# Patient Record
Sex: FEMALE | Race: WHITE | Employment: OTHER | URBAN - METROPOLITAN AREA
[De-identification: names, ages, dates, MRNs, and addresses within clinical notes are randomized per-mention and may not be internally consistent; named-entity substitution may affect disease eponyms.]

---

## 2017-01-03 ENCOUNTER — TELEPHONE (OUTPATIENT)
Dept: PULMONOLOGY | Age: 77
End: 2017-01-03

## 2017-01-03 DIAGNOSIS — G47.33 OSA (OBSTRUCTIVE SLEEP APNEA): Primary | ICD-10-CM

## 2017-01-03 ASSESSMENT — PAIN DESCRIPTION - PAIN TYPE: TYPE: CHRONIC PAIN

## 2017-01-03 ASSESSMENT — PAIN DESCRIPTION - LOCATION: LOCATION: GENERALIZED

## 2017-01-03 ASSESSMENT — PAIN - FUNCTIONAL ASSESSMENT: PAIN_FUNCTIONAL_ASSESSMENT: 0-10

## 2017-01-06 ENCOUNTER — HOSPITAL ENCOUNTER (OUTPATIENT)
Dept: SURGERY | Age: 77
Discharge: HOME | End: 2017-01-06
Attending: SURGERY | Admitting: SURGERY

## 2017-01-06 VITALS
WEIGHT: 189 LBS | SYSTOLIC BLOOD PRESSURE: 144 MMHG | TEMPERATURE: 97.6 F | DIASTOLIC BLOOD PRESSURE: 56 MMHG | BODY MASS INDEX: 37.11 KG/M2 | RESPIRATION RATE: 19 BRPM | OXYGEN SATURATION: 98 % | HEART RATE: 83 BPM | HEIGHT: 60 IN

## 2017-01-06 LAB
AMYLASE: 26 U/L (ref 25–115)
ANION GAP SERPL CALCULATED.3IONS-SCNC: 12 MMOL/L (ref 3–16)
BUN BLDV-MCNC: 13 MG/DL (ref 7–20)
CALCIUM SERPL-MCNC: 10.2 MG/DL (ref 8.3–10.6)
CHLORIDE BLD-SCNC: 98 MMOL/L (ref 99–110)
CO2: 31 MMOL/L (ref 21–32)
CREAT SERPL-MCNC: 0.8 MG/DL (ref 0.6–1.2)
GFR AFRICAN AMERICAN: >60
GFR NON-AFRICAN AMERICAN: >60
GLUCOSE BLD-MCNC: 109 MG/DL (ref 70–99)
POTASSIUM SERPL-SCNC: 3.2 MMOL/L (ref 3.5–5.1)
SODIUM BLD-SCNC: 141 MMOL/L (ref 136–145)

## 2017-01-06 PROCEDURE — 47562 LAPAROSCOPIC CHOLECYSTECTOMY: CPT | Performed by: SURGERY

## 2017-01-06 PROCEDURE — 93010 ELECTROCARDIOGRAM REPORT: CPT | Performed by: INTERNAL MEDICINE

## 2017-01-06 RX ORDER — OXYCODONE HYDROCHLORIDE AND ACETAMINOPHEN 5; 325 MG/1; MG/1
2 TABLET ORAL PRN
Status: COMPLETED | OUTPATIENT
Start: 2017-01-06 | End: 2017-01-06

## 2017-01-06 RX ORDER — IPRATROPIUM BROMIDE AND ALBUTEROL SULFATE 2.5; .5 MG/3ML; MG/3ML
1 SOLUTION RESPIRATORY (INHALATION) ONCE
Status: COMPLETED | OUTPATIENT
Start: 2017-01-06 | End: 2017-01-06

## 2017-01-06 RX ORDER — SODIUM CHLORIDE, SODIUM LACTATE, POTASSIUM CHLORIDE, CALCIUM CHLORIDE 600; 310; 30; 20 MG/100ML; MG/100ML; MG/100ML; MG/100ML
INJECTION, SOLUTION INTRAVENOUS CONTINUOUS
Status: DISCONTINUED | OUTPATIENT
Start: 2017-01-06 | End: 2017-01-07 | Stop reason: HOSPADM

## 2017-01-06 RX ORDER — HYDROMORPHONE HCL 110MG/55ML
0.5 PATIENT CONTROLLED ANALGESIA SYRINGE INTRAVENOUS EVERY 5 MIN PRN
Status: COMPLETED | OUTPATIENT
Start: 2017-01-06 | End: 2017-01-06

## 2017-01-06 RX ORDER — SODIUM CHLORIDE 0.9 % (FLUSH) 0.9 %
10 SYRINGE (ML) INJECTION EVERY 12 HOURS SCHEDULED
Status: DISCONTINUED | OUTPATIENT
Start: 2017-01-06 | End: 2017-01-07 | Stop reason: HOSPADM

## 2017-01-06 RX ORDER — HEPARIN SODIUM 5000 [USP'U]/ML
5000 INJECTION, SOLUTION INTRAVENOUS; SUBCUTANEOUS ONCE
Status: COMPLETED | OUTPATIENT
Start: 2017-01-06 | End: 2017-01-06

## 2017-01-06 RX ORDER — LIDOCAINE HYDROCHLORIDE 10 MG/ML
1 INJECTION, SOLUTION EPIDURAL; INFILTRATION; INTRACAUDAL; PERINEURAL
Status: DISPENSED | OUTPATIENT
Start: 2017-01-06 | End: 2017-01-06

## 2017-01-06 RX ORDER — HYDROMORPHONE HCL 110MG/55ML
0.25 PATIENT CONTROLLED ANALGESIA SYRINGE INTRAVENOUS EVERY 5 MIN PRN
Status: DISCONTINUED | OUTPATIENT
Start: 2017-01-06 | End: 2017-01-07 | Stop reason: HOSPADM

## 2017-01-06 RX ORDER — HYDROCODONE BITARTRATE AND ACETAMINOPHEN 10; 325 MG/1; MG/1
1 TABLET ORAL EVERY 6 HOURS PRN
Qty: 30 TABLET | Refills: 0 | Status: SHIPPED | OUTPATIENT
Start: 2017-01-06 | End: 2019-10-08

## 2017-01-06 RX ORDER — SODIUM CHLORIDE 0.9 % (FLUSH) 0.9 %
10 SYRINGE (ML) INJECTION PRN
Status: DISCONTINUED | OUTPATIENT
Start: 2017-01-06 | End: 2017-01-07 | Stop reason: HOSPADM

## 2017-01-06 RX ORDER — HYDRALAZINE HYDROCHLORIDE 20 MG/ML
5 INJECTION INTRAMUSCULAR; INTRAVENOUS
Status: DISCONTINUED | OUTPATIENT
Start: 2017-01-06 | End: 2017-01-07 | Stop reason: HOSPADM

## 2017-01-06 RX ORDER — IPRATROPIUM BROMIDE AND ALBUTEROL SULFATE 2.5; .5 MG/3ML; MG/3ML
SOLUTION RESPIRATORY (INHALATION)
Status: COMPLETED
Start: 2017-01-06 | End: 2017-01-06

## 2017-01-06 RX ORDER — ONDANSETRON 2 MG/ML
4 INJECTION INTRAMUSCULAR; INTRAVENOUS
Status: COMPLETED | OUTPATIENT
Start: 2017-01-06 | End: 2017-01-06

## 2017-01-06 RX ORDER — MEPERIDINE HYDROCHLORIDE 25 MG/ML
12.5 INJECTION INTRAMUSCULAR; INTRAVENOUS; SUBCUTANEOUS EVERY 5 MIN PRN
Status: DISCONTINUED | OUTPATIENT
Start: 2017-01-06 | End: 2017-01-07 | Stop reason: HOSPADM

## 2017-01-06 RX ORDER — LABETALOL HYDROCHLORIDE 5 MG/ML
5 INJECTION, SOLUTION INTRAVENOUS EVERY 10 MIN PRN
Status: DISCONTINUED | OUTPATIENT
Start: 2017-01-06 | End: 2017-01-07 | Stop reason: HOSPADM

## 2017-01-06 RX ORDER — OXYCODONE HYDROCHLORIDE AND ACETAMINOPHEN 5; 325 MG/1; MG/1
1 TABLET ORAL PRN
Status: COMPLETED | OUTPATIENT
Start: 2017-01-06 | End: 2017-01-06

## 2017-01-06 RX ADMIN — Medication 0.25 MG: at 12:10

## 2017-01-06 RX ADMIN — Medication 0.25 MG: at 12:00

## 2017-01-06 RX ADMIN — Medication 0.5 MG: at 10:52

## 2017-01-06 RX ADMIN — ONDANSETRON 4 MG: 2 INJECTION INTRAMUSCULAR; INTRAVENOUS at 10:52

## 2017-01-06 RX ADMIN — HEPARIN SODIUM 5000 UNITS: 5000 INJECTION, SOLUTION INTRAVENOUS; SUBCUTANEOUS at 09:03

## 2017-01-06 RX ADMIN — IPRATROPIUM BROMIDE AND ALBUTEROL SULFATE 1 AMPULE: 2.5; .5 SOLUTION RESPIRATORY (INHALATION) at 11:44

## 2017-01-06 RX ADMIN — MEPERIDINE HYDROCHLORIDE 12.5 MG: 25 INJECTION INTRAMUSCULAR; INTRAVENOUS; SUBCUTANEOUS at 11:07

## 2017-01-06 RX ADMIN — Medication 0.5 MG: at 11:10

## 2017-01-06 RX ADMIN — Medication 0.5 MG: at 11:07

## 2017-01-06 RX ADMIN — Medication 0.5 MG: at 11:23

## 2017-01-06 RX ADMIN — OXYCODONE HYDROCHLORIDE AND ACETAMINOPHEN 2 TABLET: 5; 325 TABLET ORAL at 11:40

## 2017-01-06 RX ADMIN — Medication 0.25 MG: at 12:15

## 2017-01-06 RX ADMIN — MEPERIDINE HYDROCHLORIDE 12.5 MG: 25 INJECTION INTRAMUSCULAR; INTRAVENOUS; SUBCUTANEOUS at 10:52

## 2017-01-06 RX ADMIN — SODIUM CHLORIDE, SODIUM LACTATE, POTASSIUM CHLORIDE, CALCIUM CHLORIDE: 600; 310; 30; 20 INJECTION, SOLUTION INTRAVENOUS at 09:05

## 2017-01-06 ASSESSMENT — PAIN DESCRIPTION - DESCRIPTORS: DESCRIPTORS: CONSTANT

## 2017-01-06 ASSESSMENT — PAIN SCALES - GENERAL
PAINLEVEL_OUTOF10: 7
PAINLEVEL_OUTOF10: 9
PAINLEVEL_OUTOF10: 5
PAINLEVEL_OUTOF10: 7
PAINLEVEL_OUTOF10: 8
PAINLEVEL_OUTOF10: 4
PAINLEVEL_OUTOF10: 8
PAINLEVEL_OUTOF10: 6
PAINLEVEL_OUTOF10: 8
PAINLEVEL_OUTOF10: 7
PAINLEVEL_OUTOF10: 8

## 2017-01-06 ASSESSMENT — PAIN - FUNCTIONAL ASSESSMENT: PAIN_FUNCTIONAL_ASSESSMENT: 0-10

## 2017-01-10 LAB
EKG ATRIAL RATE: 68 BPM
EKG DIAGNOSIS: NORMAL
EKG P AXIS: 24 DEGREES
EKG P-R INTERVAL: 162 MS
EKG Q-T INTERVAL: 444 MS
EKG QRS DURATION: 96 MS
EKG QTC CALCULATION (BAZETT): 472 MS
EKG R AXIS: -21 DEGREES
EKG T AXIS: -6 DEGREES
EKG VENTRICULAR RATE: 68 BPM

## 2017-01-16 RX ORDER — HYDROCHLOROTHIAZIDE 25 MG/1
TABLET ORAL
Qty: 30 TABLET | Refills: 2 | Status: SHIPPED | OUTPATIENT
Start: 2017-01-16 | End: 2017-04-19 | Stop reason: SDUPTHER

## 2017-01-19 ENCOUNTER — OFFICE VISIT (OUTPATIENT)
Dept: SURGERY | Age: 77
End: 2017-01-19

## 2017-01-19 VITALS
SYSTOLIC BLOOD PRESSURE: 126 MMHG | DIASTOLIC BLOOD PRESSURE: 70 MMHG | BODY MASS INDEX: 37.11 KG/M2 | HEIGHT: 60 IN | WEIGHT: 189 LBS

## 2017-01-19 DIAGNOSIS — Z98.890 POST-OPERATIVE STATE: Primary | ICD-10-CM

## 2017-01-19 PROCEDURE — 99024 POSTOP FOLLOW-UP VISIT: CPT | Performed by: SURGERY

## 2017-01-26 RX ORDER — TRIAMCINOLONE ACETONIDE 1 MG/G
CREAM TOPICAL
Qty: 454 G | Refills: 0 | Status: SHIPPED | OUTPATIENT
Start: 2017-01-26 | End: 2019-01-21

## 2017-02-08 ENCOUNTER — TELEPHONE (OUTPATIENT)
Dept: FAMILY MEDICINE CLINIC | Age: 77
End: 2017-02-08

## 2017-02-08 DIAGNOSIS — I10 ESSENTIAL HYPERTENSION: ICD-10-CM

## 2017-02-08 RX ORDER — CETIRIZINE HYDROCHLORIDE 10 MG/1
TABLET ORAL
Qty: 30 TABLET | Refills: 5 | Status: SHIPPED | OUTPATIENT
Start: 2017-02-08 | End: 2017-09-15 | Stop reason: SDUPTHER

## 2017-02-08 RX ORDER — TRAZODONE HYDROCHLORIDE 100 MG/1
TABLET ORAL
Qty: 60 TABLET | Refills: 5 | Status: SHIPPED | OUTPATIENT
Start: 2017-02-08 | End: 2017-08-21 | Stop reason: SDUPTHER

## 2017-02-08 RX ORDER — CETIRIZINE HYDROCHLORIDE 10 MG/1
TABLET ORAL
Qty: 30 TABLET | Refills: 3 | Status: SHIPPED | OUTPATIENT
Start: 2017-02-08 | End: 2017-02-08 | Stop reason: SDUPTHER

## 2017-02-08 RX ORDER — AMLODIPINE BESYLATE 10 MG/1
TABLET ORAL
Qty: 30 TABLET | Refills: 5 | Status: SHIPPED | OUTPATIENT
Start: 2017-02-08

## 2017-02-10 ENCOUNTER — OFFICE VISIT (OUTPATIENT)
Dept: PULMONOLOGY | Age: 77
End: 2017-02-10

## 2017-02-10 VITALS
SYSTOLIC BLOOD PRESSURE: 116 MMHG | RESPIRATION RATE: 18 BRPM | TEMPERATURE: 97.6 F | OXYGEN SATURATION: 95 % | DIASTOLIC BLOOD PRESSURE: 70 MMHG | WEIGHT: 190.2 LBS | HEART RATE: 70 BPM | HEIGHT: 60 IN | BODY MASS INDEX: 37.34 KG/M2

## 2017-02-10 DIAGNOSIS — G47.33 OSA (OBSTRUCTIVE SLEEP APNEA): Primary | ICD-10-CM

## 2017-02-10 DIAGNOSIS — E66.9 OBESITY (BMI 30-39.9): ICD-10-CM

## 2017-02-10 DIAGNOSIS — Z78.9 DIFFICULTY WITH BIPAP USE: ICD-10-CM

## 2017-02-10 DIAGNOSIS — R68.2 DRY MOUTH: ICD-10-CM

## 2017-02-10 PROCEDURE — G8427 DOCREV CUR MEDS BY ELIG CLIN: HCPCS | Performed by: NURSE PRACTITIONER

## 2017-02-10 PROCEDURE — G8399 PT W/DXA RESULTS DOCUMENT: HCPCS | Performed by: NURSE PRACTITIONER

## 2017-02-10 PROCEDURE — 1036F TOBACCO NON-USER: CPT | Performed by: NURSE PRACTITIONER

## 2017-02-10 PROCEDURE — G8484 FLU IMMUNIZE NO ADMIN: HCPCS | Performed by: NURSE PRACTITIONER

## 2017-02-10 PROCEDURE — 4040F PNEUMOC VAC/ADMIN/RCVD: CPT | Performed by: NURSE PRACTITIONER

## 2017-02-10 PROCEDURE — 1123F ACP DISCUSS/DSCN MKR DOCD: CPT | Performed by: NURSE PRACTITIONER

## 2017-02-10 PROCEDURE — 1090F PRES/ABSN URINE INCON ASSESS: CPT | Performed by: NURSE PRACTITIONER

## 2017-02-10 PROCEDURE — G8417 CALC BMI ABV UP PARAM F/U: HCPCS | Performed by: NURSE PRACTITIONER

## 2017-02-10 PROCEDURE — G8598 ASA/ANTIPLAT THER USED: HCPCS | Performed by: NURSE PRACTITIONER

## 2017-02-10 PROCEDURE — 99213 OFFICE O/P EST LOW 20 MIN: CPT | Performed by: NURSE PRACTITIONER

## 2017-02-10 ASSESSMENT — SLEEP AND FATIGUE QUESTIONNAIRES
HOW LIKELY ARE YOU TO NOD OFF OR FALL ASLEEP WHILE SITTING AND TALKING TO SOMEONE: 0
HOW LIKELY ARE YOU TO NOD OFF OR FALL ASLEEP IN A CAR, WHILE STOPPED FOR A FEW MINUTES IN TRAFFIC: 0
ESS TOTAL SCORE: 3
HOW LIKELY ARE YOU TO NOD OFF OR FALL ASLEEP WHEN YOU ARE A PASSENGER IN A CAR FOR AN HOUR WITHOUT A BREAK: 0
HOW LIKELY ARE YOU TO NOD OFF OR FALL ASLEEP WHILE WATCHING TV: 2
HOW LIKELY ARE YOU TO NOD OFF OR FALL ASLEEP WHILE SITTING QUIETLY AFTER LUNCH WITHOUT ALCOHOL: 0
HOW LIKELY ARE YOU TO NOD OFF OR FALL ASLEEP WHILE SITTING AND READING: 1
HOW LIKELY ARE YOU TO NOD OFF OR FALL ASLEEP WHILE LYING DOWN TO REST IN THE AFTERNOON WHEN CIRCUMSTANCES PERMIT: 0
HOW LIKELY ARE YOU TO NOD OFF OR FALL ASLEEP WHILE SITTING INACTIVE IN A PUBLIC PLACE: 0
NECK CIRCUMFERENCE (INCHES): 14

## 2017-02-14 ENCOUNTER — TELEPHONE (OUTPATIENT)
Dept: ORTHOPEDIC SURGERY | Age: 77
End: 2017-02-14

## 2017-02-14 RX ORDER — CITALOPRAM 20 MG/1
TABLET ORAL
Qty: 30 TABLET | Refills: 5 | Status: SHIPPED | OUTPATIENT
Start: 2017-02-14 | End: 2017-10-16 | Stop reason: SDUPTHER

## 2017-02-15 ENCOUNTER — OFFICE VISIT (OUTPATIENT)
Dept: FAMILY MEDICINE CLINIC | Age: 77
End: 2017-02-15

## 2017-02-15 VITALS
HEIGHT: 60 IN | WEIGHT: 191 LBS | HEART RATE: 65 BPM | DIASTOLIC BLOOD PRESSURE: 70 MMHG | BODY MASS INDEX: 37.5 KG/M2 | SYSTOLIC BLOOD PRESSURE: 120 MMHG | OXYGEN SATURATION: 97 %

## 2017-02-15 DIAGNOSIS — G47.33 OSA (OBSTRUCTIVE SLEEP APNEA): ICD-10-CM

## 2017-02-15 DIAGNOSIS — G44.221 CHRONIC TENSION-TYPE HEADACHE, INTRACTABLE: ICD-10-CM

## 2017-02-15 DIAGNOSIS — I10 ESSENTIAL HYPERTENSION: Primary | ICD-10-CM

## 2017-02-15 PROCEDURE — 1090F PRES/ABSN URINE INCON ASSESS: CPT | Performed by: FAMILY MEDICINE

## 2017-02-15 PROCEDURE — G8399 PT W/DXA RESULTS DOCUMENT: HCPCS | Performed by: FAMILY MEDICINE

## 2017-02-15 PROCEDURE — 4040F PNEUMOC VAC/ADMIN/RCVD: CPT | Performed by: FAMILY MEDICINE

## 2017-02-15 PROCEDURE — G8417 CALC BMI ABV UP PARAM F/U: HCPCS | Performed by: FAMILY MEDICINE

## 2017-02-15 PROCEDURE — G8484 FLU IMMUNIZE NO ADMIN: HCPCS | Performed by: FAMILY MEDICINE

## 2017-02-15 PROCEDURE — G8427 DOCREV CUR MEDS BY ELIG CLIN: HCPCS | Performed by: FAMILY MEDICINE

## 2017-02-15 PROCEDURE — G8598 ASA/ANTIPLAT THER USED: HCPCS | Performed by: FAMILY MEDICINE

## 2017-02-15 PROCEDURE — 1036F TOBACCO NON-USER: CPT | Performed by: FAMILY MEDICINE

## 2017-02-15 PROCEDURE — 99214 OFFICE O/P EST MOD 30 MIN: CPT | Performed by: FAMILY MEDICINE

## 2017-02-15 PROCEDURE — 1123F ACP DISCUSS/DSCN MKR DOCD: CPT | Performed by: FAMILY MEDICINE

## 2017-02-15 RX ORDER — AMITRIPTYLINE HYDROCHLORIDE 50 MG/1
50 TABLET, FILM COATED ORAL NIGHTLY
Qty: 30 TABLET | Refills: 5 | Status: SHIPPED | OUTPATIENT
Start: 2017-02-15 | End: 2017-09-05 | Stop reason: SDUPTHER

## 2017-02-15 ASSESSMENT — ENCOUNTER SYMPTOMS
BACK PAIN: 1
GASTROINTESTINAL NEGATIVE: 1
RESPIRATORY NEGATIVE: 1

## 2017-02-20 RX ORDER — AMITRIPTYLINE HYDROCHLORIDE 25 MG/1
TABLET, FILM COATED ORAL
Qty: 30 TABLET | Refills: 5 | OUTPATIENT
Start: 2017-02-20

## 2017-02-22 ENCOUNTER — TELEPHONE (OUTPATIENT)
Dept: ORTHOPEDIC SURGERY | Age: 77
End: 2017-02-22

## 2017-02-28 ENCOUNTER — OFFICE VISIT (OUTPATIENT)
Dept: SURGERY | Age: 77
End: 2017-02-28

## 2017-02-28 ENCOUNTER — HOSPITAL ENCOUNTER (OUTPATIENT)
Dept: OTHER | Age: 77
Discharge: HOME | End: 2017-02-28
Attending: SURGERY | Admitting: SURGERY

## 2017-02-28 VITALS
SYSTOLIC BLOOD PRESSURE: 138 MMHG | DIASTOLIC BLOOD PRESSURE: 74 MMHG | BODY MASS INDEX: 36.91 KG/M2 | HEIGHT: 60 IN | WEIGHT: 188 LBS

## 2017-02-28 DIAGNOSIS — R10.13 EPIGASTRIC PAIN: ICD-10-CM

## 2017-02-28 DIAGNOSIS — Z98.890 POST-OPERATIVE STATE: Primary | ICD-10-CM

## 2017-02-28 LAB
ALBUMIN SERPL-MCNC: 4.3 G/DL (ref 3.4–5)
ALP BLD-CCNC: 109 U/L (ref 40–129)
ALT SERPL-CCNC: 21 U/L (ref 10–40)
ANION GAP SERPL CALCULATED.3IONS-SCNC: 18 MMOL/L (ref 3–16)
AST SERPL-CCNC: 26 U/L (ref 15–37)
BASOPHILS ABSOLUTE: 0.1 K/UL (ref 0–0.2)
BASOPHILS RELATIVE PERCENT: 0.7 %
BILIRUB SERPL-MCNC: <0.2 MG/DL (ref 0–1)
BILIRUBIN DIRECT: <0.2 MG/DL (ref 0–0.3)
BILIRUBIN, INDIRECT: NORMAL MG/DL (ref 0–1)
BUN BLDV-MCNC: 20 MG/DL (ref 7–20)
CALCIUM SERPL-MCNC: 9.5 MG/DL (ref 8.3–10.6)
CHLORIDE BLD-SCNC: 99 MMOL/L (ref 99–110)
CO2: 26 MMOL/L (ref 21–32)
CREAT SERPL-MCNC: 0.9 MG/DL (ref 0.6–1.2)
EOSINOPHILS ABSOLUTE: 0.1 K/UL (ref 0–0.6)
EOSINOPHILS RELATIVE PERCENT: 0.9 %
GFR AFRICAN AMERICAN: >60
GFR NON-AFRICAN AMERICAN: >60
GLUCOSE BLD-MCNC: 93 MG/DL (ref 70–99)
HCT VFR BLD CALC: 43.9 % (ref 36–48)
HEMOGLOBIN: 13.9 G/DL (ref 12–16)
LYMPHOCYTES ABSOLUTE: 2.8 K/UL (ref 1–5.1)
LYMPHOCYTES RELATIVE PERCENT: 24.4 %
MCH RBC QN AUTO: 26.1 PG (ref 26–34)
MCHC RBC AUTO-ENTMCNC: 31.7 G/DL (ref 31–36)
MCV RBC AUTO: 82.5 FL (ref 80–100)
MONOCYTES ABSOLUTE: 0.8 K/UL (ref 0–1.3)
MONOCYTES RELATIVE PERCENT: 6.7 %
NEUTROPHILS ABSOLUTE: 7.7 K/UL (ref 1.7–7.7)
NEUTROPHILS RELATIVE PERCENT: 67.3 %
PDW BLD-RTO: 14.6 % (ref 12.4–15.4)
PLATELET # BLD: 217 K/UL (ref 135–450)
PMV BLD AUTO: 8.1 FL (ref 5–10.5)
POTASSIUM SERPL-SCNC: 4.3 MMOL/L (ref 3.5–5.1)
RBC # BLD: 5.33 M/UL (ref 4–5.2)
SODIUM BLD-SCNC: 143 MMOL/L (ref 136–145)
TOTAL PROTEIN: 7.4 G/DL (ref 6.4–8.2)
WBC # BLD: 11.4 K/UL (ref 4–11)

## 2017-02-28 PROCEDURE — 99024 POSTOP FOLLOW-UP VISIT: CPT | Performed by: SURGERY

## 2017-03-02 ENCOUNTER — TELEPHONE (OUTPATIENT)
Dept: SURGERY | Age: 77
End: 2017-03-02

## 2017-03-06 DIAGNOSIS — I10 ESSENTIAL HYPERTENSION: ICD-10-CM

## 2017-03-06 RX ORDER — AMLODIPINE BESYLATE 10 MG/1
TABLET ORAL
Qty: 30 TABLET | Refills: 5 | OUTPATIENT
Start: 2017-03-06

## 2017-03-17 ENCOUNTER — OFFICE VISIT (OUTPATIENT)
Dept: PULMONOLOGY | Age: 77
End: 2017-03-17

## 2017-03-17 VITALS
OXYGEN SATURATION: 97 % | BODY MASS INDEX: 38.3 KG/M2 | RESPIRATION RATE: 18 BRPM | DIASTOLIC BLOOD PRESSURE: 73 MMHG | SYSTOLIC BLOOD PRESSURE: 132 MMHG | WEIGHT: 190 LBS | HEART RATE: 67 BPM | HEIGHT: 59 IN | TEMPERATURE: 97.4 F

## 2017-03-17 DIAGNOSIS — G47.33 OSA (OBSTRUCTIVE SLEEP APNEA): Primary | ICD-10-CM

## 2017-03-17 DIAGNOSIS — E66.9 OBESITY (BMI 30-39.9): ICD-10-CM

## 2017-03-17 PROCEDURE — 1090F PRES/ABSN URINE INCON ASSESS: CPT | Performed by: NURSE PRACTITIONER

## 2017-03-17 PROCEDURE — G8484 FLU IMMUNIZE NO ADMIN: HCPCS | Performed by: NURSE PRACTITIONER

## 2017-03-17 PROCEDURE — 99213 OFFICE O/P EST LOW 20 MIN: CPT | Performed by: NURSE PRACTITIONER

## 2017-03-17 PROCEDURE — G8399 PT W/DXA RESULTS DOCUMENT: HCPCS | Performed by: NURSE PRACTITIONER

## 2017-03-17 PROCEDURE — 1123F ACP DISCUSS/DSCN MKR DOCD: CPT | Performed by: NURSE PRACTITIONER

## 2017-03-17 PROCEDURE — G8417 CALC BMI ABV UP PARAM F/U: HCPCS | Performed by: NURSE PRACTITIONER

## 2017-03-17 PROCEDURE — G8427 DOCREV CUR MEDS BY ELIG CLIN: HCPCS | Performed by: NURSE PRACTITIONER

## 2017-03-17 PROCEDURE — 1036F TOBACCO NON-USER: CPT | Performed by: NURSE PRACTITIONER

## 2017-03-17 PROCEDURE — 4040F PNEUMOC VAC/ADMIN/RCVD: CPT | Performed by: NURSE PRACTITIONER

## 2017-03-17 PROCEDURE — G8598 ASA/ANTIPLAT THER USED: HCPCS | Performed by: NURSE PRACTITIONER

## 2017-03-17 ASSESSMENT — SLEEP AND FATIGUE QUESTIONNAIRES
NECK CIRCUMFERENCE (INCHES): 14.5
ESS TOTAL SCORE: 1
HOW LIKELY ARE YOU TO NOD OFF OR FALL ASLEEP WHILE LYING DOWN TO REST IN THE AFTERNOON WHEN CIRCUMSTANCES PERMIT: 0
HOW LIKELY ARE YOU TO NOD OFF OR FALL ASLEEP WHILE WATCHING TV: 0
HOW LIKELY ARE YOU TO NOD OFF OR FALL ASLEEP WHILE SITTING QUIETLY AFTER LUNCH WITHOUT ALCOHOL: 0
HOW LIKELY ARE YOU TO NOD OFF OR FALL ASLEEP WHILE SITTING INACTIVE IN A PUBLIC PLACE: 0
HOW LIKELY ARE YOU TO NOD OFF OR FALL ASLEEP IN A CAR, WHILE STOPPED FOR A FEW MINUTES IN TRAFFIC: 0
HOW LIKELY ARE YOU TO NOD OFF OR FALL ASLEEP WHEN YOU ARE A PASSENGER IN A CAR FOR AN HOUR WITHOUT A BREAK: 0
HOW LIKELY ARE YOU TO NOD OFF OR FALL ASLEEP WHILE SITTING AND TALKING TO SOMEONE: 0
HOW LIKELY ARE YOU TO NOD OFF OR FALL ASLEEP WHILE SITTING AND READING: 1

## 2017-03-22 RX ORDER — BUTALBITAL, ACETAMINOPHEN AND CAFFEINE 50; 325; 40 MG/1; MG/1; MG/1
1 TABLET ORAL EVERY 6 HOURS PRN
Qty: 30 TABLET | Refills: 0 | Status: SHIPPED | OUTPATIENT
Start: 2017-03-22 | End: 2017-04-24 | Stop reason: SDUPTHER

## 2017-03-23 ENCOUNTER — HOSPITAL ENCOUNTER (OUTPATIENT)
Dept: SURGERY | Age: 77
Discharge: HOME | End: 2017-03-23
Attending: INTERNAL MEDICINE | Admitting: INTERNAL MEDICINE

## 2017-03-23 VITALS
BODY MASS INDEX: 38.3 KG/M2 | TEMPERATURE: 97.4 F | SYSTOLIC BLOOD PRESSURE: 138 MMHG | OXYGEN SATURATION: 95 % | DIASTOLIC BLOOD PRESSURE: 53 MMHG | HEIGHT: 59 IN | WEIGHT: 190 LBS | HEART RATE: 60 BPM | RESPIRATION RATE: 18 BRPM

## 2017-03-23 RX ORDER — CALCIUM CARBONATE 200(500)MG
1 TABLET,CHEWABLE ORAL DAILY
COMMUNITY
End: 2019-01-05

## 2017-03-23 RX ORDER — SODIUM CHLORIDE, SODIUM LACTATE, POTASSIUM CHLORIDE, CALCIUM CHLORIDE 600; 310; 30; 20 MG/100ML; MG/100ML; MG/100ML; MG/100ML
INJECTION, SOLUTION INTRAVENOUS ONCE
Status: COMPLETED | OUTPATIENT
Start: 2017-03-23 | End: 2017-03-23

## 2017-03-23 RX ORDER — LIDOCAINE HYDROCHLORIDE 10 MG/ML
0.1 INJECTION, SOLUTION EPIDURAL; INFILTRATION; INTRACAUDAL; PERINEURAL
Status: ACTIVE | OUTPATIENT
Start: 2017-03-23 | End: 2017-03-23

## 2017-03-23 RX ADMIN — SODIUM CHLORIDE, SODIUM LACTATE, POTASSIUM CHLORIDE, CALCIUM CHLORIDE: 600; 310; 30; 20 INJECTION, SOLUTION INTRAVENOUS at 10:16

## 2017-03-23 ASSESSMENT — PAIN DESCRIPTION - DESCRIPTORS: DESCRIPTORS: DISCOMFORT;DULL

## 2017-03-23 ASSESSMENT — PAIN SCALES - GENERAL
PAINLEVEL_OUTOF10: 0

## 2017-03-23 ASSESSMENT — PAIN - FUNCTIONAL ASSESSMENT: PAIN_FUNCTIONAL_ASSESSMENT: 0-10

## 2017-04-18 ENCOUNTER — TELEPHONE (OUTPATIENT)
Dept: FAMILY MEDICINE CLINIC | Age: 77
End: 2017-04-18

## 2017-04-18 ENCOUNTER — HOSPITAL ENCOUNTER (OUTPATIENT)
Dept: SLEEP MEDICINE | Age: 77
Discharge: HOME | End: 2017-04-20
Attending: NURSE PRACTITIONER | Admitting: NURSE PRACTITIONER

## 2017-04-18 DIAGNOSIS — G47.33 OSA (OBSTRUCTIVE SLEEP APNEA): ICD-10-CM

## 2017-04-19 RX ORDER — HYDROCHLOROTHIAZIDE 25 MG/1
TABLET ORAL
Qty: 60 TABLET | Refills: 1 | Status: SHIPPED | OUTPATIENT
Start: 2017-04-19 | End: 2017-09-08 | Stop reason: SDUPTHER

## 2017-04-20 DIAGNOSIS — G47.33 OSA (OBSTRUCTIVE SLEEP APNEA): Primary | ICD-10-CM

## 2017-04-27 ENCOUNTER — TELEPHONE (OUTPATIENT)
Dept: PULMONOLOGY | Age: 77
End: 2017-04-27

## 2017-04-27 DIAGNOSIS — G47.33 OSA (OBSTRUCTIVE SLEEP APNEA): ICD-10-CM

## 2017-04-27 DIAGNOSIS — G47.31 CSA (CENTRAL SLEEP APNEA): Primary | ICD-10-CM

## 2017-05-04 ENCOUNTER — TELEPHONE (OUTPATIENT)
Dept: PULMONOLOGY | Age: 77
End: 2017-05-04

## 2017-05-04 DIAGNOSIS — G47.33 OSA (OBSTRUCTIVE SLEEP APNEA): Primary | ICD-10-CM

## 2017-05-09 ENCOUNTER — TELEPHONE (OUTPATIENT)
Dept: CARDIOLOGY CLINIC | Age: 77
End: 2017-05-09

## 2017-05-18 ENCOUNTER — OFFICE VISIT (OUTPATIENT)
Dept: FAMILY MEDICINE CLINIC | Age: 77
End: 2017-05-18

## 2017-05-18 VITALS
OXYGEN SATURATION: 94 % | HEART RATE: 94 BPM | BODY MASS INDEX: 36.91 KG/M2 | WEIGHT: 188 LBS | DIASTOLIC BLOOD PRESSURE: 74 MMHG | HEIGHT: 60 IN | SYSTOLIC BLOOD PRESSURE: 130 MMHG

## 2017-05-18 DIAGNOSIS — G44.221 CHRONIC TENSION-TYPE HEADACHE, INTRACTABLE: ICD-10-CM

## 2017-05-18 DIAGNOSIS — I10 ESSENTIAL HYPERTENSION: Primary | ICD-10-CM

## 2017-05-18 DIAGNOSIS — F51.01 PRIMARY INSOMNIA: ICD-10-CM

## 2017-05-18 PROCEDURE — 1036F TOBACCO NON-USER: CPT | Performed by: FAMILY MEDICINE

## 2017-05-18 PROCEDURE — G8427 DOCREV CUR MEDS BY ELIG CLIN: HCPCS | Performed by: FAMILY MEDICINE

## 2017-05-18 PROCEDURE — G8399 PT W/DXA RESULTS DOCUMENT: HCPCS | Performed by: FAMILY MEDICINE

## 2017-05-18 PROCEDURE — G8598 ASA/ANTIPLAT THER USED: HCPCS | Performed by: FAMILY MEDICINE

## 2017-05-18 PROCEDURE — G8417 CALC BMI ABV UP PARAM F/U: HCPCS | Performed by: FAMILY MEDICINE

## 2017-05-18 PROCEDURE — 1123F ACP DISCUSS/DSCN MKR DOCD: CPT | Performed by: FAMILY MEDICINE

## 2017-05-18 PROCEDURE — 99214 OFFICE O/P EST MOD 30 MIN: CPT | Performed by: FAMILY MEDICINE

## 2017-05-18 PROCEDURE — 4040F PNEUMOC VAC/ADMIN/RCVD: CPT | Performed by: FAMILY MEDICINE

## 2017-05-18 PROCEDURE — 1090F PRES/ABSN URINE INCON ASSESS: CPT | Performed by: FAMILY MEDICINE

## 2017-05-18 ASSESSMENT — ENCOUNTER SYMPTOMS: GASTROINTESTINAL NEGATIVE: 1

## 2017-05-22 RX ORDER — BUTALBITAL, ACETAMINOPHEN AND CAFFEINE 50; 325; 40 MG/1; MG/1; MG/1
TABLET ORAL
Qty: 30 TABLET | Refills: 0 | Status: SHIPPED | OUTPATIENT
Start: 2017-05-22 | End: 2017-10-20 | Stop reason: SDUPTHER

## 2017-06-06 ENCOUNTER — TELEPHONE (OUTPATIENT)
Dept: PULMONOLOGY | Age: 77
End: 2017-06-06

## 2017-06-13 ENCOUNTER — OFFICE VISIT (OUTPATIENT)
Dept: PULMONOLOGY | Age: 77
End: 2017-06-13

## 2017-06-13 VITALS
RESPIRATION RATE: 16 BRPM | WEIGHT: 188 LBS | HEIGHT: 59 IN | DIASTOLIC BLOOD PRESSURE: 74 MMHG | BODY MASS INDEX: 37.9 KG/M2 | OXYGEN SATURATION: 97 % | HEART RATE: 84 BPM | TEMPERATURE: 97 F | SYSTOLIC BLOOD PRESSURE: 136 MMHG

## 2017-06-13 DIAGNOSIS — E66.9 OBESITY (BMI 30-39.9): ICD-10-CM

## 2017-06-13 DIAGNOSIS — Z78.9 DIFFICULTY WITH BIPAP USE: ICD-10-CM

## 2017-06-13 DIAGNOSIS — G47.09 OTHER INSOMNIA: ICD-10-CM

## 2017-06-13 DIAGNOSIS — G47.33 OSA (OBSTRUCTIVE SLEEP APNEA): Primary | ICD-10-CM

## 2017-06-13 PROCEDURE — 99213 OFFICE O/P EST LOW 20 MIN: CPT | Performed by: NURSE PRACTITIONER

## 2017-06-13 RX ORDER — CLOPIDOGREL BISULFATE 75 MG/1
75 TABLET ORAL
COMMUNITY
Start: 2017-05-20 | End: 2019-01-08

## 2017-06-13 RX ORDER — LIDOCAINE 50 MG/G
OINTMENT TOPICAL
COMMUNITY
Start: 2016-08-17 | End: 2019-01-05

## 2017-06-13 ASSESSMENT — SLEEP AND FATIGUE QUESTIONNAIRES
HOW LIKELY ARE YOU TO NOD OFF OR FALL ASLEEP IN A CAR, WHILE STOPPED FOR A FEW MINUTES IN TRAFFIC: 0
HOW LIKELY ARE YOU TO NOD OFF OR FALL ASLEEP WHILE SITTING AND TALKING TO SOMEONE: 0
HOW LIKELY ARE YOU TO NOD OFF OR FALL ASLEEP WHILE LYING DOWN TO REST IN THE AFTERNOON WHEN CIRCUMSTANCES PERMIT: 0
HOW LIKELY ARE YOU TO NOD OFF OR FALL ASLEEP WHILE SITTING QUIETLY AFTER LUNCH WITHOUT ALCOHOL: 0
HOW LIKELY ARE YOU TO NOD OFF OR FALL ASLEEP WHILE WATCHING TV: 2
HOW LIKELY ARE YOU TO NOD OFF OR FALL ASLEEP WHILE SITTING INACTIVE IN A PUBLIC PLACE: 0
HOW LIKELY ARE YOU TO NOD OFF OR FALL ASLEEP WHEN YOU ARE A PASSENGER IN A CAR FOR AN HOUR WITHOUT A BREAK: 0
HOW LIKELY ARE YOU TO NOD OFF OR FALL ASLEEP WHILE SITTING AND READING: 1
NECK CIRCUMFERENCE (INCHES): 15
ESS TOTAL SCORE: 3

## 2017-06-19 DIAGNOSIS — B37.9 CANDIDIASIS: ICD-10-CM

## 2017-06-20 RX ORDER — POTASSIUM CHLORIDE 750 MG/1
CAPSULE, EXTENDED RELEASE ORAL
Qty: 120 CAPSULE | Refills: 5 | Status: SHIPPED | OUTPATIENT
Start: 2017-06-20 | End: 2018-01-04 | Stop reason: SDUPTHER

## 2017-06-20 RX ORDER — NYSTATIN 100000 [USP'U]/G
POWDER TOPICAL
Qty: 1 BOTTLE | Refills: 0 | Status: SHIPPED | OUTPATIENT
Start: 2017-06-20 | End: 2017-08-30 | Stop reason: SDUPTHER

## 2017-08-10 ENCOUNTER — TELEPHONE (OUTPATIENT)
Dept: CARDIOLOGY CLINIC | Age: 77
End: 2017-08-10

## 2017-08-21 RX ORDER — TRAZODONE HYDROCHLORIDE 100 MG/1
TABLET ORAL
Qty: 60 TABLET | Refills: 1 | Status: SHIPPED | OUTPATIENT
Start: 2017-08-21 | End: 2017-10-27 | Stop reason: SDUPTHER

## 2017-08-30 DIAGNOSIS — B37.9 CANDIDIASIS: ICD-10-CM

## 2017-08-30 RX ORDER — NYSTATIN 100000 [USP'U]/G
POWDER TOPICAL
Qty: 15 G | Refills: 0 | Status: SHIPPED | OUTPATIENT
Start: 2017-08-30 | End: 2018-06-25 | Stop reason: SDUPTHER

## 2017-09-07 ENCOUNTER — TELEPHONE (OUTPATIENT)
Dept: FAMILY MEDICINE CLINIC | Age: 77
End: 2017-09-07

## 2017-09-08 RX ORDER — HYDROCHLOROTHIAZIDE 25 MG/1
TABLET ORAL
Qty: 60 TABLET | Refills: 0 | Status: SHIPPED | OUTPATIENT
Start: 2017-09-08 | End: 2017-12-07 | Stop reason: SDUPTHER

## 2017-09-11 ENCOUNTER — OFFICE VISIT (OUTPATIENT)
Dept: FAMILY MEDICINE CLINIC | Age: 77
End: 2017-09-11

## 2017-09-11 VITALS
DIASTOLIC BLOOD PRESSURE: 82 MMHG | HEIGHT: 60 IN | BODY MASS INDEX: 36.91 KG/M2 | HEART RATE: 65 BPM | SYSTOLIC BLOOD PRESSURE: 122 MMHG | OXYGEN SATURATION: 92 % | WEIGHT: 188 LBS

## 2017-09-11 DIAGNOSIS — Z01.818 PRE-OP TESTING: ICD-10-CM

## 2017-09-11 DIAGNOSIS — Z01.818 PRE-OP EXAM: Primary | ICD-10-CM

## 2017-09-11 DIAGNOSIS — Z12.31 ENCOUNTER FOR SCREENING MAMMOGRAM FOR HIGH-RISK PATIENT: ICD-10-CM

## 2017-09-11 LAB
A/G RATIO: 1.4 (ref 1.1–2.2)
ALBUMIN SERPL-MCNC: 4.1 G/DL (ref 3.4–5)
ALP BLD-CCNC: 127 U/L (ref 40–129)
ALT SERPL-CCNC: 13 U/L (ref 10–40)
ANION GAP SERPL CALCULATED.3IONS-SCNC: 15 MMOL/L (ref 3–16)
AST SERPL-CCNC: 16 U/L (ref 15–37)
BASOPHILS ABSOLUTE: 0 K/UL (ref 0–0.2)
BASOPHILS RELATIVE PERCENT: 0.5 %
BILIRUB SERPL-MCNC: <0.2 MG/DL (ref 0–1)
BUN BLDV-MCNC: 22 MG/DL (ref 7–20)
CALCIUM SERPL-MCNC: 9.5 MG/DL (ref 8.3–10.6)
CHLORIDE BLD-SCNC: 101 MMOL/L (ref 99–110)
CO2: 27 MMOL/L (ref 21–32)
CREAT SERPL-MCNC: 0.7 MG/DL (ref 0.6–1.2)
EOSINOPHILS ABSOLUTE: 0.3 K/UL (ref 0–0.6)
EOSINOPHILS RELATIVE PERCENT: 3.3 %
GFR AFRICAN AMERICAN: >60
GFR NON-AFRICAN AMERICAN: >60
GLOBULIN: 2.9 G/DL
GLUCOSE BLD-MCNC: 84 MG/DL (ref 70–99)
HCT VFR BLD CALC: 41.1 % (ref 36–48)
HEMOGLOBIN: 13.2 G/DL (ref 12–16)
LYMPHOCYTES ABSOLUTE: 2.2 K/UL (ref 1–5.1)
LYMPHOCYTES RELATIVE PERCENT: 23.3 %
MCH RBC QN AUTO: 26.4 PG (ref 26–34)
MCHC RBC AUTO-ENTMCNC: 32.2 G/DL (ref 31–36)
MCV RBC AUTO: 82.1 FL (ref 80–100)
MONOCYTES ABSOLUTE: 0.6 K/UL (ref 0–1.3)
MONOCYTES RELATIVE PERCENT: 6.3 %
NEUTROPHILS ABSOLUTE: 6.3 K/UL (ref 1.7–7.7)
NEUTROPHILS RELATIVE PERCENT: 66.6 %
PDW BLD-RTO: 15.4 % (ref 12.4–15.4)
PLATELET # BLD: 170 K/UL (ref 135–450)
PMV BLD AUTO: 8.2 FL (ref 5–10.5)
POTASSIUM SERPL-SCNC: 3.8 MMOL/L (ref 3.5–5.1)
RBC # BLD: 5 M/UL (ref 4–5.2)
SODIUM BLD-SCNC: 143 MMOL/L (ref 136–145)
TOTAL PROTEIN: 7 G/DL (ref 6.4–8.2)
WBC # BLD: 9.5 K/UL (ref 4–11)

## 2017-09-11 PROCEDURE — 99214 OFFICE O/P EST MOD 30 MIN: CPT | Performed by: FAMILY MEDICINE

## 2017-09-11 PROCEDURE — 93000 ELECTROCARDIOGRAM COMPLETE: CPT | Performed by: FAMILY MEDICINE

## 2017-09-11 ASSESSMENT — PATIENT HEALTH QUESTIONNAIRE - PHQ9
2. FEELING DOWN, DEPRESSED OR HOPELESS: 0
SUM OF ALL RESPONSES TO PHQ9 QUESTIONS 1 & 2: 0
SUM OF ALL RESPONSES TO PHQ QUESTIONS 1-9: 0
1. LITTLE INTEREST OR PLEASURE IN DOING THINGS: 0

## 2017-09-21 ENCOUNTER — TELEPHONE (OUTPATIENT)
Dept: PULMONOLOGY | Age: 77
End: 2017-09-21

## 2017-10-12 ENCOUNTER — HOSPITAL ENCOUNTER (OUTPATIENT)
Dept: MAMMOGRAPHY | Age: 77
Discharge: HOME | End: 2017-10-12
Attending: FAMILY MEDICINE | Admitting: FAMILY MEDICINE

## 2017-10-12 DIAGNOSIS — R92.8 ABNORMAL SCREENING MAMMOGRAM: Primary | ICD-10-CM

## 2017-10-12 DIAGNOSIS — Z12.31 SCREENING MAMMOGRAM, ENCOUNTER FOR: ICD-10-CM

## 2017-10-12 DIAGNOSIS — Z12.31 ENCOUNTER FOR SCREENING MAMMOGRAM FOR MALIGNANT NEOPLASM OF BREAST: ICD-10-CM

## 2017-10-16 RX ORDER — CITALOPRAM 20 MG/1
TABLET ORAL
Qty: 45 TABLET | Refills: 3 | Status: SHIPPED | OUTPATIENT
Start: 2017-10-16 | End: 2018-02-26 | Stop reason: SDUPTHER

## 2017-10-19 ENCOUNTER — HOSPITAL ENCOUNTER (OUTPATIENT)
Dept: MAMMOGRAPHY | Age: 77
Discharge: HOME | End: 2017-10-19
Admitting: FAMILY MEDICINE

## 2017-10-19 ENCOUNTER — TELEPHONE (OUTPATIENT)
Dept: FAMILY MEDICINE CLINIC | Age: 77
End: 2017-10-19

## 2017-10-19 DIAGNOSIS — N63.0 LUMP OR MASS IN BREAST: ICD-10-CM

## 2017-10-19 DIAGNOSIS — R92.8 ABNORMAL MAMMOGRAM: ICD-10-CM

## 2017-10-19 DIAGNOSIS — N63.10 LUMP OF RIGHT BREAST: ICD-10-CM

## 2017-10-20 RX ORDER — BUTALBITAL, ACETAMINOPHEN AND CAFFEINE 50; 325; 40 MG/1; MG/1; MG/1
TABLET ORAL
Qty: 30 TABLET | Refills: 0 | Status: SHIPPED | OUTPATIENT
Start: 2017-10-20 | End: 2017-11-24 | Stop reason: SDUPTHER

## 2017-10-27 RX ORDER — TRAZODONE HYDROCHLORIDE 100 MG/1
TABLET ORAL
Qty: 60 TABLET | Refills: 0 | Status: SHIPPED | OUTPATIENT
Start: 2017-10-27 | End: 2017-11-24 | Stop reason: SDUPTHER

## 2018-01-04 ENCOUNTER — HOSPITAL ENCOUNTER (OUTPATIENT)
Dept: OTHER | Age: 78
Discharge: HOME | End: 2018-01-31
Attending: INTERNAL MEDICINE | Admitting: INTERNAL MEDICINE

## 2018-01-15 ENCOUNTER — TELEPHONE (OUTPATIENT)
Dept: PULMONOLOGY | Age: 78
End: 2018-01-15

## 2018-01-15 NOTE — TELEPHONE ENCOUNTER
Patient cancelled appointment on 1/15/18 with Dr. Amber Alegria for MARIA C f/u. Reason: weather    Patient did not reschedule appointment. Pt states that sister is in the hospital and she does not have transportation right now. Pt asks for office to call her back in 1 week. Appointment rescheduled for . 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 · Discussed acclimation techniques. Patient will make priority to use daily while watching TV with BiPAP to get used to using machine  · Discussed severity of MARIA C and importance of BiPAP use  · Advised to use BiPAP 6-8 hrs at night and during naps- need to increase use. · Replacement of mask, tubing, head straps every 3-6 months or sooner if damaged. · Patient instructed to contact [facility] for any mask, tubing or machine trouble shooting if problems arise. · Sleep hygiene  · Cognitive behavioral therapy was discussed with patient including stimulus control and sleep restriction. · Continue to follow with psychiatry for medication management and stress management and coping skills  · Avoid sedatives, alcohol and caffeinated drinks at bed time. · No driving motorized vehicles or operating heavy machinery while fatigue, drowsy or sleepy. · Weight loss is also recommended as a long-term intervention.     · Complications of MARIA C if not treated were discussed with patient patient to include systemic hypertension, pulmonary hypertension, cardiovascular morbidities, car accidents and all cause mortality.        Follow up 2 months for compliance and AHI

## 2018-01-16 RX ORDER — BUTALBITAL, ACETAMINOPHEN AND CAFFEINE 50; 325; 40 MG/1; MG/1; MG/1
TABLET ORAL
Qty: 30 TABLET | Refills: 0 | Status: SHIPPED | OUTPATIENT
Start: 2018-01-16 | End: 2018-03-07 | Stop reason: SDUPTHER

## 2018-02-01 ENCOUNTER — HOSPITAL ENCOUNTER (OUTPATIENT)
Dept: OTHER | Age: 78
Discharge: HOME | End: 2018-02-28
Attending: INTERNAL MEDICINE | Admitting: INTERNAL MEDICINE

## 2018-03-01 ENCOUNTER — HOSPITAL ENCOUNTER (OUTPATIENT)
Dept: OTHER | Age: 78
Discharge: HOME | End: 2018-03-31
Attending: INTERNAL MEDICINE | Admitting: INTERNAL MEDICINE

## 2018-03-07 RX ORDER — BUTALBITAL, ACETAMINOPHEN AND CAFFEINE 50; 325; 40 MG/1; MG/1; MG/1
TABLET ORAL
Qty: 30 TABLET | Refills: 0 | Status: SHIPPED | OUTPATIENT
Start: 2018-03-07 | End: 2019-01-21 | Stop reason: SDUPTHER

## 2018-03-07 NOTE — TELEPHONE ENCOUNTER
.  Last office visit 9/11/2017     Last written 1-16-18     Next office visit scheduled Visit date not scheduled     Requested Prescriptions     Pending Prescriptions Disp Refills    butalbital-acetaminophen-caffeine (FIORICET, ESGIC) -40 MG per tablet [Pharmacy Med Name: [id number] -40] 30 tablet 0     Sig: TAKE 1 TABLET EVERY 6 HOURS AS NEEDED FOR HEADACHES LIMIT TO 3 DAYS PER WEEK

## 2018-03-21 ENCOUNTER — HOSPITAL ENCOUNTER (OUTPATIENT)
Dept: OTHER | Age: 78
Discharge: HOME | End: 2018-03-21
Attending: INTERNAL MEDICINE | Admitting: INTERNAL MEDICINE

## 2018-03-21 LAB
ALBUMIN SERPL-MCNC: 4.3 G/DL (ref 3.4–5)
ANION GAP SERPL CALCULATED.3IONS-SCNC: 18 MMOL/L (ref 3–16)
BUN BLDV-MCNC: 21 MG/DL (ref 7–20)
CALCIUM SERPL-MCNC: 9.1 MG/DL (ref 8.3–10.6)
CHLORIDE BLD-SCNC: 98 MMOL/L (ref 99–110)
CO2: 23 MMOL/L (ref 21–32)
CREAT SERPL-MCNC: 0.9 MG/DL (ref 0.6–1.2)
GFR AFRICAN AMERICAN: >60
GFR NON-AFRICAN AMERICAN: >60
GLUCOSE BLD-MCNC: 108 MG/DL (ref 70–99)
PHOSPHORUS: 3.8 MG/DL (ref 2.5–4.9)
POTASSIUM SERPL-SCNC: 4.3 MMOL/L (ref 3.5–5.1)
SODIUM BLD-SCNC: 139 MMOL/L (ref 136–145)

## 2018-04-01 ENCOUNTER — HOSPITAL ENCOUNTER (OUTPATIENT)
Dept: OTHER | Age: 78
Discharge: HOME | End: 2018-04-30
Attending: INTERNAL MEDICINE | Admitting: INTERNAL MEDICINE

## 2018-05-03 ENCOUNTER — TELEPHONE (OUTPATIENT)
Dept: FAMILY MEDICINE CLINIC | Age: 78
End: 2018-05-03

## 2018-05-03 ENCOUNTER — OFFICE VISIT (OUTPATIENT)
Dept: FAMILY MEDICINE CLINIC | Age: 78
End: 2018-05-03

## 2018-05-03 VITALS
DIASTOLIC BLOOD PRESSURE: 70 MMHG | OXYGEN SATURATION: 97 % | HEART RATE: 74 BPM | WEIGHT: 197 LBS | SYSTOLIC BLOOD PRESSURE: 140 MMHG | HEIGHT: 60 IN | BODY MASS INDEX: 38.68 KG/M2

## 2018-05-03 DIAGNOSIS — I10 ESSENTIAL HYPERTENSION: Primary | ICD-10-CM

## 2018-05-03 DIAGNOSIS — N30.00 ACUTE CYSTITIS WITHOUT HEMATURIA: ICD-10-CM

## 2018-05-03 DIAGNOSIS — I25.10 CORONARY ARTERY DISEASE INVOLVING NATIVE CORONARY ARTERY OF NATIVE HEART WITHOUT ANGINA PECTORIS: ICD-10-CM

## 2018-05-03 DIAGNOSIS — K21.00 GASTROESOPHAGEAL REFLUX DISEASE WITH ESOPHAGITIS: ICD-10-CM

## 2018-05-03 DIAGNOSIS — R10.9 FLANK PAIN: ICD-10-CM

## 2018-05-03 LAB
BILIRUBIN, POC: NORMAL
BLOOD URINE, POC: NORMAL
CLARITY, POC: NORMAL
COLOR, POC: YELLOW
GLUCOSE URINE, POC: NORMAL
KETONES, POC: NORMAL
LEUKOCYTE EST, POC: NORMAL
NITRITE, POC: NORMAL
PH, POC: 6
PROTEIN, POC: NORMAL
SPECIFIC GRAVITY, POC: 1.02
UROBILINOGEN, POC: 0.2

## 2018-05-03 PROCEDURE — 99214 OFFICE O/P EST MOD 30 MIN: CPT | Performed by: FAMILY MEDICINE

## 2018-05-03 PROCEDURE — 81002 URINALYSIS NONAUTO W/O SCOPE: CPT | Performed by: FAMILY MEDICINE

## 2018-05-03 RX ORDER — LOSARTAN POTASSIUM 50 MG/1
50 TABLET ORAL DAILY
COMMUNITY
End: 2019-05-22 | Stop reason: ALTCHOICE

## 2018-05-03 RX ORDER — FEXOFENADINE HYDROCHLORIDE 60 MG/1
60 TABLET, FILM COATED ORAL 2 TIMES DAILY
Qty: 60 TABLET | Refills: 3 | Status: SHIPPED | OUTPATIENT
Start: 2018-05-03 | End: 2019-01-21 | Stop reason: SDUPTHER

## 2018-05-03 RX ORDER — FUROSEMIDE 20 MG/1
20 TABLET ORAL DAILY
COMMUNITY
End: 2019-01-31 | Stop reason: SDUPTHER

## 2018-05-03 RX ORDER — CEPHALEXIN 500 MG/1
500 CAPSULE ORAL 3 TIMES DAILY
Qty: 30 CAPSULE | Refills: 0 | Status: SHIPPED | OUTPATIENT
Start: 2018-05-03 | End: 2018-09-20

## 2018-05-03 RX ORDER — ISOSORBIDE MONONITRATE 30 MG/1
30 TABLET, EXTENDED RELEASE ORAL DAILY
COMMUNITY

## 2018-05-03 RX ORDER — PANTOPRAZOLE SODIUM 40 MG/1
40 GRANULE, DELAYED RELEASE ORAL
COMMUNITY
End: 2019-02-12 | Stop reason: SDUPTHER

## 2018-05-03 RX ORDER — NITROFURANTOIN 25; 75 MG/1; MG/1
100 CAPSULE ORAL 2 TIMES DAILY
Qty: 20 CAPSULE | Refills: 0 | Status: SHIPPED | OUTPATIENT
Start: 2018-05-03 | End: 2018-05-03

## 2018-05-03 RX ORDER — BACLOFEN 10 MG/1
10 TABLET ORAL EVERY 8 HOURS PRN
COMMUNITY
End: 2019-11-29 | Stop reason: SDUPTHER

## 2018-05-03 ASSESSMENT — ENCOUNTER SYMPTOMS: GASTROINTESTINAL NEGATIVE: 1

## 2018-05-06 LAB
ORGANISM: ABNORMAL
URINE CULTURE, ROUTINE: ABNORMAL
URINE CULTURE, ROUTINE: ABNORMAL

## 2018-05-07 RX ORDER — NITROFURANTOIN 25; 75 MG/1; MG/1
100 CAPSULE ORAL 2 TIMES DAILY
Qty: 20 CAPSULE | Refills: 0 | Status: SHIPPED | OUTPATIENT
Start: 2018-05-07 | End: 2018-05-17

## 2018-06-25 DIAGNOSIS — B37.9 CANDIDIASIS: ICD-10-CM

## 2018-06-25 RX ORDER — NYSTATIN 100000 [USP'U]/G
POWDER TOPICAL
Qty: 15 G | Refills: 0 | Status: SHIPPED | OUTPATIENT
Start: 2018-06-25 | End: 2018-12-13 | Stop reason: SDUPTHER

## 2018-08-31 RX ORDER — TRAZODONE HYDROCHLORIDE 100 MG/1
TABLET ORAL
Qty: 60 TABLET | Refills: 3 | Status: SHIPPED | OUTPATIENT
Start: 2018-08-31 | End: 2018-11-14 | Stop reason: SDUPTHER

## 2018-09-20 ENCOUNTER — OFFICE VISIT (OUTPATIENT)
Dept: FAMILY MEDICINE CLINIC | Age: 78
End: 2018-09-20

## 2018-09-20 VITALS
SYSTOLIC BLOOD PRESSURE: 116 MMHG | WEIGHT: 203 LBS | OXYGEN SATURATION: 99 % | HEIGHT: 60 IN | BODY MASS INDEX: 39.85 KG/M2 | HEART RATE: 74 BPM | DIASTOLIC BLOOD PRESSURE: 60 MMHG

## 2018-09-20 DIAGNOSIS — E53.8 VITAMIN B12 DEFICIENCY: ICD-10-CM

## 2018-09-20 DIAGNOSIS — G44.221 CHRONIC TENSION-TYPE HEADACHE, INTRACTABLE: ICD-10-CM

## 2018-09-20 DIAGNOSIS — E66.9 OBESITY (BMI 30-39.9): ICD-10-CM

## 2018-09-20 DIAGNOSIS — I25.10 CORONARY ARTERY DISEASE INVOLVING NATIVE CORONARY ARTERY OF NATIVE HEART WITHOUT ANGINA PECTORIS: ICD-10-CM

## 2018-09-20 DIAGNOSIS — R53.82 CHRONIC FATIGUE: ICD-10-CM

## 2018-09-20 DIAGNOSIS — I10 ESSENTIAL HYPERTENSION: Primary | ICD-10-CM

## 2018-09-20 LAB
BILIRUBIN, POC: NORMAL
BLOOD URINE, POC: NORMAL
CLARITY, POC: CLEAR
COLOR, POC: YELLOW
GLUCOSE URINE, POC: NORMAL
KETONES, POC: NORMAL
LEUKOCYTE EST, POC: NORMAL
NITRITE, POC: NORMAL
PH, POC: 5.5
PROTEIN, POC: NORMAL
SPECIFIC GRAVITY, POC: 1.01
UROBILINOGEN, POC: 0.2

## 2018-09-20 PROCEDURE — 99214 OFFICE O/P EST MOD 30 MIN: CPT | Performed by: FAMILY MEDICINE

## 2018-09-20 PROCEDURE — 36415 COLL VENOUS BLD VENIPUNCTURE: CPT | Performed by: FAMILY MEDICINE

## 2018-09-20 PROCEDURE — 81002 URINALYSIS NONAUTO W/O SCOPE: CPT | Performed by: FAMILY MEDICINE

## 2018-09-20 ASSESSMENT — PATIENT HEALTH QUESTIONNAIRE - PHQ9
2. FEELING DOWN, DEPRESSED OR HOPELESS: 1
1. LITTLE INTEREST OR PLEASURE IN DOING THINGS: 1
SUM OF ALL RESPONSES TO PHQ9 QUESTIONS 1 & 2: 2
SUM OF ALL RESPONSES TO PHQ QUESTIONS 1-9: 2
SUM OF ALL RESPONSES TO PHQ QUESTIONS 1-9: 2

## 2018-09-20 ASSESSMENT — ENCOUNTER SYMPTOMS
COUGH: 1
ABDOMINAL PAIN: 0
BLOOD IN STOOL: 0

## 2018-09-21 ENCOUNTER — TELEPHONE (OUTPATIENT)
Dept: FAMILY MEDICINE CLINIC | Age: 78
End: 2018-09-21

## 2018-09-21 DIAGNOSIS — M54.9 SPINE PAIN: Primary | ICD-10-CM

## 2018-09-21 LAB
FOLATE: >20 NG/ML (ref 4.78–24.2)
T4 FREE: 1.4 NG/DL (ref 0.9–1.8)
TSH SERPL DL<=0.05 MIU/L-ACNC: 1.76 UIU/ML (ref 0.27–4.2)
VITAMIN B-12: 641 PG/ML (ref 211–911)

## 2018-09-21 NOTE — TELEPHONE ENCOUNTER
Dr. Rajendra Greenwood ( Chiropractor) received a referral from you to see Mesfin Em. Dr. Rajendra Greenwood is asking if you can order a cervical, thoracic and lumbar Xray for Mesfin Em so she can see what she is dealing with. Please call pt. When orders have been placed so she may go get them done.

## 2018-09-26 ENCOUNTER — HOSPITAL ENCOUNTER (OUTPATIENT)
Age: 78
Discharge: HOME | End: 2018-09-26
Payer: MEDICARE

## 2018-09-26 ENCOUNTER — HOSPITAL ENCOUNTER (OUTPATIENT)
Dept: GENERAL RADIOLOGY | Age: 78
Discharge: HOME | End: 2018-09-26
Payer: MEDICARE

## 2018-09-26 DIAGNOSIS — M54.9 SPINE PAIN: ICD-10-CM

## 2018-09-26 DIAGNOSIS — R05.9 COUGH: ICD-10-CM

## 2018-09-26 PROCEDURE — 72110 X-RAY EXAM L-2 SPINE 4/>VWS: CPT

## 2018-09-26 PROCEDURE — 72052 X-RAY EXAM NECK SPINE 6/>VWS: CPT

## 2018-09-26 PROCEDURE — 72070 X-RAY EXAM THORAC SPINE 2VWS: CPT

## 2018-10-03 DIAGNOSIS — I25.10 CORONARY ARTERY DISEASE INVOLVING NATIVE CORONARY ARTERY OF NATIVE HEART WITHOUT ANGINA PECTORIS: Primary | ICD-10-CM

## 2018-10-18 ENCOUNTER — OFFICE VISIT (OUTPATIENT)
Dept: ORTHOPEDIC SURGERY | Age: 78
End: 2018-10-18
Payer: MEDICARE

## 2018-10-18 VITALS
DIASTOLIC BLOOD PRESSURE: 70 MMHG | BODY MASS INDEX: 38.28 KG/M2 | HEIGHT: 60 IN | WEIGHT: 195 LBS | HEART RATE: 67 BPM | SYSTOLIC BLOOD PRESSURE: 159 MMHG

## 2018-10-18 DIAGNOSIS — R52 PAIN: ICD-10-CM

## 2018-10-18 DIAGNOSIS — M25.562 LEFT KNEE PAIN, UNSPECIFIED CHRONICITY: Primary | ICD-10-CM

## 2018-10-18 DIAGNOSIS — M25.561 RIGHT KNEE PAIN, UNSPECIFIED CHRONICITY: ICD-10-CM

## 2018-10-18 DIAGNOSIS — T84.84XA PAINFUL TOTAL KNEE REPLACEMENT, INITIAL ENCOUNTER (HCC): ICD-10-CM

## 2018-10-18 DIAGNOSIS — Z96.659 PAINFUL TOTAL KNEE REPLACEMENT, INITIAL ENCOUNTER (HCC): ICD-10-CM

## 2018-10-18 PROCEDURE — 99203 OFFICE O/P NEW LOW 30 MIN: CPT | Performed by: ORTHOPAEDIC SURGERY

## 2018-10-18 NOTE — PATIENT INSTRUCTIONS
HPI:    Patient ID: Nely Blount is a 31year old female.     HPI  Here for med ck   Doing better w the med but not lasting all day she feels  No SE from meds   Gets panic attacks at end of the day   Otherwise feels well controlled w it     Review of System Management discussed and patient voiced understanding. They were instructed to follow up with their PCP regarding any abnormal vitals reading.

## 2018-10-25 ENCOUNTER — HOSPITAL ENCOUNTER (OUTPATIENT)
Dept: NUCLEAR MEDICINE | Age: 78
Discharge: HOME | End: 2018-10-25
Payer: MEDICARE

## 2018-10-25 DIAGNOSIS — T84.84XA PAINFUL TOTAL KNEE REPLACEMENT, INITIAL ENCOUNTER (HCC): ICD-10-CM

## 2018-10-25 DIAGNOSIS — Z96.659 PAINFUL TOTAL KNEE REPLACEMENT, INITIAL ENCOUNTER (HCC): ICD-10-CM

## 2018-10-25 LAB
BASOPHILS ABSOLUTE: 0.1 K/UL (ref 0–0.2)
BASOPHILS RELATIVE PERCENT: 0.8 %
C-REACTIVE PROTEIN: 9 MG/L (ref 0–5.1)
EOSINOPHILS ABSOLUTE: 0.1 K/UL (ref 0–0.6)
EOSINOPHILS RELATIVE PERCENT: 2 %
HCT VFR BLD CALC: 38.5 % (ref 36–48)
HEMOGLOBIN: 12.5 G/DL (ref 12–16)
LYMPHOCYTES ABSOLUTE: 1.2 K/UL (ref 1–5.1)
LYMPHOCYTES RELATIVE PERCENT: 19 %
MCH RBC QN AUTO: 26.6 PG (ref 26–34)
MCHC RBC AUTO-ENTMCNC: 32.4 G/DL (ref 31–36)
MCV RBC AUTO: 82.3 FL (ref 80–100)
MONOCYTES ABSOLUTE: 0.4 K/UL (ref 0–1.3)
MONOCYTES RELATIVE PERCENT: 6.9 %
NEUTROPHILS ABSOLUTE: 4.6 K/UL (ref 1.7–7.7)
NEUTROPHILS RELATIVE PERCENT: 71.3 %
PDW BLD-RTO: 14.7 % (ref 12.4–15.4)
PLATELET # BLD: 152 K/UL (ref 135–450)
PMV BLD AUTO: 8.1 FL (ref 5–10.5)
RBC # BLD: 4.68 M/UL (ref 4–5.2)
SEDIMENTATION RATE, ERYTHROCYTE: 24 MM/HR (ref 0–30)
WBC # BLD: 6.4 K/UL (ref 4–11)

## 2018-10-25 PROCEDURE — 85025 COMPLETE CBC W/AUTO DIFF WBC: CPT

## 2018-10-25 PROCEDURE — A9503 TC99M MEDRONATE: HCPCS | Performed by: ORTHOPAEDIC SURGERY

## 2018-10-25 PROCEDURE — 85652 RBC SED RATE AUTOMATED: CPT

## 2018-10-25 PROCEDURE — 86140 C-REACTIVE PROTEIN: CPT

## 2018-10-25 PROCEDURE — 36415 COLL VENOUS BLD VENIPUNCTURE: CPT

## 2018-10-25 PROCEDURE — 78315 BONE IMAGING 3 PHASE: CPT

## 2018-10-25 PROCEDURE — 3430000000 HC RX DIAGNOSTIC RADIOPHARMACEUTICAL: Performed by: ORTHOPAEDIC SURGERY

## 2018-10-25 RX ORDER — TC 99M MEDRONATE 20 MG/10ML
26.7 INJECTION, POWDER, LYOPHILIZED, FOR SOLUTION INTRAVENOUS ONCE
Status: COMPLETED | OUTPATIENT
Start: 2018-10-25 | End: 2018-10-25

## 2018-10-25 RX ADMIN — Medication 26.7 MILLICURIE: at 10:55

## 2018-10-29 ENCOUNTER — OFFICE VISIT (OUTPATIENT)
Dept: ORTHOPEDIC SURGERY | Age: 78
End: 2018-10-29
Payer: MEDICARE

## 2018-10-29 DIAGNOSIS — T84.84XA PAINFUL TOTAL KNEE REPLACEMENT, INITIAL ENCOUNTER (HCC): Primary | ICD-10-CM

## 2018-10-29 DIAGNOSIS — Z96.659 PAINFUL TOTAL KNEE REPLACEMENT, INITIAL ENCOUNTER (HCC): Primary | ICD-10-CM

## 2018-10-29 PROCEDURE — 99213 OFFICE O/P EST LOW 20 MIN: CPT | Performed by: ORTHOPAEDIC SURGERY

## 2018-10-29 NOTE — PROGRESS NOTES
Chief Complaint    Results (BONE SCAN RESULTS and lab bilat knees)      History of Present Illness:  Magalys Jackson is a 77 y.o. female comes in for bilateral knee pain worse in the left side. She had both knees replaced 2010 years ago by Dr. Jason Cardenas. She reports doing very well for multiple years after surgery however over the past 4-5 years she's had insidious onset of bilateral knee pain worse in the left side. She also complains of pain throughout her entire body including her neck and lumbar spine, shoulders, hands, hips, etc.  She has currently seen pain management specialist.  She has pain daily she has trouble walking for distances she uses a cane and walker for ambulation. She also has a history of full spine surgeons. She also has 10 stents in her heart and recently had aortic valve replacement. She is on aspirin and Plavix there is no smoking or diabetic history. We ordered acute phase reactants and a bone scan and she is back in today for the results. The bone scan shows some mild activity around both the left and the right knee prosthesis but after reviewing the images no skin to graphic evidence of loosening. The white blood cell count and sed rate were also both within normal limits with only a mildly elevated CRP. Pain Assessment  Location of Pain: Knee  Location Modifiers: Right, Left  Severity of Pain: 8  Frequency of Pain: Intermittent  Aggravating Factors: Walking, Standing  Limiting Behavior: Some  Result of Injury: No  Work-Related Injury: No  Are there other pain locations you wish to document?: No    Medical History:  Patient's medications, allergies, past medical, surgical, social and family histories were reviewed and updated as appropriate. Review of Systems:  Pertinent items are noted in HPI  Review of systems reviewed from Patient History Form dated on 10/29/2018 and available in the patient's chart under the Media tab.        Vital Signs:  LMP  (LMP Unknown)

## 2018-11-05 ENCOUNTER — OFFICE VISIT (OUTPATIENT)
Dept: FAMILY MEDICINE CLINIC | Age: 78
End: 2018-11-05
Payer: MEDICARE

## 2018-11-05 VITALS
SYSTOLIC BLOOD PRESSURE: 150 MMHG | BODY MASS INDEX: 38.28 KG/M2 | HEART RATE: 58 BPM | WEIGHT: 195 LBS | OXYGEN SATURATION: 98 % | HEIGHT: 60 IN | DIASTOLIC BLOOD PRESSURE: 70 MMHG

## 2018-11-05 DIAGNOSIS — I25.10 CORONARY ARTERY DISEASE INVOLVING NATIVE CORONARY ARTERY OF NATIVE HEART WITHOUT ANGINA PECTORIS: ICD-10-CM

## 2018-11-05 DIAGNOSIS — I10 ESSENTIAL HYPERTENSION: Primary | ICD-10-CM

## 2018-11-05 DIAGNOSIS — F41.9 ANXIETY: ICD-10-CM

## 2018-11-05 DIAGNOSIS — G25.0 TREMOR, ESSENTIAL: ICD-10-CM

## 2018-11-05 PROCEDURE — 99214 OFFICE O/P EST MOD 30 MIN: CPT | Performed by: FAMILY MEDICINE

## 2018-11-05 RX ORDER — LORAZEPAM 0.5 MG/1
0.5 TABLET ORAL DAILY PRN
Qty: 30 TABLET | Refills: 0 | Status: SHIPPED | OUTPATIENT
Start: 2018-11-05 | End: 2018-11-14

## 2018-11-05 ASSESSMENT — ENCOUNTER SYMPTOMS
ABDOMINAL PAIN: 0
BLOOD IN STOOL: 0

## 2018-11-05 NOTE — PROGRESS NOTES
Subjective:      Patient ID: Beverlyn Rubinstein is a 77 y.o. female. HPI  In for check on HT(labile-checking bid per Card-she will call him in AM)-CAD(seeing Card)--Anxiety(not well with Duspar)    Prior to Visit Medications :  Medication citalopram (CELEXA) 20 MG tablet, Sig TAKE 1 AND 1/2 TABLET ONCE DAILY, Taking? Yes, Authorizing Provider Huber Smith, DO    Medication traZODone (DESYREL) 100 MG tablet, Sig TAKE 1 OR 2 TABLETS AT BEDTIME, Taking? Yes, Authorizing Provider Huber Smith, DO    Medication busPIRone (BUSPAR) 5 MG tablet, Sig TAKE 1 OR 2 TABLET(S) IN THE EVENING AS NEEDED FOR ANXIETY, Taking? Yes, Authorizing Provider Huber Smith, DO    Medication nystatin (MYCOSTATIN) 385298 UNIT/GM powder, Sig APPLY 3 TIMES DAILY, Taking? Yes, Authorizing Provider Huber Smith, DO    Medication cetirizine (ZYRTEC) 10 MG tablet, Sig TAKE (1) TABLET DAILY, Taking? Yes, Authorizing Provider Huber Smith, DO    Medication isosorbide mononitrate (IMDUR) 30 MG extended release tablet, Sig Take 30 mg by mouth daily, Taking? Yes, Authorizing Provider Historical Provider, MD    Medication losartan (COZAAR) 50 MG tablet, Sig Take 50 mg by mouth daily, Taking? Yes, Authorizing Provider Historical Provider, MD    Medication furosemide (LASIX) 20 MG tablet, Sig Take 20 mg by mouth daily , Taking? Yes, Authorizing Provider Historical Provider, MD    Medication pantoprazole sodium (PROTONIX) 40 MG PACK packet, Sig Take 40 mg by mouth every morning (before breakfast), Taking? Yes, Authorizing Provider Historical Provider, MD    Medication baclofen (LIORESAL) 10 MG tablet, Sig Take 10 mg by mouth every 8 hours as needed, Taking? Yes, Authorizing Provider Historical Provider, MD    Medication fexofenadine (ALLEGRA ALLERGY) 60 MG tablet, Sig Take 1 tablet by mouth 2 times daily, Taking?  Yes, Authorizing Provider Huber Smith, DO    Medication albuterol-ipratropium (COMBIVENT RESPIMAT)  MCG/ACT AERS inhaler, Sig Inhale 1

## 2018-11-05 NOTE — PATIENT INSTRUCTIONS
Essential hypertension  Continue meds-call Card with 1 week of BP readings  Coronary artery disease involving native coronary artery of native heart without angina pectoris  Continue meds & Card  Anxiety  Stop Buspar--Rx Ativan 0.5 daily if needed-call 1 week  Tremor, essential  Rx Mysoline 50--call me 1 week    See me 2 mos

## 2018-11-14 ENCOUNTER — TELEPHONE (OUTPATIENT)
Dept: FAMILY MEDICINE CLINIC | Age: 78
End: 2018-11-14

## 2018-11-14 RX ORDER — TRAZODONE HYDROCHLORIDE 100 MG/1
TABLET ORAL
Qty: 60 TABLET | Refills: 3 | Status: SHIPPED | OUTPATIENT
Start: 2018-11-14 | End: 2019-07-09 | Stop reason: SDUPTHER

## 2018-11-14 RX ORDER — BUSPIRONE HYDROCHLORIDE 5 MG/1
TABLET ORAL
Qty: 60 TABLET | Refills: 3 | Status: SHIPPED | OUTPATIENT
Start: 2018-11-14 | End: 2019-05-17 | Stop reason: SDUPTHER

## 2019-01-05 ENCOUNTER — APPOINTMENT (OUTPATIENT)
Dept: GENERAL RADIOLOGY | Age: 79
End: 2019-01-05
Payer: MEDICARE

## 2019-01-05 ENCOUNTER — HOSPITAL ENCOUNTER (EMERGENCY)
Age: 79
Discharge: LEFT BEFORE DISCHARGE | End: 2019-01-05
Attending: EMERGENCY MEDICINE
Payer: MEDICARE

## 2019-01-05 VITALS
SYSTOLIC BLOOD PRESSURE: 156 MMHG | HEART RATE: 61 BPM | BODY MASS INDEX: 35.93 KG/M2 | RESPIRATION RATE: 16 BRPM | WEIGHT: 183 LBS | DIASTOLIC BLOOD PRESSURE: 91 MMHG | OXYGEN SATURATION: 92 % | HEIGHT: 60 IN | TEMPERATURE: 99 F

## 2019-01-05 DIAGNOSIS — Z53.21 PATIENT LEFT WITHOUT BEING SEEN: Primary | ICD-10-CM

## 2019-01-05 PROCEDURE — 99284 EMERGENCY DEPT VISIT MOD MDM: CPT

## 2019-01-05 PROCEDURE — 71046 X-RAY EXAM CHEST 2 VIEWS: CPT

## 2019-01-05 ASSESSMENT — PAIN DESCRIPTION - LOCATION: LOCATION: HEAD;THROAT;CHEST

## 2019-01-05 ASSESSMENT — PAIN SCALES - GENERAL: PAINLEVEL_OUTOF10: 8

## 2019-01-05 ASSESSMENT — PAIN DESCRIPTION - DESCRIPTORS: DESCRIPTORS: ACHING;BURNING

## 2019-01-07 ENCOUNTER — OFFICE VISIT (OUTPATIENT)
Dept: FAMILY MEDICINE CLINIC | Age: 79
End: 2019-01-07
Payer: MEDICARE

## 2019-01-07 VITALS
DIASTOLIC BLOOD PRESSURE: 70 MMHG | OXYGEN SATURATION: 91 % | SYSTOLIC BLOOD PRESSURE: 108 MMHG | HEIGHT: 60 IN | HEART RATE: 64 BPM | BODY MASS INDEX: 35.18 KG/M2 | TEMPERATURE: 97.9 F | WEIGHT: 179.2 LBS

## 2019-01-07 DIAGNOSIS — I10 ESSENTIAL HYPERTENSION: Primary | ICD-10-CM

## 2019-01-07 DIAGNOSIS — F41.9 ANXIETY: ICD-10-CM

## 2019-01-07 DIAGNOSIS — J20.9 ACUTE BRONCHITIS WITH BRONCHOSPASM: ICD-10-CM

## 2019-01-07 PROCEDURE — 99214 OFFICE O/P EST MOD 30 MIN: CPT | Performed by: FAMILY MEDICINE

## 2019-01-07 RX ORDER — PREDNISONE 20 MG/1
20 TABLET ORAL 2 TIMES DAILY
Qty: 8 TABLET | Refills: 0 | Status: SHIPPED | OUTPATIENT
Start: 2019-01-07 | End: 2019-01-11

## 2019-01-07 RX ORDER — DOXYCYCLINE HYCLATE 100 MG
100 TABLET ORAL 2 TIMES DAILY
Qty: 20 TABLET | Refills: 0 | Status: SHIPPED | OUTPATIENT
Start: 2019-01-07 | End: 2019-01-17

## 2019-01-07 ASSESSMENT — ENCOUNTER SYMPTOMS
COUGH: 1
SINUS PRESSURE: 1
SHORTNESS OF BREATH: 0
DIARRHEA: 1

## 2019-01-08 ENCOUNTER — HOSPITAL ENCOUNTER (EMERGENCY)
Age: 79
Discharge: HOME | End: 2019-01-08
Attending: EMERGENCY MEDICINE
Payer: MEDICARE

## 2019-01-08 ENCOUNTER — TELEPHONE (OUTPATIENT)
Dept: FAMILY MEDICINE CLINIC | Age: 79
End: 2019-01-08

## 2019-01-08 ENCOUNTER — APPOINTMENT (OUTPATIENT)
Dept: GENERAL RADIOLOGY | Age: 79
End: 2019-01-08
Payer: MEDICARE

## 2019-01-08 VITALS
BODY MASS INDEX: 35.14 KG/M2 | HEART RATE: 62 BPM | HEIGHT: 60 IN | RESPIRATION RATE: 16 BRPM | DIASTOLIC BLOOD PRESSURE: 48 MMHG | SYSTOLIC BLOOD PRESSURE: 142 MMHG | OXYGEN SATURATION: 93 % | TEMPERATURE: 97.3 F | WEIGHT: 179 LBS

## 2019-01-08 DIAGNOSIS — J06.9 ACUTE UPPER RESPIRATORY INFECTION: Primary | ICD-10-CM

## 2019-01-08 DIAGNOSIS — J06.9 VIRAL UPPER RESPIRATORY TRACT INFECTION: ICD-10-CM

## 2019-01-08 LAB
A/G RATIO: 1.1 (ref 1.1–2.2)
ALBUMIN SERPL-MCNC: 3.9 G/DL (ref 3.4–5)
ALP BLD-CCNC: 98 U/L (ref 40–129)
ALT SERPL-CCNC: 12 U/L (ref 10–40)
ANION GAP SERPL CALCULATED.3IONS-SCNC: 13 MMOL/L (ref 3–16)
AST SERPL-CCNC: 18 U/L (ref 15–37)
BASOPHILS ABSOLUTE: 0 K/UL (ref 0–0.2)
BASOPHILS RELATIVE PERCENT: 0.4 %
BILIRUB SERPL-MCNC: <0.2 MG/DL (ref 0–1)
BUN BLDV-MCNC: 28 MG/DL (ref 7–20)
CALCIUM SERPL-MCNC: 9.7 MG/DL (ref 8.3–10.6)
CHLORIDE BLD-SCNC: 100 MMOL/L (ref 99–110)
CO2: 24 MMOL/L (ref 21–32)
CREAT SERPL-MCNC: 1.3 MG/DL (ref 0.6–1.2)
EOSINOPHILS ABSOLUTE: 0 K/UL (ref 0–0.6)
EOSINOPHILS RELATIVE PERCENT: 0 %
GFR AFRICAN AMERICAN: 48
GFR NON-AFRICAN AMERICAN: 39
GLOBULIN: 3.4 G/DL
GLUCOSE BLD-MCNC: 155 MG/DL (ref 70–99)
HCT VFR BLD CALC: 41.3 % (ref 36–48)
HEMOGLOBIN: 13.1 G/DL (ref 12–16)
LACTIC ACID, SEPSIS: 2.5 MMOL/L (ref 0.4–1.9)
LYMPHOCYTES ABSOLUTE: 0.9 K/UL (ref 1–5.1)
LYMPHOCYTES RELATIVE PERCENT: 9.8 %
MCH RBC QN AUTO: 25.7 PG (ref 26–34)
MCHC RBC AUTO-ENTMCNC: 31.8 G/DL (ref 31–36)
MCV RBC AUTO: 80.9 FL (ref 80–100)
MONOCYTES ABSOLUTE: 0.4 K/UL (ref 0–1.3)
MONOCYTES RELATIVE PERCENT: 4.5 %
NEUTROPHILS ABSOLUTE: 8.2 K/UL (ref 1.7–7.7)
NEUTROPHILS RELATIVE PERCENT: 85.3 %
PDW BLD-RTO: 16.2 % (ref 12.4–15.4)
PLATELET # BLD: 207 K/UL (ref 135–450)
PMV BLD AUTO: 8.3 FL (ref 5–10.5)
POTASSIUM SERPL-SCNC: 4.7 MMOL/L (ref 3.5–5.1)
RAPID INFLUENZA  B AGN: NEGATIVE
RAPID INFLUENZA A AGN: NEGATIVE
RBC # BLD: 5.1 M/UL (ref 4–5.2)
SODIUM BLD-SCNC: 137 MMOL/L (ref 136–145)
TOTAL PROTEIN: 7.3 G/DL (ref 6.4–8.2)
WBC # BLD: 9.6 K/UL (ref 4–11)

## 2019-01-08 PROCEDURE — 6370000000 HC RX 637 (ALT 250 FOR IP): Performed by: NURSE PRACTITIONER

## 2019-01-08 PROCEDURE — 80053 COMPREHEN METABOLIC PANEL: CPT

## 2019-01-08 PROCEDURE — 99284 EMERGENCY DEPT VISIT MOD MDM: CPT

## 2019-01-08 PROCEDURE — 85025 COMPLETE CBC W/AUTO DIFF WBC: CPT

## 2019-01-08 PROCEDURE — 6360000002 HC RX W HCPCS: Performed by: NURSE PRACTITIONER

## 2019-01-08 PROCEDURE — 71046 X-RAY EXAM CHEST 2 VIEWS: CPT

## 2019-01-08 PROCEDURE — 2580000003 HC RX 258: Performed by: NURSE PRACTITIONER

## 2019-01-08 PROCEDURE — 96360 HYDRATION IV INFUSION INIT: CPT

## 2019-01-08 PROCEDURE — 83605 ASSAY OF LACTIC ACID: CPT

## 2019-01-08 PROCEDURE — 96361 HYDRATE IV INFUSION ADD-ON: CPT

## 2019-01-08 PROCEDURE — 87804 INFLUENZA ASSAY W/OPTIC: CPT

## 2019-01-08 PROCEDURE — 87040 BLOOD CULTURE FOR BACTERIA: CPT

## 2019-01-08 RX ORDER — ALBUTEROL SULFATE 2.5 MG/3ML
5 SOLUTION RESPIRATORY (INHALATION) ONCE
Status: COMPLETED | OUTPATIENT
Start: 2019-01-08 | End: 2019-01-08

## 2019-01-08 RX ORDER — 0.9 % SODIUM CHLORIDE 0.9 %
1000 INTRAVENOUS SOLUTION INTRAVENOUS ONCE
Status: COMPLETED | OUTPATIENT
Start: 2019-01-08 | End: 2019-01-08

## 2019-01-08 RX ORDER — IPRATROPIUM BROMIDE AND ALBUTEROL SULFATE 2.5; .5 MG/3ML; MG/3ML
1 SOLUTION RESPIRATORY (INHALATION) ONCE
Status: COMPLETED | OUTPATIENT
Start: 2019-01-08 | End: 2019-01-08

## 2019-01-08 RX ORDER — ALBUTEROL SULFATE 90 UG/1
2 AEROSOL, METERED RESPIRATORY (INHALATION) 4 TIMES DAILY PRN
Qty: 1 INHALER | Refills: 0 | Status: SHIPPED | OUTPATIENT
Start: 2019-01-08 | End: 2019-01-21 | Stop reason: SDUPTHER

## 2019-01-08 RX ORDER — LORATADINE 10 MG/1
10 CAPSULE, LIQUID FILLED ORAL DAILY
COMMUNITY
End: 2019-01-21

## 2019-01-08 RX ADMIN — SODIUM CHLORIDE 1000 ML: 9 INJECTION, SOLUTION INTRAVENOUS at 16:22

## 2019-01-08 RX ADMIN — ALBUTEROL SULFATE 5 MG: 2.5 SOLUTION RESPIRATORY (INHALATION) at 16:06

## 2019-01-08 RX ADMIN — IPRATROPIUM BROMIDE AND ALBUTEROL SULFATE 1 AMPULE: .5; 3 SOLUTION RESPIRATORY (INHALATION) at 16:06

## 2019-01-08 ASSESSMENT — ENCOUNTER SYMPTOMS
DIARRHEA: 0
WHEEZING: 1
SHORTNESS OF BREATH: 1
CHEST TIGHTNESS: 1
VOMITING: 0
NAUSEA: 0
ABDOMINAL PAIN: 0
COUGH: 1

## 2019-01-08 ASSESSMENT — PAIN DESCRIPTION - DESCRIPTORS: DESCRIPTORS: ACHING

## 2019-01-08 ASSESSMENT — PAIN SCALES - GENERAL: PAINLEVEL_OUTOF10: 8

## 2019-01-13 LAB
BLOOD CULTURE, ROUTINE: NORMAL
CULTURE, BLOOD 2: NORMAL

## 2019-01-21 ENCOUNTER — OFFICE VISIT (OUTPATIENT)
Dept: FAMILY MEDICINE CLINIC | Age: 79
End: 2019-01-21
Payer: MEDICARE

## 2019-01-21 VITALS
WEIGHT: 190.13 LBS | BODY MASS INDEX: 37.13 KG/M2 | DIASTOLIC BLOOD PRESSURE: 70 MMHG | OXYGEN SATURATION: 94 % | HEART RATE: 55 BPM | TEMPERATURE: 97.7 F | SYSTOLIC BLOOD PRESSURE: 122 MMHG

## 2019-01-21 DIAGNOSIS — F02.80 ALZHEIMER'S DEMENTIA WITHOUT BEHAVIORAL DISTURBANCE, UNSPECIFIED TIMING OF DEMENTIA ONSET: ICD-10-CM

## 2019-01-21 DIAGNOSIS — F41.9 ANXIETY: ICD-10-CM

## 2019-01-21 DIAGNOSIS — G44.221 CHRONIC TENSION-TYPE HEADACHE, INTRACTABLE: Primary | ICD-10-CM

## 2019-01-21 DIAGNOSIS — E78.5 HYPERLIPIDEMIA, UNSPECIFIED HYPERLIPIDEMIA TYPE: ICD-10-CM

## 2019-01-21 DIAGNOSIS — I10 ESSENTIAL HYPERTENSION: ICD-10-CM

## 2019-01-21 DIAGNOSIS — R05.9 COUGH: ICD-10-CM

## 2019-01-21 DIAGNOSIS — G30.9 ALZHEIMER'S DEMENTIA WITHOUT BEHAVIORAL DISTURBANCE, UNSPECIFIED TIMING OF DEMENTIA ONSET: ICD-10-CM

## 2019-01-21 DIAGNOSIS — J30.89 NON-SEASONAL ALLERGIC RHINITIS, UNSPECIFIED TRIGGER: ICD-10-CM

## 2019-01-21 DIAGNOSIS — I25.10 CORONARY ARTERY DISEASE INVOLVING NATIVE CORONARY ARTERY OF NATIVE HEART WITHOUT ANGINA PECTORIS: ICD-10-CM

## 2019-01-21 DIAGNOSIS — R73.09 ELEVATED GLUCOSE: ICD-10-CM

## 2019-01-21 DIAGNOSIS — F51.01 PRIMARY INSOMNIA: ICD-10-CM

## 2019-01-21 PROBLEM — G45.9 TIA (TRANSIENT ISCHEMIC ATTACK): Status: ACTIVE | Noted: 2019-01-21

## 2019-01-21 PROCEDURE — 99214 OFFICE O/P EST MOD 30 MIN: CPT | Performed by: NURSE PRACTITIONER

## 2019-01-21 RX ORDER — FEXOFENADINE HCL 180 MG/1
180 TABLET ORAL DAILY
Qty: 30 TABLET | Refills: 2 | Status: SHIPPED | OUTPATIENT
Start: 2019-01-21 | End: 2019-02-12 | Stop reason: ALTCHOICE

## 2019-01-21 RX ORDER — ALBUTEROL SULFATE 90 UG/1
2 AEROSOL, METERED RESPIRATORY (INHALATION) 4 TIMES DAILY PRN
Qty: 1 INHALER | Refills: 0 | Status: SHIPPED | OUTPATIENT
Start: 2019-01-21 | End: 2019-03-02 | Stop reason: SDUPTHER

## 2019-01-21 RX ORDER — BUTALBITAL, ACETAMINOPHEN AND CAFFEINE 50; 325; 40 MG/1; MG/1; MG/1
TABLET ORAL
Qty: 30 TABLET | Refills: 0 | Status: SHIPPED | OUTPATIENT
Start: 2019-01-21 | End: 2019-05-22 | Stop reason: SDUPTHER

## 2019-01-21 ASSESSMENT — ENCOUNTER SYMPTOMS
NAUSEA: 0
VOMITING: 0
BACK PAIN: 1
CHEST TIGHTNESS: 0
RHINORRHEA: 1
SORE THROAT: 0
ABDOMINAL PAIN: 0
SHORTNESS OF BREATH: 0
BLOOD IN STOOL: 0
COUGH: 1

## 2019-01-22 LAB
A/G RATIO: 1.7 (ref 1.1–2.2)
ALBUMIN SERPL-MCNC: 4 G/DL (ref 3.4–5)
ALP BLD-CCNC: 92 U/L (ref 40–129)
ALT SERPL-CCNC: 12 U/L (ref 10–40)
ANION GAP SERPL CALCULATED.3IONS-SCNC: 12 MMOL/L (ref 3–16)
AST SERPL-CCNC: 16 U/L (ref 15–37)
BASOPHILS ABSOLUTE: 0 K/UL (ref 0–0.2)
BASOPHILS RELATIVE PERCENT: 0.1 %
BILIRUB SERPL-MCNC: <0.2 MG/DL (ref 0–1)
BUN BLDV-MCNC: 20 MG/DL (ref 7–20)
CALCIUM SERPL-MCNC: 9.4 MG/DL (ref 8.3–10.6)
CHLORIDE BLD-SCNC: 104 MMOL/L (ref 99–110)
CHOLESTEROL, TOTAL: 167 MG/DL (ref 0–199)
CO2: 27 MMOL/L (ref 21–32)
CREAT SERPL-MCNC: 0.9 MG/DL (ref 0.6–1.2)
EOSINOPHILS ABSOLUTE: 0.1 K/UL (ref 0–0.6)
EOSINOPHILS RELATIVE PERCENT: 1.7 %
GFR AFRICAN AMERICAN: >60
GFR NON-AFRICAN AMERICAN: >60
GLOBULIN: 2.3 G/DL
GLUCOSE BLD-MCNC: 94 MG/DL (ref 70–99)
HCT VFR BLD CALC: 37.6 % (ref 36–48)
HDLC SERPL-MCNC: 53 MG/DL (ref 40–60)
HEMOGLOBIN: 12 G/DL (ref 12–16)
LDL CHOLESTEROL CALCULATED: 88 MG/DL
LYMPHOCYTES ABSOLUTE: 1.9 K/UL (ref 1–5.1)
LYMPHOCYTES RELATIVE PERCENT: 23.2 %
MCH RBC QN AUTO: 26 PG (ref 26–34)
MCHC RBC AUTO-ENTMCNC: 32 G/DL (ref 31–36)
MCV RBC AUTO: 81.3 FL (ref 80–100)
MONOCYTES ABSOLUTE: 0.6 K/UL (ref 0–1.3)
MONOCYTES RELATIVE PERCENT: 7.4 %
NEUTROPHILS ABSOLUTE: 5.6 K/UL (ref 1.7–7.7)
NEUTROPHILS RELATIVE PERCENT: 67.6 %
PDW BLD-RTO: 16.3 % (ref 12.4–15.4)
PLATELET # BLD: 208 K/UL (ref 135–450)
PMV BLD AUTO: 8.8 FL (ref 5–10.5)
POTASSIUM SERPL-SCNC: 4.6 MMOL/L (ref 3.5–5.1)
RBC # BLD: 4.63 M/UL (ref 4–5.2)
SODIUM BLD-SCNC: 143 MMOL/L (ref 136–145)
TOTAL PROTEIN: 6.3 G/DL (ref 6.4–8.2)
TRIGL SERPL-MCNC: 129 MG/DL (ref 0–150)
VLDLC SERPL CALC-MCNC: 26 MG/DL
WBC # BLD: 8.3 K/UL (ref 4–11)

## 2019-01-23 LAB
ESTIMATED AVERAGE GLUCOSE: 131.2 MG/DL
HBA1C MFR BLD: 6.2 %

## 2019-01-25 ENCOUNTER — TELEPHONE (OUTPATIENT)
Dept: ORTHOPEDIC SURGERY | Age: 79
End: 2019-01-25

## 2019-01-31 RX ORDER — FUROSEMIDE 20 MG/1
TABLET ORAL
Qty: 90 TABLET | Refills: 0 | Status: SHIPPED | OUTPATIENT
Start: 2019-01-31 | End: 2019-06-22 | Stop reason: SDUPTHER

## 2019-02-12 ENCOUNTER — OFFICE VISIT (OUTPATIENT)
Dept: FAMILY MEDICINE CLINIC | Age: 79
End: 2019-02-12
Payer: MEDICARE

## 2019-02-12 VITALS
OXYGEN SATURATION: 94 % | BODY MASS INDEX: 37.35 KG/M2 | WEIGHT: 191.25 LBS | DIASTOLIC BLOOD PRESSURE: 74 MMHG | HEART RATE: 53 BPM | SYSTOLIC BLOOD PRESSURE: 128 MMHG | TEMPERATURE: 97.3 F

## 2019-02-12 DIAGNOSIS — L29.9 ITCH OF SKIN: Primary | ICD-10-CM

## 2019-02-12 PROCEDURE — 99214 OFFICE O/P EST MOD 30 MIN: CPT | Performed by: NURSE PRACTITIONER

## 2019-02-12 RX ORDER — METOPROLOL TARTRATE 50 MG/1
25 TABLET, FILM COATED ORAL 2 TIMES DAILY
COMMUNITY
Start: 2019-01-31 | End: 2020-09-18

## 2019-02-12 RX ORDER — PREDNISONE 10 MG/1
10 TABLET ORAL DAILY
Qty: 10 TABLET | Refills: 0 | Status: SHIPPED | OUTPATIENT
Start: 2019-02-12 | End: 2019-02-22

## 2019-02-12 RX ORDER — CETIRIZINE HYDROCHLORIDE 10 MG/1
TABLET ORAL
Qty: 30 TABLET | Refills: 2 | Status: SHIPPED | OUTPATIENT
Start: 2019-02-12 | End: 2019-05-28 | Stop reason: ALTCHOICE

## 2019-02-12 RX ORDER — PANTOPRAZOLE SODIUM 40 MG/1
40 TABLET, DELAYED RELEASE ORAL DAILY
COMMUNITY
Start: 2019-01-31 | End: 2020-08-05 | Stop reason: SDUPTHER

## 2019-03-02 DIAGNOSIS — R05.9 COUGH: ICD-10-CM

## 2019-03-19 ENCOUNTER — OFFICE VISIT (OUTPATIENT)
Dept: FAMILY MEDICINE CLINIC | Age: 79
End: 2019-03-19
Payer: MEDICARE

## 2019-03-19 VITALS
DIASTOLIC BLOOD PRESSURE: 75 MMHG | WEIGHT: 192 LBS | TEMPERATURE: 98.7 F | OXYGEN SATURATION: 92 % | BODY MASS INDEX: 37.5 KG/M2 | HEART RATE: 79 BPM | SYSTOLIC BLOOD PRESSURE: 170 MMHG

## 2019-03-19 DIAGNOSIS — N30.90 CYSTITIS: ICD-10-CM

## 2019-03-19 DIAGNOSIS — J20.9 ACUTE BRONCHITIS, UNSPECIFIED ORGANISM: Primary | ICD-10-CM

## 2019-03-19 DIAGNOSIS — I10 ESSENTIAL HYPERTENSION: ICD-10-CM

## 2019-03-19 LAB
BILIRUBIN, POC: ABNORMAL
BLOOD URINE, POC: ABNORMAL
CLARITY, POC: ABNORMAL
COLOR, POC: YELLOW
GLUCOSE URINE, POC: ABNORMAL
INFLUENZA A ANTIGEN, POC: NORMAL
INFLUENZA B ANTIGEN, POC: NORMAL
KETONES, POC: ABNORMAL
LEUKOCYTE EST, POC: ABNORMAL
NITRITE, POC: ABNORMAL
PH, POC: 5.5
PROTEIN, POC: ABNORMAL
SPECIFIC GRAVITY, POC: >=1.03
UROBILINOGEN, POC: 0.2

## 2019-03-19 PROCEDURE — 99214 OFFICE O/P EST MOD 30 MIN: CPT | Performed by: NURSE PRACTITIONER

## 2019-03-19 PROCEDURE — 87804 INFLUENZA ASSAY W/OPTIC: CPT | Performed by: NURSE PRACTITIONER

## 2019-03-19 PROCEDURE — 81003 URINALYSIS AUTO W/O SCOPE: CPT | Performed by: NURSE PRACTITIONER

## 2019-03-19 RX ORDER — CEPHALEXIN 500 MG/1
500 CAPSULE ORAL 4 TIMES DAILY
Qty: 28 CAPSULE | Refills: 0 | Status: SHIPPED | OUTPATIENT
Start: 2019-03-19 | End: 2019-03-21 | Stop reason: SINTOL

## 2019-03-19 RX ORDER — MONTELUKAST SODIUM 10 MG/1
10 TABLET ORAL NIGHTLY
Qty: 30 TABLET | Refills: 0 | Status: SHIPPED | OUTPATIENT
Start: 2019-03-19 | End: 2019-04-08 | Stop reason: SDUPTHER

## 2019-03-19 RX ORDER — PREDNISONE 20 MG/1
20 TABLET ORAL DAILY
Qty: 10 TABLET | Refills: 0 | Status: SHIPPED | OUTPATIENT
Start: 2019-03-19 | End: 2019-03-29

## 2019-03-21 ENCOUNTER — HOSPITAL ENCOUNTER (OUTPATIENT)
Dept: GENERAL RADIOLOGY | Age: 79
Discharge: HOME | End: 2019-03-21
Payer: MEDICARE

## 2019-03-21 ENCOUNTER — HOSPITAL ENCOUNTER (OUTPATIENT)
Age: 79
Discharge: HOME | End: 2019-03-21
Payer: MEDICARE

## 2019-03-21 DIAGNOSIS — J20.9 ACUTE BRONCHITIS, UNSPECIFIED ORGANISM: ICD-10-CM

## 2019-03-21 PROCEDURE — 71046 X-RAY EXAM CHEST 2 VIEWS: CPT

## 2019-03-21 RX ORDER — DOXYCYCLINE HYCLATE 100 MG
100 TABLET ORAL 2 TIMES DAILY
Qty: 20 TABLET | Refills: 0 | Status: SHIPPED | OUTPATIENT
Start: 2019-03-21 | End: 2019-03-31

## 2019-03-22 LAB
ORGANISM: ABNORMAL
ORGANISM: ABNORMAL
URINE CULTURE, ROUTINE: ABNORMAL

## 2019-03-22 RX ORDER — NITROFURANTOIN 25; 75 MG/1; MG/1
100 CAPSULE ORAL 2 TIMES DAILY
Qty: 14 CAPSULE | Refills: 0 | Status: SHIPPED | OUTPATIENT
Start: 2019-03-22 | End: 2019-03-29

## 2019-03-25 ENCOUNTER — TELEPHONE (OUTPATIENT)
Dept: FAMILY MEDICINE CLINIC | Age: 79
End: 2019-03-25

## 2019-04-08 DIAGNOSIS — J20.9 ACUTE BRONCHITIS, UNSPECIFIED ORGANISM: ICD-10-CM

## 2019-04-08 RX ORDER — MONTELUKAST SODIUM 10 MG/1
TABLET ORAL
Qty: 30 TABLET | Refills: 3 | Status: SHIPPED | OUTPATIENT
Start: 2019-04-08 | End: 2019-05-22 | Stop reason: ALTCHOICE

## 2019-04-22 ENCOUNTER — OFFICE VISIT (OUTPATIENT)
Dept: FAMILY MEDICINE CLINIC | Age: 79
End: 2019-04-22
Payer: MEDICARE

## 2019-04-22 VITALS
SYSTOLIC BLOOD PRESSURE: 144 MMHG | HEART RATE: 51 BPM | TEMPERATURE: 98.1 F | DIASTOLIC BLOOD PRESSURE: 72 MMHG | OXYGEN SATURATION: 95 % | BODY MASS INDEX: 37.6 KG/M2 | WEIGHT: 192.5 LBS

## 2019-04-22 DIAGNOSIS — Z09 FOLLOW UP: Primary | ICD-10-CM

## 2019-04-22 DIAGNOSIS — J30.89 NON-SEASONAL ALLERGIC RHINITIS, UNSPECIFIED TRIGGER: ICD-10-CM

## 2019-04-22 PROCEDURE — 99213 OFFICE O/P EST LOW 20 MIN: CPT | Performed by: NURSE PRACTITIONER

## 2019-04-22 RX ORDER — CANDESARTAN 4 MG/1
TABLET ORAL
COMMUNITY
Start: 2019-04-03 | End: 2020-04-07

## 2019-04-22 NOTE — PATIENT INSTRUCTIONS
Please read the healthy family handout that you were given and share it with your family. Please compare this printed medication list with your medications at home to be sure they are the same. If you have any medications that are different please contact us immediately at [phone]. Also review your allergies that we have listed, these may also include medications that you have not been able to tolerate, make sure everything listed is correct. If you have any allergies that are different please contact us immediately at [phone].

## 2019-04-22 NOTE — PROGRESS NOTES
Patient: Roge Ford is a 77 y.o. female who presents today with the following Chief Complaint(s):  Chief Complaint   Patient presents with    Pneumonia     Follow up         Assessment & Plan:  1. Follow up  Pneumonia resolved  Do not feel it is necessary for repeat chest x-ray since patient is asymptomatic  Follow-up as needed    2. Non-seasonal allergic rhinitis, unspecified trigger  On two oral agents without relief. Does not tolerate nasal spray. Requesting referral to allergist.  - Kayla Toney MD, Allergy & Immunology, [facility]  Live Huynh is in the office for follow-up following treatment for pneumonia. She was diagnosed with pneumonia after chest x-ray 5 weeks ago. She finished antibiotic. She states her symptoms are completely resolved. She still has a cough but associates this with postnasal drip related to allergies. She states she has had lifelong allergies and symptoms are getting worse. She takes Singulair and Zyrtec daily without control of symptoms. She does not tolerate nasal sprays. She would like referral to allergist for more targeted treatment. Denies fever, dyspnea, myalgias or fatigue.     Current Outpatient Medications   Medication Sig Dispense Refill    candesartan (ATACAND) 4 MG tablet       montelukast (SINGULAIR) 10 MG tablet TAKE 1 TABLET NIGHTLY 30 tablet 3    VENTOLIN  (90 Base) MCG/ACT inhaler INHALE 2 PUFFS FOUR TIMES DAILY AS NEEDED FOR WHEEZING 18 g 2    metoprolol tartrate (LOPRESSOR) 50 MG tablet 50 mg 2 times daily       pantoprazole (PROTONIX) 40 MG tablet       cetirizine (ZYRTEC) 10 MG tablet TAKE (1) TABLET DAILY 30 tablet 2    furosemide (LASIX) 20 MG tablet TAKE (1) TABLET DAILY 90 tablet 0    butalbital-acetaminophen-caffeine (FIORICET, ESGIC) -40 MG per tablet TAKE 1 TABLET EVERY 6 HOURS AS NEEDED FOR HEADACHES LIMIT TO 3 DAYS PER WEEK 30 tablet 0    citalopram (CELEXA) 20 MG tablet TAKE 1 AND 1/2 TABLET ONCE DAILY 90 tablet 1    busPIRone (BUSPAR) 5 MG tablet TAKE 1 OR 2 TABLET(S) IN THE EVENING AS NEEDED FOR ANXIETY 60 tablet 3    traZODone (DESYREL) 100 MG tablet TAKE 1 OR 2 TABLETS AT BEDTIME 60 tablet 3    isosorbide mononitrate (IMDUR) 30 MG extended release tablet Take 30 mg by mouth daily      losartan (COZAAR) 50 MG tablet Take 50 mg by mouth daily      baclofen (LIORESAL) 10 MG tablet Take 10 mg by mouth every 8 hours as needed      albuterol-ipratropium (COMBIVENT RESPIMAT)  MCG/ACT AERS inhaler Inhale 1 puff into the lungs every 6 hours 1 Inhaler 5    KLOR-CON SPRINKLE 10 MEQ extended release capsule TAKE 2 CAPSULES IN THE   MORNING AND 2 CAPSULES IN  THE EVENING 120 capsule 3    amLODIPine (NORVASC) 10 MG tablet TAKE (1) TABLET DAILY 30 tablet 5    HYDROcodone-acetaminophen (NORCO)  MG per tablet Take 1 tablet by mouth every 6 hours as needed for Pain . 30 tablet 0    simvastatin (ZOCOR) 40 MG tablet Take 1 tablet by mouth nightly 30 tablet 11    donepezil (ARICEPT) 10 MG tablet TAKE 1 TABLET IN THE EVENING 30 tablet 11    ASPIR-LOW 81 MG EC tablet TAKE (1) TABLET DAILY 30 tablet 3    NITROSTAT 0.4 MG SL tablet USE 1 TABLET UNDER TONGUE AS NEEDED FOR CHEST PAIN MAY REPEAT EVERY 5MIN. UP TO 3. THEN GO TO ER. 25 tablet 3    lidocaine (XYLOCAINE) 5 % ointment Apply topically as needed. 1 Tube 3     No current facility-administered medications for this visit. Patient's past medical history, surgical history, family history, medications,  and allergies  were all reviewed and updated as appropriate today. Review of Systems  See HPI    Physical Exam   Constitutional: She is oriented to person, place, and time. She appears well-developed. Non-toxic appearance. HENT:   Head: Normocephalic. Eyes: Pupils are equal, round, and reactive to light. EOM are normal.   Neck: Normal range of motion. Neck supple.    Cardiovascular: Regular rhythm, normal heart sounds and normal pulses. Bradycardia present. Pulmonary/Chest: Effort normal and breath sounds normal. No respiratory distress. Musculoskeletal: She exhibits no edema. Neurological: She is alert and oriented to person, place, and time. Skin: Skin is warm and dry. Psychiatric: She has a normal mood and affect. Her behavior is normal. Judgment and thought content normal.   Vitals reviewed. Vitals:    04/22/19 0939 04/22/19 0942   BP: (!) 148/76 (!) 144/72   Pulse: 51    Temp: 98.1 °F (36.7 °C)    TempSrc: Oral    SpO2: 95%    Weight: 192 lb 8 oz (87.3 kg)            KALI Camacho-CNP    The note was completedusing Dragon voice recognition transcription. Every effort was made to ensure accuracy; however, inadvertent  transcription errors may be present despite my best efforts to edit errors.

## 2019-05-03 ENCOUNTER — TELEPHONE (OUTPATIENT)
Dept: FAMILY MEDICINE CLINIC | Age: 79
End: 2019-05-03

## 2019-05-03 NOTE — TELEPHONE ENCOUNTER
Patient said the allergist you referred her does not take her insurance.   She wanted to see if you could refer to someone else

## 2019-05-03 NOTE — TELEPHONE ENCOUNTER
Dr Donn Mcnulty is the other allergist in the area. She can also call her insurance carrier to find out who is in her network.

## 2019-05-07 DIAGNOSIS — J30.89 NON-SEASONAL ALLERGIC RHINITIS, UNSPECIFIED TRIGGER: Primary | ICD-10-CM

## 2019-05-17 RX ORDER — BUSPIRONE HYDROCHLORIDE 5 MG/1
TABLET ORAL
Qty: 60 TABLET | Refills: 0 | Status: SHIPPED | OUTPATIENT
Start: 2019-05-17 | End: 2019-06-25 | Stop reason: SDUPTHER

## 2019-05-22 ENCOUNTER — OFFICE VISIT (OUTPATIENT)
Dept: FAMILY MEDICINE CLINIC | Age: 79
End: 2019-05-22
Payer: MEDICARE

## 2019-05-22 VITALS
BODY MASS INDEX: 37.5 KG/M2 | WEIGHT: 192 LBS | DIASTOLIC BLOOD PRESSURE: 68 MMHG | TEMPERATURE: 97.8 F | OXYGEN SATURATION: 97 % | HEART RATE: 60 BPM | SYSTOLIC BLOOD PRESSURE: 113 MMHG

## 2019-05-22 DIAGNOSIS — G44.221 CHRONIC TENSION-TYPE HEADACHE, INTRACTABLE: ICD-10-CM

## 2019-05-22 DIAGNOSIS — I10 ESSENTIAL HYPERTENSION: Primary | ICD-10-CM

## 2019-05-22 DIAGNOSIS — Z91.81 AT HIGH RISK FOR FALLS: ICD-10-CM

## 2019-05-22 DIAGNOSIS — R73.03 PREDIABETES: ICD-10-CM

## 2019-05-22 DIAGNOSIS — E78.5 HYPERLIPIDEMIA, UNSPECIFIED HYPERLIPIDEMIA TYPE: ICD-10-CM

## 2019-05-22 PROCEDURE — 99214 OFFICE O/P EST MOD 30 MIN: CPT | Performed by: NURSE PRACTITIONER

## 2019-05-22 PROCEDURE — 36415 COLL VENOUS BLD VENIPUNCTURE: CPT | Performed by: NURSE PRACTITIONER

## 2019-05-22 RX ORDER — NYSTATIN 100000 [USP'U]/G
POWDER TOPICAL 4 TIMES DAILY
COMMUNITY
End: 2019-06-17 | Stop reason: SDUPTHER

## 2019-05-22 RX ORDER — BUTALBITAL, ACETAMINOPHEN AND CAFFEINE 50; 325; 40 MG/1; MG/1; MG/1
TABLET ORAL
Qty: 30 TABLET | Refills: 0 | Status: SHIPPED | OUTPATIENT
Start: 2019-05-22 | End: 2019-08-06 | Stop reason: SDUPTHER

## 2019-05-22 RX ORDER — AMITRIPTYLINE HYDROCHLORIDE 50 MG/1
50 TABLET, FILM COATED ORAL NIGHTLY
COMMUNITY
End: 2019-12-10 | Stop reason: SDUPTHER

## 2019-05-22 RX ORDER — DIPHENHYDRAMINE HCL 25 MG
25 TABLET ORAL EVERY 8 HOURS PRN
COMMUNITY
End: 2019-05-28 | Stop reason: ALTCHOICE

## 2019-05-22 RX ORDER — TIZANIDINE 2 MG/1
2 TABLET ORAL EVERY 6 HOURS PRN
COMMUNITY
End: 2019-11-26

## 2019-05-22 ASSESSMENT — PATIENT HEALTH QUESTIONNAIRE - PHQ9
1. LITTLE INTEREST OR PLEASURE IN DOING THINGS: 0
2. FEELING DOWN, DEPRESSED OR HOPELESS: 0
SUM OF ALL RESPONSES TO PHQ QUESTIONS 1-9: 0
SUM OF ALL RESPONSES TO PHQ QUESTIONS 1-9: 0
SUM OF ALL RESPONSES TO PHQ9 QUESTIONS 1 & 2: 0

## 2019-05-22 NOTE — PATIENT INSTRUCTIONS
Please read the healthy family handout that you were given and share it with your family. Please compare this printed medication list with your medications at home to be sure they are the same. If you have any medications that are different please contact us immediately at [phone]. Also review your allergies that we have listed, these may also include medications that you have not been able to tolerate, make sure everything listed is correct. If you have any allergies that are different please contact us immediately at [phone]. Patient Education        Preventing Outdoor Falls: Care Instructions  Your Care Instructions    Worries about falls don't need to keep you indoors. Outdoor activities like walking have big benefits for your health. You will need to watch your step and learn a few safety measures. If you are worried about having a fall outdoors, ask your doctor about exercises, classes, or physical therapy that may help. You can learn ways to gain strength, flexibility, and balance. Ask if it might help to use a cane or walker. You can make your time outdoors safer with a few simple measures. Follow-up care is a key part of your treatment and safety. Be sure to make and go to all appointments, and call your doctor if you are having problems. It's also a good idea to know your test results and keep a list of the medicines you take. How can you prevent falls outdoors? · Wear shoes with firm soles and low heels. If you have to walk on an icy surface, use grippers that can be worn over your shoes in bad weather. · Be extra careful if weather is bad. Walk on the grass when the sidewalks are slick. If you live in a place that gets snow and ice in the winter, sprinkle salt on slippery stairs and sidewalks. · Be careful getting on or off buses and trains or getting in and out of cars. If handrails are available, use them. · Be careful when you cross the street.  Look for crosswalks or places where curb cuts or ramps are present. · Try not to hurry, especially if you are carrying something. · Be cautious in parking lots or garages. There may be curbs or changes in pavement, or the height of the pavement may vary. · Make sure to wear the correct eyeglasses, if you need them. Reading glasses or bifocals can make it harder to see hazards that might be in your way. · If you are walking outdoors for exercise, try to:  ? Walk in well-lighted, well-maintained areas. These include high school or college tracks, shopping malls, and public spaces. ? Walk with a partner. ? Watch out for cracked sidewalks, curbs, changes in the height of the pavement, exposed tree roots, and debris such as fallen leaves or branches. Where can you learn more? Go to https://Colondeepepiceweb.[employer]. org and sign in to your [employer] account. Enter I207 in the Ahandyhand box to learn more about \"Preventing Outdoor Falls: Care Instructions. \"     If you do not have an account, please click on the \"Sign Up Now\" link. Current as of: November 7, 2018  Content Version: 12.0  © 3223-6827 Healthwise, Incorporated. Care instructions adapted under license by [facility] [place] ([facility]). If you have questions about a medical condition or this instruction, always ask your healthcare professional. [place] [address] any warranty or liability for your use of this information. Patient Education        Preventing Falls: Care Instructions  Your Care Instructions    Getting around your home safely can be a challenge if you have injuries or health problems that make it easy for you to fall. Loose rugs and furniture in walkways are among the dangers for many older people who have problems walking or who have poor eyesight. People who have conditions such as arthritis, osteoporosis, or dementia also have to be careful not to fall. You can make your home safer with a few simple measures.   Follow-up care is a key part of your treatment and safety. Be sure to make and go to all appointments, and call your doctor if you are having problems. It's also a good idea to know your test results and keep a list of the medicines you take. How can you care for yourself at home? Taking care of yourself  · You may get dizzy if you do not drink enough water. To prevent dehydration, drink plenty of fluids, enough so that your urine is light yellow or clear like water. Choose water and other caffeine-free clear liquids. If you have kidney, heart, or liver disease and have to limit fluids, talk with your doctor before you increase the amount of fluids you drink. · Exercise regularly to improve your strength, muscle tone, and balance. Walk if you can. Swimming may be a good choice if you cannot walk easily. · Have your vision and hearing checked each year or any time you notice a change. If you have trouble seeing and hearing, you might not be able to avoid objects and could lose your balance. · Know the side effects of the medicines you take. Ask your doctor or pharmacist whether the medicines you take can affect your balance. Sleeping pills or sedatives can affect your balance. · Limit the amount of alcohol you drink. Alcohol can impair your balance and other senses. · Ask your doctor whether calluses or corns on your feet need to be removed. If you wear loose-fitting shoes because of calluses or corns, you can lose your balance and fall. · Talk to your doctor if you have numbness in your feet. Preventing falls at home  · Remove raised doorway thresholds, throw rugs, and clutter. Repair loose carpet or raised areas in the floor. · Move furniture and electrical cords to keep them out of walking paths. · Use nonskid floor wax, and wipe up spills right away, especially on ceramic tile floors. · If you use a walker or cane, put rubber tips on it.  If you use crutches, clean the bottoms of them regularly with an abrasive pad, such as steel wool.  · Keep your house well lit, especially [address], and outside walkways. Use night-lights in areas such as hallways and bathrooms. Add extra light switches or use remote switches (such as switches that go on or off when you clap your hands) to make it easier to turn lights on if you have to get up during the night. · Install sturdy handrails on stairways. · Move items in your cabinets so that the things you use a lot are on the lower shelves (about waist level). · Keep a cordless phone and a flashlight with new batteries by your bed. If possible, put a phone in each of the main rooms of your house, or carry a cell phone in case you fall and cannot reach a phone. Or, you can wear a device around your neck or wrist. You push a button that sends a signal for help. · Wear low-heeled shoes that fit well and give your feet good support. Use footwear with nonskid soles. Check the heels and soles of your shoes for wear. Repair or replace worn heels or soles. · Do not wear socks without shoes on wood floors. · Walk on the grass when the sidewalks are slippery. If you live in an area that gets snow and ice in the winter, sprinkle salt on slippery steps and sidewalks. Preventing falls in the bath  · Install grab bars and nonskid mats inside and outside your shower or tub and near the toilet and sinks. · Use shower chairs and bath benches. · Use a hand-held shower head that will allow you to sit while showering. · Get into a tub or shower by putting the weaker leg in first. Get out of a tub or shower with your strong side first.  · Repair loose toilet seats and consider installing a raised toilet seat to make getting on and off the toilet easier. · Keep your bathroom door unlocked while you are in the shower. Where can you learn more? Go to https://kyleigh.healthVatler. org and sign in to your [employer] account.  Enter G117 in the [facility] box to learn more about \"Preventing Falls: Care Instructions. \"     If you do not have an account, please click on the \"Sign Up Now\" link. Current as of: November 7, 2018  Content Version: 12.0  © 8436-1582 Healthwise, Incorporated. Care instructions adapted under license by [facility] ([facility]). If you have questions about a medical condition or this instruction, always ask your healthcare professional. [address] any warranty or liability for your use of this information.

## 2019-05-22 NOTE — PROGRESS NOTES
Patient: Cami Wheatley is a [de-identified] y.o. female who presents today with the following Chief Complaint(s):  Chief Complaint   Patient presents with   [address]:  1. Essential hypertension  Controlled  Continue current medication  Follow-up with cardiology as scheduled  Follow-up annually or sooner as needed  - Comprehensive Metabolic Panel    2. Hyperlipidemia, unspecified hyperlipidemia type  Controlled  Continue current medication  Follow-up with cardiology  Follow-up annually   - Lipid Panel    3. Prediabetes  Pending labs  - Hemoglobin A1C    4. Chronic tension-type headache, intractable  OARRS report obtained. No inappropriate prescription use noted  Controlled  Follow-up with neurologist in July to discuss injectable medication  Follow-up 4 months or sooner as needed  - butalbital-acetaminophen-caffeine (FIORICET, ESGIC) -40 MG per tablet; TAKE 1 TABLET EVERY 6 HOURS AS NEEDED FOR HEADACHES LIMIT TO 3 DAYS PER WEEK  Dispense: 30 tablet; Refill: 0    5. At high risk for falls    - Misc. Devices (ROLLER WALKER) MISC; 1 each by Does not apply route daily  Dispense: 1 each; Refill: 0    HPI  HTN  Current medication includes Norvasc and Lopressor. She is compliant with medication. Denies medication side effects. Blood pressure is managed by her cardiologist.  Is due to see her cardiologist in September. Denies dizziness, chest pain, palpitations, orthopnea or abnormal fatigue. HLD  Current medication includes Zocor. She is compliant with medication. Denies medication side effects, myalgias. Cholesterol is managed by cardiologist.    Prediabetes  Labs drawn 4 months ago indicated prediabetes. She has cut back on carbohydrates since then. Is not been physically active since she had recent spinal cord stimulator placed. She has had increased pain and complication due to the spinal cord stimulator. She has procedure scheduled for tomorrow to fix the problem.   Denies probably aphasia. Reports polydipsia. Denies delayed wound healing or peripheral neuralgia    Chronic headache  Treatment includes Fioricet . This is only medication that has helped with her headaches. She denies adverse reactions such as increased fatigue for rebound headache. She does see a neurologist Dr. Gillian Collado for dementia. She has an appointment in July. Plans to ask Dr. Gillian Collado about injectable migraine treatment. Current Outpatient Medications   Medication Sig Dispense Refill    amitriptyline (ELAVIL) 50 MG tablet Take 50 mg by mouth nightly      diphenhydrAMINE (BENADRYL) 25 MG tablet Take 25 mg by mouth every 8 hours as needed for Itching      nystatin (MYCOSTATIN) 117422 UNIT/GM powder Apply topically 4 times daily Apply topically 4 times daily.       tiZANidine (ZANAFLEX) 2 MG tablet Take 2 mg by mouth every 6 hours as needed      Lifitegrast (XIIDRA) 5 % SOLN Apply to eye      busPIRone (BUSPAR) 5 MG tablet TAKE 1 OR 2 TABLET(S) IN THE EVENING AS NEEDED FOR ANXIETY 60 tablet 0    candesartan (ATACAND) 4 MG tablet       VENTOLIN  (90 Base) MCG/ACT inhaler INHALE 2 PUFFS FOUR TIMES DAILY AS NEEDED FOR WHEEZING 18 g 2    metoprolol tartrate (LOPRESSOR) 50 MG tablet 50 mg 2 times daily       pantoprazole (PROTONIX) 40 MG tablet       cetirizine (ZYRTEC) 10 MG tablet TAKE (1) TABLET DAILY 30 tablet 2    furosemide (LASIX) 20 MG tablet TAKE (1) TABLET DAILY 90 tablet 0    butalbital-acetaminophen-caffeine (FIORICET, ESGIC) -40 MG per tablet TAKE 1 TABLET EVERY 6 HOURS AS NEEDED FOR HEADACHES LIMIT TO 3 DAYS PER WEEK 30 tablet 0    citalopram (CELEXA) 20 MG tablet TAKE 1 AND 1/2 TABLET ONCE DAILY 90 tablet 1    traZODone (DESYREL) 100 MG tablet TAKE 1 OR 2 TABLETS AT BEDTIME 60 tablet 3    isosorbide mononitrate (IMDUR) 30 MG extended release tablet Take 30 mg by mouth daily      baclofen (LIORESAL) 10 MG tablet Take 10 mg by mouth every 8 hours as needed      albuterol-ipratropium (COMBIVENT RESPIMAT)  MCG/ACT AERS inhaler Inhale 1 puff into the lungs every 6 hours 1 Inhaler 5    KLOR-CON SPRINKLE 10 MEQ extended release capsule TAKE 2 CAPSULES IN THE   MORNING AND 2 CAPSULES IN  THE EVENING 120 capsule 3    amLODIPine (NORVASC) 10 MG tablet TAKE (1) TABLET DAILY 30 tablet 5    HYDROcodone-acetaminophen (NORCO)  MG per tablet Take 1 tablet by mouth every 6 hours as needed for Pain . 30 tablet 0    simvastatin (ZOCOR) 40 MG tablet Take 1 tablet by mouth nightly 30 tablet 11    donepezil (ARICEPT) 10 MG tablet TAKE 1 TABLET IN THE EVENING 30 tablet 11    ASPIR-LOW 81 MG EC tablet TAKE (1) TABLET DAILY 30 tablet 3    NITROSTAT 0.4 MG SL tablet USE 1 TABLET UNDER TONGUE AS NEEDED FOR CHEST PAIN MAY REPEAT EVERY 5MIN. UP TO 3. THEN GO TO ER. 25 tablet 3    lidocaine (XYLOCAINE) 5 % ointment Apply topically as needed. 1 Tube 3     No current facility-administered medications for this visit. Patient's past medical history, surgical history, family history, medications,  and allergies  were all reviewed and updated as appropriate today. Review of Systems  See HPI    Physical Exam   Constitutional: She appears well-developed. Non-toxic appearance. She appears distressed (Appears to be in pain). HENT:   Head: Normocephalic and atraumatic. Eyes: EOM are normal.   Neck: Normal range of motion. Neck supple. Cardiovascular: Normal rate, regular rhythm, normal heart sounds and intact distal pulses. No murmur heard. Pulmonary/Chest: Effort normal and breath sounds normal. She has no wheezes. Musculoskeletal: She exhibits no edema. Neurological: She is alert. Skin: Skin is warm and dry. Capillary refill takes 2 to 3 seconds. Psychiatric: She has a normal mood and affect. Nursing note and vitals reviewed.       Vitals:    05/22/19 1330   BP: 113/68   Pulse: 60   Temp: 97.8 °F (36.6 °C)   TempSrc: Oral   SpO2: 97%   Weight: 192 lb (87.1 kg)           Levi Jiang, APRN-CNP    The note was completedusing Dragon voice recognition transcription. Every effort was made to ensure accuracy; however, inadvertent  transcription errors may be present despite my best efforts to edit errors. On the basis of positive falls risk screening, assessment and plan is as follows: home safety tips provided, patient declines any further evaluation/treatment for increased falls risk, order for walker. Camila Caldwell

## 2019-05-23 DIAGNOSIS — I10 ESSENTIAL HYPERTENSION: Primary | ICD-10-CM

## 2019-05-23 LAB
A/G RATIO: 1.6 (ref 1.1–2.2)
ALBUMIN SERPL-MCNC: 4.5 G/DL (ref 3.4–5)
ALP BLD-CCNC: 114 U/L (ref 40–129)
ALT SERPL-CCNC: 10 U/L (ref 10–40)
ANION GAP SERPL CALCULATED.3IONS-SCNC: 18 MMOL/L (ref 3–16)
AST SERPL-CCNC: 15 U/L (ref 15–37)
BILIRUB SERPL-MCNC: 0.3 MG/DL (ref 0–1)
BUN BLDV-MCNC: 19 MG/DL (ref 7–20)
CALCIUM SERPL-MCNC: 9.8 MG/DL (ref 8.3–10.6)
CHLORIDE BLD-SCNC: 105 MMOL/L (ref 99–110)
CHOLESTEROL, TOTAL: 123 MG/DL (ref 0–199)
CO2: 25 MMOL/L (ref 21–32)
CREAT SERPL-MCNC: 1.2 MG/DL (ref 0.6–1.2)
ESTIMATED AVERAGE GLUCOSE: 139.9 MG/DL
GFR AFRICAN AMERICAN: 52
GFR NON-AFRICAN AMERICAN: 43
GLOBULIN: 2.9 G/DL
GLUCOSE BLD-MCNC: 134 MG/DL (ref 70–99)
HBA1C MFR BLD: 6.5 %
HDLC SERPL-MCNC: 50 MG/DL (ref 40–60)
LDL CHOLESTEROL CALCULATED: 49 MG/DL
POTASSIUM SERPL-SCNC: 4.3 MMOL/L (ref 3.5–5.1)
SODIUM BLD-SCNC: 148 MMOL/L (ref 136–145)
TOTAL PROTEIN: 7.4 G/DL (ref 6.4–8.2)
TRIGL SERPL-MCNC: 121 MG/DL (ref 0–150)
VLDLC SERPL CALC-MCNC: 24 MG/DL

## 2019-05-28 DIAGNOSIS — R05.9 COUGH: ICD-10-CM

## 2019-05-28 RX ORDER — CETIRIZINE HYDROCHLORIDE 10 MG/1
TABLET ORAL
Qty: 30 TABLET | Refills: 2 | Status: SHIPPED | OUTPATIENT
Start: 2019-05-28 | End: 2019-09-13 | Stop reason: SDUPTHER

## 2019-05-30 ENCOUNTER — OFFICE VISIT (OUTPATIENT)
Dept: FAMILY MEDICINE CLINIC | Age: 79
End: 2019-05-30
Payer: MEDICARE

## 2019-05-30 VITALS
DIASTOLIC BLOOD PRESSURE: 70 MMHG | WEIGHT: 191.5 LBS | BODY MASS INDEX: 37.4 KG/M2 | SYSTOLIC BLOOD PRESSURE: 132 MMHG | TEMPERATURE: 97.5 F | HEART RATE: 52 BPM | OXYGEN SATURATION: 96 %

## 2019-05-30 DIAGNOSIS — N28.9 DECREASED RENAL FUNCTION: Primary | ICD-10-CM

## 2019-05-30 DIAGNOSIS — I50.9 CONGESTIVE HEART FAILURE, UNSPECIFIED HF CHRONICITY, UNSPECIFIED HEART FAILURE TYPE (HCC): ICD-10-CM

## 2019-05-30 DIAGNOSIS — T14.8XXA OPEN WOUND OF SKIN: ICD-10-CM

## 2019-05-30 PROCEDURE — 99214 OFFICE O/P EST MOD 30 MIN: CPT | Performed by: NURSE PRACTITIONER

## 2019-05-30 PROCEDURE — 36415 COLL VENOUS BLD VENIPUNCTURE: CPT | Performed by: NURSE PRACTITIONER

## 2019-05-30 RX ORDER — AMOXICILLIN 500 MG/1
500 CAPSULE ORAL 2 TIMES DAILY
Qty: 14 CAPSULE | Refills: 0 | Status: SHIPPED | OUTPATIENT
Start: 2019-05-30 | End: 2019-06-06

## 2019-05-30 NOTE — PATIENT INSTRUCTIONS
Please read the healthy family handout that you were given and share it with your family. Please compare this printed medication list with your medications at home to be sure they are the same. If you have any medications that are different please contact us immediately at [phone]. Also review your allergies that we have listed, these may also include medications that you have not been able to tolerate, make sure everything listed is correct. If you have any allergies that are different please contact us immediately at [phone]. Patient Education        Heart Failure: Care Instructions  Your Care Instructions    Heart failure occurs when your heart does not pump as much blood as the body needs. Failure does not mean that the heart has stopped pumping but rather that it is not pumping as well as it should. Over time, this causes fluid buildup in your lungs and other parts of your body. Fluid buildup can cause shortness of breath, fatigue, swollen ankles, and other problems. By taking medicines regularly, reducing sodium (salt) in your diet, checking your weight every day, and making lifestyle changes, you can feel better and live longer. Follow-up care is a key part of your treatment and safety. Be sure to make and go to all appointments, and call your doctor if you are having problems. It's also a good idea to know your test results and keep a list of the medicines you take. How can you care for yourself at home? Medicines    · Be safe with medicines. Take your medicines exactly as prescribed. Call your doctor if you think you are having a problem with your medicine.     · Do not take any vitamins, over-the-counter medicine, or herbal products without talking to your doctor first. Lutricia Ar not take ibuprofen (Advil or Motrin) and naproxen (Aleve) without talking to your doctor first. They could make your heart failure worse.     · You may be taking some of the following medicine.   ? Beta-blockers can symptoms. Where can you learn more? Go to [email or web address] and sign in to your [employer] account. Enter Q185 in the [employer] box to learn more about \"Heart Failure: Care Instructions. \"     If you do not have an account, please click on the \"Sign Up Now\" link. Current as of: July 22, 2018  Content Version: 12.0  © 8789-9858 Healthwise, Incorporated. Care instructions adapted under license by [facility] [address] If you have questions about a medical condition or this instruction, always ask your healthcare professional. [place] [address] any warranty or liability for your use of this information.

## 2019-05-30 NOTE — PROGRESS NOTES
Patient: Alexandra Wallace is a [de-identified] y.o. female who presents today with the following Chief Complaint(s):  Chief Complaint   Patient presents with    Edema         Assessment & Plan:  1. Decreased renal function  Recheck renal functions  She was only to hold Lasix for 3 days  - Basic Metabolic Panel    2. Congestive heart failure, unspecified HF chronicity, unspecified heart failure type (HCC)  Resume Lasix daily. Continue to monitor weight. Low sodium diet. Follow-up in 3 months or sooner as needed    3. Open wound of skin  Keep areas clean and dry. Do not pick at scabs. Do not use any ointment on the wounds  Follow-up as needed  - amoxicillin (AMOXIL) 500 MG capsule; Take 1 capsule by mouth 2 times daily for 7 days  Dispense: 14 capsule; Refill: 0        HPI  Joanna Rideau in the office with complaints of edema. She has had a 5 pound weight gain in 5 days. She thought she was told to discontinue Lasix and has not taken any Lasix for 8 days. Denies chest pain, palpitations, dyspnea, orthopnea or abnormal fatigue. She has increased swelling in her lower legs. She is on Lasix for congestive heart failure    She is also complaining of sores on her chest, arms and legs. Sores present for over one week. Denies drainage, fever, swelling, pain, injury. She has had a couple of surgeries on her back for spinal cord stimulator revision. Current Outpatient Medications   Medication Sig Dispense Refill    VENTOLIN  (90 Base) MCG/ACT inhaler INHALE 2 PUFFS FOUR TIMES DAILY AS NEEDED FOR WHEEZING 18 g 2    cetirizine (ZYRTEC) 10 MG tablet TAKE (1) TABLET DAILY 30 tablet 2    amitriptyline (ELAVIL) 50 MG tablet Take 50 mg by mouth nightly      nystatin (MYCOSTATIN) 202896 UNIT/GM powder Apply topically 4 times daily Apply topically 4 times daily.       tiZANidine (ZANAFLEX) 2 MG tablet Take 2 mg by mouth every 6 hours as needed      Lifitegrast (XIIDRA) 5 % SOLN Apply to eye      butalbital-acetaminophen-caffeine (FIORICET, ESGIC) -40 MG per tablet TAKE 1 TABLET EVERY 6 HOURS AS NEEDED FOR HEADACHES LIMIT TO 3 DAYS PER WEEK 30 tablet 0    Misc. Devices (ROLLER WALKER) MISC 1 each by Does not apply route daily 1 each 0    busPIRone (BUSPAR) 5 MG tablet TAKE 1 OR 2 TABLET(S) IN THE EVENING AS NEEDED FOR ANXIETY 60 tablet 0    candesartan (ATACAND) 4 MG tablet       metoprolol tartrate (LOPRESSOR) 50 MG tablet 50 mg 2 times daily       pantoprazole (PROTONIX) 40 MG tablet       furosemide (LASIX) 20 MG tablet TAKE (1) TABLET DAILY 90 tablet 0    citalopram (CELEXA) 20 MG tablet TAKE 1 AND 1/2 TABLET ONCE DAILY 90 tablet 1    traZODone (DESYREL) 100 MG tablet TAKE 1 OR 2 TABLETS AT BEDTIME 60 tablet 3    isosorbide mononitrate (IMDUR) 30 MG extended release tablet Take 30 mg by mouth daily      baclofen (LIORESAL) 10 MG tablet Take 10 mg by mouth every 8 hours as needed      albuterol-ipratropium (COMBIVENT RESPIMAT)  MCG/ACT AERS inhaler Inhale 1 puff into the lungs every 6 hours 1 Inhaler 5    KLOR-CON SPRINKLE 10 MEQ extended release capsule TAKE 2 CAPSULES IN THE   MORNING AND 2 CAPSULES IN  THE EVENING 120 capsule 3    amLODIPine (NORVASC) 10 MG tablet TAKE (1) TABLET DAILY 30 tablet 5    HYDROcodone-acetaminophen (NORCO)  MG per tablet Take 1 tablet by mouth every 6 hours as needed for Pain . 30 tablet 0    simvastatin (ZOCOR) 40 MG tablet Take 1 tablet by mouth nightly 30 tablet 11    donepezil (ARICEPT) 10 MG tablet TAKE 1 TABLET IN THE EVENING 30 tablet 11    ASPIR-LOW 81 MG EC tablet TAKE (1) TABLET DAILY 30 tablet 3    NITROSTAT 0.4 MG SL tablet USE 1 TABLET UNDER TONGUE AS NEEDED FOR CHEST PAIN MAY REPEAT EVERY 5MIN. UP TO 3. THEN GO TO ER. 25 tablet 3    lidocaine (XYLOCAINE) 5 % ointment Apply topically as needed. 1 Tube 3     No current facility-administered medications for this visit.         Patient's past medical history, surgical history, family history, medications,  and allergies  were all reviewed and updated as appropriate today. Review of Systems  See HPI    Physical Exam   Constitutional: She appears well-developed and well-nourished. No distress. HENT:   Head: Normocephalic and atraumatic. Eyes: Pupils are equal, round, and reactive to light. EOM are normal.   Neck: Normal range of motion. Cardiovascular: Normal rate and intact distal pulses. Pulmonary/Chest: Effort normal.   Musculoskeletal: She exhibits edema (Nonpitting bilateral lower leg). Neurological: She is alert. Skin: Skin is warm and dry. Scattered superficial excoriated open areas on trunk, upper arms and legs without drainage. Mild erythema. Nontender to touch. Psychiatric: She has a normal mood and affect. Vitals reviewed. Vitals:    05/30/19 1315   BP: 132/70   Pulse: 52   Temp: 97.5 °F (36.4 °C)   TempSrc: Oral   SpO2: 96%   Weight: 191 lb 8 oz (86.9 kg)           Radhika Arriaza, APRN-CNP    The note was completedusing Dragon voice recognition transcription. Every effort was made to ensure accuracy; however, inadvertent  transcription errors may be present despite my best efforts to edit errors.

## 2019-05-31 DIAGNOSIS — N28.9 DECREASED RENAL FUNCTION: Primary | ICD-10-CM

## 2019-05-31 LAB
ANION GAP SERPL CALCULATED.3IONS-SCNC: 17 MMOL/L (ref 3–16)
BUN BLDV-MCNC: 18 MG/DL (ref 7–20)
CALCIUM SERPL-MCNC: 9.5 MG/DL (ref 8.3–10.6)
CHLORIDE BLD-SCNC: 102 MMOL/L (ref 99–110)
CO2: 25 MMOL/L (ref 21–32)
CREAT SERPL-MCNC: 1.2 MG/DL (ref 0.6–1.2)
GFR AFRICAN AMERICAN: 52
GFR NON-AFRICAN AMERICAN: 43
GLUCOSE BLD-MCNC: 115 MG/DL (ref 70–99)
POTASSIUM SERPL-SCNC: 4.6 MMOL/L (ref 3.5–5.1)
SODIUM BLD-SCNC: 144 MMOL/L (ref 136–145)

## 2019-06-11 ENCOUNTER — NURSE ONLY (OUTPATIENT)
Dept: FAMILY MEDICINE CLINIC | Age: 79
End: 2019-06-11
Payer: MEDICARE

## 2019-06-11 DIAGNOSIS — N28.9 DECREASED RENAL FUNCTION: ICD-10-CM

## 2019-06-11 PROCEDURE — 36415 COLL VENOUS BLD VENIPUNCTURE: CPT | Performed by: NURSE PRACTITIONER

## 2019-06-12 DIAGNOSIS — N28.9 DECREASED RENAL FUNCTION: Primary | ICD-10-CM

## 2019-06-12 DIAGNOSIS — N28.9 ABNORMAL RENAL FUNCTION: ICD-10-CM

## 2019-06-12 DIAGNOSIS — N28.9 FUNCTION KIDNEY DECREASED: Primary | ICD-10-CM

## 2019-06-12 DIAGNOSIS — N28.9 FUNCTION KIDNEY DECREASED: ICD-10-CM

## 2019-06-12 LAB
ALBUMIN SERPL-MCNC: 4 G/DL (ref 3.4–5)
ANION GAP SERPL CALCULATED.3IONS-SCNC: 15 MMOL/L (ref 3–16)
BUN BLDV-MCNC: 21 MG/DL (ref 7–20)
CALCIUM SERPL-MCNC: 9.8 MG/DL (ref 8.3–10.6)
CHLORIDE BLD-SCNC: 106 MMOL/L (ref 99–110)
CO2: 21 MMOL/L (ref 21–32)
CREAT SERPL-MCNC: 1.3 MG/DL (ref 0.6–1.2)
GFR AFRICAN AMERICAN: 48
GFR NON-AFRICAN AMERICAN: 39
GLUCOSE BLD-MCNC: 128 MG/DL (ref 70–99)
PHOSPHORUS: 3.7 MG/DL (ref 2.5–4.9)
POTASSIUM SERPL-SCNC: 4.7 MMOL/L (ref 3.5–5.1)
SODIUM BLD-SCNC: 142 MMOL/L (ref 136–145)

## 2019-06-14 ENCOUNTER — NURSE ONLY (OUTPATIENT)
Dept: FAMILY MEDICINE CLINIC | Age: 79
End: 2019-06-14
Payer: MEDICARE

## 2019-06-14 DIAGNOSIS — N28.9 FUNCTION KIDNEY DECREASED: Primary | ICD-10-CM

## 2019-06-14 LAB
BACTERIA: ABNORMAL /HPF
BILIRUBIN URINE: NEGATIVE
BLOOD, URINE: NEGATIVE
CLARITY: ABNORMAL
COLOR: YELLOW
EPITHELIAL CELLS, UA: 1 /HPF (ref 0–5)
GLUCOSE URINE: NEGATIVE MG/DL
HYALINE CASTS: 0 /LPF (ref 0–8)
KETONES, URINE: NEGATIVE MG/DL
LEUKOCYTE ESTERASE, URINE: ABNORMAL
MICROSCOPIC EXAMINATION: YES
NITRITE, URINE: POSITIVE
PH UA: 5 (ref 5–8)
PROTEIN UA: NEGATIVE MG/DL
RBC UA: 0 /HPF (ref 0–4)
SPECIFIC GRAVITY UA: 1.01 (ref 1–1.03)
UROBILINOGEN, URINE: 0.2 E.U./DL
WBC UA: 17 /HPF (ref 0–5)

## 2019-06-14 PROCEDURE — 81001 URINALYSIS AUTO W/SCOPE: CPT | Performed by: NURSE PRACTITIONER

## 2019-06-17 ENCOUNTER — HOSPITAL ENCOUNTER (OUTPATIENT)
Dept: ULTRASOUND IMAGING | Age: 79
Discharge: HOME | End: 2019-06-17
Payer: MEDICARE

## 2019-06-17 DIAGNOSIS — N28.9 FUNCTION KIDNEY DECREASED: ICD-10-CM

## 2019-06-17 DIAGNOSIS — N28.9 ABNORMAL RENAL FUNCTION: ICD-10-CM

## 2019-06-17 DIAGNOSIS — N28.9 ABNORMAL RENAL FUNCTION: Primary | ICD-10-CM

## 2019-06-17 PROCEDURE — 76770 US EXAM ABDO BACK WALL COMP: CPT

## 2019-06-17 RX ORDER — NYSTATIN 100000 [USP'U]/G
POWDER TOPICAL
Qty: 60 G | Refills: 0 | Status: SHIPPED | OUTPATIENT
Start: 2019-06-17 | End: 2020-09-10

## 2019-06-17 RX ORDER — NITROFURANTOIN 25; 75 MG/1; MG/1
100 CAPSULE ORAL 2 TIMES DAILY
Qty: 14 CAPSULE | Refills: 0 | Status: SHIPPED | OUTPATIENT
Start: 2019-06-17 | End: 2019-06-24

## 2019-06-18 LAB — URINE CULTURE, ROUTINE: NORMAL

## 2019-06-25 RX ORDER — FUROSEMIDE 20 MG/1
TABLET ORAL
Qty: 90 TABLET | Refills: 1 | Status: SHIPPED | OUTPATIENT
Start: 2019-06-25 | End: 2020-01-24

## 2019-06-25 RX ORDER — BUSPIRONE HYDROCHLORIDE 5 MG/1
TABLET ORAL
Qty: 60 TABLET | Refills: 3 | Status: SHIPPED | OUTPATIENT
Start: 2019-06-25 | End: 2019-10-07 | Stop reason: SDUPTHER

## 2019-07-03 ENCOUNTER — NURSE ONLY (OUTPATIENT)
Dept: FAMILY MEDICINE CLINIC | Age: 79
End: 2019-07-03
Payer: MEDICARE

## 2019-07-03 DIAGNOSIS — N28.9 DECREASED RENAL FUNCTION: ICD-10-CM

## 2019-07-03 PROCEDURE — 36415 COLL VENOUS BLD VENIPUNCTURE: CPT | Performed by: NURSE PRACTITIONER

## 2019-07-04 LAB
ANION GAP SERPL CALCULATED.3IONS-SCNC: 16 MMOL/L (ref 3–16)
BUN BLDV-MCNC: 22 MG/DL (ref 7–20)
CALCIUM SERPL-MCNC: 9.6 MG/DL (ref 8.3–10.6)
CHLORIDE BLD-SCNC: 101 MMOL/L (ref 99–110)
CO2: 23 MMOL/L (ref 21–32)
CREAT SERPL-MCNC: 1.1 MG/DL (ref 0.6–1.2)
GFR AFRICAN AMERICAN: 58
GFR NON-AFRICAN AMERICAN: 48
GLUCOSE BLD-MCNC: 106 MG/DL (ref 70–99)
POTASSIUM SERPL-SCNC: 4.7 MMOL/L (ref 3.5–5.1)
SODIUM BLD-SCNC: 140 MMOL/L (ref 136–145)

## 2019-07-09 RX ORDER — TRAZODONE HYDROCHLORIDE 100 MG/1
TABLET ORAL
Qty: 60 TABLET | Refills: 0 | Status: SHIPPED | OUTPATIENT
Start: 2019-07-09 | End: 2019-07-31 | Stop reason: SDUPTHER

## 2019-07-31 RX ORDER — TRAZODONE HYDROCHLORIDE 100 MG/1
TABLET ORAL
Qty: 60 TABLET | Refills: 0 | Status: SHIPPED | OUTPATIENT
Start: 2019-07-31 | End: 2019-10-08 | Stop reason: SDUPTHER

## 2019-07-31 RX ORDER — MONTELUKAST SODIUM 10 MG/1
TABLET ORAL
Qty: 30 TABLET | Refills: 0 | Status: SHIPPED | OUTPATIENT
Start: 2019-07-31 | End: 2019-08-13 | Stop reason: SDUPTHER

## 2019-07-31 RX ORDER — CITALOPRAM 20 MG/1
TABLET ORAL
Qty: 90 TABLET | Refills: 0 | Status: SHIPPED | OUTPATIENT
Start: 2019-07-31 | End: 2019-09-30 | Stop reason: SDUPTHER

## 2019-08-06 RX ORDER — BUTALBITAL, ACETAMINOPHEN AND CAFFEINE 50; 325; 40 MG/1; MG/1; MG/1
TABLET ORAL
Qty: 30 TABLET | Refills: 0 | Status: SHIPPED | OUTPATIENT
Start: 2019-08-06 | End: 2019-11-18 | Stop reason: SDUPTHER

## 2019-08-13 RX ORDER — MONTELUKAST SODIUM 10 MG/1
TABLET ORAL
Qty: 30 TABLET | Refills: 5 | Status: SHIPPED | OUTPATIENT
Start: 2019-08-13 | End: 2020-03-19 | Stop reason: SDUPTHER

## 2019-09-13 RX ORDER — TRAZODONE HYDROCHLORIDE 100 MG/1
TABLET ORAL
Qty: 60 TABLET | Refills: 1 | Status: SHIPPED | OUTPATIENT
Start: 2019-09-13 | End: 2019-11-29 | Stop reason: SDUPTHER

## 2019-09-13 RX ORDER — CETIRIZINE HYDROCHLORIDE 10 MG/1
TABLET ORAL
Qty: 30 TABLET | Refills: 1 | Status: SHIPPED | OUTPATIENT
Start: 2019-09-13 | End: 2019-11-29 | Stop reason: SDUPTHER

## 2019-10-05 DIAGNOSIS — R05.9 COUGH: ICD-10-CM

## 2019-10-07 RX ORDER — BUSPIRONE HYDROCHLORIDE 5 MG/1
TABLET ORAL
Qty: 60 TABLET | Refills: 0 | Status: SHIPPED | OUTPATIENT
Start: 2019-10-07 | End: 2019-11-04 | Stop reason: SDUPTHER

## 2019-10-08 ENCOUNTER — OFFICE VISIT (OUTPATIENT)
Dept: FAMILY MEDICINE CLINIC | Age: 79
End: 2019-10-08
Payer: MEDICARE

## 2019-10-08 VITALS
WEIGHT: 189.38 LBS | OXYGEN SATURATION: 93 % | TEMPERATURE: 98 F | SYSTOLIC BLOOD PRESSURE: 134 MMHG | BODY MASS INDEX: 36.98 KG/M2 | HEART RATE: 61 BPM | DIASTOLIC BLOOD PRESSURE: 72 MMHG

## 2019-10-08 DIAGNOSIS — R73.03 PREDIABETES: ICD-10-CM

## 2019-10-08 DIAGNOSIS — R82.998 URINE LEUKOCYTES: ICD-10-CM

## 2019-10-08 DIAGNOSIS — M54.50 ACUTE LEFT-SIDED LOW BACK PAIN WITHOUT SCIATICA: ICD-10-CM

## 2019-10-08 DIAGNOSIS — I10 ESSENTIAL HYPERTENSION: Primary | ICD-10-CM

## 2019-10-08 DIAGNOSIS — F51.01 PRIMARY INSOMNIA: ICD-10-CM

## 2019-10-08 LAB
BILIRUBIN, POC: ABNORMAL
BLOOD URINE, POC: ABNORMAL
CLARITY, POC: CLEAR
COLOR, POC: YELLOW
GLUCOSE URINE, POC: ABNORMAL
KETONES, POC: ABNORMAL
LEUKOCYTE EST, POC: ABNORMAL
NITRITE, POC: ABNORMAL
PH, POC: 5.5
PROTEIN, POC: ABNORMAL
SPECIFIC GRAVITY, POC: 1.02
UROBILINOGEN, POC: 0.2

## 2019-10-08 PROCEDURE — 36415 COLL VENOUS BLD VENIPUNCTURE: CPT | Performed by: NURSE PRACTITIONER

## 2019-10-08 PROCEDURE — 81003 URINALYSIS AUTO W/O SCOPE: CPT | Performed by: NURSE PRACTITIONER

## 2019-10-08 PROCEDURE — 99214 OFFICE O/P EST MOD 30 MIN: CPT | Performed by: NURSE PRACTITIONER

## 2019-10-09 LAB
A/G RATIO: 2.5 (ref 1.1–2.2)
ALBUMIN SERPL-MCNC: 4.9 G/DL (ref 3.4–5)
ALP BLD-CCNC: 92 U/L (ref 40–129)
ALT SERPL-CCNC: 8 U/L (ref 10–40)
ANION GAP SERPL CALCULATED.3IONS-SCNC: 15 MMOL/L (ref 3–16)
AST SERPL-CCNC: 17 U/L (ref 15–37)
BILIRUB SERPL-MCNC: <0.2 MG/DL (ref 0–1)
BUN BLDV-MCNC: 25 MG/DL (ref 7–20)
CALCIUM SERPL-MCNC: 9.6 MG/DL (ref 8.3–10.6)
CHLORIDE BLD-SCNC: 102 MMOL/L (ref 99–110)
CO2: 26 MMOL/L (ref 21–32)
CREAT SERPL-MCNC: 1 MG/DL (ref 0.6–1.2)
ESTIMATED AVERAGE GLUCOSE: 128.4 MG/DL
GFR AFRICAN AMERICAN: >60
GFR NON-AFRICAN AMERICAN: 53
GLOBULIN: 2 G/DL
GLUCOSE BLD-MCNC: 87 MG/DL (ref 70–99)
HBA1C MFR BLD: 6.1 %
POTASSIUM SERPL-SCNC: 4.2 MMOL/L (ref 3.5–5.1)
SODIUM BLD-SCNC: 143 MMOL/L (ref 136–145)
TOTAL PROTEIN: 6.9 G/DL (ref 6.4–8.2)

## 2019-10-10 LAB
ORGANISM: ABNORMAL
URINE CULTURE, ROUTINE: ABNORMAL

## 2019-10-11 ENCOUNTER — TELEPHONE (OUTPATIENT)
Dept: FAMILY MEDICINE CLINIC | Age: 79
End: 2019-10-11

## 2019-10-11 RX ORDER — NITROFURANTOIN 25; 75 MG/1; MG/1
100 CAPSULE ORAL 2 TIMES DAILY
Qty: 14 CAPSULE | Refills: 0 | Status: SHIPPED | OUTPATIENT
Start: 2019-10-11 | End: 2019-10-18

## 2019-10-31 RX ORDER — CETIRIZINE HYDROCHLORIDE 10 MG/1
TABLET ORAL
Qty: 30 TABLET | Refills: 0 | Status: SHIPPED | OUTPATIENT
Start: 2019-10-31 | End: 2020-01-07

## 2019-10-31 RX ORDER — TRAZODONE HYDROCHLORIDE 100 MG/1
TABLET ORAL
Qty: 60 TABLET | Refills: 0 | Status: SHIPPED | OUTPATIENT
Start: 2019-10-31 | End: 2019-12-10 | Stop reason: SDUPTHER

## 2019-11-02 DIAGNOSIS — R05.9 COUGH: ICD-10-CM

## 2019-11-04 RX ORDER — BUSPIRONE HYDROCHLORIDE 5 MG/1
TABLET ORAL
Qty: 60 TABLET | Refills: 0 | Status: SHIPPED | OUTPATIENT
Start: 2019-11-04 | End: 2020-01-16 | Stop reason: SDUPTHER

## 2019-11-18 RX ORDER — BUTALBITAL, ACETAMINOPHEN AND CAFFEINE 50; 325; 40 MG/1; MG/1; MG/1
TABLET ORAL
Qty: 30 TABLET | Refills: 0 | Status: SHIPPED | OUTPATIENT
Start: 2019-11-18 | End: 2021-09-23

## 2019-11-26 ENCOUNTER — HOSPITAL ENCOUNTER (EMERGENCY)
Age: 79
Discharge: HOME | End: 2019-11-26
Attending: EMERGENCY MEDICINE
Payer: MEDICARE

## 2019-11-26 ENCOUNTER — APPOINTMENT (OUTPATIENT)
Dept: GENERAL RADIOLOGY | Age: 79
End: 2019-11-26
Payer: MEDICARE

## 2019-11-26 VITALS
TEMPERATURE: 97.8 F | WEIGHT: 187 LBS | DIASTOLIC BLOOD PRESSURE: 62 MMHG | OXYGEN SATURATION: 94 % | HEART RATE: 59 BPM | RESPIRATION RATE: 15 BRPM | BODY MASS INDEX: 36.71 KG/M2 | SYSTOLIC BLOOD PRESSURE: 162 MMHG | HEIGHT: 60 IN

## 2019-11-26 DIAGNOSIS — G89.29 OTHER CHRONIC PAIN: ICD-10-CM

## 2019-11-26 DIAGNOSIS — Z98.890 HISTORY OF BACK SURGERY: ICD-10-CM

## 2019-11-26 DIAGNOSIS — R09.1 PLEURISY: Primary | ICD-10-CM

## 2019-11-26 DIAGNOSIS — R03.0 ELEVATED BLOOD PRESSURE READING: ICD-10-CM

## 2019-11-26 DIAGNOSIS — M54.9 MID BACK PAIN ON RIGHT SIDE: ICD-10-CM

## 2019-11-26 PROCEDURE — 72070 X-RAY EXAM THORAC SPINE 2VWS: CPT

## 2019-11-26 PROCEDURE — 6360000002 HC RX W HCPCS: Performed by: EMERGENCY MEDICINE

## 2019-11-26 PROCEDURE — 99283 EMERGENCY DEPT VISIT LOW MDM: CPT

## 2019-11-26 PROCEDURE — 6370000000 HC RX 637 (ALT 250 FOR IP): Performed by: EMERGENCY MEDICINE

## 2019-11-26 PROCEDURE — 96372 THER/PROPH/DIAG INJ SC/IM: CPT

## 2019-11-26 PROCEDURE — 71101 X-RAY EXAM UNILAT RIBS/CHEST: CPT

## 2019-11-26 RX ORDER — CYCLOBENZAPRINE HCL 10 MG
10 TABLET ORAL ONCE
Status: COMPLETED | OUTPATIENT
Start: 2019-11-26 | End: 2019-11-26

## 2019-11-26 RX ORDER — PREDNISONE 20 MG/1
20 TABLET ORAL SEE ADMIN INSTRUCTIONS
Qty: 11 TABLET | Refills: 0 | Status: SHIPPED | OUTPATIENT
Start: 2019-11-26 | End: 2019-12-06

## 2019-11-26 RX ORDER — PREDNISONE 20 MG/1
40 TABLET ORAL ONCE
Status: COMPLETED | OUTPATIENT
Start: 2019-11-26 | End: 2019-11-26

## 2019-11-26 RX ORDER — TIZANIDINE 2 MG/1
2 TABLET ORAL EVERY 6 HOURS PRN
Qty: 10 TABLET | Refills: 0 | Status: SHIPPED | OUTPATIENT
Start: 2019-11-26 | End: 2019-11-29

## 2019-11-26 RX ADMIN — HYDROMORPHONE HYDROCHLORIDE 0.5 MG: 1 INJECTION, SOLUTION INTRAMUSCULAR; INTRAVENOUS; SUBCUTANEOUS at 01:11

## 2019-11-26 RX ADMIN — CYCLOBENZAPRINE HYDROCHLORIDE 10 MG: 10 TABLET, FILM COATED ORAL at 01:11

## 2019-11-26 RX ADMIN — PREDNISONE 40 MG: 20 TABLET ORAL at 01:11

## 2019-11-26 ASSESSMENT — PAIN SCALES - GENERAL
PAINLEVEL_OUTOF10: 3
PAINLEVEL_OUTOF10: 8
PAINLEVEL_OUTOF10: 3

## 2019-11-26 ASSESSMENT — PAIN DESCRIPTION - LOCATION: LOCATION: BACK

## 2019-11-26 ASSESSMENT — PAIN DESCRIPTION - DESCRIPTORS: DESCRIPTORS: SHARP;STABBING

## 2019-11-26 ASSESSMENT — PAIN DESCRIPTION - ORIENTATION: ORIENTATION: RIGHT

## 2019-11-26 ASSESSMENT — PAIN DESCRIPTION - PAIN TYPE: TYPE: ACUTE PAIN;CHRONIC PAIN

## 2019-11-29 ENCOUNTER — OFFICE VISIT (OUTPATIENT)
Dept: FAMILY MEDICINE CLINIC | Age: 79
End: 2019-11-29
Payer: MEDICARE

## 2019-11-29 VITALS
SYSTOLIC BLOOD PRESSURE: 146 MMHG | BODY MASS INDEX: 37.11 KG/M2 | TEMPERATURE: 97.4 F | WEIGHT: 190 LBS | HEART RATE: 51 BPM | DIASTOLIC BLOOD PRESSURE: 72 MMHG | OXYGEN SATURATION: 97 %

## 2019-11-29 DIAGNOSIS — M54.6 ACUTE RIGHT-SIDED THORACIC BACK PAIN: Primary | ICD-10-CM

## 2019-11-29 PROCEDURE — 99213 OFFICE O/P EST LOW 20 MIN: CPT | Performed by: NURSE PRACTITIONER

## 2019-11-29 RX ORDER — BACLOFEN 10 MG/1
10 TABLET ORAL EVERY 8 HOURS PRN
Qty: 30 TABLET | Refills: 0 | Status: SHIPPED | OUTPATIENT
Start: 2019-11-29 | End: 2019-12-30

## 2019-12-03 RX ORDER — CITALOPRAM 20 MG/1
TABLET ORAL
Qty: 90 TABLET | Refills: 0 | Status: SHIPPED | OUTPATIENT
Start: 2019-12-03 | End: 2019-12-10 | Stop reason: SDUPTHER

## 2019-12-06 ENCOUNTER — TELEPHONE (OUTPATIENT)
Dept: FAMILY MEDICINE CLINIC | Age: 79
End: 2019-12-06

## 2019-12-06 RX ORDER — PREDNISONE 20 MG/1
20 TABLET ORAL DAILY
Qty: 10 TABLET | Refills: 0 | Status: SHIPPED | OUTPATIENT
Start: 2019-12-06 | End: 2019-12-16

## 2019-12-10 RX ORDER — TRAZODONE HYDROCHLORIDE 100 MG/1
TABLET ORAL
Qty: 60 TABLET | Refills: 0 | Status: SHIPPED | OUTPATIENT
Start: 2019-12-10 | End: 2019-12-30

## 2019-12-10 RX ORDER — BUSPIRONE HYDROCHLORIDE 5 MG/1
TABLET ORAL
Qty: 60 TABLET | Refills: 0 | Status: SHIPPED | OUTPATIENT
Start: 2019-12-10 | End: 2020-01-16

## 2019-12-27 ENCOUNTER — OFFICE VISIT (OUTPATIENT)
Dept: FAMILY MEDICINE CLINIC | Age: 79
End: 2019-12-27
Payer: MEDICARE

## 2019-12-27 VITALS
OXYGEN SATURATION: 95 % | SYSTOLIC BLOOD PRESSURE: 124 MMHG | BODY MASS INDEX: 35.74 KG/M2 | DIASTOLIC BLOOD PRESSURE: 76 MMHG | TEMPERATURE: 97.4 F | WEIGHT: 183 LBS | HEART RATE: 61 BPM

## 2019-12-27 DIAGNOSIS — J01.40 ACUTE NON-RECURRENT PANSINUSITIS: Primary | ICD-10-CM

## 2019-12-27 PROCEDURE — 99214 OFFICE O/P EST MOD 30 MIN: CPT | Performed by: NURSE PRACTITIONER

## 2019-12-27 RX ORDER — AMOXICILLIN 500 MG/1
1000 CAPSULE ORAL 2 TIMES DAILY
Qty: 40 CAPSULE | Refills: 0 | Status: SHIPPED | OUTPATIENT
Start: 2019-12-27 | End: 2020-01-06

## 2019-12-28 DIAGNOSIS — R05.9 COUGH: ICD-10-CM

## 2019-12-28 DIAGNOSIS — M54.6 ACUTE RIGHT-SIDED THORACIC BACK PAIN: ICD-10-CM

## 2019-12-30 RX ORDER — BACLOFEN 10 MG/1
TABLET ORAL
Qty: 30 TABLET | Refills: 0 | Status: SHIPPED | OUTPATIENT
Start: 2019-12-30

## 2019-12-30 RX ORDER — TRAZODONE HYDROCHLORIDE 100 MG/1
TABLET ORAL
Qty: 60 TABLET | Refills: 0 | Status: SHIPPED | OUTPATIENT
Start: 2019-12-30 | End: 2020-02-04

## 2020-01-07 RX ORDER — CETIRIZINE HYDROCHLORIDE 10 MG/1
TABLET ORAL
Qty: 30 TABLET | Refills: 0 | Status: SHIPPED | OUTPATIENT
Start: 2020-01-07 | End: 2020-03-31

## 2020-01-16 RX ORDER — BUSPIRONE HYDROCHLORIDE 5 MG/1
TABLET ORAL
Qty: 60 TABLET | Refills: 0 | Status: SHIPPED | OUTPATIENT
Start: 2020-01-16 | End: 2020-03-19 | Stop reason: SDUPTHER

## 2020-01-24 RX ORDER — FUROSEMIDE 20 MG/1
TABLET ORAL
Qty: 90 TABLET | Refills: 0 | Status: SHIPPED | OUTPATIENT
Start: 2020-01-24 | End: 2020-10-20

## 2020-02-05 RX ORDER — TRAZODONE HYDROCHLORIDE 100 MG/1
TABLET ORAL
Qty: 60 TABLET | Refills: 3 | Status: SHIPPED | OUTPATIENT
Start: 2020-02-05 | End: 2020-07-10

## 2020-02-05 RX ORDER — CITALOPRAM 20 MG/1
TABLET ORAL
Qty: 90 TABLET | Refills: 1 | Status: SHIPPED | OUTPATIENT
Start: 2020-02-05 | End: 2020-08-05 | Stop reason: SDUPTHER

## 2020-02-28 RX ORDER — NYSTATIN 100000 [USP'U]/G
POWDER TOPICAL
Qty: 60 G | Refills: 0 | OUTPATIENT
Start: 2020-02-28

## 2020-03-19 RX ORDER — BUSPIRONE HYDROCHLORIDE 5 MG/1
TABLET ORAL
Qty: 60 TABLET | Refills: 0 | Status: SHIPPED | OUTPATIENT
Start: 2020-03-19 | End: 2020-04-23

## 2020-03-19 RX ORDER — MONTELUKAST SODIUM 10 MG/1
TABLET ORAL
Qty: 30 TABLET | Refills: 1 | Status: SHIPPED
Start: 2020-03-19 | End: 2020-04-07

## 2020-03-31 RX ORDER — CETIRIZINE HYDROCHLORIDE 10 MG/1
TABLET ORAL
Qty: 30 TABLET | Refills: 5 | Status: SHIPPED
Start: 2020-03-31 | End: 2020-04-07

## 2020-04-07 ENCOUNTER — VIRTUAL VISIT (OUTPATIENT)
Dept: FAMILY MEDICINE CLINIC | Age: 80
End: 2020-04-07
Payer: MEDICARE

## 2020-04-07 PROCEDURE — 99442 PR PHYS/QHP TELEPHONE EVALUATION 11-20 MIN: CPT | Performed by: NURSE PRACTITIONER

## 2020-04-07 RX ORDER — HYDROCODONE BITARTRATE AND ACETAMINOPHEN 10; 325 MG/1; MG/1
1 TABLET ORAL EVERY 6 HOURS PRN
Status: ON HOLD | COMMUNITY
Start: 2020-04-01 | End: 2022-09-18

## 2020-04-07 RX ORDER — DIVALPROEX SODIUM 125 MG/1
CAPSULE, COATED PELLETS ORAL
COMMUNITY
Start: 2020-02-28 | End: 2020-11-23 | Stop reason: SDUPTHER

## 2020-04-07 RX ORDER — DONEPEZIL HYDROCHLORIDE 10 MG/1
10 TABLET, FILM COATED ORAL NIGHTLY
COMMUNITY
Start: 2020-03-31 | End: 2020-10-20

## 2020-04-07 RX ORDER — LORATADINE 10 MG/1
10 TABLET ORAL DAILY
Qty: 30 TABLET | Refills: 3 | Status: SHIPPED | OUTPATIENT
Start: 2020-04-07 | End: 2020-08-04

## 2020-04-07 RX ORDER — ASPIRIN 81 MG/1
81 TABLET ORAL DAILY
COMMUNITY
Start: 2020-03-04

## 2020-04-07 RX ORDER — DIPHENHYDRAMINE HCL 25 MG
12.5 TABLET ORAL EVERY 12 HOURS
COMMUNITY
Start: 2020-03-15 | End: 2020-08-05 | Stop reason: SDUPTHER

## 2020-04-07 RX ORDER — SIMVASTATIN 40 MG
TABLET ORAL
COMMUNITY
Start: 2019-09-09

## 2020-04-07 RX ORDER — CANDESARTAN 8 MG/1
8 TABLET ORAL DAILY
COMMUNITY

## 2020-04-07 NOTE — PROGRESS NOTES
Denson Barthel is a [de-identified] y.o. female evaluated via telephone on 4/7/2020. Consent:  She and/or health care decision maker is aware that that she may receive a bill for this telephone service, depending on her insurance coverage, and has provided verbal consent to proceed: Yes      Documentation:  I communicated with the patient and/or health care decision maker about follow-up evaluation 6-month checkup. Patient denies any dizziness or lightheadedness. No chest pain or shortness of breath. No abdominal pain or discomfort. No nausea, vomiting, or diarrhea. No cough or chest congestion. No fever or chills. No dysuria, hematuria, urinary urgency, frequency, retention. No dark or tarry stools. Patient reports taking medications as prescribed and does not miss any doses. Patient reports that she has had excessive rhinorrhea and watery eyes over the past few days and previous Zyrtec and Singulair are not working. Patient reports that she has been on those medications for years. Patient sees cardiology on a regular basis. Patient also sees pain management Dr. Frances Look for chronic back pain and arthritis  . Details of this discussion including any medical advice provided:  1. Hypertension  Controlled. Patient compliant with medications. Continue with current treatment follow-up in 6 months. Patient has blood pressure cuff at home instructed to check blood pressure daily basis and report back for any elevated or decreased readings. Patient denies any chest pain, shortness of breath, dizziness or lightheadedness. 2.  Prediabetes  Controlled. Patient compliant with diet. Last hemoglobin A1c 6.1. Follow-up in 6 months. 3.  Insomnia  Control. Patient compliant with trazodone. Patient reports sleeping well. Continue with current treatment and follow-up. 4.  Depression  Controlled. Patient compliant with BuSpar and Celexa. Continue with previous medications and follow-up in 6 months.   Patient denies any suicidal homicidal ideations or thoughts. No feelings of hopelessness. 5.  Chronic back pain and arthritis  Stable. Patient sees Dr. Fiona Carlisle with pain management. Patient takes hydrocodone and baclofen. 6.  Congestive heart failure  Controlled. Patient denies any recent weight gain. No excessive swelling. Patient reports that when she sits she will lift her legs up in the recliner. Patient aware of sodium restriction diet. Patient takes medications as prescribed and follows up with cardiology on a regular basis. Follow-up in 6 months. Patient will follow-up in 6 months and we will perform laboratory studies at that time. Patient verbalizes and acknowledges to call for any new or worsening symptoms. I affirm this is a Patient Initiated Episode with an Established Patient who has not had a related appointment within my department in the past 7 days or scheduled within the next 24 hours. Total Time: minutes: 11-20 minutes    Note: not billable if this call serves to triage the patient into an appointment for the relevant concern    Pursuant to the emergency declaration under the [place] act and the national emergencies act, [facility] waiver authority in the coronavirus prepredness and response supplemental appropriation's act, this visit was conducted via telephone with patient's consent, to reduce the patient's risk of exposure to Covid 19 and provide continuity of care for this established patient. Services were provided through a telephone call to substitute for [address] [address] clinic visit.   Ryan Weldon

## 2020-04-23 RX ORDER — BUSPIRONE HYDROCHLORIDE 5 MG/1
TABLET ORAL
Qty: 60 TABLET | Refills: 0 | OUTPATIENT
Start: 2020-04-23

## 2020-07-10 RX ORDER — TRAZODONE HYDROCHLORIDE 100 MG/1
TABLET ORAL
Qty: 60 TABLET | Refills: 4 | Status: SHIPPED | OUTPATIENT
Start: 2020-07-10 | End: 2020-12-14

## 2020-07-10 NOTE — TELEPHONE ENCOUNTER
Future appt scheduled 0 scheduled -Return in about 6 months (around 10/7/2020         Last appt 04/07/2020      Last Written  02/05/2020    traZODone (DESYREL) 100 MG tablet  #60  3 RF

## 2020-08-04 RX ORDER — LORATADINE 10 MG/1
TABLET ORAL
Qty: 30 TABLET | Refills: 3 | Status: SHIPPED | OUTPATIENT
Start: 2020-08-04 | End: 2020-11-23

## 2020-08-05 RX ORDER — CITALOPRAM 20 MG/1
TABLET ORAL
Qty: 135 TABLET | Refills: 1 | Status: SHIPPED | OUTPATIENT
Start: 2020-08-05 | End: 2020-11-19

## 2020-08-05 RX ORDER — PANTOPRAZOLE SODIUM 40 MG/1
40 TABLET, DELAYED RELEASE ORAL DAILY
Qty: 90 TABLET | Refills: 0 | Status: SHIPPED | OUTPATIENT
Start: 2020-08-05

## 2020-08-05 RX ORDER — DIPHENHYDRAMINE HCL 25 MG
12.5 TABLET ORAL EVERY 12 HOURS
Qty: 45 TABLET | Refills: 0 | Status: ON HOLD | OUTPATIENT
Start: 2020-08-05 | End: 2021-11-29 | Stop reason: HOSPADM

## 2020-08-10 ENCOUNTER — OFFICE VISIT (OUTPATIENT)
Dept: ORTHOPEDIC SURGERY | Age: 80
End: 2020-08-10
Payer: MEDICARE

## 2020-08-10 VITALS — HEIGHT: 60 IN | WEIGHT: 191 LBS | BODY MASS INDEX: 37.5 KG/M2

## 2020-08-10 PROCEDURE — 99213 OFFICE O/P EST LOW 20 MIN: CPT | Performed by: ORTHOPAEDIC SURGERY

## 2020-08-10 NOTE — PROGRESS NOTES
groomed with no evidence of malnutrition  DTRs: Deep tendon reflexes are intact  Mental Status: The patient is oriented to time, place and person. The patient's mood and affect are appropriate. Lymphatic: The lymphatic examination bilaterally reveals all areas to be without enlargement or induration. Vascular: Examination reveals no swelling or calf tenderness. Peripheral pulses are palpable and 2+. Neurological: The patient has good coordination. There is no weakness or sensory deficit. Right shoulder Examination:    Inspection:  No rashes, scars, or lesions. No deformity or atrophy. Palpation: Patient has severe pain upon palpation over the inferior margin of her scapula and she also has severe pain upon palpation over her acromioclavicular joint. Range of Motion: 90 degrees of forward flexion, 80 degrees of abduction, 40 degrees of external rotation and she internally rotates to L2. Strength: Right shoulder. Biceps and triceps strength is 3/5. Special Tests:  Positive Castillo and Neer impingement exam.  Negative speed sign. Negative crossover examination. Skin: There are no rashes, ulcerations or lesions. Distal neurovascular is grossly intact and she has normal sensation into the right hand  Additional Examinations:         Contralateral Exam: Examination of the left shoulder reveals no atrophy or deformity. Skin is warm and dry. Range of motion is within normal limits. There is no focal tenderness with palpation. No AC joint tenderness. Negative Neer and Castillo-Alfonso exams. Strength is graded 5/5 throughout. Neck: Examination of the neck reveals very limited range of motion with approximately 20 degrees of motion in any plane. Range of motion of the cervical spine to the right does reproduce pain over the right shoulder and arm.     Radiology:     X-rays obtained and reviewed in office:  Views 3 views including AP, Y, axillary  Location right shoulder  Impression there is a well-maintained glenohumeral joint with moderate arthritic changes. No fractures or dislocations. Impression:  Encounter Diagnoses   Name Primary?  Right shoulder pain, unspecified chronicity Yes    Impingement syndrome of right shoulder     Cervical radiculopathy        Office Procedures:  Orders Placed This Encounter   Procedures    XR SHOULDER RIGHT (MIN 2 VIEWS)     Standing Status:   Future     Number of Occurrences:   1     Standing Expiration Date:   8/5/2021     Scheduling Instructions:      3V    MRI SHOULDER RIGHT WO CONTRAST     Standing Status:   Future     Standing Expiration Date:   8/10/2021     Scheduling Instructions:      [facility]      [phone]            SCAN DATE/TIME:     Order Specific Question:   Reason for exam:     Answer:   SHOULDER IMPINGEMENT; R/O RTC TEAR    MRI CERVICAL SPINE WO CONTRAST     Standing Status:   Future     Standing Expiration Date:   8/10/2021     Scheduling Instructions:      [facility]      [phone]            SCAN DATE/TIME:     Order Specific Question:   Reason for exam:     Answer:   R/O HNP       Treatment Plan: Right shoulder pain due to possible overlying cervical radiculopathy versus rotator cuff pathology/subacromial bursitis. We are going to obtain an MRI of the cervical spine and right shoulder to rule out cervical radiculopathy and rotator cuff pathology. She will follow-up after the MRI to review the results and to discuss treatment options.

## 2020-08-12 ENCOUNTER — TELEPHONE (OUTPATIENT)
Dept: ORTHOPEDIC SURGERY | Age: 80
End: 2020-08-12

## 2020-08-12 NOTE — TELEPHONE ENCOUNTER
Spoke with patient regarding MRI's ordered for C-Spine and RIGHT Shoulder. Shoulder scan has gone peer to peer and was given to PA to complete. Patient is scheduled on Monday for both MRI's. She may have to cancel the Shoulder scan pending authorization.   I will call patient back if we get approval.

## 2020-08-17 ENCOUNTER — HOSPITAL ENCOUNTER (OUTPATIENT)
Dept: MRI IMAGING | Age: 80
Discharge: HOME | End: 2020-08-17
Payer: MEDICARE

## 2020-08-24 ENCOUNTER — OFFICE VISIT (OUTPATIENT)
Dept: ORTHOPEDIC SURGERY | Age: 80
End: 2020-08-24
Payer: MEDICARE

## 2020-08-24 ENCOUNTER — TELEPHONE (OUTPATIENT)
Dept: ORTHOPEDIC SURGERY | Age: 80
End: 2020-08-24

## 2020-08-24 VITALS — WEIGHT: 190 LBS | HEIGHT: 60 IN | BODY MASS INDEX: 37.3 KG/M2

## 2020-08-24 PROCEDURE — 99213 OFFICE O/P EST LOW 20 MIN: CPT | Performed by: ORTHOPAEDIC SURGERY

## 2020-08-24 RX ORDER — BETAMETHASONE SODIUM PHOSPHATE AND BETAMETHASONE ACETATE 3; 3 MG/ML; MG/ML
12 INJECTION, SUSPENSION INTRA-ARTICULAR; INTRALESIONAL; INTRAMUSCULAR; SOFT TISSUE ONCE
Status: COMPLETED | OUTPATIENT
Start: 2020-08-24 | End: 2020-08-24

## 2020-08-24 RX ORDER — BUPIVACAINE HYDROCHLORIDE 5 MG/ML
30 INJECTION, SOLUTION PERINEURAL ONCE
Status: COMPLETED | OUTPATIENT
Start: 2020-08-24 | End: 2020-08-24

## 2020-08-24 RX ADMIN — BETAMETHASONE SODIUM PHOSPHATE AND BETAMETHASONE ACETATE 12 MG: 3; 3 INJECTION, SUSPENSION INTRA-ARTICULAR; INTRALESIONAL; INTRAMUSCULAR; SOFT TISSUE at 15:25

## 2020-08-24 RX ADMIN — BUPIVACAINE HYDROCHLORIDE 150 MG: 5 INJECTION, SOLUTION PERINEURAL at 15:25

## 2020-08-24 NOTE — TELEPHONE ENCOUNTER
Patient was supposed to have Cervical & Shoulder MRI. Cha Lynne at [facility] ([address]) [phone] and said because of the implantable pain stimulators patient is unable to have MRI. They are not compatible.

## 2020-08-24 NOTE — PROGRESS NOTES
Chief Complaint    Shoulder Pain (rt shoulder continued pain, not able to have MRI due to pain stimulators, wants to proceed with something)      History of Present Illness:  Corey Portillo is a 79 y.o. female who presents today for evaluation of right shoulder and neck pain. We attempted to order an MRI of her cervical spine and right shoulder but unfortunately this was canceled due to several wire leads from a old pain stimulator. Patient states that she has been experiencing shoulder pain for several years which she states is gradually gotten to the point where she is now having moderate to severe pain over the mid scapular margin on her right shoulder which is painful even to lean up against the back of a chair. She also states that she has moderate to severe pain over the [facility] joint where her bra strap contacts. She states that she has had previous cervical fusion and has limited range of motion of her neck but states that range of motion of her neck causes pain that radiates down her arm into her hand. She has had  strength weakness recently and has been dropping objects. She states that any forward range of motion or abduction of the right shoulder causes severe pain over the anterior superior aspect of the right shoulder. Is presently only taking over-the-counter medication for her right shoulder. Pain Assessment  Location of Pain: Shoulder  Location Modifiers: Right  Severity of Pain: 9  Frequency of Pain: Constant  Limiting Behavior: Yes  Work-Related Injury: No  Are there other pain locations you wish to document?: No    Medical History:  Patient's medications, allergies, past medical, surgical, social and family histories were reviewed and updated as appropriate.     Review of Systems:  Relevant review of systems reviewed and available in the patient's chart    Vital Signs:  Ht 5' (1.524 m)   Wt 190 lb (86.2 kg)   LMP  (LMP Unknown)   BMI 37.11 kg/m²     General Exam: Constitutional: Patient is adequately groomed with no evidence of malnutrition  DTRs: Deep tendon reflexes are intact  Mental Status: The patient is oriented to time, place and person. The patient's mood and affect are appropriate. Lymphatic: The lymphatic examination bilaterally reveals all areas to be without enlargement or induration. Vascular: Examination reveals no swelling or calf tenderness. Peripheral pulses are palpable and 2+. Neurological: The patient has good coordination. There is no weakness or sensory deficit. Right shoulder Examination:    Inspection:  No rashes, scars, or lesions. No deformity or atrophy. Palpation: Patient has severe pain upon palpation over the inferior margin of her scapula and she also has severe pain upon palpation over her acromioclavicular joint. Range of Motion: 90 degrees of forward flexion, 80 degrees of abduction, 40 degrees of external rotation and she internally rotates to L2. Strength: Right shoulder. Biceps and triceps strength is 3/5. Special Tests:  Positive Castillo and Neer impingement exam.  Negative speed sign. Negative crossover examination. Skin: There are no rashes, ulcerations or lesions. Distal neurovascular is grossly intact and she has normal sensation into the right hand      Neck: Examination of the neck reveals very limited range of motion with approximately 20 degrees of motion in any plane. Range of motion of the cervical spine to the right does reproduce pain over the right shoulder and arm. Impression:  Encounter Diagnoses   Name Primary?     Impingement syndrome of right shoulder Yes    Cervical radiculopathy        Office Procedures:  Orders Placed This Encounter   Procedures    US ARTHR/ASP/INJ MAJOR JNT/BURSA RIGHT     Standing Status:   Future     Number of Occurrences:   1     Standing Expiration Date:   8/24/2021   Prasad Hernández PT - [facility]) Physical Therapy     Referral Priority:   Routine     Referral Type:   Eval and Treat     Referral Reason:   Specialty Services Required     Requested Specialty:   Physical Therapy     Number of Visits Requested:   1     Procedure: Right shoulder subacromial injection  I discussed in detail the risk, benefits, and complications of an injection which included but are not limited to infection, skin reactions, hot swollen joints, and anaphylaxis with the patient. The patient verbalized good understanding and gave informed consent for the right shoulder injection. The skin was prepped using sterile alcohol solution. A sterile 22-gauge needle was inserted into the subacromial space and a mixture of 2ml Beta-Beta, 3 mL of 0.5% bupivacaine, was injected under sterile technique. The needle was withdrawn and the puncture site sealed with a Band-Aid. Technique: Under sterile conditions a [employer] ultrasound unit with a variable frequency (6.0-15.0 MHz) linear transducer was used to localize the placement of a 22-gauge needle into the subacromial space. Findings: Successful needle placement for  subacromial space injection. Final images were taken and saved for permanent record. The patient tolerated the injection well. The patient was instructed to call the office immediately if there is any pain, redness, warmth, fever, or chills. Treatment Plan: At this point we would recommend proceeding with a left shoulder subacromial injection along with outpatient physical therapy for both her shoulder and her neck. This will hopefully help us be able to differentiate between her neck pain and her shoulder pain. We will also have her follow-up with 1 of our spine physicians to further evaluate her neck. If she does get substantial improvement from the subacromial injection, but it short-term, she may require a CT arthrogram of the right shoulder.

## 2020-09-10 RX ORDER — BUSPIRONE HYDROCHLORIDE 5 MG/1
TABLET ORAL
Qty: 60 TABLET | Refills: 4 | Status: SHIPPED | OUTPATIENT
Start: 2020-09-10 | End: 2021-02-23

## 2020-09-10 RX ORDER — NYSTATIN 100000 [USP'U]/G
POWDER TOPICAL
Qty: 60 G | Refills: 1 | Status: SHIPPED | OUTPATIENT
Start: 2020-09-10 | End: 2021-02-23

## 2020-09-10 NOTE — TELEPHONE ENCOUNTER
Future appt scheduled 0 scheduled- Return in about 6 months (around 10/7/2020          Last appt 04/07/2020        Last Written 04/23/2020    busPIRone (BUSPAR) 5 MG tablet  #60  3 RF

## 2020-09-11 ENCOUNTER — HOSPITAL ENCOUNTER (EMERGENCY)
Age: 80
Discharge: HOME | End: 2020-09-11
Attending: EMERGENCY MEDICINE
Payer: MEDICARE

## 2020-09-11 ENCOUNTER — APPOINTMENT (OUTPATIENT)
Dept: CT IMAGING | Age: 80
End: 2020-09-11
Payer: MEDICARE

## 2020-09-11 ENCOUNTER — NURSE TRIAGE (OUTPATIENT)
Dept: OTHER | Facility: CLINIC | Age: 80
End: 2020-09-11

## 2020-09-11 VITALS
RESPIRATION RATE: 16 BRPM | OXYGEN SATURATION: 96 % | HEART RATE: 62 BPM | DIASTOLIC BLOOD PRESSURE: 62 MMHG | SYSTOLIC BLOOD PRESSURE: 110 MMHG | TEMPERATURE: 98.7 F

## 2020-09-11 PROCEDURE — 96372 THER/PROPH/DIAG INJ SC/IM: CPT

## 2020-09-11 PROCEDURE — 6360000002 HC RX W HCPCS: Performed by: EMERGENCY MEDICINE

## 2020-09-11 PROCEDURE — 99283 EMERGENCY DEPT VISIT LOW MDM: CPT

## 2020-09-11 PROCEDURE — 2500000003 HC RX 250 WO HCPCS: Performed by: EMERGENCY MEDICINE

## 2020-09-11 PROCEDURE — 72125 CT NECK SPINE W/O DYE: CPT

## 2020-09-11 RX ORDER — ORPHENADRINE CITRATE 100 MG/1
100 TABLET, EXTENDED RELEASE ORAL 2 TIMES DAILY PRN
Qty: 20 TABLET | Refills: 0 | Status: SHIPPED | OUTPATIENT
Start: 2020-09-11 | End: 2020-09-21

## 2020-09-11 RX ORDER — ORPHENADRINE CITRATE 30 MG/ML
60 INJECTION INTRAMUSCULAR; INTRAVENOUS ONCE
Status: COMPLETED | OUTPATIENT
Start: 2020-09-11 | End: 2020-09-11

## 2020-09-11 RX ADMIN — ORPHENADRINE CITRATE 60 MG: 60 INJECTION INTRAMUSCULAR; INTRAVENOUS at 13:31

## 2020-09-11 RX ADMIN — HYDROMORPHONE HYDROCHLORIDE 0.5 MG: 1 INJECTION, SOLUTION INTRAMUSCULAR; INTRAVENOUS; SUBCUTANEOUS at 13:31

## 2020-09-11 ASSESSMENT — PAIN SCALES - GENERAL
PAINLEVEL_OUTOF10: 2
PAINLEVEL_OUTOF10: 10

## 2020-09-11 ASSESSMENT — ENCOUNTER SYMPTOMS
ABDOMINAL PAIN: 0
BACK PAIN: 0
NAUSEA: 0
SORE THROAT: 0
CONSTIPATION: 0
DIARRHEA: 0
SHORTNESS OF BREATH: 0
COUGH: 0
VOMITING: 0

## 2020-09-11 ASSESSMENT — PAIN DESCRIPTION - LOCATION: LOCATION: NECK

## 2020-09-11 ASSESSMENT — PAIN DESCRIPTION - ONSET: ONSET: ON-GOING

## 2020-09-11 ASSESSMENT — PAIN DESCRIPTION - ORIENTATION: ORIENTATION: RIGHT

## 2020-09-11 ASSESSMENT — PAIN DESCRIPTION - DESCRIPTORS: DESCRIPTORS: SORE

## 2020-09-11 ASSESSMENT — PAIN DESCRIPTION - PAIN TYPE: TYPE: ACUTE PAIN

## 2020-09-11 NOTE — ED NOTES
Granddaughter to the nurses station. Updated on POC. Verbalized understanding.      Jermaine Kennedy RN  09/11/20 2128

## 2020-09-11 NOTE — ED PROVIDER NOTES
[facility] Name: Mariza Ross  MRN: [id number]  [place] 1940  Date of evaluation: 9/11/2020  Provider: Yvonne Adan MD    [address] [place] [address]       Chief Complaint   Patient presents with    Neck Pain         HISTORY OF PRESENT ILLNESS   (Location/Symptom, Timing/Onset, Context/Setting, Quality, Duration, Modifying Factors, Severity)  Note limiting factors. Mariza Ross is a 79 y.o. female who presents to the emergency department     Patient is an 18-year-old female with a past medical history of hypertension, hyperlipidemia, CHF, coronary artery disease, COPD, chronic right neck and right shoulder pain that she is seeing orthopedics for who presents with worsening right-sided neck and shoulder pain. Patient reports that symptoms have been getting worse over the last couple of days. She reports that when she woke up this morning her neck was turned more than it normally is due to her prior injury. She reports that she felt like she could not straighten it out and that the pain was excruciating. Patient has hydrocodone available to her at home which she took but it did not improve the symptoms. She also tried both heat and ice without any relief so came in for further work-up and evaluation. Patient denies any fever, chills, chest pain, shortness of breath, nausea, vomiting, abdominal pain. Patient does report some weakness in her right arm and numbness that she states has been going on for quite some time. The history is provided by the patient. Nursing Notes were reviewed. REVIEW OF SYSTEMS    (2-9 systems for level 4, 10 or more for level 5)     Review of Systems   Constitutional: Negative for chills and fever. HENT: Negative for congestion and sore throat. Eyes: Negative for visual disturbance. Respiratory: Negative for cough and shortness of breath. Cardiovascular: Negative for chest pain.    Gastrointestinal: Negative for abdominal pain, constipation, diarrhea, nausea and vomiting. Genitourinary: Negative for dysuria and hematuria. Musculoskeletal: Positive for arthralgias and neck pain. Negative for back pain. Skin: Negative for pallor and rash. Neurological: Negative for light-headedness and headaches. All other systems reviewed and are negative. Except as noted above the remainder of the review of systems was reviewed and negative.        PAST MEDICAL HISTORY     Past Medical History:   Diagnosis Date    Arthritis     BCC (basal cell carcinoma of skin)     RT clavicle    Bladder infection     frequently    CAD (coronary artery disease)     stents    Cancer ([facility]     skin    CHF (congestive heart failure) (HCC)     Chronic back pain     COPD (chronic obstructive pulmonary disease) ([facility]     Depression     Depression     Hypercholesteremia     Hypertension     Pain in shoulder [id number]    pain in left shoulder, taking steroid injections for steroid    Reflux     Rheumatic fever     as a child    Sleep apnea     uses CPAP    TIA (transient ischemic attack)     Urinary incontinence     Wears glasses          SURGICAL HISTORY       Past Surgical History:   Procedure Laterality Date    AORTIC VALVE REPLACEMENT      APPENDECTOMY      BLADDER REPAIR      3 TIMES    CARDIAC SURGERY      stents    CARPAL TUNNEL RELEASE      RIGHT    CHOLECYSTECTOMY, LAPAROSCOPIC  01/06/2017    with dr Mills Clarity      has it it done twice    DIAGNOSTIC CARDIAC CATH LAB PROCEDURE      HYSTERECTOMY      JOINT REPLACEMENT      KATHLEEN TKR    NECK SURGERY      OTHER SURGICAL HISTORY  9/28/12    Full Interstim Implant    SHOULDER SURGERY      SKIN CANCER EXCISION      SPINAL FIXATION SURGERY WITH IMPLANT  2001    SPINAL FIXATION SURGERY WITH IMPLANT  2004    SPINAL FIXATION SURGERY WITH IMPLANT  2007    SPINE SURGERY  1999    TONSILLECTOMY      UPPER GASTROINTESTINAL ENDOSCOPY  4/22/11    UPPER GASTROINTESTINAL ENDOSCOPY      UPPER GASTROINTESTINAL ENDOSCOPY  03/23/2017    dilitation         CURRENT MEDICATIONS       Discharge Medication List as of 9/11/2020  3:10 PM      CONTINUE these medications which have NOT CHANGED    Details   busPIRone (BUSPAR) 5 MG tablet TAKE 1 OR 2 TABLET(S) IN THE EVENING AS NEEDED FOR ANXIETY, Disp-60 tablet,R-4Normal      NYAMYC 646913 UNIT/GM powder APPLY 3 TIMES DAILY, Disp-60 g,R-1Normal      citalopram (CELEXA) 20 MG tablet TAKE 1 AND 1/2 TABLET ONCE DAILY, Disp-135 tablet,R-1Normal      diphenhydrAMINE (BANOPHEN) 25 MG tablet Take 0.5 tablets by mouth every 12 hours, Disp-45 tablet,R-0Normal      pantoprazole (PROTONIX) 40 MG tablet Take 1 tablet by mouth daily, Disp-90 tablet,R-0Normal      loratadine (CLARITIN) 10 MG tablet TAKE (1) TABLET DAILY, Disp-30 tablet,R-3Normal      traZODone (DESYREL) 100 MG tablet TAKE 1 OR 2 TABLETS AT BEDTIME, Disp-60 tablet,R-4Normal      simvastatin (ZOCOR) 40 MG tablet TAKE ONE TABLET NIGHTLY. Historical Med      divalproex (DEPAKOTE SPRINKLE) 125 MG capsule Historical Med      aspirin (ASPIRIN 81) 81 MG EC tablet Take 81 mg by mouth dailyHistorical Med      candesartan (ATACAND) 8 MG tablet Take 8 mg by mouth dailyHistorical Med      donepezil (ARICEPT) 10 MG tablet Take 10 mg by mouth nightly Historical Med      HYDROcodone-acetaminophen (NORCO)  MG per tablet Take 1 tablet by mouth every 6 hours as needed.  Historical Med      furosemide (LASIX) 20 MG tablet TAKE (1) TABLET DAILY, Disp-90 tablet, R-0Normal      baclofen (LIORESAL) 10 MG tablet TAKE 1 TABLET EVERY 8 HOURS AS NEEDED, Disp-30 tablet, R-0Normal      VENTOLIN  (90 Base) MCG/ACT inhaler INHALE 2 PUFFS FOUR TIMES DAILY AS NEEDED FOR WHEEZING, Disp-18 g, R-0Normal      butalbital-acetaminophen-caffeine (FIORICET, ESGIC) -40 MG per tablet TAKE 1 TABLET EVERY 6 HOURS AS NEEDED FOR HEADACHES LIMIT TO 3 DAYS PER WEEK, Disp-30 tablet, R-0$Normal      Misc. Devices (ROLLER WALKER) MISC DAILY Starting Wed 5/22/2019, Disp-1 each, R-0, Print      metoprolol tartrate (LOPRESSOR) 50 MG tablet Take 25 mg by mouth 2 times daily Historical Med      isosorbide mononitrate (IMDUR) 30 MG extended release tablet Take 30 mg by mouth dailyHistorical Med      albuterol-ipratropium (COMBIVENT RESPIMAT)  MCG/ACT AERS inhaler Inhale 1 puff into the lungs every 6 hours, Disp-1 Inhaler, R-5Normal      KLOR-CON SPRINKLE 10 MEQ extended release capsule TAKE 2 CAPSULES IN THE   MORNING AND 2 CAPSULES IN  THE EVENING, Disp-120 capsule, R-3Normal      amLODIPine (NORVASC) 10 MG tablet TAKE (1) TABLET DAILY, Disp-30 tablet, R-5      NITROSTAT 0.4 MG SL tablet USE 1 TABLET UNDER TONGUE AS NEEDED FOR CHEST PAIN MAY REPEAT EVERY 5MIN. UP TO 3. THEN GO TO ER., Disp-25 tablet, R-3      lidocaine (XYLOCAINE) 5 % ointment Apply topically as needed. , Disp-1 Tube, R-3, Normal             ALLERGIES     Azithromycin; Codeine; Keflex [cephalexin];  Levofloxacin; Morphine; Pentazocine lactate; Sulfa antibiotics; and Tape Shilo Hane tape]    FAMILY HISTORY       Family History   Problem Relation Age of Onset    Arthritis Mother     Cancer Mother     Depression Mother     Heart Disease Mother     High Blood Pressure Mother     High Cholesterol Mother     Miscarriages / [place] People Mother     Stroke Mother     Early Death Mother     Hearing Loss Mother     Mental Illness Mother     Vision Loss Mother     Arthritis Brother     Depression Brother     Diabetes Brother     Hearing Loss Brother     Heart Disease Brother     High Blood Pressure Brother     High Cholesterol Brother     Vision Loss Brother           SOCIAL HISTORY       Social History     Socioeconomic History    Marital status:      Spouse name: None    Number of children: 8    Years of education: 10    Highest education level: None   Occupational History    Occupation: retired   Social Needs    Financial resource strain: None    Food insecurity     Worry: None     Inability: None    Transportation needs     Medical: None     Non-medical: None   Tobacco Use    Smoking status: Never Smoker    Smokeless tobacco: Never Used   Substance and Sexual Activity    Alcohol use: No    Drug use: No    Sexual activity: Not Currently   Lifestyle    Physical activity     Days per week: None     Minutes per session: None    Stress: None   Relationships    Social connections     Talks on phone: None     Gets together: None     Attends Orthodox service: None     Active member of club or organization: None     Attends meetings of clubs or organizations: None     Relationship status: None    Intimate partner violence     Fear of current or ex partner: None     Emotionally abused: None     Physically abused: None     Forced sexual activity: None   Other Topics Concern    None   Social History Narrative    None       SCREENINGS    Abbi Coma Scale  Eye Opening: Spontaneous  Best Verbal Response: Oriented  Best Motor Response: Obeys commands  Abbi Coma Scale Score: 15          PHYSICAL EXAM    (up to 7 for level 4, 8 or more for level 5)     ED Triage Vitals [09/11/20 1223]   BP Temp Temp Source Pulse Resp SpO2 Height Weight   (!) 155/49 98.7 °F (37.1 °C) Oral 62 22 97 % -- --       Physical Exam  Vitals signs and nursing note reviewed. Constitutional:       General: She is not in acute distress. Appearance: Normal appearance. HENT:      Head: Normocephalic and atraumatic. Nose: Nose normal. No congestion. Mouth/Throat:      Mouth: Mucous membranes are moist.   Eyes:      Conjunctiva/sclera: Conjunctivae normal.   Neck:      Musculoskeletal: Decreased range of motion. Spinous process tenderness and muscular tenderness present. Cardiovascular:      Rate and Rhythm: Normal rate and regular rhythm. Pulses: Normal pulses.       Heart sounds: Normal heart sounds. No murmur. Pulmonary:      Effort: Pulmonary effort is normal. No respiratory distress. Musculoskeletal:      Comments: Decreased range of motion of the right upper extremity due to pain that seems to stem from the neck. Patient has decreased  strength on the right but this is not unusual for her. Sensation is intact. 2+ radial pulse present. Skin:     General: Skin is warm and dry. Neurological:      General: No focal deficit present. Mental Status: She is alert and oriented to person, place, and time. DIAGNOSTIC RESULTS     EKG: All EKG's are interpreted by the Emergency Department Physician who either signs or Co-signs this chart in the absence of a cardiologist.        RADIOLOGY:     Interpretation per the Radiologist below, if available at the time of this note:    CT Cervical Spine WO Contrast   Final Result   1. No acute abnormality   2. Intact cervical fusion   3. Multilevel degenerative disease               LABS:  Labs Reviewed - No data to display    All other labs were within normal range or not returned as of this dictation. EMERGENCY DEPARTMENT COURSE and DIFFERENTIAL DIAGNOSIS/MDM:   Vitals:    Vitals:    09/11/20 1223 09/11/20 1325 09/11/20 1422 09/11/20 1519   BP: (!) 155/49 (!) 132/50 (!) 120/52 110/62   Pulse: 62 57 52 62   Resp: 22 18 12 16   Temp: 98.7 °F (37.1 °C)      TempSrc: Oral      SpO2: 97% 95% 92% 96%       Patient evaluated and previous record reviewed. Patient presents with worsening of her chronic right-sided neck pain and shoulder pain. Vital signs notable for initial hypertension that improves with pain control. Physical exam as documented above. CT scan as above with multilevel degenerative disease with no acute abnormality. Patient treated symptomatically with pain medication and muscle relaxer. On reevaluation patient states that her pain is much improved and rates as a 2 out of 10.   Patient is amenable with discharge home to follow-up with her orthopedic surgeon as an outpatient. Patient is already prescribed hydrocodone at home so advised her to continue taking that but will prescribe a short course of muscle relaxer. Patient voiced understanding was amenable with this plan. Advised of return precautions. Patient discharged home. CONSULTS:  None    PROCEDURES:  Unless otherwise noted below, none     Procedures      FINAL IMPRESSION      1. Neck pain    2. Chronic right shoulder pain          DISPOSITION/PLAN   DISPOSITION        PATIENT REFERRED TO:  KALI Osullivan - CNP  [address] [id number]  [phone]    Call   As needed    Jayson Romero MD  [facility] [address]  [phone]    Schedule an appointment as soon as possible for a visit         DISCHARGE MEDICATIONS:  Discharge Medication List as of 9/11/2020  3:10 PM      START taking these medications    Details   orphenadrine (NORFLEX) 100 MG extended release tablet Take 1 tablet by mouth 2 times daily as needed for Muscle spasms, Disp-20 tablet,R-0Print           Controlled Substances Monitoring:     RX Monitoring 5/22/2019   Attestation The Prescription Monitoring Report for this patient was reviewed today.    Periodic Controlled Substance Monitoring No signs of potential drug abuse or diversion identified: otherwise, see note documentation       (Please note that portions of this note were completed with a voice recognition program.  Efforts were made to edit the dictations but occasionally words are mis-transcribed.)    Yogesh Cuevas MD (electronically signed)  Attending Emergency Physician            Fernando Kaur MD  09/11/20 3705

## 2020-09-11 NOTE — ED NOTES
AVS provided and reviewed with the patient. The patient verbalized understanding of care at home, follow up care, and emergent symptoms to return for. The patient verbalized understanding of medication side effects and restrictions. No questions or concerns verbalized at this time. The patient is alert, oriented, stable, and assisted via wheelchair out of the department at the time of discharge. Discharged with prescription x1.      Chiquita Goode RN  09/11/20 2639

## 2020-09-12 ENCOUNTER — APPOINTMENT (OUTPATIENT)
Dept: CT IMAGING | Age: 80
End: 2020-09-12
Payer: MEDICARE

## 2020-09-12 ENCOUNTER — HOSPITAL ENCOUNTER (EMERGENCY)
Age: 80
Discharge: HOME | End: 2020-09-12
Attending: STUDENT IN AN ORGANIZED HEALTH CARE EDUCATION/TRAINING PROGRAM
Payer: MEDICARE

## 2020-09-12 VITALS
RESPIRATION RATE: 16 BRPM | DIASTOLIC BLOOD PRESSURE: 63 MMHG | TEMPERATURE: 97.9 F | BODY MASS INDEX: 37.89 KG/M2 | WEIGHT: 193 LBS | HEIGHT: 60 IN | HEART RATE: 63 BPM | OXYGEN SATURATION: 94 % | SYSTOLIC BLOOD PRESSURE: 148 MMHG

## 2020-09-12 LAB
ANION GAP SERPL CALCULATED.3IONS-SCNC: 13 MMOL/L (ref 3–16)
BUN BLDV-MCNC: 21 MG/DL (ref 7–20)
CALCIUM SERPL-MCNC: 9.4 MG/DL (ref 8.3–10.6)
CHLORIDE BLD-SCNC: 98 MMOL/L (ref 99–110)
CO2: 29 MMOL/L (ref 21–32)
CREAT SERPL-MCNC: 1.2 MG/DL (ref 0.6–1.2)
GFR AFRICAN AMERICAN: 52
GFR NON-AFRICAN AMERICAN: 43
GLUCOSE BLD-MCNC: 117 MG/DL (ref 70–99)
POTASSIUM REFLEX MAGNESIUM: 4.3 MMOL/L (ref 3.5–5.1)
SODIUM BLD-SCNC: 140 MMOL/L (ref 136–145)

## 2020-09-12 PROCEDURE — 6360000002 HC RX W HCPCS: Performed by: STUDENT IN AN ORGANIZED HEALTH CARE EDUCATION/TRAINING PROGRAM

## 2020-09-12 PROCEDURE — 70450 CT HEAD/BRAIN W/O DYE: CPT

## 2020-09-12 PROCEDURE — 70498 CT ANGIOGRAPHY NECK: CPT

## 2020-09-12 PROCEDURE — 6360000004 HC RX CONTRAST MEDICATION: Performed by: STUDENT IN AN ORGANIZED HEALTH CARE EDUCATION/TRAINING PROGRAM

## 2020-09-12 PROCEDURE — 6370000000 HC RX 637 (ALT 250 FOR IP): Performed by: STUDENT IN AN ORGANIZED HEALTH CARE EDUCATION/TRAINING PROGRAM

## 2020-09-12 PROCEDURE — 99284 EMERGENCY DEPT VISIT MOD MDM: CPT

## 2020-09-12 PROCEDURE — 80048 BASIC METABOLIC PNL TOTAL CA: CPT

## 2020-09-12 PROCEDURE — 96372 THER/PROPH/DIAG INJ SC/IM: CPT

## 2020-09-12 RX ORDER — PREDNISONE 20 MG/1
40 TABLET ORAL ONCE
Status: COMPLETED | OUTPATIENT
Start: 2020-09-12 | End: 2020-09-12

## 2020-09-12 RX ORDER — HYDROCODONE BITARTRATE AND ACETAMINOPHEN 5; 325 MG/1; MG/1
1 TABLET ORAL ONCE
Status: COMPLETED | OUTPATIENT
Start: 2020-09-12 | End: 2020-09-12

## 2020-09-12 RX ORDER — PREDNISONE 10 MG/1
TABLET ORAL
Qty: 20 TABLET | Refills: 0 | Status: SHIPPED | OUTPATIENT
Start: 2020-09-12 | End: 2020-09-22

## 2020-09-12 RX ORDER — ORPHENADRINE CITRATE 30 MG/ML
60 INJECTION INTRAMUSCULAR; INTRAVENOUS ONCE
Status: COMPLETED | OUTPATIENT
Start: 2020-09-12 | End: 2020-09-12

## 2020-09-12 RX ADMIN — HYDROCODONE BITARTRATE AND ACETAMINOPHEN 1 TABLET: 5; 325 TABLET ORAL at 14:54

## 2020-09-12 RX ADMIN — PREDNISONE 40 MG: 20 TABLET ORAL at 16:45

## 2020-09-12 RX ADMIN — ORPHENADRINE CITRATE 60 MG: 30 INJECTION INTRAMUSCULAR; INTRAVENOUS at 14:54

## 2020-09-12 RX ADMIN — IOPAMIDOL 85 ML: 755 INJECTION, SOLUTION INTRAVENOUS at 15:44

## 2020-09-12 ASSESSMENT — ENCOUNTER SYMPTOMS
VOMITING: 0
SHORTNESS OF BREATH: 0
ABDOMINAL PAIN: 0
PHOTOPHOBIA: 0
NAUSEA: 0
SORE THROAT: 0
RHINORRHEA: 0
STRIDOR: 0
COUGH: 0
BACK PAIN: 0

## 2020-09-12 ASSESSMENT — PAIN DESCRIPTION - LOCATION: LOCATION: HEAD

## 2020-09-12 ASSESSMENT — PAIN DESCRIPTION - PAIN TYPE: TYPE: ACUTE PAIN

## 2020-09-12 ASSESSMENT — PAIN SCALES - GENERAL
PAINLEVEL_OUTOF10: 9
PAINLEVEL_OUTOF10: 9

## 2020-09-12 ASSESSMENT — PAIN DESCRIPTION - PROGRESSION: CLINICAL_PROGRESSION: GRADUALLY WORSENING

## 2020-09-12 NOTE — ED PROVIDER NOTES
[address] ED  EMERGENCY DEPARTMENT ENCOUNTER      Pt Name: Kristi Shirley  MRN: [id number]  [place] 1940  Date of evaluation: 9/12/2020  Provider: Carloz Poole, [address] [place] [address]       Chief Complaint   Patient presents with    Headache         HISTORY OF PRESENT ILLNESS   (Location/Symptom, Timing/Onset, Context/Setting, Quality, Duration, Modifying Factors, Severity)  Note limiting factors. Kristi Shirley is a 79 y.o. female who presents to the emergency department complaining of right-sided neck pain/shoulder pain, headache. Patient has longstanding history of neck pain status post fusion several years ago with neurosurgery. Was seen and evaluated yesterday in our hospital had CT cervical spine obtained which showed stable fusion with degenerative changes of the spine. On arrival today patient is complaining mainly of right-sided lateral neck pain up to the base of the skull which does wrap around to the front of her head. Pain seems to worsen with movement antibiotic palpation. Patient denies vision change. Patient does report occasionally she has paresthesias and shooting nerve type pain into her arms and legs but however this is not occurring at time of presentation today. Patient is on chronic opioid medications hydrocodone for pain control at baseline and has not had her scheduled dose yet during our evaluation. Otherwise patient has no other complaints denies chest pain, shortness of breath, abdominal pain, nausea, vomiting, vision change. Nursing Notes were reviewed.     PAST MEDICAL HISTORY     Past Medical History:   Diagnosis Date    Arthritis     BCC (basal cell carcinoma of skin)     RT clavicle    Bladder infection     frequently    CAD (coronary artery disease)     stents    Cancer ([facility] [address] [facility]     skin    CHF (congestive heart failure) (HCC)     Chronic back pain     COPD (chronic obstructive pulmonary disease) ([facility] [address] [facility]     Depression     Depression     Hypercholesteremia     Hypertension     Pain in shoulder [id number]    pain in left shoulder, taking steroid injections for steroid    Reflux     Rheumatic fever     as a child    Sleep apnea     uses CPAP    TIA (transient ischemic attack)     Urinary incontinence     Wears glasses          SURGICAL HISTORY       Past Surgical History:   Procedure Laterality Date    AORTIC VALVE REPLACEMENT      APPENDECTOMY      BLADDER REPAIR      3 TIMES    CARDIAC SURGERY      stents    CARPAL TUNNEL RELEASE      RIGHT    CHOLECYSTECTOMY, LAPAROSCOPIC  01/06/2017    with dr Brooke Casanova      has it it done twice    DIAGNOSTIC CARDIAC CATH LAB PROCEDURE      HYSTERECTOMY      JOINT REPLACEMENT      KATHLEEN TKR    NECK SURGERY      OTHER SURGICAL HISTORY  9/28/12    Full Interstim Implant    SHOULDER SURGERY      SKIN CANCER EXCISION      SPINAL FIXATION SURGERY WITH IMPLANT  2001    SPINAL FIXATION SURGERY WITH IMPLANT  2004    SPINAL FIXATION SURGERY WITH IMPLANT  2007    SPINE SURGERY  1999    TONSILLECTOMY      UPPER GASTROINTESTINAL ENDOSCOPY  4/22/11    UPPER GASTROINTESTINAL ENDOSCOPY      UPPER GASTROINTESTINAL ENDOSCOPY  03/23/2017    dilitation         CURRENT MEDICATIONS       Previous Medications    ALBUTEROL-IPRATROPIUM (COMBIVENT RESPIMAT)  MCG/ACT AERS INHALER    Inhale 1 puff into the lungs every 6 hours    AMLODIPINE (NORVASC) 10 MG TABLET    TAKE (1) TABLET DAILY    ASPIRIN (ASPIRIN 81) 81 MG EC TABLET    Take 81 mg by mouth daily    BACLOFEN (LIORESAL) 10 MG TABLET    TAKE 1 TABLET EVERY 8 HOURS AS NEEDED    BUSPIRONE (BUSPAR) 5 MG TABLET    TAKE 1 OR 2 TABLET(S) IN THE EVENING AS NEEDED FOR ANXIETY    BUTALBITAL-ACETAMINOPHEN-CAFFEINE (FIORICET, ESGIC) -40 MG PER TABLET    TAKE 1 TABLET EVERY 6 HOURS AS NEEDED FOR HEADACHES LIMIT TO 3 DAYS PER WEEK    CANDESARTAN (ATACAND) 8 MG TABLET    Take 8 mg by mouth daily CITALOPRAM (CELEXA) 20 MG TABLET    TAKE 1 AND 1/2 TABLET ONCE DAILY    DIPHENHYDRAMINE (BANOPHEN) 25 MG TABLET    Take 0.5 tablets by mouth every 12 hours    DIVALPROEX (DEPAKOTE SPRINKLE) 125 MG CAPSULE        DONEPEZIL (ARICEPT) 10 MG TABLET    Take 10 mg by mouth nightly     FUROSEMIDE (LASIX) 20 MG TABLET    TAKE (1) TABLET DAILY    HYDROCODONE-ACETAMINOPHEN (NORCO)  MG PER TABLET    Take 1 tablet by mouth every 6 hours as needed. ISOSORBIDE MONONITRATE (IMDUR) 30 MG EXTENDED RELEASE TABLET    Take 30 mg by mouth daily    KLOR-CON SPRINKLE 10 MEQ EXTENDED RELEASE CAPSULE    TAKE 2 CAPSULES IN THE   MORNING AND 2 CAPSULES IN  THE EVENING    LIDOCAINE (XYLOCAINE) 5 % OINTMENT    Apply topically as needed. LORATADINE (CLARITIN) 10 MG TABLET    TAKE (1) TABLET DAILY    METOPROLOL TARTRATE (LOPRESSOR) 50 MG TABLET    Take 25 mg by mouth 2 times daily     MISC. DEVICES (ROLLER WALKER) MISC    1 each by Does not apply route daily    NITROSTAT 0.4 MG SL TABLET    USE 1 TABLET UNDER TONGUE AS NEEDED FOR CHEST PAIN MAY REPEAT EVERY 5MIN. UP TO 3. THEN GO TO ER.    Megan Can [id number] UNIT/GM POWDER    APPLY 3 TIMES DAILY    ORPHENADRINE (NORFLEX) 100 MG EXTENDED RELEASE TABLET    Take 1 tablet by mouth 2 times daily as needed for Muscle spasms    PANTOPRAZOLE (PROTONIX) 40 MG TABLET    Take 1 tablet by mouth daily    SIMVASTATIN (ZOCOR) 40 MG TABLET    TAKE ONE TABLET NIGHTLY. TRAZODONE (DESYREL) 100 MG TABLET    TAKE 1 OR 2 TABLETS AT BEDTIME    VENTOLIN  (90 BASE) MCG/ACT INHALER    INHALE 2 PUFFS FOUR TIMES DAILY AS NEEDED FOR WHEEZING       ALLERGIES     Azithromycin; Codeine; Keflex [cephalexin];  Levofloxacin; Morphine; Pentazocine lactate; Sulfa antibiotics; and Tape [employer] tape]    FAMILY HISTORY       Family History   Problem Relation Age of Onset    Arthritis Mother     Cancer Mother     Depression Mother     Heart Disease Mother     High Blood Pressure Mother     High Cholesterol Mother     Miscarriages / Stillbirths Mother     Stroke Mother     Early Death Mother     Hearing Loss Mother     Mental Illness Mother     Vision Loss Mother     Arthritis Brother     Depression Brother     Diabetes Brother     Hearing Loss Brother     Heart Disease Brother     High Blood Pressure Brother     High Cholesterol Brother     Vision Loss Brother           SOCIAL HISTORY       Social History     Socioeconomic History    Marital status:      Spouse name: None    Number of children: 6    Years of education: 8    Highest education level: None   Occupational History    Occupation: retired   Social Needs    Financial resource strain: None    Food insecurity     Worry: None     Inability: None    Transportation needs     Medical: None     Non-medical: None   Tobacco Use    Smoking status: Never Smoker    Smokeless tobacco: Never Used   Substance and Sexual Activity    Alcohol use: No    Drug use: No    Sexual activity: Not Currently   Lifestyle    Physical activity     Days per week: None     Minutes per session: None    Stress: None   Relationships    Social connections     Talks on phone: None     Gets together: None     Attends [employer] service: None     Active member of club or organization: None     Attends meetings of clubs or organizations: None     Relationship status: None    Intimate partner violence     Fear of current or ex partner: None     Emotionally abused: None     Physically abused: None     Forced sexual activity: None   Other Topics Concern    None   Social History Narrative    None       SCREENINGS        [place] Coma Scale  Eye Opening: Spontaneous  Best Verbal Response: Oriented  Best Motor Response: Obeys commands  Abbi Coma Scale Score: 15                   REVIEW OF SYSTEMS    (2-9 systems for level 4, 10 or more for level 5)   Review of Systems   Constitutional: Negative for chills and fever.    HENT: Negative for congestion, rhinorrhea and sore throat. Eyes: Negative for photophobia and visual disturbance. Respiratory: Negative for cough, shortness of breath and stridor. Cardiovascular: Negative for chest pain, palpitations and leg swelling. Gastrointestinal: Negative for abdominal pain, nausea and vomiting. Genitourinary: Negative for decreased urine volume. Musculoskeletal: Positive for arthralgias and neck stiffness. Negative for back pain and neck pain. Right-sided shoulder pain/trap pain. Skin: Negative for rash. Allergic/Immunologic: Negative for environmental allergies. Neurological: Positive for headaches. Negative for weakness and numbness. Hematological: Negative for adenopathy. Psychiatric/Behavioral: Negative for confusion. PHYSICAL EXAM    (up to 7 for level 4, 8 or more for level 5)     ED Triage Vitals [09/12/20 1414]   BP Temp Temp Source Pulse Resp SpO2 Height Weight   (!) 137/51 97.9 °F (36.6 °C) Oral 68 20 95 % 5' (1.524 m) 193 lb (87.5 kg)       Physical Exam  Constitutional:       General: She is not in acute distress. Appearance: She is not diaphoretic. HENT:      Head: Normocephalic and atraumatic. Eyes:      Pupils: Pupils are equal, round, and reactive to light. Neck:      Musculoskeletal: Decreased range of motion. No crepitus. Trachea: No tracheal deviation. Comments: Patient with old vertical midline incision from prior fusion. Patient has significant tenderness to the right sided paraspinal region and trap. Neck pain and headache symptoms worsen with range of motion of the neck and range of motion of the right shoulder. Cardiovascular:      Rate and Rhythm: Normal rate and regular rhythm. Pulmonary:      Effort: Pulmonary effort is normal. No respiratory distress. Abdominal:      General: There is no distension. Palpations: Abdomen is soft. Skin:     General: Skin is warm. Neurological:      General: No focal deficit present.       Mental Status: She is oriented to person, place, and time. Comments: Normal strength normal sensation upper and lower extremities         DIAGNOSTIC RESULTS     EKG: All EKG's are interpreted by the Emergency Department Physician who either signs or Co-signs this chart in the absence of a cardiologi        RADIOLOGY:   Non-plain film images such as CT, Ultrasound and MRI are read by the radiologist. Plain radiographic images are visualized and preliminarily interpreted by the emergency physician. Interpretation per the Radiologist below, if available at the time of this note:     [facility]   Final Result   1. No acute arterial abnormality in the head or neck. 2. Bilateral proximal internal carotid artery stenosis measuring 50% on the   left and 20% on the right. Otherwise, no arterial stenosis in the head or   neck. CT HEAD WO CONTRAST   Final Result   No acute intracranial abnormality. Diffuse atrophic changes with findings suggesting chronic microvascular   ischemia               LABS:  Labs Reviewed   BASIC METABOLIC PANEL W/ REFLEX TO MG FOR LOW K - Abnormal; Notable for the following components:       Result Value    Chloride 98 (*)     Glucose 117 (*)     BUN 21 (*)     GFR Non- 43 (*)     GFR  52 (*)     All other components within normal limits    Narrative:     Performed at:  [facility]) [facility],  [place], [facility]   Phone [phone]       All other labs were within normal range or not returned as of this dictation. EMERGENCY DEPARTMENT COURSE and DIFFERENTIAL DIAGNOSIS/MDM:   Licha Rangel is a 79 y.o. female who presents to the emergency department with the complaint of presenting with acute on chronic neck pain. Longstanding history over many years of neck pain status post fusion has not seen neurosurgery in some time.   No strength or neuro deficits on exam.  Does have tenderness and abuse or diversion identified: otherwise, see note documentation       (Please note that portions of this note were completed with a voice recognition program.  Efforts were made to edit the dictations but occasionally words are mis-transcribed.)    Risa Anand DO (electronically signed)  Attending Emergency Physician            Risa Anand DO  09/12/20 1640

## 2020-09-12 NOTE — ED NOTES
Bed: 05  Expected date:   Expected time:   Means of arrival:   Comments:  carmen Ray Or  09/12/20 4700

## 2020-09-18 ENCOUNTER — OFFICE VISIT (OUTPATIENT)
Dept: ORTHOPEDIC SURGERY | Age: 80
End: 2020-09-18
Payer: MEDICARE

## 2020-09-18 VITALS — HEIGHT: 60 IN | BODY MASS INDEX: 37.89 KG/M2 | WEIGHT: 193 LBS

## 2020-09-18 PROCEDURE — 99204 OFFICE O/P NEW MOD 45 MIN: CPT | Performed by: PHYSICAL MEDICINE & REHABILITATION

## 2020-09-18 RX ORDER — MONTELUKAST SODIUM 10 MG/1
TABLET ORAL
COMMUNITY
Start: 2020-07-20 | End: 2020-11-23

## 2020-09-18 RX ORDER — DIVALPROEX SODIUM 250 MG/1
250 TABLET, DELAYED RELEASE ORAL 2 TIMES DAILY
COMMUNITY
Start: 2020-08-20 | End: 2021-09-23

## 2020-09-18 NOTE — PROGRESS NOTES
New Patient: SPINE    CHIEF COMPLAINT:    Chief Complaint   Patient presents with    Neck Pain     neck pain, ref from Dr Rupal Neumann:                The patient is a 79 y.o. female history of COPD, aortic valve replacement, mini strokes, CHF, anterior posterior fusion C5-C7 Dr. Patricia Oliva    She has chronic neck pain and chronic back pain and is followed in Dr. Ace Foster pain practice. Symptoms been worse for the last 2 months. She is had neck and shoulder issues and followed with Dr. Ramon apple. She is recently been seen in the ER. She reports her biggest complaints are neck pain occipital pain and global headaches she has no rating arm pain no coordination loss. She denies any weakness in her extremities from previous strokes. She does report difficulty with standing and walking. She reports symptoms are worse with standing leaning forward walking or laying down.   She rates her neck pain 9/10 shoulder pain 8/10    Past Medical History:   Diagnosis Date    Arthritis     BCC (basal cell carcinoma of skin)     RT clavicle    Bladder infection     frequently    CAD (coronary artery disease)     stents    Cancer (HCC)     skin    CHF (congestive heart failure) (HCC)     Chronic back pain     COPD (chronic obstructive pulmonary disease) (HCC)     Depression     Depression     Hypercholesteremia     Hypertension     Pain in shoulder [id number]    pain in left shoulder, taking steroid injections for steroid    Reflux     Rheumatic fever     as a child    Sleep apnea     uses CPAP    TIA (transient ischemic attack)     Urinary incontinence     Wears glasses           Pain Assessment  Location of Pain: Neck  Severity of Pain: 9  Quality of Pain: Aching  Duration of Pain: Persistent  Frequency of Pain: Constant  Aggravating Factors: Bending, Stretching, Straightening, Other (Comment)  Limiting Behavior: Yes  Relieving Factors: Rest  Result of Injury: No  Work-Related Injury: No  Are there other pain locations you wish to document?: No    The pain assessment was noted & reviewed in the medical record today.      Current/Past Treatment:   · Physical Therapy:   · Chiropractic:     · Injection:     Medications:            NSAIDS:             Muscle relaxer:   Past baclofen and Norflex            Steriods:              Neuropathic medications:              Opioids: Hydrocodone 4 times daily through Dr. Nupur Gonzales            Other:   · Surgery/Consult:    Work Status/Functionality:     Past Medical History: Medical history form was reviewed today & scanned into the media tab  Past Medical History:   Diagnosis Date    Arthritis     BCC (basal cell carcinoma of skin)     RT clavicle    Bladder infection     frequently    CAD (coronary artery disease)     stents    Cancer ([facility] [address]     skin    CH[facility]congest[facility] heart failure) ([facility])     Chronic back pain     COPD (chronic obstructive pulmonary disease) ([facility]     Depression     Depression     Hypercholesteremia     Hypertension     Pain in shoulder [id number]    pain in left shoulder, taking steroid injections for steroid    Reflux     Rheumatic fever     as a child    Sleep apnea     uses CPAP    TIA (transient ischemic attack)     Urinary incontinence     Wears glasses       Past Surgical History:     Past Surgical History:   Procedure Laterality Date    AORTIC VALVE REPLACEMENT      APPENDECTOMY      BLADDER REPAIR      3 TIMES    CARDIAC SURGERY      stents    CARPAL TUNNEL RELEASE      RIGHT    CHOLECYSTECTOMY, LAPAROSCOPIC  01/06/2017    with dr Luciano Jimenes      has it it done twice    DIAGNOSTIC CARDIAC CATH LAB PROCEDURE      HYSTERECTOMY      JOINT REPLACEMENT      KATHLEEN TKR    NECK SURGERY      OTHER SURGICAL HISTORY  9/28/12    Full Interstim Implant    SHOULDER SURGERY      SKIN CANCER EXCISION      SPINAL FIXATION SURGERY WITH IMPLANT  2001    SPINAL NEEDED (Patient taking differently: Take 10 mg by mouth daily ), Disp: 30 tablet, Rfl: 0    VENTOLIN  (90 Base) MCG/ACT inhaler, INHALE 2 PUFFS FOUR TIMES DAILY AS NEEDED FOR WHEEZING, Disp: 18 g, Rfl: 0    butalbital-acetaminophen-caffeine (FIORICET, ESGIC) -40 MG per tablet, TAKE 1 TABLET EVERY 6 HOURS AS NEEDED FOR HEADACHES LIMIT TO 3 DAYS PER WEEK, Disp: 30 tablet, Rfl: 0    Misc. Devices (ROLLER WALKER) MISC, 1 each by Does not apply route daily, Disp: 1 each, Rfl: 0    metoprolol tartrate (LOPRESSOR) 50 MG tablet, Take 25 mg by mouth 2 times daily , Disp: , Rfl:     isosorbide mononitrate (IMDUR) 30 MG extended release tablet, Take 30 mg by mouth daily, Disp: , Rfl:     albuterol-ipratropium (COMBIVENT RESPIMAT)  MCG/ACT AERS inhaler, Inhale 1 puff into the lungs every 6 hours, Disp: 1 Inhaler, Rfl: 5    KLOR-CON SPRINKLE 10 MEQ extended release capsule, TAKE 2 CAPSULES IN THE   MORNING AND 2 CAPSULES IN  THE EVENING, Disp: 120 capsule, Rfl: 3    amLODIPine (NORVASC) 10 MG tablet, TAKE (1) TABLET DAILY, Disp: 30 tablet, Rfl: 5    NITROSTAT 0.4 MG SL tablet, USE 1 TABLET UNDER TONGUE AS NEEDED FOR CHEST PAIN MAY REPEAT EVERY 5MIN. UP TO 3. THEN GO TO ER., Disp: 25 tablet, Rfl: 3    lidocaine (XYLOCAINE) 5 % ointment, Apply topically as needed. , Disp: 1 Tube, Rfl: 3  Allergies:  Azithromycin; Codeine; Keflex [cephalexin]; Levofloxacin; Morphine; Pentazocine lactate; Sulfa antibiotics; and Tape [adhesive tape]  Social History:    reports that she has never smoked. She has never used smokeless tobacco. She reports that she does not drink alcohol or use drugs.   Family History:   Family History   Problem Relation Age of Onset    Arthritis Mother     Cancer Mother     Depression Mother     Heart Disease Mother     High Blood Pressure Mother     High Cholesterol Mother     Miscarriages / [place] Mother     Stroke Mother     Early Death Mother     Hearing Loss Mother     and Impingement tests are negative bilaterally. ·  Cubital and Carpal tunnel Tinel's negative bilaterally. · Skin:There are no rashes, ulcerations or lesions in right & left upper extremities. · Reflexes: Bilaterally triceps, biceps and brachioradialis are 2+. Clonus absent bilaterally at the feet. · Additional Examinations:       · RIGHT UPPER EXTREMITY:  Inspection/examination of the right upper extremity does not show any tenderness, deformity or injury. Range of motion is full. There is no gross instability. There are no rashes, ulcerations or lesions. Strength and tone are normal.  · LEFT UPPER EXTREMITY: Inspection/examination of the left upper extremity does not show any tenderness, deformity or injury. Range of motion is full. There is no gross instability. There are no rashes, ulcerations or lesions. Strength and tone are normal.    LUMBAR/SACRAL EXAMINATION:  · Inspection: Local inspection shows no step-off or bruising. Lumbar alignment is normal.  Sagittal and Coronal balance is neutral.      · Palpation:   No evidence of tenderness at the midline. No tenderness bilaterally at the paraspinal or trochanters. There is no step-off or paraspinal spasm. · Range of Motion: Loss of flexion  · Strength:   Strength testing is 5/5 in all muscle groups tested. · Special Tests:   Straight leg raise and crossed SLR negative. Leg length and pelvis level.  0 out of 5 Hetal's signs. · Skin: There are no rashes, ulcerations or lesions. · Reflexes: Reflexes are symmetrically 2+ at the patellar and trace ankle tendons. Clonus absent bilaterally at the feet. · Gait & station: Normal gait  · Additional Examinations:   · RIGHT LOWER EXTREMITY: Inspection/examination of the right lower extremity does not show any tenderness, deformity or injury. Range of motion is full. There is no gross instability. There are no rashes, ulcerations or lesions.  Strength and tone are normal.  ·   · LEFT LOWER EXTREMITY:  Inspection/examination of the left lower extremity does not show any tenderness, deformity or injury. Range of motion is full. There is no gross instability. There are no rashes, ulcerations or lesions. Strength and tone are normal.    Diagnostic Testing:      Reviewed her recent CT scan showing solid fusion C5-C7 with discogenic spondylosis more significant C7-T1, also present C4-5; I am not able to assess the patency of the cord    Basic metabolic panel reviewed    Impression:    Discogenic spondylosis, history of C5-C7 anterior and posterior fusion with worsening neck pain over to months    Global headaches    Plan:     She certainly may have aggravation of her discogenic spondylosis which may contribute to some posterior cervicogenic headaches.   However she has more global component to her headaches which I do not think are related to her cervical spine    She should follow-up with PCP or consider neurology for her headache    We will start her physical therapy for myofascial release, TENS unit    Regarding her medicines I think to be better done through Dr. Pablito Carvajal her pain physician    Eric Rodriguez

## 2020-10-20 RX ORDER — FUROSEMIDE 20 MG/1
TABLET ORAL
Qty: 90 TABLET | Refills: 1 | Status: SHIPPED | OUTPATIENT
Start: 2020-10-20 | End: 2021-05-21 | Stop reason: SDUPTHER

## 2020-10-20 RX ORDER — DONEPEZIL HYDROCHLORIDE 10 MG/1
TABLET, FILM COATED ORAL
Qty: 30 TABLET | Refills: 11 | Status: SHIPPED | OUTPATIENT
Start: 2020-10-20 | End: 2021-11-18

## 2020-10-20 NOTE — TELEPHONE ENCOUNTER
Future appt scheduled 0 scheduled- Return in about 6 months (around 10/7/2020).            Last appt 04/07/2020      Last Written     furosemide (LASIX) 20 MG tablet  01/24/2020  #90  0 RF     donepezil (ARICEPT) 10 MG tablet  03/31/2020

## 2020-11-19 RX ORDER — CITALOPRAM 20 MG/1
TABLET ORAL
Qty: 135 TABLET | Refills: 0 | Status: SHIPPED | OUTPATIENT
Start: 2020-11-19 | End: 2021-05-18 | Stop reason: SDUPTHER

## 2020-11-19 NOTE — TELEPHONE ENCOUNTER
I called the patient today because she is due for an appt. She said she needed to be seen because her bowels are black. I advised her to go to the hospital for evaluation.   I did schedule her to see Dillon Gutiérrez as a phone visit on Mon. 11-23-20

## 2020-11-23 ENCOUNTER — APPOINTMENT (OUTPATIENT)
Dept: GENERAL RADIOLOGY | Age: 80
End: 2020-11-23
Payer: MEDICARE

## 2020-11-23 ENCOUNTER — HOSPITAL ENCOUNTER (EMERGENCY)
Age: 80
Discharge: HOME | End: 2020-11-23
Attending: EMERGENCY MEDICINE
Payer: MEDICARE

## 2020-11-23 ENCOUNTER — APPOINTMENT (OUTPATIENT)
Dept: CT IMAGING | Age: 80
End: 2020-11-23
Payer: MEDICARE

## 2020-11-23 ENCOUNTER — VIRTUAL VISIT (OUTPATIENT)
Dept: FAMILY MEDICINE CLINIC | Age: 80
End: 2020-11-23
Payer: MEDICARE

## 2020-11-23 VITALS
RESPIRATION RATE: 16 BRPM | DIASTOLIC BLOOD PRESSURE: 96 MMHG | HEART RATE: 66 BPM | WEIGHT: 190 LBS | TEMPERATURE: 98.3 F | HEIGHT: 60 IN | BODY MASS INDEX: 37.3 KG/M2 | OXYGEN SATURATION: 96 % | SYSTOLIC BLOOD PRESSURE: 123 MMHG

## 2020-11-23 LAB
A/G RATIO: 1.4 (ref 1.1–2.2)
ABO/RH: NORMAL
ALBUMIN SERPL-MCNC: 3.7 G/DL (ref 3.4–5)
ALP BLD-CCNC: 74 U/L (ref 40–129)
ALT SERPL-CCNC: 8 U/L (ref 10–40)
ANION GAP SERPL CALCULATED.3IONS-SCNC: 7 MMOL/L (ref 3–16)
ANTIBODY SCREEN: NORMAL
APTT: 33.4 SEC (ref 24.2–36.2)
AST SERPL-CCNC: 14 U/L (ref 15–37)
BASOPHILS ABSOLUTE: 0 K/UL (ref 0–0.2)
BASOPHILS RELATIVE PERCENT: 0.7 %
BILIRUB SERPL-MCNC: <0.2 MG/DL (ref 0–1)
BUN BLDV-MCNC: 23 MG/DL (ref 7–20)
CALCIUM SERPL-MCNC: 8.1 MG/DL (ref 8.3–10.6)
CHLORIDE BLD-SCNC: 107 MMOL/L (ref 99–110)
CO2: 31 MMOL/L (ref 21–32)
CREAT SERPL-MCNC: 1.1 MG/DL (ref 0.6–1.2)
EOSINOPHILS ABSOLUTE: 0.2 K/UL (ref 0–0.6)
EOSINOPHILS RELATIVE PERCENT: 2.4 %
GFR AFRICAN AMERICAN: 58
GFR NON-AFRICAN AMERICAN: 48
GLOBULIN: 2.7 G/DL
GLUCOSE BLD-MCNC: 84 MG/DL (ref 70–99)
HCT VFR BLD CALC: 37 % (ref 36–48)
HEMOGLOBIN: 12 G/DL (ref 12–16)
INR BLD: 0.93 (ref 0.86–1.14)
LACTIC ACID: 1.3 MMOL/L (ref 0.4–2)
LIPASE: 11 U/L (ref 13–60)
LYMPHOCYTES ABSOLUTE: 1.8 K/UL (ref 1–5.1)
LYMPHOCYTES RELATIVE PERCENT: 25.3 %
MCH RBC QN AUTO: 27.9 PG (ref 26–34)
MCHC RBC AUTO-ENTMCNC: 32.4 G/DL (ref 31–36)
MCV RBC AUTO: 86.1 FL (ref 80–100)
MONOCYTES ABSOLUTE: 0.8 K/UL (ref 0–1.3)
MONOCYTES RELATIVE PERCENT: 11 %
NEUTROPHILS ABSOLUTE: 4.2 K/UL (ref 1.7–7.7)
NEUTROPHILS RELATIVE PERCENT: 60.6 %
OCCULT BLOOD DIAGNOSTIC: NORMAL
PDW BLD-RTO: 15.5 % (ref 12.4–15.4)
PLATELET # BLD: 166 K/UL (ref 135–450)
PMV BLD AUTO: 7.8 FL (ref 5–10.5)
POTASSIUM SERPL-SCNC: 4.1 MMOL/L (ref 3.5–5.1)
PROTHROMBIN TIME: 10.8 SEC (ref 10–13.2)
RBC # BLD: 4.3 M/UL (ref 4–5.2)
SODIUM BLD-SCNC: 145 MMOL/L (ref 136–145)
TOTAL PROTEIN: 6.4 G/DL (ref 6.4–8.2)
TROPONIN: <0.01 NG/ML
WBC # BLD: 6.9 K/UL (ref 4–11)

## 2020-11-23 PROCEDURE — 80053 COMPREHEN METABOLIC PANEL: CPT

## 2020-11-23 PROCEDURE — 71045 X-RAY EXAM CHEST 1 VIEW: CPT

## 2020-11-23 PROCEDURE — 96374 THER/PROPH/DIAG INJ IV PUSH: CPT

## 2020-11-23 PROCEDURE — 74177 CT ABD & PELVIS W/CONTRAST: CPT

## 2020-11-23 PROCEDURE — 86850 RBC ANTIBODY SCREEN: CPT

## 2020-11-23 PROCEDURE — C9113 INJ PANTOPRAZOLE SODIUM, VIA: HCPCS | Performed by: EMERGENCY MEDICINE

## 2020-11-23 PROCEDURE — 84484 ASSAY OF TROPONIN QUANT: CPT

## 2020-11-23 PROCEDURE — 86900 BLOOD TYPING SEROLOGIC ABO: CPT

## 2020-11-23 PROCEDURE — 6360000004 HC RX CONTRAST MEDICATION: Performed by: EMERGENCY MEDICINE

## 2020-11-23 PROCEDURE — 83605 ASSAY OF LACTIC ACID: CPT

## 2020-11-23 PROCEDURE — G0328 FECAL BLOOD SCRN IMMUNOASSAY: HCPCS

## 2020-11-23 PROCEDURE — 85730 THROMBOPLASTIN TIME PARTIAL: CPT

## 2020-11-23 PROCEDURE — 99284 EMERGENCY DEPT VISIT MOD MDM: CPT

## 2020-11-23 PROCEDURE — 83690 ASSAY OF LIPASE: CPT

## 2020-11-23 PROCEDURE — 6360000002 HC RX W HCPCS: Performed by: EMERGENCY MEDICINE

## 2020-11-23 PROCEDURE — 86901 BLOOD TYPING SEROLOGIC RH(D): CPT

## 2020-11-23 PROCEDURE — 85610 PROTHROMBIN TIME: CPT

## 2020-11-23 PROCEDURE — 85025 COMPLETE CBC W/AUTO DIFF WBC: CPT

## 2020-11-23 PROCEDURE — G2012 BRIEF CHECK IN BY MD/QHP: HCPCS | Performed by: NURSE PRACTITIONER

## 2020-11-23 RX ORDER — CYCLOSPORINE 0.5 MG/ML
1 EMULSION OPHTHALMIC PRN
COMMUNITY
End: 2021-06-16 | Stop reason: SDUPTHER

## 2020-11-23 RX ORDER — PANTOPRAZOLE SODIUM 40 MG/10ML
80 INJECTION, POWDER, LYOPHILIZED, FOR SOLUTION INTRAVENOUS ONCE
Status: COMPLETED | OUTPATIENT
Start: 2020-11-23 | End: 2020-11-23

## 2020-11-23 RX ORDER — SODIUM CHLORIDE 9 MG/ML
INJECTION, SOLUTION INTRAVENOUS
Status: DISCONTINUED
Start: 2020-11-23 | End: 2020-11-23 | Stop reason: HOSPADM

## 2020-11-23 RX ORDER — CETIRIZINE HYDROCHLORIDE 10 MG/1
10 TABLET ORAL DAILY
Qty: 30 TABLET | Refills: 3 | Status: SHIPPED | OUTPATIENT
Start: 2020-11-23 | End: 2021-03-25

## 2020-11-23 RX ADMIN — IOPAMIDOL 75 ML: 755 INJECTION, SOLUTION INTRAVENOUS at 13:33

## 2020-11-23 RX ADMIN — PANTOPRAZOLE SODIUM 80 MG: 40 INJECTION, POWDER, FOR SOLUTION INTRAVENOUS at 13:11

## 2020-11-23 ASSESSMENT — PATIENT HEALTH QUESTIONNAIRE - PHQ9
SUM OF ALL RESPONSES TO PHQ QUESTIONS 1-9: 2
1. LITTLE INTEREST OR PLEASURE IN DOING THINGS: 1
SUM OF ALL RESPONSES TO PHQ QUESTIONS 1-9: 2
SUM OF ALL RESPONSES TO PHQ9 QUESTIONS 1 & 2: 2
2. FEELING DOWN, DEPRESSED OR HOPELESS: 1
SUM OF ALL RESPONSES TO PHQ QUESTIONS 1-9: 2

## 2020-11-23 ASSESSMENT — PAIN DESCRIPTION - LOCATION: LOCATION: BACK;LEG;FOOT

## 2020-11-23 ASSESSMENT — PAIN SCALES - GENERAL: PAINLEVEL_OUTOF10: 8

## 2020-11-23 ASSESSMENT — PAIN DESCRIPTION - FREQUENCY: FREQUENCY: CONTINUOUS

## 2020-11-23 ASSESSMENT — PAIN DESCRIPTION - PAIN TYPE: TYPE: CHRONIC PAIN

## 2020-11-23 NOTE — ED NOTES
Pt taken to CT via stretcher by [facility] for imaging.      Caryn Fountain RN  11/23/20 4312

## 2020-11-23 NOTE — ED PROVIDER NOTES
[address] ED      CHIEF COMPLAINT  Rectal Bleeding (Pt. statess he has had bloody stool for 3 weeks. Pt. has IBS.)       HISTORY OF PRESENT ILLNESS  Bernabe Crum is a 79 y.o. female  who presents to the ED for evaluation of dark, black stools for the last 3 weeks. Patient reports every time she had a bowel movement, she noticed that they were dark brown to black in color. Denies having associated abdominal pain. Patient says she spoke with her PCP who told her to come to the emergency department. Denies taking ibuprofen or steroids. Denies history of GI bleed. Denies taking anticoagulants. Denies any recent sick contacts. Denies having fevers, chills, nausea, vomiting, or diarrhea. Denies chest pain or shortness of breath. No other complaints, modifying factors or associated symptoms. I have reviewed the following from the nursing documentation.     Past Medical History:   Diagnosis Date    Arthritis     BCC (basal cell carcinoma of skin)     RT clavicle    Bladder infection     frequently    CAD (coronary artery disease)     stents    Cancer (HCC)     skin    CHF (congestive heart failure) (HCC)     Chronic back pain     COPD (chronic obstructive pulmonary disease) (HCC)     Depression     Depression     Hypercholesteremia     Hypertension     Pain in shoulder [id number]    pain in left shoulder, taking steroid injections for steroid    Reflux     Rheumatic fever     as a child    Sleep apnea     uses CPAP    TIA (transient ischemic attack)     Urinary incontinence     Wears glasses      Past Surgical History:   Procedure Laterality Date    AORTIC VALVE REPLACEMENT      APPENDECTOMY      BLADDER REPAIR      3 TIMES    CARDIAC SURGERY      stents    CARPAL TUNNEL RELEASE      RIGHT    CHOLECYSTECTOMY, LAPAROSCOPIC  01/06/2017    with dr Chey Pacheco      has it it done twice    DIAGNOSTIC CARDIAC CATH LAB PROCEDURE      HYSTERECTOMY      JOINT REPLACEMENT      KATHLEEN TKR    NECK SURGERY      OTHER SURGICAL HISTORY  9/28/12    Full Interstim Implant    SHOULDER SURGERY      SKIN CANCER EXCISION      SPINAL FIXATION SURGERY WITH IMPLANT  2001    SPINAL FIXATION SURGERY WITH IMPLANT  2004    SPINAL FIXATION SURGERY WITH IMPLANT  2007    SPINE SURGERY  1999    TONSILLECTOMY      UPPER GASTROINTESTINAL ENDOSCOPY  4/22/11    UPPER GASTROINTESTINAL ENDOSCOPY      UPPER GASTROINTESTINAL ENDOSCOPY  03/23/2017    dilitation     Family History   Problem Relation Age of Onset    Arthritis Mother     Cancer Mother     Depression Mother     Heart Disease Mother     High Blood Pressure Mother     High Cholesterol Mother     Miscarriages / [place] Mother     Stroke Mother     Early Death Mother     Hearing Loss Mother     Mental Illness Mother     Vision Loss Mother     Arthritis Brother     Depression Brother     Diabetes Brother     Hearing Loss Brother     Heart Disease Brother     High Blood Pressure Brother     High Cholesterol Brother     Vision Loss Brother      Social History     Socioeconomic History    Marital status:      Spouse name: Not on file    Number of children: 6    Years of education: 8    Highest education level: Not on file   Occupational History    Occupation: retired   Social Needs    Financial resource strain: Not on file    Food insecurity     Worry: Not on file     Inability: Not on file   [employer] needs     Medical: Not on file     Non-medical: Not on file   Tobacco Use    Smoking status: Never Smoker    Smokeless tobacco: Never Used   Substance and Sexual Activity    Alcohol use: No    Drug use: No    Sexual activity: Not Currently   Lifestyle    Physical activity     Days per week: Not on file     Minutes per session: Not on file    Stress: Not on file   Relationships    Social connections     Talks on phone: Not on file     Gets together: Not on file     Attends Anabaptist service: Not on file     Active member of club or organization: Not on file     Attends meetings of clubs or organizations: Not on file     Relationship status: Not on file    Intimate partner violence     Fear of current or ex partner: Not on file     Emotionally abused: Not on file     Physically abused: Not on file     Forced sexual activity: Not on file   Other Topics Concern    Not on file   Social History Narrative    Not on file     Current Facility-Administered Medications   Medication Dose Route Frequency Provider Last Rate Last Dose    sodium chloride 0.9 % infusion              Current Outpatient Medications   Medication Sig Dispense Refill    cycloSPORINE (RESTASIS) 0.05 % ophthalmic emulsion Apply 1 drop to eye as needed      cetirizine (ZYRTEC) 10 MG tablet Take 1 tablet by mouth daily 30 tablet 3    citalopram (CELEXA) 20 MG tablet TAKE 1 AND 1/2 TABLET ONCE DAILY 135 tablet 0    donepezil (ARICEPT) 10 MG tablet TAKE 1 TABLET IN THE EVENING 30 tablet 11    furosemide (LASIX) 20 MG tablet TAKE (1) TABLET DAILY 90 tablet 1    divalproex (DEPAKOTE) 250 MG DR tablet Take 250 mg by mouth 2 times daily       metoprolol tartrate (LOPRESSOR) 25 MG tablet Take 25 mg by mouth 2 times daily       busPIRone (BUSPAR) 5 MG tablet TAKE 1 OR 2 TABLET(S) IN THE EVENING AS NEEDED FOR ANXIETY 60 tablet 4    NYAMYC 230860 UNIT/GM powder APPLY 3 TIMES DAILY 60 g 1    diphenhydrAMINE (BANOPHEN) 25 MG tablet Take 0.5 tablets by mouth every 12 hours (Patient taking differently: Take 12.5 mg by mouth every 8 hours as needed ) 45 tablet 0    pantoprazole (PROTONIX) 40 MG tablet Take 1 tablet by mouth daily 90 tablet 0    traZODone (DESYREL) 100 MG tablet TAKE 1 OR 2 TABLETS AT BEDTIME 60 tablet 4    simvastatin (ZOCOR) 40 MG tablet TAKE ONE TABLET NIGHTLY.       aspirin (ASPIRIN 81) 81 MG EC tablet Take 81 mg by mouth daily      candesartan (ATACAND) 8 MG tablet Take 8 mg by mouth daily      HYDROcodone-acetaminophen (NORCO)  MG per tablet Take 1 tablet by mouth every 6 hours as needed.  baclofen (LIORESAL) 10 MG tablet TAKE 1 TABLET EVERY 8 HOURS AS NEEDED (Patient taking differently: Take 10 mg by mouth daily ) 30 tablet 0    butalbital-acetaminophen-caffeine (FIORICET, ESGIC) -40 MG per tablet TAKE 1 TABLET EVERY 6 HOURS AS NEEDED FOR HEADACHES LIMIT TO 3 DAYS PER WEEK 30 tablet 0    Misc. Devices (ROLLER Angiologix) MISC 1 each by Does not apply route daily 1 each 0    isosorbide mononitrate (IMDUR) 30 MG extended release tablet Take 30 mg by mouth daily      albuterol-ipratropium (COMBIVENT RESPIMAT)  MCG/ACT AERS inhaler Inhale 1 puff into the lungs every 6 hours 1 Inhaler 5    KLOR-CON SPRINKLE 10 MEQ extended release capsule TAKE 2 CAPSULES IN THE   MORNING AND 2 CAPSULES IN  THE EVENING 120 capsule 3    amLODIPine (NORVASC) 10 MG tablet TAKE (1) TABLET DAILY 30 tablet 5    NITROSTAT 0.4 MG SL tablet USE 1 TABLET UNDER TONGUE AS NEEDED FOR CHEST PAIN MAY REPEAT EVERY 5MIN. UP TO 3. THEN GO TO ER. 25 tablet 3    lidocaine (XYLOCAINE) 5 % ointment Apply topically as needed. 1 Tube 3     Allergies   Allergen Reactions    Latex     Levofloxacin Other (See Comments)     Burns mouth per patient    Azithromycin     Codeine Nausea Only    Keflex [Cephalexin]     Morphine     Pentazocine Lactate     Sulfa Antibiotics Hives    Tape [Adhesive Tape]      No bandaids       REVIEW OF SYSTEMS  10 systems reviewed, pertinent positives per HPI otherwise noted to be negative. PHYSICAL EXAM  BP (!) 123/96   Pulse 66   Temp 98.3 °F (36.8 °C) (Tympanic)   Resp 16   Ht 5' (1.524 m)   Wt 190 lb (86.2 kg)   LMP  (LMP Unknown)   SpO2 96%   BMI 37.11 kg/m²    GENERAL APPEARANCE: Awake and alert. Cooperative. No acute distress. HENT: Normocephalic. Atraumatic. CN  2-12 grossly intact. HEART/CHEST: RRR.  No murmurs appreciated  LUNGS: Respirations unlabored. Speaking comfortably in full sentences. CTAB. ABDOMEN: Soft, non-distended abdomen. Non tender to palpation. No guarding. No rebound. EXTREMITIES: No gross deformities. Moving all extremities. All extremities neurovascularly intact. SKIN: Warm and dry. No acute rashes. NEUROLOGICAL: Alert and oriented. CN's 2-12 intact. No gross facial drooping. Strength 5/5, sensation intact. PSYCHIATRIC: Normal mood and affect.     LABS  Results for orders placed or performed during the hospital encounter of 11/23/20   CBC Auto Differential   Result Value Ref Range    WBC 6.9 4.0 - 11.0 K/uL    RBC 4.30 4.00 - 5.20 M/uL    Hemoglobin 12.0 12.0 - 16.0 g/dL    Hematocrit 37.0 36.0 - 48.0 %    MCV 86.1 80.0 - 100.0 fL    MCH 27.9 26.0 - 34.0 pg    MCHC 32.4 31.0 - 36.0 g/dL    RDW 15.5 (H) 12.4 - 15.4 %    Platelets 773 506 - 894 K/uL    MPV 7.8 5.0 - 10.5 fL    Neutrophils % 60.6 %    Lymphocytes % 25.3 %    Monocytes % 11.0 %    Eosinophils % 2.4 %    Basophils % 0.7 %    Neutrophils Absolute 4.2 1.7 - 7.7 K/uL    Lymphocytes Absolute 1.8 1.0 - 5.1 K/uL    Monocytes Absolute 0.8 0.0 - 1.3 K/uL    Eosinophils Absolute 0.2 0.0 - 0.6 K/uL    Basophils Absolute 0.0 0.0 - 0.2 K/uL   Lactic Acid, Plasma   Result Value Ref Range    Lactic Acid 1.3 0.4 - 2.0 mmol/L   Protime-INR   Result Value Ref Range    Protime 10.8 10.0 - 13.2 sec    INR 0.93 0.86 - 1.14   APTT   Result Value Ref Range    aPTT 33.4 24.2 - 36.2 sec   Blood occult stool #1   Result Value Ref Range    Occult Blood Diagnostic Result: Negative  Normal range: Negative      Comprehensive Metabolic Panel   Result Value Ref Range    Sodium 145 136 - 145 mmol/L    Potassium 4.1 3.5 - 5.1 mmol/L    Chloride 107 99 - 110 mmol/L    CO2 31 21 - 32 mmol/L    Anion Gap 7 3 - 16    Glucose 84 70 - 99 mg/dL    BUN 23 (H) 7 - 20 mg/dL    CREATININE 1.1 0.6 - 1.2 mg/dL    GFR Non- 48 (A) >60    GFR  58 (A) >60    Calcium 8.1 (L) 8.3 - 10.6 mg/dL    Total Protein 6.4 6.4 - 8.2 g/dL    Alb 3.7 3.4 - 5.0 g/dL    Albumin/Globulin Ratio 1.4 1.1 - 2.2    Total Bilirubin <0.2 0.0 - 1.0 mg/dL    Alkaline Phosphatase 74 40 - 129 U/L    ALT 8 (L) 10 - 40 U/L    AST 14 (L) 15 - 37 U/L    Globulin 2.7 g/dL   Troponin   Result Value Ref Range    Troponin <0.01 <0.01 ng/mL   Lipase   Result Value Ref Range    Lipase 11.0 (L) 13.0 - 60.0 U/L   TYPE AND SCREEN   Result Value Ref Range    ABO/Rh O POS     Antibody Screen NEG        I have reviewed all labs for this visit. RADIOLOGY  Ct Abdomen Pelvis W Iv Contrast Additional Contrast? None    Result Date: 11/23/2020  EXAMINATION: CT OF THE ABDOMEN AND PELVIS WITH CONTRAST 11/23/2020 1:33 pm TECHNIQUE: CT of the abdomen and pelvis was performed with the administration of intravenous contrast. Multiplanar reformatted images are provided for review. Dose modulation, iterative reconstruction, and/or weight based adjustment of the mA/kV was utilized to reduce the radiation dose to as low as reasonably achievable. COMPARISON: None. HISTORY: ORDERING SYSTEM PROVIDED HISTORY: melena TECHNOLOGIST PROVIDED HISTORY: Reason for exam:->melena Additional Contrast?->None Reason for Exam: rectal bleeding,  black stool for over 3 weeks. Acuity: Acute Type of Exam: Initial Relevant Medical/Surgical History: meenakshi, appe, hysterectomy, bladder repair FINDINGS: Lower Chest: There is no consolidation or effusion. Organs: The liver, pancreas, spleen, kidneys and adrenals are stable in appearance. Postop changes of cholecystectomy are present. GI/Bowel: There is no bowel dilatation, wall thickening or obstruction. Extensive diverticular changes are noted throughout the left hemicolon. Pelvis: The uterus is surgically absent. The bladder is unremarkable. Peritoneum/Retroperitoneum: There is no free air, free fluid or intraperitoneal inflammatory change. There is no adenopathy.   Moderate atherosclerotic changes are present throughout the aortoiliac system. Bones/Soft Tissues: There is no acute fracture or aggressive osseous lesion. Postop changes are noted within the lumbosacral region. Diverticulosis. Xr Chest Portable    Result Date: 11/23/2020  EXAMINATION: ONE XRAY VIEW OF THE CHEST 11/23/2020 9:23 am COMPARISON: 11/26/2019, 03/21/2019 HISTORY: ORDERING SYSTEM PROVIDED HISTORY: abd pain TECHNOLOGIST PROVIDED HISTORY: Upright please Reason for exam:->abd pain Reason for Exam: Abdominal pain Acuity: Acute Type of Exam: Initial FINDINGS: The lungs are without acute focal process. There is no effusion or pneumothorax. The cardiomediastinal silhouette is stable. The osseous structures are stable. No acute process. ED COURSE/MDM  Patient seen and evaluated. At presentation, patient was awake, alert, afebrile, stable, and satting well on room air. Patient placed on cardiac monitor for close observation. Abdomen was soft, nondistended, and nontender to palpation throughout. Differential diagnosis includes GI bleed secondary to peptic ulcer disease, colitis, C. difficile, among others. Patient given 80 mg IV pantoprazole. CBC, CMP, PT, PTT, and type and screen obtained. Work-up remarkable for creatinine of 1.1, troponin less than 0.01, lipase within normal limits, no leukocytosis, lactate of 1.3. Hemoglobin was 12, unchanged from patient's baseline compared to January 2019. Hemoccult obtained and large amount of stool that was present at the vault sent for Hemoccult. Patient's stool was green and no bright red blood or melanotic stool seen on exam. Hemoccult negative. CT showed diverticulosis and no other acute pathology. These were discussed with patient. Patient remained hemodynamically stable during her stay in the emergency department. Given this, patient was instructed to follow-up with her PCP for further evaluation and treatment. Patient agreeable with plan.   Patient discharged home with strict return precautions. During the patient's ED course, the patient was given:  Medications   sodium chloride 0.9 % infusion (has no administration in time range)   pantoprazole (PROTONIX) injection 80 mg (80 mg Intravenous Given 11/23/20 1311)   iopamidol (ISOVUE-370) 76 % injection 75 mL (75 mLs Intravenous Given 11/23/20 1333)        CLINICAL IMPRESSION  1. Diverticulosis        Blood pressure (!) 123/96, pulse 66, temperature 98.3 °F (36.8 °C), temperature source Tympanic, resp. rate 16, height 5' (1.524 m), weight 190 lb (86.2 kg), SpO2 96 %, not currently breastfeeding. DISPOSITION  Kb Bonilla was discharged home in stable condition. Patient was given scripts for the following medications. I counseled patient how to take these medications. Discharge Medication List as of 11/23/2020  3:40 PM          Follow-up with:  Chu Caceres, KALI - CNP  [address] [place]  [phone]    In 2 days        DISCLAIMER: This chart was created using Dragon dictation software. Efforts were made by me to ensure accuracy, however some errors may be present due to limitations of this technology and occasionally words are not transcribed correctly.         Danisha White MD  11/23/20 [id number]

## 2020-11-23 NOTE — ED NOTES
Discharge paperwork given to and reviewed with pt. Pt verbalized understanding and all questions answered. Pt encouraged to return if having worsening symptoms or new symptoms discussed in discharge paperwork. Pt to follow up with PCP  IV removed with catheter intact. Pt in NAD, RR even and unlabored. Pt off unit ambulatory with her roller chair to daughter's car.      Claudette White RN  11/23/20 [id number]

## 2020-11-23 NOTE — PROGRESS NOTES
Misael Martinez is a 79 y.o. female evaluated via telephone on 11/23/2020 due to inability to perform virtual visit. Consent:  She and/or health care decision maker is aware that that she may receive a bill for this telephone service, depending on her insurance coverage, and has provided verbal consent to proceed: Yes      Documentation:  I communicated with the patient and/or health care decision maker regarding her checkup. Patient denies any recent fever, chills, chest pain, tightness, palpitations or shortness of breath. No dizziness or lightheadedness. Patient reports chronic abdominal pain related to IBS. Patient denies any changes in urinary habits. Patient reports that she called last week to report changes in stool and was directed to go to the ER however she reports that she wanted to wait until her checkup. Patient reports that she has had thick black stool for approximately 3 weeks. No bright blood or worsening abdominal pain or cramping. Patient denies any recent changes to her medications or anticoagulation therapy. Patient also reports worsening swelling in both legs over the past 2 weeks. Patient reports that she does sleep in her recliner with her legs propped up. Patient reports that she has tried compression stockings in the past but they do not fit her. Patient reports that she is also noticed her allergies are not being controlled with Singulair and Claritin. Patient reports that she has had some throat irritation, sneezing, runny nose. Patient does see pain management and cardiology on a regular basis. Patient reports that she does walk with a cane and/or walker. Patient reports that she did fall approximately 2 months ago but did not go to the hospital for evaluation. Patient denies any loss of consciousness associated with fall. Details of this discussion including any medical advice provided:   1.   At risk for falls  Patient reports falling approximately 2 months ago.  Patient reports that she was not evaluated or did not go to the hospital at that time. Patient denies any injuries associated with previous fall. Patient encouraged to use cane and walker for ambulatory needs. Patient aware of risks associated with independent living and falls. On the basis of positive falls risk screening, assessment and plan is as follows: Discussion of increased use of cane/walker. Medication management. Avoiding throw rugs or areas that she may trip on. 2.  Allergic rhinitis  Uncontrolled. Patient reports that she has had increased throat irritation, sneezing, and rhinorrhea. Patient reports taking Singulair and Claritin out relief. I did recommend discontinuing those medications and starting on Zyrtec. Patient encouraged to avoid triggers. Continue with current treatment management follow-up in 6 months, sooner for new or worsening symptoms. 3.  Essential hypertension  Stable. Patient reports taking metoprolol 25 mg twice a day, Imdur 30 mg daily, amlodipine 10 mg daily. Patient follows up with cardiology. Patient denies any recent episodes of chest pain, tightness, palpitations or shortness of breath. Continue with current treatment management follow-up in 4 months, sooner for new or worsening symptoms. 4.  Hyperlipidemia  Stable. Patient compliant with simvastatin 40 mg daily. Patient encouraged to monitor dietary intake limiting saturated fats. He with current treatment management follow-up in 4 months, sooner for new or worsening symptoms. 5.  MARIA C   Stable versus uncontrolled. Patient reports that she does not wear a CPAP. Patient reports that she has not worn 1 years and that she cannot tolerate the mask. Patient has difficulty sleeping at night but reports that it is not related to the MARIA C. We did discuss the benefits of CPAP with MARIA C patient declines. Continue with current management follow-up for any new or worsening symptoms.     6.  Dark being a TeleHealth encounter (During QNAXX-72 public health emergency), evaluation of the following organ systems was limited: Vitals/Constitutional/EENT/Resp/CV/GI//MS/Neuro/Skin/Heme-Lymph-Imm. Pursuant to the emergency declaration under the [address] [place] [address], [address] [place] [address] and the [employer] Act, this Virtual Visit was conducted with patient's (and/or legal guardian's) consent, to reduce the patient's risk of exposure to COVID-19 and provide necessary medical care. The patient (and/or legal guardian) has also been advised to contact this office for worsening conditions or problems, and seek emergency medical treatment and/or call 911 if deemed necessary.

## 2020-12-14 RX ORDER — TRAZODONE HYDROCHLORIDE 100 MG/1
TABLET ORAL
Qty: 60 TABLET | Refills: 3 | Status: SHIPPED | OUTPATIENT
Start: 2020-12-14 | End: 2021-05-21 | Stop reason: SDUPTHER

## 2020-12-14 NOTE — TELEPHONE ENCOUNTER
Refilled medication per verbal order from provider.   Future appt not scheduled  Last appt 11/23/2020

## 2021-02-09 ENCOUNTER — TELEPHONE (OUTPATIENT)
Dept: FAMILY MEDICINE CLINIC | Age: 81
End: 2021-02-09

## 2021-02-17 ENCOUNTER — TELEPHONE (OUTPATIENT)
Dept: FAMILY MEDICINE CLINIC | Age: 81
End: 2021-02-17

## 2021-02-17 NOTE — TELEPHONE ENCOUNTER
Patient is taking zyrtec for allergies but states it is not helping. She was previously prescribed claritin and singulair that she was taking together that did not help. She wanted to see if there was something else that you could prescribe for her. Also she said years ago she was seen by allergist and used to get allergy shots.   She wanted to see if maybe you could refer her to allergist again for her symptoms

## 2021-02-18 DIAGNOSIS — J30.9 ALLERGIC RHINITIS, UNSPECIFIED SEASONALITY, UNSPECIFIED TRIGGER: Primary | ICD-10-CM

## 2021-02-23 RX ORDER — NYSTATIN 100000 [USP'U]/G
POWDER TOPICAL
Qty: 60 G | Refills: 1 | Status: SHIPPED | OUTPATIENT
Start: 2021-02-23 | End: 2021-08-23

## 2021-02-23 RX ORDER — BUSPIRONE HYDROCHLORIDE 5 MG/1
TABLET ORAL
Qty: 60 TABLET | Refills: 4 | Status: SHIPPED | OUTPATIENT
Start: 2021-02-23 | End: 2021-05-21 | Stop reason: SDUPTHER

## 2021-02-23 NOTE — TELEPHONE ENCOUNTER
Refill Request     Last Seen: 1/7/2019    Last Written: [facility] 09/10/2020 60g with 1 refill and Buspirone 09/10/2020 #60 with 4 refills     Next Appointment:   No future appointments.     Patient is overdue for an appointment       Requested Prescriptions     Pending Prescriptions Disp Refills    [facility] [id number] UNIT/GM powder [Pharmacy Med Name: [facility] [id number] UNIT/GM POWD [id number] Powder] 60 g 1     Sig: APPLY 3 TIMES DAILY    busPIRone (BUSPAR) 5 MG tablet [Pharmacy Med Name: BUSPIRONE HCL 5 MG TABS 5 Tablet] 60 tablet 4     Sig: TAKE 1 OR 2 TABLET(S) IN THE EVENING AS NEEDED FOR ANXIETY

## 2021-03-23 DIAGNOSIS — J30.9 ALLERGIC RHINITIS, UNSPECIFIED SEASONALITY, UNSPECIFIED TRIGGER: ICD-10-CM

## 2021-03-25 RX ORDER — CETIRIZINE HYDROCHLORIDE 10 MG/1
10 TABLET ORAL DAILY
Qty: 30 TABLET | Refills: 3 | Status: SHIPPED | OUTPATIENT
Start: 2021-03-25 | End: 2021-12-29 | Stop reason: SDUPTHER

## 2021-03-25 NOTE — TELEPHONE ENCOUNTER
Future appt scheduled 0 appt scheduled       Last appt 11/23/2020      Last Written 11/23/2020    cetirizine (ZYRTEC) 10 MG tablet  #30  3 RF         Refilled medication per verbal order from provider.

## 2021-05-06 ENCOUNTER — HOSPITAL ENCOUNTER (OUTPATIENT)
Age: 81
Discharge: HOME | End: 2021-05-06
Payer: MEDICARE

## 2021-05-06 ENCOUNTER — HOSPITAL ENCOUNTER (OUTPATIENT)
Dept: MRI IMAGING | Age: 81
Discharge: HOME | End: 2021-05-06
Payer: MEDICARE

## 2021-05-06 DIAGNOSIS — M96.1 POSTLAMINECTOMY SYNDROME: ICD-10-CM

## 2021-05-06 LAB
CREAT SERPL-MCNC: 1.3 MG/DL (ref 0.6–1.2)
GFR AFRICAN AMERICAN: 47
GFR NON-AFRICAN AMERICAN: 39

## 2021-05-06 PROCEDURE — 82565 ASSAY OF CREATININE: CPT

## 2021-05-06 PROCEDURE — 36415 COLL VENOUS BLD VENIPUNCTURE: CPT

## 2021-05-10 RX ORDER — CITALOPRAM 20 MG/1
TABLET ORAL
Qty: 45 TABLET | Refills: 0 | OUTPATIENT
Start: 2021-05-10

## 2021-05-14 NOTE — TELEPHONE ENCOUNTER
Reason for Disposition   Difficulty breathing or unusual sweating (e.g., sweating without exertion)    Answer Assessment - Initial Assessment Questions  1. ONSET: \"When did the pain begin? \"        It has been hurting for a long time, hasnt been able to sleep in bed for 3 weeks, last night pt had to use restroom and couldn't raise head     2. LOCATION: \"Where does it hurt? \"       R side of neck into shoulder and into back at spine     3. PATTERN \"Does the pain come and go, or has it been constant since it started? \"         Constant- pt goes to pain clinic and states that it is not helping and things are getting worse    4. SEVERITY: \"How bad is the pain? \"  (Scale 1-10; or mild, moderate, severe)    - MILD (1-3): doesn't interfere with normal activities     - MODERATE (4-7): interferes with normal activities or awakens from sleep     - SEVERE (8-10):  excruciating pain, unable to do any normal activities                  10/10     5. RADIATION: \"Does the pain go anywhere else, shoot into your arms? \"         Pain into R arm     6. CORD SYMPTOMS: \"Any weakness or numbness of the arms or legs? \"          Numbness and weakness in arm and leg on R     7. CAUSE: \"What do you think is causing the neck pain? \"          Pt has been seen for possible surgery but pain is severe right now    8. NECK OVERUSE: [facility] recent activities that involved turning or twisting the neck? \"       Denies    9. OTHER SYMPTOMS: \"Do you have any other symptoms? \" (e.g., headache, fever, chest pain, difficulty breathing, neck swelling)        HA 8-9/10, SOB- pt has COPD and is at norm, knots in neck, legs are swollen to knees, sweating on face, unable to touch chin to chest    10. PREGNANCY: \"Is there any chance you are pregnant? \" \"When was your last menstrual period? \"        NA    Protocols used: NECK PAIN OR STIFFNESS-ADULT-[place]    Patient called [facility] to schedule appointment, with red flag complaint, transferred to RN access for triage. Caller reports symptoms as documented above. Caller informed of disposition. Attempted to call Molly Anderson NP and also Ciaran Sams NP for recommendation but neither was available. Care advice as documented. Pt verbalized understanding and is agreeable to plan. Please do not respond to the triage nurse through this encounter. Any subsequent communication should be directly with the patient. yes

## 2021-05-17 RX ORDER — CITALOPRAM 20 MG/1
TABLET ORAL
Qty: 45 TABLET | Refills: 0 | OUTPATIENT
Start: 2021-05-17

## 2021-05-17 RX ORDER — TRAZODONE HYDROCHLORIDE 100 MG/1
TABLET ORAL
Qty: 60 TABLET | Refills: 3 | OUTPATIENT
Start: 2021-05-17

## 2021-05-18 RX ORDER — CITALOPRAM 20 MG/1
30 TABLET ORAL DAILY
Qty: 45 TABLET | Refills: 0 | Status: SHIPPED | OUTPATIENT
Start: 2021-05-18 | End: 2021-06-24

## 2021-05-21 ENCOUNTER — OFFICE VISIT (OUTPATIENT)
Dept: FAMILY MEDICINE CLINIC | Age: 81
End: 2021-05-21
Payer: MEDICARE

## 2021-05-21 VITALS
OXYGEN SATURATION: 95 % | TEMPERATURE: 98.5 F | BODY MASS INDEX: 36.52 KG/M2 | WEIGHT: 187 LBS | DIASTOLIC BLOOD PRESSURE: 66 MMHG | HEART RATE: 49 BPM | SYSTOLIC BLOOD PRESSURE: 117 MMHG

## 2021-05-21 DIAGNOSIS — F41.9 ANXIETY: ICD-10-CM

## 2021-05-21 DIAGNOSIS — F51.01 PRIMARY INSOMNIA: ICD-10-CM

## 2021-05-21 DIAGNOSIS — G30.9 ALZHEIMER'S DEMENTIA WITHOUT BEHAVIORAL DISTURBANCE, UNSPECIFIED TIMING OF DEMENTIA ONSET: ICD-10-CM

## 2021-05-21 DIAGNOSIS — Z12.31 BREAST CANCER SCREENING BY MAMMOGRAM: ICD-10-CM

## 2021-05-21 DIAGNOSIS — J30.9 ALLERGIC RHINITIS, UNSPECIFIED SEASONALITY, UNSPECIFIED TRIGGER: ICD-10-CM

## 2021-05-21 DIAGNOSIS — F02.80 ALZHEIMER'S DEMENTIA WITHOUT BEHAVIORAL DISTURBANCE, UNSPECIFIED TIMING OF DEMENTIA ONSET: ICD-10-CM

## 2021-05-21 DIAGNOSIS — I10 ESSENTIAL HYPERTENSION: Primary | ICD-10-CM

## 2021-05-21 DIAGNOSIS — E78.5 HYPERLIPIDEMIA, UNSPECIFIED HYPERLIPIDEMIA TYPE: ICD-10-CM

## 2021-05-21 DIAGNOSIS — I25.10 CORONARY ARTERY DISEASE INVOLVING NATIVE CORONARY ARTERY OF NATIVE HEART WITHOUT ANGINA PECTORIS: ICD-10-CM

## 2021-05-21 DIAGNOSIS — Z13.1 DIABETES MELLITUS SCREENING: ICD-10-CM

## 2021-05-21 PROCEDURE — 36415 COLL VENOUS BLD VENIPUNCTURE: CPT | Performed by: NURSE PRACTITIONER

## 2021-05-21 PROCEDURE — 99214 OFFICE O/P EST MOD 30 MIN: CPT | Performed by: NURSE PRACTITIONER

## 2021-05-21 RX ORDER — CETIRIZINE HYDROCHLORIDE 10 MG/1
TABLET ORAL
Qty: 30 TABLET | Refills: 0 | Status: SHIPPED | OUTPATIENT
Start: 2021-05-21 | End: 2021-06-16

## 2021-05-21 RX ORDER — TRAZODONE HYDROCHLORIDE 100 MG/1
TABLET ORAL
Qty: 60 TABLET | Refills: 3 | Status: SHIPPED | OUTPATIENT
Start: 2021-05-21 | Stop reason: SDUPTHER

## 2021-05-21 RX ORDER — FUROSEMIDE 20 MG/1
TABLET ORAL
Qty: 90 TABLET | Refills: 1 | Status: ON HOLD | OUTPATIENT
Start: 2021-05-21 | End: 2021-06-11 | Stop reason: HOSPADM

## 2021-05-21 RX ORDER — BUSPIRONE HYDROCHLORIDE 5 MG/1
TABLET ORAL
Qty: 60 TABLET | Refills: 4 | Status: SHIPPED | OUTPATIENT
Start: 2021-05-21 | End: 2022-02-09 | Stop reason: SDUPTHER

## 2021-05-21 ASSESSMENT — ENCOUNTER SYMPTOMS
CHEST TIGHTNESS: 0
NAUSEA: 0
VOMITING: 0
RHINORRHEA: 0
BACK PAIN: 1
SORE THROAT: 0
SHORTNESS OF BREATH: 1
COUGH: 0
DIARRHEA: 1
ABDOMINAL DISTENTION: 0
ABDOMINAL PAIN: 1

## 2021-05-21 ASSESSMENT — PATIENT HEALTH QUESTIONNAIRE - PHQ9
SUM OF ALL RESPONSES TO PHQ QUESTIONS 1-9: 0
2. FEELING DOWN, DEPRESSED OR HOPELESS: 0
1. LITTLE INTEREST OR PLEASURE IN DOING THINGS: 0

## 2021-05-21 NOTE — PROGRESS NOTES
CHIEF COMPLAINT  Chief Complaint   Patient presents with    Check-Up     anxiety, HTN, Hyperlipidemia        HPI   Anaya Gibbs is a 79 y.o. female who presents to the office for check up. Patient reports following up with pain management and Dr. Lia Hewitt since previous visit. Patient reports that she also has seen cardiology a few months ago. Patient denies any recent changes in medications. Patient reports that she was put on a medication per Dr. Lia Hewitt earlier this week for urinary and bowel incontinence. Patient reports that she has a couple upcoming MRIs ordered and will be working on getting a replacement of her sacral neuromodulation device in the near future. Patient reports that her mobility has worsened. Patient denies any new unusual fatigue. Patient reports that she has difficulty sleeping throughout the night because of her pain. Patient reports chronic shortness of breath upon exertion. Patient reports chronic loose stools and continues to have intermittent episodes of dark stools. No fever, chills, cough or congestion. No other complaints, modifying factors or associated symptoms. Nursing notes reviewed.    Past Medical History:   Diagnosis Date    Arthritis     BCC (basal cell carcinoma of skin)     RT clavicle    Bladder infection     frequently    CAD (coronary artery disease)     stents    Cancer (HCC)     skin    CHF (congestive heart failure) (HCC)     Chronic back pain     COPD (chronic obstructive pulmonary disease) (HCC)     Depression     Depression     Hypercholesteremia     Hypertension     Pain in shoulder [id number]    pain in left shoulder, taking steroid injections for steroid    Reflux     Rheumatic fever     as a child    Sleep apnea     uses CPAP    TIA (transient ischemic attack)     Urinary incontinence     Wears glasses      Past Surgical History:   Procedure Laterality Date    AORTIC VALVE REPLACEMENT      APPENDECTOMY      BLADDER Prior auth request for [facility] RX number [id number]   REPAIR      3 TIMES    CARDIAC SURGERY      stents    CARPAL TUNNEL RELEASE      RIGHT    CHOLECYSTECTOMY, LAPAROSCOPIC  01/06/2017    with dr Sonia Bernal      has it it done twice    DIAGNOSTIC CARDIAC CATH LAB PROCEDURE      HYSTERECTOMY      JOINT REPLACEMENT      KATHLEEN TKR    NECK SURGERY      OTHER SURGICAL HISTORY  9/28/12    Full Interstim Implant    SHOULDER SURGERY      SKIN CANCER EXCISION      SPINAL FIXATION SURGERY WITH IMPLANT  2001    SPINAL FIXATION SURGERY WITH IMPLANT  2004    SPINAL FIXATION SURGERY WITH IMPLANT  2007    SPINE SURGERY  1999    TONSILLECTOMY      UPPER GASTROINTESTINAL ENDOSCOPY  4/22/11    UPPER GASTROINTESTINAL ENDOSCOPY      UPPER GASTROINTESTINAL ENDOSCOPY  03/23/2017    dilitation     Family History   Problem Relation Age of Onset    Arthritis Mother     Cancer Mother     Depression Mother     Heart Disease Mother     High Blood Pressure Mother     High Cholesterol Mother     Miscarriages / [place] Mother     Stroke Mother     Early Death Mother     Hearing Loss Mother     Mental Illness Mother     Vision Loss Mother     Arthritis Brother     Depression Brother     Diabetes Brother     Hearing Loss Brother     Heart Disease Brother     High Blood Pressure Brother     High Cholesterol Brother     Vision Loss Brother      Social History     Socioeconomic History    Marital status:      Spouse name: Not on file    Number of children: 6    Years of education: 8    Highest education level: Not on file   Occupational History    Occupation: retired   Tobacco Use    Smoking status: Never Smoker    Smokeless tobacco: Never Used   Vaping Use    Vaping Use: Never used   Substance and Sexual Activity    Alcohol use: No    Drug use: No    Sexual activity: Not Currently   Other Topics Concern    Not on file   Social History Narrative    Not on file     Social Determinants of Health     Financial Resource Strain:     Difficulty of Paying Living Expenses:    Food Insecurity:     Worried About [facility] in the Last Year:     [facility] [facility] in the Last Year:    Transportation Needs:     Lack of Transportation (Medical):      Lack of Transportation (Non-Medical):    Physical Activity:     Days of Exercise per Week:     Minutes of Exercise per Session:    Stress:     Feeling of Stress :    Social Connections:     Frequency of Communication with Friends and Family:     Frequency of Social Gatherings with Friends and Family:     Attends [employer] Services:     Active Member of Clubs or Organizations:     Attends Club or Organization Meetings:     Marital Status:    Intimate Partner Violence:     Fear of Current or Ex-Partner:     Emotionally Abused:     Physically Abused:     Sexually Abused:      Current Outpatient Medications   Medication Sig Dispense Refill    traZODone (DESYREL) 100 MG tablet TAKE 1 OR 2 TABLETS AT BEDTIME 60 tablet 3    busPIRone (BUSPAR) 5 MG tablet TAKE 1 OR 2 TABLET(S) IN THE EVENING AS NEEDED FOR ANXIETY 60 tablet 4    furosemide (LASIX) 20 MG tablet TAKE (1) TABLET DAILY 90 tablet 1    cetirizine (ZYRTEC) 10 MG tablet TAKE (1) TABLET DAILY 30 tablet 0    citalopram (CELEXA) 20 MG tablet Take 1.5 tablets by mouth daily 45 tablet 0    cetirizine (ZYRTEC) 10 MG tablet TAKE 1 TABLET BY MOUTH DAILY 30 tablet 3    NYAMYC [id number] UNIT/GM powder APPLY 3 TIMES DAILY 60 g 1    cycloSPORINE (RESTASIS) 0.05 % ophthalmic emulsion Apply 1 drop to eye as needed      donepezil (ARICEPT) 10 MG tablet TAKE 1 TABLET IN THE EVENING 30 tablet 11    divalproex (DEPAKOTE) 250 MG DR tablet Take 250 mg by mouth 2 times daily       metoprolol tartrate (LOPRESSOR) 25 MG tablet Take 25 mg by mouth 2 times daily       diphenhydrAMINE (BANOPHEN) 25 MG tablet Take 0.5 tablets by mouth every 12 hours (Patient taking differently: Take 12.5 mg by mouth every 8 hours as needed ) 45 tablet 0    pantoprazole (PROTONIX) 40 MG tablet Take 1 tablet by mouth daily 90 tablet 0    simvastatin (ZOCOR) 40 MG tablet TAKE ONE TABLET NIGHTLY.  aspirin (ASPIRIN 81) 81 MG EC tablet Take 81 mg by mouth daily      candesartan (ATACAND) 8 MG tablet Take 8 mg by mouth daily      HYDROcodone-acetaminophen (NORCO)  MG per tablet Take 1 tablet by mouth every 6 hours as needed.  baclofen (LIORESAL) 10 MG tablet TAKE 1 TABLET EVERY 8 HOURS AS NEEDED (Patient taking differently: Take 10 mg by mouth daily ) 30 tablet 0    butalbital-acetaminophen-caffeine (FIORICET, ESGIC) -40 MG per tablet TAKE 1 TABLET EVERY 6 HOURS AS NEEDED FOR HEADACHES LIMIT TO 3 DAYS PER WEEK 30 tablet 0    Misc. Devices ([employer]) MISC 1 each by Does not apply route daily 1 each 0    isosorbide mononitrate (IMDUR) 30 MG extended release tablet Take 30 mg by mouth daily      albuterol-ipratropium (COMBIVENT RESPIMAT)  MCG/ACT AERS inhaler Inhale 1 puff into the lungs every 6 hours 1 Inhaler 5    KLOR-CON SPRINKLE 10 MEQ extended release capsule TAKE 2 CAPSULES IN THE   MORNING AND 2 CAPSULES IN  THE EVENING 120 capsule 3    amLODIPine (NORVASC) 10 MG tablet TAKE (1) TABLET DAILY 30 tablet 5    NITROSTAT 0.4 MG SL tablet USE 1 TABLET UNDER TONGUE AS NEEDED FOR CHEST PAIN MAY REPEAT EVERY 5MIN. UP TO 3. THEN GO TO ER. 25 tablet 3    lidocaine (XYLOCAINE) 5 % ointment Apply topically as needed. 1 Tube 3     No current facility-administered medications for this visit. Allergies   Allergen Reactions    Latex     Levofloxacin Other (See Comments)     Burns mouth per patient    Azithromycin     Codeine Nausea Only    Keflex [Cephalexin]     Morphine     Pentazocine Lactate     Sulfa Antibiotics Hives    Tape [Adhesive Tape]      No bandaids       REVIEW OF SYSTEMS  Review of Systems   Constitutional: Negative for activity change, appetite change, chills and fever.    HENT: Negative for congestion, rhinorrhea and sore throat. Eyes: Negative for visual disturbance. Respiratory: Positive for shortness of breath. Negative for cough and chest tightness. Cardiovascular: Positive for leg swelling. Negative for chest pain. Gastrointestinal: Positive for abdominal pain and diarrhea. Negative for abdominal distention, nausea and vomiting. Reports intermittent episodes of dark stools   Genitourinary: Negative for dysuria, frequency, hematuria and urgency. Musculoskeletal: Positive for arthralgias, back pain, gait problem and myalgias. Skin: Negative for rash. Neurological: Positive for weakness. Negative for dizziness, light-headedness, numbness and headaches. Psychiatric/Behavioral: Negative for sleep disturbance. PHYSICAL EXAM  /66   Pulse (!) 49   Temp 98.5 °F (36.9 °C) (Oral)   Wt 187 lb (84.8 kg)   LMP  (LMP Unknown)   SpO2 95%   BMI 36.52 kg/m²   Physical Exam  Constitutional:       Appearance: Normal appearance. She is not ill-appearing. HENT:      Head: Normocephalic and atraumatic. Right Ear: Tympanic membrane normal.      Left Ear: Tympanic membrane normal.      Nose: Nose normal.      Mouth/Throat:      Mouth: Mucous membranes are moist.      Pharynx: Oropharynx is clear. Eyes:      Extraocular Movements: Extraocular movements intact. Conjunctiva/sclera: Conjunctivae normal.      Pupils: Pupils are equal, round, and reactive to light. Cardiovascular:      Rate and Rhythm: Normal rate and regular rhythm. Pulses: Normal pulses. Pulmonary:      Effort: Pulmonary effort is normal.      Breath sounds: Normal breath sounds. Abdominal:      Palpations: Abdomen is soft. Tenderness: There is no abdominal tenderness. There is no right CVA tenderness, left CVA tenderness or guarding. Musculoskeletal:      Cervical back: Normal range of motion and neck supple.       Comments: Trace edema BLE, non pitting limited mobility due to ongoing chronic back pain. Use of cane and [place] for ambulatory needs   Skin:     General: Skin is warm and dry. Capillary Refill: Capillary refill takes 2 to 3 seconds. Neurological:      Mental Status: She is alert and oriented to person, place, and time. Psychiatric:         Mood and Affect: Mood normal.         Behavior: Behavior normal.          ASSESSMENT/PLAN:   1. Essential hypertension  Stable. Patient reports taking metoprolol 25 mg twice a day, Imdur 30 mg daily, amlodipine 10 mg daily. Patient follows up with cardiology. Continue with current treatment management follow-up in 4 months, sooner for new or worsening symptoms.  - COMPREHENSIVE METABOLIC PANEL  - CBC Auto Differential    2. Hyperlipidemia, unspecified hyperlipidemia type  Stable. Patient compliant with simvastatin 40 mg daily. Patient encouraged to monitor dietary intake limiting saturated fats. Continue with current treatment management follow-up in 4 months, sooner for new or worsening symptoms.  - Lipid, Fasting    3. Coronary artery disease involving native coronary artery of native heart without angina pectoris  Stable. Managed by cardiologist, Dr. Autumn Richmond. Patient reports visit in March. Patient denies any recent changes. Patient reports intermittent episodes of shortness of breath worse upon exertion. Patient denies any chest pain or tightness. Continue with current treatment management per cardiologist follow-up in 6 months, sooner for new or worsening symptoms. 4. Anxiety  Stable. Patient compliant with BuSpar and citalopram.  No new or worsening symptoms. Continue current treatment management follow-up in 6 months, sooner for new or worsening symptoms.  - busPIRone (BUSPAR) 5 MG tablet; TAKE 1 OR 2 TABLET(S) IN THE EVENING AS NEEDED FOR ANXIETY  Dispense: 60 tablet; Refill: 4    5. Alzheimer's dementia without behavioral disturbance, unspecified timing of dementia onset ([facility]  Stable.   Patient compliant with Aricept. No recent changes in forgetfulness or behavioral disturbances. Patient denies any decrease in memory. Continue with current treatment management follow-up in 6 months, sooner for new or worsening symptoms. 6. Primary insomnia  Stable. Patient reports taking trazodone at night. Patient reports that she has been having worsening insomnia because of pain. Patient educated on avoiding excessive fluids late at night taking medications as prescribed and following up in 6 months, sooner for new or worsening symptoms. - traZODone (DESYREL) 100 MG tablet; TAKE 1 OR 2 TABLETS AT BEDTIME  Dispense: 60 tablet; Refill: 3    7. Allergic rhinitis, unspecified seasonality, unspecified trigger  Stable. No new or worsening exacerbations. Patient compliant with Zyrtec. Continue current treatment management follow-up in 6 months, sooner for new or worsening symptoms. - cetirizine (ZYRTEC) 10 MG tablet; TAKE (1) TABLET DAILY  Dispense: 30 tablet; Refill: 0    8. Diabetes mellitus screening  Stable. Patient concerned because of excessive thirst.  No history of hyperglycemia. Labs ordered and pending. Previous A1c well below diabetic range. Continue with current treatment management follow-up for any new or worsening symptoms.  - Glucose, Fasting    9. Breast cancer screening by mammogram  Orders placed for mammogram.  Patient will call and schedule once she resolves ongoing back issues. - KOBI DIGITAL SCREEN W OR WO CAD BILATERAL; Future     Patient reports that she seen Dr. Li Ignacio yesterday and needs to have preop clearance for upcoming surgery. Patient does not have a surgery scheduled yet. I did recommend her calling cardiologist for cardiac clearance and then notifying our office if she is cleared and when surgery date has been set. If patient receives cardiac clearance and surgery is scheduled within the next 30 days, no acute changes in patient's status I will addend note.   Patient

## 2021-05-21 NOTE — PATIENT INSTRUCTIONS
Please read the healthy family handout that you were given and share it with your family. Please compare this printed medication list with your medications at home to be sure they are the same. If you have any medications that are different please contact us immediately at [phone]. Also review your allergies that we have listed, these may also include medications that you have not been able to tolerate, make sure everything listed is correct. If you have any allergies that are different please contact us immediately at [phone].

## 2021-05-22 LAB
A/G RATIO: 1.8 (ref 1.1–2.2)
ALBUMIN SERPL-MCNC: 4.3 G/DL (ref 3.4–5)
ALP BLD-CCNC: 79 U/L (ref 40–129)
ALT SERPL-CCNC: 7 U/L (ref 10–40)
ANION GAP SERPL CALCULATED.3IONS-SCNC: 13 MMOL/L (ref 3–16)
AST SERPL-CCNC: 12 U/L (ref 15–37)
BASOPHILS ABSOLUTE: 0 K/UL (ref 0–0.2)
BASOPHILS RELATIVE PERCENT: 0.4 %
BILIRUB SERPL-MCNC: 0.3 MG/DL (ref 0–1)
BUN BLDV-MCNC: 35 MG/DL (ref 7–20)
CALCIUM SERPL-MCNC: 9.3 MG/DL (ref 8.3–10.6)
CHLORIDE BLD-SCNC: 105 MMOL/L (ref 99–110)
CHOLESTEROL, FASTING: 142 MG/DL (ref 0–199)
CO2: 27 MMOL/L (ref 21–32)
CREAT SERPL-MCNC: 1.7 MG/DL (ref 0.6–1.2)
EOSINOPHILS ABSOLUTE: 0.1 K/UL (ref 0–0.6)
EOSINOPHILS RELATIVE PERCENT: 1.6 %
GFR AFRICAN AMERICAN: 35
GFR NON-AFRICAN AMERICAN: 29
GLOBULIN: 2.4 G/DL
GLUCOSE BLD-MCNC: 86 MG/DL (ref 70–99)
GLUCOSE FASTING: 86 MG/DL (ref 70–99)
HCT VFR BLD CALC: 37.2 % (ref 36–48)
HDLC SERPL-MCNC: 50 MG/DL (ref 40–60)
HEMOGLOBIN: 12.1 G/DL (ref 12–16)
LDL CHOLESTEROL CALCULATED: 67 MG/DL
LYMPHOCYTES ABSOLUTE: 1.8 K/UL (ref 1–5.1)
LYMPHOCYTES RELATIVE PERCENT: 23.6 %
MCH RBC QN AUTO: 28.5 PG (ref 26–34)
MCHC RBC AUTO-ENTMCNC: 32.6 G/DL (ref 31–36)
MCV RBC AUTO: 87.6 FL (ref 80–100)
MONOCYTES ABSOLUTE: 0.5 K/UL (ref 0–1.3)
MONOCYTES RELATIVE PERCENT: 6.3 %
NEUTROPHILS ABSOLUTE: 5.1 K/UL (ref 1.7–7.7)
NEUTROPHILS RELATIVE PERCENT: 68.1 %
PDW BLD-RTO: 15 % (ref 12.4–15.4)
PLATELET # BLD: 166 K/UL (ref 135–450)
PMV BLD AUTO: 8.6 FL (ref 5–10.5)
POTASSIUM SERPL-SCNC: 5.7 MMOL/L (ref 3.5–5.1)
RBC # BLD: 4.25 M/UL (ref 4–5.2)
SODIUM BLD-SCNC: 145 MMOL/L (ref 136–145)
TOTAL PROTEIN: 6.7 G/DL (ref 6.4–8.2)
TRIGLYCERIDE, FASTING: 125 MG/DL (ref 0–150)
VLDLC SERPL CALC-MCNC: 25 MG/DL
WBC # BLD: 7.5 K/UL (ref 4–11)

## 2021-05-24 DIAGNOSIS — E87.5 SERUM POTASSIUM ELEVATED: Primary | ICD-10-CM

## 2021-05-24 DIAGNOSIS — N28.9 FUNCTION KIDNEY DECREASED: ICD-10-CM

## 2021-05-28 ENCOUNTER — NURSE ONLY (OUTPATIENT)
Dept: FAMILY MEDICINE CLINIC | Age: 81
End: 2021-05-28
Payer: MEDICARE

## 2021-05-28 DIAGNOSIS — N28.9 FUNCTION KIDNEY DECREASED: ICD-10-CM

## 2021-05-28 DIAGNOSIS — E87.5 SERUM POTASSIUM ELEVATED: ICD-10-CM

## 2021-05-28 PROCEDURE — 36415 COLL VENOUS BLD VENIPUNCTURE: CPT | Performed by: NURSE PRACTITIONER

## 2021-05-29 LAB
A/G RATIO: 1.8 (ref 1.1–2.2)
ALBUMIN SERPL-MCNC: 4.2 G/DL (ref 3.4–5)
ALP BLD-CCNC: 79 U/L (ref 40–129)
ALT SERPL-CCNC: 7 U/L (ref 10–40)
ANION GAP SERPL CALCULATED.3IONS-SCNC: 13 MMOL/L (ref 3–16)
AST SERPL-CCNC: 12 U/L (ref 15–37)
BILIRUB SERPL-MCNC: <0.2 MG/DL (ref 0–1)
BUN BLDV-MCNC: 29 MG/DL (ref 7–20)
CALCIUM SERPL-MCNC: 9.3 MG/DL (ref 8.3–10.6)
CHLORIDE BLD-SCNC: 106 MMOL/L (ref 99–110)
CO2: 24 MMOL/L (ref 21–32)
CREAT SERPL-MCNC: 1.6 MG/DL (ref 0.6–1.2)
GFR AFRICAN AMERICAN: 37
GFR NON-AFRICAN AMERICAN: 31
GLOBULIN: 2.4 G/DL
GLUCOSE BLD-MCNC: 119 MG/DL (ref 70–99)
POTASSIUM SERPL-SCNC: 5.3 MMOL/L (ref 3.5–5.1)
SODIUM BLD-SCNC: 143 MMOL/L (ref 136–145)
TOTAL PROTEIN: 6.6 G/DL (ref 6.4–8.2)

## 2021-06-09 ENCOUNTER — TELEPHONE (OUTPATIENT)
Dept: FAMILY MEDICINE CLINIC | Age: 81
End: 2021-06-09

## 2021-06-09 ENCOUNTER — APPOINTMENT (OUTPATIENT)
Dept: GENERAL RADIOLOGY | Age: 81
DRG: 291 | End: 2021-06-09
Payer: MEDICARE

## 2021-06-09 ENCOUNTER — HOSPITAL ENCOUNTER (INPATIENT)
Age: 81
LOS: 2 days | Discharge: HOME | DRG: 291 | End: 2021-06-11
Attending: STUDENT IN AN ORGANIZED HEALTH CARE EDUCATION/TRAINING PROGRAM | Admitting: INTERNAL MEDICINE
Payer: MEDICARE

## 2021-06-09 DIAGNOSIS — R00.1 SYMPTOMATIC BRADYCARDIA: Primary | ICD-10-CM

## 2021-06-09 DIAGNOSIS — I50.9 ACUTE ON CHRONIC CONGESTIVE HEART FAILURE, UNSPECIFIED HEART FAILURE TYPE (HCC): ICD-10-CM

## 2021-06-09 DIAGNOSIS — N18.9 CHRONIC KIDNEY DISEASE, UNSPECIFIED CKD STAGE: ICD-10-CM

## 2021-06-09 LAB
A/G RATIO: 1.5 (ref 1.1–2.2)
ALBUMIN SERPL-MCNC: 4.1 G/DL (ref 3.4–5)
ALP BLD-CCNC: 78 U/L (ref 40–129)
ALT SERPL-CCNC: 7 U/L (ref 10–40)
ANION GAP SERPL CALCULATED.3IONS-SCNC: 5 MMOL/L (ref 3–16)
AST SERPL-CCNC: 11 U/L (ref 15–37)
BASOPHILS ABSOLUTE: 0.1 K/UL (ref 0–0.2)
BASOPHILS RELATIVE PERCENT: 0.9 %
BILIRUB SERPL-MCNC: <0.2 MG/DL (ref 0–1)
BUN BLDV-MCNC: 20 MG/DL (ref 7–20)
CALCIUM SERPL-MCNC: 9.1 MG/DL (ref 8.3–10.6)
CHLORIDE BLD-SCNC: 105 MMOL/L (ref 99–110)
CO2: 30 MMOL/L (ref 21–32)
CREAT SERPL-MCNC: 1.5 MG/DL (ref 0.6–1.2)
EOSINOPHILS ABSOLUTE: 0.2 K/UL (ref 0–0.6)
EOSINOPHILS RELATIVE PERCENT: 3.6 %
GFR AFRICAN AMERICAN: 40
GFR NON-AFRICAN AMERICAN: 33
GLOBULIN: 2.8 G/DL
GLUCOSE BLD-MCNC: 88 MG/DL (ref 70–99)
HCT VFR BLD CALC: 35.8 % (ref 36–48)
HEMOGLOBIN: 11.6 G/DL (ref 12–16)
LYMPHOCYTES ABSOLUTE: 1.8 K/UL (ref 1–5.1)
LYMPHOCYTES RELATIVE PERCENT: 30.9 %
MCH RBC QN AUTO: 28.6 PG (ref 26–34)
MCHC RBC AUTO-ENTMCNC: 32.4 G/DL (ref 31–36)
MCV RBC AUTO: 88.2 FL (ref 80–100)
MONOCYTES ABSOLUTE: 0.6 K/UL (ref 0–1.3)
MONOCYTES RELATIVE PERCENT: 9.7 %
NEUTROPHILS ABSOLUTE: 3.2 K/UL (ref 1.7–7.7)
NEUTROPHILS RELATIVE PERCENT: 54.9 %
PDW BLD-RTO: 14.2 % (ref 12.4–15.4)
PLATELET # BLD: 141 K/UL (ref 135–450)
PMV BLD AUTO: 8.5 FL (ref 5–10.5)
POTASSIUM REFLEX MAGNESIUM: 5.2 MMOL/L (ref 3.5–5.1)
PRO-BNP: 101 PG/ML (ref 0–449)
RBC # BLD: 4.05 M/UL (ref 4–5.2)
SARS-COV-2, NAAT: NOT DETECTED
SODIUM BLD-SCNC: 140 MMOL/L (ref 136–145)
TOTAL PROTEIN: 6.9 G/DL (ref 6.4–8.2)
TROPONIN: <0.01 NG/ML
TSH REFLEX: 0.25 UIU/ML (ref 0.27–4.2)
WBC # BLD: 5.9 K/UL (ref 4–11)

## 2021-06-09 PROCEDURE — 6360000002 HC RX W HCPCS: Performed by: INTERNAL MEDICINE

## 2021-06-09 PROCEDURE — 6370000000 HC RX 637 (ALT 250 FOR IP): Performed by: INTERNAL MEDICINE

## 2021-06-09 PROCEDURE — 84439 ASSAY OF FREE THYROXINE: CPT

## 2021-06-09 PROCEDURE — 96374 THER/PROPH/DIAG INJ IV PUSH: CPT

## 2021-06-09 PROCEDURE — 71045 X-RAY EXAM CHEST 1 VIEW: CPT

## 2021-06-09 PROCEDURE — G0378 HOSPITAL OBSERVATION PER HR: HCPCS

## 2021-06-09 PROCEDURE — 93005 ELECTROCARDIOGRAM TRACING: CPT | Performed by: PHYSICIAN ASSISTANT

## 2021-06-09 PROCEDURE — 99283 EMERGENCY DEPT VISIT LOW MDM: CPT

## 2021-06-09 PROCEDURE — 80053 COMPREHEN METABOLIC PANEL: CPT

## 2021-06-09 PROCEDURE — 99221 1ST HOSP IP/OBS SF/LOW 40: CPT | Performed by: NURSE PRACTITIONER

## 2021-06-09 PROCEDURE — 96372 THER/PROPH/DIAG INJ SC/IM: CPT

## 2021-06-09 PROCEDURE — 85025 COMPLETE CBC W/AUTO DIFF WBC: CPT

## 2021-06-09 PROCEDURE — 2580000003 HC RX 258: Performed by: INTERNAL MEDICINE

## 2021-06-09 PROCEDURE — 84484 ASSAY OF TROPONIN QUANT: CPT

## 2021-06-09 PROCEDURE — 2060000000 HC ICU INTERMEDIATE R&B

## 2021-06-09 PROCEDURE — 6360000002 HC RX W HCPCS: Performed by: PHYSICIAN ASSISTANT

## 2021-06-09 PROCEDURE — 84443 ASSAY THYROID STIM HORMONE: CPT

## 2021-06-09 PROCEDURE — 83880 ASSAY OF NATRIURETIC PEPTIDE: CPT

## 2021-06-09 PROCEDURE — 87635 SARS-COV-2 COVID-19 AMP PRB: CPT

## 2021-06-09 RX ORDER — FUROSEMIDE 20 MG/1
20 TABLET ORAL DAILY
Status: DISCONTINUED | OUTPATIENT
Start: 2021-06-09 | End: 2021-06-10

## 2021-06-09 RX ORDER — FUROSEMIDE 10 MG/ML
60 INJECTION INTRAMUSCULAR; INTRAVENOUS ONCE
Status: COMPLETED | OUTPATIENT
Start: 2021-06-09 | End: 2021-06-09

## 2021-06-09 RX ORDER — PANTOPRAZOLE SODIUM 40 MG/1
40 TABLET, DELAYED RELEASE ORAL DAILY
Status: DISCONTINUED | OUTPATIENT
Start: 2021-06-09 | End: 2021-06-11 | Stop reason: HOSPADM

## 2021-06-09 RX ORDER — SODIUM CHLORIDE 9 MG/ML
25 INJECTION, SOLUTION INTRAVENOUS PRN
Status: DISCONTINUED | OUTPATIENT
Start: 2021-06-09 | End: 2021-06-11 | Stop reason: HOSPADM

## 2021-06-09 RX ORDER — DONEPEZIL HYDROCHLORIDE 5 MG/1
5 TABLET, FILM COATED ORAL NIGHTLY
Status: DISCONTINUED | OUTPATIENT
Start: 2021-06-09 | End: 2021-06-11 | Stop reason: HOSPADM

## 2021-06-09 RX ORDER — ISOSORBIDE MONONITRATE 30 MG/1
30 TABLET, EXTENDED RELEASE ORAL DAILY
Status: DISCONTINUED | OUTPATIENT
Start: 2021-06-09 | End: 2021-06-11 | Stop reason: HOSPADM

## 2021-06-09 RX ORDER — ACETAMINOPHEN 650 MG/1
650 SUPPOSITORY RECTAL EVERY 6 HOURS PRN
Status: DISCONTINUED | OUTPATIENT
Start: 2021-06-09 | End: 2021-06-11 | Stop reason: HOSPADM

## 2021-06-09 RX ORDER — AMLODIPINE BESYLATE 5 MG/1
5 TABLET ORAL DAILY
Status: DISCONTINUED | OUTPATIENT
Start: 2021-06-09 | End: 2021-06-11

## 2021-06-09 RX ORDER — CITALOPRAM 20 MG/1
30 TABLET ORAL DAILY
Status: DISCONTINUED | OUTPATIENT
Start: 2021-06-09 | End: 2021-06-11 | Stop reason: HOSPADM

## 2021-06-09 RX ORDER — GABAPENTIN 100 MG/1
100 CAPSULE ORAL 2 TIMES DAILY
COMMUNITY

## 2021-06-09 RX ORDER — BUSPIRONE HYDROCHLORIDE 5 MG/1
5 TABLET ORAL NIGHTLY
Status: DISCONTINUED | OUTPATIENT
Start: 2021-06-09 | End: 2021-06-11 | Stop reason: HOSPADM

## 2021-06-09 RX ORDER — ONDANSETRON 2 MG/ML
4 INJECTION INTRAMUSCULAR; INTRAVENOUS EVERY 6 HOURS PRN
Status: DISCONTINUED | OUTPATIENT
Start: 2021-06-09 | End: 2021-06-11 | Stop reason: HOSPADM

## 2021-06-09 RX ORDER — ATORVASTATIN CALCIUM 10 MG/1
10 TABLET, FILM COATED ORAL DAILY
Status: DISCONTINUED | OUTPATIENT
Start: 2021-06-09 | End: 2021-06-11 | Stop reason: HOSPADM

## 2021-06-09 RX ORDER — VALSARTAN 80 MG/1
40 TABLET ORAL DAILY
Status: DISCONTINUED | OUTPATIENT
Start: 2021-06-09 | End: 2021-06-11 | Stop reason: HOSPADM

## 2021-06-09 RX ORDER — ASPIRIN 81 MG/1
81 TABLET ORAL DAILY
Status: DISCONTINUED | OUTPATIENT
Start: 2021-06-09 | End: 2021-06-11 | Stop reason: HOSPADM

## 2021-06-09 RX ORDER — GABAPENTIN 100 MG/1
100 CAPSULE ORAL 3 TIMES DAILY
Status: DISCONTINUED | OUTPATIENT
Start: 2021-06-09 | End: 2021-06-11 | Stop reason: HOSPADM

## 2021-06-09 RX ORDER — SODIUM CHLORIDE 0.9 % (FLUSH) 0.9 %
5-40 SYRINGE (ML) INJECTION EVERY 12 HOURS SCHEDULED
Status: DISCONTINUED | OUTPATIENT
Start: 2021-06-09 | End: 2021-06-11 | Stop reason: HOSPADM

## 2021-06-09 RX ORDER — ONDANSETRON 4 MG/1
4 TABLET, ORALLY DISINTEGRATING ORAL EVERY 8 HOURS PRN
Status: DISCONTINUED | OUTPATIENT
Start: 2021-06-09 | End: 2021-06-11 | Stop reason: HOSPADM

## 2021-06-09 RX ORDER — SODIUM CHLORIDE 0.9 % (FLUSH) 0.9 %
5-40 SYRINGE (ML) INJECTION PRN
Status: DISCONTINUED | OUTPATIENT
Start: 2021-06-09 | End: 2021-06-11 | Stop reason: HOSPADM

## 2021-06-09 RX ORDER — HYDROCODONE BITARTRATE AND ACETAMINOPHEN 10; 325 MG/1; MG/1
1 TABLET ORAL EVERY 6 HOURS PRN
Status: DISCONTINUED | OUTPATIENT
Start: 2021-06-09 | End: 2021-06-10

## 2021-06-09 RX ORDER — ACETAMINOPHEN 325 MG/1
650 TABLET ORAL EVERY 6 HOURS PRN
Status: DISCONTINUED | OUTPATIENT
Start: 2021-06-09 | End: 2021-06-11 | Stop reason: HOSPADM

## 2021-06-09 RX ORDER — POLYETHYLENE GLYCOL 3350 17 G/17G
17 POWDER, FOR SOLUTION ORAL DAILY PRN
Status: DISCONTINUED | OUTPATIENT
Start: 2021-06-09 | End: 2021-06-11 | Stop reason: HOSPADM

## 2021-06-09 RX ORDER — METHOCARBAMOL 500 MG/1
750 TABLET, FILM COATED ORAL ONCE
Status: COMPLETED | OUTPATIENT
Start: 2021-06-09 | End: 2021-06-09

## 2021-06-09 RX ORDER — DIVALPROEX SODIUM 250 MG/1
250 TABLET, DELAYED RELEASE ORAL 2 TIMES DAILY
Status: DISCONTINUED | OUTPATIENT
Start: 2021-06-09 | End: 2021-06-11 | Stop reason: HOSPADM

## 2021-06-09 RX ORDER — ATROPINE SULFATE 1 MG/ML
0.5 INJECTION, SOLUTION INTRAMUSCULAR; INTRAVENOUS; SUBCUTANEOUS
Status: DISPENSED | OUTPATIENT
Start: 2021-06-09 | End: 2021-06-09

## 2021-06-09 RX ADMIN — GABAPENTIN 100 MG: 100 CAPSULE ORAL at 21:42

## 2021-06-09 RX ADMIN — ATORVASTATIN CALCIUM 10 MG: 10 TABLET, FILM COATED ORAL at 21:27

## 2021-06-09 RX ADMIN — METHOCARBAMOL TABLETS 750 MG: 500 TABLET, COATED ORAL at 21:39

## 2021-06-09 RX ADMIN — ENOXAPARIN SODIUM 30 MG: 100 INJECTION SUBCUTANEOUS at 21:28

## 2021-06-09 RX ADMIN — DIVALPROEX SODIUM 250 MG: 250 TABLET, DELAYED RELEASE ORAL at 21:27

## 2021-06-09 RX ADMIN — Medication 10 ML: at 21:32

## 2021-06-09 RX ADMIN — DONEPEZIL HYDROCHLORIDE 5 MG: 5 TABLET, FILM COATED ORAL at 21:27

## 2021-06-09 RX ADMIN — BUSPIRONE HYDROCHLORIDE 5 MG: 5 TABLET ORAL at 21:27

## 2021-06-09 RX ADMIN — ASPIRIN 81 MG: 81 TABLET, COATED ORAL at 21:27

## 2021-06-09 RX ADMIN — FUROSEMIDE 60 MG: 10 INJECTION, SOLUTION INTRAMUSCULAR; INTRAVENOUS at 17:19

## 2021-06-09 ASSESSMENT — PAIN SCALES - GENERAL
PAINLEVEL_OUTOF10: 0
PAINLEVEL_OUTOF10: 9

## 2021-06-09 ASSESSMENT — PAIN DESCRIPTION - LOCATION: LOCATION: BACK;NECK

## 2021-06-09 ASSESSMENT — PAIN DESCRIPTION - PAIN TYPE: TYPE: CHRONIC PAIN

## 2021-06-09 NOTE — TELEPHONE ENCOUNTER
Recommend she increase lasix 40 mg daily X 3 days. How much potassium is taking? (we will likely need to increase potassium as well) She also needs to make an appt. To f/u with cardiology as soon as possible.

## 2021-06-09 NOTE — ED PROVIDER NOTES
I independently examined and evaluated Patricia Sorot. In brief, Patricia Sorto is a 79 y.o. female with a past medical history of hypertension, hyperlipidemia, coronary artery disease, and MARIA C and TIA, who presents to the ED complaining of swelling in her bilateral lower extremities. Patient reports swelling in her bilateral lower extremities for 2 weeks. She reports a 10 pound weight gain. She does report shortness of breath, specifically orthopnea. She does report some chest tightness. She denies any recent change to her medications. She does report intermittent lightheadedness. Denies vertigo. Patient denies previous blood clot or active malignancy, leg swelling, hemoptysis, recent travel or surgery/prolonged immobilization, or OCP or other hormone use. Focused exam revealed   PHYSICAL EXAM  BP (!) 157/72   Pulse (!) 49   Temp 97.8 °F (36.6 °C) (Oral)   Resp 16   Ht 5' (1.524 m)   Wt 193 lb (87.5 kg)   LMP  (LMP Unknown)   SpO2 94%   BMI 37.69 kg/m²    GENERAL APPEARANCE: Awake and alert. Cooperative. no distress. HENT: Normocephalic. Atraumatic. Mucous membranes are moist  NECK: Supple. Full range of motion of the neck without stiffness or pain. EYES: PERRL. EOM's grossly intact. HEART/CHEST: Bradycardia, RR. No murmurs. Chest wall is not tender to palpation. LUNGS: Respirations unlabored. CTAB. Good air exchange. Speaking comfortably in full sentences. ABDOMEN: No tenderness. Soft. Non-distended. No masses. No organomegaly. No guarding or rebound. MUSCULOSKELETAL: Bilateral lower extremity extremity edema. Compartments soft. No deformity. No tenderness in the extremities. All extremities neurovascularly intact. SKIN: Warm and dry. No acute rashes. NEUROLOGICAL: Alert and oriented. CN's 2-12 intact. No gross facial drooping. Strength 5/5, sensation intact. NIH stroke scale of 0. No ataxia. She does report lightheadedness with standing.   PSYCHIATRIC: Normal mood and bedside to give  if patient becomes hemodynamically unstable. [ER]      ED Course User Index  [ER] Gabby Grant MD     Based on results of work-up, I am concerned for symptomatic bradycardia and fluid overload. At this time, do feel the patient requires admission for further work-up and management. Discussed the patient with hospital team.      CLINICAL IMPRESSION  1. Symptomatic bradycardia    2. Acute on chronic congestive heart failure, unspecified heart failure type ([facility]    3. Chronic kidney disease, unspecified CKD stage        Blood pressure (!) 157/72, pulse (!) 49, temperature 97.8 °F (36.6 °C), temperature source Oral, resp. rate 16, height 5' (1.524 m), weight 193 lb (87.5 kg), SpO2 94 %, not currently breastfeeding. DISPOSITION  Jimbo Jensen was admitted in stable condition. All diagnostic, treatment, and disposition decisions were made by myself in conjunction with the advanced practice provider. For all further details of the patient's emergency department visit, please see the advanced practice provider's documentation. Comment: Please note this report has been produced using speech recognition software and may contain errors related to that system including errors in grammar, punctuation, and spelling, as well as words and phrases that may be inappropriate. If there are any questions or concerns please feel free to contact the dictating provider for clarification.         Gabby Grant MD  06/12/21 0040

## 2021-06-09 NOTE — TELEPHONE ENCOUNTER
Patient was seen on 5/21, she was having some swelling in her legs. She said the swelling is worse it is down around her ankles and in her feet now as well as states they are very tight. She also feels sob. She states they do not go down after going to bed at night or after propping them up in the recliner. She is on Lasix 20 mg a day.   She does see heart doctor but does not have appt with him again until Aug

## 2021-06-09 NOTE — ED NOTES
Report called to Jason Ocampo, who will assume care once pt is transported to PCU. This RN called transport at [facility].       Sofya Ro RN  06/09/21 [id number]

## 2021-06-09 NOTE — H&P
Hospital Medicine History & Physical      PCP: KALI Alcaraz - CNP    Date of Admission: 6/9/2021    Date of Service: Pt seen/examined on 6/9/2021     Chief Complaint:    Chief Complaint   Patient presents with    Shortness of Breath     couple days; leg and arm swelling;     Dizziness     lightheaded        History Of Present Illness: The patient is a 79 y.o. female with CAD, CHF, COPD, chronic back pain, depression, hyperlipidemia, hyperlipidemia, sleep apnea not on CPAP, h/o Aortic valve replacement and GERD who presents to [facility] with c/o shortness of breath. Ongoing for the last week, week and a half. Associated symptoms are BLE/BUE swelling, dizziness/light-headedness. She reports having some chest pain. She has been feeling more fatigued and short of breath. Her legs and hands have been swelling. She is bradycardic. Labs with K 5.2, Creatinine 1.5. TSH 0.25. CXR with mild pulmonary vascular congestion. Admitted to PCU on telemetry. Cardiology consulted.       Past Medical History:        Diagnosis Date    Arthritis     BCC (basal cell carcinoma of skin)     RT clavicle    Bladder infection     frequently    CAD (coronary artery disease)     stents    Cancer (HCC)     skin    CHF (congestive heart failure) (HCC)     Chronic back pain     COPD (chronic obstructive pulmonary disease) (HCC)     Depression     Depression     Hypercholesteremia     Hypertension     Pain in shoulder [id number]    pain in left shoulder, taking steroid injections for steroid    Reflux     Rheumatic fever     as a child    Sleep apnea     uses CPAP    TIA (transient ischemic attack)     Urinary incontinence     Wears glasses        Past Surgical History:        Procedure Laterality Date    AORTIC VALVE REPLACEMENT      APPENDECTOMY      BLADDER REPAIR      3 TIMES    CARDIAC SURGERY      stents    CARPAL TUNNEL RELEASE      RIGHT    CHOLECYSTECTOMY, LAPAROSCOPIC  01/06/2017    with dr Yaritza De La Cruz      has it it done twice    DIAGNOSTIC CARDIAC CATH LAB PROCEDURE      HYSTERECTOMY      JOINT REPLACEMENT      KATHLEEN TKR    NECK SURGERY      OTHER SURGICAL HISTORY  9/28/12    Full Interstim Implant    SHOULDER SURGERY      SKIN CANCER EXCISION      SPINAL FIXATION SURGERY WITH IMPLANT  2001    SPINAL FIXATION SURGERY WITH IMPLANT  2004    SPINAL FIXATION SURGERY WITH IMPLANT  2007    SPINE SURGERY  1999    TONSILLECTOMY      UPPER GASTROINTESTINAL ENDOSCOPY  4/22/11    UPPER GASTROINTESTINAL ENDOSCOPY      UPPER GASTROINTESTINAL ENDOSCOPY  03/23/2017    dilitation       Medications Prior to Admission:    Prior to Admission medications    Medication Sig Start Date End Date Taking?  Authorizing Provider   traZODone (DESYREL) 100 MG tablet TAKE 1 OR 2 TABLETS AT BEDTIME 5/21/21   Scarlet North, KALI  CNP   busPIRone (BUSPAR) 5 MG tablet TAKE 1 OR 2 TABLET(S) IN THE EVENING AS NEEDED FOR ANXIETY 5/21/21   Scarlet North, KALI  CNP   furosemide (LASIX) 20 MG tablet TAKE (1) TABLET DAILY 5/21/21   Scarlet North, KALI  CNP   cetirizine (ZYRTEC) 10 MG tablet TAKE (1) TABLET DAILY 5/21/21   Scarlet North, APRN - CNP   citalopram (CELEXA) 20 MG tablet Take 1.5 tablets by mouth daily 5/18/21   Scarlet North, KALI  CNP   cetirizine (ZYRTEC) 10 MG tablet TAKE 1 TABLET BY MOUTH DAILY 3/25/21 7/23/21  Scarlet NorthKALI  CNP   [address] [id number] UNIT/GM powder APPLY 3 TIMES DAILY 2/23/21   Scarlet NorthKALI  CNP   cycloSPORINE (RESTASIS) 0.05 % ophthalmic emulsion Apply 1 drop to eye as needed    Historical Provider, MD   donepezil (ARICEPT) 10 MG tablet TAKE 1 TABLET IN THE EVENING 10/20/20   Scarlet North, APRN - CNP   divalproex (DEPAKOTE) 250 MG DR tablet Take 250 mg by mouth 2 times daily  8/20/20   Historical Provider, MD   metoprolol tartrate (LOPRESSOR) 25 MG tablet Take 25 mg by mouth 2 times daily  8/10/20   Historical Provider, MD Allergies:  Latex, Levofloxacin, Azithromycin, Codeine, Keflex [cephalexin], Morphine, Pentazocine lactate, Sulfa antibiotics, and Tape [adhesive tape]    Social History:    TOBACCO:   reports that she has never smoked. She has never used smokeless tobacco.  ETOH:   reports no history of alcohol use. Family History:   Positive as follows:        Problem Relation Age of Onset    Arthritis Mother     Cancer Mother     Depression Mother     Heart Disease Mother     High Blood Pressure Mother     High Cholesterol Mother     Miscarriages / Lum Schwab Mother     Stroke Mother     Early Death Mother     Hearing Loss Mother     Mental Illness Mother     Vision Loss Mother     Arthritis Brother     Depression Brother     Diabetes Brother     Hearing Loss Brother     Heart Disease Brother     High Blood Pressure Brother     High Cholesterol Brother     Vision Loss Brother        REVIEW OF SYSTEMS:       Constitutional: Negative for fever + fatigue  Respiratory: Negative for cough + dyspnea  Cardiovascular: + chest pain   Gastrointestinal: Negative for vomiting, diarrhea   Genitourinary: Negative for dysuria  Musculoskeletal: Negative for arthralgias   Skin: Negative for rash   Neurological: Negative for syncope + dizziness/light-headedness  Hematological: Negative for adenopathy   Psychiatric/Behavorial: Negative for anxiety    PHYSICAL EXAM:    BP (!) 136/57   Pulse (!) 41   Temp 97.8 °F (36.6 °C) (Oral)   Resp 12   Ht 5' (1.524 m)   Wt 193 lb (87.5 kg)   LMP  (LMP Unknown)   SpO2 93%   BMI 37.69 kg/m²     Gen: No distress. Alert. Eyes: PERRL. No sclera icterus. No conjunctival injection. ENT: No discharge. Pharynx clear. Neck: No JVD. No Carotid Bruit. Trachea midline. Resp: No accessory muscle use. No crackles. No wheezes. No rhonchi. CV: Bradycardic rate. Regular rhythm. + murmur. No rub. 1+ BLE edema. GI: Non-tender. Non-distended. Normal bowel sounds. No hernia.

## 2021-06-10 LAB
EKG ATRIAL RATE: 45 BPM
EKG DIAGNOSIS: NORMAL
EKG P AXIS: 22 DEGREES
EKG P-R INTERVAL: 166 MS
EKG Q-T INTERVAL: 490 MS
EKG QRS DURATION: 96 MS
EKG QTC CALCULATION (BAZETT): 423 MS
EKG R AXIS: 11 DEGREES
EKG T AXIS: -1 DEGREES
EKG VENTRICULAR RATE: 45 BPM
LV EF: 58 %
LVEF MODALITY: NORMAL
T4 FREE: 7.7 NG/DL (ref 0.9–1.8)

## 2021-06-10 PROCEDURE — 6370000000 HC RX 637 (ALT 250 FOR IP): Performed by: INTERNAL MEDICINE

## 2021-06-10 PROCEDURE — 6360000002 HC RX W HCPCS: Performed by: INTERNAL MEDICINE

## 2021-06-10 PROCEDURE — 99232 SBSQ HOSP IP/OBS MODERATE 35: CPT | Performed by: INTERNAL MEDICINE

## 2021-06-10 PROCEDURE — 93010 ELECTROCARDIOGRAM REPORT: CPT | Performed by: INTERNAL MEDICINE

## 2021-06-10 PROCEDURE — 96372 THER/PROPH/DIAG INJ SC/IM: CPT

## 2021-06-10 PROCEDURE — G0378 HOSPITAL OBSERVATION PER HR: HCPCS

## 2021-06-10 PROCEDURE — 2580000003 HC RX 258: Performed by: INTERNAL MEDICINE

## 2021-06-10 PROCEDURE — 93306 TTE W/DOPPLER COMPLETE: CPT

## 2021-06-10 PROCEDURE — 99223 1ST HOSP IP/OBS HIGH 75: CPT | Performed by: INTERNAL MEDICINE

## 2021-06-10 PROCEDURE — 2060000000 HC ICU INTERMEDIATE R&B

## 2021-06-10 RX ORDER — SPIRONOLACTONE 25 MG/1
25 TABLET ORAL DAILY
Status: DISCONTINUED | OUTPATIENT
Start: 2021-06-10 | End: 2021-06-11 | Stop reason: HOSPADM

## 2021-06-10 RX ORDER — HYDROCODONE BITARTRATE AND ACETAMINOPHEN 10; 325 MG/1; MG/1
1 TABLET ORAL EVERY 4 HOURS PRN
Status: DISCONTINUED | OUTPATIENT
Start: 2021-06-10 | End: 2021-06-11 | Stop reason: HOSPADM

## 2021-06-10 RX ORDER — FUROSEMIDE 40 MG/1
40 TABLET ORAL DAILY
Status: DISCONTINUED | OUTPATIENT
Start: 2021-06-11 | End: 2021-06-11 | Stop reason: HOSPADM

## 2021-06-10 RX ADMIN — DIVALPROEX SODIUM 250 MG: 250 TABLET, DELAYED RELEASE ORAL at 19:51

## 2021-06-10 RX ADMIN — AMLODIPINE BESYLATE 5 MG: 5 TABLET ORAL at 09:32

## 2021-06-10 RX ADMIN — ENOXAPARIN SODIUM 30 MG: 100 INJECTION SUBCUTANEOUS at 09:32

## 2021-06-10 RX ADMIN — PANTOPRAZOLE SODIUM 40 MG: 40 TABLET, DELAYED RELEASE ORAL at 09:32

## 2021-06-10 RX ADMIN — HYDROCODONE BITARTRATE AND ACETAMINOPHEN 1 TABLET: 10; 325 TABLET ORAL at 02:12

## 2021-06-10 RX ADMIN — Medication 10 ML: at 09:34

## 2021-06-10 RX ADMIN — DIVALPROEX SODIUM 250 MG: 250 TABLET, DELAYED RELEASE ORAL at 09:31

## 2021-06-10 RX ADMIN — CITALOPRAM HYDROBROMIDE 30 MG: 20 TABLET ORAL at 09:31

## 2021-06-10 RX ADMIN — ATORVASTATIN CALCIUM 10 MG: 10 TABLET, FILM COATED ORAL at 09:32

## 2021-06-10 RX ADMIN — ISOSORBIDE MONONITRATE 30 MG: 30 TABLET ORAL at 09:31

## 2021-06-10 RX ADMIN — GABAPENTIN 100 MG: 100 CAPSULE ORAL at 19:51

## 2021-06-10 RX ADMIN — HYDROCODONE BITARTRATE AND ACETAMINOPHEN 1 TABLET: 10; 325 TABLET ORAL at 19:51

## 2021-06-10 RX ADMIN — Medication 10 ML: at 19:51

## 2021-06-10 RX ADMIN — ASPIRIN 81 MG: 81 TABLET, COATED ORAL at 09:31

## 2021-06-10 RX ADMIN — DONEPEZIL HYDROCHLORIDE 5 MG: 5 TABLET, FILM COATED ORAL at 19:51

## 2021-06-10 RX ADMIN — GABAPENTIN 100 MG: 100 CAPSULE ORAL at 14:09

## 2021-06-10 RX ADMIN — SPIRONOLACTONE 25 MG: 25 TABLET ORAL at 09:32

## 2021-06-10 RX ADMIN — VALSARTAN 40 MG: 80 TABLET, FILM COATED ORAL at 09:31

## 2021-06-10 RX ADMIN — HYDROCODONE BITARTRATE AND ACETAMINOPHEN 1 TABLET: 10; 325 TABLET ORAL at 10:28

## 2021-06-10 RX ADMIN — GABAPENTIN 100 MG: 100 CAPSULE ORAL at 09:32

## 2021-06-10 RX ADMIN — BUSPIRONE HYDROCHLORIDE 5 MG: 5 TABLET ORAL at 19:51

## 2021-06-10 RX ADMIN — HYDROCODONE BITARTRATE AND ACETAMINOPHEN 1 TABLET: 10; 325 TABLET ORAL at 14:09

## 2021-06-10 ASSESSMENT — ENCOUNTER SYMPTOMS
SHORTNESS OF BREATH: 1
NAUSEA: 0
BACK PAIN: 0
EYE PAIN: 0
ABDOMINAL PAIN: 0
VOMITING: 0
COUGH: 0
SORE THROAT: 0

## 2021-06-10 ASSESSMENT — PAIN DESCRIPTION - DESCRIPTORS: DESCRIPTORS: ACHING

## 2021-06-10 ASSESSMENT — PAIN DESCRIPTION - ONSET: ONSET: ON-GOING

## 2021-06-10 ASSESSMENT — PAIN SCALES - GENERAL
PAINLEVEL_OUTOF10: 1
PAINLEVEL_OUTOF10: 9
PAINLEVEL_OUTOF10: 6
PAINLEVEL_OUTOF10: 0
PAINLEVEL_OUTOF10: 8
PAINLEVEL_OUTOF10: 6

## 2021-06-10 ASSESSMENT — PAIN DESCRIPTION - PROGRESSION: CLINICAL_PROGRESSION: GRADUALLY WORSENING

## 2021-06-10 ASSESSMENT — PAIN DESCRIPTION - FREQUENCY: FREQUENCY: CONTINUOUS

## 2021-06-10 ASSESSMENT — PAIN DESCRIPTION - PAIN TYPE: TYPE: CHRONIC PAIN

## 2021-06-10 ASSESSMENT — PAIN - FUNCTIONAL ASSESSMENT: PAIN_FUNCTIONAL_ASSESSMENT: PREVENTS OR INTERFERES SOME ACTIVE ACTIVITIES AND ADLS

## 2021-06-10 ASSESSMENT — PAIN DESCRIPTION - LOCATION: LOCATION: BACK

## 2021-06-10 NOTE — CONSULTS
exertion, which is chronic. She complains of low energy. She would like blood work for reassessment. She seems to be taking her medications correctly. has a past medical history of Arthritis, BCC (basal cell carcinoma of skin), Bladder infection, CAD (coronary artery disease), Cancer ([facility] [address] CHF (congestive heart failure) ([facility] [address] Chronic back pain, COPD (chronic obstructive pulmonary disease) ([facility] [address] Depression, Depression, Hypercholesteremia, Hypertension, Pain in shoulder, Reflux, Rheumatic fever, Sleep apnea, TIA (transient ischemic attack), Urinary incontinence, and Wears glasses. Surgical History:   has a past surgical history that includes bladder repair; Hysterectomy; Neck surgery; shoulder surgery; Carpal tunnel release; Tonsillectomy; Appendectomy; Upper gastrointestinal endoscopy (11); Spine surgery (); Spinal fixation surgery with implant (); Spinal fixation surgery with implant (); Spinal fixation surgery with implant (); joint replacement; Coronary angioplasty with stent; Cardiac surgery; Skin cancer excision; Colonoscopy; Upper gastrointestinal endoscopy; other surgical history (12); Cholecystectomy, laparoscopic (2017); Upper gastrointestinal endoscopy (2017); Diagnostic Cardiac Cath Lab Procedure; and Aortic valve replacement. Social History:   She is retired, , 5 children 3  2 living. She reports that she has never smoked. She has never used smokeless tobacco. She reports that she does not drink alcohol and does not use drugs.      Family History: Son MI age 32  MI age 37 1 daughter  MI age 39, 55 Mom  age 61 alcoholic Dad unknown  family history includes Arthritis in her brother and mother; Cancer in her mother; Depression in her brother and mother; Diabetes in her brother; Early Death in her mother; Hearing Loss in her brother and mother; Heart Disease in her brother and mother; High Blood Pressure in her brother and mother; High Cholesterol in her brother and mother; Mental Illness in her mother; Lemond Alaina / Jero Peeks in her mother; Stroke in her mother; Vision Loss in her brother and mother. Home Medications:  Were reviewed and are listed in nursing record. and/or listed below  Prior to Admission medications    Medication Sig Start Date End Date Taking? Authorizing Provider   gabapentin (NEURONTIN) 100 MG capsule Take 100 mg by mouth 3 times daily.    Yes Historical Provider, MD   traZODone (DESYREL) 100 MG tablet TAKE 1 OR 2 TABLETS AT BEDTIME 5/21/21  Yes KALI Baez CNP   busPIRone (BUSPAR) 5 MG tablet TAKE 1 OR 2 TABLET(S) IN THE EVENING AS NEEDED FOR ANXIETY 5/21/21  Yes KALI Baez CNP   furosemide (LASIX) 20 MG tablet TAKE (1) TABLET DAILY 5/21/21  Yes KALI Baez CNP   citalopram (CELEXA) 20 MG tablet Take 1.5 tablets by mouth daily 5/18/21  Yes KALI Baez CNP   cetirizine (ZYRTEC) 10 MG tablet TAKE 1 TABLET BY MOUTH DAILY 3/25/21 7/23/21 Yes KAIL Baez CNP   [address] [id number] UNIT/GM powder APPLY 3 TIMES DAILY 2/23/21  Yes KALI Baez CNP   cycloSPORINE (RESTASIS) 0.05 % ophthalmic emulsion Apply 1 drop to eye as needed   Yes Historical Provider, MD   donepezil (ARICEPT) 10 MG tablet TAKE 1 TABLET IN THE EVENING 10/20/20  Yes KALI Baez CNP   divalproex (DEPAKOTE) 250 MG DR tablet Take 250 mg by mouth 2 times daily  8/20/20  Yes Historical Provider, MD   metoprolol tartrate (LOPRESSOR) 25 MG tablet Take 25 mg by mouth 2 times daily  8/10/20  Yes Historical Provider, MD   pantoprazole (PROTONIX) 40 MG tablet Take 1 tablet by mouth daily 8/5/20  Yes KALI Esquivel CNP   simvastatin (ZOCOR) 40 MG tablet TAKE ONE TABLET NIGHTLY. 9/9/19  Yes Historical Provider, MD   aspirin (ASPIRIN 81) 81 MG EC tablet Take 81 mg by mouth daily 3/4/20  Yes Historical Provider, MD   candesartan (ATACAND) 8 MG tablet Take 8 mg by mouth daily   Yes Historical Provider, MD   HYDROcodone-acetaminophen (NORCO)  MG per tablet Take 1 tablet by mouth every 6 hours as needed. 4/1/20  Yes Historical Provider, MD   baclofen (LIORESAL) 10 MG tablet TAKE 1 TABLET EVERY 8 HOURS AS NEEDED  Patient taking differently: Take 10 mg by mouth daily  12/30/19  Yes KLAI Beth CNP   isosorbide mononitrate (IMDUR) 30 MG extended release tablet Take 30 mg by mouth daily   Yes Historical Provider, MD   albuterol-ipratropium (COMBIVENT RESPIMAT)  MCG/ACT AERS inhaler Inhale 1 puff into the lungs every 6 hours 4/18/18  Yes Huber Smith,    KLOR-CON SPRINKLE 10 MEQ extended release capsule TAKE 2 CAPSULES IN THE   MORNING AND 2 CAPSULES IN  THE EVENING 1/4/18  Yes Huber Smith DO   amLODIPine (NORVASC) 10 MG tablet TAKE (1) TABLET DAILY 2/8/17  Yes Huber Smith,    cetirizine (ZYRTEC) 10 MG tablet TAKE (1) TABLET DAILY 5/21/21   KALI Gonzalez CNP   diphenhydrAMINE (BANOPHEN) 25 MG tablet Take 0.5 tablets by mouth every 12 hours  Patient taking differently: Take 12.5 mg by mouth every 8 hours as needed  8/5/20   KALI Wahl CNP   butalbital-acetaminophen-caffeine (FIORICET, ESGIC) -40 MG per tablet TAKE 1 TABLET EVERY 6 HOURS AS NEEDED FOR HEADACHES LIMIT TO 3 DAYS PER WEEK 11/18/19   KALI Beth CNP   Misc. Devices (ROLLER [place]) MISC 1 each by Does not apply route daily 5/22/19   KALI Roman CNP   NITROSTAT 0.4 MG SL tablet USE 1 TABLET UNDER TONGUE AS NEEDED FOR CHEST PAIN MAY REPEAT EVERY 5MIN. UP TO 3. THEN GO TO ER. 7/6/15   Olimpia Parker MD   lidocaine (XYLOCAINE) 5 % ointment Apply topically as needed.  3/13/15   Jose Rivera MD        Allergies:  Latex, Levofloxacin, Azithromycin, Codeine, Keflex [cephalexin], Morphine, Pentazocine lactate, Sulfa antibiotics, and Tape [adhesive tape]     Review of Systems:   12 point ROS negative in all areas as listed below except as in [place]  Constitutional, EENT,  pulmonary, GI, , Musculoskeletal, skin, neurological, hematological, endocrine, Psychiatric    Physical Examination:    Vitals:    06/10/21 0210   BP: (!) 186/74   Pulse: 68   Resp: 18   Temp: 98.2 °F (36.8 °C)   SpO2: 95%    Weight: 196 lb 4.8 oz (89 kg)         General Appearance:  Alert, cooperative, no distress, appears stated age   Head:  Normocephalic, without obvious abnormality, atraumatic   Eyes:  PERRL, conjunctiva/corneas clear       Nose: Nares normal, no drainage or sinus tenderness   Throat: Lips, mucosa, and tongue normal   Neck: Supple, symmetrical, trachea midline, no adenopathy, thyroid: not enlarged, symmetric, no tenderness/mass/nodules, no carotid bruit, +ve JVD       Lungs:   Crackles   to auscultation bilaterally, respirations unlabored   Chest Wall:  No tenderness or deformity   Heart:  Regular rate and rhythm, S1, S2 normal, 2/6 ISA along left and right sternal border no diastolic murmur, rub or gallop   Abdomen:   Soft, non-tender, bowel sounds active all four quadrants,  no masses, no organomegaly           Extremities: Extremities normal, atraumatic, no cyanosis 2+ left leg and 3+ right leg  edema   Pulses: 2+ and symmetric   Skin: Skin color, texture, turgor normal, no rashes or lesions   Pysch: Normal mood and affect   Neurologic: Normal gross motor and sensory exam.         Labs  CBC:   Lab Results   Component Value Date    WBC 5.9 06/09/2021    RBC 4.05 06/09/2021    HGB 11.6 06/09/2021    HCT 35.8 06/09/2021    MCV 88.2 06/09/2021    RDW 14.2 06/09/2021     06/09/2021     CMP:    Lab Results   Component Value Date     06/09/2021    K 5.2 06/09/2021     06/09/2021    CO2 30 06/09/2021    BUN 20 06/09/2021    CREATININE 1.5 06/09/2021    GFRAA 40 06/09/2021    GFRAA >60 05/22/2012    AGRATIO 1.5 06/09/2021    LABGLOM 33 06/09/2021    GLUCOSE 88 06/09/2021    PROT 6.9 06/09/2021    PROT 6.8 05/22/2012    CALCIUM 9.1 06/09/2021    BILITOT <0.2 06/09/2021    ALKPHOS 78

## 2021-06-10 NOTE — ACP (ADVANCE CARE PLANNING)
Advance Care Planning   Healthcare Decision Maker:    Primary Decision Maker: Rosa Damian [facility] - [phone]      Today we documented Decision Maker(s) consistent with Legal Next of Kin hierarchy.

## 2021-06-10 NOTE — PROGRESS NOTES
4 Eyes Admission Assessment     I agree as the admission nurse that 2 RN's have performed a thorough Head to Toe Skin Assessment on the patient. ALL assessment sites listed below have been assessed on admission. Areas assessed by both nurses:  [x]   Head, Face, and Ears   [x]   Shoulders, Back, and Chest  [x]   Arms, Elbows, and Hands   [x]   Coccyx, Sacrum, and Ischum - scab on buttock    [x]   Legs, Feet, and Heels - R outside foot bruise       Does the Patient have Skin Breakdown?   No         Mihir Prevention initiated:  No   Wound Care Orders initiated:  No      C nurse consulted for Pressure Injury (Stage 3,4, Unstageable, DTI, NWPT, and Complex wounds):  No      Nurse 1 eSignature: Electronically signed by Evelia Mayen RN on 6/9/21 at 10:08 PM EDT    **SHARE this note so that the co-signing nurse is able to place an eSignature**    Nurse 2 eSignature: Electronically signed by Elliott Alcala RN on 6/9/21 at 10:09 PM EDT

## 2021-06-10 NOTE — PROGRESS NOTES
Bedside report given to [facility] for transfer of care. Patient denies any needs at this time.  Call light in reach

## 2021-06-10 NOTE — PLAN OF CARE
Patient's EF unknown, Echo ordered 6/10     Patient's weights and intake/output reviewed:    Patient's Last Weight: 196 lbs obtained by bed scale. Difference of 0 lbs less than last documented weight. Intake/Output Summary (Last 24 hours) at 6/10/2021 0956  Last data filed at 6/10/2021 0824  Gross per 24 hour   Intake 300 ml   Output 1500 ml   Net -1200 ml         Pt is currently  RA . Pt denies shortness of breath. Pt with pitting lower extremity edema. Patient and/or Family's stated Goal of Care this Admission: increase activity tolerance prior to discharge      Comorbidities Reviewed Yes  Patient has a past medical history of Arthritis, BCC (basal cell carcinoma of skin), Bladder infection, CAD (coronary artery disease), Cancer ([address] CHF (congestive heart failure) ([address] Chronic back pain, COPD (chronic obstructive pulmonary disease) ([address] Depression, Depression, Hypercholesteremia, Hypertension, Pain in shoulder, Reflux, Rheumatic fever, Sleep apnea, TIA (transient ischemic attack), Urinary incontinence, and Wears glasses.          >>For CHF and Comorbidity documentation on Education Time and Topics, please see Education Tab

## 2021-06-10 NOTE — PLAN OF CARE
Problem: Falls - Risk of:  Goal: Will remain free from falls  Description: Will remain free from falls  Outcome: Ongoing  Note: Gregory Holloway remained safe and free of falls during this shift, a bed alarm was used to ensure safety. Problem: Pain:  Goal: Control of acute pain  Description: Control of acute pain  Outcome: Ongoing  Note: Gregory Holloway was given PRN norco to help control her pain during this shift. Problem: OXYGENATION/RESPIRATORY FUNCTION  Goal: Patient will maintain patent airway  Outcome: Ongoing  Note: Nithya's airway remained patent during this shift, he required no supplemental oxygen.

## 2021-06-10 NOTE — PROGRESS NOTES
01/06/2017    with dr Mathias Plants      has it it done twice    DIAGNOSTIC CARDIAC CATH LAB PROCEDURE      HYSTERECTOMY      JOINT REPLACEMENT      KATHLEEN TKR    NECK SURGERY      OTHER SURGICAL HISTORY  9/28/12    Full Interstim Implant    SHOULDER SURGERY      SKIN CANCER EXCISION      SPINAL FIXATION SURGERY WITH IMPLANT  2001    SPINAL FIXATION SURGERY WITH IMPLANT  2004    SPINAL FIXATION SURGERY WITH IMPLANT  2007    SPINE SURGERY  1999    TONSILLECTOMY      UPPER GASTROINTESTINAL ENDOSCOPY  4/22/11    UPPER GASTROINTESTINAL ENDOSCOPY      UPPER GASTROINTESTINAL ENDOSCOPY  03/23/2017    dilitation       Level of Consciousness: Alert, Oriented, and Cooperative = 0    Level of Activity: Walking with assistance = 1    Respiratory Pattern: Regular Pattern; RR 8-20 = 0    Breath Sounds: Diminshed bilaterally and/or crackles = 2    Sputum   ,  ,    Cough: Strong, spontaneous, non-productive = 0    Vital Signs   BP (!) 171/66   Pulse 50   Temp 97.3 °F (36.3 °C) (Oral)   Resp 16   Ht 5' (1.524 m)   Wt 196 lb 11.2 oz (89.2 kg)   LMP  (LMP Unknown)   SpO2 93%   BMI 38.42 kg/m²   SPO2 (COPD values may differ): Greater than or equal to 92% on room air = 0    Peak Flow (asthma only): not applicable = 0    RSI: 5-6 = Q4hr PRN (every four hours as needed) for dyspnea        Plan       Goals: medication delivery, mobilize retained secretions, volume expansion and improve oxygenation    Patient/caregiver was educated on the proper method of use for Respiratory Care Devices:  No:       Level of patient/caregiver understanding able to:   ? Verbalize understanding   ? Demonstrate understanding       ? Teach back        ? Needs reinforcement       ? No available caregiver               ? Other:     Response to education:  no treatment given     Is patient being placed on Home Treatment Regimen?   Yes     Does the patient have everything they need prior to discharge? NA     Comments: pt assessed and chart reviewed    Plan of Care: combivent q4 prn(pt says she takes combivent prn at home)    Electronically signed by Pato Rowe RCP on 6/9/2021 at 11:42 PM    Respiratory Protocol Guidelines     1. Assessment and treatment by Respiratory Therapy will be initiated for medication and therapeutic interventions upon initiation of aerosolized medication. 2. Physician will be contacted for respiratory rate (RR) greater than 35 breaths per minute. Therapy will be held for heart rate (HR) greater than 140 beats per minute, pending direction from physician. 3. Bronchodilators will be administered via Metered Dose Inhaler (MDI) with spacer when the following criteria are met:  a. Alert and cooperative     b. HR < 140 bpm  c. RR < 30 bpm                d. Can demonstrate a 2-3 second inspiratory hold  4. Bronchodilators will be administered via Hand Held Nebulizer [facility]) to patients when ANY of the following criteria are met  a. Incognizant or uncooperative          b. Patients treated with HHN at Home        c. Unable to demonstrate proper use of MDI with spacer     d. RR > 30 bpm   5. Bronchodilators will be delivered via Metered Dose Inhaler (MDI), HHN, Aerogen to intubated patients on mechanical ventilation. 6. Inhalation medication orders will be delivered and/or substituted as outlined below. Aerosolized Medications Ordering and Administration Guidelines:    1. All Medications will be ordered by a physician, and their frequency and/or modality will be adjusted as defined by the patients Respiratory Severity Index (RSI) score. 2. If the patient does not have documented COPD, consider discontinuing anticholinergics when RSI is less than 9.  3. If the bronchospasm worsens (increased RSI), then the bronchodilator frequency can be increased to a maximum of every 4 hours. If greater than every 4 hours is required, the physician will be contacted.   4. If the bronchospasm improves, the frequency of the bronchodilator can be decreased, based on the patient's RSI, but not less than home treatment regimen frequency. 5. Bronchodilator(s) will be discontinued if patient has a RSI less than 9 and has received no scheduled or as needed treatment for 72  Hrs. Patients Ordered on a Mucolytic Agent:    1. Must always be administered with a bronchodilator. 2. Discontinue if patient experiences worsened bronchospasm, or secretions have lessened to the point that the patient is able to clear them with a cough. Anti-inflammatory and Combination Medications:    1. If the patient lacks prior history of lung disease, is not using inhaled anti-inflammatory medication at home, and lacks wheezing by examination or by history for at least 24 hours, contact physician for possible discontinuation.

## 2021-06-10 NOTE — PROGRESS NOTES
Patient admitted to room 321 from ER. Transferred to bed as a max assist. Oriented to room, call light, phone, TV, thermostat, bed controls, bathroom, emergency cord, white board, therapy times, unit routine, visiting hours,  and meal times. Patient verbalized understanding. Call light and personal items in reach, alarms active and in place.

## 2021-06-10 NOTE — CARE COORDINATION
phone and explained the role of the CM. Plans to return home. ADLS w/some assistance. Has HHA/Homemaker 3 days a week through [facility]. Unsure of [facility]. needs. CM follow andwill reassess.

## 2021-06-10 NOTE — PROGRESS NOTES
Patient resting in bed, AM assessment completed. Lungs decreased. BS+. INT intact. VSS. Patient states she is sore in her back and legs, not feeling well. No acute distress noted. Call light in reach. Will continue to monitor.

## 2021-06-10 NOTE — PROGRESS NOTES
Progress Note    Admit Date:  6/9/2021      Admitted with bradycardia and acute on chronic diastolic CHF. Heart rate was in the 40s on presentation. Beta-blocker held. Seen by cardiology    Subjective:  Ms. Paula Foley is better today. Heart rate is in the 70s. Feels weak, mildly short of breath. Has leg edema. . Oxygen saturations are stable on room air. Objective:   BP (!) 149/60   Pulse 72   Temp 97.9 °F (36.6 °C) (Oral)   Resp 18   Ht 5' (1.524 m)   Wt 196 lb 4.8 oz (89 kg)   LMP  (LMP Unknown)   SpO2 91%   BMI 38.34 kg/m²       Intake/Output Summary (Last 24 hours) at 6/10/2021 1053  Last data filed at 6/10/2021 0824  Gross per 24 hour   Intake 300 ml   Output 1500 ml   Net -1200 ml         Physical Exam:  General:  Awake, alert, NAD  Skin:  Warm and dry  Neck:  JVD absent. Neck supple  Chest:  Clear to auscultation, respiration easy. No wheezes, rales or rhonchi. Cardiovascular:  RRR ,S1S2 normal  Abdomen:  Soft, non tender, non distended, BS +  Extremities:   BLE 1 +  edema. Intact peripheral pulses.  Brisk cap refill, < 2 secs  Neuro: non focal      Medications:   Scheduled Meds:   [START ON 6/11/2021] furosemide  40 mg Oral Daily    spironolactone  25 mg Oral Daily    amLODIPine  5 mg Oral Daily    isosorbide mononitrate  30 mg Oral Daily    atorvastatin  10 mg Oral Daily    aspirin  81 mg Oral Daily    valsartan  40 mg Oral Daily    pantoprazole  40 mg Oral Daily    divalproex  250 mg Oral BID    donepezil  5 mg Oral Nightly    citalopram  30 mg Oral Daily    busPIRone  5 mg Oral Nightly    sodium chloride flush  5-40 mL Intravenous 2 times per day    enoxaparin  30 mg Subcutaneous Daily    gabapentin  100 mg Oral TID       Continuous Infusions:   sodium chloride         Data:  CBC:   Recent Labs     06/09/21  1539   WBC 5.9   RBC 4.05   HGB 11.6*   HCT 35.8*   MCV 88.2   RDW 14.2        BMP:   Recent Labs     06/09/21  1539      K 5.2*      CO2 30 BUN 20   CREATININE 1.5*     BNP: No results for input(s): BNP in the last 72 hours. PT/INR: No results for input(s): PROTIME, INR in the last 72 hours. APTT: No results for input(s): APTT in the last 72 hours. CARDIAC ENZYMES:   Recent Labs     06/09/21  1539   TROPONINI <0.01     FASTING LIPID PANEL:  Lab Results   Component Value Date    CHOL 123 05/22/2019    HDL 50 05/22/2021    TRIG 121 05/22/2019     LIVER PROFILE:   Recent Labs     06/09/21  1539   AST 11*   ALT 7*   BILITOT <0.2   ALKPHOS 78        CULTURES  COVID: Not detected     EKG:  I have reviewed the EKG with the following interpretation:   Sinus bradycardiaInferior infarct , age undetermined, Possible Anterior infarct , age undeterminedAbnormal      RADIOLOGY  XR CHEST PORTABLE   Final Result   Mild pulmonary vascular congestion. No overt edema or acute process. Echo 9/9/19  - Left ventricle: The cavity size was normal. Wall thickness was     increased in a pattern of mild LVH. Systolic function was normal.     The estimated ejection fraction was in the range of 55% to 60%.   Wall motion was normal; there were no regional wall motion     abnormalities. Doppler parameters are consistent with abnormal     left ventricular relaxation (grade 1 diastolic dysfunction). - Aortic valve: A bioprosthesis was present and functioning     normally. Trivial regurgitation (appears paravalvular). - Right ventricle: Systolic function was normal by objective     interpretation. TAPSE: 2.3cm.   - Pulmonary arteries: Systolic pressure could not be accurately     estimated. Rpt ECHO today       Assessment:  Active Problems:    Symptomatic bradycardia    History of transcatheter aortic valve replacement (TAVR)  Resolved Problems:    * No resolved hospital problems. *      Plan:    Symptomatic bradycardia  - admitted to PCU on telemetry  - cardiology consulted. - holding Lopressor. Continue to monitor in telemetry.   Heart rate was in the

## 2021-06-10 NOTE — PROGRESS NOTES
Physician Progress Note      Rah Calles  CSN #:                  [id number]  :                       1940  ADMIT DATE:       2021 3:16 PM  [address] DATE:  RESPONDING  PROVIDER #:        Stacy Fajardo MD          QUERY TEXT:    Pt admitted with acute on chronic diastolic CHF. Pt noted to also have htn,   cad, and h/o of aortic valve replacement. If possible, please document in   progress notes and discharge summary the etiology of CHF, if able to be   determined. The medical record reflects the following:  Risk Factors: advanced age, cad, htn, ckd, aortic valve replacement  Clinical Indicators: Admitted with bradycardia and acute on chronic diastolic   CHF, edema, [id number], chest: Mild pulmonary vascular congestion  Treatment: cardiology consult, echo-pending, IV lasix, low sodium diet    Thank you for your assistance,  Gill Gallo RN,BSN,CCDS,CRCR  Options provided:  -- CHF due to Hypertensive Heart Disease  -- CHF due to Hypertensive Heart Disease and CAD  -- CHF not due to Hypertension but due to CAD  -- CHF due to Hypertensive Heart Disease and Valvular Heart Disease  -- CHF not due to Hypertension but due to valvular heart disease  -- CHF due to HTN, CAD, and valvular disease  -- Other - I will add my own diagnosis  -- Disagree - Not applicable / Not valid  -- Disagree - Clinically unable to determine / Unknown  -- Refer to Clinical Documentation Reviewer    PROVIDER RESPONSE TEXT:    This patient has CHF due to hypertensive heart disease and CAD.     Query created by: Cynthia Arroyo on 6/10/2021 12:15 PM      Electronically signed by:  Stacy Fajardo MD 6/10/2021 1:23 PM

## 2021-06-11 VITALS
HEIGHT: 60 IN | TEMPERATURE: 97.1 F | SYSTOLIC BLOOD PRESSURE: 133 MMHG | BODY MASS INDEX: 38.01 KG/M2 | RESPIRATION RATE: 16 BRPM | WEIGHT: 193.6 LBS | DIASTOLIC BLOOD PRESSURE: 60 MMHG | HEART RATE: 63 BPM | OXYGEN SATURATION: 96 %

## 2021-06-11 LAB
ANION GAP SERPL CALCULATED.3IONS-SCNC: 10 MMOL/L (ref 3–16)
BUN BLDV-MCNC: 26 MG/DL (ref 7–20)
CALCIUM SERPL-MCNC: 8.6 MG/DL (ref 8.3–10.6)
CHLORIDE BLD-SCNC: 101 MMOL/L (ref 99–110)
CO2: 27 MMOL/L (ref 21–32)
CREAT SERPL-MCNC: 1.5 MG/DL (ref 0.6–1.2)
GFR AFRICAN AMERICAN: 40
GFR NON-AFRICAN AMERICAN: 33
GLUCOSE BLD-MCNC: 96 MG/DL (ref 70–99)
POTASSIUM REFLEX MAGNESIUM: 4.2 MMOL/L (ref 3.5–5.1)
SODIUM BLD-SCNC: 138 MMOL/L (ref 136–145)
TSH REFLEX FT4: 2.3 UIU/ML (ref 0.27–4.2)

## 2021-06-11 PROCEDURE — 6360000002 HC RX W HCPCS: Performed by: INTERNAL MEDICINE

## 2021-06-11 PROCEDURE — 6370000000 HC RX 637 (ALT 250 FOR IP): Performed by: INTERNAL MEDICINE

## 2021-06-11 PROCEDURE — 80048 BASIC METABOLIC PNL TOTAL CA: CPT

## 2021-06-11 PROCEDURE — 36415 COLL VENOUS BLD VENIPUNCTURE: CPT

## 2021-06-11 PROCEDURE — 99233 SBSQ HOSP IP/OBS HIGH 50: CPT | Performed by: INTERNAL MEDICINE

## 2021-06-11 PROCEDURE — 96376 TX/PRO/DX INJ SAME DRUG ADON: CPT

## 2021-06-11 PROCEDURE — G0378 HOSPITAL OBSERVATION PER HR: HCPCS

## 2021-06-11 PROCEDURE — 99239 HOSP IP/OBS DSCHRG MGMT >30: CPT | Performed by: INTERNAL MEDICINE

## 2021-06-11 PROCEDURE — 84443 ASSAY THYROID STIM HORMONE: CPT

## 2021-06-11 PROCEDURE — 96372 THER/PROPH/DIAG INJ SC/IM: CPT

## 2021-06-11 RX ORDER — AMLODIPINE BESYLATE 5 MG/1
10 TABLET ORAL DAILY
Status: DISCONTINUED | OUTPATIENT
Start: 2021-06-11 | End: 2021-06-11 | Stop reason: HOSPADM

## 2021-06-11 RX ORDER — FUROSEMIDE 10 MG/ML
40 INJECTION INTRAMUSCULAR; INTRAVENOUS ONCE
Status: COMPLETED | OUTPATIENT
Start: 2021-06-11 | End: 2021-06-11

## 2021-06-11 RX ORDER — SPIRONOLACTONE 25 MG/1
25 TABLET ORAL DAILY
Qty: 30 TABLET | Refills: 3 | Status: ON HOLD | OUTPATIENT
Start: 2021-06-12 | End: 2021-11-26

## 2021-06-11 RX ORDER — FUROSEMIDE 40 MG/1
40 TABLET ORAL DAILY
Qty: 60 TABLET | Refills: 3 | Status: SHIPPED | OUTPATIENT
Start: 2021-06-12 | End: 2022-08-26 | Stop reason: DRUGHIGH

## 2021-06-11 RX ORDER — AMLODIPINE BESYLATE 5 MG/1
10 TABLET ORAL DAILY
Status: DISCONTINUED | OUTPATIENT
Start: 2021-06-12 | End: 2021-06-11

## 2021-06-11 RX ADMIN — ENOXAPARIN SODIUM 30 MG: 100 INJECTION SUBCUTANEOUS at 09:05

## 2021-06-11 RX ADMIN — HYDROCODONE BITARTRATE AND ACETAMINOPHEN 1 TABLET: 10; 325 TABLET ORAL at 13:58

## 2021-06-11 RX ADMIN — FUROSEMIDE 40 MG: 40 TABLET ORAL at 09:02

## 2021-06-11 RX ADMIN — SPIRONOLACTONE 25 MG: 25 TABLET ORAL at 09:01

## 2021-06-11 RX ADMIN — HYDROCODONE BITARTRATE AND ACETAMINOPHEN 1 TABLET: 10; 325 TABLET ORAL at 09:15

## 2021-06-11 RX ADMIN — GABAPENTIN 100 MG: 100 CAPSULE ORAL at 09:01

## 2021-06-11 RX ADMIN — DIVALPROEX SODIUM 250 MG: 250 TABLET, DELAYED RELEASE ORAL at 09:02

## 2021-06-11 RX ADMIN — FUROSEMIDE 40 MG: 10 INJECTION, SOLUTION INTRAVENOUS at 09:14

## 2021-06-11 RX ADMIN — GABAPENTIN 100 MG: 100 CAPSULE ORAL at 13:58

## 2021-06-11 RX ADMIN — AMLODIPINE BESYLATE 10 MG: 5 TABLET ORAL at 09:14

## 2021-06-11 RX ADMIN — ISOSORBIDE MONONITRATE 30 MG: 30 TABLET ORAL at 09:01

## 2021-06-11 RX ADMIN — PANTOPRAZOLE SODIUM 40 MG: 40 TABLET, DELAYED RELEASE ORAL at 09:01

## 2021-06-11 RX ADMIN — CITALOPRAM HYDROBROMIDE 30 MG: 20 TABLET ORAL at 09:02

## 2021-06-11 RX ADMIN — ATORVASTATIN CALCIUM 10 MG: 10 TABLET, FILM COATED ORAL at 09:01

## 2021-06-11 RX ADMIN — VALSARTAN 40 MG: 80 TABLET, FILM COATED ORAL at 09:01

## 2021-06-11 RX ADMIN — ASPIRIN 81 MG: 81 TABLET, COATED ORAL at 09:02

## 2021-06-11 ASSESSMENT — PAIN SCALES - GENERAL
PAINLEVEL_OUTOF10: 8
PAINLEVEL_OUTOF10: 8

## 2021-06-11 NOTE — PROGRESS NOTES
[address] Daily Progress Note      Admit Date:  6/9/2021    Subjective:  Ms. Prem Atkinson is seen for cardiology follow up  She is feeling better today. Reason for Consultation/Chief Complaint: \"I have been having worsening sob, swelling in my feet and legs. \"     Consulted for bradycardia     History of Present Illness:  Osmany Schwab is a 79 y.o. patient who presented to the hospital with complaints of  Progressive swelling both legs over last two weeks. She has gained 11 lbs during last two weeks. She has shortness of breath more than usual.  She has no chest pain cough fever. she takes lasix but also has been told to drink a lots of water which she does. No change in diet or meds lately. She is frustrated and wants to have a medical device removed from her back which is for bladder. Multiple back surgeries and has chronic back pain, needing an MRI which she cannot have until medical device is removed from her back.     In the ED his EKG revealed Sinus bradycardiaInferior infarct , age undeterminedPossible Anterior infarct , age undetermined CXR Mild pulmonary vascular congestion.  No overt edema or acute process.   troponin neg  ROS:  12 point ROS negative in all areas as listed below except as in [place]  Constitutional, EENT,  pulmonary, GI, , Musculoskeletal, skin, neurological, hematological, endocrine, Psychiatric    Past Medical History:   Diagnosis Date    Arthritis     BCC (basal cell carcinoma of skin)     RT clavicle    Bladder infection     frequently    CAD (coronary artery disease)     stents    Cancer (HCC)     skin    CHF (congestive heart failure) (HCC)     Chronic back pain     COPD (chronic obstructive pulmonary disease) (HCC)     Depression     Depression     Hypercholesteremia     Hypertension     Pain in shoulder [id number]    pain in left shoulder, taking steroid injections for steroid    Reflux     Rheumatic fever     as a child    Sleep apnea     uses CPAP    or thrill. Abdomen:  Soft normal liver and spleen  Extremities:   1+ bilat leg edema,  No clubbing, cyanosis,  Pulses: Femoral and pedal pulses are normal.  Neuro: intact    Medications:    furosemide  40 mg Oral Daily    spironolactone  25 mg Oral Daily    amLODIPine  5 mg Oral Daily    isosorbide mononitrate  30 mg Oral Daily    atorvastatin  10 mg Oral Daily    aspirin  81 mg Oral Daily    valsartan  40 mg Oral Daily    pantoprazole  40 mg Oral Daily    divalproex  250 mg Oral BID    donepezil  5 mg Oral Nightly    citalopram  30 mg Oral Daily    busPIRone  5 mg Oral Nightly    sodium chloride flush  5-40 mL Intravenous 2 times per day    enoxaparin  30 mg Subcutaneous Daily    gabapentin  100 mg Oral TID      sodium chloride       HYDROcodone-acetaminophen, sodium chloride flush, sodium chloride, ondansetron **OR** ondansetron, polyethylene glycol, acetaminophen **OR** acetaminophen, albuterol-ipratropium    Lab Data:  CBC:   Recent Labs     06/09/21  1539   WBC 5.9   HGB 11.6*   HCT 35.8*   MCV 88.2        BMP:   Recent Labs     06/09/21  1539 06/11/21  0437    138   K 5.2* 4.2    101   CO2 30 27   BUN 20 26*   CREATININE 1.5* 1.5*     LIVER PROFILE:   Recent Labs     06/09/21  1539   AST 11*   ALT 7*   BILITOT <0.2   ALKPHOS 78     PT/INR: No results for input(s): PROTIME, INR in the last 72 hours. APTT: No results for input(s): APTT in the last 72 hours. BNP:  No results for input(s): BNP in the last 72 hours. IMAGING:      ECHO 6/10/21   Summary   Left ventricular systolic function is normal with ejection fraction   estimated at 55-60 %. No regional wall motion abnormalities are noted. There is moderate concentric left ventricular hypertrophy. Grade I diastolic dysfunction with normal filling pressure. Mild aortic regurgitation is present. It appears to be paravalvular, at 5 O   clock position in parasternal short axis view. .   The right atrium is mildly dilated. Mild tricuspid regurgitation. Normal systolic pulmonary artery pressure (SPAP) estimated at 39 mmHg (RA   pressure 15 mmHg). Mild pulmonic regurgitation present.  Echo 2019 55-60%, mild LVH, DD1, trivial AI probable paravalvular       EK21  I have reviewed EKG with the following interpretation:  Impression: Sinus bradycardiaInferior infarct , age undeterminedPossible Anterior infarct , age undeterminedAbnormal ECGWhen compared with ECG of 1.6.17 heart rate is slower. Confirmed by Jane Bravo MD, [address] () on 6/10/2021 7:51:52 AM     CXR 21  FINDINGS: Mild pulmonary vascular congestion.  No overt edema.  The lungs are without acute focal process.  There is no effusion or pneumothorax.  Postsurgical changes are seen in the region of the aortic valve.  The cardiomediastinal silhouette is without acute process. The osseous structures are without acute process. Postsurgical changes are again seen in the lower cervical spine. No significant change compared to prior. Mild pulmonary vascular congestion.  No overt edema or acute process.      ECHO 19  Study Conclusions     - Left ventricle: The cavity size was normal. Wall thickness was     increased in a pattern of mild LVH. Systolic function was normal.     The estimated ejection fraction was in the range of 55% to 60%.   Wall motion was normal; there were no regional wall motion     abnormalities. Doppler parameters are consistent with abnormal     left ventricular relaxation (grade 1 diastolic dysfunction). - Aortic valve: A bioprosthesis was present and functioning     normally. Trivial regurgitation (appears paravalvular). - Right ventricle: Systolic function was normal by objective     interpretation.  TAPSE: 2.3cm.   - Pulmonary arteries: Systolic pressure could not be accurately     estimated.      ECHO 4/24/15  Summary   Normal left ventricular systolic function, with estimated ejection fraction   of 55%.   Mild concentric

## 2021-06-11 NOTE — FLOWSHEET NOTE
06/11/21 1627   Encounter Summary   Services provided to: Patient and family together   Referral/Consult From: [facility] Children;Family members   Continue Visiting   (6/11 support and prayer)   Complexity of Encounter Low   Length of Encounter 15 minutes   Spiritual Assessment Completed Yes   Spiritual/Temple   Type Spiritual support   Assessment Calm; Hopeful   Intervention Prayer;Explored feelings, thoughts, concerns; Discussed illness/injury and it's impact; Discussed relationship with God   Outcome Expressed gratitude;Engaged in conversation; Shared life review; Hopeful

## 2021-06-11 NOTE — PROGRESS NOTES
Attempted to schedule follow up appt. With Dr. Garry Edwards for next week, advised that no appts are available within the next several weeks. Advised by office staff that a stat message would be sent to Dr. Garry Edwards to request an earlier appt. Patient has appointment scheduled for August 11th at 1:45 pm with Dr. Garry Edwards.

## 2021-06-11 NOTE — PROGRESS NOTES
Call out to cardiology to update on urine output and VS at this time following administration of IV Lasix. Dr. Michelle Hutchins has cleared patient to be discharged today, however, a follow up appointment needs to be made with Dr. Marisabel Clarke prior to discharge today. Message sent to Dr. Ascencion Hogan to update.

## 2021-06-11 NOTE — PROGRESS NOTES
Discharge instructions reviewed with patient and patient's daughter at bedside. Peripheral IV removed by patient, and patient removed self from telemetry monitor. Assisted patient to remove purewick and assisted with putting on personal clothing. Transport request made and patient belongings packed. Patient discharged in stable condition home with daughter to transport at this time.

## 2021-06-11 NOTE — PROGRESS NOTES
Spoke with Dr. Lilliam Tran regarding discharge plans, verbal order received to notify Dr. Tommy Perez before 1500 to review effectiveness of 1 x Lasix IV 40 mg.

## 2021-06-11 NOTE — CARE COORDINATION
INTERDISCIPLINARY PLAN OF CARE CONFERENCE    Date/Time: 2021 2:45 PM  Completed by: Nisha Medley RN, Case Management      Patient Name:  Victorina Mayberry  YOB: 1940  Admitting Diagnosis: Bradycardia [R00.1]     Admit Date/Time:  2021  3:16 PM    Chart reviewed. Interdisciplinary team contacted or reviewed plan related to patient progress and discharge plans. Disciplines included Case Management, Nursing, and Dietitian. Current Status:ongoing  PT/OT recommendation for discharge plan of care: n/a    Expected D/C Disposition:  Home  Confirmed plan with patient and/or family Yes confirmed with: (name) pt  Met with:pt  Discharge Plan Comments: Reviewed chart. Role of discharge planner explained and patient verbalized understanding. Pt is from 1st floor apt alone and plans to return. Pt has a [employer] [facility] 3 days a week. Pt is not sure if she wants [address] at this time. Will follow. Home O2 in place on admit: No  Pt informed of need to bring portable home O2 tank on day of discharge for nursing to connect prior to leaving:  Not Indicated  Verbalized agreement/Understanding:  Not Indicated    DISCHARGE ORDER  Date/Time 2021 3:18 PM  Completed by: Nisha Medley RN, Case Management    Patient Name: Victorina Mayberry      : 1940  Admitting Diagnosis: Bradycardia [R00.1]      Admit order Date and Status:2021 nipt  (verify MD's last order for status of admission)      Noted discharge order. If applicable PT/OT recommendation at Discharge: n/a  DME recommendation by PT/OT:n/a  Confirmed discharge plan  (pt): Yes  with whom________pt_______  If pt confirmed DC plan does family need to be contacted by CM Yes if yes who__daughter, Sonya____  Discharge Plan: Reviewed chart. Role of discharge planner explained and patient and daughter, Tom Hilliard, verbalized understanding. Discharge order is noted. Pt is being d/c'd to home today, as Sonya is at her bedside. Pt's O2 sats are 96% on RA.  Pt is active with [facility] with a HHA 3 days a week. Pt declines HHC outside of [employer]. CM called [employer] ([phone]) and LM via  for Leda Cardoza to let her know pt is discharging to home today and needs ALEXANDER with HHA. Faxed KINA/AVS to [phone], ATTN: Leda Cardoza. No further discharge needs needed or noted. Reviewed chart. Role of discharge planner explained and patient verbalized understanding. Discharge order is noted. Has Home O2 in place on admit:  No  Informed of need to bring portable home O2 tank on day of discharge for nursing to connect prior to leaving:   Not Indicated  Verbalized agreement/Understanding:   Not Indicated    Discharge timeout done with Qi Larkin RN. All discharge needs and concerns addressed.

## 2021-06-14 ENCOUNTER — CARE COORDINATION (OUTPATIENT)
Dept: CASE MANAGEMENT | Age: 81
End: 2021-06-14

## 2021-06-14 RX ORDER — POLYETHYLENE GLYCOL 3350 17 G/17G
17 POWDER, FOR SOLUTION ORAL DAILY
COMMUNITY
End: 2021-12-08

## 2021-06-14 RX ORDER — WHEAT DEXTRIN 3 G/3.8 G
4 POWDER (GRAM) ORAL DAILY
COMMUNITY
End: 2022-04-12 | Stop reason: ALTCHOICE

## 2021-06-14 NOTE — CARE COORDINATION
[facility] Transitions Initial Follow Up Call    Call within 2 business days of discharge: Yes    Patient: Luiz Soulier Patient : 1940   MRN: [id number]  Reason for Admission: symptomatic bradycardia, CAD, cp, acaute on chronic dCHF, h/o aortic valve replacement, COPD (no AE), HTN, HLD, sleep apnea not on CPAP, GERD, CKD3, dementia, chronic back pain   Discharge Date: 21 RARS: Readmission Risk Score: 22      Last Discharge [address] [address]       Complaint Diagnosis Description Type Department Provider    21 Shortness of Breath; Dizziness Symptomatic bradycardia . .. ED to Hosp-Admission (Discharged) (ADMITTED) [address]  PCU Caitlin Paris MD; Lakshmi Bravo,... Spoke with: Luiz Soulier (patient)    Facility: [facility]    Non-face-to-face services provided:  Scheduled appointment with PCP-see below  Obtained and reviewed discharge summary and/or continuity of care documents  Education of patient/family/caregiver/guardian to support self-management-CHF education  Assessment and support for treatment adherence and medication management-1111F completed    Was this an external facility discharge? No Discharge Facility: NA    Challenges to be reviewed by the provider   Additional needs identified to be addressed with provider Yes  appt on Wed       Method of communication with provider : chart routing    Advance Care Planning:   Does patient have an Advance Directive:  decision maker updated. Was this a readmission? No  Patient stated reason for admission: SOB, dizziness, lightheaded  Patients top risk factors for readmission: functional physical ability and medical condition-     Care Transition Nurse (CTN) contacted the patient by telephone to perform post hospital discharge assessment. Verified name and  with patient as identifiers. Provided introduction to self, and explanation of the CTN role.      CTN reviewed discharge instructions, medical action plan and red flags with patient who verbalized understanding. Patient given an opportunity to ask questions and does not have any further questions or concerns at this time. Were discharge instructions available to patient? Yes. Reviewed appropriate site of care based on symptoms and resources available to patient including: PCP, Specialist and CTN. The patient agrees to contact the PCP office for questions related to their healthcare. Medication reconciliation was performed with patient, who verbalizes understanding of administration of home medications. COVID Risk Education    HLXXE-19 Not Detected on 6/9/2021. Patient completed Pfizer 2-step COVID vaccination on 2/13/2021. Patient was given an opportunity to verbalize any questions and concerns and agrees to contact CTN or health care provider for questions related to their healthcare. Was patient discharged with a pulse oximeter? No     States she has \"been down all weekend\" with weakness and sleeping. She has been monitoring her VS daily which were reviewed at this time:     6/12 97/75 P62 (scale needed batteries)  6/13 129/84 P80 189.8#  6/14 123/72 P82 182# (states all she does is urinate and not eating well - she does have plenty of food in the home)    Full med review. She manages her own medications but could not find her gabapentin during med review. She will look for it. Her cardiologist is Dr [facility]abel [facility] at [facility]. She has appt on 8/11/2021. States it was supposed to be in July but he did not have availability and she does not want to see anyone else in the practice because he was the one who did her aortic valve replacement. CHF education:  -weigh daily in the morning at the same time and in the same clothing  -report weight gain of >3#/day or >5#/week  -report increased SOB, edema, abd fullness, difficulty lying flat  -report palpitations, cough, difficulty urinating  -eat a low sodium diet and limit fluid intake to 64 ounces daily    States appetite is poor.  She does not like nutrition shakes like Ensure or Boost. States she has plenty of food in the home, but no appetite to eat it. She states fluid restrictions without asking. She takes Miralax with 8 ounces of water and benefiber with 8 ounces of an orange drink. She also tries to drink two 16 ounces of diet flavored water. Her HHA was already there today and will return Wed and Fri. Her ankle edema is improved but she was still unable to wear her regular shoes. Encouraged elevation LE at rest. She has been sleeping in her recliner. She still has some SOB with exertion and her legs hurt at that time too. She does not wear oxygen nor does she have a pulse oximeter at home. [facility]. States she cannot stand up very long because of her chronic back pain. Encouraged her to offer herself snacks every couple hours today as her body needs protein and calories. She will try to do this. An appointment was made with PCP for Wednesday. States she will be able to get there. Instructed her if she does not feel well tomorrow or if she feels worse to call PCP office or CTN at 0900 for same day appt. She took CTN contact info and repeated the information with understanding. CTN will follow up with her after she sees PCP but she was encouraged to call prior for assistance or concerns.      Care Transitions 24 Hour Call    Schedule Follow Up Appointment with PCP: Completed  Do you have any ongoing symptoms?: Yes  Patient-reported symptoms: Weakness  Interventions for patient-reported symptoms: Notified PCP/Physician  Do you have a copy of your discharge instructions?: Yes  Do you have all of your prescriptions and are they filled?: Yes  Have you been contacted by a [facility] Pharmacist?: No  Have you scheduled your follow up appointment?: Yes  How are you going to get to your appointment?: Car - drive self  Were you discharged with any Home Care or Post Acute Services: Yes  Post Acute Services:  Outpatient/Community Services (Comment: [facility] 3x week MWF)  Do you feel like you have everything you need to keep you well at home?: Yes  Care Transitions Interventions     Other Therapy Services: Declined      Other Services: Declined          Follow Up  Future Appointments   Date Time Provider Karla Sims   6/16/2021 12:00 PM KALI Anne - CNP [address] [id number]       Darcy Carbajal RN

## 2021-06-14 NOTE — TELEPHONE ENCOUNTER
[id number] Antonina Sheikh, will follow up with patient at upcoming appointment.   I would recommend patient be placed on a cancellation list with her cardiologist in case an appointment would become available prior to August.

## 2021-06-15 ENCOUNTER — CARE COORDINATION (OUTPATIENT)
Dept: CASE MANAGEMENT | Age: 81
End: 2021-06-15

## 2021-06-15 NOTE — CARE COORDINATION
CTN update:     Outreach to Dr Ana Tellez office to see if patient could be added to a cancellation list as she prefers to see this provider only. CTN notified by their office that patient has appt with [facility] Cardiology NP (associate of Dr Ana Tellez her primary cardiologist) on 6/16/2021 at 0830.      Trudy Sanders, RN  Care Transition Nurse  [phone] mobile    Future Appointments   Date Time Provider Carla Sims   6/16/2021 12:00 PM KALI Larry - CNP [facility]

## 2021-06-16 ENCOUNTER — OFFICE VISIT (OUTPATIENT)
Dept: FAMILY MEDICINE CLINIC | Age: 81
End: 2021-06-16
Payer: MEDICARE

## 2021-06-16 VITALS
WEIGHT: 189.25 LBS | OXYGEN SATURATION: 96 % | DIASTOLIC BLOOD PRESSURE: 71 MMHG | TEMPERATURE: 97.7 F | SYSTOLIC BLOOD PRESSURE: 135 MMHG | BODY MASS INDEX: 36.96 KG/M2 | HEART RATE: 76 BPM

## 2021-06-16 DIAGNOSIS — R00.1 SYMPTOMATIC BRADYCARDIA: ICD-10-CM

## 2021-06-16 DIAGNOSIS — N18.9 CHRONIC KIDNEY DISEASE, UNSPECIFIED CKD STAGE: Primary | ICD-10-CM

## 2021-06-16 DIAGNOSIS — I50.9 ACUTE ON CHRONIC CONGESTIVE HEART FAILURE, UNSPECIFIED HEART FAILURE TYPE (HCC): ICD-10-CM

## 2021-06-16 DIAGNOSIS — J30.9 ALLERGIC RHINITIS, UNSPECIFIED SEASONALITY, UNSPECIFIED TRIGGER: ICD-10-CM

## 2021-06-16 PROCEDURE — 99495 TRANSJ CARE MGMT MOD F2F 14D: CPT | Performed by: NURSE PRACTITIONER

## 2021-06-16 PROCEDURE — 1111F DSCHRG MED/CURRENT MED MERGE: CPT | Performed by: NURSE PRACTITIONER

## 2021-06-16 PROCEDURE — 36415 COLL VENOUS BLD VENIPUNCTURE: CPT | Performed by: NURSE PRACTITIONER

## 2021-06-16 RX ORDER — CYCLOSPORINE 0.5 MG/ML
1 EMULSION OPHTHALMIC PRN
Qty: 1 VIAL | Refills: 3 | Status: ON HOLD | OUTPATIENT
Start: 2021-06-16 | End: 2022-09-18

## 2021-06-16 RX ORDER — AZELASTINE 1 MG/ML
1 SPRAY, METERED NASAL 2 TIMES DAILY
Qty: 2 BOTTLE | Refills: 1 | Status: ON HOLD | OUTPATIENT
Start: 2021-06-16 | End: 2022-09-18

## 2021-06-16 ASSESSMENT — ENCOUNTER SYMPTOMS
VOMITING: 0
CHEST TIGHTNESS: 0
RHINORRHEA: 0
DIARRHEA: 0
BACK PAIN: 1
NAUSEA: 0
SORE THROAT: 0
COUGH: 0
ABDOMINAL PAIN: 0
SHORTNESS OF BREATH: 1

## 2021-06-16 NOTE — PATIENT INSTRUCTIONS
Please read the healthy family handout that you were given and share it with your family. Please compare this printed medication list with your medications at home to be sure they are the same. If you have any medications that are different please contact us immediately at [phone]. Also review your allergies that we have listed, these may also include medications that you have not been able to tolerate, make sure everything listed is correct. If you have any allergies that are different please contact us immediately at [phone].

## 2021-06-16 NOTE — PROGRESS NOTES
Post-Discharge Transitional Care Management Services or Hospital Follow Up      Joselin Uribe   YOB: 1940    Date of Office Visit:  6/16/2021  Date of Hospital Admission: 6/9/21  Date of Hospital Discharge: 6/11/21  Readmission Risk Score(high >=14%. Medium >=10%):Readmission Risk Score: 22      Care management risk score Rising risk (score 2-5) and Complex Care (Scores >=6): 9     Non face to face  following discharge, date last encounter closed (first attempt may have been earlier): 6/14/2021 11:17 AM 6/14/2021 11:17 AM    Call initiated 2 business days of discharge: Yes     Patient Active Problem List   Diagnosis    HTN (hypertension)    Hyperlipidemia    CAD (coronary artery disease)    Hypokalemia    Obesity (BMI 30-39. 9)    Anxiety    Primary insomnia    Epigastric pain    Chronic tension-type headache, intractable    MARIA C (obstructive sleep apnea)    Painful total [facility] replacement, initial encounter ([facility])    Tremor, essential    TIA (transient ischemic attack)    Alzheimer's dementia without behavioral disturbance ([facility])    Symptomatic bradycardia    Acute on chronic congestive heart failure ([facility])    Chronic kidney disease    History of transcatheter aortic valve replacement (TAVR)       Allergies   Allergen Reactions    Latex     Levofloxacin Other (See Comments)     Burns mouth per patient    Azithromycin     Codeine Nausea Only    Keflex [Cephalexin]     Morphine     Pentazocine Lactate     Sulfa Antibiotics Hives    Tape [Adhesive Tape]      No bandaids       Medications listed as ordered at the time of discharge from hospital   Dashawn COOLEY   Home Medication Instructions TIM:    Printed on:06/16/21 1221   Medication Information                      albuterol-ipratropium (COMBIVENT RESPIMAT)  MCG/ACT AERS inhaler  Inhale 1 puff into the lungs every 6 hours             amLODIPine (NORVASC) 10 MG tablet  TAKE (1) TABLET DAILY             aspirin (ASPIRIN 81) 81 MG EC tablet  Take 81 mg by mouth daily             azelastine (ASTELIN) 0.1 % nasal spray  1 spray by Nasal route 2 times daily Use in each nostril as directed             baclofen (LIORESAL) 10 MG tablet  TAKE 1 TABLET EVERY 8 HOURS AS NEEDED             busPIRone (BUSPAR) 5 MG tablet  TAKE 1 OR 2 TABLET(S) IN THE EVENING AS NEEDED FOR ANXIETY             butalbital-acetaminophen-caffeine (FIORICET, ESGIC) -40 MG per tablet  TAKE 1 TABLET EVERY 6 HOURS AS NEEDED FOR HEADACHES LIMIT TO 3 DAYS PER WEEK             candesartan (ATACAND) 8 MG tablet  Take 8 mg by mouth daily             cetirizine (ZYRTEC) 10 MG tablet  TAKE 1 TABLET BY MOUTH DAILY             citalopram (CELEXA) 20 MG tablet  Take 1.5 tablets by mouth daily             cycloSPORINE (RESTASIS) 0.05 % ophthalmic emulsion  Place 1 drop into both eyes as needed (dry eyes)             diphenhydrAMINE (BANOPHEN) 25 MG tablet  Take 0.5 tablets by mouth every 12 hours             divalproex (DEPAKOTE) 250 MG DR tablet  Take 250 mg by mouth 2 times daily              donepezil (ARICEPT) 10 MG tablet  TAKE 1 TABLET IN THE EVENING             furosemide (LASIX) 40 MG tablet  Take 1 tablet by mouth daily             gabapentin (NEURONTIN) 100 MG capsule  Take 100 mg by mouth 3 times daily. HYDROcodone-acetaminophen (NORCO)  MG per tablet  Take 1 tablet by mouth every 6 hours as needed. isosorbide mononitrate (IMDUR) 30 MG extended release tablet  Take 30 mg by mouth daily             KLOR-CON SPRINKLE 10 MEQ extended release capsule  TAKE 2 CAPSULES IN THE   MORNING AND 2 CAPSULES IN  THE EVENING             lidocaine (XYLOCAINE) 5 % ointment  Apply topically as needed. Misc. Devices (ROLLER WALKER) MISC  1 each by Does not apply route daily             NITROSTAT 0.4 MG SL tablet  USE 1 TABLET UNDER TONGUE AS NEEDED FOR CHEST PAIN MAY REPEAT EVERY 5MIN. UP TO 3.  THEN GO TO ERAlexandro Maloney 198626 UNIT/GM powder  APPLY 3 TIMES DAILY             pantoprazole (PROTONIX) 40 MG tablet  Take 1 tablet by mouth daily             polyethylene glycol (GLYCOLAX) 17 GM/SCOOP powder  Take 17 g by mouth daily             simvastatin (ZOCOR) 40 MG tablet  TAKE ONE TABLET NIGHTLY. spironolactone (ALDACTONE) 25 MG tablet  Take 1 tablet by mouth daily             traZODone (DESYREL) 100 MG tablet  TAKE 1 OR 2 TABLETS AT BEDTIME             Wheat Dextrin (BENEFIBER) POWD  Take 4 g by mouth daily                   Medications marked \"taking\" at this time  Outpatient Medications Marked as Taking for the 6/16/21 encounter (Office Visit) with KALI Christiansen CNP   Medication Sig Dispense Refill    azelastine (ASTELIN) 0.1 % nasal spray 1 spray by Nasal route 2 times daily Use in each nostril as directed 2 Bottle 1    cycloSPORINE (RESTASIS) 0.05 % ophthalmic emulsion Place 1 drop into both eyes as needed (dry eyes) 1 vial 3    polyethylene glycol (GLYCOLAX) 17 GM/SCOOP powder Take 17 g by mouth daily      Wheat Dextrin (BENEFIBER) POWD Take 4 g by mouth daily      spironolactone (ALDACTONE) 25 MG tablet Take 1 tablet by mouth daily 30 tablet 3    furosemide (LASIX) 40 MG tablet Take 1 tablet by mouth daily 60 tablet 3    gabapentin (NEURONTIN) 100 MG capsule Take 100 mg by mouth 3 times daily.       traZODone (DESYREL) 100 MG tablet TAKE 1 OR 2 TABLETS AT BEDTIME 60 tablet 3    busPIRone (BUSPAR) 5 MG tablet TAKE 1 OR 2 TABLET(S) IN THE EVENING AS NEEDED FOR ANXIETY 60 tablet 4    citalopram (CELEXA) 20 MG tablet Take 1.5 tablets by mouth daily 45 tablet 0    cetirizine (ZYRTEC) 10 MG tablet TAKE 1 TABLET BY MOUTH DAILY 30 tablet 3    NYAMYC 443585 UNIT/GM powder APPLY 3 TIMES DAILY 60 g 1    donepezil (ARICEPT) 10 MG tablet TAKE 1 TABLET IN THE EVENING 30 tablet 11    divalproex (DEPAKOTE) 250 MG DR tablet Take 250 mg by mouth 2 times daily       diphenhydrAMINE (BANOPHEN) 25 MG tablet Take 0.5 tablets by mouth every 12 hours (Patient taking differently: Take 12.5 mg by mouth every 8 hours as needed ) 45 tablet 0    pantoprazole (PROTONIX) 40 MG tablet Take 1 tablet by mouth daily 90 tablet 0    simvastatin (ZOCOR) 40 MG tablet TAKE ONE TABLET NIGHTLY.  aspirin (ASPIRIN 81) 81 MG EC tablet Take 81 mg by mouth daily      candesartan (ATACAND) 8 MG tablet Take 8 mg by mouth daily      HYDROcodone-acetaminophen (NORCO)  MG per tablet Take 1 tablet by mouth every 6 hours as needed.  baclofen (LIORESAL) 10 MG tablet TAKE 1 TABLET EVERY 8 HOURS AS NEEDED (Patient taking differently: Take 10 mg by mouth daily ) 30 tablet 0    butalbital-acetaminophen-caffeine (FIORICET, ESGIC) -40 MG per tablet TAKE 1 TABLET EVERY 6 HOURS AS NEEDED FOR HEADACHES LIMIT TO 3 DAYS PER WEEK 30 tablet 0    Misc. Devices (ROLLER [place]) MISC 1 each by Does not apply route daily 1 each 0    isosorbide mononitrate (IMDUR) 30 MG extended release tablet Take 30 mg by mouth daily      albuterol-ipratropium (COMBIVENT RESPIMAT)  MCG/ACT AERS inhaler Inhale 1 puff into the lungs every 6 hours 1 Inhaler 5    KLOR-CON SPRINKLE 10 MEQ extended release capsule TAKE 2 CAPSULES IN THE   MORNING AND 2 CAPSULES IN  THE EVENING 120 capsule 3    amLODIPine (NORVASC) 10 MG tablet TAKE (1) TABLET DAILY 30 tablet 5    NITROSTAT 0.4 MG SL tablet USE 1 TABLET UNDER TONGUE AS NEEDED FOR CHEST PAIN MAY REPEAT EVERY 5MIN. UP TO 3. THEN GO TO ER. 25 tablet 3    lidocaine (XYLOCAINE) 5 % ointment Apply topically as needed. 1 Tube 3        Medications patient taking as of now reconciled against medications ordered at time of hospital discharge: Yes    Chief Complaint   Patient presents with   [address] from Hospital       HPI    Inpatient course: Discharge summary reviewed- see chart. Interval history/Current status: Stable. Patient presents to the office today for hospital follow-up.   Patient was recently 06/16/21 135/71   06/11/21 133/60   05/21/21 117/66       Physical Exam  Constitutional:       Appearance: Normal appearance. She is not ill-appearing. HENT:      Head: Normocephalic and atraumatic. Right Ear: Tympanic membrane normal.      Left Ear: Tympanic membrane normal.      Nose: Nose normal.      Mouth/Throat:      Mouth: Mucous membranes are moist.      Pharynx: Oropharynx is clear. Eyes:      Extraocular Movements: Extraocular movements intact. Conjunctiva/sclera: Conjunctivae normal.      Pupils: Pupils are equal, round, and reactive to light. Cardiovascular:      Rate and Rhythm: Normal rate. Pulses: Normal pulses. Pulmonary:      Effort: Pulmonary effort is normal.      Breath sounds: Normal breath sounds. Abdominal:      Palpations: Abdomen is soft. Tenderness: There is no right CVA tenderness, left CVA tenderness or guarding. Musculoskeletal:      Cervical back: Normal range of motion and neck supple. Right lower leg: No edema. Left lower leg: No edema. Comments:  limited mobility due to ongoing chronic back pain. Use of cane and [place] for ambulatory needs   Skin:     General: Skin is warm and dry. Capillary Refill: Capillary refill takes 2 to 3 seconds. Neurological:      Mental Status: She is alert and oriented to person, place, and time. Psychiatric:         Mood and Affect: Mood normal.         Behavior: Behavior normal.       Assessment/Plan:  1. Chronic kidney disease, unspecified CKD stage  Stable. Creatinine 1.5, BUN 26, GFR 33 prior to hospital discharge. Labs ordered today and pending. Continue to monitor and follow-up for any new or worsening symptoms.  - BASIC METABOLIC PANEL  - MA DISCHARGE MEDS RECONCILED W/ CURRENT OUTPATIENT MED LIST    2. Acute on chronic congestive heart failure, unspecified heart failure type ([facility] [address]  Stable.   Patient denies any new or worsening dyspnea upon exertion, chest pain, dizziness or lightheadedness. Patient reports taking Lasix and candesartan as described. Patient reports weighing herself every morning. No increase in weight since hospital discharge. Patient reports monitoring her fluid intake as well as salt in her diet. Continue with current treatment management follow-up with cardiology as recommended. - [place] DISCHARGE MEDS RECONCILED W/ CURRENT OUTPATIENT MED LIST    3. Symptomatic bradycardia  Stable. Asymptomatic at this time. Patient was previously on Lopressor twice a day. Since hospitalization that has been discontinued. Patient does see cardiology on a regular basis. Continue with current holding of Lopressor and follow-up with cardiology as recommended. Patient verbalized and acknowledged with plan of care at this time. - [place] DISCHARGE MEDS RECONCILED W/ CURRENT OUTPATIENT MED LIST    4. Allergic rhinitis, unspecified seasonality, unspecified trigger  Stable. Patient reports that her allergies have been uncontrolled recently. Patient reports sneezing, watery eyes, cough and rhinorrhea. Patient currently on Zyrtec. Patient reports trying other allergy medications with no relief. We did discuss avoiding triggers, continue with Zyrtec, use of local honey, and nasal spray. Patient verbalized and acknowledges. Patient will follow-up for any new or worsening symptoms. - azelastine (ASTELIN) 0.1 % nasal spray; 1 spray by Nasal route 2 times daily Use in each nostril as directed  Dispense: 2 Bottle; Refill: 1        Medical Decision Making: moderate complexity    Cardiology appointment was made while patient was in the office today. Patient will be seeing 's nurse practitioner on July 7 @ 11am.  Patient made aware.

## 2021-06-17 LAB
ANION GAP SERPL CALCULATED.3IONS-SCNC: 15 MMOL/L (ref 3–16)
BUN BLDV-MCNC: 23 MG/DL (ref 7–20)
CALCIUM SERPL-MCNC: 9.6 MG/DL (ref 8.3–10.6)
CHLORIDE BLD-SCNC: 103 MMOL/L (ref 99–110)
CO2: 25 MMOL/L (ref 21–32)
CREAT SERPL-MCNC: 1.5 MG/DL (ref 0.6–1.2)
GFR AFRICAN AMERICAN: 40
GFR NON-AFRICAN AMERICAN: 33
GLUCOSE BLD-MCNC: 114 MG/DL (ref 70–99)
POTASSIUM SERPL-SCNC: 5.1 MMOL/L (ref 3.5–5.1)
SODIUM BLD-SCNC: 143 MMOL/L (ref 136–145)

## 2021-06-18 ENCOUNTER — CARE COORDINATION (OUTPATIENT)
Dept: CASE MANAGEMENT | Age: 81
End: 2021-06-18

## 2021-06-18 NOTE — CARE COORDINATION
[facility] Transitions Follow Up Call    2021    Patient: Slade Mccann  Patient : 1940   MRN: [id number]  Reason for Admission: symptomatic bradycardia, CAD, cp, acaute on chronic dCHF, h/o aortic valve replacement, COPD (no AE), HTN, HLD, sleep apnea not on CPAP, GERD, CKD3, dementia, chronic back pain   Discharge Date: 21 RARS: Readmission Risk Score: 22       Spoke with: Slade Mccann (patient)    Reports \"not feeling the best\". She is having back, neck and knee pain and her feet are \"burning like fire and stinging\". BLE edema improved. Denies redness, weeping, red lines, open areas. She feels it is from having the increased swelling during hospitalization. Encouraged elevation above level of heart at rest. She does not wear compression stockings because the pair she has does not fit. States she drove to her PCP appt on Wednesday as it is only 1/4 mile from her home. Today's weight 186#   Tuesday 188.6#  Thursday 188.2#    She thinks the reported 182# on Monday was a mistake. She denies SOB, cough, fever, chills. States she isn't eating much but did start taking vitamins again. She has appt with cardiology on 2021 per TCM note. States her HHA through [employer] helps with whatever she needs and she declines therapy and nursing despite having c/o weakness. States her daughter is over 1-2 times daily and as needed. She denies needs going into the weekend. Care Transitions Subsequent and Final Call    Subsequent and Final Calls  Do you have any ongoing symptoms?: Yes  Onset of Patient-reported symptoms: Other  Patient-reported symptoms: Other  Interventions for patient-reported symptoms: Other  Are you currently active with any services?: Outpatient/Community Services  Care Transitions Interventions     Other Therapy Services: Declined      Other Services: Declined   Other Interventions:            Follow Up  Future Appointments   Date Time Provider Carla Sims 9/23/2021 10:40 AM KALI Jain - CNP [facility] Abbie Holland RN

## 2021-06-23 ENCOUNTER — CARE COORDINATION (OUTPATIENT)
Dept: CASE MANAGEMENT | Age: 81
End: 2021-06-23

## 2021-06-23 NOTE — CARE COORDINATION
[facility] Transitions Follow Up Call    2021    Patient: Ashanti Rascon  Patient : 1940   MRN: [id number]  Reason for Admission: symptomatic bradycardia, CAD, cp, acaute on chronic dCHF, h/o aortic valve replacement, COPD (no AE), HTN, HLD, sleep apnea not on CPAP, GERD, CKD3, dementia, chronic back pain   Discharge Date: 21 RARS: Readmission Risk Score: 22         Spoke with: Ashanti Rascon (patient)    Reports feeling okay this week. Today's weight 183#    Denies SOB, fever, n/v/d. States she has trembling, shaking. Appetite is fair. LE \"look pretty good\". Still has HHA support in the mornings. Declines skilled HC. States she has appt to get unit out of her back on  at [facility]. Her DIL will take her. She has cardiologist appt on . She denies needs today. She has [facility]N contact info and was encouraged to call CTN or PCP for concerns/change in condition. Care Transitions Subsequent and Final Call    Schedule Follow Up Appointment with PCP: Completed  Subsequent and Final Calls  Do you have any ongoing symptoms?: No  Have your medications changed?: No  Do you have any questions related to your medications?: No  Do you currently have any active services?: Yes  Are you currently active with any services?: Outpatient/Community Services  Do you have any needs or concerns that I can assist you with?: No  Identified Barriers: Impairment  Care Transitions Interventions     Other Therapy Services: Declined      Other Services: Declined   Other Interventions:            Follow Up  Future Appointments   Date Time Provider Carla Sims   2021 10:40 AM KALI Meraz - HENRIETTA Walker RN

## 2021-06-24 RX ORDER — CITALOPRAM 20 MG/1
30 TABLET ORAL DAILY
Qty: 45 TABLET | Refills: 5 | Status: SHIPPED | OUTPATIENT
Start: 2021-06-24 | End: 2021-12-28

## 2021-06-24 NOTE — TELEPHONE ENCOUNTER
Refilled medication per verbal order from provider.   Future appt scheduled 09/23/2021  Last appt 06/16/2021

## 2021-06-29 NOTE — DISCHARGE SUMMARY
Hospital Medicine Discharge Summary    Patient ID: Benedicto Richter      Patient's PCP: Armen Barrientos, APRN - CNP    Admit Date: 6/9/2021     Discharge Date: 6/11/2021      Admitting Physician: Dianna Sandhu MD     Discharge Physician: Yara Núñez MD     Discharge Diagnoses: Active Hospital Problems    Diagnosis     History of transcatheter aortic valve replacement (TAVR) [Z95.2]     Symptomatic bradycardia [R00.1]        The patient was seen and examined on day of discharge and this discharge summary is in conjunction with any daily progress note from day of discharge. Hospital Course: The patient is a 79 y.o. female with CAD, CHF, COPD, chronic back pain, depression, hyperlipidemia, hyperlipidemia, sleep apnea not on CPAP, h/o Aortic valve replacement and GERD who presents to [facility] with c/o shortness of breath. Ongoing for the last week, week and a half. Associated symptoms are BLE/BUE swelling, dizziness/light-headedness. She reports having some chest pain. She has been feeling more fatigued and short of breath. Her legs and hands have been swelling. She is bradycardic. Labs with K 5.2, Creatinine 1.5. TSH 0.25. CXR with mild pulmonary vascular congestion. Admitted to PCU on telemetry. Cardiology consulted. Symptomatic bradycardia  - admitted to PCU on telemetry  - cardiology consulted. - holding Lopressor.   - Continue to monitor in telemetry. Heart rate was in the 40s on presentation, currently stable in the 70s.    CAD  Chest pain   - on aspirin, Imdur, statin.  Holding BB  - cardiology consulted     Acute on chronic diastolic CHF  H/o Aortic valve replacement. - continue home dose of lasix.-1 dose of IV Lasix. Aldactone added. - holding Lopressor.   - Also on Candesartan.   Continue  Repeat echocardiogram reviewed     COPD  - stable. No AE  - continue Combivent.      Hypertension  - BP stable.  Continue Losartan, Amlodipine, Imdur.   Holding BB     Hyperlipidemia  - on Statin.      Sleep apnea  - not on CPAP     GERD  - on PPI.      CKD stage 3  - stable. Monitor labs.       Dementia  - supportive care measures  - continue Aricept.      Chronic back pain  -Continue [place] as at home       Physical Exam Performed:     /60   Pulse 63   Temp 97.1 °F (36.2 °C) (Oral)   Resp 16   Ht 5' (1.524 m)   Wt 193 lb 9.6 oz (87.8 kg)   LMP  (LMP Unknown)   SpO2 96%   BMI 37.81 kg/m²     General:  Awake, alert, NAD  Skin:  Warm and dry  Neck:  JVD absent. Neck supple  Chest:  Clear to auscultation, respiration easy. No wheezes, rales or rhonchi. Cardiovascular:  RRR ,S1S2 normal  Abdomen:  Soft, non tender, non distended, BS +  Extremities:   BLE 1 +  edema. Intact peripheral pulses. Brisk cap refill, < 2 secs  Neuro: non focal    Labs: For convenience and continuity at follow-up the following most recent labs are provided:      CBC:    Lab Results   Component Value Date    WBC 5.9 06/09/2021    HGB 11.6 06/09/2021    HCT 35.8 06/09/2021     06/09/2021       Renal:    Lab Results   Component Value Date     06/16/2021    K 5.1 06/16/2021    K 4.2 06/11/2021     06/16/2021    CO2 25 06/16/2021    BUN 23 06/16/2021    CREATININE 1.5 06/16/2021    CALCIUM 9.6 06/16/2021    PHOS 3.7 06/11/2019         Significant Diagnostic Studies    Radiology:   XR CHEST PORTABLE   Final Result   Mild pulmonary vascular congestion. No overt edema or acute process.                 Consults:     IP CONSULT TO CARDIOLOGY  IP CONSULT TO HOSPITALIST  IP CONSULT TO CARDIOLOGY    Disposition:  home     Condition at Discharge: Stable    Discharge Instructions/Follow-up:  PCP in 1 week    Code Status:  Prior     Activity: activity as tolerated    Diet: cardiac diet      Discharge Medications:     Discharge Medication List as of 6/11/2021  4:43 PM           Details   spironolactone (ALDACTONE) 25 MG tablet Take 1 tablet by mouth daily, Disp-30 tablet, R-3Normal Details   furosemide (LASIX) 40 MG tablet Take 1 tablet by mouth daily, Disp-60 tablet, R-3Normal              Details   gabapentin (NEURONTIN) 100 MG capsule Take 100 mg by mouth 3 times daily. Historical Med      traZODone (DESYREL) 100 MG tablet TAKE 1 OR 2 TABLETS AT BEDTIME, Disp-60 tablet, R-3Normal      busPIRone (BUSPAR) 5 MG tablet TAKE 1 OR 2 TABLET(S) IN THE EVENING AS NEEDED FOR ANXIETY, Disp-60 tablet, R-4Normal      !! cetirizine (ZYRTEC) 10 MG tablet TAKE (1) TABLET DAILY, Disp-30 tablet, R-0$Normal      citalopram (CELEXA) 20 MG tablet Take 1.5 tablets by mouth daily, Disp-45 tablet, R-0Normal      !! cetirizine (ZYRTEC) 10 MG tablet TAKE 1 TABLET BY MOUTH DAILY, Disp-30 tablet, R-3Normal      NYAMYC [id number] UNIT/GM powder APPLY 3 TIMES DAILY, Disp-60 g, R-1Normal      cycloSPORINE (RESTASIS) 0.05 % ophthalmic emulsion Apply 1 drop to eye as neededHistorical Med      donepezil (ARICEPT) 10 MG tablet TAKE 1 TABLET IN THE EVENING, Disp-30 tablet,R-11Normal      divalproex (DEPAKOTE) 250 MG DR tablet Take 250 mg by mouth 2 times daily Historical Med      diphenhydrAMINE (BANOPHEN) 25 MG tablet Take 0.5 tablets by mouth every 12 hours, Disp-45 tablet,R-0Normal      pantoprazole (PROTONIX) 40 MG tablet Take 1 tablet by mouth daily, Disp-90 tablet,R-0Normal      simvastatin (ZOCOR) 40 MG tablet TAKE ONE TABLET NIGHTLY. Historical Med      aspirin (ASPIRIN 81) 81 MG EC tablet Take 81 mg by mouth dailyHistorical Med      candesartan (ATACAND) 8 MG tablet Take 8 mg by mouth dailyHistorical Med      HYDROcodone-acetaminophen (NORCO)  MG per tablet Take 1 tablet by mouth every 6 hours as needed.  Historical Med      baclofen (LIORESAL) 10 MG tablet TAKE 1 TABLET EVERY 8 HOURS AS NEEDED, Disp-30 tablet, R-0Normal      butalbital-acetaminophen-caffeine (FIORICET, ESGIC) -40 MG per tablet TAKE 1 TABLET EVERY 6 HOURS AS NEEDED FOR HEADACHES LIMIT TO 3 DAYS PER WEEK, Disp-30 tablet, R-0$Normal Misc. Devices (ROLLER WALKER) MISC DAILY Starting Wed 5/22/2019, Disp-1 each, R-0, Print      isosorbide mononitrate (IMDUR) 30 MG extended release tablet Take 30 mg by mouth dailyHistorical Med      albuterol-ipratropium (COMBIVENT RESPIMAT)  MCG/ACT AERS inhaler Inhale 1 puff into the lungs every 6 hours, Disp-1 Inhaler, R-5Normal      KLOR-CON SPRINKLE 10 MEQ extended release capsule TAKE 2 CAPSULES IN THE   MORNING AND 2 CAPSULES IN  THE EVENING, Disp-120 capsule, R-3Normal      amLODIPine (NORVASC) 10 MG tablet TAKE (1) TABLET DAILY, Disp-30 tablet, R-5      NITROSTAT 0.4 MG SL tablet USE 1 TABLET UNDER TONGUE AS NEEDED FOR CHEST PAIN MAY REPEAT EVERY 5MIN. UP TO 3. THEN GO TO ER., Disp-25 tablet, R-3      lidocaine (XYLOCAINE) 5 % ointment Apply topically as needed. , Disp-1 Tube, R-3, Normal       !! - Potential duplicate medications found. Please discuss with provider. Time Spent on discharge is more than 30 minutes in the examination, evaluation, counseling and review of medications and discharge plan. Signed:    Hailee Scott MD   6/28/2021      Thank you KALI Meraz CNP for the opportunity to be involved in this patient's care. If you have any questions or concerns please feel free to contact me at [phone].

## 2021-06-30 ENCOUNTER — CARE COORDINATION (OUTPATIENT)
Dept: CASE MANAGEMENT | Age: 81
End: 2021-06-30

## 2021-06-30 NOTE — CARE COORDINATION
Rick 45 Transitions Follow Up Call    2021    Patient: Elise Brock  Patient : 1940   MRN: [id number]  Reason for Admission: symptomatic bradycardia, CAD, cp, acaute on chronic dCHF, h/o aortic valve replacement, COPD (no AE), HTN, HLD, sleep apnea not on CPAP, GERD, CKD3, dementia, chronic back pain   Discharge Date: 21 RARS: Readmission Risk Score: 22         Spoke with: Elise Brock (patient)    Today's weight 186#  Monitor VS daily and \"looking good\"    States she isn't feeling well. She limits her fluid and her salt. She keeps her LE elevated and denies more edema than baseline. She has dyspnea with exertion only. She states she is taking all of her medications as instructed. Takes Lasix 40mg and spironolactone 25mg daily. Her HHA still comes MWF to help around her home and such. CHF education:  -weigh daily in the morning at the same time and in the same clothing  -report weight gain of >3#/day or >5#/week  -report increased SOB, edema, abd fullness, difficulty lying flat  -report palpitations, cough, difficulty urinating  -eat a low sodium diet and limit fluid intake to 64 ounces daily    States she has swallow eval on , outpatient surgery at [facility] on  and cardiology appt on . She is agreeable to CTN contact after these appointments. Instructed her in s/s and when to call provider. She voices understanding.      Care Transitions Subsequent and Final Call    Schedule Follow Up Appointment with PCP: Completed  Subsequent and Final Calls  Do you have any ongoing symptoms?: No  Have your medications changed?: No  Do you have any questions related to your medications?: No  Do you currently have any active services?: Yes  Are you currently active with any services?: Outpatient/Community Services  Do you have any needs or concerns that I can assist you with?: No  Identified Barriers: Impairment  Care Transitions Interventions     Other Therapy Services: Declined Other Services: Declined   Other Interventions:            Follow Up  Future Appointments   Date Time Provider Carla Sims   9/23/2021 10:40 AM KALI Hanks - CNP [facility] Abbie See RN

## 2021-07-08 ENCOUNTER — CARE COORDINATION (OUTPATIENT)
Dept: CASE MANAGEMENT | Age: 81
End: 2021-07-08

## 2021-07-08 NOTE — CARE COORDINATION
[facility] 45 Transitions FINAL Call    2021    Patient: Lee Ann Costa  Patient : 1940   MRN: [id number]  Reason for Admission: symptomatic bradycardia, CAD, cp, acaute on chronic dCHF, h/o aortic valve replacement, COPD (no AE), HTN, HLD, sleep apnea not on CPAP, GERD, CKD3, dementia, chronic back pain   Discharge Date: 21 RARS: Readmission Risk Score: 22         Spoke with: Lee Ann Costa (patient)    She had surgery on  to remove implant and wires at [facility]. States no issues with this procedure and she is not even sore. She completed cardiology visit yesterday. She is to take additional furosemide 20mg if weight >186#. Change added to [facility] in notes. Weight today 185.2#    States she urinates all the time already so she is unsure about taking additional pill. She limits salt and fluids. She has chronic back pain and has limited mobility because of that. Has plan for MRI of back soon to plan for additional treatment now that stimulator removed. She denies further needs/concerns at this time. She has CTN contact info for future needs. Reviewed with her the upcoming appt as below and that if she has concerns/symptoms to contact her PCP or specialist the same day for same or next day appt. She voices understanding. No further CTN outreach scheduled. Episode resolved at this time.      Care Transitions Subsequent and Final Call    Schedule Follow Up Appointment with PCP: Completed  Subsequent and Final Calls  Do you have any ongoing symptoms?: No  Have your medications changed?: Yes  Do you have any questions related to your medications?: No  Do you currently have any active services?: Yes  Are you currently active with any services?: Outpatient/Community Services  Do you have any needs or concerns that I can assist you with?: No  Identified Barriers: Impairment  Care Transitions Interventions     Other Therapy Services: Declined      Other Services: Declined   Other Interventions:            Follow Up  Future Appointments   Date Time Provider Carla Sims   9/23/2021 10:40 AM KALI Keller - CNP [facility] [place] - JEFF Murray, RN

## 2021-08-23 DIAGNOSIS — J30.9 ALLERGIC RHINITIS, UNSPECIFIED SEASONALITY, UNSPECIFIED TRIGGER: ICD-10-CM

## 2021-08-23 RX ORDER — NYSTATIN 100000 [USP'U]/G
POWDER TOPICAL
Qty: 60 G | Refills: 1 | Status: SHIPPED | OUTPATIENT
Start: 2021-08-23 | End: 2022-01-06

## 2021-08-23 RX ORDER — CETIRIZINE HYDROCHLORIDE 10 MG/1
10 TABLET ORAL DAILY
Qty: 30 TABLET | Refills: 3 | Status: SHIPPED | OUTPATIENT
Start: 2021-08-23 | End: 2021-12-21

## 2021-08-23 NOTE — TELEPHONE ENCOUNTER
Future appt scheduled 9/23/21  Last appt 5/21/21      Do you want pt to take this? In your note from 5/21/21 you took her off of it and put her on it so I'm not sure want needs to be done. When I order it it states to high of a dose.     Thank you

## 2021-08-25 ENCOUNTER — HOSPITAL ENCOUNTER (OUTPATIENT)
Dept: MRI IMAGING | Age: 81
Discharge: HOME | End: 2021-08-25
Payer: MEDICARE

## 2021-08-25 DIAGNOSIS — M51.36 DEGENERATION OF LUMBAR INTERVERTEBRAL DISC: ICD-10-CM

## 2021-08-25 PROCEDURE — 72141 MRI NECK SPINE W/O DYE: CPT

## 2021-08-25 PROCEDURE — 72148 MRI LUMBAR SPINE W/O DYE: CPT

## 2021-09-09 ENCOUNTER — TELEPHONE (OUTPATIENT)
Dept: FAMILY MEDICINE CLINIC | Age: 81
End: 2021-09-09

## 2021-09-23 ENCOUNTER — OFFICE VISIT (OUTPATIENT)
Dept: FAMILY MEDICINE CLINIC | Age: 81
End: 2021-09-23
Payer: MEDICARE

## 2021-09-23 VITALS
BODY MASS INDEX: 37.2 KG/M2 | SYSTOLIC BLOOD PRESSURE: 126 MMHG | OXYGEN SATURATION: 94 % | DIASTOLIC BLOOD PRESSURE: 61 MMHG | TEMPERATURE: 97.9 F | WEIGHT: 190.5 LBS | HEART RATE: 57 BPM

## 2021-09-23 DIAGNOSIS — G47.33 OSA (OBSTRUCTIVE SLEEP APNEA): ICD-10-CM

## 2021-09-23 DIAGNOSIS — G30.9 ALZHEIMER'S DEMENTIA WITHOUT BEHAVIORAL DISTURBANCE, UNSPECIFIED TIMING OF DEMENTIA ONSET: Primary | ICD-10-CM

## 2021-09-23 DIAGNOSIS — F02.80 ALZHEIMER'S DEMENTIA WITHOUT BEHAVIORAL DISTURBANCE, UNSPECIFIED TIMING OF DEMENTIA ONSET: Primary | ICD-10-CM

## 2021-09-23 DIAGNOSIS — E78.5 HYPERLIPIDEMIA, UNSPECIFIED HYPERLIPIDEMIA TYPE: ICD-10-CM

## 2021-09-23 DIAGNOSIS — I25.10 CORONARY ARTERY DISEASE INVOLVING NATIVE CORONARY ARTERY OF NATIVE HEART WITHOUT ANGINA PECTORIS: ICD-10-CM

## 2021-09-23 DIAGNOSIS — F41.9 ANXIETY: ICD-10-CM

## 2021-09-23 DIAGNOSIS — N18.9 CHRONIC KIDNEY DISEASE, UNSPECIFIED CKD STAGE: ICD-10-CM

## 2021-09-23 DIAGNOSIS — I10 ESSENTIAL HYPERTENSION: ICD-10-CM

## 2021-09-23 PROCEDURE — 99214 OFFICE O/P EST MOD 30 MIN: CPT | Performed by: NURSE PRACTITIONER

## 2021-09-23 RX ORDER — DICLOFENAC SODIUM 75 MG/1
75 TABLET, DELAYED RELEASE ORAL 2 TIMES DAILY
Status: ON HOLD | COMMUNITY
Start: 2021-08-23 | End: 2021-11-26

## 2021-09-23 RX ORDER — ACETAMINOPHEN 325 MG/1
TABLET ORAL
COMMUNITY
Start: 2021-07-26 | End: 2022-04-12 | Stop reason: ALTCHOICE

## 2021-09-23 RX ORDER — ASPIRIN 81 MG/1
81 TABLET ORAL DAILY
COMMUNITY
End: 2021-09-23 | Stop reason: SDUPTHER

## 2021-09-23 RX ORDER — MIRABEGRON 50 MG/1
50 TABLET, FILM COATED, EXTENDED RELEASE ORAL DAILY
COMMUNITY
Start: 2021-08-30 | End: 2022-04-12 | Stop reason: ALTCHOICE

## 2021-09-23 RX ORDER — DIVALPROEX SODIUM 250 MG/1
250 TABLET, DELAYED RELEASE ORAL 2 TIMES DAILY
COMMUNITY
End: 2022-08-26

## 2021-09-23 RX ORDER — CANDESARTAN 8 MG/1
8 TABLET ORAL DAILY
Status: ON HOLD | COMMUNITY
End: 2021-11-26

## 2021-09-23 RX ORDER — METOPROLOL SUCCINATE 25 MG/1
25 TABLET, EXTENDED RELEASE ORAL DAILY
COMMUNITY
Start: 2021-08-11 | End: 2022-04-12 | Stop reason: ALTCHOICE

## 2021-09-23 ASSESSMENT — ENCOUNTER SYMPTOMS
SORE THROAT: 0
COUGH: 0
VOMITING: 0
RHINORRHEA: 0
NAUSEA: 0
DIARRHEA: 0
ABDOMINAL PAIN: 0
SHORTNESS OF BREATH: 1
CHEST TIGHTNESS: 0
BACK PAIN: 1

## 2021-09-23 NOTE — PROGRESS NOTES
CHIEF COMPLAINT  Chief Complaint   Patient presents with    Check-Up     HTN        HPI   Juana Justice is a 79 y.o. female who presents to the office check up. She denies any dizziness or lightheadedness. No chest pain or shortness of breath. No abdominal pain or discomfort. No nausea, vomiting or diarrhea. Patient reports following up with cardiologist, pain management and GI since last visit. Patient reports chronic fatigue and weakness. Patient reports that she does take her medications as prescribed. Patient reports checking her blood pressure daily and weighing herself as well. No other complaints, modifying factors or associated symptoms. Nursing notes reviewed.    Past Medical History:   Diagnosis Date    Arthritis     BCC (basal cell carcinoma of skin)     RT clavicle    Bladder infection     frequently    CAD (coronary artery disease)     stents    Cancer (HCC)     skin    CHF (congestive heart failure) (HCC)     Chronic back pain     COPD (chronic obstructive pulmonary disease) (HCC)     Depression     Depression     Hypercholesteremia     Hypertension     Pain in shoulder [id number]    pain in left shoulder, taking steroid injections for steroid    Reflux     Rheumatic fever     as a child    Sleep apnea     uses CPAP    TIA (transient ischemic attack)     Urinary incontinence     Wears glasses      Past Surgical History:   Procedure Laterality Date    AORTIC VALVE REPLACEMENT      APPENDECTOMY      BLADDER REPAIR      3 TIMES    CARDIAC SURGERY      stents    CARPAL TUNNEL RELEASE      RIGHT    CHOLECYSTECTOMY, LAPAROSCOPIC  01/06/2017    with dr Snow [place]      has it it done twice    DIAGNOSTIC CARDIAC CATH LAB PROCEDURE      HYSTERECTOMY      JOINT REPLACEMENT      KATHLEEN TKR    NECK SURGERY      OTHER SURGICAL HISTORY  9/28/12    Full Interstim Implant    SHOULDER SURGERY      SKIN CANCER EXCISION      SPINAL FIXATION SURGERY WITH IMPLANT  2001    SPINAL FIXATION SURGERY WITH IMPLANT  2004    SPINAL FIXATION SURGERY WITH IMPLANT  2007    SPINE SURGERY  1999    TONSILLECTOMY      UPPER GASTROINTESTINAL ENDOSCOPY  4/22/11    UPPER GASTROINTESTINAL ENDOSCOPY      UPPER GASTROINTESTINAL ENDOSCOPY  03/23/2017    dilitation     Family History   Problem Relation Age of Onset    Arthritis Mother     Cancer Mother     Depression Mother     Heart Disease Mother     High Blood Pressure Mother     High Cholesterol Mother     Miscarriages / [place] Mother     Stroke Mother     Early Death Mother     Hearing Loss Mother     Mental Illness Mother     Vision Loss Mother     Arthritis Brother     Depression Brother     Diabetes Brother     Hearing Loss Brother     Heart Disease Brother     High Blood Pressure Brother     High Cholesterol Brother     Vision Loss Brother      Social History     Socioeconomic History    Marital status:      Spouse name: Not on file    Number of children: 6    Years of education: 8    Highest education level: Not on file   Occupational History    Occupation: retired   Tobacco Use    Smoking status: Never Smoker    Smokeless tobacco: Never Used   Vaping Use    Vaping Use: Never used   Substance and Sexual Activity    Alcohol use: No    Drug use: No    Sexual activity: Not Currently   Other Topics Concern    Not on file   Social History Narrative    Not on file     Social Determinants of Health     Financial Resource Strain:     Difficulty of Paying Living Expenses:    Food Insecurity:     Worried About [address] [employer] in the Last Year:     [facility] in the Last Year:    Transportation Needs:     Lack of Transportation (Medical):      Lack of Transportation (Non-Medical):    Physical Activity:     Days of Exercise per Week:     Minutes of Exercise per Session:    Stress:     Feeling of Stress :    Social Connections:  Frequency of Communication with Friends and Family:     Frequency of Social Gatherings with Friends and Family:     Attends [employer] Services:     Active Member of Clubs or Organizations:     Attends Club or Organization Meetings:     Marital Status:    Intimate Partner Violence:     Fear of Current or Ex-Partner:     Emotionally Abused:     Physically Abused:     Sexually Abused:      Current Outpatient Medications   Medication Sig Dispense Refill    cetirizine (ZYRTEC) 10 MG tablet TAKE 1 TABLET BY MOUTH DAILY 30 tablet 3    NYAMYC 556626 UNIT/GM powder APPLY 3 TIMES DAILY 60 g 1    citalopram (CELEXA) 20 MG tablet TAKE 1.5 TABLETS BY MOUTH DAILY 45 tablet 5    azelastine (ASTELIN) 0.1 % nasal spray 1 spray by Nasal route 2 times daily Use in each nostril as directed 2 Bottle 1    cycloSPORINE (RESTASIS) 0.05 % ophthalmic emulsion Place 1 drop into both eyes as needed (dry eyes) 1 vial 3    polyethylene glycol (GLYCOLAX) 17 GM/SCOOP powder Take 17 g by mouth daily      Wheat Dextrin (BENEFIBER) POWD Take 4 g by mouth daily      spironolactone (ALDACTONE) 25 MG tablet Take 1 tablet by mouth daily 30 tablet 3    furosemide (LASIX) 40 MG tablet Take 1 tablet by mouth daily (Patient taking differently: Take 40 mg by mouth daily 7/6/2021-f/up at cardiology office - take additional 20mg daily if weight >186#) 60 tablet 3    gabapentin (NEURONTIN) 100 MG capsule Take 100 mg by mouth 3 times daily.       traZODone (DESYREL) 100 MG tablet TAKE 1 OR 2 TABLETS AT BEDTIME 60 tablet 3    busPIRone (BUSPAR) 5 MG tablet TAKE 1 OR 2 TABLET(S) IN THE EVENING AS NEEDED FOR ANXIETY 60 tablet 4    donepezil (ARICEPT) 10 MG tablet TAKE 1 TABLET IN THE EVENING 30 tablet 11    divalproex (DEPAKOTE) 250 MG DR tablet Take 250 mg by mouth 2 times daily       diphenhydrAMINE (BANOPHEN) 25 MG tablet Take 0.5 tablets by mouth every 12 hours (Patient taking differently: Take 12.5 mg by mouth every 8 hours as needed ) 45 tablet 0    pantoprazole (PROTONIX) 40 MG tablet Take 1 tablet by mouth daily 90 tablet 0    simvastatin (ZOCOR) 40 MG tablet TAKE ONE TABLET NIGHTLY.  aspirin (ASPIRIN 81) 81 MG EC tablet Take 81 mg by mouth daily      candesartan (ATACAND) 8 MG tablet Take 8 mg by mouth daily      HYDROcodone-acetaminophen (NORCO)  MG per tablet Take 1 tablet by mouth every 6 hours as needed.  baclofen (LIORESAL) 10 MG tablet TAKE 1 TABLET EVERY 8 HOURS AS NEEDED (Patient taking differently: Take 10 mg by mouth daily ) 30 tablet 0    butalbital-acetaminophen-caffeine (FIORICET, ESGIC) -40 MG per tablet TAKE 1 TABLET EVERY 6 HOURS AS NEEDED FOR HEADACHES LIMIT TO 3 DAYS PER WEEK 30 tablet 0    Misc. Devices (ROLLER [place]) MISC 1 each by Does not apply route daily 1 each 0    isosorbide mononitrate (IMDUR) 30 MG extended release tablet Take 30 mg by mouth daily      albuterol-ipratropium (COMBIVENT RESPIMAT)  MCG/ACT AERS inhaler Inhale 1 puff into the lungs every 6 hours 1 Inhaler 5    KLOR-CON SPRINKLE 10 MEQ extended release capsule TAKE 2 CAPSULES IN THE   MORNING AND 2 CAPSULES IN  THE EVENING 120 capsule 3    amLODIPine (NORVASC) 10 MG tablet TAKE (1) TABLET DAILY 30 tablet 5    NITROSTAT 0.4 MG SL tablet USE 1 TABLET UNDER TONGUE AS NEEDED FOR CHEST PAIN MAY REPEAT EVERY 5MIN. UP TO 3. THEN GO TO ER. 25 tablet 3    lidocaine (XYLOCAINE) 5 % ointment Apply topically as needed. 1 Tube 3     No current facility-administered medications for this visit. Allergies   Allergen Reactions    Latex     Levofloxacin Other (See Comments)     Burns mouth per patient    Azithromycin     Codeine Nausea Only    Keflex [Cephalexin]     Morphine     Pentazocine Lactate     Sulfa Antibiotics Hives    Tape [Adhesive Tape]      No bandaids       REVIEW OF SYSTEMS  Review of Systems   Constitutional: Negative for activity change, appetite change, chills and fever.    HENT: Negative for congestion, rhinorrhea and sore throat. Eyes: Negative for visual disturbance. Respiratory: Positive for shortness of breath. Negative for cough and chest tightness. Cardiovascular: Negative for chest pain and leg swelling. Gastrointestinal: Negative for abdominal pain, diarrhea, nausea and vomiting. Genitourinary: Negative for dysuria, frequency, hematuria and urgency. Musculoskeletal: Positive for arthralgias, back pain, gait problem and myalgias. Skin: Negative for rash. Neurological: Positive for tremors and weakness. Negative for dizziness, light-headedness, numbness and headaches. Psychiatric/Behavioral: Negative for sleep disturbance. PHYSICAL EXAM  /61   Pulse 57   Temp 97.9 °F (36.6 °C) (Oral)   Wt 190 lb 8 oz (86.4 kg)   LMP  (LMP Unknown)   SpO2 94%   BMI 37.20 kg/m²   Physical Exam  Constitutional:       Appearance: Normal appearance. She is not ill-appearing. HENT:      Head: Normocephalic and atraumatic. Right Ear: Tympanic membrane normal.      Left Ear: Tympanic membrane normal.      Nose: Nose normal.      Mouth/Throat:      Mouth: Mucous membranes are moist.      Pharynx: Oropharynx is clear. Eyes:      Extraocular Movements: Extraocular movements intact. Conjunctiva/sclera: Conjunctivae normal.      Pupils: Pupils are equal, round, and reactive to light. Cardiovascular:      Rate and Rhythm: Normal rate and regular rhythm. Pulses: Normal pulses. Pulmonary:      Effort: Pulmonary effort is normal.      Breath sounds: Normal breath sounds. Abdominal:      Palpations: Abdomen is soft. Tenderness: There is no abdominal tenderness. There is no right CVA tenderness, left CVA tenderness or guarding. Musculoskeletal:      Cervical back: Normal range of motion and neck supple. Comments: Trace edema BLE, non pitting limited mobility due to ongoing chronic back pain.   Use of cane and Hoveround for ambulatory needs   Skin: General: Skin is warm and dry. Capillary Refill: Capillary refill takes 2 to 3 seconds. Neurological:      Mental Status: She is alert and oriented to person, place, and time. Motor: Weakness present. Psychiatric:         Mood and Affect: Mood normal.         Behavior: Behavior normal.        ASSESSMENT/PLAN:   1. Alzheimer's dementia without behavioral disturbance, unspecified timing of dementia onset ([facility] [address]  Stable. Patient compliant with Aricept. No recent changes in forgetfulness or behavioral disturbances. Patient denies any decrease in memory. Continue with current treatment management follow-up in 6 months, sooner for new or worsening symptoms. 2. Chronic kidney disease, unspecified CKD stage  Stable. Labs performed at previous visit. No new or worsening symptoms. Patient reports that she monitors her diet and weighs herself daily. Continue with current treatment management follow-up in 6 months, sooner for new or worsening symptoms. 3. MARIA C (obstructive sleep apnea)  Stable. No new or worsening symptoms. Continue with current treatment management follow-up in 6 to 12 months, sooner for new or worsening symptoms. 4. Essential hypertension  Stable. Patient compliant with metoprolol 25 mg daily, Norvasc 10 mg daily and Imdur 30 mg daily. 5. Hyperlipidemia, unspecified hyperlipidemia type  Stable. Previous lipids reviewed. Patient compliant with simvastatin 40 mg daily. Continue with current treatment management follow-up in 6 months, sooner for new or worsening symptoms. 6. Coronary artery disease involving native coronary artery of native heart without angina pectoris  Stable. Managed by cardiologist, Dr. Desiree Gaston. Patient reports seeing him in August.  Patient reports that she was placed back onto metoprolol 25 mg daily. Patient continues to be on Lasix 60 mg daily, aspirin 81 mg daily, amlodipine 10 mg daily, Imdur 30 mg daily and simvastatin 40 mg daily.   Patient reports that she does check her blood pressure and weighs herself daily. Patient reports generalized fatigue/weakness and shortness of breath upon exertion. No new or worsening symptoms. Continue with current treatment management. Patient will follow up with cardiologist in December. Follow-up in 6 months, sooner for new or worsening symptoms. 7. Anxiety  Stable. Patient compliant with citalopram 1-1/2 tablets daily and BuSpar 2 tablets at nighttime. Patient reports doing well. No new or worsening anxiety. Continue with current treatment management follow-up in 6 months, sooner for new or worsening symptoms. Due to multiple providers providing medication Rx's I did call patient's pharmacy to get updated list.  We will update med rec once received. The note was completed using Dragon voice recognition transcription. Every effort was made to ensure accuracy; however, inadvertent  transcription errors may be present despite my best efforts to edit errors.     Lila Ying, KALI - CNP

## 2021-09-23 NOTE — PATIENT INSTRUCTIONS
Please read the healthy family handout that you were given and share it with your family. Please compare this printed medication list with your medications at home to be sure they are the same. If you have any medications that are different please contact us immediately at [phone]. Also review your allergies that we have listed, these may also include medications that you have not been able to tolerate, make sure everything listed is correct. If you have any allergies that are different please contact us immediately at [phone]. Patient Education        Alzheimer's Disease: Care Instructions  Overview     Alzheimer's disease is a type of dementia. It causes memory loss and affects judgment, language, and behavior. You may have trouble making decisions or may get lost in places that you used to know well. Alzheimer's disease is different than mild memory loss that occurs with aging. It's not clear what causes Alzheimer's disease. It's the most common form of dementia in older adults. Although there is no cure at this time, medicine in some cases may slow memory loss for a while. Other medicines may help with sleep, depression, or behavior changes. Alzheimer's disease is different for everyone. Some people can function well for a long time. In the early stage of the disease, you can do things at home to make life easier and safer. You also can keep doing your hobbies and other activities. Many people find comfort in planning now for their future needs. Follow-up care is a key part of your treatment and safety. Be sure to make and go to all appointments, and call your doctor if you are having problems. It's also a good idea to know your test results and keep a list of the medicines you take. How can you care for yourself at home? Taking care of yourself  · If your doctor gives you medicines, take them exactly as prescribed. Call your doctor if you think you are having a problem with your medicine. You will get more details on the medicines your doctor prescribes. · Eat a balanced diet. Get plenty of whole grains, fruits, and vegetables every day. If you are not hungry at mealtimes, eat snacks at midmorning and in the afternoon. Try drinks such as Boost, Ensure, or Sustacal if you are having trouble keeping your weight up. · Stay active. Exercise such as walking may slow the decline of your mental abilities. Try to stay active mentally too. Read and work crossword puzzles if you enjoy these activities. · If you have trouble sleeping, do not nap during the day. Get regular exercise (but not within several hours of bedtime). Drink a glass of warm milk or caffeine-free herbal tea before going to bed. · Ask your doctor about support groups and other resources in your area. They can help people who have Alzheimer's disease and their families. · Be patient. You may find that a task takes you longer than it used to. · If you have not already done so, make a list of advance directives. Advance directives are instructions to your doctor and family members about what kind of care you want if you become unable to speak or express yourself. Talk to a  about making a will, if you do not already have one. Keeping schedules  · Develop a routine. You will feel less frustrated or confused if you have a clear, simple plan of what to do every day. ? Make lists of your medicines and when to take them. ? Write down appointments and other tasks in a calendar. ? Put sticky notes around the house to help you remember events and other things you have to do. ? Schedule activities and tasks for times of the day when you are best able to handle them. Staying safe  · Tell someone when you are going out and where you are going. Let the person know when you will be back. Before you go out alone, write down where you are going, how to get there, and how to get back home.  Do this even if you have gone there many times before. Take someone along with you when possible. · Make your home safe. Tack down rugs, put no-slip tape in the tub, use handrails, and put safety switches on stoves and appliances. · Have a family member or other caregiver tell you whether you are driving badly. Deciding to stop driving is very hard for many people. Driving helps you feel independent. Your state 's license bureau can do a driving test if there is any question. Plan for other means of getting around when you are no longer able to drive. · Use strong lighting, especially at night. Put night-lights in bedrooms, hallways, and bathrooms. · Lower the hot water temperature setting to 120°F or lower to avoid burns. When should you call for help? Call 911 anytime you think you may need emergency care. For example, call if:    · You are lost and do not know whom to call.     · You are injured and do not know whom to call. Call your doctor now or seek immediate medical care if:    · Your symptoms suddenly get much worse. Watch closely for changes in your health, and be sure to contact your doctor if:    · You want more information about how you can take care of yourself. Where can you learn more? Go to https://KoviopeFranchise Fundeb.Just Eat. org and sign in to your CinaMaker account. Enter Y179 in the [facility] box to learn more about \"Alzheimer's Disease: Care Instructions. \"     If you do not have an account, please click on the \"Sign Up Now\" link. Current as of: June 16, 2021               Content Version: 13.0  © 2006-2021 Healthwise, Incorporated. Care instructions adapted under license by [facility]– [place]note 2] [address] If you have questions about a medical condition or this instruction, always ask your healthcare professional. [place] [address] any warranty or liability for your use of this information.          Patient Education        Anxiety Disorder: Care Instructions  Your Care Instructions     Anxiety is a normal are.  · Get at least 30 minutes of exercise on most days of the week to relieve stress. Walking is a good choice. You also may want to do other activities, such as running, swimming, cycling, or playing tennis or team sports. Relaxation techniques  Do relaxation exercises 10 to 20 minutes a day. You can play soothing, relaxing music while you do them, if you wish. · Tell others in your house that you are going to do your relaxation exercises. Ask them not to disturb you. · Find a comfortable place, away from all distractions and noise. · Lie down on your back, or sit with your back straight. · Focus on your breathing. Make it slow and steady. · Breathe in through your nose. Breathe out through either your nose or mouth. · Breathe deeply, filling up the area between your navel and your rib cage. Breathe so that your belly goes up and down. · Do not hold your breath. · Breathe like this for 5 to 10 minutes. Notice the feeling of calmness throughout your whole body. As you continue to breathe slowly and deeply, relax by doing the following for another 5 to 10 minutes:  · Tighten and relax each muscle group in your body. You can begin at your toes and work your way up to your head. · Imagine your muscle groups relaxing and becoming heavy. · Empty your mind of all thoughts. · Let yourself relax more and more deeply. · Become aware of the state of calmness that surrounds you. · When your relaxation time is over, you can bring yourself back to alertness by moving your fingers and toes and then your hands and feet and then stretching and moving your entire body. Sometimes people fall asleep during relaxation, but they usually wake up shortly afterward. · Always give yourself time to return to full alertness before you drive a car or do anything that might cause an accident if you are not fully alert. Never play a relaxation tape while you drive a car. When should you call for help?    Call 911 anytime you think you may need emergency care. For example, call if:    · You feel you cannot stop from hurting yourself or someone else. Keep the numbers for these national suicide hotlines: 3-190-438-TALK (8-112.270.5147) and 0-446-WPBESBW (5-860.336.6404). If you or someone you know talks about suicide or feeling hopeless, get help right away. Watch closely for changes in your health, and be sure to contact your doctor if:    · You have anxiety or fear that affects your life.     · You have symptoms of anxiety that are new or different from those you had before. Where can you learn more? Go to https://Telematik.eBIZ.mobility. org and sign in to your Decide.com account. Enter P754 in the "[employer]" box to learn more about \"Anxiety Disorder: Care Instructions. \"     If you do not have an account, please click on the \"Sign Up Now\" link. Current as of: September 23, 2020               Content Version: 12.9  © 2006-2021 [employer]. Care instructions adapted under license by [facility] ([facility]). If you have questions about a medical condition or this instruction, always ask your healthcare professional. [place] [address] any warranty or liability for your use of this information. Patient Education        Learning About Coronary Artery Disease (CAD)  What is coronary artery disease? Coronary artery disease is a condition that occurs when plaque builds up in the arteries that bring oxygen-rich blood to your heart. Plaque is a fatty substance made of cholesterol, calcium, and other substances in the blood. This process is called hardening of the arteries, or atherosclerosis. What happens when you have coronary artery disease? · Plaque may narrow the coronary arteries. Narrowed arteries cause poor blood flow. This can lead to angina symptoms such as chest pain or discomfort. If blood flow is completely blocked, you could have a heart attack.   · You can slow and reduce the risk of future problems by making changes in your lifestyle. These include quitting smoking and eating heart-healthy foods. · Treatment, along with changes in your lifestyle, can help you live a longer and healthier life. How can you prevent coronary artery disease? · Do not smoke. It may be the best thing you can do to prevent coronary artery disease. If you need help quitting, talk to your doctor about stop-smoking programs and medicines. These can increase your chances of quitting for good. · Be active. Try to do moderate activity at least 2½ hours a week. Or try to do vigorous activity at least 1¼ hours a week. You may want to walk or try other activities, such as running, swimming, cycling, or playing tennis or team sports. · Eat heart-healthy foods. Eat more fruits and vegetables and less food that contains saturated and trans fats. Limit alcohol, sodium, and sweets. · Stay at a healthy weight. Lose weight if you need to. · Manage other health problems such as diabetes, high blood pressure, and high cholesterol. How is coronary artery disease treated? · Your doctor will suggest that you make lifestyle changes. For example, your doctor may ask you to eat healthy foods, quit smoking, lose extra weight, and be more active. · You will take medicines that help prevent a heart attack. · Your doctor may suggest a procedure to open narrowed or blocked arteries. This is called angioplasty. Or your doctor may suggest using healthy blood vessels to create detours around narrowed or blocked arteries. This is called bypass surgery. Follow-up care is a key part of your treatment and safety. Be sure to make and go to all appointments, and call your doctor if you are having problems. It's also a good idea to know your test results and keep a list of the medicines you take. Where can you learn more? Go to https://kyleigh.Nordic Consumer Portals. org and sign in to your 7Road account.  Enter [phone] in the [address] chopped or canned fruit, 1/4 cup dried fruit, or 4 ounces (½ cup) of fruit juice. Choose fruit more often than fruit juice. · Eat 4 to 5 servings of vegetables each day. A serving is 1 cup of lettuce or raw leafy vegetables, ½ cup of chopped or cooked vegetables, or 4 ounces (½ cup) of vegetable juice. Choose vegetables more often than vegetable juice. · Get 2 to 3 servings of low-fat and fat-free dairy each day. A serving is 8 ounces of milk, 1 cup of yogurt, or 1 ½ ounces of cheese. · Eat 6 to 8 servings of grains each day. A serving is 1 slice of bread, 1 ounce of dry cereal, or ½ cup of cooked rice, pasta, or cooked cereal. Try to choose whole-grain products as much as possible. · Limit lean meat, poultry, and fish to 2 servings each day. A serving is 3 ounces, about the size of a deck of cards. · Eat 4 to 5 servings of nuts, seeds, and legumes (cooked dried beans, lentils, and split peas) each week. A serving is 1/3 cup of nuts, 2 tablespoons of seeds, or ½ cup of cooked beans or peas. · Limit fats and oils to 2 to 3 servings each day. A serving is 1 teaspoon of vegetable oil or 2 tablespoons of salad dressing. · Limit sweets and added sugars to 5 servings or less a week. A serving is 1 tablespoon jelly or jam, ½ cup sorbet, or 1 cup of lemonade. · Eat less than 2,300 milligrams (mg) of sodium a day. If you limit your sodium to 1,500 mg a day, you can lower your blood pressure even more. · Be aware that all of these are the suggested number of servings for people who eat 1,800 to 2,000 calories a day. Your recommended number of servings may be different if you need more or fewer calories. Tips for success  · Start small. Do not try to make dramatic changes to your diet all at once. You might feel that you are missing out on your favorite foods and then be more likely to not follow the plan. Make small changes, and stick with them. Once those changes become habit, add a few more changes.   · Try some of the following:  ? Make it a goal to eat a fruit or vegetable at every meal and at snacks. This will make it easy to get the recommended amount of fruits and vegetables each day. ? Try yogurt topped with fruit and nuts for a snack or healthy dessert. ? Add lettuce, tomato, cucumber, and onion to sandwiches. ? Combine a ready-made pizza crust with low-fat mozzarella cheese and lots of vegetable toppings. Try using tomatoes, squash, spinach, broccoli, carrots, cauliflower, and onions. ? Have a variety of cut-up vegetables with a low-fat dip as an appetizer instead of chips and dip. ? Sprinkle sunflower seeds or chopped almonds over salads. Or try adding chopped walnuts or almonds to cooked vegetables. ? Try some vegetarian meals using beans and peas. Add garbanzo or kidney beans to salads. Make burritos and tacos with mashed saleem beans or black beans. Where can you learn more? Go to https://Mosso.Brainz Games. org and sign in to your [employer] account. Enter T432 in the [facility] box to learn more about \"DASH Diet: Care Instructions. \"     If you do not have an account, please click on the \"Sign Up Now\" link. Current as of: April 29, 2021               Content Version: 13.0  © 3377-5361 [employer]. Care instructions adapted under license by [facility] ([facility]). If you have questions about a medical condition or this instruction, always ask your healthcare professional. [place] [address] any warranty or liability for your use of this information. Patient Education        High Cholesterol: Care Instructions  Overview     Cholesterol is a type of fat in your blood. It is needed for many body functions, such as making new cells. Cholesterol is made by your body. It also comes from food you eat. High cholesterol means that you have too much of the fat in your blood. This raises your risk of a heart attack and stroke.   LDL and HDL are part of your total health problems. These include diabetes and high blood pressure. If you think you may have a problem with alcohol or drug use, talk to your doctor. · If you take medicine, take it exactly as prescribed. Call your doctor if you think you are having a problem with your medicine. · Check with your doctor or pharmacist before you use any other medicines, including over-the-counter medicines. Make sure your doctor knows all of the medicines, vitamins, herbal products, and supplements you take. Taking some medicines together can cause problems. When should you call for help? Watch closely for changes in your health, and be sure to contact your doctor if:    · You need help making lifestyle changes.     · You have questions about your medicine. Where can you learn more? Go to https://AbiquopeClearCount Medical Solutionseweb.500px. org and sign in to your Five Delta account. Enter U255 in the Wis.dm box to learn more about \"High Cholesterol: Care Instructions. \"     If you do not have an account, please click on the \"Sign Up Now\" link. Current as of: April 29, 2021               Content Version: 13.0  © 0585-1268 [employer]ke[employer]. Care instructions adapted under license by [facility] [place] ([facility]). If you have questions about a medical condition or this instruction, always ask your healthcare professional. [address] any warranty or liability for your use of this information. Patient Education        High Blood Pressure: Care Instructions  Overview     It's normal for blood pressure to go up and down throughout the day. But if it stays up, you have high blood pressure. Another name for high blood pressure is hypertension. Despite what a lot of people think, high blood pressure usually doesn't cause headaches or make you feel dizzy or lightheaded. It usually has no symptoms. But it does increase your risk of stroke, heart attack, and other problems.  You and your doctor will talk about your risks of these problems based on your blood pressure. Your doctor will give you a goal for your blood pressure. Your goal will be based on your health and your age. Lifestyle changes, such as eating healthy and being active, are always important to help lower blood pressure. You might also take medicine to reach your blood pressure goal.  Follow-up care is a key part of your treatment and safety. Be sure to make and go to all appointments, and call your doctor if you are having problems. It's also a good idea to know your test results and keep a list of the medicines you take. How can you care for yourself at home? Medical treatment  · If you stop taking your medicine, your blood pressure will go back up. You may take one or more types of medicine to lower your blood pressure. Be safe with medicines. Take your medicine exactly as prescribed. Call your doctor if you think you are having a problem with your medicine. · Talk to your doctor before you start taking aspirin every day. Aspirin can help certain people lower their risk of a heart attack or stroke. But taking aspirin isn't right for everyone, because it can cause serious bleeding. · See your doctor regularly. You may need to see the doctor more often at first or until your blood pressure comes down. · If you are taking blood pressure medicine, talk to your doctor before you take decongestants or anti-inflammatory medicine, such as ibuprofen. Some of these medicines can raise blood pressure. · Learn how to check your blood pressure at home. Lifestyle changes  · Stay at a healthy weight. This is especially important if you put on weight around the waist. Losing even 10 pounds can help you lower your blood pressure. · If your doctor recommends it, get more exercise. Walking is a good choice. Bit by bit, increase the amount you walk every day. Try for at least 30 minutes on most days of the week.  You also may want to swim, bike, or do other activities. · Avoid or limit alcohol. Talk to your doctor about whether you can drink any alcohol. · Try to limit how much sodium you eat to less than 2,300 milligrams (mg) a day. Your doctor may ask you to try to eat less than 1,500 mg a day. · Eat plenty of fruits (such as bananas and oranges), vegetables, legumes, whole grains, and low-fat dairy products. · Lower the amount of saturated fat in your diet. Saturated fat is found in animal products such as milk, cheese, and meat. Limiting these foods may help you lose weight and also lower your risk for heart disease. · Do not smoke. Smoking increases your risk for heart attack and stroke. If you need help quitting, talk to your doctor about stop-smoking programs and medicines. These can increase your chances of quitting for good. When should you call for help? Call 911  anytime you think you may need emergency care. This may mean having symptoms that suggest that your blood pressure is causing a serious heart or blood vessel problem. Your blood pressure may be over 180/120. For example, call 911 if:    · You have symptoms of a heart attack. These may include:  ? Chest pain or pressure, or a strange feeling in the chest.  ? Sweating. ? Shortness of breath. ? Nausea or vomiting. ? Pain, pressure, or a strange feeling in the back, neck, jaw, or upper belly or in one or both shoulders or arms. ? Lightheadedness or sudden weakness. ? A fast or irregular heartbeat.     · You have symptoms of a stroke. These may include:  ? Sudden numbness, tingling, weakness, or loss of movement in your face, arm, or leg, especially on only one side of your body. ? Sudden vision changes. ? Sudden trouble speaking. ? Sudden confusion or trouble understanding simple statements. ? Sudden problems with walking or balance. ? A sudden, severe headache that is different from past headaches.     · You have severe back or belly pain.    Do not wait until your blood pressure comes down on its own. Get help right away. Call your doctor now or seek immediate care if:    · Your blood pressure is much higher than normal (such as 180/120 or higher), but you don't have symptoms.     · You think high blood pressure is causing symptoms, such as:  ? Severe headache.  ? Blurry vision. Watch closely for changes in your health, and be sure to contact your doctor if:    · Your blood pressure measures higher than your doctor recommends at least 2 times. That means the top number is higher or the bottom number is higher, or both.     · You think you may be having side effects from your blood pressure medicine. Where can you learn more? Go to https://ShareGrovepeClose."[employer] Inc". org and sign in to your Advice Wallet account. Enter T339 in the [facility]elti box to learn more about \"High Blood Pressure: Care Instructions. \"     If you do not have an account, please click on the \"Sign Up Now\" link. Current as of: April 29, 2021               Content Version: 13.0  © 2006-2021 Healthwise, Incorporated. Care instructions adapted under license by [facility] ([facility]). If you have questions about a medical condition or this instruction, always ask your healthcare professional. [place] [address] any warranty or liability for your use of this information.

## 2021-09-24 DIAGNOSIS — Z20.822 SUSPECTED COVID-19 VIRUS INFECTION: ICD-10-CM

## 2021-09-24 DIAGNOSIS — Z20.822 SUSPECTED COVID-19 VIRUS INFECTION: Primary | ICD-10-CM

## 2021-09-24 LAB — SARS-COV-2: POSITIVE

## 2021-10-12 DIAGNOSIS — F51.01 PRIMARY INSOMNIA: ICD-10-CM

## 2021-10-12 RX ORDER — SPIRONOLACTONE 25 MG/1
TABLET ORAL
Qty: 30 TABLET | Refills: 3 | OUTPATIENT
Start: 2021-10-12

## 2021-10-12 RX ORDER — TRAZODONE HYDROCHLORIDE 100 MG/1
TABLET ORAL
Qty: 60 TABLET | Refills: 3 | Status: SHIPPED | OUTPATIENT
Start: 2021-10-12 | End: 2022-03-03

## 2021-11-11 ENCOUNTER — OFFICE VISIT (OUTPATIENT)
Dept: FAMILY MEDICINE CLINIC | Age: 81
End: 2021-11-11
Payer: MEDICARE

## 2021-11-11 VITALS
TEMPERATURE: 97.4 F | SYSTOLIC BLOOD PRESSURE: 121 MMHG | DIASTOLIC BLOOD PRESSURE: 66 MMHG | OXYGEN SATURATION: 97 % | BODY MASS INDEX: 37.55 KG/M2 | HEART RATE: 61 BPM | WEIGHT: 192.25 LBS

## 2021-11-11 DIAGNOSIS — M79.89 LEG SWELLING: ICD-10-CM

## 2021-11-11 DIAGNOSIS — R31.9 URINARY TRACT INFECTION WITH HEMATURIA, SITE UNSPECIFIED: Primary | ICD-10-CM

## 2021-11-11 DIAGNOSIS — N39.0 URINARY TRACT INFECTION WITH HEMATURIA, SITE UNSPECIFIED: Primary | ICD-10-CM

## 2021-11-11 LAB
BILIRUBIN, POC: ABNORMAL
BLOOD URINE, POC: ABNORMAL
CLARITY, POC: ABNORMAL
COLOR, POC: YELLOW
GLUCOSE URINE, POC: ABNORMAL
KETONES, POC: ABNORMAL
LEUKOCYTE EST, POC: ABNORMAL
NITRITE, POC: ABNORMAL
PH, POC: 5
PROTEIN, POC: 30
SPECIFIC GRAVITY, POC: >=1.03
UROBILINOGEN, POC: 0.2

## 2021-11-11 PROCEDURE — 81002 URINALYSIS NONAUTO W/O SCOPE: CPT | Performed by: NURSE PRACTITIONER

## 2021-11-11 PROCEDURE — 99213 OFFICE O/P EST LOW 20 MIN: CPT | Performed by: NURSE PRACTITIONER

## 2021-11-11 RX ORDER — NITROFURANTOIN 25; 75 MG/1; MG/1
100 CAPSULE ORAL 2 TIMES DAILY
Qty: 20 CAPSULE | Refills: 0 | Status: SHIPPED | OUTPATIENT
Start: 2021-11-11 | End: 2021-11-21

## 2021-11-11 ASSESSMENT — ENCOUNTER SYMPTOMS
ABDOMINAL PAIN: 0
NAUSEA: 0
SHORTNESS OF BREATH: 0
DIARRHEA: 0
RHINORRHEA: 0
VOMITING: 0
COUGH: 1
SORE THROAT: 0
BACK PAIN: 1
CHEST TIGHTNESS: 0

## 2021-11-11 NOTE — PROGRESS NOTES
CHIEF COMPLAINT  Chief Complaint   Patient presents with    Dysuria    Urinary Frequency        HPI   Olivia Posada is a 79 y.o. female who presents to the office complaining of pain with urination since Friday evening during her flight home from [facility]. Reports that she noticed some burning during her flight home and symptoms worsened since then. Patient reports that she has been busy going to her specialty physicians that she was unable to come into our office until today. Patient reports urinary urgency, frequency. No known dysuria. Patient denies abdominal pain, nausea, vomiting or diarrhea. Patient reports that she has noticed some increase in leg swelling. Patient reports taking her Lasix but with her urinary symptoms she has only taken half the recommended dose. No Chest pain or chest tightness. No worsening shortness of breath. No other complaints, modifying factors or associated symptoms. Nursing notes reviewed.    Past Medical History:   Diagnosis Date    Arthritis     BCC (basal cell carcinoma of skin)     RT clavicle    Bladder infection     frequently    CAD (coronary artery disease)     stents    Cancer (HCC)     skin    CHF (congestive heart failure) (HCC)     Chronic back pain     COPD (chronic obstructive pulmonary disease) (HCC)     Depression     Depression     Hypercholesteremia     Hypertension     Pain in shoulder [id number]    pain in left shoulder, taking steroid injections for steroid    Reflux     Rheumatic fever     as a child    Sleep apnea     uses CPAP    TIA (transient ischemic attack)     Urinary incontinence     Wears glasses      Past Surgical History:   Procedure Laterality Date    AORTIC VALVE REPLACEMENT      APPENDECTOMY      BLADDER REPAIR      3 TIMES    CARDIAC SURGERY      stents    CARPAL TUNNEL RELEASE      RIGHT    CHOLECYSTECTOMY, LAPAROSCOPIC  01/06/2017    with dr Marcos Kitchen STENT PLACEMENT      has it it done twice    DIAGNOSTIC CARDIAC CATH LAB PROCEDURE      HYSTERECTOMY      JOINT REPLACEMENT      KATHLEEN TKR    NECK SURGERY      OTHER SURGICAL HISTORY  9/28/12    Full Interstim Implant    SHOULDER SURGERY      SKIN CANCER EXCISION      SPINAL FIXATION SURGERY WITH IMPLANT  2001    SPINAL FIXATION SURGERY WITH IMPLANT  2004    SPINAL FIXATION SURGERY WITH IMPLANT  2007    SPINE SURGERY  1999    TONSILLECTOMY      UPPER GASTROINTESTINAL ENDOSCOPY  4/22/11    UPPER GASTROINTESTINAL ENDOSCOPY      UPPER GASTROINTESTINAL ENDOSCOPY  03/23/2017    dilitation     Family History   Problem Relation Age of Onset    Arthritis Mother     Cancer Mother     Depression Mother     Heart Disease Mother     High Blood Pressure Mother     High Cholesterol Mother     Miscarriages / [place] Mother     Stroke Mother     Early Death Mother     Hearing Loss Mother     Mental Illness Mother     Vision Loss Mother     Arthritis Brother     Depression Brother     Diabetes Brother     Hearing Loss Brother     Heart Disease Brother     High Blood Pressure Brother     High Cholesterol Brother     Vision Loss Brother      Social History     Socioeconomic History    Marital status:      Spouse name: Not on file    Number of children: 6    Years of education: 8    Highest education level: Not on file   Occupational History    Occupation: retired   Tobacco Use    Smoking status: Never Smoker    Smokeless tobacco: Never Used   Vaping Use    Vaping Use: Never used   Substance and Sexual Activity    Alcohol use: No    Drug use: No    Sexual activity: Not Currently   Other Topics Concern    Not on file   Social History Narrative    Not on file     Social Determinants of Health     Financial Resource Strain:     Difficulty of Paying Living Expenses: Not on file   Food Insecurity:     Worried About [address] in the Last Year: Not on file    [employer] in the Last Year: Not on file   Transportation Needs:     Lack of Transportation (Medical): Not on file    Lack of Transportation (Non-Medical):  Not on file   Physical Activity:     Days of Exercise per Week: Not on file    Minutes of Exercise per Session: Not on file   Stress:     Feeling of Stress : Not on file   Social Connections:     Frequency of Communication with Friends and Family: Not on file    Frequency of Social Gatherings with Friends and Family: Not on file    Attends Worship Services: Not on file    Active Member of [address] [place] [address] [employer] or Organizations: Not on file    Attends Club or Organization Meetings: Not on file    Marital Status: Not on file   Intimate Partner Violence:     Fear of Current or Ex-Partner: Not on file    Emotionally Abused: Not on file    Physically Abused: Not on file    Sexually Abused: Not on file   Housing Stability:     Unable to Pay for Housing in the Last Year: Not on file    Number of [place] in the Last Year: Not on file    Unstable Housing in the Last Year: Not on file     Current Outpatient Medications   Medication Sig Dispense Refill    nitrofurantoin, macrocrystal-monohydrate, (MACROBID) 100 MG capsule Take 1 capsule by mouth 2 times daily for 10 days 20 capsule 0    traZODone (DESYREL) 100 MG tablet TAKE 1 OR 2 TABLETS AT BEDTIME 60 tablet 3    candesartan (ATACAND) 8 MG tablet Take 8 mg by mouth daily      divalproex (DEPAKOTE) 250 MG DR tablet Take 250 mg by mouth 2 times daily      diclofenac (VOLTAREN) 75 MG EC tablet Take 75 mg by mouth 2 times daily       MYRBETRIQ 50 MG TB24 50 mg daily       metoprolol succinate (TOPROL XL) 25 MG extended release tablet Take 25 mg by mouth daily       acetaminophen (TYLENOL) 325 MG tablet       cetirizine (ZYRTEC) 10 MG tablet TAKE 1 TABLET BY MOUTH DAILY 30 tablet 3    NYAMYC 797019 UNIT/GM powder APPLY 3 TIMES DAILY 60 g 1    citalopram (CELEXA) 20 MG tablet TAKE 1.5 TABLETS BY MOUTH DAILY 45 tablet 5    azelastine (ASTELIN) 0.1 % nasal spray 1 spray by Nasal route 2 times daily Use in each nostril as directed 2 Bottle 1    cycloSPORINE (RESTASIS) 0.05 % ophthalmic emulsion Place 1 drop into both eyes as needed (dry eyes) 1 vial 3    polyethylene glycol (GLYCOLAX) 17 GM/SCOOP powder Take 17 g by mouth daily      Wheat Dextrin (BENEFIBER) POWD Take 4 g by mouth daily      spironolactone (ALDACTONE) 25 MG tablet Take 1 tablet by mouth daily 30 tablet 3    furosemide (LASIX) 40 MG tablet Take 1 tablet by mouth daily (Patient taking differently: Take 40 mg by mouth daily 7/6/2021-f/up at cardiology office - take additional 20mg daily if weight >186#) 60 tablet 3    gabapentin (NEURONTIN) 100 MG capsule Take 100 mg by mouth 3 times daily.  busPIRone (BUSPAR) 5 MG tablet TAKE 1 OR 2 TABLET(S) IN THE EVENING AS NEEDED FOR ANXIETY 60 tablet 4    donepezil (ARICEPT) 10 MG tablet TAKE 1 TABLET IN THE EVENING 30 tablet 11    diphenhydrAMINE (BANOPHEN) 25 MG tablet Take 0.5 tablets by mouth every 12 hours (Patient taking differently: Take 12.5 mg by mouth every 8 hours as needed ) 45 tablet 0    pantoprazole (PROTONIX) 40 MG tablet Take 1 tablet by mouth daily 90 tablet 0    simvastatin (ZOCOR) 40 MG tablet TAKE ONE TABLET NIGHTLY.  aspirin (ASPIRIN 81) 81 MG EC tablet Take 81 mg by mouth daily      candesartan (ATACAND) 8 MG tablet Take 8 mg by mouth daily      HYDROcodone-acetaminophen (NORCO)  MG per tablet Take 1 tablet by mouth every 6 hours as needed.  baclofen (LIORESAL) 10 MG tablet TAKE 1 TABLET EVERY 8 HOURS AS NEEDED (Patient taking differently: Take 10 mg by mouth daily ) 30 tablet 0    Misc.  Devices (ROLLER WALKER) MISC 1 each by Does not apply route daily 1 each 0    isosorbide mononitrate (IMDUR) 30 MG extended release tablet Take 30 mg by mouth daily      albuterol-ipratropium (COMBIVENT RESPIMAT)  MCG/ACT AERS inhaler Inhale 1 puff into the lungs every 6 Left Ear: Tympanic membrane normal.      Nose: Nose normal.      Mouth/Throat:      Mouth: Mucous membranes are moist.      Pharynx: Oropharynx is clear. Eyes:      Extraocular Movements: Extraocular movements intact. Conjunctiva/sclera: Conjunctivae normal.      Pupils: Pupils are equal, round, and reactive to light. Cardiovascular:      Rate and Rhythm: Normal rate. Pulses: Normal pulses. Pulmonary:      Effort: Pulmonary effort is normal.      Breath sounds: Normal breath sounds. Abdominal:      General: Bowel sounds are normal.      Palpations: Abdomen is soft. Tenderness: There is no right CVA tenderness, left CVA tenderness or guarding. Musculoskeletal:      Cervical back: Normal range of motion. Right lower leg: Edema present. Left lower leg: Edema present. Skin:     General: Skin is warm and dry. Capillary Refill: Capillary refill takes 2 to 3 seconds. Findings: No rash. Neurological:      Mental Status: She is alert and oriented to person, place, and time. ASSESSMENT/PLAN:   1. Urinary tract infection with hematuria, site unspecified  Patient presents today with complaints of urinary urgency, frequency, and dysuria. Patient reports that she noticed symptoms during her flight home on Friday evening. Patient denies any abdominal pain, nausea, vomiting or diarrhea. Patient reports chronic back pain but no new or worsening symptoms. Urinalysis obtained which did reveal moderate leuks and blood. Urine culture pending. Patiently placed on Macrobid at this time. Patient courage to drink plenty of fluids and follow-up if symptoms do not improve or worsen. - POCT Urinalysis no Micro  - Culture, Urine    2. Leg swelling  Stable. Patient reports worsening leg swelling since her recent trip. Patient reports that she was sitting with her legs dependent for long period of time. No calf pain, tenderness, redness.   Patient reports that she has been taking Lasix 40 mg daily however today she only took 20 mg because of needing to run errands plus urinary tract infection. Swelling is bilaterally. Patient reports that she has gained a few pounds according to her scale at home. Patient denies any worsening shortness of breath, chest tightness, or pain. Patient encouraged to wear compression stockings, elevate extremities, and take recommended Lasix 40 mg daily. If symptoms do not improve or worsen she would need to be reevaluated. Patient verbalized and acknowledges with plan of care at this time. The note was completed using Dragon voice recognition transcription. Every effort was made to ensure accuracy; however, inadvertent  transcription errors may be present despite my best efforts to edit errors.     Jenell Cranker, APRN - CNP

## 2021-11-13 LAB
ORGANISM: ABNORMAL
URINE CULTURE, ROUTINE: ABNORMAL

## 2021-11-18 RX ORDER — DONEPEZIL HYDROCHLORIDE 10 MG/1
TABLET, FILM COATED ORAL
Qty: 30 TABLET | Refills: 11 | Status: SHIPPED | OUTPATIENT
Start: 2021-11-18 | End: 2022-08-26

## 2021-11-18 NOTE — TELEPHONE ENCOUNTER
Future appt scheduled 03/09/2022                 Last appt 11/11/2021      Last Written 10/20/2020    donepezil (ARICEPT) 10 MG tablet  #30  11 RF

## 2021-11-24 ENCOUNTER — TELEPHONE (OUTPATIENT)
Dept: FAMILY MEDICINE CLINIC | Age: 81
End: 2021-11-24

## 2021-11-24 ENCOUNTER — HOSPITAL ENCOUNTER (INPATIENT)
Age: 81
LOS: 5 days | Discharge: HOME HEALTH | DRG: 683 | End: 2021-11-29
Attending: EMERGENCY MEDICINE | Admitting: INTERNAL MEDICINE
Payer: MEDICARE

## 2021-11-24 ENCOUNTER — APPOINTMENT (OUTPATIENT)
Dept: CT IMAGING | Age: 81
DRG: 683 | End: 2021-11-24
Payer: MEDICARE

## 2021-11-24 ENCOUNTER — APPOINTMENT (OUTPATIENT)
Dept: GENERAL RADIOLOGY | Age: 81
DRG: 683 | End: 2021-11-24
Payer: MEDICARE

## 2021-11-24 DIAGNOSIS — E87.5 HYPERKALEMIA: ICD-10-CM

## 2021-11-24 DIAGNOSIS — S09.90XA CLOSED HEAD INJURY, INITIAL ENCOUNTER: Primary | ICD-10-CM

## 2021-11-24 DIAGNOSIS — S01.01XA LACERATION OF SCALP, INITIAL ENCOUNTER: ICD-10-CM

## 2021-11-24 DIAGNOSIS — N17.9 AKI (ACUTE KIDNEY INJURY) (HCC): ICD-10-CM

## 2021-11-24 LAB
A/G RATIO: 1.6 (ref 1.1–2.2)
ALBUMIN SERPL-MCNC: 3.9 G/DL (ref 3.4–5)
ALP BLD-CCNC: 81 U/L (ref 40–129)
ALT SERPL-CCNC: <5 U/L (ref 10–40)
ANION GAP SERPL CALCULATED.3IONS-SCNC: 6 MMOL/L (ref 3–16)
ANION GAP SERPL CALCULATED.3IONS-SCNC: 9 MMOL/L (ref 3–16)
ANION GAP SERPL CALCULATED.3IONS-SCNC: 9 MMOL/L (ref 3–16)
APTT: 34.2 SEC (ref 26.2–38.6)
AST SERPL-CCNC: 9 U/L (ref 15–37)
BASE EXCESS VENOUS: -5.8 MMOL/L (ref -3–3)
BASOPHILS ABSOLUTE: 0.1 K/UL (ref 0–0.2)
BASOPHILS RELATIVE PERCENT: 1.2 %
BILIRUB SERPL-MCNC: <0.2 MG/DL (ref 0–1)
BILIRUBIN URINE: NEGATIVE
BLOOD, URINE: NEGATIVE
BUN BLDV-MCNC: 30 MG/DL (ref 7–20)
BUN BLDV-MCNC: 30 MG/DL (ref 7–20)
BUN BLDV-MCNC: 32 MG/DL (ref 7–20)
CALCIUM SERPL-MCNC: 8.9 MG/DL (ref 8.3–10.6)
CALCIUM SERPL-MCNC: 9.1 MG/DL (ref 8.3–10.6)
CALCIUM SERPL-MCNC: 9.2 MG/DL (ref 8.3–10.6)
CARBOXYHEMOGLOBIN: 1.5 % (ref 0–1.5)
CHLORIDE BLD-SCNC: 104 MMOL/L (ref 99–110)
CHLORIDE BLD-SCNC: 104 MMOL/L (ref 99–110)
CHLORIDE BLD-SCNC: 106 MMOL/L (ref 99–110)
CLARITY: CLEAR
CO2: 21 MMOL/L (ref 21–32)
CO2: 22 MMOL/L (ref 21–32)
CO2: 24 MMOL/L (ref 21–32)
COLOR: YELLOW
CREAT SERPL-MCNC: 3 MG/DL (ref 0.6–1.2)
CREAT SERPL-MCNC: 3.1 MG/DL (ref 0.6–1.2)
CREAT SERPL-MCNC: 3.3 MG/DL (ref 0.6–1.2)
EKG ATRIAL RATE: 44 BPM
EKG DIAGNOSIS: NORMAL
EKG P AXIS: 56 DEGREES
EKG P-R INTERVAL: 190 MS
EKG Q-T INTERVAL: 484 MS
EKG QRS DURATION: 124 MS
EKG QTC CALCULATION (BAZETT): 413 MS
EKG R AXIS: -21 DEGREES
EKG T AXIS: 14 DEGREES
EKG VENTRICULAR RATE: 44 BPM
EOSINOPHILS ABSOLUTE: 0.1 K/UL (ref 0–0.6)
EOSINOPHILS RELATIVE PERCENT: 1.2 %
GFR AFRICAN AMERICAN: 16
GFR AFRICAN AMERICAN: 17
GFR AFRICAN AMERICAN: 18
GFR NON-AFRICAN AMERICAN: 13
GFR NON-AFRICAN AMERICAN: 14
GFR NON-AFRICAN AMERICAN: 15
GLUCOSE BLD-MCNC: 100 MG/DL (ref 70–99)
GLUCOSE BLD-MCNC: 71 MG/DL (ref 70–99)
GLUCOSE BLD-MCNC: 91 MG/DL (ref 70–99)
GLUCOSE URINE: NEGATIVE MG/DL
HCO3 VENOUS: 21.4 MMOL/L (ref 23–29)
HCT VFR BLD CALC: 35 % (ref 36–48)
HEMOGLOBIN: 11.3 G/DL (ref 12–16)
INR BLD: 1.03 (ref 0.88–1.12)
KETONES, URINE: ABNORMAL MG/DL
LACTIC ACID: 0.6 MMOL/L (ref 0.4–2)
LEUKOCYTE ESTERASE, URINE: NEGATIVE
LYMPHOCYTES ABSOLUTE: 1.9 K/UL (ref 1–5.1)
LYMPHOCYTES RELATIVE PERCENT: 17.5 %
MCH RBC QN AUTO: 29.1 PG (ref 26–34)
MCHC RBC AUTO-ENTMCNC: 32.3 G/DL (ref 31–36)
MCV RBC AUTO: 90.1 FL (ref 80–100)
METHEMOGLOBIN VENOUS: 0.5 %
MICROSCOPIC EXAMINATION: ABNORMAL
MONOCYTES ABSOLUTE: 1 K/UL (ref 0–1.3)
MONOCYTES RELATIVE PERCENT: 8.9 %
NEUTROPHILS ABSOLUTE: 7.6 K/UL (ref 1.7–7.7)
NEUTROPHILS RELATIVE PERCENT: 71.2 %
NITRITE, URINE: NEGATIVE
O2 SAT, VEN: 95 %
O2 THERAPY: ABNORMAL
PCO2, VEN: 49.6 MMHG (ref 40–50)
PDW BLD-RTO: 14.5 % (ref 12.4–15.4)
PH UA: 5.5 (ref 5–8)
PH VENOUS: 7.25 (ref 7.35–7.45)
PHOSPHORUS: 4.6 MG/DL (ref 2.5–4.9)
PLATELET # BLD: 175 K/UL (ref 135–450)
PLATELET SLIDE REVIEW: ADEQUATE
PMV BLD AUTO: 8.2 FL (ref 5–10.5)
PO2, VEN: 77.8 MMHG (ref 25–40)
POTASSIUM REFLEX MAGNESIUM: 8.1 MMOL/L (ref 3.5–5.1)
POTASSIUM SERPL-SCNC: 5.4 MMOL/L (ref 3.5–5.1)
POTASSIUM SERPL-SCNC: 5.9 MMOL/L (ref 3.5–5.1)
PRO-BNP: 497 PG/ML (ref 0–449)
PROTEIN UA: NEGATIVE MG/DL
PROTHROMBIN TIME: 11.6 SEC (ref 9.9–12.7)
RBC # BLD: 3.88 M/UL (ref 4–5.2)
REASON FOR REJECTION: NORMAL
REJECTED TEST: NORMAL
SLIDE REVIEW: ABNORMAL
SODIUM BLD-SCNC: 134 MMOL/L (ref 136–145)
SODIUM BLD-SCNC: 134 MMOL/L (ref 136–145)
SODIUM BLD-SCNC: 137 MMOL/L (ref 136–145)
SPECIFIC GRAVITY UA: 1.02 (ref 1–1.03)
TCO2 CALC VENOUS: 23 MMOL/L
TOTAL PROTEIN: 6.3 G/DL (ref 6.4–8.2)
TROPONIN: <0.01 NG/ML
URINE REFLEX TO CULTURE: ABNORMAL
URINE TYPE: ABNORMAL
UROBILINOGEN, URINE: 0.2 E.U./DL
VALPROIC ACID LEVEL: 7 UG/ML (ref 50–100)
WBC # BLD: 10.7 K/UL (ref 4–11)

## 2021-11-24 PROCEDURE — 93010 ELECTROCARDIOGRAM REPORT: CPT | Performed by: INTERNAL MEDICINE

## 2021-11-24 PROCEDURE — 90471 IMMUNIZATION ADMIN: CPT | Performed by: EMERGENCY MEDICINE

## 2021-11-24 PROCEDURE — 71045 X-RAY EXAM CHEST 1 VIEW: CPT

## 2021-11-24 PROCEDURE — P9612 CATHETERIZE FOR URINE SPEC: HCPCS

## 2021-11-24 PROCEDURE — 99285 EMERGENCY DEPT VISIT HI MDM: CPT

## 2021-11-24 PROCEDURE — 80164 ASSAY DIPROPYLACETIC ACD TOT: CPT

## 2021-11-24 PROCEDURE — 71250 CT THORAX DX C-: CPT

## 2021-11-24 PROCEDURE — 84484 ASSAY OF TROPONIN QUANT: CPT

## 2021-11-24 PROCEDURE — 72170 X-RAY EXAM OF PELVIS: CPT

## 2021-11-24 PROCEDURE — 6370000000 HC RX 637 (ALT 250 FOR IP): Performed by: INTERNAL MEDICINE

## 2021-11-24 PROCEDURE — 51702 INSERT TEMP BLADDER CATH: CPT

## 2021-11-24 PROCEDURE — 85610 PROTHROMBIN TIME: CPT

## 2021-11-24 PROCEDURE — 84100 ASSAY OF PHOSPHORUS: CPT

## 2021-11-24 PROCEDURE — 72125 CT NECK SPINE W/O DYE: CPT

## 2021-11-24 PROCEDURE — 81003 URINALYSIS AUTO W/O SCOPE: CPT

## 2021-11-24 PROCEDURE — 94640 AIRWAY INHALATION TREATMENT: CPT

## 2021-11-24 PROCEDURE — 6370000000 HC RX 637 (ALT 250 FOR IP): Performed by: EMERGENCY MEDICINE

## 2021-11-24 PROCEDURE — 6360000002 HC RX W HCPCS: Performed by: INTERNAL MEDICINE

## 2021-11-24 PROCEDURE — 2580000003 HC RX 258: Performed by: EMERGENCY MEDICINE

## 2021-11-24 PROCEDURE — 96375 TX/PRO/DX INJ NEW DRUG ADDON: CPT

## 2021-11-24 PROCEDURE — 80053 COMPREHEN METABOLIC PANEL: CPT

## 2021-11-24 PROCEDURE — 2060000000 HC ICU INTERMEDIATE R&B

## 2021-11-24 PROCEDURE — 72100 X-RAY EXAM L-S SPINE 2/3 VWS: CPT

## 2021-11-24 PROCEDURE — 85730 THROMBOPLASTIN TIME PARTIAL: CPT

## 2021-11-24 PROCEDURE — 2580000003 HC RX 258: Performed by: INTERNAL MEDICINE

## 2021-11-24 PROCEDURE — 6360000002 HC RX W HCPCS

## 2021-11-24 PROCEDURE — 85025 COMPLETE CBC W/AUTO DIFF WBC: CPT

## 2021-11-24 PROCEDURE — 2500000003 HC RX 250 WO HCPCS: Performed by: INTERNAL MEDICINE

## 2021-11-24 PROCEDURE — 90715 TDAP VACCINE 7 YRS/> IM: CPT | Performed by: EMERGENCY MEDICINE

## 2021-11-24 PROCEDURE — 96374 THER/PROPH/DIAG INJ IV PUSH: CPT

## 2021-11-24 PROCEDURE — 82803 BLOOD GASES ANY COMBINATION: CPT

## 2021-11-24 PROCEDURE — 93005 ELECTROCARDIOGRAM TRACING: CPT | Performed by: INTERNAL MEDICINE

## 2021-11-24 PROCEDURE — 6360000002 HC RX W HCPCS: Performed by: EMERGENCY MEDICINE

## 2021-11-24 PROCEDURE — 83605 ASSAY OF LACTIC ACID: CPT

## 2021-11-24 PROCEDURE — 2500000003 HC RX 250 WO HCPCS: Performed by: EMERGENCY MEDICINE

## 2021-11-24 PROCEDURE — 93005 ELECTROCARDIOGRAM TRACING: CPT | Performed by: EMERGENCY MEDICINE

## 2021-11-24 PROCEDURE — 70450 CT HEAD/BRAIN W/O DYE: CPT

## 2021-11-24 PROCEDURE — 83880 ASSAY OF NATRIURETIC PEPTIDE: CPT

## 2021-11-24 PROCEDURE — 36415 COLL VENOUS BLD VENIPUNCTURE: CPT

## 2021-11-24 RX ORDER — AZELASTINE 1 MG/ML
1 SPRAY, METERED NASAL 2 TIMES DAILY
Status: DISCONTINUED | OUTPATIENT
Start: 2021-11-24 | End: 2021-11-29 | Stop reason: HOSPADM

## 2021-11-24 RX ORDER — DEXTROSE MONOHYDRATE 100 MG/ML
INJECTION, SOLUTION INTRAVENOUS CONTINUOUS
Status: DISCONTINUED | OUTPATIENT
Start: 2021-11-24 | End: 2021-11-24

## 2021-11-24 RX ORDER — CIPROFLOXACIN 250 MG/1
250 TABLET, FILM COATED ORAL 2 TIMES DAILY
Qty: 20 TABLET | Refills: 0 | Status: ON HOLD | OUTPATIENT
Start: 2021-11-24 | End: 2021-12-03 | Stop reason: HOSPADM

## 2021-11-24 RX ORDER — FUROSEMIDE 10 MG/ML
INJECTION INTRAMUSCULAR; INTRAVENOUS
Status: COMPLETED
Start: 2021-11-24 | End: 2021-11-24

## 2021-11-24 RX ORDER — NICOTINE POLACRILEX 4 MG
15 LOZENGE BUCCAL PRN
Status: DISCONTINUED | OUTPATIENT
Start: 2021-11-24 | End: 2021-11-29 | Stop reason: HOSPADM

## 2021-11-24 RX ORDER — SODIUM CHLORIDE 0.9 % (FLUSH) 0.9 %
10 SYRINGE (ML) INJECTION EVERY 12 HOURS SCHEDULED
Status: DISCONTINUED | OUTPATIENT
Start: 2021-11-24 | End: 2021-11-29 | Stop reason: HOSPADM

## 2021-11-24 RX ORDER — ACETAMINOPHEN 650 MG/1
650 SUPPOSITORY RECTAL EVERY 6 HOURS PRN
Status: DISCONTINUED | OUTPATIENT
Start: 2021-11-24 | End: 2021-11-29 | Stop reason: HOSPADM

## 2021-11-24 RX ORDER — ONDANSETRON 2 MG/ML
4 INJECTION INTRAMUSCULAR; INTRAVENOUS EVERY 6 HOURS PRN
Status: DISCONTINUED | OUTPATIENT
Start: 2021-11-24 | End: 2021-11-29 | Stop reason: HOSPADM

## 2021-11-24 RX ORDER — SODIUM CHLORIDE 9 MG/ML
1000 INJECTION, SOLUTION INTRAVENOUS CONTINUOUS
Status: DISCONTINUED | OUTPATIENT
Start: 2021-11-24 | End: 2021-11-25

## 2021-11-24 RX ORDER — DIVALPROEX SODIUM 250 MG/1
250 TABLET, DELAYED RELEASE ORAL 2 TIMES DAILY
Status: DISCONTINUED | OUTPATIENT
Start: 2021-11-24 | End: 2021-11-29 | Stop reason: HOSPADM

## 2021-11-24 RX ORDER — POTASSIUM CHLORIDE 7.45 MG/ML
10 INJECTION INTRAVENOUS PRN
Status: DISCONTINUED | OUTPATIENT
Start: 2021-11-24 | End: 2021-11-25

## 2021-11-24 RX ORDER — ACETAMINOPHEN 500 MG
1000 TABLET ORAL ONCE
Status: COMPLETED | OUTPATIENT
Start: 2021-11-24 | End: 2021-11-24

## 2021-11-24 RX ORDER — ALBUTEROL SULFATE 2.5 MG/3ML
2.5 SOLUTION RESPIRATORY (INHALATION) ONCE
Status: COMPLETED | OUTPATIENT
Start: 2021-11-24 | End: 2021-11-24

## 2021-11-24 RX ORDER — HEPARIN SODIUM 5000 [USP'U]/ML
5000 INJECTION, SOLUTION INTRAVENOUS; SUBCUTANEOUS EVERY 8 HOURS SCHEDULED
Status: DISCONTINUED | OUTPATIENT
Start: 2021-11-24 | End: 2021-11-29 | Stop reason: HOSPADM

## 2021-11-24 RX ORDER — MAGNESIUM SULFATE IN WATER 40 MG/ML
2000 INJECTION, SOLUTION INTRAVENOUS PRN
Status: DISCONTINUED | OUTPATIENT
Start: 2021-11-24 | End: 2021-11-25

## 2021-11-24 RX ORDER — ACETAMINOPHEN 325 MG/1
650 TABLET ORAL EVERY 6 HOURS PRN
Status: DISCONTINUED | OUTPATIENT
Start: 2021-11-24 | End: 2021-11-29 | Stop reason: HOSPADM

## 2021-11-24 RX ORDER — PANTOPRAZOLE SODIUM 40 MG/1
40 TABLET, DELAYED RELEASE ORAL DAILY
Status: DISCONTINUED | OUTPATIENT
Start: 2021-11-25 | End: 2021-11-29 | Stop reason: HOSPADM

## 2021-11-24 RX ORDER — SODIUM CHLORIDE 0.9 % (FLUSH) 0.9 %
10 SYRINGE (ML) INJECTION PRN
Status: DISCONTINUED | OUTPATIENT
Start: 2021-11-24 | End: 2021-11-29 | Stop reason: HOSPADM

## 2021-11-24 RX ORDER — ATORVASTATIN CALCIUM 10 MG/1
20 TABLET, FILM COATED ORAL DAILY
Status: DISCONTINUED | OUTPATIENT
Start: 2021-11-25 | End: 2021-11-29 | Stop reason: HOSPADM

## 2021-11-24 RX ORDER — DEXTROSE MONOHYDRATE 50 MG/ML
100 INJECTION, SOLUTION INTRAVENOUS PRN
Status: DISCONTINUED | OUTPATIENT
Start: 2021-11-24 | End: 2021-11-29 | Stop reason: HOSPADM

## 2021-11-24 RX ORDER — SODIUM CHLORIDE 9 MG/ML
25 INJECTION, SOLUTION INTRAVENOUS PRN
Status: DISCONTINUED | OUTPATIENT
Start: 2021-11-24 | End: 2021-11-29 | Stop reason: HOSPADM

## 2021-11-24 RX ORDER — CALCIUM GLUCONATE 94 MG/ML
1000 INJECTION, SOLUTION INTRAVENOUS ONCE
Status: COMPLETED | OUTPATIENT
Start: 2021-11-24 | End: 2021-11-24

## 2021-11-24 RX ORDER — CALCIUM GLUCONATE 20 MG/ML
1000 INJECTION, SOLUTION INTRAVENOUS ONCE
Status: COMPLETED | OUTPATIENT
Start: 2021-11-24 | End: 2021-11-24

## 2021-11-24 RX ORDER — DEXTROSE MONOHYDRATE 25 G/50ML
25 INJECTION, SOLUTION INTRAVENOUS ONCE
Status: COMPLETED | OUTPATIENT
Start: 2021-11-24 | End: 2021-11-24

## 2021-11-24 RX ORDER — TROSPIUM CHLORIDE 20 MG/1
20 TABLET, FILM COATED ORAL DAILY
Status: DISCONTINUED | OUTPATIENT
Start: 2021-11-25 | End: 2021-11-29 | Stop reason: HOSPADM

## 2021-11-24 RX ORDER — PROMETHAZINE HYDROCHLORIDE 25 MG/1
12.5 TABLET ORAL EVERY 6 HOURS PRN
Status: DISCONTINUED | OUTPATIENT
Start: 2021-11-24 | End: 2021-11-29 | Stop reason: HOSPADM

## 2021-11-24 RX ORDER — FUROSEMIDE 10 MG/ML
20 INJECTION INTRAMUSCULAR; INTRAVENOUS ONCE
Status: COMPLETED | OUTPATIENT
Start: 2021-11-24 | End: 2021-11-24

## 2021-11-24 RX ORDER — DONEPEZIL HYDROCHLORIDE 5 MG/1
10 TABLET, FILM COATED ORAL NIGHTLY
Status: DISCONTINUED | OUTPATIENT
Start: 2021-11-24 | End: 2021-11-29 | Stop reason: HOSPADM

## 2021-11-24 RX ORDER — ASPIRIN 81 MG/1
81 TABLET ORAL DAILY
Status: DISCONTINUED | OUTPATIENT
Start: 2021-11-25 | End: 2021-11-29 | Stop reason: HOSPADM

## 2021-11-24 RX ORDER — FUROSEMIDE 10 MG/ML
80 INJECTION INTRAMUSCULAR; INTRAVENOUS ONCE
Status: COMPLETED | OUTPATIENT
Start: 2021-11-24 | End: 2021-11-24

## 2021-11-24 RX ORDER — DEXTROSE MONOHYDRATE 25 G/50ML
12.5 INJECTION, SOLUTION INTRAVENOUS PRN
Status: DISCONTINUED | OUTPATIENT
Start: 2021-11-24 | End: 2021-11-29 | Stop reason: HOSPADM

## 2021-11-24 RX ORDER — GABAPENTIN 100 MG/1
100 CAPSULE ORAL 3 TIMES DAILY
Status: DISCONTINUED | OUTPATIENT
Start: 2021-11-24 | End: 2021-11-29 | Stop reason: HOSPADM

## 2021-11-24 RX ADMIN — CALCIUM GLUCONATE 1000 MG: 98 INJECTION, SOLUTION INTRAVENOUS at 17:57

## 2021-11-24 RX ADMIN — INSULIN HUMAN 10 UNITS: 100 INJECTION, SOLUTION PARENTERAL at 20:05

## 2021-11-24 RX ADMIN — ACETAMINOPHEN 1000 MG: 500 TABLET ORAL at 17:57

## 2021-11-24 RX ADMIN — TETANUS TOXOID, REDUCED DIPHTHERIA TOXOID AND ACELLULAR PERTUSSIS VACCINE, ADSORBED 0.5 ML: 5; 2.5; 8; 8; 2.5 SUSPENSION INTRAMUSCULAR at 17:38

## 2021-11-24 RX ADMIN — FUROSEMIDE 20 MG: 10 INJECTION, SOLUTION INTRAMUSCULAR; INTRAVENOUS at 18:35

## 2021-11-24 RX ADMIN — Medication 3 ML: at 15:28

## 2021-11-24 RX ADMIN — HEPARIN SODIUM 5000 UNITS: 5000 INJECTION INTRAVENOUS; SUBCUTANEOUS at 23:44

## 2021-11-24 RX ADMIN — INSULIN HUMAN 10 UNITS: 100 INJECTION, SOLUTION PARENTERAL at 17:35

## 2021-11-24 RX ADMIN — ALBUTEROL SULFATE 2.5 MG: 2.5 SOLUTION RESPIRATORY (INHALATION) at 18:39

## 2021-11-24 RX ADMIN — SODIUM BICARBONATE 50 MEQ: 84 INJECTION, SOLUTION INTRAVENOUS at 20:04

## 2021-11-24 RX ADMIN — SODIUM CHLORIDE 1000 ML: 9 INJECTION, SOLUTION INTRAVENOUS at 15:46

## 2021-11-24 RX ADMIN — CALCIUM GLUCONATE 1000 MG: 20 INJECTION, SOLUTION INTRAVENOUS at 20:10

## 2021-11-24 RX ADMIN — DEXTROSE MONOHYDRATE 25 G: 25 INJECTION, SOLUTION INTRAVENOUS at 17:34

## 2021-11-24 RX ADMIN — FUROSEMIDE 80 MG: 10 INJECTION INTRAMUSCULAR; INTRAVENOUS at 20:04

## 2021-11-24 RX ADMIN — SODIUM ZIRCONIUM CYCLOSILICATE 10 G: 5 POWDER, FOR SUSPENSION ORAL at 17:42

## 2021-11-24 RX ADMIN — FUROSEMIDE 80 MG: 10 INJECTION, SOLUTION INTRAMUSCULAR; INTRAVENOUS at 20:04

## 2021-11-24 RX ADMIN — SODIUM CHLORIDE, PRESERVATIVE FREE 10 ML: 5 INJECTION INTRAVENOUS at 23:45

## 2021-11-24 RX ADMIN — DEXTROSE MONOHYDRATE 25 G: 25 INJECTION, SOLUTION INTRAVENOUS at 20:05

## 2021-11-24 ASSESSMENT — PAIN SCALES - GENERAL
PAINLEVEL_OUTOF10: 8
PAINLEVEL_OUTOF10: 8

## 2021-11-24 ASSESSMENT — PAIN DESCRIPTION - LOCATION: LOCATION: HEAD

## 2021-11-24 ASSESSMENT — PAIN DESCRIPTION - DESCRIPTORS: DESCRIPTORS: HEADACHE

## 2021-11-24 ASSESSMENT — PAIN DESCRIPTION - PAIN TYPE: TYPE: ACUTE PAIN

## 2021-11-24 NOTE — TELEPHONE ENCOUNTER
Patient seen on the 11th and was prescribed Macrobid, urine culture was positive. Patient said she finished abx yesterday but is still having the frequent and burning when she urinates.   Requesting to have more abx prescribed

## 2021-11-24 NOTE — ED NOTES
Writer placed patient on purewick external catheter at this time. Patient instructed to pee when possible patient verbalized understanding.      Lisa Vega RN  11/24/21 9714

## 2021-11-24 NOTE — ED NOTES
Called [place]. [address] [facility] @1677  Per Jama Quesada MARIO k8   returned page @2006       [facility]  11/24/21 1828

## 2021-11-24 NOTE — Clinical Note
Patient Class: Inpatient [101]   REQUIRED: Diagnosis: MARIO (acute kidney injury) ([facility] [[id number]]   Estimated Length of Stay: Estimated stay of more than 2 midnights   Admitting Provider: Maged Marie [[id number]]   Telemetry/Cardiac Monitoring Required?: Yes

## 2021-11-24 NOTE — ED NOTES
Phillipr performed wound care on patient's laceration using sterile technique to left sided head laceration. Writer irrigated laceration with 200 cc of sodium chloride irrigation solution, then scrubbed wound with chlorhexidine solution and soldium chloride soaked gauze, wound then irrigated with 100 cc of sodium chloride irrigation solution. Patient tolerated well. Dr. Moreira Poster aware. Writer will continue to monitor.        Annabelle Aguilar RN  11/24/21 0353

## 2021-11-24 NOTE — ED NOTES
RN to attempt IV start x1, was able to obtain bloodwork at this time. No IV access started. Dr. Ladi White aware.      Joselito Duncan, MARINO  11/24/21 5007

## 2021-11-25 ENCOUNTER — APPOINTMENT (OUTPATIENT)
Dept: GENERAL RADIOLOGY | Age: 81
DRG: 683 | End: 2021-11-25
Payer: MEDICARE

## 2021-11-25 LAB
ANION GAP SERPL CALCULATED.3IONS-SCNC: 9 MMOL/L (ref 3–16)
ANION GAP SERPL CALCULATED.3IONS-SCNC: 9 MMOL/L (ref 3–16)
BASOPHILS ABSOLUTE: 0.1 K/UL (ref 0–0.2)
BASOPHILS RELATIVE PERCENT: 1.4 %
BUN BLDV-MCNC: 29 MG/DL (ref 7–20)
BUN BLDV-MCNC: 30 MG/DL (ref 7–20)
CALCIUM SERPL-MCNC: 9.1 MG/DL (ref 8.3–10.6)
CALCIUM SERPL-MCNC: 9.5 MG/DL (ref 8.3–10.6)
CHLORIDE BLD-SCNC: 102 MMOL/L (ref 99–110)
CHLORIDE BLD-SCNC: 102 MMOL/L (ref 99–110)
CO2: 25 MMOL/L (ref 21–32)
CO2: 28 MMOL/L (ref 21–32)
CREAT SERPL-MCNC: 2.3 MG/DL (ref 0.6–1.2)
CREAT SERPL-MCNC: 2.8 MG/DL (ref 0.6–1.2)
EKG ATRIAL RATE: 46 BPM
EKG DIAGNOSIS: NORMAL
EKG P AXIS: 1 DEGREES
EKG P-R INTERVAL: 198 MS
EKG Q-T INTERVAL: 480 MS
EKG QRS DURATION: 102 MS
EKG QTC CALCULATION (BAZETT): 420 MS
EKG R AXIS: -18 DEGREES
EKG T AXIS: -2 DEGREES
EKG VENTRICULAR RATE: 46 BPM
EOSINOPHILS ABSOLUTE: 0.2 K/UL (ref 0–0.6)
EOSINOPHILS RELATIVE PERCENT: 2.8 %
GFR AFRICAN AMERICAN: 20
GFR AFRICAN AMERICAN: 25
GFR NON-AFRICAN AMERICAN: 16
GFR NON-AFRICAN AMERICAN: 20
GLUCOSE BLD-MCNC: 121 MG/DL (ref 70–99)
GLUCOSE BLD-MCNC: 123 MG/DL (ref 70–99)
GLUCOSE BLD-MCNC: 161 MG/DL (ref 70–99)
GLUCOSE BLD-MCNC: 80 MG/DL (ref 70–99)
GLUCOSE BLD-MCNC: 86 MG/DL (ref 70–99)
HCT VFR BLD CALC: 33.7 % (ref 36–48)
HEMOGLOBIN: 11.1 G/DL (ref 12–16)
LYMPHOCYTES ABSOLUTE: 1.5 K/UL (ref 1–5.1)
LYMPHOCYTES RELATIVE PERCENT: 20.7 %
MCH RBC QN AUTO: 28.6 PG (ref 26–34)
MCHC RBC AUTO-ENTMCNC: 33 G/DL (ref 31–36)
MCV RBC AUTO: 86.6 FL (ref 80–100)
MONOCYTES ABSOLUTE: 0.6 K/UL (ref 0–1.3)
MONOCYTES RELATIVE PERCENT: 8.1 %
NEUTROPHILS ABSOLUTE: 4.8 K/UL (ref 1.7–7.7)
NEUTROPHILS RELATIVE PERCENT: 67 %
PDW BLD-RTO: 14.3 % (ref 12.4–15.4)
PERFORMED ON: ABNORMAL
PERFORMED ON: ABNORMAL
PERFORMED ON: NORMAL
PLATELET # BLD: 164 K/UL (ref 135–450)
PMV BLD AUTO: 7.4 FL (ref 5–10.5)
POTASSIUM REFLEX MAGNESIUM: 6.2 MMOL/L (ref 3.5–5.1)
POTASSIUM SERPL-SCNC: 5.8 MMOL/L (ref 3.5–5.1)
RBC # BLD: 3.89 M/UL (ref 4–5.2)
SODIUM BLD-SCNC: 136 MMOL/L (ref 136–145)
SODIUM BLD-SCNC: 139 MMOL/L (ref 136–145)
WBC # BLD: 7.2 K/UL (ref 4–11)

## 2021-11-25 PROCEDURE — 93010 ELECTROCARDIOGRAM REPORT: CPT | Performed by: INTERNAL MEDICINE

## 2021-11-25 PROCEDURE — 73560 X-RAY EXAM OF KNEE 1 OR 2: CPT

## 2021-11-25 PROCEDURE — 80048 BASIC METABOLIC PNL TOTAL CA: CPT

## 2021-11-25 PROCEDURE — 6370000000 HC RX 637 (ALT 250 FOR IP): Performed by: HOSPITALIST

## 2021-11-25 PROCEDURE — 36415 COLL VENOUS BLD VENIPUNCTURE: CPT

## 2021-11-25 PROCEDURE — 2580000003 HC RX 258: Performed by: INTERNAL MEDICINE

## 2021-11-25 PROCEDURE — 6360000002 HC RX W HCPCS: Performed by: INTERNAL MEDICINE

## 2021-11-25 PROCEDURE — 85025 COMPLETE CBC W/AUTO DIFF WBC: CPT

## 2021-11-25 PROCEDURE — 6370000000 HC RX 637 (ALT 250 FOR IP): Performed by: INTERNAL MEDICINE

## 2021-11-25 PROCEDURE — 2060000000 HC ICU INTERMEDIATE R&B

## 2021-11-25 RX ORDER — IPRATROPIUM BROMIDE AND ALBUTEROL SULFATE 2.5; .5 MG/3ML; MG/3ML
1 SOLUTION RESPIRATORY (INHALATION) EVERY 6 HOURS PRN
Status: DISCONTINUED | OUTPATIENT
Start: 2021-11-25 | End: 2021-11-29 | Stop reason: HOSPADM

## 2021-11-25 RX ORDER — SODIUM POLYSTYRENE SULFONATE 15 G/60ML
30 SUSPENSION ORAL; RECTAL ONCE
Status: COMPLETED | OUTPATIENT
Start: 2021-11-25 | End: 2021-11-25

## 2021-11-25 RX ORDER — HYDROCODONE BITARTRATE AND ACETAMINOPHEN 5; 325 MG/1; MG/1
1 TABLET ORAL EVERY 6 HOURS PRN
Status: DISCONTINUED | OUTPATIENT
Start: 2021-11-25 | End: 2021-11-29 | Stop reason: HOSPADM

## 2021-11-25 RX ADMIN — HEPARIN SODIUM 5000 UNITS: 5000 INJECTION INTRAVENOUS; SUBCUTANEOUS at 22:05

## 2021-11-25 RX ADMIN — DONEPEZIL HYDROCHLORIDE 10 MG: 5 TABLET, FILM COATED ORAL at 22:01

## 2021-11-25 RX ADMIN — GABAPENTIN 100 MG: 100 CAPSULE ORAL at 07:56

## 2021-11-25 RX ADMIN — SODIUM POLYSTYRENE SULFONATE 30 G: 15 SUSPENSION ORAL; RECTAL at 11:22

## 2021-11-25 RX ADMIN — TROSPIUM CHLORIDE 20 MG: 20 TABLET, FILM COATED ORAL at 07:56

## 2021-11-25 RX ADMIN — SODIUM CHLORIDE, PRESERVATIVE FREE 10 ML: 5 INJECTION INTRAVENOUS at 07:57

## 2021-11-25 RX ADMIN — GABAPENTIN 100 MG: 100 CAPSULE ORAL at 13:14

## 2021-11-25 RX ADMIN — SODIUM CHLORIDE, PRESERVATIVE FREE 10 ML: 5 INJECTION INTRAVENOUS at 22:04

## 2021-11-25 RX ADMIN — SODIUM ZIRCONIUM CYCLOSILICATE 10 G: 10 POWDER, FOR SUSPENSION ORAL at 22:02

## 2021-11-25 RX ADMIN — PANTOPRAZOLE SODIUM 40 MG: 40 TABLET, DELAYED RELEASE ORAL at 07:56

## 2021-11-25 RX ADMIN — DIVALPROEX SODIUM 250 MG: 250 TABLET, DELAYED RELEASE ORAL at 07:56

## 2021-11-25 RX ADMIN — GABAPENTIN 100 MG: 100 CAPSULE ORAL at 22:01

## 2021-11-25 RX ADMIN — HEPARIN SODIUM 5000 UNITS: 5000 INJECTION INTRAVENOUS; SUBCUTANEOUS at 05:13

## 2021-11-25 RX ADMIN — ATORVASTATIN CALCIUM 20 MG: 10 TABLET, FILM COATED ORAL at 07:56

## 2021-11-25 RX ADMIN — ASPIRIN 81 MG: 81 TABLET, COATED ORAL at 07:57

## 2021-11-25 RX ADMIN — HEPARIN SODIUM 5000 UNITS: 5000 INJECTION INTRAVENOUS; SUBCUTANEOUS at 13:14

## 2021-11-25 RX ADMIN — ACETAMINOPHEN 650 MG: 325 TABLET ORAL at 04:47

## 2021-11-25 RX ADMIN — DIVALPROEX SODIUM 250 MG: 250 TABLET, DELAYED RELEASE ORAL at 22:01

## 2021-11-25 RX ADMIN — HYDROCODONE BITARTRATE AND ACETAMINOPHEN 1 TABLET: 5; 325 TABLET ORAL at 13:14

## 2021-11-25 ASSESSMENT — PAIN DESCRIPTION - LOCATION
LOCATION: HEAD
LOCATION: HEAD;KNEE
LOCATION: HEAD;LEG

## 2021-11-25 ASSESSMENT — PAIN SCALES - GENERAL
PAINLEVEL_OUTOF10: 8
PAINLEVEL_OUTOF10: 10
PAINLEVEL_OUTOF10: 0
PAINLEVEL_OUTOF10: 8
PAINLEVEL_OUTOF10: 0
PAINLEVEL_OUTOF10: 8

## 2021-11-25 ASSESSMENT — PAIN DESCRIPTION - ORIENTATION
ORIENTATION: LEFT
ORIENTATION: LEFT

## 2021-11-25 NOTE — ED NOTES
Per Dr. Pauline Hanks hold Kemper Mail for potassium about 5.4 after BMP redraw results at 2045. Writer will continue to monitor.      Madhu Shepherd RN  11/24/21 2042

## 2021-11-25 NOTE — CONSULTS
Call from [facility] at 608 p.m. regarding this patient with a potassium of 8.1  Creatinine 3.3. Blood pressure in the range of 100  Spoke with  ER physician  Patient most likely will need urgent dialysis. Requested to place a femoral dialysis catheter. He is also going to contact ICU and hospitalist.  Meantime I asked to place a Prince catheter in case she has urinary retention. She got the cocktail including Lokelma and D50 calcium. She also got some Lasix 20 mg although probably will not work with this level of creatinine her blood pressure is soft. As per Dr. Kimberley Silverman patient has edema. Patient has bradycardia. Start D10 with calcium gluconate and calcium and insulin meantime.   Urgent dialysis being arranged  I'm heading to [facility] to see the patient in the ER

## 2021-11-25 NOTE — CONSULTS
[address] [address]                                  CONSULTATION    PATIENT NAME: Maurice Jim                     :        1940  MED REC NO:   [id number]                          ROOM:       2376  ACCOUNT NO:   [de-identified]                           ADMIT DATE: 2021  PROVIDER:     Zachary Thorne MD    CONSULT DATE:  2021    ADMITTING PHYSICIAN:  Dr. Radha Simmons. REASON FOR ADMISSION:   patient with a fall, laceration scalp found to  have a potassium of 8.1, with bradycardia, broad QRS complex. urgent consultation   for life-threatening hyperkalemia. EKG wide  QRS complex,  sinus bradycardia      Immediately, I was asked to place a Prince catheter. She got the  cocktail, gave her Lasix 80 mg. HISTORY OF PRESENTING COMPLAINT:  This is an 23-year-old   female who I got a call around 6 p.m. regarding the above, discussed  with Dr. Radha Simmons, discussed with Dr. Pauline Hanks, hospitalist, discussed  with the patient's nurse, plan made. Initial concern was for urgent dialysis because of high potassium,  bradycardia. I reviewed her previous EKG. In fact, she was known to have sinus  bradycardia with a first-degree block, but her QRS complex on this one  was 124 as compared to previous in 2021, where it was 96. I am seeing the patient now in the emergency department with the  daughter present. I see that the patient was having some difficulty in urination in the  way of dribbling, so she reduced her Lasix significantly, but was taking  potassium. It does not look like she was taking any nonsteroidal, this  was obtained from the patient who was seen at the ER, who was little  sleepy, but the daughter was present also. Reviewed her extensive history, discussed with her daughter. She actually was brought in because of fall and laceration probably due  to muscle weakness.     REVIEW OF SYSTEMS:  GENERAL:  Good appetite. No fever, chills. CARDIAC:  No chest pain. PULMONARY:  No shortness of breath. GENITOURINARY:  Some difficulty urination with frequency. VASCULAR:  No claudication. GI:  No nausea, vomiting, or diarrhea. Review of the rest of systems is negative. PAST HISTORY:  Coronary artery disease, many stents in the past at  [facility] setting by Dr. Didier Mcnamara. History of congestive  heart failure, probably diastolic. History of urinary tract infection, history of basal cell carcinoma,  history of COPD, depression, rheumatic fever. She also has aortic valve replaced in 2019 as per daughter at [facility]:  . Never smoker. No alcohol. No other relevant family history. MEDICATIONS:  Her initial medications include nitrofurantoin, trazodone,  Atacand, Depakote, Voltaren tablets, metoprolol, cetirizine, citalopram,  spironolactone, Lasix 40 mg daily, gabapentin, BuSpar, Protonix,  aspirin, baclofen, amlodipine, Nitrostat. ALLERGIES:  Include AZITHROMYCIN, Lang Lopez. PHYSICAL EXAMINATION:  GENERAL:  A very pleasant female,sleepy  bleeding from her  Scalpwhere she has a laceration, seen in the ER, daughter present now. [facility] VITAL SIGNS:  Blood pressure 105/60, pulse was 45. HEENT:  Head normocephalic, atraumatic. Conjunctivae, no icterus. CARDIAC:  She has normal heart sounds with ejection systolic murmur. NECK:  JVD, difficult to see, but she was lying down, not raised. CHEST:  Reduced breath sounds. ABDOMEN:  Soft, nontender. Bowel sounds present. EXTREMITIES:  Bilateral lower extremities, she has 1+ edema. NEUROLOGIC:  She will wake up and will be fully alert, but then fall  asleep. LABORATORY DATA:  Her initial labs as I mentioned, showed sodium 134,  potassium 8.1, CO2 104, BUN 30, creatinine 3.3. Her baseline creatinine was 1.5 in 06/2021, and potassium was 5.1.     Her urinalysis shows specific gravity of 1.020, negative for protein,  negative for blood. ASSESSMENT AND PLAN:    Severe life-threatening hyperkalemia   managed initially with cocktail, given Lasix, placed Prince catheter. Her hyperkalemia is related due to the use of Atacand, spironolactone,nonsteroidals, and on top of that, most likely urinary retention. Immediately, Prince catheter was placed and accordingly, 500 mL of urine came. She already got cocktail    EKG, broad QRS complex with a QRS duration of 124. Sinus bradycardia. known to have sinus bradycardia   before with first-degree block. Initial discussion with Dr. Jessi Gonzalez, [place] [id number] physician, was to immediately  place femoral dialysis catheter for urgent dialysis. Meantime, while this was done and in my discussion with them, potassium has started coming down. The patient will be closely monitored. Continue with low potassium diet. Prince   Lasix 80 mg. Lokelma   RepeatLabs     MARIO/CKD 3  Creat 3.2  Was 1.5 in 6/2021  Urine in active on admission   CT of kidneys in 11/2020    normal     TAVR in 2028 at      History of coronary artery disease  previous stents placement last in 2017  Under care of  team     HFpEF   echo in 50/2554, grade 1diastolic dysfunction. History of recurrent UTI.     urinary retention. This could be due to her pain medication. Dabetic. Controlled   A1C 6.1 2019    Critical care time spent is 45 minutes.         Ingrid Gunderson MD    D: 11/24/2021 20:37:32       T: 11/24/2021 21:39:03     [email or web address]  Job#: [id number]     Doc#: [id number]    CC:

## 2021-11-25 NOTE — PROGRESS NOTES
Spoke with the nurse Sagar Chinchilla she has a Prince catheter 500 cc urine came. Blood pressure now 115. Repeat potassium now came back 5.9. I reviewed her previous EKG she does have sinus bradycardia even in June 2021 although QRS was not as broad as this one.   Current  one in June was 96  Asked  her to give another dose of 80 mg of Lasix IV ×1

## 2021-11-25 NOTE — PROGRESS NOTES
11/25/21 0220   RT Protocol   History Pulmonary Disease 0   Respiratory pattern 0   Breath sounds 2   Cough 0   Indications for Bronchodilator Therapy On home bronchodilators   Bronchodilator Assessment Score 2

## 2021-11-25 NOTE — ED PROVIDER NOTES
CHIEF COMPLAINT  Fall (per squad report patient was at her pain mgt appt when she lost her footing causing her to fall. pt hit the top of her head causing a laceration. pt denies any LOC)      HISTORY OF PRESENT ILLNESS  Jake Martinez is a 79 y.o. female with a history of COPD, hypertension, CAD, CHF, TIA, chronic musculoskeletal pain especially of the neck and lower back who presents to the ED complaining of fall with head injury. Patient was sitting in the waiting room of her pain management clinic when she stood up and was reported to have lost her balance falling forward and striking her head against a piece of furniture. Unclear if there was loss of consciousness however she denies it. Reportedly sustained a scalp laceration. Patient is a very poor historian and is somnolent, possibly overmedicated. She denies taking blood thinners. No report of recent illness or other traumatic events. No other complaints, modifying factors or associated symptoms. I have reviewed the following from the nursing documentation.     Past Medical History:   Diagnosis Date    Arthritis     BCC (basal cell carcinoma of skin)     RT clavicle    Bladder infection     frequently    CAD (coronary artery disease)     stents    Cancer (HCC)     skin    CHF (congestive heart failure) (HCC)     Chronic back pain     COPD (chronic obstructive pulmonary disease) (HCC)     Depression     Depression     Hypercholesteremia     Hypertension     Pain in shoulder [id number]    pain in left shoulder, taking steroid injections for steroid    Reflux     Rheumatic fever     as a child    Sleep apnea     uses CPAP    TIA (transient ischemic attack)     Urinary incontinence     Wears glasses      Past Surgical History:   Procedure Laterality Date    AORTIC VALVE REPLACEMENT      APPENDECTOMY      BLADDER REPAIR      3 TIMES    CARDIAC SURGERY      stents    CARPAL TUNNEL RELEASE      RIGHT    CHOLECYSTECTOMY, LAPAROSCOPIC  01/06/2017    with dr Candy Rogers      has it it done twice    DIAGNOSTIC CARDIAC CATH LAB PROCEDURE      HYSTERECTOMY      JOINT REPLACEMENT      KATHLEEN TKR    NECK SURGERY      OTHER SURGICAL HISTORY  9/28/12    Full Interstim Implant    SHOULDER SURGERY      SKIN CANCER EXCISION      SPINAL FIXATION SURGERY WITH IMPLANT  2001    SPINAL FIXATION SURGERY WITH IMPLANT  2004    SPINAL FIXATION SURGERY WITH IMPLANT  2007    SPINE SURGERY  1999    TONSILLECTOMY      UPPER GASTROINTESTINAL ENDOSCOPY  4/22/11    UPPER GASTROINTESTINAL ENDOSCOPY      UPPER GASTROINTESTINAL ENDOSCOPY  03/23/2017    dilitation     Family History   Problem Relation Age of Onset    Arthritis Mother     Cancer Mother     Depression Mother     Heart Disease Mother     High Blood Pressure Mother     High Cholesterol Mother     Miscarriages / Kenney Beer Mother     Stroke Mother     Early Death Mother     Hearing Loss Mother     Mental Illness Mother     Vision Loss Mother     Arthritis Brother     Depression Brother     Diabetes Brother     Hearing Loss Brother     Heart Disease Brother     High Blood Pressure Brother     High Cholesterol Brother     Vision Loss Brother      Social History     Socioeconomic History    Marital status:      Spouse name: Not on file    Number of children: 6    Years of education: 8    Highest education level: Not on file   Occupational History    Occupation: retired   Tobacco Use    Smoking status: Never Smoker    Smokeless tobacco: Never Used   Vaping Use    Vaping Use: Never used   Substance and Sexual Activity    Alcohol use: No    Drug use: No    Sexual activity: Not Currently   Other Topics Concern    Not on file   Social History Narrative    Not on file     Social Determinants of Health     Financial Resource Strain:     Difficulty of Paying Living Expenses: Not on file   Food PRN Lafonda Situ, DO        Or    ondansetron (ZOFRAN) injection 4 mg  4 mg IntraVENous Q6H PRN Ramona Cantu, DO        acetaminophen (TYLENOL) tablet 650 mg  650 mg Oral Q6H [facility]        Or    acetaminophen (TYLENOL) suppository 650 mg  650 mg Rectal Q6H PRN mad TEJINDER Cantu, DO        heparin (porcine) injection 5,000 Units  5,000 Units SubCUTAneous 3 times per day Aris Lee Cantu, DO        glucose (GLUTOSE) 40 % oral gel 15 g  15 g Oral PRN [facility]        dextrose 50 % IV solution  12.5 g IntraVENous PRN Aris Lee Cantu, DO        glucagon (rDNA) injection 1 mg  1 mg IntraMUSCular PRN nessa Cantu DO        dextrose 5 % solution  100 mL/hr IntraVENous PRN nessa Cantu DO        [START ON 11/25/2021] sodium zirconium cyclosilicate (LOKELMA) oral suspension 10 g  10 g Oral Once Lafonda Situ, DO        [START ON 11/25/2021] atorvastatin (LIPITOR) tablet 20 mg  20 mg Oral Daily [facility]        [START ON 11/25/2021] pantoprazole (PROTONIX) tablet 40 mg  40 mg Oral Daily [facility]        [START ON 11/25/2021] trospium (SANCTURA) tablet 20 mg  20 mg Oral Daily [facility]        gabapentin (NEURONTIN) capsule 100 mg  100 mg Oral TID Arismeghan Cantu DO        divalproex (DEPAKOTE) DR tablet 250 mg  250 mg Oral BID [facility]        albuterol-ipratropium (COMBIVENT RESPIMAT)  MCG/ACT inhaler 1 puff  1 puff Inhalation Q6H Jong Cantu DO        [START ON 11/25/2021] aspirin EC tablet 81 mg  81 mg Oral Daily [facility]        azelastine (ASTELIN) 0.1 % nasal spray 1 spray  1 spray Each Nostril BID nessa Cantu DO        donepezil (ARICEPT) tablet 10 mg  10 mg Oral Nightly Jong Cantu, DO         Allergies   Allergen Reactions    Latex     Levofloxacin Other (See Comments)     Burns mouth per patient    Azithromycin     Codeine Nausea Only    Keflex [Cephalexin]     Morphine     Pentazocine Lactate     Sulfa Antibiotics Hives    Tape Orren Marcus Tape]      No bandaids       REVIEW OF SYSTEMS  10 systems reviewed, pertinent positives per HPI otherwise noted to be negative. PHYSICAL EXAM  BP (!) 115/57   Pulse (!) 45   Temp 98.8 °F (37.1 °C) (Axillary)   Resp 18   Ht 5' (1.524 m)   Wt 193 lb (87.5 kg)   LMP  (LMP Unknown)   SpO2 95%   BMI 37.69 kg/m²   GENERAL APPEARANCE: Somnolent but interactive. Appears elderly, chronically ill, obese, but not acutely toxic. HEAD: Normocephalic. 6 centimeter linear left parietal laceration with mild bleeding. EYES: PERRL. EOM's grossly intact. Pupils 3 mm bilaterally. ENT: Mucous membranes are dry. NECK: Supple, trachea midline. HEART: RR rate 40's. Normal S1, S2. No murmurs, rubs or gallops. LUNGS: Respirations unlabored. CTAB. Good air exchange. No wheezes, rales, or rhonchi. Speaking comfortably in full sentences. ABDOMEN: Soft. Non-distended. Non-tender. No guarding or rebound. Normal Bowel sounds. EXTREMITIES: No peripheral edema. MAEE. No acute deformities. Patient with pain to the neck, lower back, hips, suspect chronic. SKIN: Warm and dry. No acute rashes. NEUROLOGICAL: Somnolent but interactive. CN II-XII intact. No gross facial drooping. Strength symmetrical  PSYCHIATRIC: Flat affect. LABS  I have reviewed all labs for this visit.    Results for orders placed or performed during the hospital encounter of 11/24/21   CBC Auto Differential   Result Value Ref Range    WBC 10.7 4.0 - 11.0 K/uL    RBC 3.88 (L) 4.00 - 5.20 M/uL    Hemoglobin 11.3 (L) 12.0 - 16.0 g/dL    Hematocrit 35.0 (L) 36.0 - 48.0 %    MCV 90.1 80.0 - 100.0 fL    MCH 29.1 26.0 - 34.0 pg    MCHC 32.3 31.0 - 36.0 g/dL    RDW 14.5 12.4 - 15.4 %    Platelets 274 269 - 558 K/uL    MPV 8.2 5.0 - 10.5 fL    PLATELET SLIDE REVIEW Adequate     SLIDE REVIEW see below     Neutrophils % 71.2 %    Lymphocytes % 17.5 %    Monocytes % 8.9 %    Eosinophils % 1.2 %    Basophils % 1.2 % Neutrophils Absolute 7.6 1.7 - 7.7 K/uL    Lymphocytes Absolute 1.9 1.0 - 5.1 K/uL    Monocytes Absolute 1.0 0.0 - 1.3 K/uL    Eosinophils Absolute 0.1 0.0 - 0.6 K/uL    Basophils Absolute 0.1 0.0 - 0.2 K/uL   Urinalysis Reflex to Culture    Specimen: Urine, straight catheter   Result Value Ref Range    Color, UA Yellow Straw/Yellow    Clarity, UA Clear Clear    Glucose, Ur Negative Negative mg/dL    Bilirubin Urine Negative Negative    Ketones, Urine TRACE (A) Negative mg/dL    Specific Gravity, UA 1.020 1.005 - 1.030    Blood, Urine Negative Negative    pH, UA 5.5 5.0 - 8.0    Protein, UA Negative Negative mg/dL    Urobilinogen, Urine 0.2 <2.0 E.U./dL    Nitrite, Urine Negative Negative    Leukocyte Esterase, Urine Negative Negative    Microscopic Examination Not Indicated     Urine Type NotGiven     Urine Reflex to Culture Not Indicated    SPECIMEN REJECTION   Result Value Ref Range    Rejected Test cmpx trop     Reason for Rejection see below    Comprehensive Metabolic Panel w/ Reflex to MG   Result Value Ref Range    Sodium 134 (L) 136 - 145 mmol/L    Potassium reflex Magnesium 8.1 (HH) 3.5 - 5.1 mmol/L    Chloride 104 99 - 110 mmol/L    CO2 24 21 - 32 mmol/L    Anion Gap 6 3 - 16    Glucose 91 70 - 99 mg/dL    BUN 30 (H) 7 - 20 mg/dL    CREATININE 3.3 (H) 0.6 - 1.2 mg/dL    GFR Non-African American 13 (A) >60    GFR  16 (A) >60    Calcium 8.9 8.3 - 10.6 mg/dL    Total Protein 6.3 (L) 6.4 - 8.2 g/dL    Albumin 3.9 3.4 - 5.0 g/dL    Albumin/Globulin Ratio 1.6 1.1 - 2.2    Total Bilirubin <0.2 0.0 - 1.0 mg/dL    Alkaline Phosphatase 81 40 - 129 U/L    ALT <5 (L) 10 - 40 U/L    AST 9 (L) 15 - 37 U/L   Troponin   Result Value Ref Range    Troponin <0.01 <0.01 ng/mL   Blood Gas, Venous   Result Value Ref Range    pH, Claus 7.253 (L) 7.350 - 7.450    pCO2, Claus 49.6 40.0 - 50.0 mmHg    pO2, Claus 77.8 (H) 25.0 - 40.0 mmHg    HCO3, Venous 21.4 (L) 23.0 - 29.0 mmol/L    Base Excess, Claus -5.8 (L) -3.0 - 3.0 mmol/L    O2 Sat, Claus 95 Not Established %    Carboxyhemoglobin 1.5 0.0 - 1.5 %    MetHgb, Claus 0.5 <1.5 %    TC02 (Calc), Claus 23 Not Established mmol/L    O2 Therapy Unknown    Basic metabolic panel   Result Value Ref Range    Sodium 137 136 - 145 mmol/L    Potassium 5.9 (H) 3.5 - 5.1 mmol/L    Chloride 106 99 - 110 mmol/L    CO2 22 21 - 32 mmol/L    Anion Gap 9 3 - 16    Glucose 100 (H) 70 - 99 mg/dL    BUN 32 (H) 7 - 20 mg/dL    CREATININE 3.0 (H) 0.6 - 1.2 mg/dL    GFR Non-African American 15 (A) >60    GFR  18 (A) >60    Calcium 9.2 8.3 - 10.6 mg/dL   Protime-INR   Result Value Ref Range    Protime 11.6 9.9 - 12.7 sec    INR 1.03 0.88 - 1.12   APTT   Result Value Ref Range    aPTT 34.2 26.2 - 38.6 sec   Lactic acid, plasma   Result Value Ref Range    Lactic Acid 0.6 0.4 - 2.0 mmol/L   Phosphorus   Result Value Ref Range    Phosphorus 4.6 2.5 - 4.9 mg/dL   Brain Natriuretic Peptide   Result Value Ref Range    Pro- (H) 0 - 449 pg/mL   Valproic acid level, total   Result Value Ref Range    Valproic Acid Lvl 7.0 (L) 50.0 - 100.0 ug/mL   Basic Metabolic Panel   Result Value Ref Range    Sodium 134 (L) 136 - 145 mmol/L    Potassium 5.4 (H) 3.5 - 5.1 mmol/L    Chloride 104 99 - 110 mmol/L    CO2 21 21 - 32 mmol/L    Anion Gap 9 3 - 16    Glucose 71 70 - 99 mg/dL    BUN 30 (H) 7 - 20 mg/dL    CREATININE 3.1 (H) 0.6 - 1.2 mg/dL    GFR Non-African American 14 (A) >60    GFR  17 (A) >60    Calcium 9.1 8.3 - 10.6 mg/dL   EKG 12 Lead   Result Value Ref Range    Ventricular Rate 44 BPM    Atrial Rate 44 BPM    P-R Interval 190 ms    QRS Duration 124 ms    Q-T Interval 484 ms    QTc Calculation (Bazett) 413 ms    P Axis 56 degrees    R Axis -21 degrees    T Axis 14 degrees    Diagnosis       Marked sinus bradycardiaLeft bundle branch blockAbnormal ECGConfirmed by Verito Quick MD, JASON ([id number]) on 11/24/2021 3:18:10 PM   EKG 12 Lead   Result Value Ref Range    Ventricular Rate 46 BPM acute hyperkalemia with EKG changes necessitating Lokelma, albuterol, insulin, D50, calcium gluconate, Lasix, IV fluids. Consultation with nephrology. PROCEDURES:Lac Repair    Date/Time: 11/24/2021 11:10 PM  Performed by: Domitila Lucero MD  Authorized by: Godwin Meza DO     Consent:     Consent obtained:  Verbal    Consent given by:  Patient  Anesthesia (see MAR for exact dosages): Anesthesia method:  Topical application    Topical anesthetic:  LET  Laceration details:     Location:  Scalp    Scalp location:  L parietal    Length (cm):  6  Repair type:     Repair type:  Simple  Pre-procedure details:     Preparation:  Patient was prepped and draped in usual sterile fashion  Exploration:     Hemostasis achieved with:  LET    Contaminated: no    Treatment:     Area cleansed with:  Saline and soap and water    Amount of cleaning:  Standard  Skin repair:     Repair method:  Staples    Number of staples:  11  Approximation:     Approximation:  Close  Post-procedure details:     Dressing:  Open (no dressing)    Patient tolerance of procedure: Tolerated well, no immediate complications            ED COURSE/MDM  Patient seen and evaluated. Old records reviewed. Labs and imaging reviewed and results discussed with patient. Patient is somnolent and appears overmedicated. This may in part be due to mild concussion but I also suspect overmedication with oxycodone and retention of metabolites due to acute renal failure. She has significant hyperkalemia of 8.1 with some QRS widening. Apparently stopped taking her Lasix because she did not like urinating frequently however continue to take her supplemental potassium. Case was discussed with Dr. Leland Engel at 18:20 with initial plan for emergent dialysis however subsequent to ER treatment hyperkalemia was rapidly normalizing. Patient admitted to the hospitalist service. Current Discharge Medication List          CLINICAL IMPRESSION  1.  Closed head injury, initial encounter    2. Laceration of scalp, initial encounter    3. MARIO (acute kidney injury) ([facility] [address]    4. Hyperkalemia        Blood pressure (!) 115/57, pulse (!) 45, temperature 98.8 °F (37.1 °C), temperature source Axillary, resp. rate 18, height 5' (1.524 m), weight 193 lb (87.5 kg), SpO2 95 %, not currently breastfeeding. DISPOSITION  Kimberly Aguilar was admitted in stable condition.         Arnold Nguyen MD  11/24/21 [id number]

## 2021-11-25 NOTE — PROGRESS NOTES
Admit Date: 11/24/2021      REASON FOR ADMISSION:   patient with weakness  fall, laceration scalp found to  have a potassium of 8.1, with bradycardia, broad QRS complex.     Follow up   for life-threatening hyperkalemia with K 8.1. EKG wide  QRS complex,  sinus bradycardia          INTERVAL HISTORY   fels and looks much better     K 8.1-->5.4-->6.2-->5.8  Urine 2000  Creat 3.1-->2.3  PROBNP 495  I feel main problm was retention      PLAN    Give dose of Lokelma   Will resume Lasix  40 mg daily     Severe life-threatening hyperkalemia   managed initially with cocktail, given Lasix, placed Prince catheter. Her hyperkalemia is related due to the use of Atacand, spironolactone,nonsteroidals, and on top of that, most likely urinary retention. Immediately, Prince catheter was placed and accordingly, 500 mL of urine came. She already got cocktail    EKG, broad QRS complex with a QRS duration of 124. Sinus bradycardia. known to have sinus bradycardia   before with first-degree block. Initial discussion with Dr. Rajni Comer, [place] [id number] physician, was to immediately  place femoral dialysis catheter for urgent dialysis. Meantime, while this was done and in my discussion with them, potassium has started coming down. The patient will be closely monitored. Continue with low potassium diet. Prince   Lasix 80 mg. Lokelma   RepeatLabs     MARIO/CKD 3  Creat 3.2  Was 1.5 in 6/2021  Urine in active on admission   CT of kidneys in 11/2020    normal     TAVR in 2028 at      History of coronary artery disease  previous stents placement last in 2017  Under care of  team     HFpEF   echo in 60/3398, grade 1diastolic dysfunction. History of recurrent UTI.     urinary retention. This could be due to her pain medication. Dabetic. Controlled   A1C 6.1 2019        [facility]    Immediately, I was asked to place a Prince catheter.   She got the  cocktail, gave her Lasix 80 mg.     HISTORY OF PRESENTING COMPLAINT:  This is an 79-year-old   female who I got a call around 6 p.m. regarding the above, discussed  with Dr. Lillie Lyon, discussed with Dr. Sunita Naidu, hospitalist, discussed  with the patient's nurse, plan made. Initial concern was for urgent dialysis because of high potassium,  bradycardia.     I reviewed her previous EKG. In fact, she was known to have sinus  bradycardia with a first-degree block, but her QRS complex on this one  was 124 as compared to previous in 06/2021, where it was 96.     I am seeing the patient now in the emergency department with the  daughter present.     I see that the patient was having some difficulty in urination in the  way of dribbling, so she reduced her Lasix significantly, but was taking  potassium. It does not look like she was taking any nonsteroidal, this  was obtained from the patient who was seen at the ER, who was little  sleepy, but the daughter was present also.     Subjective:   Patient has no complaint       Review of Systems  Seen in room     Objective:     Patient Vitals for the past 8 hrs:   BP Temp Temp src Pulse Resp SpO2   11/25/21 0752 138/69 98 °F (36.7 °C) Oral 53 16 95 %   11/25/21 0400 132/74 -- -- -- -- --   11/25/21 0200 (!) 128/54 -- -- -- -- --       I/O last 3 completed shifts:  In: -   Out: 2000 [Urine:2000]        General appearance:Awake, alert,   Neck: , no JVD and thyroid not enlarged,   Lungs: clear to auscultation bilaterally   Heart: regular rate and rhythm, S1, S2 normal, no murmur, click, rub or gallop  Abdomen: soft, non-tender; bowel sounds normal; no masses,  no organomegaly  Extremities: extremities normal, atraumatic, no edema  Skin: Skin color, texture, turgor normal. No rashes or lesions  Neurologic: Grossly normal     .l  Lab Results   Component Value Date    CREATININE 3.1 (H) 11/24/2021    BUN 30 (H) 11/24/2021     (L) 11/24/2021    K 5.4 (H) 11/24/2021     11/24/2021    CO2 21 11/24/2021     Lab Results   Component Value Date WBC 10.7 11/24/2021    HGB 11.3 (L) 11/24/2021    HCT 35.0 (L) 11/24/2021    MCV 90.1 11/24/2021     11/24/2021            AGLUCOSE)Magnesium:    Lab Results   Component Value Date    MG 1.80 08/29/2016     Phosphorus:    Lab Results   Component Value Date    PHOS 4.6 11/24/2021       Uric Acid:  No components found for: URIC    Active Problems:    MARIO (acute kidney injury) ([facility] [address]  Resolved Problems:    * No resolved hospital problems.  *

## 2021-11-25 NOTE — PROGRESS NOTES
Patient admitted to room 427 from ED. Patient oriented to room, call light, bed rails, phone, lights and bathroom. Patient is very lethargic, will wake up and answer orientation questions, but then falls back asleep. Will keep NPO for now. Telemetry box in place, patient aware of placement and reason. Bed locked, in lowest position, side rails up 2/4, call light within reach. Will continue to monitor. Kimberly Hickman RN    10:00 PM  2048 BMP not drawn in ED. I called lab, they are coming up now to draw it. MD notified that it should be resulting soon. Kimberly Hickman RN    2:43 AM  Patient resting in bed with eyes closed, no needs at this time. Kimberly Hickman RN    4:58 AM  Message sent to cross cover \"Patient much more awake and alert now. She is c/o pain in lower back (she has chronic back pain), pain in left knee, and \"excruciating\" pain in her head. I gave her Tylenol and we've been  using ice packs. I'm wondering if she can have something stronger? At home, she takes Vicodin 5 q3-4 hours. Please and thank you! \" Kimberly Hickman RN

## 2021-11-25 NOTE — H&P
Hospital Medicine History & Physical      PCP: Zofia Person, APRN - CNP    Date of Admission: 11/24/2021    Date of Service: Pt seen/examined on 11/24/2021  Pt seen/examined face to face on and admitted as inpatient with expected LOS greater than two midnights due to medical therapyChief Complaint:    Chief Complaint   Patient presents with   Jasmina Spurling Fall     per squad report patient was at her pain mgt appt when she lost her footing causing her to fall. pt hit the top of her head causing a laceration. pt denies any LOC        History Of Present Illness:      79 y.o. female who presented to [facility] with past medical history of COPD, depression, hyperlipidemia, hypertension, sleep apnea on CPAP, TIA, chronic pain presented to the ED after a fall somnolence. Patient reported that she was given Lasix to be taken 40 in the morning and 20 in the afternoon, the patient has been compliant however has noticed that she been urinating a lot decreasing her quality of life. Patient does stop taking all her medications. In the meantime patient continue to take her oral potassium pills. This has been going on for almost a month now where the patient has not been taking Lasix but continue taking her oral potassium with known CKD. Patient follows up with Dr. Anderson Robles. Patient reported that she was at the pain clinic sitting down and then she stood up and then suddenly lost conscious fell and hit the ground with her face lacerated her forehead. Patient at that time was reported to be somnolent fatigued but would be responding to pain. Patient was brought in by EMS had a CT head that showed no acute bleeding. Patient while in the ER improved. On my evaluation complaint of chest pain abdominal pain that was generalized occurring after the fall. Patient labs showing hyperkalemia of 8.1. Nephrology consulted recommended treatment and recheck. After recheck potassium improved.         Past Medical History: Diagnosis Date    Arthritis     BCC (basal cell carcinoma of skin)     RT clavicle    Bladder infection     frequently    CAD (coronary artery disease)     stents    Cancer ([facility] [address]     skin    CHF (congestive heart failure) (HCC)     Chronic back pain     COPD (chronic obstructive pulmonary disease) ([facility] [address]     Depression     Depression     Hypercholesteremia     Hypertension     Pain in shoulder [id number]    pain in left shoulder, taking steroid injections for steroid    Reflux     Rheumatic fever     as a child    Sleep apnea     uses CPAP    TIA (transient ischemic attack)     Urinary incontinence     Wears glasses        Past Surgical History:          Procedure Laterality Date    AORTIC VALVE REPLACEMENT      APPENDECTOMY      BLADDER REPAIR      3 TIMES    CARDIAC SURGERY      stents    CARPAL TUNNEL RELEASE      RIGHT    CHOLECYSTECTOMY, LAPAROSCOPIC  01/06/2017    with dr Susan Card      has it it done twice    DIAGNOSTIC CARDIAC CATH LAB PROCEDURE      HYSTERECTOMY      JOINT REPLACEMENT      KATHLEEN TKR    NECK SURGERY      OTHER SURGICAL HISTORY  9/28/12    Full Interstim Implant    SHOULDER SURGERY      SKIN CANCER EXCISION      SPINAL FIXATION SURGERY WITH IMPLANT  2001    SPINAL FIXATION SURGERY WITH IMPLANT  2004    SPINAL FIXATION SURGERY WITH IMPLANT  2007    SPINE SURGERY  1999    TONSILLECTOMY      UPPER GASTROINTESTINAL ENDOSCOPY  4/22/11    UPPER GASTROINTESTINAL ENDOSCOPY      UPPER GASTROINTESTINAL ENDOSCOPY  03/23/2017    dilitation       Medications Prior to Admission:      Prior to Admission medications    Medication Sig Start Date End Date Taking?  Authorizing Provider   ciprofloxacin (CIPRO) 250 MG tablet Take 1 tablet by mouth 2 times daily for 10 days 11/24/21 12/4/21  KALI Rooney - CNP   donepezil (ARICEPT) 10 MG tablet TAKE 1 TABLET IN THE EVENING 11/18/21   KALI Chaney - CNP   traZODone (DESYREL) 100 MG tablet TAKE 1 OR 2 TABLETS AT BEDTIME 10/12/21   KALI Cormier CNP   candesartan (ATACAND) 8 MG tablet Take 8 mg by mouth daily    Historical Provider, MD   divalproex (DEPAKOTE) 250 MG DR tablet Take 250 mg by mouth 2 times daily    Historical Provider, MD   diclofenac (VOLTAREN) 75 MG EC tablet Take 75 mg by mouth 2 times daily  8/23/21   Historical Provider, MD   MYRBETRIQ 50 MG TB24 50 mg daily  8/30/21   Historical Provider, MD   metoprolol succinate (TOPROL XL) 25 MG extended release tablet Take 25 mg by mouth daily  8/11/21   Historical Provider, MD   acetaminophen (TYLENOL) 325 MG tablet  7/26/21   Historical Provider, MD   cetirizine (ZYRTEC) 10 MG tablet TAKE 1 TABLET BY MOUTH DAILY 8/23/21 12/21/21  KALI Cormier CNP   [id number] [id number] UNIT/GM powder APPLY 3 TIMES DAILY 8/23/21   KALI Cormier CNP   citalopram (CELEXA) 20 MG tablet TAKE 1.5 TABLETS BY MOUTH DAILY 6/24/21   KALI Cormier CNP   azelastine (ASTELIN) 0.1 % nasal spray 1 spray by Nasal route 2 times daily Use in each nostril as directed 6/16/21   KALI Cormier CNP   cycloSPORINE (RESTASIS) 0.05 % ophthalmic emulsion Place 1 drop into both eyes as needed (dry eyes) 6/16/21   KALI Cormier CNP   polyethylene glycol (GLYCOLAX) 17 GM/SCOOP powder Take 17 g by mouth daily    Historical Provider, MD   Wheat Dextrin (BENEFIBER) POWD Take 4 g by mouth daily    Historical Provider, MD   spironolactone (ALDACTONE) 25 MG tablet Take 1 tablet by mouth daily 6/12/21   Yolanda Wallace MD   furosemide (LASIX) 40 MG tablet Take 1 tablet by mouth daily  Patient taking differently: Take 40 mg by mouth daily 7/6/2021-f/up at cardiology office - take additional 20mg daily if weight >186# 6/12/21   Yolanda Wallace MD   gabapentin (NEURONTIN) 100 MG capsule Take 100 mg by mouth 3 times daily.      Historical Provider, MD   busPIRone (BUSPAR) 5 MG tablet TAKE 1 OR 2 TABLET(S) IN THE EVENING AS NEEDED FOR ANXIETY 5/21/21   KALI Sheikh CNP   traZODone (DESYREL) 100 MG tablet TAKE 1 OR 2 TABLETS AT BEDTIME 5/21/21   KALI Sheikh CNP   cetirizine (ZYRTEC) 10 MG tablet TAKE 1 TABLET BY MOUTH DAILY 3/25/21   KALI Sheikh CNP   diphenhydrAMINE (BANOPHEN) 25 MG tablet Take 0.5 tablets by mouth every 12 hours  Patient taking differently: Take 12.5 mg by mouth every 8 hours as needed  8/5/20   KALI Lira CNP   pantoprazole (PROTONIX) 40 MG tablet Take 1 tablet by mouth daily 8/5/20   KALI Lira CNP   simvastatin (ZOCOR) 40 MG tablet TAKE ONE TABLET NIGHTLY. 9/9/19   Historical Provider, MD   aspirin (ASPIRIN 81) 81 MG EC tablet Take 81 mg by mouth daily 3/4/20   Historical Provider, MD   candesartan (ATACAND) 8 MG tablet Take 8 mg by mouth daily    Historical Provider, MD   HYDROcodone-acetaminophen (NORCO)  MG per tablet Take 1 tablet by mouth every 6 hours as needed. 4/1/20   Historical Provider, MD   baclofen (LIORESAL) 10 MG tablet TAKE 1 TABLET EVERY 8 HOURS AS NEEDED  Patient taking differently: Take 10 mg by mouth daily  12/30/19   KALI Samaniego CNP   Misc. Devices (ROLLER [place]) MISC 1 each by Does not apply route daily 5/22/19   KALI Samaniego CNP   isosorbide mononitrate (IMDUR) 30 MG extended release tablet Take 30 mg by mouth daily    Historical Provider, MD   albuterol-ipratropium (COMBIVENT RESPIMAT)  MCG/ACT AERS inhaler Inhale 1 puff into the lungs every 6 hours 4/18/18   Huber Smith DO   KLOR-CON SPRINKLE 10 MEQ extended release capsule TAKE 2 CAPSULES IN THE   MORNING AND 2 CAPSULES IN  THE EVENING 1/4/18   Huber Smith DO   amLODIPine (NORVASC) 10 MG tablet TAKE (1) TABLET DAILY 2/8/17   Huber Smith DO   NITROSTAT 0.4 MG SL tablet USE 1 TABLET UNDER TONGUE AS NEEDED FOR CHEST PAIN MAY REPEAT EVERY 5MIN. UP TO 3.  THEN GO TO ER. 7/6/15   Duc Butcher MD   lidocaine (XYLOCAINE) 5 % ointment Apply topically as needed. 3/13/15   Eugene Zapata MD       Allergies:  Latex, Levofloxacin, Azithromycin, Codeine, Keflex [cephalexin], Morphine, Pentazocine lactate, Sulfa antibiotics, and Tape [adhesive tape]    Social History:          TOBACCO:   reports that she has never smoked. She has never used smokeless tobacco.  ETOH:   reports no history of alcohol use. E-Cigarettes/Vaping Use     Questions Responses    E-Cigarette/Vaping Use Never User    Start Date     Passive Exposure     Quit Date     Counseling Given     Comments             Family History:      Reviewed in detail         Problem Relation Age of Onset    Arthritis Mother     Cancer Mother     Depression Mother     Heart Disease Mother     High Blood Pressure Mother     High Cholesterol Mother    Michael Vieyra / Deandre Moons Mother     Stroke Mother     Early Death Mother     Hearing Loss Mother     Mental Illness Mother     Vision Loss Mother     Arthritis Brother     Depression Brother     Diabetes Brother     Hearing Loss Brother     Heart Disease Brother     High Blood Pressure Brother     High Cholesterol Brother     Vision Loss Brother        REVIEW OF SYSTEMS:     Constitutional:  No Fever, No Chills, No Night Sweats  ENT/Mouth:  No Nasal Congestion,  No Hoarseness, No new mouth lesion  Eyes:  No Eye Pain, No Redness, No Discharge  Cardiovascular: + Chest Pain, No Orthopnea, No Palpitations  Respiratory:  No Cough, No Sputum, No Dyspnea  Gastrointestinal: No Vomiting, No Diarrhea, + + abdominal pain  Genitourinary: No Urinary Frequency, No Hematuria, No Urinary pain  Musculoskeletal:  No worsening Arthralgias, No worsening Myalgias  Skin: + New Skin Lesions, No new skin rash  Neuro:  No new weakness, No new numbness.   Psych:  No suicial ideation, No Violence ideation    PHYSICAL EXAM PERFORMED:    BP (!) 115/57   Pulse (!) 45   Temp 98 °F (36.7 °C) (Oral)   Resp 18   Ht 5' (1.524 m)   Wt 193 lb (87.5 kg) LMP  (LMP Unknown)   SpO2 95%   BMI 37.69 kg/m²     General appearance:  mild acute distress, appears older than stated age  [de-identified]:   atraumatic, sclera anicteric, Conjunctivae clear. Neck: Supple,Trachea midline, no goiter  Respiratory:minimal accessory muscle usage, Normal respiratory effort. Clear to auscultation, bilaterally without wheezing  Cardiovascular:  Regular rate and rhythm, capillary refill 2 seconds  Abdomen: Soft, non-tender, non-distended with normal bowel sounds. Musculoskeletal:  No clubbing, cyanosis. trace edema LE bilaterally. Skin: turgor normal.  No new rashes or lesions. Neurologic: Alert and oriented x4, no new focal sensory/motor deficits. Labs:     Recent Labs     11/24/21  1520   WBC 10.7   HGB 11.3*   HCT 35.0*        Recent Labs     11/24/21  1639 11/24/21  1848   * 137   K 8.1* 5.9*    106   CO2 24 22   BUN 30* 32*   CREATININE 3.3* 3.0*   CALCIUM 8.9 9.2   PHOS  --  4.6     Recent Labs     11/24/21  1639   AST 9*   ALT <5*   BILITOT <0.2   ALKPHOS 81     Recent Labs     11/24/21  1935   INR 1.03     Recent Labs     11/24/21  1639   TROPONINI <0.01       Urinalysis:      Lab Results   Component Value Date    NITRU Negative 11/24/2021    WBCUA 17 06/14/2019    BACTERIA 1+ 06/14/2019    RBCUA 0 06/14/2019    BLOODU Negative 11/24/2021    SPECGRAV 1.020 11/24/2021    GLUCOSEU Negative 11/24/2021    GLUCOSEU Neg 05/12/2011       Radiology:     CXR: I have reviewed the CXR with the following interpretation:   No acute process stable cardiomegaly  EKG:  I have reviewed the EKG with the following interpretation:   Initial EKG sinus bradycardia with QRS prolonged over 120    CT Head WO Contrast   Final Result   No acute intracranial abnormality. Mild soft tissue swelling left frontal scalp, likely soft tissue contusion. No acute bony abnormality. CT Cervical Spine WO Contrast   Final Result   No acute abnormality of the cervical spine.          XR PELVIS (1-2 VIEWS)   Final Result   No acute osseous abnormality. If there is continued clinical concern for occult fracture, follow-up   radiographs or MRI may be helpful for further evaluation. XR LUMBAR SPINE (2-3 VIEWS)   Final Result   Remote postsurgical changes L3 through S1 and degenerative changes at L1-L2   with grade 1 listhesis. No acute osseous abnormality. XR CHEST PORTABLE   Final Result   No acute abnormality. Stable cardiomegaly. CT CHEST ABDOMEN PELVIS WO CONTRAST    (Results Pending)       ASSESSMENT AND PLAN:    Active Hospital Problems    Diagnosis Date Noted    MARIO (acute kidney injury) ([facility] [address] [N17.9] 11/24/2021     Severe hyperkalemia with EKG changes:  Initially was proceeding with dialysis however patient improved significantly with medical treatment. Repeat EKG showing improvement  Continue medical treatment    Acute renal failure:  Nephrology consulted, much appreciated    Syncope:  Suspected orthostatic or cardiac in the setting of hyperkalemia  Continue telemetry, orthostatics pending  Continue to monitor    Fall: PT OT    Lumbar listhesis: Outpatient follow-up    Head laceration:  CT head negative    Diet: Renal diet    DVT Prophylaxis: Heparin    Dispo:   Expected LOS greater than two [place], DO

## 2021-11-25 NOTE — PROGRESS NOTES
Hospitalist Progress Note      PCP: Bev Varner, APRN - CNP    Date of Admission: 11/24/2021    Chief Complaint: [facility] Course: 41-year-old female with past medical history of COPD, depression, hyperlipidemia, hypertension, sleep apnea on CPAP, TIA, chronic pain admitted after a fall at doctor's office. Subjective: Patient denies any chest pain or shortness of breath no nausea vomiting stated that she had a fall at doctor's office hitting her head on a cabinet with laceration requiring sutures. Later patient complained of left knee pain to the nurse. Medications:  Reviewed    Infusion Medications    sodium chloride Stopped (11/24/21 9848)    sodium chloride      dextrose       Scheduled Medications    sodium chloride flush  10 mL IntraVENous 2 times per day    heparin (porcine)  5,000 Units SubCUTAneous 3 times per day    sodium zirconium cyclosilicate  10 g Oral Once    atorvastatin  20 mg Oral Daily    pantoprazole  40 mg Oral Daily    trospium  20 mg Oral Daily    gabapentin  100 mg Oral TID    divalproex  250 mg Oral BID    aspirin  81 mg Oral Daily    azelastine  1 spray Each Nostril BID    donepezil  10 mg Oral Nightly     PRN Meds: albuterol-ipratropium, sodium chloride flush, sodium chloride, promethazine **OR** ondansetron, acetaminophen **OR** acetaminophen, glucose, dextrose, glucagon (rDNA), dextrose      Intake/Output Summary (Last 24 hours) at 11/25/2021 0976  Last data filed at 11/25/2021 0437  Gross per 24 hour   Intake --   Output 2000 ml   Net -2000 ml       Physical Exam Performed:    /69   Pulse 53   Temp 98 °F (36.7 °C) (Oral)   Resp 16   Ht 5' (1.524 m)   Wt 193 lb (87.5 kg)   LMP  (LMP Unknown)   SpO2 95%   BMI 37.69 kg/m²     General appearance: No apparent distress, appears stated age and cooperative. HEENT: Pupils equal, round, and reactive to light. Conjunctivae/corneas clear. Neck: Supple, with full range of motion.  No jugular venous distention. Trachea midline. Respiratory:  Normal respiratory effort. Clear to auscultation, bilaterally without Rales/Wheezes/Rhonchi. Cardiovascular: Regular rate and rhythm with normal S1/S2 without murmurs, rubs or gallops. Abdomen: Soft, non-tender, non-distended with normal bowel sounds. Musculoskeletal: No clubbing, cyanosis or edema bilaterally. Full range of motion without deformity. Skin: Skin color, texture, turgor normal.  No rashes or lesions. Neurologic:  Neurovascularly intact without any focal sensory/motor deficits. Cranial nerves: II-XII intact, grossly non-focal.  Psychiatric: Alert and oriented, thought content appropriate, normal insight  Capillary Refill: Brisk,3 seconds, normal   Peripheral Pulses: +2 palpable, equal bilaterally       Labs:   Recent Labs     11/24/21  1520   WBC 10.7   HGB 11.3*   HCT 35.0*        Recent Labs     11/24/21  1639 11/24/21  1848 11/24/21  2235   * 137 134*   K 8.1* 5.9* 5.4*    106 104   CO2 24 22 21   BUN 30* 32* 30*   CREATININE 3.3* 3.0* 3.1*   CALCIUM 8.9 9.2 9.1   PHOS  --  4.6  --      Recent Labs     11/24/21  1639   AST 9*   ALT <5*   BILITOT <0.2   ALKPHOS 81     Recent Labs     11/24/21  1935   INR 1.03     Recent Labs     11/24/21  1639   TROPONINI <0.01       Urinalysis:      Lab Results   Component Value Date    NITRU Negative 11/24/2021    WBCUA 17 06/14/2019    BACTERIA 1+ 06/14/2019    RBCUA 0 06/14/2019    BLOODU Negative 11/24/2021    SPECGRAV 1.020 11/24/2021    GLUCOSEU Negative 11/24/2021    GLUCOSEU Neg 05/12/2011       Radiology:  CT CHEST ABDOMEN PELVIS WO CONTRAST   Final Result   1. No acute traumatic abnormality. 2. Diverticulosis without scan evidence for diverticulitis. CT Head WO Contrast   Final Result   No acute intracranial abnormality. Mild soft tissue swelling left frontal scalp, likely soft tissue contusion. No acute bony abnormality.          CT Cervical Spine WO Contrast   Final

## 2021-11-26 LAB
ANION GAP SERPL CALCULATED.3IONS-SCNC: 8 MMOL/L (ref 3–16)
BASOPHILS ABSOLUTE: 0.1 K/UL (ref 0–0.2)
BASOPHILS RELATIVE PERCENT: 0.7 %
BUN BLDV-MCNC: 26 MG/DL (ref 7–20)
CALCIUM SERPL-MCNC: 8.4 MG/DL (ref 8.3–10.6)
CHLORIDE BLD-SCNC: 104 MMOL/L (ref 99–110)
CO2: 27 MMOL/L (ref 21–32)
CREAT SERPL-MCNC: 1.8 MG/DL (ref 0.6–1.2)
EOSINOPHILS ABSOLUTE: 0.2 K/UL (ref 0–0.6)
EOSINOPHILS RELATIVE PERCENT: 2.4 %
GFR AFRICAN AMERICAN: 33
GFR NON-AFRICAN AMERICAN: 27
GLUCOSE BLD-MCNC: 102 MG/DL (ref 70–99)
GLUCOSE BLD-MCNC: 136 MG/DL (ref 70–99)
GLUCOSE BLD-MCNC: 179 MG/DL (ref 70–99)
GLUCOSE BLD-MCNC: 91 MG/DL (ref 70–99)
GLUCOSE BLD-MCNC: 94 MG/DL (ref 70–99)
HCT VFR BLD CALC: 32.3 % (ref 36–48)
HEMOGLOBIN: 10.7 G/DL (ref 12–16)
LYMPHOCYTES ABSOLUTE: 2 K/UL (ref 1–5.1)
LYMPHOCYTES RELATIVE PERCENT: 26.4 %
MCH RBC QN AUTO: 29.1 PG (ref 26–34)
MCHC RBC AUTO-ENTMCNC: 33.2 G/DL (ref 31–36)
MCV RBC AUTO: 87.6 FL (ref 80–100)
MONOCYTES ABSOLUTE: 0.8 K/UL (ref 0–1.3)
MONOCYTES RELATIVE PERCENT: 10.6 %
NEUTROPHILS ABSOLUTE: 4.5 K/UL (ref 1.7–7.7)
NEUTROPHILS RELATIVE PERCENT: 59.9 %
PDW BLD-RTO: 14.1 % (ref 12.4–15.4)
PERFORMED ON: ABNORMAL
PERFORMED ON: NORMAL
PLATELET # BLD: 161 K/UL (ref 135–450)
PMV BLD AUTO: 7.2 FL (ref 5–10.5)
POTASSIUM REFLEX MAGNESIUM: 5.2 MMOL/L (ref 3.5–5.1)
RBC # BLD: 3.69 M/UL (ref 4–5.2)
SODIUM BLD-SCNC: 139 MMOL/L (ref 136–145)
TOTAL CK: 40 U/L (ref 26–192)
WBC # BLD: 7.4 K/UL (ref 4–11)

## 2021-11-26 PROCEDURE — 36415 COLL VENOUS BLD VENIPUNCTURE: CPT

## 2021-11-26 PROCEDURE — 97166 OT EVAL MOD COMPLEX 45 MIN: CPT

## 2021-11-26 PROCEDURE — 6370000000 HC RX 637 (ALT 250 FOR IP): Performed by: INTERNAL MEDICINE

## 2021-11-26 PROCEDURE — 97535 SELF CARE MNGMENT TRAINING: CPT

## 2021-11-26 PROCEDURE — 97110 THERAPEUTIC EXERCISES: CPT

## 2021-11-26 PROCEDURE — 97530 THERAPEUTIC ACTIVITIES: CPT

## 2021-11-26 PROCEDURE — 2580000003 HC RX 258: Performed by: INTERNAL MEDICINE

## 2021-11-26 PROCEDURE — 2060000000 HC ICU INTERMEDIATE R&B

## 2021-11-26 PROCEDURE — 6360000002 HC RX W HCPCS: Performed by: INTERNAL MEDICINE

## 2021-11-26 PROCEDURE — 97162 PT EVAL MOD COMPLEX 30 MIN: CPT

## 2021-11-26 PROCEDURE — 85025 COMPLETE CBC W/AUTO DIFF WBC: CPT

## 2021-11-26 PROCEDURE — 6370000000 HC RX 637 (ALT 250 FOR IP): Performed by: HOSPITALIST

## 2021-11-26 PROCEDURE — 82550 ASSAY OF CK (CPK): CPT

## 2021-11-26 PROCEDURE — 97116 GAIT TRAINING THERAPY: CPT

## 2021-11-26 PROCEDURE — 80048 BASIC METABOLIC PNL TOTAL CA: CPT

## 2021-11-26 RX ADMIN — SODIUM CHLORIDE, PRESERVATIVE FREE 10 ML: 5 INJECTION INTRAVENOUS at 21:06

## 2021-11-26 RX ADMIN — HEPARIN SODIUM 5000 UNITS: 5000 INJECTION INTRAVENOUS; SUBCUTANEOUS at 21:05

## 2021-11-26 RX ADMIN — AZELASTINE HYDROCHLORIDE 1 SPRAY: 137 SPRAY, METERED NASAL at 21:05

## 2021-11-26 RX ADMIN — GABAPENTIN 100 MG: 100 CAPSULE ORAL at 08:21

## 2021-11-26 RX ADMIN — HYDROCODONE BITARTRATE AND ACETAMINOPHEN 1 TABLET: 5; 325 TABLET ORAL at 21:05

## 2021-11-26 RX ADMIN — HYDROCODONE BITARTRATE AND ACETAMINOPHEN 1 TABLET: 5; 325 TABLET ORAL at 13:33

## 2021-11-26 RX ADMIN — GABAPENTIN 100 MG: 100 CAPSULE ORAL at 13:33

## 2021-11-26 RX ADMIN — SODIUM ZIRCONIUM CYCLOSILICATE 10 G: 10 POWDER, FOR SUSPENSION ORAL at 08:21

## 2021-11-26 RX ADMIN — DIVALPROEX SODIUM 250 MG: 250 TABLET, DELAYED RELEASE ORAL at 08:21

## 2021-11-26 RX ADMIN — HEPARIN SODIUM 5000 UNITS: 5000 INJECTION INTRAVENOUS; SUBCUTANEOUS at 05:14

## 2021-11-26 RX ADMIN — PANTOPRAZOLE SODIUM 40 MG: 40 TABLET, DELAYED RELEASE ORAL at 08:21

## 2021-11-26 RX ADMIN — ASPIRIN 81 MG: 81 TABLET, COATED ORAL at 08:21

## 2021-11-26 RX ADMIN — TROSPIUM CHLORIDE 20 MG: 20 TABLET, FILM COATED ORAL at 08:21

## 2021-11-26 RX ADMIN — ATORVASTATIN CALCIUM 20 MG: 10 TABLET, FILM COATED ORAL at 08:21

## 2021-11-26 RX ADMIN — SODIUM CHLORIDE, PRESERVATIVE FREE 10 ML: 5 INJECTION INTRAVENOUS at 08:22

## 2021-11-26 RX ADMIN — DONEPEZIL HYDROCHLORIDE 10 MG: 5 TABLET, FILM COATED ORAL at 21:05

## 2021-11-26 RX ADMIN — AZELASTINE HYDROCHLORIDE 1 SPRAY: 137 SPRAY, METERED NASAL at 08:21

## 2021-11-26 RX ADMIN — GABAPENTIN 100 MG: 100 CAPSULE ORAL at 21:05

## 2021-11-26 RX ADMIN — DIVALPROEX SODIUM 250 MG: 250 TABLET, DELAYED RELEASE ORAL at 21:05

## 2021-11-26 RX ADMIN — HEPARIN SODIUM 5000 UNITS: 5000 INJECTION INTRAVENOUS; SUBCUTANEOUS at 13:32

## 2021-11-26 ASSESSMENT — PAIN DESCRIPTION - PAIN TYPE: TYPE: CHRONIC PAIN;ACUTE PAIN

## 2021-11-26 ASSESSMENT — PAIN SCALES - GENERAL
PAINLEVEL_OUTOF10: 7
PAINLEVEL_OUTOF10: 4
PAINLEVEL_OUTOF10: 3
PAINLEVEL_OUTOF10: 0
PAINLEVEL_OUTOF10: 6
PAINLEVEL_OUTOF10: 7

## 2021-11-26 ASSESSMENT — PAIN DESCRIPTION - ORIENTATION: ORIENTATION: LEFT

## 2021-11-26 NOTE — CARE COORDINATION
CASE MANAGEMENT INITIAL ASSESSMENT      Reviewed chart and completed assessment with patient:  Explained Case Management role/services. To patient    Primary contact information:See below     Health Care Decision Maker :   Primary Decision Maker: Melyssa Valdivia - Child - [phone]    Secondary Decision Maker: Duke Gallardo Child - [phone]          Can this person be reached and be able to respond quickly, such as within a few minutes or hours? Yes      Admit date/status:inpatient 11/24/2021  Diagnosis:MARIO  Is this a Readmission?:  No      Insurance:BCBS Medicare   Precert required for SNF: Yes       3 night stay required: No    Living arrangements, Adls, care needs, prior to admission:Lives at home alone in an apartment with no steps to enter building. Uses a walker     Transportation:TBD    Durable Medical Equipment at home:  Walker_x_Cane_x_RTS__ BSC__Shower Chair__  02__ HHN__ CPAP__  BiPap__  Hospital Bed__ W/C_x__ Other__________    Services in the home and/or outpatient, prior to [address] [place] through [address] 3 hrs a day for 3 days a week for house cleaning, personal care, grocery shopping etc.     PT/OT recs:SNF    Hospital Exemption Notification (HEN):n/a    Barriers to discharge:none identified    Plan/comments:Patient refuses SNF at this time but states she will do skilled home care. She has no preference of skilled agencies. Offered home care list and she states she doesn't know anything about them so she has no preference. She was agreeable to a referral to [facility]. Referral initiated with Rosa Maria Henderson RN liaison for [facility]. She states they will follow but depending on timing of discharge and their staffing at that time she may have to refer it somewhere else. Patric Ford  will follow for home care needs.      ECOC on chart for MD signature

## 2021-11-26 NOTE — PROGRESS NOTES
no, in good spirits-   Lethargic  HEENT: Lips- normal, teeth- ok , oral mucosa- moist  Neck : Mass- no, appears symmetrical, JVD- not visible  Respiratory: Respiratory effort-normal, wheeze- no, crackles - no  Cardiovascular: Ausculation- No M/R/G, Edema none  Abdomen: visible mass- no, distention- no, scar- no, tenderness- no                            hepatosplenomegaly-  no  Musculoskeletal:  clubbing no,cyanosis- no , digital ischemia- no                           muscle strength- grossly normal , tone - grossly normal  Skin: rashes- no , ulcers- no, induration- no, tightening - no  Psychiatric:  Judgement and insight- normal           AAO X 3  Additional positive findings:-Laceration of the left scalp      Meds:      sodium zirconium cyclosilicate  10 g Oral TID    sodium chloride flush  10 mL IntraVENous 2 times per day    heparin (porcine)  5,000 Units SubCUTAneous 3 times per day    atorvastatin  20 mg Oral Daily    pantoprazole  40 mg Oral Daily    trospium  20 mg Oral Daily    gabapentin  100 mg Oral TID    divalproex  250 mg Oral BID    aspirin  81 mg Oral Daily    azelastine  1 spray Each Nostril BID    donepezil  10 mg Oral Nightly       Labs:     Recent Labs     11/24/21  1520 11/25/21  0949 11/26/21  0459   WBC 10.7 7.2 7.4   HGB 11.3* 11.1* 10.7*   HCT 35.0* 33.7* 32.3*    164 161          Recent Labs     11/24/21  1848 11/24/21  2235 11/25/21  0949 11/25/21  1322 11/26/21  0500      < > 139 136 139   K 5.9*   < > 6.2* 5.8* 5.2*      < > 102 102 104   CO2 22   < > 28 25 27   BUN 32*   < > 30* 29* 26*   CREATININE 3.0*   < > 2.8* 2.3* 1.8*   GLUCOSE 100*   < > 80 161* 91   PHOS 4.6  --   --   --   --     < > = values in this interval not displayed.

## 2021-11-26 NOTE — PROGRESS NOTES
Physical Therapy    Facility/Department: [place] [address] [facility]  Initial Assessment    NAME: Nickie Astudillo  : 1940  MRN: [id number]    Date of Service: 2021    Discharge Recommendations:  Subacute/Skilled Nursing Facility   PT Equipment Recommendations  Equipment Needed: No  If pt discharges prior to next PT session this note will serve as discharge summary. Assessment   Body structures, Functions, Activity limitations: Decreased functional mobility ; Decreased endurance; Decreased strength; Decreased ROM; Increased pain  Assessment: 78 yo female adm with MARIO. PLOF: Indep amb with [id number], Today pt min assist bed mob, CG transfers, CG to amb with RW 25 ft with R knee pain and fatigue reported. Pt will benefit from skilled PT to address deficits. RecommendSNF at discharge pending progress since pt lives alone and is requiring assist for mobility  Treatment Diagnosis: weakness, fatigue  Specific instructions for Next Treatment: ex, mobility  Prognosis: Good  Decision Making: Medium Complexity  PT Education: Goals; PT Role; Plan of Care; General Safety; Disease Specific Education; Transfer Training; Gait Training  Patient Education: Disease Specific: pt educated in prevention of complications of bedrest, safety for transfers and walking. She voices understanding  Barriers to Learning: none  REQUIRES PT FOLLOW UP: Yes  Activity Tolerance  Activity Tolerance: Patient Tolerated treatment well  Activity Tolerance: 141/68  HR 76  Spo2 74%       Patient Diagnosis(es): The primary encounter diagnosis was Closed head injury, initial encounter. Diagnoses of Laceration of scalp, initial encounter, MARIO (acute kidney injury) ([facility] [address] and Hyperkalemia were also pertinent to this visit.      has a past medical history of Arthritis, BCC (basal cell carcinoma of skin), Bladder infection, CAD (coronary artery disease), Cancer ([address] CHF (congestive heart failure) ([address] Chronic back pain, COPD (chronic obstructive [facility] disease) [facility] Depression, Depression, Hypercholesteremia, Hypertension, Pain in shoulder, Reflux, Rheumatic fever, Sleep apnea, TIA (transient ischemic attack), Urinary incontinence, and Wears glasses. has a past surgical history that includes bladder repair; Hysterectomy; Neck surgery; shoulder surgery; Carpal tunnel release; Tonsillectomy; Appendectomy; Upper gastrointestinal endoscopy (4/22/11); Spine surgery (1999); Spinal fixation surgery with implant (2001); Spinal fixation surgery with implant (2004); Spinal fixation surgery with implant (2007); joint replacement; Coronary angioplasty with stent; Cardiac surgery; Skin cancer excision; Colonoscopy; Upper gastrointestinal endoscopy; other surgical history (9/28/12); Cholecystectomy, laparoscopic (01/06/2017); Upper gastrointestinal endoscopy (03/23/2017); Diagnostic Cardiac Cath Lab Procedure; and Aortic valve replacement. Restrictions  Restrictions/Precautions  Restrictions/Precautions: Seizure  Position Activity Restriction  Other position/activity restrictions: Up with assistance, reynolds  Vision/Hearing      Glasses all the time, hearing good  Subjective  RN cleared pt for therapy, pt resting in bed on approach. Pt agrees to therapy  Patient assessed for rehabilitation services?: Yes  Pt reports R knee \"Hurts a bit more\" with ambulation. Denies pain at rest.  Pt provided emotional support, rest and repostioning. She voices satisfaction once seated  R knee discomfort limited distance ambulated, but she was able to complete treatment session  Pain Screening  Patient Currently in Pain: Yes          OrientationWNL     Social/Functional History  Social/Functional History  Lives With: Alone  Type of Home: Apartment  Home Layout: One level  Home Access: Level entry  Bathroom Shower/Tub: Walk-in shower, Shower chair with back  Bathroom Toilet: Handicap height  Bathroom Equipment: Shower chair, Grab bars in [address]: 4 wheeled walker, [place] cane, [employer], Electric scooter  ADL Assistance: Needs assistance (Pt is independent with all ADLS except bathing. Aid assists with washing back.)  Homemaking Assistance: Needs assistance (Aid comes to assist 3 days a week.)  Homemaking Responsibilities: Yes (Aid comes 3 days a week.)  Meal Prep Responsibility: Primary  Laundry Responsibility: Secondary  Cleaning Responsibility: Secondary  Bill Paying/Finance Responsibility: Primary  Shopping Responsibility: Secondary  Ambulation Assistance: Independent (Pt primarily uses a cane at home and sometimes a rollator. Pt uses scooter or electric grocery carts in stores for longer distances. )  Transfer Assistance: Independent  Active : Yes  Leisure & Hobbies: Make cards  Additional Comments: Pt has had 1 other fall in the past 6 months besides the one prior to this admission. Pt's daughter can assist PRN at home but her daughter works. Pt does have an aid that comes MWF. Objective:  AROM: WFL BLEs, stiffness at knees, old TKR Bila  Strength: BLEs grossly 3+/5 at hips, 4-/5 knees and ankles         Bed mobility  Supine to Sit: Minimal assistance     Transfers  Sit to Stand: Contact guard assistance  Stand to sit: Contact guard assistance  Bed to Chair: Minimal assistance    Ambulation  Surface: level tile  Device: Rolling Walker  Assistance: Contact guard assistance  Quality of Gait: very slow pace with shortened stride and minimal foot clerance, pt reports R knee discomfort with ambulation, reports early fatigue  Distance: 25 ft        Exercises  Straight Leg Raise: 3 x B  Heelslides: 5 x B  Gluteal Sets: 10 xB  Hip Abduction: 5 x B  Ankle Pumps: 10 x B     Plan   Plan  Times per week: 3- 5 x wk  Specific instructions for Next Treatment: ex, mobility  Current Treatment Recommendations: Strengthening, Transfer Training, Endurance Training, Gait Training, Functional Mobility Training, Safety Education & Training  Safety Devices  Type of devices:  All fall risk precautions in place, Gait belt, Patient at risk for falls, Call light within reach, Left in chair, Chair alarm in place, Nurse notified ([facility])      AM-PAC Score     [address] [place] [address] [place] without Stair Climbing Raw Score : 15 (11/26/21 1216)  AM-PAC Inpatient without Stair Climbing T-Scale Score : 43.03 (11/26/21 1216)  Mobility Inpatient CMS 0-100% Score: 47.43 (11/26/21 1216)  Mobility Inpatient without Stair CMS G-Code Modifier : CK (11/26/21 1216)       Goals  Short term goals  Time Frame for Short term goals: 1 week (12/3) unless otherwise specified  Short term goal 1: pt to perform bed mob modified indep  Short term goal 2: pt to perform transfers modified indep  Short term goal 3: pt to amb with RW and supervision 80 ft  Short term goal 4: pt to participate in LE Ex 101-2 reps by 11/30  Patient Goals   Patient goals : \" to walk\"       Therapy Time   Individual Concurrent Group Co-treatment   Time In 1140         Time Out 1214         Minutes 34         Timed Code Treatment Minutes: 70 Margie Gonsalez, PT

## 2021-11-26 NOTE — PROGRESS NOTES
Occupational Therapy   Occupational Therapy Initial Assessment/Treatment  Date: 2021   Patient Name: Alessandra Pulido  MRN: [id number]     : 1940    Date of Service: 2021    Discharge Recommendations:  [address]  OT Equipment Recommendations  Other: defer    Assessment   Performance deficits / Impairments: Decreased functional mobility ; Decreased ADL status; Decreased ROM; Decreased strength; Decreased balance; Decreased coordination; Decreased posture; Decreased fine motor control; Decreased endurance    Assessment: Pt is an 78yo female with deficits in the areas listed above after a fall. Pt typically receives min A with bathing at baseline and assistance with IADLS but is (I) with other ADLs and functional mobility. Today pt performed bed mobility with min-mod A and functional mobility with min A and RW. Pt performing functional t/fs with CGA and RW and vc's for safe t/fs. Pt required total A for LB dressing. Pt experiencing increased pain in her LLE limiting ability to complete further mobility and ADLs. Pt would continue to benefit from skilled OT services to increase endurance, safety and independence for ADLs and functional mobility. Recommend SNF at d/c to increase safety and independence for home. Prognosis: Good  Decision Making: Medium Complexity  OT Education: Plan of Care; OT Role; ADL Adaptive Strategies; Transfer Training  Patient Education: disease specific: use of RW, safe t/fs, importance of OOB mobility, prevention of complications of bedrest, general safety during hospitalization. Pt verbalized understanding.   REQUIRES OT FOLLOW UP: Yes  Activity Tolerance  Activity Tolerance: Patient limited by pain  Activity Tolerance: /55, HR 74, O2 97%  Safety Devices  Safety Devices in place: Yes  Type of devices: Left in bed; Bed alarm in place; Call light within reach; Nurse notified; Gait belt           Patient Diagnosis(es): The primary encounter diagnosis was Closed head injury, initial encounter. Diagnoses of Laceration of scalp, initial encounter, MARIO (acute kidney injury) ([address] and Hyperkalemia were also pertinent to this visit. has a past medical history of Arthritis, BCC (basal cell carcinoma of skin), Bladder infection, CAD (coronary artery disease), Cancer ([address] CHF (congestive heart failure) ([address] Chronic back pain, COPD (chronic obstructive pulmonary disease) ([address] Depression, Depression, Hypercholesteremia, Hypertension, Pain in shoulder, Reflux, Rheumatic fever, Sleep apnea, TIA (transient ischemic attack), Urinary incontinence, and Wears glasses. has a past surgical history that includes bladder repair; Hysterectomy; Neck surgery; shoulder surgery; Carpal tunnel release; Tonsillectomy; Appendectomy; Upper gastrointestinal endoscopy (4/22/11); Spine surgery (1999); Spinal fixation surgery with implant (2001); Spinal fixation surgery with implant (2004); Spinal fixation surgery with implant (2007); joint replacement; Coronary angioplasty with stent; Cardiac surgery; Skin cancer excision; Colonoscopy; Upper gastrointestinal endoscopy; other surgical history (9/28/12); Cholecystectomy, laparoscopic (01/06/2017); Upper gastrointestinal endoscopy (03/23/2017); Diagnostic Cardiac Cath Lab Procedure; and Aortic valve replacement. Restrictions  Restrictions/Precautions  Restrictions/Precautions: Seizure  Position Activity Restriction  Other position/activity restrictions: Up with assistance, gail Lang   General  Chart Reviewed: Yes  Patient assessed for rehabilitation services?: Yes  Additional Pertinent Hx: Per ED provider notes, \"Nithya Hennessy is a 79 y.o. female with a history of COPD, hypertension, CAD, CHF, TIA, chronic musculoskeletal pain especially of the neck and lower back who presents to the ED complaining of fall with head injury.   Patient was sitting in the waiting room of her pain management clinic when she stood up and was reported to have lost her balance falling forward and striking her head against a piece of furniture. Unclear if there was loss of consciousness however she denies it. Reportedly sustained a scalp laceration. \"  Response to previous treatment: Patient with no complaints from previous session  Family / Caregiver Present: No  Referring Practitioner: Jaya Man MD  Diagnosis: MARIO  Subjective  Subjective: Pt supine and in pain but agreeable to therapy. General Comment  Comments: RN approved therapy. Vital Signs  Temp: 98.2 °F (36.8 °C)  Temp Source: Axillary  Pulse: 75  Heart Rate Source: Monitor  Resp: 14  BP: (!) 141/68  BP Location: Left upper arm  MAP (mmHg): 93  Patient Position: Semi fowlers  Oxygen Therapy  SpO2: 95 %  O2 Device: None (Room air)  Social/Functional History  Social/Functional History  Lives With: Alone  Type of Home: Apartment  Home Layout: One level  Home Access: Level entry  Bathroom Shower/Tub: Walk-in shower, Shower chair with back  Bathroom Toilet: Handicap height  Bathroom Equipment: Shower chair, Grab bars in shower  Home Equipment: 4 wheeled walker, Quad cane, Cane, Electric scooter  ADL Assistance: Needs assistance (Pt is independent with all ADLS except bathing. Aid assists with washing back.)  Homemaking Assistance: Needs assistance (Aid comes to assist 3 days a week.)  Homemaking Responsibilities: Yes (Aid comes 3 days a week.)  Meal Prep Responsibility: Primary (orders take out and prepares simple meals.)  Laundry Responsibility: Secondary  Cleaning Responsibility: Secondary  Bill Paying/Finance Responsibility: Primary  Shopping Responsibility: Secondary  Ambulation Assistance: Independent (Pt primarily uses a cane at home and sometimes a rollator. Pt uses scooter or electric grocery carts in stores for longer distances. )  Transfer Assistance: Independent  Active : Yes  Leisure & Hobbies: Make cards  Additional Comments: Pt has had 1 other fall in the past 6 months besides the one prior to this admission. Pt's daughter can assist PRN at home but her daughter works. Pt does have an aid that comes MWF. Objective   Vision: Impaired  Vision Exceptions: Wears glasses at all times  Hearing: Exceptions to [facility]  Hearing Exceptions: Hard of hearing/hearing concerns    Orientation  Overall Orientation Status: Within Functional Limits  Observation/Palpation  Posture: Fair  Balance  Sitting Balance: Stand by assistance  Standing Balance: Minimal assistance  Standing Balance  Time: ~1min x2  Activity: in room functional mobility. Comment: with RW and vc's for RW use  Functional Mobility  Functional - Mobility Device: Rolling Walker  Activity: Other (in room functional mobility.)  Assist Level: Minimal assistance  Functional Mobility Comments: Vc's for mobiltiy strategies with LLE pain. Pt limited by pain this date in mobility. ADL  LE Dressing: Dependent/Total (to don socks)  Tone RUE  RUE Tone: Normotonic  Tone LUE  LUE Tone: Normotonic  Coordination  Movements Are Fluid And Coordinated: No  Coordination and Movement description: Tremors; Right UE; Left UE; Gross motor impairments; Fine motor impairments (RUE>LUE)     Bed mobility  Supine to Sit: Minimal assistance (HOB elevated.)  Sit to Supine: Minimal assistance (HOB elevated.)  Scooting: Moderate assistance (using BR)  Transfers  Sit to stand: Contact guard assistance  Stand to sit: Contact guard assistance  Transfer Comments: with RW and vc's for safe tfs     Cognition  Overall Cognitive Status: Exceptions  Arousal/Alertness: Appropriate responses to stimuli  Following Commands:  Follows one step commands with repetition  Attention Span: Attends with cues to redirect  Memory: Appears intact  Problem Solving: Decreased awareness of errors  Insights: Decreased awareness of deficits  Initiation: Requires cues for some  Sequencing: Requires cues for some        Sensation  Overall Sensation Status: Impaired (b/l n/t in her feet, legs and hands.)        LUE AROM (degrees)  LUE AROM : Exceptions  LUE General AROM: shoulder flexion less than 50 degrees seated. shoulder flexion ~90 degrees in supine. Left Hand AROM (degrees)  Left Hand AROM: WFL  Left Hand General AROM: shoulder flexion less than 50 degrees seated. shoulder flexion ~90 degrees in supine. RUE AROM (degrees)  RUE AROM : WFL  LUE Strength  L Hand General: 4+/5  LUE Strength Comment: 4+/5 grossly without testing shoulder flexion  RUE Strength  R Hand General: 4+/5  RUE Strength Comment: 4+/5 grossly without testing shoulder flexion                   Plan   Plan  Times per week: 3-5x/week  Current Treatment Recommendations: Strengthening, ROM, Balance Training, Functional Mobility Training, Endurance Training, Safety Education & Training, Self-Care / ADL, Home Management Training, Patient/Caregiver Education & Training    AM-PAC Score        AM-[facility] Inpatient Daily Activity Raw Score: 15 (11/26/21 1257)  AM-PAC Inpatient ADL T-Scale Score : 34.69 (11/26/21 1257)  ADL Inpatient CMS 0-100% Score: 56.46 (11/26/21 1257)  ADL Inpatient CMS G-Code Modifier : CK (11/26/21 1257)    Goals  Short term goals  Time Frame for Short term goals: 1 week (12/3) unless stated otherwise. Short term goal 1: Pt will toilet with min A and AD PRN. Short term goal 2: Pt will LB dress with min A and AE/AD PRN. Short term goal 3: Pt will perform at least 3min of standing ADLS with supervision. (12/1)  Short term goal 4: Pt will UB dress with set up and supervision. Short term goal 5: Pt will perform BUE ther ex x15-20 to increase strength for functional mobility and activities.   Patient Goals   Patient goals : \"to walk to the restroom\"       Therapy Time   Individual Concurrent Group Co-treatment   Time In 0913         Time Out 0952         Minutes 39         Timed Code Treatment Minutes: 29 Minutes (10min eval)       Shreyas More, OTR/L  If pt discharges prior to next session, this note will serve as discharge summary. See case management note for discharge disposition.

## 2021-11-26 NOTE — FLOWSHEET NOTE
11/26/21 0814   Assessment   Charting Type Shift assessment   Neurological   Neuro (WDL) WDL   Level of Consciousness Alert (0)   Orientation Level Oriented X4   Cognition Appropriate judgement; Appropriate safety awareness; Follows commands; Appropriate attention/concentration   Language Clear   Gag Present   Tongue Deviation None   [place] Coma Scale   Eye Opening 4   Best Verbal Response 5   Best Motor Response 6   [place] Coma Scale Score 15   HEENT   HEENT (WDL) X   Right Eye Intact   Left Eye Intact   Right Ear Impaired hearing   Left Ear Impaired hearing   Nose Intact   Throat Intact   Neck Symmetrical; Trachea midline   Tongue Pink & moist   Voice Normal   Mucous Membrane Moist; Intact; [place]   Teeth Intact   Respiratory   Respiratory (WDL) WDL   Cardiac   Cardiac (WDL) WDL   Cardiac Regularity Regular   Heart Sounds S1, S2   Cardiac Rhythm Sinus rhythm; Sinus dianne   Rhythm Interpretation   Pulse 66   Cardiac Monitor   Telemetry Monitor On Yes   Telemetry Audible Yes   Telemetry Alarms Set Yes   Gastrointestinal   Abdominal (WDL) WDL   Abdomen Inspection Soft   RUQ Bowel Sounds Active   LUQ Bowel Sounds Active   RLQ Bowel Sounds Active   LLQ Bowel Sounds Active   Peripheral Vascular   Peripheral Vascular (WDL) WDL   Edema Generalized   Edema Generalized +1   Skin Color/Condition   Skin Color/Condition (WDL) X   Skin Color Pale   Skin Condition/Temp Warm   Skin Integrity   Skin Integrity (WDL) X   Skin Integrity Abrasion; Bruising   Location scattered   Musculoskeletal   Musculoskeletal (WDL) X   RUE Full movement   LUE Full movement   RL Extremity Weakness   LL Extremity Weakness   Genitourinary   Genitourinary (WDL) WDL   Flank Tenderness No   Suprapubic Tenderness No   Dysuria No   Anus/Rectum   Anus/Rectum (WDL) WDL   Psychosocial   Psychosocial (WDL) X   Patient Behaviors Flat affect;  Withdrawn

## 2021-11-26 NOTE — PROGRESS NOTES
Sent to: Wilman Mccoy   Patient: Micaela Angel     11/26/21 5:55 AM   [phone] Hospital or Facility: [facility] From: [facility] THE RE: August Safer 1940 RM: 909 KXN the potassium is 5.2. They are trending down. Thank you!  Need Callback: NO CALLBACK REQ C4 PROGRESSIVE CARE FYI  Unread

## 2021-11-26 NOTE — CARE COORDINATION
[facility]    Referral received from  to follow for home care services.    [facility] unable to staff timely  Referral sent to [facility] home care out of service area  Referral sent to [facility]; awaiting response  Referral sent to [place] accepted referral spoke w Jon Mariano RN, BSN CTN  [facility] [phone]

## 2021-11-26 NOTE — PLAN OF CARE
Problem: Falls - Risk of:  Goal: Will remain free from falls  Description: Will remain free from falls  11/26/2021 0155 by Diamond Mann RN  Outcome: Ongoing  11/26/2021 0154 by Diamond Mann RN  Outcome: Ongoing  Goal: Absence of physical injury  Description: Absence of physical injury  11/26/2021 0155 by Diamond Mann RN  Outcome: Ongoing  11/26/2021 0154 by Diamond Mann RN  Outcome: Ongoing     Problem: Pain:  Goal: Pain level will decrease  Description: Pain level will decrease  11/26/2021 0155 by Diamond Mann RN  Outcome: Ongoing  11/26/2021 0154 by Diamond Mann RN  Outcome: Ongoing  Goal: Control of acute pain  Description: Control of acute pain  11/26/2021 0155 by Diamond Mann RN  Outcome: Ongoing  11/26/2021 0154 by Diamond Mann RN  Outcome: Ongoing  Goal: Control of chronic pain  Description: Control of chronic pain  11/26/2021 0155 by Diamond Mann RN  Outcome: Ongoing  11/26/2021 0154 by Diamond Mann RN  Outcome: Ongoing No

## 2021-11-27 LAB
ANION GAP SERPL CALCULATED.3IONS-SCNC: 8 MMOL/L (ref 3–16)
BUN BLDV-MCNC: 20 MG/DL (ref 7–20)
CALCIUM SERPL-MCNC: 9.1 MG/DL (ref 8.3–10.6)
CHLORIDE BLD-SCNC: 103 MMOL/L (ref 99–110)
CO2: 26 MMOL/L (ref 21–32)
CREAT SERPL-MCNC: 1.6 MG/DL (ref 0.6–1.2)
GFR AFRICAN AMERICAN: 37
GFR NON-AFRICAN AMERICAN: 31
GLUCOSE BLD-MCNC: 104 MG/DL (ref 70–99)
GLUCOSE BLD-MCNC: 112 MG/DL (ref 70–99)
GLUCOSE BLD-MCNC: 171 MG/DL (ref 70–99)
GLUCOSE BLD-MCNC: 93 MG/DL (ref 70–99)
PERFORMED ON: ABNORMAL
POTASSIUM REFLEX MAGNESIUM: 4.4 MMOL/L (ref 3.5–5.1)
SODIUM BLD-SCNC: 137 MMOL/L (ref 136–145)

## 2021-11-27 PROCEDURE — 6370000000 HC RX 637 (ALT 250 FOR IP): Performed by: HOSPITALIST

## 2021-11-27 PROCEDURE — 36415 COLL VENOUS BLD VENIPUNCTURE: CPT

## 2021-11-27 PROCEDURE — 2580000003 HC RX 258: Performed by: INTERNAL MEDICINE

## 2021-11-27 PROCEDURE — 97110 THERAPEUTIC EXERCISES: CPT

## 2021-11-27 PROCEDURE — 6360000002 HC RX W HCPCS: Performed by: INTERNAL MEDICINE

## 2021-11-27 PROCEDURE — 6370000000 HC RX 637 (ALT 250 FOR IP): Performed by: INTERNAL MEDICINE

## 2021-11-27 PROCEDURE — 97530 THERAPEUTIC ACTIVITIES: CPT

## 2021-11-27 PROCEDURE — 2060000000 HC ICU INTERMEDIATE R&B

## 2021-11-27 PROCEDURE — 80048 BASIC METABOLIC PNL TOTAL CA: CPT

## 2021-11-27 PROCEDURE — 97535 SELF CARE MNGMENT TRAINING: CPT

## 2021-11-27 RX ADMIN — AZELASTINE HYDROCHLORIDE 1 SPRAY: 137 SPRAY, METERED NASAL at 20:59

## 2021-11-27 RX ADMIN — HYDROCODONE BITARTRATE AND ACETAMINOPHEN 1 TABLET: 5; 325 TABLET ORAL at 20:58

## 2021-11-27 RX ADMIN — ATORVASTATIN CALCIUM 20 MG: 10 TABLET, FILM COATED ORAL at 11:20

## 2021-11-27 RX ADMIN — SODIUM CHLORIDE, PRESERVATIVE FREE 10 ML: 5 INJECTION INTRAVENOUS at 20:59

## 2021-11-27 RX ADMIN — HEPARIN SODIUM 5000 UNITS: 5000 INJECTION INTRAVENOUS; SUBCUTANEOUS at 14:09

## 2021-11-27 RX ADMIN — ASPIRIN 81 MG: 81 TABLET, COATED ORAL at 11:20

## 2021-11-27 RX ADMIN — HEPARIN SODIUM 5000 UNITS: 5000 INJECTION INTRAVENOUS; SUBCUTANEOUS at 20:59

## 2021-11-27 RX ADMIN — HEPARIN SODIUM 5000 UNITS: 5000 INJECTION INTRAVENOUS; SUBCUTANEOUS at 05:34

## 2021-11-27 RX ADMIN — SODIUM CHLORIDE, PRESERVATIVE FREE 10 ML: 5 INJECTION INTRAVENOUS at 11:20

## 2021-11-27 RX ADMIN — DONEPEZIL HYDROCHLORIDE 10 MG: 5 TABLET, FILM COATED ORAL at 20:59

## 2021-11-27 RX ADMIN — AZELASTINE HYDROCHLORIDE 1 SPRAY: 137 SPRAY, METERED NASAL at 11:19

## 2021-11-27 RX ADMIN — TROSPIUM CHLORIDE 20 MG: 20 TABLET, FILM COATED ORAL at 11:19

## 2021-11-27 RX ADMIN — DIVALPROEX SODIUM 250 MG: 250 TABLET, DELAYED RELEASE ORAL at 11:19

## 2021-11-27 RX ADMIN — GABAPENTIN 100 MG: 100 CAPSULE ORAL at 11:20

## 2021-11-27 RX ADMIN — PANTOPRAZOLE SODIUM 40 MG: 40 TABLET, DELAYED RELEASE ORAL at 11:19

## 2021-11-27 RX ADMIN — GABAPENTIN 100 MG: 100 CAPSULE ORAL at 14:09

## 2021-11-27 RX ADMIN — DIVALPROEX SODIUM 250 MG: 250 TABLET, DELAYED RELEASE ORAL at 20:59

## 2021-11-27 RX ADMIN — GABAPENTIN 100 MG: 100 CAPSULE ORAL at 20:59

## 2021-11-27 RX ADMIN — HYDROCODONE BITARTRATE AND ACETAMINOPHEN 1 TABLET: 5; 325 TABLET ORAL at 11:20

## 2021-11-27 ASSESSMENT — PAIN SCALES - GENERAL
PAINLEVEL_OUTOF10: 8
PAINLEVEL_OUTOF10: 8
PAINLEVEL_OUTOF10: 9
PAINLEVEL_OUTOF10: 9
PAINLEVEL_OUTOF10: 0

## 2021-11-27 ASSESSMENT — PAIN DESCRIPTION - PAIN TYPE: TYPE: CHRONIC PAIN;ACUTE PAIN

## 2021-11-27 ASSESSMENT — PAIN DESCRIPTION - LOCATION: LOCATION: GENERALIZED

## 2021-11-27 NOTE — PROGRESS NOTES
primary encounter diagnosis was Closed head injury, initial encounter. Diagnoses of Laceration of scalp, initial encounter, MARIO (acute kidney injury) ([facility] [address] and Hyperkalemia were also pertinent to this visit. has a past medical history of Arthritis, BCC (basal cell carcinoma of skin), Bladder infection, CAD (coronary artery disease), Cancer ([address] CHF (congestive heart failure) ([address] Chronic back pain, COPD (chronic obstructive pulmonary disease) ([facility] [address] Depression, Depression, Hypercholesteremia, Hypertension, Pain in shoulder, Reflux, Rheumatic fever, Sleep apnea, TIA (transient ischemic attack), Urinary incontinence, and Wears glasses. has a past surgical history that includes bladder repair; Hysterectomy; Neck surgery; shoulder surgery; Carpal tunnel release; Tonsillectomy; Appendectomy; Upper gastrointestinal endoscopy (4/22/11); Spine surgery (1999); Spinal fixation surgery with implant (2001); Spinal fixation surgery with implant (2004); Spinal fixation surgery with implant (2007); joint replacement; Coronary angioplasty with stent; Cardiac surgery; Skin cancer excision; Colonoscopy; Upper gastrointestinal endoscopy; other surgical history (9/28/12); Cholecystectomy, laparoscopic (01/06/2017); Upper gastrointestinal endoscopy (03/23/2017); Diagnostic Cardiac Cath Lab Procedure; and Aortic valve replacement. Restrictions  Restrictions/Precautions  Restrictions/Precautions: Seizure  Position Activity Restriction  Other position/activity restrictions: Up with assistancegail   General  Chart Reviewed: Yes  Patient assessed for rehabilitation services?: Yes  Additional Pertinent Hx: Per ED provider notes, \"Nithya Aguirre is a 79 y.o. female with a history of COPD, hypertension, CAD, CHF, TIA, chronic musculoskeletal pain especially of the neck and lower back who presents to the ED complaining of fall with head injury.   Patient was sitting in the waiting room of her pain management clinic when she stood up and was reported to have lost her balance falling forward and striking her head against a piece of furniture. Unclear if there was loss of consciousness however she denies it. Reportedly sustained a scalp laceration. \"  Response to previous treatment: Patient with no complaints from previous session  Family / Caregiver Present: No  Referring Practitioner: Evens Portillo MD  Diagnosis: MARIO  Subjective  Subjective: Pt supine and in pain but agreeable to therapy. General Comment  Comments: RN approved therapy. Pain Assessment  Pain Level: 9  Pain Type: Chronic pain; Acute pain  Pain Location: Generalized  Response to Pain Intervention: Patient Satisfied (ambulation/increased activity, repositioned.)  Vital Signs  Patient Currently in Pain: Yes   Orientation  Orientation  Overall Orientation Status: Within Functional Limits  Objective    ADL  Feeding: Setup; Beverage management  Grooming: Setup (semifowlers in bed to wash face.)  LE Dressing: Maximum assistance (Mod to doff briefs, total to don/doff socks and to don brief.)  Toileting: Dependent/Total (Prince, Incontinence of bowels and max A for clothing management and pericare.)        Balance  Sitting Balance: Stand by assistance  Standing Balance: Minimal assistance  Standing Balance  Time: ~1min x2, ~40s  Activity: to/from restroom, LB ADLS. Comment: with RW and vc's for RW use and posture. Functional Mobility  Functional - Mobility Device: Rolling Walker  Activity: To/from bathroom  Assist Level: Minimal assistance  Functional Mobility Comments: Vc's for mobiltiy strategies with LLE pain. Pt limited by pain this date in mobility. Toilet Transfers  Toilet - Technique: Ambulating  Equipment Used: Grab bars (on R)  Toilet Transfer: Minimal assistance  Toilet Transfers Comments: RW used, vc's for safe t/fs.   Bed mobility  Supine to Sit: Stand by assistance (HOB elevated.)  Sit to Supine: Minimal assistance (HOB elevated)  Scooting: Minimal supervision. Short term goal 5: Pt will perform BUE ther ex x15-20 to increase strength for functional mobility and activities. Patient Goals   Patient goals : \"to walk to the restroom\"-MET       Therapy Time   Individual Concurrent Group Co-treatment   Time In 1010         Time Out 1051         Minutes 41         Timed Code Treatment Minutes: 555 Chau Sarmiento OTR/L  If pt discharges prior to next session, this note will serve as discharge summary. See case management note for discharge disposition.

## 2021-11-27 NOTE — PROGRESS NOTES
Hospitalist Progress Note      PCP: KALI Lambert - CNP    Date of Admission: 11/24/2021    Chief Complaint: [facility] Course: 48-year-old female with past medical history of COPD, depression, hyperlipidemia, hypertension, sleep apnea on CPAP, TIA, chronic pain admitted after a fall at doctor's office. Subjective: Patient is lying in bed comfortable denies any chest pain no shortness of breath positive BM this morning. No      Medications:  Reviewed    Infusion Medications    sodium chloride      dextrose       Scheduled Medications    sodium zirconium cyclosilicate  10 g Oral Daily    sodium chloride flush  10 mL IntraVENous 2 times per day    heparin (porcine)  5,000 Units SubCUTAneous 3 times per day    atorvastatin  20 mg Oral Daily    pantoprazole  40 mg Oral Daily    trospium  20 mg Oral Daily    gabapentin  100 mg Oral TID    divalproex  250 mg Oral BID    aspirin  81 mg Oral Daily    azelastine  1 spray Each Nostril BID    donepezil  10 mg Oral Nightly     PRN Meds: HYDROcodone 5 mg - acetaminophen, ipratropium-albuterol, sodium chloride flush, sodium chloride, promethazine **OR** ondansetron, acetaminophen **OR** acetaminophen, glucose, dextrose, glucagon (rDNA), dextrose      Intake/Output Summary (Last 24 hours) at 11/27/2021 1000  Last data filed at 11/27/2021 0502  Gross per 24 hour   Intake 50 ml   Output 1700 ml   Net -1650 ml       Physical Exam Performed:    BP (!) 162/80   Pulse 73   Temp 98.8 °F (37.1 °C) (Oral)   Resp 16   Ht 5' (1.524 m)   Wt 186 lb 15.2 oz (84.8 kg)   LMP  (LMP Unknown)   SpO2 97%   BMI 36.51 kg/m²     General appearance: No apparent distress, appears stated age and cooperative. HEENT: Pupils equal, round, and reactive to light. Conjunctivae/corneas clear. Neck: Supple, with full range of motion. No jugular venous distention. Trachea midline. Respiratory:  Normal respiratory effort.  Clear to auscultation, bilaterally without Rales/Wheezes/Rhonchi. Cardiovascular: Regular rate and rhythm with normal S1/S2 without murmurs, rubs or gallops. Abdomen: Soft, non-tender, non-distended with normal bowel sounds. Musculoskeletal: No clubbing, cyanosis or edema bilaterally. Full range of motion without deformity. Skin: Skin color, texture, turgor normal.  No rashes or lesions. Neurologic:  Neurovascularly intact without any focal sensory/motor deficits. Cranial nerves: II-XII intact, grossly non-focal.  Psychiatric: Alert and oriented, thought content appropriate, normal insight  Capillary Refill: Brisk,3 seconds, normal   Peripheral Pulses: +2 palpable, equal bilaterally       Labs:   Recent Labs     11/24/21  1520 11/25/21  0949 11/26/21  0459   WBC 10.7 7.2 7.4   HGB 11.3* 11.1* 10.7*   HCT 35.0* 33.7* 32.3*    164 161     Recent Labs     11/24/21  1848 11/24/21  2235 11/25/21  1322 11/26/21  0500 11/27/21  0443      < > 136 139 137   K 5.9*   < > 5.8* 5.2* 4.4      < > 102 104 103   CO2 22   < > 25 27 26   BUN 32*   < > 29* 26* 20   CREATININE 3.0*   < > 2.3* 1.8* 1.6*   CALCIUM 9.2   < > 9.1 8.4 9.1   PHOS 4.6  --   --   --   --     < > = values in this interval not displayed. Recent Labs     11/24/21  1639   AST 9*   ALT <5*   BILITOT <0.2   ALKPHOS 81     Recent Labs     11/24/21  1935   INR 1.03     Recent Labs     11/24/21  1639 11/26/21  0500   CKTOTAL  --  40   TROPONINI <0.01  --        Urinalysis:      Lab Results   Component Value Date    NITRU Negative 11/24/2021    WBCUA 17 06/14/2019    BACTERIA 1+ 06/14/2019    RBCUA 0 06/14/2019    BLOODU Negative 11/24/2021    SPECGRAV 1.020 11/24/2021    GLUCOSEU Negative 11/24/2021    GLUCOSEU Neg 05/12/2011       Radiology:  XR KNEE LEFT (1-2 VIEWS)   Final Result   Left knee prior arthroplasty with anatomic alignment and intact hardware. No   acute fracture. CT CHEST ABDOMEN PELVIS WO CONTRAST   Final Result   1. No acute traumatic abnormality.    2. Diverticulosis without scan evidence for diverticulitis. CT Head WO Contrast   Final Result   No acute intracranial abnormality. Mild soft tissue swelling left frontal scalp, likely soft tissue contusion. No acute bony abnormality. CT Cervical Spine WO Contrast   Final Result   No acute abnormality of the cervical spine. XR PELVIS (1-2 VIEWS)   Final Result   No acute osseous abnormality. If there is continued clinical concern for occult fracture, follow-up   radiographs or MRI may be helpful for further evaluation. XR LUMBAR SPINE (2-3 VIEWS)   Final Result   Remote postsurgical changes L3 through S1 and degenerative changes at L1-L2   with grade 1 listhesis. No acute osseous abnormality. XR CHEST PORTABLE   Final Result   No acute abnormality. Stable cardiomegaly. Assessment/Plan:    Active Hospital Problems    Diagnosis     MARIO (acute kidney injury) ([facility] [address] [N17.9]      1.  This is a 59-year-old female admitted after a fall with laceration requiring staples on the head , CT of the head negative in the emergency room ,continue with the supportive care. 2.  Hyperkalemia given 1 dose of Kayexalate on 11/25/2021 nephrology consulted and following. Hyperkalemia now has resolved  3.  Acute renal failure nephrology consulted.  Acute kidney injury continues to improve check daily BMP. 4.  Left knee pain x-ray of the knee negative, history of chronic pain syndrome continue with home medication. 5.  Consulted physical Occupational Therapy. DVT Prophylaxis: Heparin subcu  Diet: ADULT DIET;  Regular; Low Potassium (Less than 3000 mg/day)  Code Status: Full Code    PT/OT Eval Status: Rito Tovar MD

## 2021-11-27 NOTE — PROGRESS NOTES
Physical Therapy  Facility/Department: [facility] C4 PCU  Daily Treatment Note  NAME: Donna Whitmore  : 1940  MRN: [id number]    Date of Service: 2021    Discharge Recommendations:  Subacute/Skilled Nursing Facility   PT Equipment Recommendations  Equipment Needed: No    Assessment   Body structures, Functions, Activity limitations: Decreased functional mobility ; Decreased endurance; Decreased strength; Decreased ROM; Increased pain  Assessment: pt found supine in bed, politely declining out of bed or edge of bed activity. agreeable to supine ther ex. therapist offered once more to assist pt with gait/standign prior to leaving, pt continues to decline. rec SNF Upon d/c due to pt's current level and needs for mobiity. Treatment Diagnosis: weakness, fatigue  Prognosis: Good  Decision Making: Medium Complexity  PT Education: Goals; PT Role; Plan of Care; General Safety; Disease Specific Education; Transfer Training; Gait Training  Patient Education: Disease Specific: pt educated in prevention of complications of bedrest, safety for transfers and walking. She voices understanding  Barriers to Learning: none  Activity Tolerance  Activity Tolerance: Patient Tolerated treatment well  Activity Tolerance: 159/68, 85 bpm, Spo2-= 93%     Patient Diagnosis(es): The primary encounter diagnosis was Closed head injury, initial encounter. Diagnoses of Laceration of scalp, initial encounter, MARIO (acute kidney injury) ([address] and Hyperkalemia were also pertinent to this visit. has a past medical history of Arthritis, BCC (basal cell carcinoma of skin), Bladder infection, CAD (coronary artery disease), Cancer ([address] CHF (congestive heart failure) ([address] Chronic back pain, COPD (chronic obstructive pulmonary disease) ([address] Depression, Depression, Hypercholesteremia, Hypertension, Pain in shoulder, Reflux, Rheumatic fever, Sleep apnea, TIA (transient ischemic attack), Urinary incontinence, and Wears glasses.    has a past surgical history that includes bladder repair; Hysterectomy; Neck surgery; shoulder surgery; Carpal tunnel release; Tonsillectomy; Appendectomy; Upper gastrointestinal endoscopy (4/22/11); Spine surgery (1999); Spinal fixation surgery with implant (2001); Spinal fixation surgery with implant (2004); Spinal fixation surgery with implant (2007); joint replacement; Coronary angioplasty with stent; Cardiac surgery; Skin cancer excision; Colonoscopy; Upper gastrointestinal endoscopy; other surgical history (9/28/12); Cholecystectomy, laparoscopic (01/06/2017); Upper gastrointestinal endoscopy (03/23/2017); Diagnostic Cardiac Cath Lab Procedure; and Aortic valve replacement. Restrictions  Restrictions/Precautions  Restrictions/Precautions: Seizure  Position Activity Restriction  Other position/activity restrictions: Up with assistance, gail  Subjective   General  Chart Reviewed: Yes  Response To Previous Treatment: Patient with no complaints from previous session.   Family / Caregiver Present: Yes  Subjective  Subjective: pt denies pain  General Comment  Comments: RN cleared pt to particiapte  Pain Screening  Patient Currently in Pain: No  Vital Signs  Patient Currently in Pain: No       Orientation  Orientation  Overall Orientation Status: Within Functional Limits  Cognition      Objective   Bed mobility  Supine to Sit: Unable to assess  Sit to Supine: Unable to assess  Scooting: Unable to assess  Comment: pt declining out of bed activity this pm despite encouragement              Exercises  Heelslides: x15 BLE  Hip Abduction: x15 BLE  Knee Short Arc Quad: x15 BLE  Ankle Pumps: x15 BLE  Comments: completed supine                AM-PAC Score     AM-PAC Inpatient Mobility without Stair Climbing Raw Score : 14 (11/27/21 1433)  AM-PAC Inpatient without Stair Climbing T-Scale Score : 40.85 (11/27/21 1433)  Mobility Inpatient CMS 0-100% Score: 53.33 (11/27/21 1433)  Mobility Inpatient without Stair CMS G-Code Modifier : CK (11/27/21 7473)       Goals  Short term goals  Time Frame for Short term goals: 1 week (12/3) unless otherwise specified  Short term goal 1: pt to perform bed mob modified indep  Short term goal 2: pt to perform transfers modified indep  Short term goal 3: pt to amb with RW and supervision 80 ft  Short term goal 4: pt to participate in LE Ex 101-2 reps by 11/30  Patient Goals   Patient goals : \" to walk\"    Plan    Plan  Times per week: 3- 5 x wk  Specific instructions for Next Treatment: ex, mobility  Current Treatment Recommendations: Strengthening, Transfer Training, Endurance Training, Gait Training, Functional Mobility Training, Safety Education & Training  Safety Devices  Type of devices:  All fall risk precautions in place, Gait belt, Patient at risk for falls, Call light within reach, Nurse notified, Bed alarm in place, Left in bed     Therapy Time   Individual Concurrent Group Co-treatment   Time In [address]         Time Out 1345         Minutes 13         Timed Code Treatment Minutes: Janneth Bledsoe, PT

## 2021-11-27 NOTE — PLAN OF CARE
Problem: Falls - Risk of:  Goal: Will remain free from falls  Description: Will remain free from falls  11/26/2021 2258 by Jermaine Baca RN  Outcome: Ongoing  11/26/2021 1822 by Clark Clay RN  Outcome: Ongoing  Goal: Absence of physical injury  Description: Absence of physical injury  11/26/2021 2258 by Jermaine Baca RN  Outcome: Ongoing  11/26/2021 1822 by Clark Clay RN  Outcome: Ongoing     Problem: Pain:  Goal: Pain level will decrease  Description: Pain level will decrease  11/26/2021 2258 by Jermaine Baca RN  Outcome: Ongoing  11/26/2021 1822 by Clark Clay RN  Outcome: Ongoing  Goal: Control of acute pain  Description: Control of acute pain  11/26/2021 2258 by Jermaine Baca RN  Outcome: Ongoing  11/26/2021 1822 by Clark Clay RN  Outcome: Ongoing  Goal: Control of chronic pain  Description: Control of chronic pain  11/26/2021 2258 by Jermaine Baca RN  Outcome: Ongoing  11/26/2021 1822 by Clark Clay RN  Outcome: Ongoing     Problem: Skin Integrity:  Goal: Will show no infection signs and symptoms  Description: Will show no infection signs and symptoms  Outcome: Ongoing  Goal: Absence of new skin breakdown  Description: Absence of new skin breakdown  Outcome: Ongoing

## 2021-11-28 LAB
ANION GAP SERPL CALCULATED.3IONS-SCNC: 11 MMOL/L (ref 3–16)
BUN BLDV-MCNC: 20 MG/DL (ref 7–20)
CALCIUM SERPL-MCNC: 9.1 MG/DL (ref 8.3–10.6)
CHLORIDE BLD-SCNC: 101 MMOL/L (ref 99–110)
CO2: 25 MMOL/L (ref 21–32)
CREAT SERPL-MCNC: 1.2 MG/DL (ref 0.6–1.2)
GFR AFRICAN AMERICAN: 52
GFR NON-AFRICAN AMERICAN: 43
GLUCOSE BLD-MCNC: 121 MG/DL (ref 70–99)
GLUCOSE BLD-MCNC: 124 MG/DL (ref 70–99)
GLUCOSE BLD-MCNC: 127 MG/DL (ref 70–99)
GLUCOSE BLD-MCNC: 93 MG/DL (ref 70–99)
GLUCOSE BLD-MCNC: 99 MG/DL (ref 70–99)
PERFORMED ON: ABNORMAL
PERFORMED ON: NORMAL
POTASSIUM REFLEX MAGNESIUM: 4.1 MMOL/L (ref 3.5–5.1)
SODIUM BLD-SCNC: 137 MMOL/L (ref 136–145)

## 2021-11-28 PROCEDURE — 97116 GAIT TRAINING THERAPY: CPT

## 2021-11-28 PROCEDURE — 97530 THERAPEUTIC ACTIVITIES: CPT

## 2021-11-28 PROCEDURE — 6360000002 HC RX W HCPCS: Performed by: INTERNAL MEDICINE

## 2021-11-28 PROCEDURE — 6370000000 HC RX 637 (ALT 250 FOR IP): Performed by: HOSPITALIST

## 2021-11-28 PROCEDURE — 2580000003 HC RX 258: Performed by: INTERNAL MEDICINE

## 2021-11-28 PROCEDURE — 6370000000 HC RX 637 (ALT 250 FOR IP): Performed by: INTERNAL MEDICINE

## 2021-11-28 PROCEDURE — 36415 COLL VENOUS BLD VENIPUNCTURE: CPT

## 2021-11-28 PROCEDURE — 2060000000 HC ICU INTERMEDIATE R&B

## 2021-11-28 PROCEDURE — 80048 BASIC METABOLIC PNL TOTAL CA: CPT

## 2021-11-28 PROCEDURE — 51798 US URINE CAPACITY MEASURE: CPT

## 2021-11-28 RX ORDER — METOPROLOL SUCCINATE 25 MG/1
25 TABLET, EXTENDED RELEASE ORAL DAILY
Status: DISCONTINUED | OUTPATIENT
Start: 2021-11-28 | End: 2021-11-29 | Stop reason: HOSPADM

## 2021-11-28 RX ORDER — SPIRONOLACTONE 25 MG/1
25 TABLET ORAL DAILY
Status: CANCELLED | OUTPATIENT
Start: 2021-11-28

## 2021-11-28 RX ORDER — TAMSULOSIN HYDROCHLORIDE 0.4 MG/1
0.4 CAPSULE ORAL DAILY
Status: DISCONTINUED | OUTPATIENT
Start: 2021-11-28 | End: 2021-11-29 | Stop reason: HOSPADM

## 2021-11-28 RX ORDER — CITALOPRAM 20 MG/1
30 TABLET ORAL DAILY
Status: CANCELLED | OUTPATIENT
Start: 2021-11-28

## 2021-11-28 RX ORDER — FUROSEMIDE 40 MG/1
40 TABLET ORAL DAILY
Status: DISCONTINUED | OUTPATIENT
Start: 2021-11-28 | End: 2021-11-29 | Stop reason: HOSPADM

## 2021-11-28 RX ORDER — TAMSULOSIN HYDROCHLORIDE 0.4 MG/1
0.4 CAPSULE ORAL DAILY
Status: DISCONTINUED | OUTPATIENT
Start: 2021-11-28 | End: 2021-11-28

## 2021-11-28 RX ADMIN — ATORVASTATIN CALCIUM 20 MG: 10 TABLET, FILM COATED ORAL at 09:26

## 2021-11-28 RX ADMIN — HYDROCODONE BITARTRATE AND ACETAMINOPHEN 1 TABLET: 5; 325 TABLET ORAL at 15:46

## 2021-11-28 RX ADMIN — TAMSULOSIN HYDROCHLORIDE 0.4 MG: 0.4 CAPSULE ORAL at 18:20

## 2021-11-28 RX ADMIN — ASPIRIN 81 MG: 81 TABLET, COATED ORAL at 09:26

## 2021-11-28 RX ADMIN — HEPARIN SODIUM 5000 UNITS: 5000 INJECTION INTRAVENOUS; SUBCUTANEOUS at 14:44

## 2021-11-28 RX ADMIN — GABAPENTIN 100 MG: 100 CAPSULE ORAL at 14:43

## 2021-11-28 RX ADMIN — HYDROCODONE BITARTRATE AND ACETAMINOPHEN 1 TABLET: 5; 325 TABLET ORAL at 09:26

## 2021-11-28 RX ADMIN — METOPROLOL SUCCINATE 25 MG: 25 TABLET, EXTENDED RELEASE ORAL at 15:48

## 2021-11-28 RX ADMIN — TROSPIUM CHLORIDE 20 MG: 20 TABLET, FILM COATED ORAL at 09:26

## 2021-11-28 RX ADMIN — DIVALPROEX SODIUM 250 MG: 250 TABLET, DELAYED RELEASE ORAL at 09:26

## 2021-11-28 RX ADMIN — HEPARIN SODIUM 5000 UNITS: 5000 INJECTION INTRAVENOUS; SUBCUTANEOUS at 06:11

## 2021-11-28 RX ADMIN — FUROSEMIDE 40 MG: 40 TABLET ORAL at 17:52

## 2021-11-28 RX ADMIN — DONEPEZIL HYDROCHLORIDE 10 MG: 5 TABLET, FILM COATED ORAL at 22:03

## 2021-11-28 RX ADMIN — PANTOPRAZOLE SODIUM 40 MG: 40 TABLET, DELAYED RELEASE ORAL at 09:26

## 2021-11-28 RX ADMIN — HEPARIN SODIUM 5000 UNITS: 5000 INJECTION INTRAVENOUS; SUBCUTANEOUS at 22:04

## 2021-11-28 RX ADMIN — AZELASTINE HYDROCHLORIDE 1 SPRAY: 137 SPRAY, METERED NASAL at 09:27

## 2021-11-28 RX ADMIN — GABAPENTIN 100 MG: 100 CAPSULE ORAL at 22:03

## 2021-11-28 RX ADMIN — SODIUM CHLORIDE, PRESERVATIVE FREE 10 ML: 5 INJECTION INTRAVENOUS at 09:26

## 2021-11-28 RX ADMIN — DIVALPROEX SODIUM 250 MG: 250 TABLET, DELAYED RELEASE ORAL at 22:04

## 2021-11-28 RX ADMIN — GABAPENTIN 100 MG: 100 CAPSULE ORAL at 09:26

## 2021-11-28 RX ADMIN — SODIUM CHLORIDE, PRESERVATIVE FREE 10 ML: 5 INJECTION INTRAVENOUS at 22:04

## 2021-11-28 RX ADMIN — AZELASTINE HYDROCHLORIDE 1 SPRAY: 137 SPRAY, METERED NASAL at 22:03

## 2021-11-28 ASSESSMENT — PAIN SCALES - GENERAL
PAINLEVEL_OUTOF10: 0
PAINLEVEL_OUTOF10: 9
PAINLEVEL_OUTOF10: 10
PAINLEVEL_OUTOF10: 0

## 2021-11-28 ASSESSMENT — PAIN DESCRIPTION - LOCATION
LOCATION: HEAD
LOCATION: HEAD

## 2021-11-28 ASSESSMENT — PAIN DESCRIPTION - PAIN TYPE
TYPE: ACUTE PAIN
TYPE: ACUTE PAIN

## 2021-11-28 ASSESSMENT — PAIN - FUNCTIONAL ASSESSMENT: PAIN_FUNCTIONAL_ASSESSMENT: PREVENTS OR INTERFERES SOME ACTIVE ACTIVITIES AND ADLS

## 2021-11-28 NOTE — PROGRESS NOTES
Admit Date: 11/24/2021      REASON FOR ADMISSION:   patient with weakness  fall, laceration scalp found to  have a potassium of 8.1, with bradycardia, broad QRS complex.     Follow up   for life-threatening hyperkalemia with K 8.1. EKG wide  QRS complex,  sinus bradycardia          INTERVAL HISTORY   Resting  looks much better   K 8.1-->4.4  Urine 1700  Creat 3.1-->1.6  PROBNP 495  I feel main problm was retention   Has a reynolds      PLAN    D/C  Carlybartolome   Will resume Lasix  40 mg daily in am  Lizeth Currie for Discharge  D/C reynolds in am a nd make sure no retention        Severe life-threatening hyperkalemia   managed initially with cocktail, given Lasix, placed Reynolds catheter. Her hyperkalemia is related due to the use of Atacand, spironolactone,nonsteroidals, and on top of that, most likely urinary retention. Immediately, Reynolds catheter was placed and accordingly, 500 mL of urine came. She already got cocktail    EKG, broad QRS complex with a QRS duration of 124. Sinus bradycardia. known to have sinus bradycardia   before with first-degree block. Initial discussion with Dr. Sherrell Inman, [place] [address] physician, was to immediately  place femoral dialysis catheter for urgent dialysis. Meantime, while this was done and in my discussion with them, potassium has started coming down. The patient will be closely monitored. Continue with low potassium diet. Reynolds   Lasix 80 mg. Lokelma   RepeatLabs     MARIO/CKD 3  Creat 3.2  Was 1.5 in 6/2021  Urine in active on admission   CT of kidneys in 11/2020    normal     TAVR in 2028 at      History of coronary artery disease  previous stents placement last in 2017  Under care of  team     HFpEF   echo in 16/7276, grade 1diastolic dysfunction. History of recurrent UTI.     urinary retention. This could be due to her pain medication. Dabetic. Controlled   A1C 6.1 2019        [facility]    Immediately, I was asked to place a Reynolds catheter.   She got the  cocktail, gave her Lasix 80 mg.     HISTORY OF PRESENTING COMPLAINT:  This is an 79-year-old   female who I got a call around 6 p.m. regarding the above, discussed  with Dr. Eva Pickard, discussed with Dr. Yue Coburn, hospitalist, discussed  with the patient's nurse, plan made. Initial concern was for urgent dialysis because of high potassium,  bradycardia.     I reviewed her previous EKG. In fact, she was known to have sinus  bradycardia with a first-degree block, but her QRS complex on this one  was 124 as compared to previous in 06/2021, where it was 96.     I am seeing the patient now in the emergency department with the  daughter present.     I see that the patient was having some difficulty in urination in the  way of dribbling, so she reduced her Lasix significantly, but was taking  potassium. It does not look like she was taking any nonsteroidal, this  was obtained from the patient who was seen at the ER, who was little  sleepy, but the daughter was present also. Subjective:   Patient has no complaint       Review of Systems  Seen in room     Objective:     Patient Vitals for the past 8 hrs:   BP Temp Temp src Pulse Resp SpO2   11/27/21 1937 (!) 152/80 98.9 °F (37.2 °C) Oral 83 16 95 %   11/27/21 1630 (!) 153/81 99.1 °F (37.3 °C) Oral 84 16 93 %       I/O last 3 completed shifts:   In: 290 [P.O.:290]  Out: 1350 [Urine:1350]        General appearance:Awake, alert,   Neck: , no JVD and thyroid not enlarged,   Lungs: clear to auscultation bilaterally   Heart: regular rate and rhythm, S1, S2 normal, no murmur, click, rub or gallop  Abdomen: soft, non-tender; bowel sounds normal; no masses,  no organomegaly  Extremities: extremities normal, atraumatic, no edema  Skin: Skin color, texture, turgor normal. No rashes or lesions  Neurologic: Grossly normal     .l  Lab Results   Component Value Date    CREATININE 1.6 (H) 11/27/2021    BUN 20 11/27/2021     11/27/2021    K 4.4 11/27/2021     11/27/2021    CO2 26 11/27/2021     Lab Results   Component Value Date    WBC 7.4 11/26/2021    HGB 10.7 (L) 11/26/2021    HCT 32.3 (L) 11/26/2021    MCV 87.6 11/26/2021     11/26/2021            AGLUCOSE)Magnesium:    Lab Results   Component Value Date    MG 1.80 08/29/2016     Phosphorus:    Lab Results   Component Value Date    PHOS 4.6 11/24/2021       Uric Acid:  No components found for: URIC    Active Problems:    MARIO (acute kidney injury) ([facility] [address]  Resolved Problems:    * No resolved hospital problems.  *

## 2021-11-28 NOTE — PROGRESS NOTES
Physical Therapy  Facility/Department: [facility] C4 PCU  Daily Treatment Note  NAME: Danica Reagan  : 1940  MRN: [id number]    Date of Service: 2021    Discharge Recommendations:  Subacute/Skilled Nursing Facility   PT Equipment Recommendations  Equipment Needed: No  Other: defer to patient's facility  If pt is unable to be seen after this session, please let this note serve as discharge summary. Please see case management note for discharge disposition. Thank you. Barriers to home discharge:   [x] Reported available assist at home upon discharge limited: Patient lives alone     Assessment   Body structures, Functions, Activity limitations: Decreased functional mobility ; Decreased endurance; Decreased strength; Decreased ROM; Increased pain  Assessment: Patient is progressing slowly with her therapy goals. Patient continues to require maximum assistance with bed mobility, moderate assistance for transfers and moderate assistance to ambulate up to 3 feet with a rolling walker. Patient continues to present with the therapy deficits listed above and would benefit from skilled PT to address these deficits. PT continues to recommend that this patient receive skilled PT in the SNF setting, when medically stable, in order to address these deficits and to help her maximize her safety and independence with all functional mobility. PT to continue to follow. Treatment Diagnosis: weakness, fatigue  Specific instructions for Next Treatment: ex, mobility  Prognosis: Good  Decision Making: Medium Complexity  PT Education: Goals; PT Role; Plan of Care; General Safety; Disease Specific Education; Transfer Training; Gait Training  Patient Education: Disease Specific: pt educated in prevention of complications of bedrest, safety for transfers and walking with rolling walker.  She voices understanding  Barriers to Learning: none  REQUIRES PT FOLLOW UP: Yes  Activity Tolerance  Activity Tolerance: Patient limited by fatigue; Patient limited by endurance  Activity Tolerance: Vitals: 187/82 83 BPM 94% room air. post activity 161/72 86 BPM 95% room air. Patient Diagnosis(es): The primary encounter diagnosis was Closed head injury, initial encounter. Diagnoses of Laceration of scalp, initial encounter, MARIO (acute kidney injury) ([facility] [address] and Hyperkalemia were also pertinent to this visit. has a past medical history of Arthritis, BCC (basal cell carcinoma of skin), Bladder infection, CAD (coronary artery disease), Cancer ([address] CHF (congestive heart failure) ([address] Chronic back pain, COPD (chronic obstructive pulmonary disease) ([facility] [address] Depression, Depression, Hypercholesteremia, Hypertension, Pain in shoulder, Reflux, Rheumatic fever, Sleep apnea, TIA (transient ischemic attack), Urinary incontinence, and Wears glasses. has a past surgical history that includes bladder repair; Hysterectomy; Neck surgery; shoulder surgery; Carpal tunnel release; Tonsillectomy; Appendectomy; Upper gastrointestinal endoscopy (4/22/11); Spine surgery (1999); Spinal fixation surgery with implant (2001); Spinal fixation surgery with implant (2004); Spinal fixation surgery with implant (2007); joint replacement; Coronary angioplasty with stent; Cardiac surgery; Skin cancer excision; Colonoscopy; Upper gastrointestinal endoscopy; other surgical history (9/28/12); Cholecystectomy, laparoscopic (01/06/2017); Upper gastrointestinal endoscopy (03/23/2017); Diagnostic Cardiac Cath Lab Procedure; and Aortic valve replacement. Restrictions  Restrictions/Precautions  Restrictions/Precautions: Seizure  Position Activity Restriction  Other position/activity restrictions: Up with assistancegail  Subjective   General  Chart Reviewed: Yes  Response To Previous Treatment: Patient with no complaints from previous session. Family / Caregiver Present: Yes (daughter)  Subjective  Subjective: Patient agreed to participate.   General Comment  Comments: RN cleared pt to particiapte. patient supine in the bed upon entry. Pain Screening  Patient Currently in Pain: Yes  Pain Assessment  Pain Assessment: 0-10  Pain Level: 10  Pain Type: Acute pain  Pain Location: Head  Functional Pain Assessment: Prevents or interferes some active activities and ADLs  Non-Pharmaceutical Pain Intervention(s): Ambulation/Increased Activity; Repositioned; Rest  Response to Pain Intervention: Patient Satisfied  Vital Signs  Patient Currently in Pain: Yes       Orientation  Orientation  Overall Orientation Status: Within Functional Limits  Cognition   Cognition  Overall Cognitive Status: Exceptions  Arousal/Alertness: Appropriate responses to stimuli  Following Commands: Follows one step commands with repetition; Follows one step commands with increased time  Attention Span: Attends with cues to redirect  Memory: Appears intact  Safety Judgement: Decreased awareness of need for safety; Decreased awareness of need for assistance  Problem Solving: Decreased awareness of errors  Insights: Decreased awareness of deficits  Initiation: Requires cues for some  Sequencing: Requires cues for some  Cognition Comment: vc's for safety during mobility  Objective   Bed mobility  Rolling to Left: Moderate assistance  Rolling to Right: Moderate assistance  Supine to Sit: Maximum assistance  Sit to Supine: Maximum assistance  Scooting: Maximal assistance (to scoot up in bed)  Comment: head of bed elevated  Transfers  Sit to Stand: Moderate Assistance  Stand to sit: Moderate Assistance  Bed to Chair: Unable to assess  Comment: with RW. cueing for safe hand placement. patient declined to sit in the chair due to headache, fatigue, poor balance. Ambulation  Ambulation?: Yes  More Ambulation?: No  Ambulation 1  Surface: level tile  Device: Rolling Walker  Assistance: Moderate assistance  Quality of Gait: mod assist for RW placement. cueing to correct forward flexed posture.   Gait Deviations: Slow Shira; Decreased step length; Decreased step height  Distance: forward/backward 3 feet x 2. side steps to the right ~3 feet. total distance limited by fatigue, headache. Comments: No complaints of shortness of breath, chest pain or dizziness. 1 loss of balance. mod assist to correct. Stairs/Curb  Stairs?: No     Balance  Posture: Fair  Sitting - Static: Fair  Sitting - Dynamic: Fair  Standing - Static: Poor  Standing - Dynamic: Poor  Comments: with RW  Exercises  Comments: patient declined exercises today due to her headache, fatigue. Other exercises  Other exercises?: No                       AM-PAC Score     AM-PAC Inpatient Mobility without Stair Climbing Raw Score : 9 (11/28/21 1524)  AM-PAC Inpatient without Stair Climbing T-Scale Score : 32.44 (11/28/21 1524)  Mobility Inpatient CMS 0-100% Score: 76.07 (11/28/21 1524)  Mobility Inpatient without Stair CMS G-Code Modifier : CL (11/28/21 1524)       Goals  Short term goals  Time Frame for Short term goals: 1 week (12/3) unless otherwise specified  Short term goal 1: pt to perform bed mob modified indep 11/28 max assist  Short term goal 2: pt to perform transfers modified indep 11/28 mod assist  Short term goal 3: pt to amb with RW and supervision 80 ft 11/28 3 feet with RW and mod assist.  Short term goal 4: pt to participate in LE Ex 101-2 reps by 11/28 patient declined. Patient Goals   Patient goals : \" to walk\"    Plan    Plan  Times per week: 3- 5 x wk  Plan weeks: 12/3/21  Specific instructions for Next Treatment: ex, mobility  Current Treatment Recommendations: Strengthening, Transfer Training, Endurance Training, Gait Training, Functional Mobility Training, Safety Education & Training, Home Exercise Program, Patient/Caregiver Education & Training, Positioning, Balance Training, Equipment Evaluation, Education, & procurement  Safety Devices  Type of devices:  All fall risk precautions in place, Gait belt, Patient at risk for falls, Call light within reach, Nurse notified,

## 2021-11-28 NOTE — PLAN OF CARE
Problem: OXYGENATION/RESPIRATORY FUNCTION  Goal: Patient will maintain patent airway  Outcome: Ongoing  Note:   Patient's EF (Ejection Fraction) is greater than 40%    Heart Failure Medications:  Diuretics[de-identified] None    (One of the following REQUIRED for EF </= 40%/SYSTOLIC FAILURE but MAY be used in EF% >40%/DIASTOLIC FAILURE)        ACE[de-identified] None        ARB[de-identified] None         ARNI[de-identified] None    (Beta Blockers)  NON- Evidenced Based Beta Blocker (for EF% >40%/DIASTOLIC FAILURE): None    Evidenced Based Beta Blocker::(REQUIRED for EF% <40%/SYSTOLIC FAILURE) None  . .................................................................................................................................................. Patient's weights and intake/output reviewed: Yes    Patient's Last Weight: 186 lbs obtained by bed scale. Difference of 186 lbs less than last documented weight. Intake/Output Summary (Last 24 hours) at 11/27/2021 2006  Last data filed at 11/27/2021 1454  Gross per 24 hour   Intake 290 ml   Output 1350 ml   Net -1060 ml       Comorbidities Reviewed Yes    Patient has a past medical history of Arthritis, BCC (basal cell carcinoma of skin), Bladder infection, CAD (coronary artery disease), Cancer ([address] CHF (congestive heart failure) ([address] Chronic back pain, COPD (chronic obstructive pulmonary disease) ([address] Depression, Depression, Hypercholesteremia, Hypertension, Pain in shoulder, Reflux, Rheumatic fever, Sleep apnea, TIA (transient ischemic attack), Urinary incontinence, and Wears glasses. >>For CHF and Comorbidity documentation on Education Time and Topics, please see Education Tab    Progressive Mobility Assessment:  What is this patient's Current Level of Mobility?: Ambulatory- with Assistance  How was this patient Mobilized today?: Edge of Bed, Bedside Commode,  Up to Toilet/Shower, and Up in Room, ambulated 20 ft                 With Whom?  Nurse, PCA, PT, OT, and Self                 Level of Difficulty/Assistance: 1x Assist     Pt resting in bed at this time on room air. Pt denies shortness of breath. Pt with pitting lower extremity edema. Patient and/or Family's stated Goal of Care this Admission: increase activity tolerance, be more comfortable, and reduce lower extremity edema prior to discharge        :      Problem: Pain:  Goal: Pain level will decrease  Description: Pain level will decrease  Outcome: Ongoing  Note: Pt will be satisfied with pain control. Pt uses numeric pain rating scale with reassessments after pain med administration. Will continue to monitor progression throughout shift. Problem: Falls - Risk of:  Goal: Will remain free from falls  Description: Will remain free from falls  Outcome: Ongoing  Note: Pt will remain free from falls throughout hospital stay. Fall precautions in place, bed alarm on, bed in lowest position with wheels locked and side rails 2/4 up. Room door open and hourly rounding completed. Will continue to monitor throughout shift. Problem: Skin Integrity:  Goal: Will show no infection signs and symptoms  Description: Will show no infection signs and symptoms  Outcome: Ongoing     Problem:  Activity Intolerance:  Goal: Ability to tolerate increased activity will improve  Description: Ability to tolerate increased activity will improve  Outcome: Ongoing

## 2021-11-28 NOTE — PROGRESS NOTES
Admit Date: 11/24/2021      REASON FOR ADMISSION:   patient with weakness  fall, laceration scalp found to  have a potassium of 8.1, with bradycardia, broad QRS complex.     Follow up   for life-threatening hyperkalemia with K 8.1. EKG wide  QRS complex,  sinus bradycardia          INTERVAL HISTORY   Awake   looks much better   K 8.1-->4  Urine 1700  Creat 3.1-->1.6-->1.2  PROBNP 495  I feel main problm was retention    reynolds out   High BP      PLAN    Will resume Lasix  40 mg daily in am  Conchita Mildred for Discharge  Check PVR     Severe life-threatening hyperkalemia   managed initially with cocktail, given Lasix, placed Reynolds catheter. Her hyperkalemia is related due to the use of Atacand, spironolactone,nonsteroidals, and on top of that, most likely urinary retention. Immediately, Reynolds catheter was placed and accordingly, 500 mL of urine came. She already got cocktail    EKG, broad QRS complex with a QRS duration of 124. Sinus bradycardia. known to have sinus bradycardia   before with first-degree block. Initial discussion with Dr. Juno Arteaga, [place] [id number] physician, was to immediately  place femoral dialysis catheter for urgent dialysis. Meantime, while this was done and in my discussion with them, potassium has started coming down. The patient will be closely monitored. Continue with low potassium diet. Reynolds   Lasix 80 mg. [facility]     MARIO/CKD 3  Creat 3.2  Was 1.5 in 6/2021  Urine in active on admission   CT of kidneys in 11/2020    normal     TAVR in 2028 at      History of coronary artery disease  previous stents placement last in 2017  Under care of  team     HFpEF   echo in 86/4870, grade 1diastolic dysfunction. History of recurrent UTI.     urinary retention. This could be due to her pain medication. Dabetic. Controlled   A1C 6.1 2019        [facility]    Immediately, I was asked to place a Reynolds catheter.   She got the  cocktail, gave her Lasix 80 mg.     HISTORY OF PRESENTING COMPLAINT:  This is an 69-year-old   female who I got a call around 6 p.m. regarding the above, discussed  with Dr. Maurilio Cotter, discussed with Dr. Addy Novak, hospitalist, discussed  with the patient's nurse, plan made. Initial concern was for urgent dialysis because of high potassium,  bradycardia.     I reviewed her previous EKG. In fact, she was known to have sinus  bradycardia with a first-degree block, but her QRS complex on this one  was 124 as compared to previous in 06/2021, where it was 96.     I am seeing the patient now in the emergency department with the  daughter present.     I see that the patient was having some difficulty in urination in the  way of dribbling, so she reduced her Lasix significantly, but was taking  potassium. It does not look like she was taking any nonsteroidal, this  was obtained from the patient who was seen at the ER, who was little  sleepy, but the daughter was present also. Subjective:   Patient has no complaint       Review of Systems  Seen in room     Objective:     Patient Vitals for the past 8 hrs:   BP Temp Temp src Pulse Resp SpO2   11/28/21 1544 (!) 169/70 98.5 °F (36.9 °C) Oral 84 18 94 %   11/28/21 1445 (!) 187/82 -- -- -- -- --   11/28/21 1224 (!) 167/77 98.8 °F (37.1 °C) Oral 77 16 95 %   11/28/21 0923 (!) 167/81 98.2 °F (36.8 °C) Oral 73 16 96 %       I/O last 3 completed shifts:   In: 170 [P.O.:170]  Out: 800 [Urine:800]        General appearance:Awake, alert,   Neck: , no JVD and thyroid not enlarged,   Lungs: clear to auscultation bilaterally   Heart: regular rate and rhythm, S1, S2 normal, no murmur, click, rub or gallop  Abdomen: soft, non-tender; bowel sounds normal; no masses,  no organomegaly  Extremities: extremities normal, atraumatic, no edema  Skin: Skin color, texture, turgor normal. No rashes or lesions  Neurologic: Grossly normal     .l  Lab Results   Component Value Date    CREATININE 1.2 11/28/2021    BUN 20 11/28/2021     11/28/2021 K 4.1 11/28/2021     11/28/2021    CO2 25 11/28/2021     Lab Results   Component Value Date    WBC 7.4 11/26/2021    HGB 10.7 (L) 11/26/2021    HCT 32.3 (L) 11/26/2021    MCV 87.6 11/26/2021     11/26/2021            AGLUCOSE)Magnesium:    Lab Results   Component Value Date    MG 1.80 08/29/2016     Phosphorus:    Lab Results   Component Value Date    PHOS 4.6 11/24/2021       Uric Acid:  No components found for: URIC    Active Problems:    MARIO (acute kidney injury) ([address]  Resolved Problems:    * No resolved hospital problems.  *

## 2021-11-28 NOTE — PROGRESS NOTES
Hospitalist Progress Note      PCP: Shania Fuller, APRN - CNP    Date of Admission: 11/24/2021    Chief Complaint: [facility] Course: 56-year-old female with past medical history of COPD, depression, hyperlipidemia, hypertension, sleep apnea on CPAP, TIA, chronic pain admitted after a fall at doctor's office. Subjective: Patient denies any chest pain no shortness of breath no nausea vomiting wants to take a bath. Medications:  Reviewed    Infusion Medications    sodium chloride      dextrose       Scheduled Medications    sodium chloride flush  10 mL IntraVENous 2 times per day    heparin (porcine)  5,000 Units SubCUTAneous 3 times per day    atorvastatin  20 mg Oral Daily    pantoprazole  40 mg Oral Daily    trospium  20 mg Oral Daily    gabapentin  100 mg Oral TID    divalproex  250 mg Oral BID    aspirin  81 mg Oral Daily    azelastine  1 spray Each Nostril BID    donepezil  10 mg Oral Nightly     PRN Meds: HYDROcodone 5 mg - acetaminophen, ipratropium-albuterol, sodium chloride flush, sodium chloride, promethazine **OR** ondansetron, acetaminophen **OR** acetaminophen, glucose, dextrose, glucagon (rDNA), dextrose      Intake/Output Summary (Last 24 hours) at 11/28/2021 1057  Last data filed at 11/28/2021 0925  Gross per 24 hour   Intake 410 ml   Output 1000 ml   Net -590 ml       Physical Exam Performed:    BP (!) 167/81   Pulse 73   Temp 98.2 °F (36.8 °C) (Oral)   Resp 16   Ht 5' (1.524 m)   Wt 181 lb 7 oz (82.3 kg)   LMP  (LMP Unknown)   SpO2 96%   BMI 35.43 kg/m²     General appearance: No apparent distress, appears stated age and cooperative. HEENT: Pupils equal, round, and reactive to light. Conjunctivae/corneas clear. Neck: Supple, with full range of motion. No jugular venous distention. Trachea midline. Respiratory:  Normal respiratory effort. Clear to auscultation, bilaterally without Rales/Wheezes/Rhonchi.   Cardiovascular: Regular rate and rhythm with normal S1/S2 without murmurs, rubs or gallops. Abdomen: Soft, non-tender, non-distended with normal bowel sounds. Musculoskeletal: No clubbing, cyanosis or edema bilaterally. Full range of motion without deformity. Skin: Skin color, texture, turgor normal.  No rashes or lesions. Neurologic:  Neurovascularly intact without any focal sensory/motor deficits. Cranial nerves: II-XII intact, grossly non-focal.  Psychiatric: Alert and oriented, thought content appropriate, normal insight  Capillary Refill: Brisk,3 seconds, normal   Peripheral Pulses: +2 palpable, equal bilaterally       Labs:   Recent Labs     11/26/21  0459   WBC 7.4   HGB 10.7*   HCT 32.3*        Recent Labs     11/26/21  0500 11/27/21  0443 11/28/21  0442    137 137   K 5.2* 4.4 4.1    103 101   CO2 27 26 25   BUN 26* 20 20   CREATININE 1.8* 1.6* 1.2   CALCIUM 8.4 9.1 9.1     No results for input(s): AST, ALT, BILIDIR, BILITOT, ALKPHOS in the last 72 hours. No results for input(s): INR in the last 72 hours. Recent Labs     11/26/21  0500   CKTOTAL 40       Urinalysis:      Lab Results   Component Value Date    NITRU Negative 11/24/2021    WBCUA 17 06/14/2019    BACTERIA 1+ 06/14/2019    RBCUA 0 06/14/2019    BLOODU Negative 11/24/2021    SPECGRAV 1.020 11/24/2021    GLUCOSEU Negative 11/24/2021    GLUCOSEU Neg 05/12/2011       Radiology:  XR KNEE LEFT (1-2 VIEWS)   Final Result   Left knee prior arthroplasty with anatomic alignment and intact hardware. No   acute fracture. CT CHEST ABDOMEN PELVIS WO CONTRAST   Final Result   1. No acute traumatic abnormality. 2. Diverticulosis without scan evidence for diverticulitis. CT Head WO Contrast   Final Result   No acute intracranial abnormality. Mild soft tissue swelling left frontal scalp, likely soft tissue contusion. No acute bony abnormality. CT Cervical Spine WO Contrast   Final Result   No acute abnormality of the cervical spine.          XR PELVIS (1-2 VIEWS)   Final Result   No acute osseous abnormality. If there is continued clinical concern for occult fracture, follow-up   radiographs or MRI may be helpful for further evaluation. XR LUMBAR SPINE (2-3 VIEWS)   Final Result   Remote postsurgical changes L3 through S1 and degenerative changes at L1-L2   with grade 1 listhesis. No acute osseous abnormality. XR CHEST PORTABLE   Final Result   No acute abnormality. Stable cardiomegaly. Assessment/Plan:    Active Hospital Problems    Diagnosis     MARIO (acute kidney injury) ([facility] [address] [N17.9]      1.  This is a 65-year-old female admitted after a fall with laceration requiring staples on the head , CT of the head negative in the emergency room ,continue with the supportive care. 2.  Hyperkalemia given 1 dose of Kayexalate on 11/25/2021 nephrology consulted and following. Hyperkalemia now has resolved  3.  Acute renal failure nephrology consulted.  Acute kidney injury continues to improve and now resolved, discontinue Prince catheter. 4.  Left knee pain x-ray of the knee negative, history of chronic pain syndrome continue with home medication. 5.  Consulted physical Occupational Therapy recommending subacute/nursing home placement will consult social service. 6.  Hypertension medication on hold restart on metoprolol add Norvasc if needed at low-dose. 7.  History of depression medications on hold. DVT Prophylaxis: Heparin subcu  Diet: ADULT DIET;  Regular; Low Potassium (Less than 3000 mg/day)  Code Status: Full Code    PT/OT Eval Status: Consulted recommending subacute/skilled nursing facility    Bin Bradford MD

## 2021-11-28 NOTE — FLOWSHEET NOTE
RE: Juan Carlos Fruit 1940 RM: [id number] Pt's BP elevated today 167/81, 167/77, 187/82. Pt has home medications of metoprolol, isosorbide and Norvasc. DId you want to continue any of these? Thanks !  Need Callback: NEEDS CALLBACK C4 PROGRESSIVE CARE

## 2021-11-28 NOTE — PLAN OF CARE
Problem: Falls - Risk of:  Goal: Will remain free from falls  Description: Will remain free from falls  11/28/2021 0055 by Nathalie Kitchen RN  Outcome: Ongoing  11/27/2021 2006 by Verner Call, RN  Outcome: Ongoing  Note: Pt will remain free from falls throughout hospital stay. Fall precautions in place, bed alarm on, bed in lowest position with wheels locked and side rails 2/4 up. Room door open and hourly rounding completed. Will continue to monitor throughout shift. Goal: Absence of physical injury  Description: Absence of physical injury  Outcome: Ongoing     Problem: Pain:  Goal: Pain level will decrease  Description: Pain level will decrease  11/28/2021 0055 by Nathalie Kitchen RN  Outcome: Ongoing  11/27/2021 2006 by Verner Call, RN  Outcome: Ongoing  Note: Pt will be satisfied with pain control. Pt uses numeric pain rating scale with reassessments after pain med administration. Will continue to monitor progression throughout shift.    Goal: Control of acute pain  Description: Control of acute pain  Outcome: Ongoing  Goal: Control of chronic pain  Description: Control of chronic pain  Outcome: Ongoing     Problem: Skin Integrity:  Goal: Will show no infection signs and symptoms  Description: Will show no infection signs and symptoms  11/28/2021 0055 by Nathalie Kitchen RN  Outcome: Ongoing  11/27/2021 2006 by Verner Call, RN  Outcome: Ongoing  Goal: Absence of new skin breakdown  Description: Absence of new skin breakdown  Outcome: Ongoing     Problem: OXYGENATION/RESPIRATORY FUNCTION  Goal: Patient will maintain patent airway  11/28/2021 0055 by Nathalie Kitchen RN  Outcome: Ongoing  11/27/2021 2006 by Verner Call, RN  Outcome: Ongoing  Note:   Patient's EF (Ejection Fraction) is greater than 40%    Heart Failure Medications:  Diuretics[de-identified] None    (One of the following REQUIRED for EF </= 40%/SYSTOLIC FAILURE but MAY be used in EF% >40%/DIASTOLIC FAILURE)        ACE[de-identified] None        ARB[de-identified] None rate/effort  Description: Respiratory rate and effort will be within normal limits for the patient  Outcome: Ongoing     Problem:  Activity Intolerance:  Goal: Ability to tolerate increased activity will improve  Description: Ability to tolerate increased activity will improve  11/28/2021 0055 by Nayla Sanford RN  Outcome: Ongoing  11/27/2021 2008 by Sadia Tesfaye RN  Outcome: Ongoing

## 2021-11-29 VITALS
TEMPERATURE: 98.2 F | SYSTOLIC BLOOD PRESSURE: 143 MMHG | OXYGEN SATURATION: 97 % | DIASTOLIC BLOOD PRESSURE: 43 MMHG | RESPIRATION RATE: 16 BRPM | WEIGHT: 177.9 LBS | BODY MASS INDEX: 34.92 KG/M2 | HEART RATE: 58 BPM | HEIGHT: 60 IN

## 2021-11-29 LAB
GLUCOSE BLD-MCNC: 112 MG/DL (ref 70–99)
PERFORMED ON: ABNORMAL

## 2021-11-29 PROCEDURE — 6360000002 HC RX W HCPCS: Performed by: INTERNAL MEDICINE

## 2021-11-29 PROCEDURE — 6370000000 HC RX 637 (ALT 250 FOR IP): Performed by: HOSPITALIST

## 2021-11-29 PROCEDURE — 6370000000 HC RX 637 (ALT 250 FOR IP): Performed by: INTERNAL MEDICINE

## 2021-11-29 PROCEDURE — 97530 THERAPEUTIC ACTIVITIES: CPT

## 2021-11-29 PROCEDURE — 2580000003 HC RX 258: Performed by: INTERNAL MEDICINE

## 2021-11-29 PROCEDURE — 97110 THERAPEUTIC EXERCISES: CPT

## 2021-11-29 RX ORDER — SPIRONOLACTONE 25 MG/1
25 TABLET ORAL DAILY
Qty: 30 TABLET | Refills: 3 | Status: SHIPPED | OUTPATIENT
Start: 2021-11-29

## 2021-11-29 RX ADMIN — HEPARIN SODIUM 5000 UNITS: 5000 INJECTION INTRAVENOUS; SUBCUTANEOUS at 05:01

## 2021-11-29 RX ADMIN — HEPARIN SODIUM 5000 UNITS: 5000 INJECTION INTRAVENOUS; SUBCUTANEOUS at 14:30

## 2021-11-29 RX ADMIN — PANTOPRAZOLE SODIUM 40 MG: 40 TABLET, DELAYED RELEASE ORAL at 10:56

## 2021-11-29 RX ADMIN — ATORVASTATIN CALCIUM 20 MG: 10 TABLET, FILM COATED ORAL at 10:55

## 2021-11-29 RX ADMIN — GABAPENTIN 100 MG: 100 CAPSULE ORAL at 14:30

## 2021-11-29 RX ADMIN — AZELASTINE HYDROCHLORIDE 1 SPRAY: 137 SPRAY, METERED NASAL at 10:56

## 2021-11-29 RX ADMIN — METOPROLOL SUCCINATE 25 MG: 25 TABLET, EXTENDED RELEASE ORAL at 11:48

## 2021-11-29 RX ADMIN — TAMSULOSIN HYDROCHLORIDE 0.4 MG: 0.4 CAPSULE ORAL at 10:55

## 2021-11-29 RX ADMIN — FUROSEMIDE 40 MG: 40 TABLET ORAL at 10:56

## 2021-11-29 RX ADMIN — ASPIRIN 81 MG: 81 TABLET, COATED ORAL at 10:55

## 2021-11-29 RX ADMIN — TROSPIUM CHLORIDE 20 MG: 20 TABLET, FILM COATED ORAL at 11:48

## 2021-11-29 RX ADMIN — HYDROCODONE BITARTRATE AND ACETAMINOPHEN 1 TABLET: 5; 325 TABLET ORAL at 10:58

## 2021-11-29 RX ADMIN — GABAPENTIN 100 MG: 100 CAPSULE ORAL at 10:55

## 2021-11-29 RX ADMIN — SODIUM CHLORIDE, PRESERVATIVE FREE 10 ML: 5 INJECTION INTRAVENOUS at 10:56

## 2021-11-29 RX ADMIN — DIVALPROEX SODIUM 250 MG: 250 TABLET, DELAYED RELEASE ORAL at 10:55

## 2021-11-29 ASSESSMENT — PAIN - FUNCTIONAL ASSESSMENT: PAIN_FUNCTIONAL_ASSESSMENT: PREVENTS OR INTERFERES SOME ACTIVE ACTIVITIES AND ADLS

## 2021-11-29 ASSESSMENT — PAIN SCALES - GENERAL
PAINLEVEL_OUTOF10: 9
PAINLEVEL_OUTOF10: 0
PAINLEVEL_OUTOF10: 9
PAINLEVEL_OUTOF10: 0
PAINLEVEL_OUTOF10: 0

## 2021-11-29 ASSESSMENT — PAIN DESCRIPTION - PAIN TYPE: TYPE: CHRONIC PAIN

## 2021-11-29 ASSESSMENT — PAIN DESCRIPTION - LOCATION: LOCATION: GENERALIZED

## 2021-11-29 NOTE — PROGRESS NOTES
Occupational Therapy  Facility/Department: [place] [address] REMOTE TELEMETRY  Daily Treatment Note  NAME: Batsheva Hurt  : 1940  MRN: [id number]    Date of Service: 2021    Discharge Recommendations:  [address] (if refuses, needs 24 hr. A/supervision, HHOT)       Assessment   Performance deficits / Impairments: Decreased functional mobility ; Decreased safe awareness; Decreased ADL status; Decreased ROM; Decreased endurance; Decreased cognition  Assessment: pt normally independent with BADL's ADL's & functional mobility with \"Hurry cane\"; pt s/p for posterior scalp laceration; pt impulsive & with chronic generalized pain\"9/10\" requiring max assist with toileting hygiene, LE dressing, mod assist with UE dressing, min assist for functional mobility with walker with max cues for safety; pt continues to benefit from skilled OT services  OT Education: OT Role; Plan of Care; Precautions  Patient Education: disease specific: importance of use of RED/nurse call light for assist with ADL needs/transfers/mobility  Barriers to Learning: cognition/impulsive/decreased safety  REQUIRES OT FOLLOW UP: Yes  Activity Tolerance  Activity Tolerance: Patient Tolerated treatment well  Activity Tolerance: seated in chair after toileting, walking around bed to chair on RA:  BP = 149/85, HR = 88, 94 % O 2 sats  Safety Devices  Safety Devices in place: Yes  Type of devices: Call light within reach; Chair alarm in place; Left in chair; Nurse notified         Patient Diagnosis(es): The primary encounter diagnosis was Closed head injury, initial encounter. Diagnoses of Laceration of scalp, initial encounter, MARIO (acute kidney injury) ([facility] [address] and Hyperkalemia were also pertinent to this visit.       has a past medical history of Arthritis, BCC (basal cell carcinoma of skin), Bladder infection, CAD (coronary artery disease), Cancer ([address] CHF (congestive heart failure) ([address] Chronic back pain, COPD (chronic obstructive pulmonary disease) ([facility] [address] Depression, Depression, Hypercholesteremia, Hypertension, Pain in shoulder, Reflux, Rheumatic fever, Sleep apnea, TIA (transient ischemic attack), Urinary incontinence, and Wears glasses. has a past surgical history that includes bladder repair; Hysterectomy; Neck surgery; shoulder surgery; Carpal tunnel release; Tonsillectomy; Appendectomy; Upper gastrointestinal endoscopy (4/22/11); Spine surgery (1999); Spinal fixation surgery with implant (2001); Spinal fixation surgery with implant (2004); Spinal fixation surgery with implant (2007); joint replacement; Coronary angioplasty with stent; Cardiac surgery; Skin cancer excision; Colonoscopy; Upper gastrointestinal endoscopy; other surgical history (9/28/12); Cholecystectomy, laparoscopic (01/06/2017); Upper gastrointestinal endoscopy (03/23/2017); Diagnostic Cardiac Cath Lab Procedure; and Aortic valve replacement. Restrictions  Restrictions/Precautions  Restrictions/Precautions: Seizure, General Precautions, Fall Risk  Position Activity Restriction  Other position/activity restrictions: Up with assistance,  Subjective   General  Chart Reviewed: Yes  Patient assessed for rehabilitation services?: Yes  Additional Pertinent Hx: Per ED provider notes, \"Nithya Dean is a 79 y.o. female with a history of COPD, hypertension, CAD, CHF, TIA, chronic musculoskeletal pain especially of the neck and lower back who presents to the ED complaining of fall with head injury. Patient was sitting in the waiting room of her pain management clinic when she stood up and was reported to have lost her balance falling forward and striking her head against a piece of furniture. Unclear if there was loss of consciousness however she denies it. Reportedly sustained a scalp laceration. \"  Response to previous treatment: Patient with no complaints from previous session  Family / Caregiver Present: No  Referring Practitioner: Magdalena Chen MD  Diagnosis: MARIO  Subjective  Subjective: Pt supine and in pain but agreeable to therapy. General Comment  Comments: RN cleared pt for OT; pt up on BSC with PCA, agreeable to OT  Pain Assessment  Pain Assessment: 0-10  Pain Level: 9  Pain Type: Chronic pain  Pain Location: Generalized  Functional Pain Assessment: Prevents or interferes some active activities and ADLs  Non-Pharmaceutical Pain Intervention(s): Ambulation/Increased Activity; Repositioned; Therapeutic presence  Pre Treatment Pain Screening  Intervention List: Patient able to continue with treatment  Vital Signs  Patient Currently in Pain: Yes   Orientation  Orientation  Overall Orientation Status: Within Functional Limits  Objective    ADL  Feeding: Independent  Grooming: Setup (seated to comb hair, wash face & hands)  LE Dressing: Maximum assistance (to thread briefs to knees seated, CGA to pull up over hips)  Toileting: Moderate assistance (pericare due to decreased reach)        Balance  Sitting Balance: Supervision  Standing Balance: Minimal assistance (with std. walker & gait belt)  Standing Balance  Time: 1 minute x 1  Activity: walking around bed-->chair  Functional Mobility  Functional - Mobility Device: Standard Walker  Activity: Other (walking in room)  Assist Level: Minimal assistance  Bed mobility  Supine to Sit: Unable to assess (already up on BSC)  Sit to Supine: Unable to assess (Left up in chair, pt agreeable)  Scooting: Modified independent  Transfers  Sit to stand: Contact guard assistance  Stand to sit: Contact guard assistance  Transfer Comments: max cues for safe hand placement, impulsive                       Cognition  Overall Cognitive Status: Exceptions (pleasant, cooperative, but impulsive)  Arousal/Alertness: Appropriate responses to stimuli  Following Commands:  Follows one step commands with repetition  Attention Span: Attends with cues to redirect  Memory: Decreased recall of precautions  Safety Judgement: Decreased awareness of need Time   Individual Concurrent Group Co-treatment   Time In [facility]         Time Out 1003         Minutes [address] [place]

## 2021-11-29 NOTE — PROGRESS NOTES
Physical Therapy    Attempted to see pt for PT tx. Pt declining therapy at this time 2/2 feeling fatigued and wanting to save her energy for returning home later today. Discussed SNF recommendation with pt and pt declined, stating she will return home. Will follow-up as PT schedule and pt status permit. No Charge.     Haim Pack, PT, DPT, OMT-C # [id number]

## 2021-11-29 NOTE — PROGRESS NOTES
Darling Monroe is a 60 y.o. male being evaluated by a Virtual Visit (video visit) encounter to address concerns as mentioned above. A caregiver was present when appropriate. Due to this being a TeleHealth encounter (During HEBOF-91 public health emergency), evaluation of the following organ systems was limited: Vitals/Constitutional/EENT/Resp/CV/GI//MS/Neuro/Skin/Heme-Lymph-Imm. Pursuant to the emergency declaration under the [address] [place] [address] and the [employer] Act, this Virtual Visit was conducted with patient's (and/or legal guardian's) consent, to reduce the patient's risk of exposure to COVID-19 and provide necessary medical care. The patient (and/or legal guardian) has also been advised to contact this office for worsening conditions or problems, and seek emergency medical treatment and/or call 911 if deemed necessary. Patient identification was verified at the start of the visit: Yes    Total time spent for this encounter: Not billed by time    Services were provided through a video synchronous discussion virtually to substitute for in-person clinic visit. Patient and provider were located at their individual homes. --Gonzalez Schuler MD on 1/25/2021 at 8:20 AM    An electronic signature was used to authenticate this note. [address] [place] [address], MD                                                           [address] [place] [address] 1019 (d) [phone] (F)      Dear Dr. Cassie Morris MD:  Please find Rheumatology assessment. Thank you for giving me the opportunity to be involved in [place] care and I [facility]    [facility]            [phone]          Interval History and plan:     Creatinine down to 1.2  Electrolyte status stable  Blood pressure is on the high side  Noted that he is on metoprolol since yesterday  Also on furosemide and off of Lokelma  Has edema of the leg 1-2+  Plan:  Continue with furosemide for now     Assessment :     Hyperkalemia  Potassium 8.1 on admission associated with bradycardia  She was on spironolactone, nonsteroid, at home and had MARIO  Admitted with  Fall/ muscle weakness    Acute Kidney Injury on CKD IIIb  MARIO likely due to -   Cr on consultation 3.2  Baseline Cr-1.5 on 6/21    UA-bland  Renal Imaging:-CT kidneys 11/20-normal  Echo: Dysfunction 6/21-EF 55 to 35%, grade 1 diastolic failure    Hypertension   BP: (140-145)/(77-78)  Pulse:  [86-89]   BP goal inpatient [id number] systolic inpatient        [facility]R-[facility] at [facility]  CAD- sp stent         Please call with questions at-  24 hrs Answering service [phone]   7 am-5 pm at [employer], or cell phone  Dr James Cabral MD        CC/ Reason for consult:     MARIO    HPI:     Patient is a 79 y.o. female  presented to  the hospital on 11/24/2021 with   Fall and laceration of the head which was thought to be due to muscle weakness.     Seen with -no family    ROS:     SOB-   none  Edema-   none  Nausea/vomiting/diarrhea-   none  Poor appetite/oral intake-  No  Confusion  No  Difficulty passing urine-  No  Drop in urine output-  No  Any other complaints-  No  All other ROS are reviewed and are Negative    Vitals:     Vitals:    11/29/21 0500   BP: (!) 145/78   Pulse: 89   Resp: 16   Temp: 98.2 °F (36.8 °C)   SpO2: 93%       I & O:       Intake/Output Summary (Last 24 hours) at 11/29/2021 0817  Last data filed at 11/29/2021 0518  Gross per 24 hour   Intake 370 ml   Output 1400 ml   Net -1030 ml         Physical Examination:     General appearance: Anxious- no, distressed- no, in good spirits- Lethargic  HEENT: Lips- normal, teeth- ok , oral mucosa- moist  Neck : Mass- no, appears symmetrical, JVD- not visible  Respiratory: Respiratory effort-normal, wheeze- no, crackles - no  Cardiovascular: Ausculation- No M/R/G, Edema none  Abdomen: visible mass- no, distention- no, scar- no, tenderness- no                            hepatosplenomegaly-  no  Musculoskeletal:  clubbing no,cyanosis- no , digital ischemia- no                           muscle strength- grossly normal , tone - grossly normal  Skin: rashes- no , ulcers- no, induration- no, tightening - no  Psychiatric:  Judgement and insight- normal           AAO X 3  Additional positive findings:-Laceration of the left scalp      Meds:      metoprolol succinate  25 mg Oral Daily    furosemide  40 mg Oral Daily    tamsulosin  0.4 mg Oral Daily    sodium chloride flush  10 mL IntraVENous 2 times per day    heparin (porcine)  5,000 Units SubCUTAneous 3 times per day    atorvastatin  20 mg Oral Daily    pantoprazole  40 mg Oral Daily    trospium  20 mg Oral Daily    gabapentin  100 mg Oral TID    divalproex  250 mg Oral BID    aspirin  81 mg Oral Daily    azelastine  1 spray Each Nostril BID    donepezil  10 mg Oral Nightly       Labs:     No results for input(s): WBC, HGB, HCT, PLT in the last 72 hours.        Recent Labs     11/27/21  0443 11/28/21  0442    137   K 4.4 4.1    101   CO2 26 25   BUN 20 20   CREATININE 1.6* 1.2   GLUCOSE 93 99 look forward following Mer Spangler along with you. If you have any questions or concerns please feel free to reach me. Note is transcribed using voice recognition software. Inadvertent computerized transcription errors may be present. Primary provider: Steffanie Howell MD  Patient identification: Ona Crigler: [birth year],69 y.o. Sex: male     Assessment:      Mer Spangler was seen today for follow-up and arthritis. Diagnoses and all orders for this visit:    Psoriatic arthritis ([facility] [address]    Psoriasis    Generalized osteoarthritis    High risk medication use  -     Comprehensive Metabolic Panel; Future  -     CBC Auto Differential; Future  -     C-Reactive Protein; Future  -     Sedimentation Rate; Future        Skin psoriasis and psoriatic arthritis-onset 2016. Today's visit-  Psoriasis and psoriatic arthritis-is stable on Humira 40 mg every 14 days. Osteoarthritis-knees and DIP joints and CMC's-getting worse but manageable with diclofenac gel and acetaminophen. At times utilizes ibuprofen as needed. He tried intra-articular steroid injections, Synvisc without much help. Has also seen orthopedics, is not ready for surgical intervention. Plan-  Check safety labs as above prior to next visit. Continue current regimen. COVID-19 vaccine recommendations discussed, recommend getting it. Patient indicates understanding and agrees with the management plan. I reviewed patient's history, referral documents and electronic medical records. #######################################################################    [id number]-[id number] for psoriasis, psoriatic arthritis and osteoarthritis. Interval changes-   He is doing fairly well in terms of arthritis, continues to have discomfort at the base of the thumb and knees from osteoarthritis but psoriatic arthritis and psoriasis is doing well. No flares. Is able to manage his symptoms on current regimen. No swollen or inflamed joints.   No acute flares since last seen. Tolerating medications well. No infections, injection site reactions, cough or shortness of breath. No rashes. All other review of systems are negative. PMH/PSH/FH/PS/ MEDS reviewed and updated as appropriate. Father - Psorisis    Current Outpatient Medications   Medication Sig Dispense Refill    Adalimumab (HUMIRA PEN) 40 MG/0.4ML PNKT INJECT 1 PEN SUBCUTANEOUSLY EVERY 14 DAYS. 2 each 4    diclofenac sodium (VOLTAREN) 1 % GEL Apply 2 g topically 3 times daily as needed for Pain 1 Tube 0    amLODIPine (NORVASC) 10 MG tablet Take 10 mg by mouth daily      HUMIRA PEN 40 MG/0.8ML injection INJECT ONE PEN SUBCUTANEOUSLY EVERY OTHER WEEK. REFRIGERATE. 2 each 2    diclofenac (PENNSAID) 2 % SOLN Plan to painful area on knee twice daily as needed for pain. Do not use with ibuprofen or any other NSAIDs orally 1 Bottle 1    methocarbamol (ROBAXIN) 500 MG tablet Take 1 tab po TID 90 tablet 2    HYDROcodone-acetaminophen (NORCO) 5-325 MG per tablet Take 1 tab po TID/prn. 21 tablet 0    folic acid (FOLVITE) 1 MG tablet Take 1 tablet by mouth daily Take 1 tab po daily. 90 tablet 4     No current facility-administered medications for this visit. Allergies   Allergen Reactions    Shellfish-Derived Products Anaphylaxis       PHYSICAL EXAM:    Vitals: There were no vitals taken for this visit. General appearance: alert, appears stated age and cooperative. MKS:   28 joint JOINT COUNT:                               Right                   Left   Swell Tender Swell Tender   PIP1           PIP2        PIP3        PIP4          PIP5          MCP1           MCP2        MCP3        MCP4           MCP5           Wrist           Elbow           Shoulder          knee            No objective findings to be visualized in VS.  Skin: No psoriasis, rashes, No evidence ischemia or deformities noted in digits or nails.       DATA:   Lab Results   Component Value Date    WBC 6.4 10/26/2020    HGB 15.6 10/26/2020    HCT 47.7 10/26/2020    MCV 89.6 10/26/2020     10/26/2020         Chemistry        Component Value Date/Time     10/26/2020 1028    K 4.3 10/26/2020 1028     10/26/2020 1028    CO2 24 10/26/2020 1028    BUN 11 10/26/2020 1028    CREATININE 0.6 (L) 10/26/2020 1028        Component Value Date/Time    CALCIUM 8.8 10/26/2020 1028    ALKPHOS 62 10/26/2020 1028    AST 14 (L) 10/26/2020 1028    ALT 16 10/26/2020 1028    BILITOT 0.5 10/26/2020 1028            Lab Results   Component Value Date    CRP 1.9 10/26/2020     Lab Results   Component Value Date    SUPA Negative 10/10/2016    SEDRATE 4 10/26/2020     No results found for: CKTOTAL  Lab Results   Component Value Date    TSH 5.84 (H) 10/10/2016          A/P- See above.

## 2021-11-29 NOTE — PROGRESS NOTES
Hospitalist Progress Note      PCP: KALI Yang - CNP    Date of Admission: 11/24/2021    LOS: 5    Chief Complaint: Pt was seen by nephrology and cleared to be discharged today. She denies any new problems since yesterday. Active Hospital Problems    Diagnosis Date Noted    MARIO (acute kidney injury) ([facility] [address] [N17.9] 11/24/2021         Active Problems:    [facility]  Resolved Problems:    * No resolved hospital problems. *          Medications:  Reviewed  Infusion Medications    sodium chloride      dextrose       Scheduled Medications    metoprolol succinate  25 mg Oral Daily    furosemide  40 mg Oral Daily    tamsulosin  0.4 mg Oral Daily    sodium chloride flush  10 mL IntraVENous 2 times per day    heparin (porcine)  5,000 Units SubCUTAneous 3 times per day    atorvastatin  20 mg Oral Daily    pantoprazole  40 mg Oral Daily    trospium  20 mg Oral Daily    gabapentin  100 mg Oral TID    divalproex  250 mg Oral BID    aspirin  81 mg Oral Daily    azelastine  1 spray Each Nostril BID    donepezil  10 mg Oral Nightly     PRN Meds: HYDROcodone 5 mg - acetaminophen, ipratropium-albuterol, sodium chloride flush, sodium chloride, promethazine **OR** ondansetron, acetaminophen **OR** acetaminophen, glucose, dextrose, glucagon (rDNA), dextrose      DVT Prophylaxis:   Diet: ADULT DIET; Regular; Low Potassium (Less than 3000 mg/day)  Code Status: Full Code    Dispo: Anticipate discharge today    ____________________________________________________________________________    Subjective:   Overnight Events:          Physical Exam:  BP (!) 143/43   Pulse 58   Temp 98.2 °F (36.8 °C) (Oral)   Resp 16   Ht 5' (1.524 m)   Wt 177 lb 14.4 oz (80.7 kg)   LMP  (LMP Unknown)   SpO2 97%   BMI 34.74 kg/m²   General appearance: No apparent distress, appears stated age and cooperative.   HEENT: patient has scalp wound with staples to left parietal scalp  Neck:no thyromegally. No jugular venous distention. Respiratory:  Normal respiratory effort. Cardiovascular: S1/S2 without murmurs, rubs or gallops. RRR. Musculoskeletal: No clubbing, cyanosis or edema bilaterally. Skin: Skin color, texture, turgor normal.  No rashes or lesions. Neurologic:  Cranial nerves: II-XII intact, GINO, No focal sensory/motor deficits  Psychiatric: Alert and oriented, thought content appropriate    Intake/Output Summary (Last 24 hours) at 11/29/2021 1826  Last data filed at 11/29/2021 1100  Gross per 24 hour   Intake 490 ml   Output 1050 ml   Net -560 ml       Labs:   No results for input(s): WBC, HGB, HCT, PLT in the last 72 hours. Invalid input(s):  INR   Recent Labs     11/27/21  0443 11/28/21  0442    137   K 4.4 4.1    101   CO2 26 25   BUN 20 20   CREATININE 1.6* 1.2   CALCIUM 9.1 9.1     No results for input(s): Jaylene Thomas in the last 72 hours. Urinalysis:    Lab Results   Component Value Date    NITRU Negative 11/24/2021    WBCUA 17 06/14/2019    BACTERIA 1+ 06/14/2019    RBCUA 0 06/14/2019    BLOODU Negative 11/24/2021    SPECGRAV 1.020 11/24/2021    GLUCOSEU Negative 11/24/2021    GLUCOSEU Neg 05/12/2011       Radiology:  XR KNEE LEFT (1-2 VIEWS)   Final Result   Left knee prior arthroplasty with anatomic alignment and intact hardware. No   acute fracture. CT CHEST ABDOMEN PELVIS WO CONTRAST   Final Result   1. No acute traumatic abnormality. 2. Diverticulosis without scan evidence for diverticulitis. CT Head WO Contrast   Final Result   No acute intracranial abnormality. Mild soft tissue swelling left frontal scalp, likely soft tissue contusion. No acute bony abnormality. CT Cervical Spine WO Contrast   Final Result   No acute abnormality of the cervical spine. XR PELVIS (1-2 VIEWS)   Final Result   No acute osseous abnormality.       If there is continued clinical concern for occult fracture, follow-up radiographs or MRI may be helpful for further evaluation. XR LUMBAR SPINE (2-3 VIEWS)   Final Result   Remote postsurgical changes L3 through S1 and degenerative changes at L1-L2   with grade 1 listhesis. No acute osseous abnormality. XR CHEST PORTABLE   Final Result   No acute abnormality. Stable cardiomegaly. ASSESSMENT:  1. MARIO (resolved)                               2.S/P HEAD INJURY WITH SCALP LACERATION                               3. Ar[place] Side (resolved)                               4.FALL    PLAN:  Pt discharged and was advised to have pcp remove staples in 2-3 days. ANSLEY REYEZ, DO      Please excuse brevity and/or typos. This report was transcribed using voice recognition software. Every effort was made to ensure accuracy, however, inadvertent computerized transcription errors may be present.

## 2021-11-30 ENCOUNTER — APPOINTMENT (OUTPATIENT)
Dept: GENERAL RADIOLOGY | Age: 81
DRG: 682 | End: 2021-11-30
Payer: MEDICARE

## 2021-11-30 ENCOUNTER — APPOINTMENT (OUTPATIENT)
Dept: CT IMAGING | Age: 81
DRG: 682 | End: 2021-11-30
Payer: MEDICARE

## 2021-11-30 ENCOUNTER — HOSPITAL ENCOUNTER (INPATIENT)
Age: 81
LOS: 3 days | Discharge: HOME | DRG: 682 | End: 2021-12-03
Attending: EMERGENCY MEDICINE | Admitting: INTERNAL MEDICINE
Payer: MEDICARE

## 2021-11-30 ENCOUNTER — CARE COORDINATION (OUTPATIENT)
Dept: CASE MANAGEMENT | Age: 81
End: 2021-11-30

## 2021-11-30 DIAGNOSIS — N17.9 AKI (ACUTE KIDNEY INJURY) (HCC): ICD-10-CM

## 2021-11-30 DIAGNOSIS — R40.0 SOMNOLENCE: ICD-10-CM

## 2021-11-30 DIAGNOSIS — Z91.81 HISTORY OF RECENT FALL: ICD-10-CM

## 2021-11-30 DIAGNOSIS — R00.1 SINUS BRADYCARDIA: ICD-10-CM

## 2021-11-30 DIAGNOSIS — S09.90XD CLOSED HEAD INJURY, SUBSEQUENT ENCOUNTER: Primary | ICD-10-CM

## 2021-11-30 DIAGNOSIS — F11.20 NARCOTIC ADDICTION (HCC): ICD-10-CM

## 2021-11-30 DIAGNOSIS — N39.0 URINARY TRACT INFECTION IN FEMALE: ICD-10-CM

## 2021-11-30 PROBLEM — R41.82 ALTERED MENTAL STATUS: Status: ACTIVE | Noted: 2021-11-30

## 2021-11-30 PROBLEM — R55 SYNCOPE AND COLLAPSE: Status: ACTIVE | Noted: 2021-11-30

## 2021-11-30 LAB
A/G RATIO: 1 (ref 1.1–2.2)
ALBUMIN SERPL-MCNC: 3.7 G/DL (ref 3.4–5)
ALP BLD-CCNC: 71 U/L (ref 40–129)
ALT SERPL-CCNC: 8 U/L (ref 10–40)
ANION GAP SERPL CALCULATED.3IONS-SCNC: 14 MMOL/L (ref 3–16)
AST SERPL-CCNC: 14 U/L (ref 15–37)
BACTERIA: ABNORMAL /HPF
BASOPHILS ABSOLUTE: 0 K/UL (ref 0–0.2)
BASOPHILS RELATIVE PERCENT: 0.3 %
BILIRUB SERPL-MCNC: 0.3 MG/DL (ref 0–1)
BILIRUBIN URINE: NEGATIVE
BLOOD, URINE: ABNORMAL
BUN BLDV-MCNC: 37 MG/DL (ref 7–20)
CALCIUM SERPL-MCNC: 9.4 MG/DL (ref 8.3–10.6)
CHLORIDE BLD-SCNC: 97 MMOL/L (ref 99–110)
CLARITY: ABNORMAL
CO2: 29 MMOL/L (ref 21–32)
COLOR: YELLOW
CREAT SERPL-MCNC: 3.1 MG/DL (ref 0.6–1.2)
EOSINOPHILS ABSOLUTE: 0.1 K/UL (ref 0–0.6)
EOSINOPHILS RELATIVE PERCENT: 1 %
EPITHELIAL CELLS, UA: ABNORMAL /HPF (ref 0–5)
GFR AFRICAN AMERICAN: 17
GFR NON-AFRICAN AMERICAN: 14
GLUCOSE BLD-MCNC: 123 MG/DL (ref 70–99)
GLUCOSE URINE: NEGATIVE MG/DL
HCT VFR BLD CALC: 34 % (ref 36–48)
HEMOGLOBIN: 11 G/DL (ref 12–16)
INR BLD: 1 (ref 0.88–1.12)
KETONES, URINE: NEGATIVE MG/DL
LACTIC ACID: 1.9 MMOL/L (ref 0.4–2)
LEUKOCYTE ESTERASE, URINE: ABNORMAL
LYMPHOCYTES ABSOLUTE: 2 K/UL (ref 1–5.1)
LYMPHOCYTES RELATIVE PERCENT: 23.8 %
MCH RBC QN AUTO: 28.7 PG (ref 26–34)
MCHC RBC AUTO-ENTMCNC: 32.4 G/DL (ref 31–36)
MCV RBC AUTO: 88.4 FL (ref 80–100)
MICROSCOPIC EXAMINATION: YES
MONOCYTES ABSOLUTE: 1 K/UL (ref 0–1.3)
MONOCYTES RELATIVE PERCENT: 11.9 %
MUCUS: ABNORMAL /LPF
NEUTROPHILS ABSOLUTE: 5.2 K/UL (ref 1.7–7.7)
NEUTROPHILS RELATIVE PERCENT: 63 %
NITRITE, URINE: NEGATIVE
PDW BLD-RTO: 13.9 % (ref 12.4–15.4)
PH UA: 5.5 (ref 5–8)
PLATELET # BLD: 294 K/UL (ref 135–450)
PMV BLD AUTO: 7.8 FL (ref 5–10.5)
POTASSIUM SERPL-SCNC: 3.8 MMOL/L (ref 3.5–5.1)
PROTEIN UA: NEGATIVE MG/DL
PROTHROMBIN TIME: 11.4 SEC (ref 9.9–12.7)
RBC # BLD: 3.84 M/UL (ref 4–5.2)
RBC UA: ABNORMAL /HPF (ref 0–4)
SODIUM BLD-SCNC: 140 MMOL/L (ref 136–145)
SPECIFIC GRAVITY UA: >=1.03 (ref 1–1.03)
TOTAL PROTEIN: 7.3 G/DL (ref 6.4–8.2)
TROPONIN: <0.01 NG/ML
URINE REFLEX TO CULTURE: YES
URINE TYPE: ABNORMAL
UROBILINOGEN, URINE: 0.2 E.U./DL
VALPROIC ACID LEVEL: 29 UG/ML (ref 50–100)
WBC # BLD: 8.2 K/UL (ref 4–11)
WBC UA: ABNORMAL /HPF (ref 0–5)

## 2021-11-30 PROCEDURE — 87186 SC STD MICRODIL/AGAR DIL: CPT

## 2021-11-30 PROCEDURE — 85610 PROTHROMBIN TIME: CPT

## 2021-11-30 PROCEDURE — 80053 COMPREHEN METABOLIC PANEL: CPT

## 2021-11-30 PROCEDURE — 81001 URINALYSIS AUTO W/SCOPE: CPT

## 2021-11-30 PROCEDURE — 2700000000 HC OXYGEN THERAPY PER DAY

## 2021-11-30 PROCEDURE — 2060000000 HC ICU INTERMEDIATE R&B

## 2021-11-30 PROCEDURE — 94761 N-INVAS EAR/PLS OXIMETRY MLT: CPT

## 2021-11-30 PROCEDURE — 87088 URINE BACTERIA CULTURE: CPT

## 2021-11-30 PROCEDURE — 93005 ELECTROCARDIOGRAM TRACING: CPT | Performed by: EMERGENCY MEDICINE

## 2021-11-30 PROCEDURE — 80164 ASSAY DIPROPYLACETIC ACD TOT: CPT

## 2021-11-30 PROCEDURE — 2580000003 HC RX 258: Performed by: EMERGENCY MEDICINE

## 2021-11-30 PROCEDURE — 87086 URINE CULTURE/COLONY COUNT: CPT

## 2021-11-30 PROCEDURE — 99285 EMERGENCY DEPT VISIT HI MDM: CPT

## 2021-11-30 PROCEDURE — 84484 ASSAY OF TROPONIN QUANT: CPT

## 2021-11-30 PROCEDURE — 85025 COMPLETE CBC W/AUTO DIFF WBC: CPT

## 2021-11-30 PROCEDURE — 87040 BLOOD CULTURE FOR BACTERIA: CPT

## 2021-11-30 PROCEDURE — 6360000002 HC RX W HCPCS: Performed by: EMERGENCY MEDICINE

## 2021-11-30 PROCEDURE — 87636 SARSCOV2 & INF A&B AMP PRB: CPT

## 2021-11-30 PROCEDURE — 70450 CT HEAD/BRAIN W/O DYE: CPT

## 2021-11-30 PROCEDURE — 83605 ASSAY OF LACTIC ACID: CPT

## 2021-11-30 PROCEDURE — 71045 X-RAY EXAM CHEST 1 VIEW: CPT

## 2021-11-30 PROCEDURE — 36415 COLL VENOUS BLD VENIPUNCTURE: CPT

## 2021-11-30 RX ORDER — CITALOPRAM 20 MG/1
20 TABLET ORAL DAILY
Status: DISCONTINUED | OUTPATIENT
Start: 2021-12-01 | End: 2021-12-03 | Stop reason: HOSPADM

## 2021-11-30 RX ORDER — ATORVASTATIN CALCIUM 10 MG/1
20 TABLET, FILM COATED ORAL DAILY
Status: DISCONTINUED | OUTPATIENT
Start: 2021-12-01 | End: 2021-12-03 | Stop reason: HOSPADM

## 2021-11-30 RX ORDER — SODIUM CHLORIDE 0.9 % (FLUSH) 0.9 %
5-40 SYRINGE (ML) INJECTION PRN
Status: DISCONTINUED | OUTPATIENT
Start: 2021-11-30 | End: 2021-12-03 | Stop reason: HOSPADM

## 2021-11-30 RX ORDER — SODIUM CHLORIDE 0.9 % (FLUSH) 0.9 %
5-40 SYRINGE (ML) INJECTION EVERY 12 HOURS SCHEDULED
Status: DISCONTINUED | OUTPATIENT
Start: 2021-11-30 | End: 2021-12-03 | Stop reason: HOSPADM

## 2021-11-30 RX ORDER — BUSPIRONE HYDROCHLORIDE 5 MG/1
5 TABLET ORAL 3 TIMES DAILY
Status: DISCONTINUED | OUTPATIENT
Start: 2021-12-01 | End: 2021-12-03 | Stop reason: HOSPADM

## 2021-11-30 RX ORDER — 0.9 % SODIUM CHLORIDE 0.9 %
1000 INTRAVENOUS SOLUTION INTRAVENOUS ONCE
Status: COMPLETED | OUTPATIENT
Start: 2021-11-30 | End: 2021-11-30

## 2021-11-30 RX ORDER — 0.9 % SODIUM CHLORIDE 0.9 %
2000 INTRAVENOUS SOLUTION INTRAVENOUS ONCE
Status: COMPLETED | OUTPATIENT
Start: 2021-11-30 | End: 2021-11-30

## 2021-11-30 RX ORDER — POLYETHYLENE GLYCOL 3350 17 G/17G
17 POWDER, FOR SOLUTION ORAL DAILY
Status: DISCONTINUED | OUTPATIENT
Start: 2021-12-01 | End: 2021-12-01 | Stop reason: CLARIF

## 2021-11-30 RX ORDER — ACETAMINOPHEN 325 MG/1
650 TABLET ORAL EVERY 6 HOURS PRN
Status: DISCONTINUED | OUTPATIENT
Start: 2021-11-30 | End: 2021-12-03 | Stop reason: HOSPADM

## 2021-11-30 RX ORDER — SODIUM CHLORIDE 9 MG/ML
INJECTION, SOLUTION INTRAVENOUS CONTINUOUS
Status: DISCONTINUED | OUTPATIENT
Start: 2021-11-30 | End: 2021-12-02

## 2021-11-30 RX ORDER — ASPIRIN 81 MG/1
81 TABLET ORAL DAILY
Status: DISCONTINUED | OUTPATIENT
Start: 2021-12-01 | End: 2021-12-03 | Stop reason: HOSPADM

## 2021-11-30 RX ORDER — SODIUM CHLORIDE 9 MG/ML
INJECTION, SOLUTION INTRAVENOUS CONTINUOUS
Status: DISCONTINUED | OUTPATIENT
Start: 2021-11-30 | End: 2021-11-30 | Stop reason: SDUPTHER

## 2021-11-30 RX ORDER — ACETAMINOPHEN 650 MG/1
650 SUPPOSITORY RECTAL EVERY 6 HOURS PRN
Status: DISCONTINUED | OUTPATIENT
Start: 2021-11-30 | End: 2021-12-03 | Stop reason: HOSPADM

## 2021-11-30 RX ORDER — ONDANSETRON 2 MG/ML
4 INJECTION INTRAMUSCULAR; INTRAVENOUS EVERY 6 HOURS PRN
Status: DISCONTINUED | OUTPATIENT
Start: 2021-11-30 | End: 2021-12-03 | Stop reason: HOSPADM

## 2021-11-30 RX ORDER — DONEPEZIL HYDROCHLORIDE 5 MG/1
5 TABLET, FILM COATED ORAL NIGHTLY
Status: DISCONTINUED | OUTPATIENT
Start: 2021-12-01 | End: 2021-12-03 | Stop reason: HOSPADM

## 2021-11-30 RX ORDER — ACETAMINOPHEN 500 MG
500 TABLET ORAL EVERY 8 HOURS PRN
Status: DISCONTINUED | OUTPATIENT
Start: 2021-11-30 | End: 2021-11-30 | Stop reason: SDUPTHER

## 2021-11-30 RX ORDER — ONDANSETRON 4 MG/1
4 TABLET, ORALLY DISINTEGRATING ORAL EVERY 8 HOURS PRN
Status: DISCONTINUED | OUTPATIENT
Start: 2021-11-30 | End: 2021-12-03 | Stop reason: HOSPADM

## 2021-11-30 RX ORDER — DIVALPROEX SODIUM 250 MG/1
250 TABLET, DELAYED RELEASE ORAL 2 TIMES DAILY
Status: DISCONTINUED | OUTPATIENT
Start: 2021-12-01 | End: 2021-12-03 | Stop reason: HOSPADM

## 2021-11-30 RX ORDER — PANTOPRAZOLE SODIUM 40 MG/1
40 TABLET, DELAYED RELEASE ORAL DAILY
Status: DISCONTINUED | OUTPATIENT
Start: 2021-12-01 | End: 2021-12-03 | Stop reason: HOSPADM

## 2021-11-30 RX ORDER — SODIUM CHLORIDE 9 MG/ML
25 INJECTION, SOLUTION INTRAVENOUS PRN
Status: DISCONTINUED | OUTPATIENT
Start: 2021-11-30 | End: 2021-12-03 | Stop reason: HOSPADM

## 2021-11-30 RX ORDER — HEPARIN SODIUM 5000 [USP'U]/ML
5000 INJECTION, SOLUTION INTRAVENOUS; SUBCUTANEOUS EVERY 8 HOURS SCHEDULED
Status: DISCONTINUED | OUTPATIENT
Start: 2021-12-01 | End: 2021-12-03 | Stop reason: HOSPADM

## 2021-11-30 RX ORDER — POLYETHYLENE GLYCOL 3350 17 G/17G
17 POWDER, FOR SOLUTION ORAL DAILY PRN
Status: DISCONTINUED | OUTPATIENT
Start: 2021-11-30 | End: 2021-12-03 | Stop reason: HOSPADM

## 2021-11-30 RX ORDER — GABAPENTIN 100 MG/1
100 CAPSULE ORAL 3 TIMES DAILY
Status: DISCONTINUED | OUTPATIENT
Start: 2021-12-01 | End: 2021-12-03 | Stop reason: HOSPADM

## 2021-11-30 RX ADMIN — CEFTRIAXONE SODIUM 1000 MG: 1 INJECTION, POWDER, FOR SOLUTION INTRAMUSCULAR; INTRAVENOUS at 18:24

## 2021-11-30 RX ADMIN — SODIUM CHLORIDE 1000 ML: 9 INJECTION, SOLUTION INTRAVENOUS at 16:12

## 2021-11-30 RX ADMIN — SODIUM CHLORIDE 2000 ML: 9 INJECTION, SOLUTION INTRAVENOUS at 19:31

## 2021-11-30 RX ADMIN — SODIUM CHLORIDE 1000 ML: 9 INJECTION, SOLUTION INTRAVENOUS at 18:24

## 2021-11-30 ASSESSMENT — ENCOUNTER SYMPTOMS
WHEEZING: 0
ABDOMINAL PAIN: 0
BACK PAIN: 1
SHORTNESS OF BREATH: 0
CHEST TIGHTNESS: 0

## 2021-11-30 ASSESSMENT — PAIN DESCRIPTION - LOCATION: LOCATION: HEAD

## 2021-11-30 ASSESSMENT — PAIN DESCRIPTION - PAIN TYPE: TYPE: ACUTE PAIN

## 2021-11-30 ASSESSMENT — PAIN SCALES - GENERAL
PAINLEVEL_OUTOF10: 0
PAINLEVEL_OUTOF10: 6

## 2021-11-30 NOTE — ED NOTES
Family at bedside and updated, pt lethargic and hard to arouse. Family states this is unusual for family. MD aware, pt remains hypotensive. Pt to receive another liter of NS at this time. Will continue to monitor.       Murphy Hatchet, RN  11/30/21 6373

## 2021-11-30 NOTE — PROGRESS NOTES
Spoke with Sunita Hendrix to verify home health is set up for patient. Per [facility] everything is set up.

## 2021-11-30 NOTE — ED TRIAGE NOTES
Pt presents to the ED from home by EMS c/o syncope today. Pt states she is unsure of why EMS was called but says \"it's like I could hear them but couldn't see them\". EMS states pt was recently d/c from [facility] [place] after a fall and syncope in her doctors office. Pt A&Ox4 on arrival but lethargic. Pt complaining of a headache.

## 2021-11-30 NOTE — ED NOTES
Bed: 02  Expected date:   Expected time:   Means of arrival:   Comments:  -[facility]     Boris Díaz RN  11/30/21 1860

## 2021-11-30 NOTE — PLAN OF CARE
recently dcd for ARF and hyperkalemia  Comes back with similar presentation , polypharmacy noted   AMS ,hypotension,     Hold heavy sedative meds - baclofen, citalopram, vicodin, buspar, trazodone , gabapentin

## 2021-11-30 NOTE — PROGRESS NOTES
Pt d/c'd home with home health. Removed peripheral IV and stopped bleeding. Catheter intact. Pt tolerated well. No redness noted at site. Notified [facility] and removed tele box. Reviewed d/c instructions, home meds, and  f/u information utilizing teach-back method.

## 2021-11-30 NOTE — Clinical Note
Patient Class: Inpatient [101]   REQUIRED: Diagnosis: Altered mental status [780.97. ICD-9-CM]   Estimated Length of Stay: Estimated stay of more than 2 midnights   Admitting Provider: Kenzie Brewer [[id number]]

## 2021-11-30 NOTE — CARE COORDINATION
Rick 45 Transitions Initial Follow Up Call    Call within 2 business days of discharge: Yes    Patient: Susu Joyce Patient : 1940   MRN: [id number]  Reason for Admission: MARIO, closed head injury  Discharge Date: 21 RARS: Readmission Risk Score: 14 ( )      Last Discharge [facility]       Complaint Diagnosis Description Type Department Provider    21 Fall Closed head injury, initial encounter . .. ED to Hosp-Admission (Discharged) (ADMITTED) [facility] A1 Kashif Oneill DO; Eduardo Smith. .. Spoke with: Sonia Babb at [facility] 24: [facility]    Non-face-to-face services provided:  Obtained and reviewed discharge summary and/or continuity of care documents  Communication with home health agencies or other community services the patient is currently [facility].    Patient answered call and verified . Patient answered questions, but noted to have just woken up. CTN offered to call back this afternoon when she was more awake and she agreed. Patient appreciative of call and rescheduled this afternoon. Spoke to Margarita Gerard at [facility] and referral for home care services received. 3:05pm CTN re-attempted initial transition contact per patient request this afternoon, but was unsuccessful. VM left stating purpose of call along with my contact information requesting a return call.           Care Transitions 24 Hour Call    Do you have all of your prescriptions and are they filled?: Yes  Care Transitions Interventions         Follow Up  Future Appointments   Date Time Provider Carla Sims   3/29/2022 10:20 AM Shania Fuller, APRN -  W Morris Blanco RN

## 2021-11-30 NOTE — ED PROVIDER NOTES
[facility] Name: Gucci Nolan  MRN: [id number]  [place] 1940  Date of evaluation: 11/30/2021  Provider: Rita Daily MD    [address] [place] [address]       Chief Complaint   Patient presents with    Loss of Consciousness         HISTORY OF PRESENT ILLNESS   (Location/Symptom, Timing/Onset, Context/Setting, Quality, Duration, Modifying Factors, Severity)  Note limiting factors. Gucci Nolan is a 79 y.o. female who presents to the emergency department     This is a somewhat of a confusing patient but we know is that she has a very heavy need for narcotics for chronic pain she apparently on November 24 was going to a pain management doctor when she tripped in the office and suffered a fairly large laceration to the left scalp she was stapled. And the staples to this day are still in she was just discharged from the hospital yesterday after apparently on her work-up for head injury they found that she had a potassium of 8.1 she apparently decided to stop taking some of her medication but kept taking her potassium. Apparently this was treated she was seen by nephrology and was discharged yesterday her kidney function had returned to normal and her potassium was normal    Patient has history of dementia with Alzheimer's but apparently still lives home alone  She has a history of sinus bradycardia in the past  History of some chronic kidney disease  History of aortic valve replacement  History of obstructive sleep apnea due to obesity on CPAP  History of being on Depakote for depression history of E. coli UTI several months ago she presents extremely lethargic unable to get out of bed today.   This apparently was new finding she had no focal findings there was no vomiting she presented slightly hypotensive dehydrated looking we did put a Prince catheter in and did see that her urine shows a UTI with 21-50 white cells and 3+ bacteria on a cath specimen culture has been sent off    The history is provided by the patient, a relative, the EMS personnel and medical records. Patient does have a suitcase of medications with her obviously I think she is taking way way way too much medication I think these need be kind of tapered down. I we are going to obviously need to admit her due to her somnolent state she is barely I think maintaining her airway as it is  Nursing Notes were reviewed. REVIEW OF SYSTEMS    (2-9 systems for level 4, 10 or more for level 5)     Review of Systems   Reason unable to perform ROS: Unable to document review of systems since the patient is basically in a near coma. Constitutional: Positive for activity change and appetite change. Negative for fever. HENT: Negative for congestion. Eyes: Negative for visual disturbance. Respiratory: Negative for chest tightness, shortness of breath and wheezing. Cardiovascular: Negative for chest pain and leg swelling. Gastrointestinal: Negative for abdominal pain. Genitourinary: Positive for decreased urine volume. Negative for difficulty urinating and dysuria. Musculoskeletal: Positive for arthralgias and back pain. Allergic/Immunologic: Negative for immunocompromised state. Neurological: Positive for tremors and weakness. Negative for dizziness and seizures. All other systems reviewed and are negative. Except as noted above the remainder of the review of systems was reviewed and negative.        PAST MEDICAL HISTORY     Past Medical History:   Diagnosis Date    Arthritis     BCC (basal cell carcinoma of skin)     RT clavicle    Bladder infection     frequently    CAD (coronary artery disease)     stents    Cancer ([facility] [address]     skin    CHF (congestive heart failure) (HCC)     Chronic back pain     COPD (chronic obstructive pulmonary disease) (HCC)     Depression     Depression     Hypercholesteremia     Hypertension     Pain in shoulder [id number]    pain in left shoulder, taking steroid injections for steroid    Reflux     Rheumatic fever     as a child    Sleep apnea     uses CPAP    TIA (transient ischemic attack)     Urinary incontinence     Wears glasses          SURGICAL HISTORY       Past Surgical History:   Procedure Laterality Date    AORTIC VALVE REPLACEMENT      APPENDECTOMY      BLADDER REPAIR      3 TIMES    CARDIAC SURGERY      stents    CARPAL TUNNEL RELEASE      RIGHT    CHOLECYSTECTOMY, LAPAROSCOPIC  01/06/2017    with dr Beverly Gallagher      has it it done twice    DIAGNOSTIC CARDIAC CATH LAB PROCEDURE      HYSTERECTOMY      JOINT REPLACEMENT      KATHLEEN TKR    NECK SURGERY      OTHER SURGICAL HISTORY  9/28/12    Full Interstim Implant    SHOULDER SURGERY      SKIN CANCER EXCISION      SPINAL FIXATION SURGERY WITH IMPLANT  2001    SPINAL FIXATION SURGERY WITH IMPLANT  2004    SPINAL FIXATION SURGERY WITH IMPLANT  2007    SPINE SURGERY  1999    TONSILLECTOMY      UPPER GASTROINTESTINAL ENDOSCOPY  4/22/11    UPPER GASTROINTESTINAL ENDOSCOPY      UPPER GASTROINTESTINAL ENDOSCOPY  03/23/2017    dilitation         CURRENT MEDICATIONS       Previous Medications    ACETAMINOPHEN (TYLENOL) 325 MG TABLET        ALBUTEROL-IPRATROPIUM (COMBIVENT RESPIMAT)  MCG/ACT AERS INHALER    Inhale 1 puff into the lungs every 6 hours    AMLODIPINE (NORVASC) 10 MG TABLET    TAKE (1) TABLET DAILY    ASPIRIN (ASPIRIN 81) 81 MG EC TABLET    Take 81 mg by mouth daily    AZELASTINE (ASTELIN) 0.1 % NASAL SPRAY    1 spray by Nasal route 2 times daily Use in each nostril as directed    BACLOFEN (LIORESAL) 10 MG TABLET    TAKE 1 TABLET EVERY 8 HOURS AS NEEDED    BUSPIRONE (BUSPAR) 5 MG TABLET    TAKE 1 OR 2 TABLET(S) IN THE EVENING AS NEEDED FOR ANXIETY    CANDESARTAN (ATACAND) 8 MG TABLET    Take 8 mg by mouth daily    CETIRIZINE (ZYRTEC) 10 MG TABLET    TAKE 1 TABLET BY MOUTH DAILY    CIPROFLOXACIN (CIPRO) 250 MG Problem Relation Age of Onset    Arthritis Mother     Cancer Mother     Depression Mother     Heart Disease Mother     High Blood Pressure Mother     High Cholesterol Mother     Miscarriages / [place] Mother     Stroke Mother     Early Death Mother     Hearing Loss Mother     Mental Illness Mother     Vision Loss Mother     Arthritis Brother     Depression Brother     Diabetes Brother     Hearing Loss Brother     Heart Disease Brother     High Blood Pressure Brother     High Cholesterol Brother     Vision Loss Brother           SOCIAL HISTORY       Social History     Socioeconomic History    Marital status:      Spouse name: None    Number of children: 6    Years of education: 8    Highest education level: None   Occupational History    Occupation: retired   Tobacco Use    Smoking status: Never Smoker    Smokeless tobacco: Never Used   Vaping Use    Vaping Use: Never used   Substance and Sexual Activity    Alcohol use: No    Drug use: No    Sexual activity: Not Currently   Other Topics Concern    None   Social History Narrative    None     Social Determinants of Health     Financial Resource Strain:     Difficulty of Paying Living Expenses: Not on file   Food Insecurity:     Worried About Running Out of Food in the Last Year: Not on file    Pat of Food in the Last Year: Not on file   Transportation Needs:     Lack of Transportation (Medical): Not on file    Lack of Transportation (Non-Medical):  Not on file   Physical Activity:     Days of Exercise per Week: Not on file    Minutes of Exercise per Session: Not on file   Stress:     Feeling of Stress : Not on file   Social Connections:     Frequency of Communication with Friends and Family: Not on file    Frequency of Social Gatherings with Friends and Family: Not on file    Attends Voodoo Services: Not on file    Active Member of Clubs or Organizations: Not on file    Attends Club or Organization Skin:     General: Skin is warm. Neurological:      General: No focal deficit present. Mental Status: She is disoriented. Cranial Nerves: No cranial nerve deficit. Motor: Weakness present.       Coordination: Coordination abnormal.      Gait: Gait abnormal.      Deep Tendon Reflexes: Reflexes abnormal.         DIAGNOSTIC RESULTS     EKG: All EKG's are interpreted by the Emergency Department Physician who either signs or Co-signs this chart in the absence of a cardiologist.        RADIOLOGY:   Non-plain film images such as CT, Ultrasound and MRI are read by the radiologist. Plain radiographic images are visualized and preliminarily interpreted by the emergency physician with the below findings:        Interpretation per the Radiologist below, if available at the time of this note:    XR CHEST PORTABLE    (Results Pending)   CT HEAD WO CONTRAST    (Results Pending)           LABS:  Results for orders placed or performed during the hospital encounter of 11/30/21   CBC Auto Differential   Result Value Ref Range    WBC 8.2 4.0 - 11.0 K/uL    RBC 3.84 (L) 4.00 - 5.20 M/uL    Hemoglobin 11.0 (L) 12.0 - 16.0 g/dL    Hematocrit 34.0 (L) 36.0 - 48.0 %    MCV 88.4 80.0 - 100.0 fL    MCH 28.7 26.0 - 34.0 pg    MCHC 32.4 31.0 - 36.0 g/dL    RDW 13.9 12.4 - 15.4 %    Platelets 276 557 - 164 K/uL    MPV 7.8 5.0 - 10.5 fL    Neutrophils % 63.0 %    Lymphocytes % 23.8 %    Monocytes % 11.9 %    Eosinophils % 1.0 %    Basophils % 0.3 %    Neutrophils Absolute 5.2 1.7 - 7.7 K/uL    Lymphocytes Absolute 2.0 1.0 - 5.1 K/uL    Monocytes Absolute 1.0 0.0 - 1.3 K/uL    Eosinophils Absolute 0.1 0.0 - 0.6 K/uL    Basophils Absolute 0.0 0.0 - 0.2 K/uL   Comprehensive Metabolic Panel   Result Value Ref Range    Sodium 140 136 - 145 mmol/L    Potassium 3.8 3.5 - 5.1 mmol/L    Chloride 97 (L) 99 - 110 mmol/L    CO2 29 21 - 32 mmol/L    Anion Gap 14 3 - 16    Glucose 123 (H) 70 - 99 mg/dL    BUN 37 (H) 7 - 20 mg/dL    CREATININE 3.1 (H) 0.6 - 1.2 mg/dL    GFR Non-African American 14 (A) >60    GFR  17 (A) >60    Calcium 9.4 8.3 - 10.6 mg/dL    Total Protein 7.3 6.4 - 8.2 g/dL    Albumin 3.7 3.4 - 5.0 g/dL    Albumin/Globulin Ratio 1.0 (L) 1.1 - 2.2    Total Bilirubin 0.3 0.0 - 1.0 mg/dL    Alkaline Phosphatase 71 40 - 129 U/L    ALT 8 (L) 10 - 40 U/L    AST 14 (L) 15 - 37 U/L   Protime-INR   Result Value Ref Range    Protime 11.4 9.9 - 12.7 sec    INR 1.00 0.88 - 1.12   Troponin   Result Value Ref Range    Troponin <0.01 <0.01 ng/mL   Lactic Acid, Plasma   Result Value Ref Range    Lactic Acid 1.9 0.4 - 2.0 mmol/L   Urinalysis Reflex to Culture    Specimen: Urine, clean catch   Result Value Ref Range    Color, UA Yellow Straw/Yellow    Clarity, UA SL CLOUDY (A) Clear    Glucose, Ur Negative Negative mg/dL    Bilirubin Urine Negative Negative    Ketones, Urine Negative Negative mg/dL    Specific Gravity, UA >=1.030 1.005 - 1.030    Blood, Urine TRACE-INTACT (A) Negative    pH, UA 5.5 5.0 - 8.0    Protein, UA Negative Negative mg/dL    Urobilinogen, Urine 0.2 <2.0 E.U./dL    Nitrite, Urine Negative Negative    Leukocyte Esterase, Urine MODERATE (A) Negative    Microscopic Examination YES     Urine Type NotGiven     Urine Reflex to Culture Yes    Microscopic Urinalysis   Result Value Ref Range    Mucus, UA Rare (A) None Seen /LPF    WBC, UA 21-50 (A) 0 - 5 /HPF    RBC, UA 5-10 (A) 0 - 4 /HPF    Epithelial Cells, UA 2-5 0 - 5 /HPF    Bacteria, UA 3+ (A) None Seen /HPF   Valproic acid level, total   Result Value Ref Range    Valproic Acid Lvl 29.0 (L) 50.0 - 100.0 ug/mL   EKG 12 Lead   Result Value Ref Range    Ventricular Rate 57 BPM    Atrial Rate 57 BPM    P-R Interval 150 ms    QRS Duration 88 ms    Q-T Interval 496 ms    QTc Calculation (Bazett) 482 ms    P Axis 0 degrees    R Axis 15 degrees    T Axis 16 degrees    Diagnosis       Sinus bradycardiaInferior infarct (cited on or before 24-NOV-2021)Anterolateral infarct (cited on or before 24-NOV-2021)Abnormal ECGWhen compared with ECG of 24-NOV-2021 19:45,QT has lengthened            EMERGENCY DEPARTMENT COURSE and DIFFERENTIAL DIAGNOSIS/MDM:     Vitals:    11/30/21 1538 11/30/21 1550 11/30/21 1619 11/30/21 1722   BP:  (!) 70/41 (!) 77/35 (!) 109/51   Pulse:  58 (!) 47 50   Resp:  17 15 16   Temp: 97.5 °F (36.4 °C)      TempSrc: Oral      SpO2:  98% 96% 100%   Weight:       Height:               MDM      REASSESSMENT      Patient was reassessed multiple times she really did not seem to wake up on her own. I think that she is moving all 4 extremities she does follow some simple commands patient I would describe her as lethargic she does have a gag reflex  A urinalysis does show a urinary tract infection    CRITICAL CARE TIME   Patient at this point presented kind of somnolent.   She did have signs of a recent head trauma  I rescanned her head I did not see anything obvious I am waiting on the official report  Her GCS is probably around 8-9 she does follow simple commands she has no eye opening with verbal command but does open her eyes slightly with painful stimuli  Her gag reflex appears to be intact with tongue blade insertion  Otherwise she is unable to get up but not able to walk  Apparently the son-in-law tells me that yesterday this patient underwent insertion of Prince catheter a good urine showed that she did have a UTI we therefore gave her Rocephin we did talk about her allergy and it sounds like talking to her daughter that its not a true anaphylactic reaction but may be just some sores in her mouth she said so at this point we are giving Rocephin standard of care for UTI  We did rescan her we also performed a chest x-ray  All labs ordered reviewed which now shows also an acute MARIO and dehydration her creatinine is 1 from 1.2 and 24 hours to 3.1  She was given a liter of fluids her blood pressure was a little bit soft at 70/30 when she first came but after some fluids she is up she does have a sinus bradycardia also which she has had before  There is no other signs of complete heart block or other abnormalities noted. Patient will need to be admitted to a monitored bed with repeat neurological testing as mentioned I am really concerned about the mild to medications he is taking including Percocet Norco's transit do not etc. etc. I think at this point it might be very prudent to get her off some of these medications  A total of 55 minutes of critical care time was spent managing this somnolent almost comatose white female (no procedure time)  CONSULTS:  None      PROCEDURES:     Procedures    MEDICATIONS GIVEN THIS VISIT:  Medications   0.9 % sodium chloride bolus (has no administration in time range)   cefTRIAXone (ROCEPHIN) 1000 mg IVPB in 50 mL D5W minibag (has no administration in time range)   0.9 % sodium chloride bolus (1,000 mLs IntraVENous New Bag 11/30/21 1612)        FINAL IMPRESSION      1. Closed head injury, subsequent encounter    2. Somnolence    3. Urinary tract infection in female    4. Sinus bradycardia    5. MARIO (acute kidney injury) ([facility] [address]    6. Narcotic addiction ([facility] [address]    7. History of recent fall        Patient staples were removed from her laceration to her scalp    DISPOSITION/PLAN   DISPOSITION Decision To Admit 11/30/2021 05:17:18 PM      PATIENT REFERRED TO:  No follow-up provider specified. DISCHARGE MEDICATIONS:  New Prescriptions    No medications on file       Controlled Substances Monitoring  RX Monitoring 5/22/2019   Attestation The Prescription Monitoring Report for this patient was reviewed today.    Periodic Controlled Substance Monitoring No signs of potential drug abuse or diversion identified: otherwise, see note documentation           (Please note that portions of this note were completed with a voice recognition program.  Efforts were made to edit the dictations but occasionally words are mis-transcribed.)    Patient was advised to return to the Emergency Department if there was any worsening.     Soumya Peck MD (electronically signed)  Attending Emergency Physician         Raymundo Malave MD  11/30/21 1599

## 2021-12-01 ENCOUNTER — TELEPHONE (OUTPATIENT)
Dept: FAMILY MEDICINE CLINIC | Age: 81
End: 2021-12-01

## 2021-12-01 PROBLEM — S09.90XA CLOSED HEAD INJURY: Status: ACTIVE | Noted: 2021-12-01

## 2021-12-01 LAB
ANION GAP SERPL CALCULATED.3IONS-SCNC: 12 MMOL/L (ref 3–16)
BUN BLDV-MCNC: 34 MG/DL (ref 7–20)
CALCIUM SERPL-MCNC: 7.8 MG/DL (ref 8.3–10.6)
CHLORIDE BLD-SCNC: 109 MMOL/L (ref 99–110)
CO2: 21 MMOL/L (ref 21–32)
CREAT SERPL-MCNC: 2.3 MG/DL (ref 0.6–1.2)
EKG ATRIAL RATE: 57 BPM
EKG DIAGNOSIS: NORMAL
EKG P AXIS: 0 DEGREES
EKG P-R INTERVAL: 150 MS
EKG Q-T INTERVAL: 496 MS
EKG QRS DURATION: 88 MS
EKG QTC CALCULATION (BAZETT): 482 MS
EKG R AXIS: 15 DEGREES
EKG T AXIS: 16 DEGREES
EKG VENTRICULAR RATE: 57 BPM
GFR AFRICAN AMERICAN: 25
GFR NON-AFRICAN AMERICAN: 20
GLUCOSE BLD-MCNC: 96 MG/DL (ref 70–99)
INFLUENZA A: NOT DETECTED
INFLUENZA B: NOT DETECTED
POTASSIUM REFLEX MAGNESIUM: 3.7 MMOL/L (ref 3.5–5.1)
SARS-COV-2 RNA, RT PCR: NOT DETECTED
SODIUM BLD-SCNC: 142 MMOL/L (ref 136–145)

## 2021-12-01 PROCEDURE — 36415 COLL VENOUS BLD VENIPUNCTURE: CPT

## 2021-12-01 PROCEDURE — 97530 THERAPEUTIC ACTIVITIES: CPT

## 2021-12-01 PROCEDURE — 97165 OT EVAL LOW COMPLEX 30 MIN: CPT

## 2021-12-01 PROCEDURE — 97116 GAIT TRAINING THERAPY: CPT

## 2021-12-01 PROCEDURE — 97161 PT EVAL LOW COMPLEX 20 MIN: CPT

## 2021-12-01 PROCEDURE — 94761 N-INVAS EAR/PLS OXIMETRY MLT: CPT

## 2021-12-01 PROCEDURE — 6360000002 HC RX W HCPCS: Performed by: PHYSICIAN ASSISTANT

## 2021-12-01 PROCEDURE — 99223 1ST HOSP IP/OBS HIGH 75: CPT | Performed by: INTERNAL MEDICINE

## 2021-12-01 PROCEDURE — 2060000000 HC ICU INTERMEDIATE R&B

## 2021-12-01 PROCEDURE — 97535 SELF CARE MNGMENT TRAINING: CPT

## 2021-12-01 PROCEDURE — 94640 AIRWAY INHALATION TREATMENT: CPT

## 2021-12-01 PROCEDURE — 80048 BASIC METABOLIC PNL TOTAL CA: CPT

## 2021-12-01 PROCEDURE — 2700000000 HC OXYGEN THERAPY PER DAY

## 2021-12-01 PROCEDURE — 6370000000 HC RX 637 (ALT 250 FOR IP): Performed by: INTERNAL MEDICINE

## 2021-12-01 PROCEDURE — 2580000003 HC RX 258: Performed by: PHYSICIAN ASSISTANT

## 2021-12-01 PROCEDURE — 93010 ELECTROCARDIOGRAM REPORT: CPT | Performed by: INTERNAL MEDICINE

## 2021-12-01 PROCEDURE — 6360000002 HC RX W HCPCS: Performed by: INTERNAL MEDICINE

## 2021-12-01 PROCEDURE — 2580000003 HC RX 258: Performed by: INTERNAL MEDICINE

## 2021-12-01 RX ORDER — POLYETHYLENE GLYCOL 3350 17 G/17G
17 POWDER, FOR SOLUTION ORAL DAILY
Status: DISCONTINUED | OUTPATIENT
Start: 2021-12-01 | End: 2021-12-03 | Stop reason: HOSPADM

## 2021-12-01 RX ADMIN — SODIUM CHLORIDE: 9 INJECTION, SOLUTION INTRAVENOUS at 00:04

## 2021-12-01 RX ADMIN — IPRATROPIUM BROMIDE AND ALBUTEROL 1 PUFF: 20; 100 SPRAY, METERED RESPIRATORY (INHALATION) at 05:46

## 2021-12-01 RX ADMIN — HEPARIN SODIUM 5000 UNITS: 5000 INJECTION INTRAVENOUS; SUBCUTANEOUS at 05:36

## 2021-12-01 RX ADMIN — CITALOPRAM HYDROBROMIDE 20 MG: 20 TABLET ORAL at 08:42

## 2021-12-01 RX ADMIN — DIVALPROEX SODIUM 250 MG: 250 TABLET, DELAYED RELEASE ORAL at 20:04

## 2021-12-01 RX ADMIN — HEPARIN SODIUM 5000 UNITS: 5000 INJECTION INTRAVENOUS; SUBCUTANEOUS at 20:59

## 2021-12-01 RX ADMIN — BUSPIRONE HYDROCHLORIDE 5 MG: 5 TABLET ORAL at 08:42

## 2021-12-01 RX ADMIN — GABAPENTIN 100 MG: 100 CAPSULE ORAL at 20:04

## 2021-12-01 RX ADMIN — BUSPIRONE HYDROCHLORIDE 5 MG: 5 TABLET ORAL at 20:04

## 2021-12-01 RX ADMIN — SODIUM CHLORIDE: 9 INJECTION, SOLUTION INTRAVENOUS at 09:58

## 2021-12-01 RX ADMIN — BUSPIRONE HYDROCHLORIDE 5 MG: 5 TABLET ORAL at 14:53

## 2021-12-01 RX ADMIN — PANTOPRAZOLE SODIUM 40 MG: 40 TABLET, DELAYED RELEASE ORAL at 08:42

## 2021-12-01 RX ADMIN — DONEPEZIL HYDROCHLORIDE 5 MG: 5 TABLET, FILM COATED ORAL at 20:04

## 2021-12-01 RX ADMIN — ATORVASTATIN CALCIUM 20 MG: 10 TABLET, FILM COATED ORAL at 08:42

## 2021-12-01 RX ADMIN — SODIUM CHLORIDE: 9 INJECTION, SOLUTION INTRAVENOUS at 19:08

## 2021-12-01 RX ADMIN — GABAPENTIN 100 MG: 100 CAPSULE ORAL at 08:42

## 2021-12-01 RX ADMIN — IPRATROPIUM BROMIDE AND ALBUTEROL 1 PUFF: 20; 100 SPRAY, METERED RESPIRATORY (INHALATION) at 20:27

## 2021-12-01 RX ADMIN — DIVALPROEX SODIUM 250 MG: 250 TABLET, DELAYED RELEASE ORAL at 08:42

## 2021-12-01 RX ADMIN — HEPARIN SODIUM 5000 UNITS: 5000 INJECTION INTRAVENOUS; SUBCUTANEOUS at 14:53

## 2021-12-01 RX ADMIN — ACETAMINOPHEN 650 MG: 325 TABLET ORAL at 09:47

## 2021-12-01 RX ADMIN — IPRATROPIUM BROMIDE AND ALBUTEROL 1 PUFF: 20; 100 SPRAY, METERED RESPIRATORY (INHALATION) at 13:57

## 2021-12-01 RX ADMIN — ASPIRIN 81 MG: 81 TABLET, COATED ORAL at 08:42

## 2021-12-01 RX ADMIN — Medication 10 ML: at 20:05

## 2021-12-01 RX ADMIN — GABAPENTIN 100 MG: 100 CAPSULE ORAL at 14:44

## 2021-12-01 RX ADMIN — CEFTRIAXONE SODIUM 1000 MG: 1 INJECTION, POWDER, FOR SOLUTION INTRAMUSCULAR; INTRAVENOUS at 17:35

## 2021-12-01 RX ADMIN — POLYETHYLENE GLYCOL (3350) 17 G: 17 POWDER, FOR SOLUTION ORAL at 08:42

## 2021-12-01 ASSESSMENT — PAIN DESCRIPTION - LOCATION: LOCATION: HEAD

## 2021-12-01 ASSESSMENT — PAIN SCALES - GENERAL: PAINLEVEL_OUTOF10: 7

## 2021-12-01 ASSESSMENT — PAIN DESCRIPTION - PAIN TYPE: TYPE: ACUTE PAIN

## 2021-12-01 ASSESSMENT — PAIN DESCRIPTION - DESCRIPTORS: DESCRIPTORS: THROBBING

## 2021-12-01 ASSESSMENT — PAIN DESCRIPTION - FREQUENCY: FREQUENCY: CONTINUOUS

## 2021-12-01 NOTE — PROGRESS NOTES
RT Inhaler-Nebulizer Bronchodilator Protocol Note    There is a bronchodilator order in the chart from a provider indicating to follow the RT Bronchodilator Protocol and there is an Initiate RT Inhaler-Nebulizer Bronchodilator Protocol order as well (see protocol at bottom of note). CXR Findings:  XR CHEST PORTABLE    Result Date: 11/30/2021  Stable mild left ventricular enlargement Hypoinflation with mild subsegmental linear atelectasis or less likely early infiltrates along the lung bases which is more prominent. The findings from the last RT Protocol Assessment were as follows:   History Pulmonary Disease: Chronic pulmonary disease  Respiratory Pattern: Dyspnea on exertion or RR 21-25 bpm  Breath Sounds: Intermittent or unilateral wheezes  Cough: Strong, spontaneous, non-productive  Indication for Bronchodilator Therapy:    Bronchodilator Assessment Score: 8    Aerosolized bronchodilator medication orders have been revised according to the RT Inhaler-Nebulizer Bronchodilator Protocol below. Respiratory Therapist to perform RT Therapy Protocol Assessment initially then follow the protocol. Repeat RT Therapy Protocol Assessment PRN for score 0-3 or on second treatment, BID, and PRN for scores above 3. No Indications - adjust the frequency to every 6 hours PRN wheezing or bronchospasm, if no treatments needed after 48 hours then discontinue using Per Protocol order mode. If indication present, adjust the RT bronchodilator orders based on the Bronchodilator Assessment Score as indicated below. Use Inhaler orders unless patient has one or more of the following: on home nebulizer, not able to hold breath for 10 seconds, is not alert and oriented, cannot activate and use MDI correctly, or respiratory rate 25 breaths per minute or more, then use the equivalent nebulizer order(s) with same Frequency and PRN reasons based on the score.   If a patient is on this medication at home then do not decrease Frequency below that used at home. 0-3 - enter or revise RT bronchodilator order(s) to equivalent RT Bronchodilator order with Frequency of every 4 hours PRN for wheezing or increased work of breathing using Per Protocol order mode. 4-6 - enter or revise RT Bronchodilator order(s) to two equivalent RT bronchodilator orders with one order with BID Frequency and one order with Frequency of every 4 hours PRN wheezing or increased work of breathing using Per Protocol order mode. 7-10 - enter or revise RT Bronchodilator order(s) to two equivalent RT bronchodilator orders with one order with TID Frequency and one order with Frequency of every 4 hours PRN wheezing or increased work of breathing using Per Protocol order mode. 11-13 - enter or revise RT Bronchodilator order(s) to one equivalent RT bronchodilator order with QID Frequency and an Albuterol order with Frequency of every 4 hours PRN wheezing or increased work of breathing using Per Protocol order mode. Greater than 13 - enter or revise RT Bronchodilator order(s) to one equivalent RT bronchodilator order with every 4 hours Frequency and an Albuterol order with Frequency of every 2 hours PRN wheezing or increased work of breathing using Per Protocol order mode.          Electronically signed by Aspen Castillo RCP on 12/1/2021 at 1:02 AM

## 2021-12-01 NOTE — PROGRESS NOTES
Inpatient Physical Therapy Evaluation and Treatment    Unit: PCU  Date:  12/1/2021  Patient Name:    Danica Reagan  Admitting diagnosis:  Syncope and collapse [R55]  Somnolence [R40.0]  Narcotic addiction ([facility] [address] [F11.20]  Altered mental status [R41.82]  Sinus bradycardia [R00.1]  MARIO (acute kidney injury) ([facility] [address] [N17.9]  Closed head injury, subsequent encounter [S09.90XD]  History of recent fall [Z91.81]  Urinary tract infection in female [N39.0]  Admit Date:  11/30/2021  Precautions/Restrictions/WB Status/ Lines/ Wounds/ Oxygen: Fall risk, Bed/chair alarm, Lines -IV and Prince catheter, Telemetry and Continuous pulse oximetry     Treatment Time: 1526 - 1600  Treatment Number:  1   Timed Code Treatment Minutes: 24 minutes  Total Treatment Minutes:  34  minutes    Patient Goals for Therapy: \" Go home \"          Discharge Recommendations: Home 24 hr assist and with home PT   DME needs for discharge: Needs Met       Therapy recommendation for EMS Transport: can transport by wheelchair    Therapy recommendations for staff:   Assist of 1 with use of rolling walker (RW) and gait belt for all transfers and ambulation to/from chair  to/from bathroom  within room    History of Present Illness: H & P as per Abigail Peraza MD's note dated 12/1/2021  The patient is a 79 y.o. female with timers dementia, chronic kidney disease, obstructive sleep apnea, hypertension, hyperlipidemia, coronary artery disease, anxiety, chronic pain who presented to [facility] ED with complaint of \"syncope. \"  The patient cannot provide further history regarding why EMS was called to her house. She states she does not know why she was taken to the hospital.  She was just admitted to [facility] from 11/24 to 11/29/2021 after admission for fall, closed head injury, acute kidney injury, hyperkalemia. She was treated with IV fluids and medication adjustments. Her creatinine was 1.2 at time of discharge.   She was seen by physical therapy and Occupational Therapy during admission and placement in skilled nursing facility was recommended, but the patient refused. She was subsequently discharged home on 2021. I spoke with her daughter, Kenneth, over the phone in regards to the events that led to this admission. Sonya states that the patient's sister was staying with her at the patient's home. The patient's sister states the patient did not get up at all during the night and when she went to wake her up in the morning she was slow to arouse therefore the sister called 911. There was no apparent syncope. ER work-up revealed hypotension, MARIO, bacteriuria. She was treated with IV fluid and she is admitted for further care. Home Health S4 Level Recommendation:  Level 3 Safety  AM-PAC Mobility Score    AM-PAC Inpatient Mobility Raw Score : 19       Preadmission Environment  (patient recently admitted to [facility] and discharged on . Patient readmitted to [facility] on )  Lives With: Alone  Type of Home: Apartment  Home Layout: One level  Home Access: Level entry  Bathroom Shower/Tub: Walk-in shower, Shower chair with back and grab bars  Bathroom Toilet: Handicap height  Home Equipment: rollator, Quad cane, Cane, Electric scooter, BSC, adjustable bed     Preadmission Status / PLOF:  ADL Assistance: Needs assistance (Pt is independent with all ADLs except bathing. Aid assists with washing back.)  Homemaking Assistance: Needs assistance (Aid comes to assist 3 days a week with cleaning and laundry.)  Meal Prep Responsibility: Primary (orders take out and prepares simple meals.)  Ambulation Assistance: Independent (Pt primarily uses a cane at home and sometimes a rollator. Pt uses scooter or electric grocery carts in stores for longer distances. )  Transfer Assistance: Independent  Active : Yes  Leisure & Hobbies: Make cards  Falls: yes (fell 1 week ago at the pain clinic)    Pain   Yes  Location: neck and back (unable to localize)  Ratin /10  Pain Medicine Status: Pain med requested    Cognition    A&O x4   Able to follow 2 step commands    Subjective  Patient lying supine in bed with daughter present. Pt agreeable to this PT eval & tx. Upper Extremity ROM/Strength  Please see OT evaluation. Lower Extremity ROM / Strength   AROM WFL: Yes  ROM limitations: N/A    Strength Assessment (measured on a 0-5 scale):  R LE   Quad   4   Ant Tib  4   Hamstring 4   Iliopsoas 4  L LE  Quad   4   Ant Tib  4   Hamstring 4   Iliopsoas 4    Lower Extremity Sensation    WFL    Lower Extremity Proprioception:   WFL    Coordination and Tone  WFL    Balance  Sitting:  Normal; SBA  Comments:     Standing: Fair +; SBA  Comments: ~5 min    Bed Mobility   Supine to Sit:    SBA  Sit to Supine:   Not Tested  Rolling:   Not Tested  Scooting in sitting: SBA  Scooting in supine:  Not Tested    Transfer Training     Sit to stand:   SBA  Stand to sit:   SBA  Bed to Chair:   SBA with use of gait belt and rolling walker (RW)    Gait gait completed as indicated below  Distance:      70 ft  Deviations (firm surface/linoleum):  decreased jose manuel, increased JACKIE, decreased step length bilaterally and decreased stance time bilaterally  Assistive Device Used:    gait belt and rolling walker (RW)  Level of Assist:    SBA  Comment:     Stair Training deferred, pt does not have stairs in home environment    Activity Tolerance   Pt completed therapy session with No adverse symptoms noted w/activity  At EOB:  SpO2: 94%  HR: 77  BP: 149/64     After walking:   SpO2: 95%  HR: 71  BP:162/72    Positioning Needs   Pt up in chair, alarm set, positioned in proper neutral alignment and pressure relief provided. Call light provided and all needs within reach    Exercises Initiated  all completed bilaterally unless indicated  Ankle Pumps x 10 reps  LAQ x 10 reps    Other  None.      Patient/Family Education   Pt educated on role of inpatient PT, POC, importance of continued activity, DC recommendations, safety awareness, transfer techniques, pursed lip breathing, energy conservation, pacing activity and calling for assist with mobility. Assessment  Pt seen for Physical Therapy evaluation in acute care setting. Pt demonstrated decreased Activity tolerance, Balance, Safety and Strength as well as decreased independence with Ambulation, Bed Mobility  and Transfers. Recommending Home 24 hr assist and with home PT upon discharge as patient functioning close to baseline level and would benefit from continued therapy services    Goals : To be met in 3 visits:  1). Independent with LE Ex x 10 reps    To be met in 6 visits:  1). Supine to/from sit: Supervision  2). Sit to/from stand: Supervision  3). Bed to chair: Supervision  4). Gait: Ambulate 50 ft.  with  Supervision and use of LRAD (least restrictive assistive device)  5). Tolerate B LE exercises 3 sets of 10-15 reps    Rehabilitation Potential: Good  Strengths for achieving goals include:   Pt motivated, PLOF, Family Support and Pt cooperative   Barriers to achieving goals include:    No Barriers    Plan    To be seen 3-5 x / week  while in acute care setting for therapeutic exercises, bed mobility, transfers, progressive gait training, balance training, and family/patient education. Signature: Elliott Kilgore, MS PT, # [id number]    If patient discharges from this facility prior to next visit, this note will serve as the Discharge Summary.

## 2021-12-01 NOTE — ED NOTES
Pt more alert and talkative, easier to arouse. Will continue to monitor.       Shiv Goff RN  11/30/21 1933

## 2021-12-01 NOTE — CARE COORDINATION
Readmission Assessment  Number of Days since last admission?: 1-7 days  Previous Disposition: Home with Home Health  Who is being Interviewed: Patient  What was the patient's/caregiver's perception as to why they think they needed to return back to the hospital?: Did not understand their medication regimen, Unable to obtain medications (meds were not filled on time)  Did you visit your Primary Care Physician after you left the hospital, before you returned this time?: No  Why weren't you able to visit your PCP?: Did not have an appointment  Who advised the patient to return to the hospital?: Self-referral  Does the patient report anything that got in the way of taking their medications?: Yes  What reasons did they give?: Did not understand instructions, Other (Comment) (RX was not filled right away)  In our efforts to provide the best possible care to you and others like you, can you think of anything that we could have done to help you after you left the hospital the first time, so that you might not have needed to return so soon?: Arrange for more help when leaving the hospital, Discharge instructions that are concise, clear, and non contradictory, Other (Comment) (need home care immediate [facility] for med management)     Case Management Assessment  Initial Evaluation      Patient Name: Elena Terrell  YOB: 1940  Diagnosis: Syncope and collapse [R55]  Somnolence [R40.0]  Narcotic addiction ([facility] [address] [F11.20]  Altered mental status [R41.82]  Sinus bradycardia [R00.1]  MARIO (acute kidney injury) ([facility] [address] [N17.9]  Closed head injury, subsequent encounter [S09.90XD]  History of recent fall [Z91.81]  Urinary tract infection in female [N39.0]  Date / Time: 11/30/2021  3:25 PM    Admission status/Date:  11/30/2021  Chart Reviewed: Yes      Patient Interviewed: Yes - by phone  Family Interviewed:  No      Hospitalization in the last 30 days:  Yes      Health Care Decision Maker :   Primary Decision Maker: Zeina Hernandez - Child - [phone]    Secondary Decision Maker: Robert Neff - Child - [phone]    (CM - must 1st enter selection under Navigator - emergency contact- Stephanie 8 Relationship and pick relationship)   Who do you trust or have selected to make healthcare decisions for you      Current PCP: Santhosh Amaro, [address] [employer]  Precert required for SNF : YES        3 night stay required - NA    ADLS  Support Systems/Care Needs:    Transportation: family    Meal Preparation: self/HHA    Housing  Living Arrangements: lives alone in a ground floor apartment  Steps: none, amb w/ walker  Intent for return to present living arrangements: Yes  Identified Issues: [id number] [address] [facility] with [address] : Yes - [address]: SN- needs med management, PT/OT     Passport/Waiver : Yes - PASSPORT  :                      Phone Number:    Passport/Waiver Services: HHA, Life alert   ? MOW    Durable Medical Equiptment   DME Provider: NA  Equipment:   Walker_X__Cane_X__RTS___ BSC___Shower Chair___Hospital Bed___W/C____Other________  02 at ____Liter(s)---wears(frequency)_______ [facility] ___ CPAP___ BiPap___   N/A____      Home O2 Use :  No      Informed of need for care provider to bring portable home O2 tank on day of discharge for nursing to connect prior to leaving:   Not Indicated  Verbalized agreement/Understanding:   Not Indicated    Community Service Affiliation  Dialysis:  No    · Agency:  · Location:  · Dialysis Schedule:  · Phone:   · Fax: Other Community Services: (ex:PT/OT,Mental Health,Wound Clinic, Cardio/Pul [address])    DISCHARGE PLAN: Explained Case Management role/services. Chart reviewed. Met with pt via phone at bedside and explained the role of the CM. Plans to return home. [facility] refuses SNF placement. PT/OT eval and recs PENDING. +Active with [facility] for SN/PT/OT. Pt will need front loaded nursing [facility] for med management.  +Cm following

## 2021-12-01 NOTE — PROGRESS NOTES
Patient admitted to room 305 from [facility] ED. Patient oriented to room, call light, bed rails, phone, lights and bathroom. Patient instructed about the schedule of the day including: vital sign frequency, lab draws, possible tests, frequency of MD and staff rounds, daily weights, I &O's and prescribed diet. Bed alarm in place, patient aware of placement and reason. Telemetry box in place, patient aware of placement and reason. Bed locked, in lowest position, side rails up x2, call light within reach. Recliner Assessment  Patient is not able to demonstrate the ability to move from a reclining position to an upright position within the recliner due to being very lethargic. Toña Crumble 4 Eyes Skin Assessment     The patient is being assess for   Admission    I agree that 2 RN's have performed a thorough Head to Toe Skin Assessment on the patient. ALL assessment sites listed below have been assessed. Areas assessed for pressure by both nurses:   [x]   Head, Face, and Ears   [x]   Shoulders, Back, and Chest, Abdomen  [x]   Arms, Elbows, and Hands   [x]   Coccyx, Sacrum, and Ischium  [x]   Legs, Feet, and Heels    Scattered bruising noted. Blanchable redness noted on coccyx. Lac noted on scalp. No staples or stitches visible on surface, however pt stated \"I have staples, they arent due to come out for a few more days. \"        **SHARE this note so that the co-signing nurse is able to place an eSignature**    Co-signer eSignature: Electronically signed by Catalino Gaytan RN on 11/30/21 at 10:51 PM EST    Does the Patient have Skin Breakdown related to pressure?   No   Mihir Prevention initiated:  No   Wound Care Orders initiated:  No  [facility] nurse consulted for Pressure Injury (Stage 3,4, Unstageable, DTI, NWPT, Complex wounds)and New or Established Ostomies:  No      Primary Nurse eSignature: Electronically signed by Ashlyn Richter RN on 11/30/21 at 10:49 PM EST

## 2021-12-01 NOTE — ACP (ADVANCE CARE PLANNING)
Advance Care Planning   Healthcare Decision Maker:    Primary Decision Maker: Bel Mariee Child - [phone]    Secondary Decision Maker: Robert Neff - Child - [phone]    Click here to complete Healthcare Decision Makers including selection of the Healthcare Decision Maker Relationship (ie \"Primary\"). Today we documented Decision Maker(s) consistent with Legal Next of Kin hierarchy.

## 2021-12-01 NOTE — PROGRESS NOTES
IV removed from L wrist at this time due to infiltration. Dry dressing applied. Warm compress provided. Continuous fluids running in R foot IV. Charge RN notified of need for new peripheral IV in arm. Pt states it took ER 9 attempts to get peripheral access\ prior to admission.

## 2021-12-01 NOTE — PROGRESS NOTES
PRN Tylenol given for headache. Pt describes pain as being in middle of forehead, continuous throbbing. Rates pain 7/10.

## 2021-12-01 NOTE — PROGRESS NOTES
Inpatient Occupational Therapy  Evaluation and Treatment    Unit: PCU  Date:  12/1/2021  Patient Name:    Oliver Dominguez  Admitting diagnosis:  Syncope and collapse [R55]  Somnolence [R40.0]  Narcotic addiction ([facility] [address] [F11.20]  Altered mental status [R41.82]  Sinus bradycardia [R00.1]  MARIO (acute kidney injury) ([facility] [address] [N17.9]  Closed head injury, subsequent encounter [S09.90XD]  History of recent fall [Z91.81]  Urinary tract infection in female [N39.0]  Admit Date:  11/30/2021  Precautions/Restrictions/WB Status/ Lines/ Wounds/ Oxygen: fall risk, IV and bed/chair alarm    Treatment Time:  15:25-16:00  Treatment Number: 1     Billable Treatment Time: 25 minutes   Total Treatment Time:   35   minutes    Patient Goals for Therapy:  \" to go home \"      Discharge Recommendations: Home with 24/7 assist and home therapy  DME needs for discharge: Needs Met       Therapy recommendations for staff:   Assist of 1 with use of rolling walker (RW) for all ambulation to/from bathroom    History of Present Illness: 79 y.o. female with timers dementia, chronic kidney disease, obstructive sleep apnea, hypertension, hyperlipidemia, coronary artery disease, anxiety, chronic pain who presented to [facility] ED with complaint of \"syncope. \"  The patient cannot provide further history regarding why EMS was called to her house. She states she does not know why she was taken to the hospital.  She was just admitted to [facility] from 11/24 to 11/29/2021 after admission for fall, closed head injury, acute kidney injury, hyperkalemia. She was treated with IV fluids and medication adjustments. Her creatinine was 1.2 at time of discharge. She was seen by physical therapy and Occupational Therapy during admission and placement in skilled nursing facility was recommended, but the patient refused. She was subsequently discharged home on 11/29/2021.   I spoke with her daughter, Cassie Ojeda, over the phone in regards to the events that led to this admission. Sonya states that the patient's sister was staying with her at the patient's home. The patient's sister states the patient did not get up at all during the night and when she went to wake her up in the morning she was slow to arouse therefore the sister called 911. There was no apparent syncope. ER work-up revealed hypotension, MARIO, bacteriuria. She was treated with IV fluid and she is admitted for further care. Home Health S4 Level Recommendation:  Level 1 Standard  AM-PAC Score: AM-PAC Inpatient Daily Activity Raw Score: 18    Preadmission Environment  (patient recently admitted to [facility] and discharged on . Patient readmitted to [facility] on )  Lives With: Alone  Type of Home: Apartment  Home Layout: One level  Home Access: Level entry  Bathroom Shower/Tub: Walk-in shower, Shower chair with back and grab bars  Bathroom Toilet: Handicap height  Home Equipment: rollator, Quad cane, Cane, Electric scooter, BSC, adjustable bed    Preadmission Status / PLOF:  ADL Assistance: Needs assistance (Pt is independent with all ADLs except bathing. Aid assists with washing back.)  Homemaking Assistance: Needs assistance (Aid comes to assist 3 days a week with cleaning and laundry.)  Meal Prep Responsibility: Primary (orders take out and prepares simple meals.)  Ambulation Assistance: Independent (Pt primarily uses a cane at home and sometimes a rollator. Pt uses scooter or electric grocery carts in stores for longer distances. )  Transfer Assistance: Independent  Active : Yes  Leisure & Hobbies: Make cards  Falls: yes (fell 1 week ago at the pain clinic)    Pain  Yes  Ratin  Location: neck, back  Pain Medicine Status: Pain med requested      Cognition    A&O x4   Able to follow 2 step commands    Subjective  Patient lying supine in bed with daughter present  Pt agreeable to this OT eval & tx.      Upper Extremity ROM:    WFL,  pt able to perform all bed mobility, transfers, and gait without ROM limitation. Upper Extremity Strength:    BUE strength impaired but not formally assessed w/ MMT    Upper Extremity Sensation    WFL    Upper Extremity Proprioception:  WFL    Coordination and Tone  Impaired    Balance  Functional Sitting Balance:  WFL  Functional Standing Balance:Impaired    Bed mobility:    Supine to sit:   SBA  Sit to supine:   Not Tested  Rolling:    Not Tested  Scooting in sitting:  SBA  Scooting to head of bed:   Not Tested    Bridging:   Not Tested    Transfers:    Sit to stand:  CGA  Stand to sit:  CGA  Bed to chair:   CGA and with use of RW  Standard toilet: Not Tested  Bed to [facility]:  Not Tested     Completed functional mobility across room with CGA and use of RW. Mild complaint of fatigue. Dressing:      UE:   Not Tested  LE:    Mod A to don socks    Bathing:    UE:  Not Tested  LE:  Not Tested    Eating:   Not Tested    Toileting:  Not Tested    Activity Tolerance   Pt completed therapy session with No adverse symptoms noted w/activity    At EOB:  SpO2: 94%  HR: 77  BP: 149/64    After walking:   SpO2: 95%  HR: 71  BP:162/72    Positioning Needs:   Up in chair, call light and needs in reach. Alarm Set    Exercise / Activities Initiated:   N/A    Patient/Family Education:   Role of OT  Recommendations for DC  Safe RW use/hand placement    Assessment of Deficits: Pt seen for Occupational therapy evaluation in acute care setting. Pt demonstrated decreased Activity tolerance, ADLs, IADLs, Balance , Bathing, Bed mobility, Dressing, ROM, Safety Awareness, Strength, Transfers, Cognition and Coping Skills. Pt functioning below baseline and will likely benefit from skilled occupational therapy services to maximize safety and independence. Goal(s) : To be met in 3 Visits:  1). Bed to toilet/BSC: Supervision    To be met in 5 Visits:  1). Supine to/from Sit:  Supervision  2). Upper Body Bathing:   Supervision  3). Lower Body Bathing:   Supervision  4).  Upper Body Dressing: Supervision  5). Lower Body Dressing:  Supervision  6). Pt to demonstrate UE exs x 15 reps with minimal cues    Rehabilitation Potential:  Good for goals listed above. Strengths for achieving goals include: Pt motivated, PLOF and Pt cooperative  Barriers to achieving goals include:  Complexity of condition     Plan: To be seen 3-5 x/wk while in acute care setting for therapeutic exercises, bed mobility, transfers, dressing, bathing, family/patient education, ADL/IADL retraining, energy conservation training.        ELSA StrongR/L #[id number]      If patient discharges from this facility prior to next visit, this note will serve as the Discharge Summary

## 2021-12-01 NOTE — FLOWSHEET NOTE
Shift assessment complete, see flowsheet. Morning medications administered. No further needs expressed by pt at this time. Call light left within reach.

## 2021-12-01 NOTE — PROGRESS NOTES
Respiratory Therapy may adjust bronchodilator frequency and modality based on Respiratory Protocols. Frequency will be based on patient's Respiratory Severity Index and documented in Progress Notes.

## 2021-12-01 NOTE — FLOWSHEET NOTE
12/01/21 0832   Vital Signs   Temp 97.9 °F (36.6 °C)   Temp Source Oral   Pulse 60   Heart Rate Source Monitor   Resp 16   BP (!) 139/55   BP Location Right upper arm   Patient Position Semi fowlers   Level of Consciousness Responds to Voice (1)   MEWS Score 1   Patient Currently in Pain Denies   Oxygen Therapy   SpO2 (!) 85 %  (pt sleeping)   O2 Device None (Room air)      Pt fell back asleep while vitals were being obtained. Pt SPO2 85% on RA. Woke pt up. Placed continuous pulse ox on pt per MD.  Rechecked SPO2, 96% prior to leaving room per CMU.

## 2021-12-01 NOTE — TELEPHONE ENCOUNTER
[facility] home care calling to see if you will sign orders for home care.   This was ordered when patient was in the hospital

## 2021-12-01 NOTE — H&P
back pain     COPD (chronic obstructive pulmonary disease) ([address]     Depression     Depression     Hypercholesteremia     Hypertension     Pain in shoulder [id number]    pain in left shoulder, taking steroid injections for steroid    Reflux     Rheumatic fever     as a child    Sleep apnea     uses CPAP    TIA (transient ischemic attack)     Urinary incontinence     Wears glasses        Past Surgical History:        Procedure Laterality Date    AORTIC VALVE REPLACEMENT      APPENDECTOMY      BLADDER REPAIR      3 TIMES    CARDIAC SURGERY      stents    CARPAL TUNNEL RELEASE      RIGHT    CHOLECYSTECTOMY, LAPAROSCOPIC  01/06/2017    with dr Carmen Chery      has it it done twice    DIAGNOSTIC CARDIAC CATH LAB PROCEDURE      HYSTERECTOMY      JOINT REPLACEMENT      KATHLEEN TKR    NECK SURGERY      OTHER SURGICAL HISTORY  9/28/12    Full Interstim Implant    SHOULDER SURGERY      SKIN CANCER EXCISION      SPINAL FIXATION SURGERY WITH IMPLANT  2001    SPINAL FIXATION SURGERY WITH IMPLANT  2004    SPINAL FIXATION SURGERY WITH IMPLANT  2007    SPINE SURGERY  1999    TONSILLECTOMY      UPPER GASTROINTESTINAL ENDOSCOPY  4/22/11    UPPER GASTROINTESTINAL ENDOSCOPY      UPPER GASTROINTESTINAL ENDOSCOPY  03/23/2017    dilitation       Medications Prior to Admission:    Prior to Admission medications    Medication Sig Start Date End Date Taking?  Authorizing Provider   spironolactone (ALDACTONE) 25 MG tablet Take 1 tablet by mouth daily 11/29/21  Yes Varun Loving,    ciprofloxacin (CIPRO) 250 MG tablet Take 1 tablet by mouth 2 times daily for 10 days 11/24/21 12/4/21 Yes KALI Holley CNP   donepezil (ARICEPT) 10 MG tablet TAKE 1 TABLET IN THE EVENING 11/18/21  Yes KALI Arboleda CNP   traZODone (DESYREL) 100 MG tablet TAKE 1 OR 2 TABLETS AT BEDTIME 10/12/21  Yes KALI Arboleda CNP   divalproex (DEPAKOTE) 250 MG  Historical Provider, MD   baclofen (LIORESAL) 10 MG tablet TAKE 1 TABLET EVERY 8 HOURS AS NEEDED  Patient taking differently: Take 10 mg by mouth daily  12/30/19  Yes KALI Arango CNP   isosorbide mononitrate (IMDUR) 30 MG extended release tablet Take 30 mg by mouth daily   Yes Historical Provider, MD   albuterol-ipratropium (COMBIVENT RESPIMAT)  MCG/ACT AERS inhaler Inhale 1 puff into the lungs every 6 hours 4/18/18  Yes Huber Smith DO   KLOR-CON SPRINKLE 10 MEQ extended release capsule TAKE 2 CAPSULES IN THE   MORNING AND 2 CAPSULES IN  THE EVENING 1/4/18  Yes Huber Smith DO   amLODIPine (NORVASC) 10 MG tablet TAKE (1) TABLET DAILY 2/8/17  Yes Huber Smith DO   lidocaine (XYLOCAINE) 5 % ointment Apply topically as needed. 3/13/15  Yes Eugene Zapata MD   [id number] [id number] UNIT/GM powder APPLY 3 TIMES DAILY 8/23/21   KALI Painting CNP   Wheat Dextrin (BENEFIBER) POWD Take 4 g by mouth daily    Historical Provider, MD   traZODone (DESYREL) 100 MG tablet TAKE 1 OR 2 TABLETS AT BEDTIME 5/21/21   KALI Painting CNP   cetirizine (ZYRTEC) 10 MG tablet TAKE 1 TABLET BY MOUTH DAILY 3/25/21   KALI Painting CNP   Misc. Devices (ROLLER [place]) MISC 1 each by Does not apply route daily 5/22/19   KALI Schreiber CNP   NITROSTAT 0.4 MG SL tablet USE 1 TABLET UNDER TONGUE AS NEEDED FOR CHEST PAIN MAY REPEAT EVERY 5MIN. UP TO 3. THEN GO TO ER. 7/6/15   Stephan Rodríguez MD       Allergies:  Latex, Levofloxacin, Azithromycin, Codeine, Keflex [cephalexin], Morphine, Pentazocine lactate, Sulfa antibiotics, and Tape [adhesive tape]    Social History:  The patient currently lives at home alone     TOBACCO:   reports that she has never smoked. She has never used smokeless tobacco.  ETOH:   reports no history of alcohol use.       Family History:   Positive as follows:        Problem Relation Age of Onset    Arthritis Mother     Cancer Mother     Depression Mother     Heart Disease Mother     High Blood Pressure Mother     High Cholesterol Mother     Miscarriages / Stillbirths Mother     Stroke Mother     Early Death Mother     Hearing Loss Mother     Mental Illness Mother     Vision Loss Mother     Arthritis Brother     Depression Brother     Diabetes Brother     Hearing Loss Brother     Heart Disease Brother     High Blood Pressure Brother     High Cholesterol Brother     Vision Loss Brother        REVIEW OF SYSTEMS:     Unable to obtain 2/2 AMS. She denies complaints. PHYSICAL EXAM:    BP (!) 100/49   Pulse (!) 48   Temp 97.4 °F (36.3 °C) (Oral)   Resp 16   Ht 5' (1.524 m)   Wt 190 lb 3.2 oz (86.3 kg)   LMP  (LMP Unknown)   SpO2 97%   BMI 37.15 kg/m²     Gen: Elderly female initially sleeping but easily arousable. No distress. Alert. Eyes: PERRL. No sclera icterus. No conjunctival injection. Periorbital bruising on left  ENT: No discharge. Pharynx clear. Neck: No JVD. Trachea midline. Resp: No accessory muscle use. No crackles. No wheezes. No rhonchi. CV: Regular rate. Regular rhythm. No murmur. No rub. No edema. Capillary Refill: Brisk,< 3 seconds   Peripheral Pulses: +2 palpable, equal bilaterally   GI: Non-tender. Non-distended. No masses. No organomegaly. Normal bowel sounds. No hernia. Skin: Warm and dry. No nodule on exposed extremities. No rash on exposed extremities. Bruising to anterior right knee - appears resolving   M/S: No cyanosis. No joint deformity. No clubbing. Neuro: Awake. Grossly nonfocal    Psych: Oriented to person, president. Not oriented to month, year or place, or situation. No anxiety or agitation.      CBC:   Recent Labs     11/30/21  1541   WBC 8.2   HGB 11.0*   HCT 34.0*   MCV 88.4        BMP:   Recent Labs     11/30/21  1541 12/01/21  0438    142   K 3.8 3.7   CL 97* 109   CO2 29 21   BUN 37* 34*   CREATININE 3.1* 2.3*     LIVER PROFILE:   Recent Labs     11/30/21  1541   AST 14*   ALT 8*   BILITOT 0. 3   ALKPHOS 71     PT/INR:   Recent Labs     11/30/21  1541   PROTIME 11.4   INR 1.00     APTT: No results for input(s): APTT in the last 72 hours. UA:  Recent Labs     11/30/21  1639   COLORU Yellow   PHUR 5.5   WBCUA 21-50*   RBCUA 5-10*   MUCUS Rare*   BACTERIA 3+*   CLARITYU SL CLOUDY*   SPECGRAV >=1.030   LEUKOCYTESUR MODERATE*   UROBILINOGEN 0.2   BILIRUBINUR Negative   BLOODU TRACE-INTACT*   GLUCOSEU Negative          CARDIAC ENZYMES  Recent Labs     11/30/21  1541   TROPONINI <0.01       U/A:    Lab Results   Component Value Date    NITRITE neg 11/11/2021    COLORU Yellow 11/30/2021    WBCUA 21-50 11/30/2021    RBCUA 5-10 11/30/2021    MUCUS Rare 11/30/2021    BACTERIA 3+ 11/30/2021    CLARITYU SL CLOUDY 11/30/2021    SPECGRAV >=1.030 11/30/2021    LEUKOCYTESUR MODERATE 11/30/2021    BLOODU TRACE-INTACT 11/30/2021    GLUCOSEU Negative 11/30/2021    GLUCOSEU Neg 05/12/2011    AMORPHOUS 2+ 05/12/2011         CULTURES  COVID 19, PCR: not detected     EKG:   Sinus bradycardia  Baseline artifact  Inferior infarct (cited on or before 24-NOV-2021)  Anterolateral infarct (cited on or before 24-NOV-2021)  Abnormal ECG  When compared with ECG of 24-NOV-2021 19:45,  artifact new  Confirmed by FREDDIE LARSON, [place]      RADIOLOGY  XR CHEST PORTABLE   Final Result   Stable mild left ventricular enlargement      Hypoinflation with mild subsegmental linear atelectasis or less likely early   infiltrates along the lung bases which is more prominent. CT HEAD WO CONTRAST   Final Result   Mild atrophy and mild chronic microischemic disease scattered in the deep   white matter which is unchanged with no acute abnormality seen. Small scalp hematoma along the left parietal region with interval placement   of skin clips in the area.                Antonia Hunt MD have reviewed the chart on Elena Valdes[facility] and personally interviewed and examined patient, reviewed the data (labs and imaging) and after discussion with my PA formulated the plan. Agree with note with the following edits. HPI:     The patient is an 79-year-old white female who is poor historian. She has a history of hypertension, hyperlipidemia, coronary artery disease, chronic kidney disease, dementia, sleep apnea. She came to the emergency room with complaints of possible syncope. Patient is not sure why she is in the hospital.  She was admitted to [facility] on on the 24th and discharged on 1/29/2021. She had a closed head injury at that time. She had an MARIO. She was given IV fluids. She was asked to go to a rehab center but she refused. Subsequently she came back to the hospital with lethargy. Patient's mentation is slowly waking up. Work-up showed MARIO. Also showed altered mental status consistent with acute metabolic encephalopathy. I reviewed the patient's Past Medical History, Past Surgical History, Medications, and Allergies. Physical exam:    BP (!) 139/55   Pulse 60   Temp 97.9 °F (36.6 °C) (Oral)   Resp 16   Ht 5' (1.524 m)   Wt 190 lb 3.2 oz (86.3 kg)   LMP  (LMP Unknown)   SpO2 92% Comment: pt awake, speaking with RN  BMI 37.15 kg/m²     Gen: No distress. Sleepy but slowly improving mentation. Eyes: PERRL. No sclera icterus. No conjunctival injection. ENT: No discharge. Pharynx clear. Neck: Trachea midline. Normal thyroid. Resp: No accessory muscle use. No crackles. No wheezes. No rhonchi. No dullness on percussion. CV: Regular rate. Regular rhythm. No murmur or rub. No edema.             ASSESSMENT/PLAN:    #MARIO  - suspect pre renal related to hypotension as well as use of arb, aldactone and lasix   - Crt 3.2 on admit-> 2.1 after IVF  - cont monitoring  - of note, recently admitted to [facility] and dc'd 11/29 with similar presentation and seen by nephro at that time     #Hypotension  - suspect 2/2 sedating medications, rule out UTI  - holding all BP meds and giving IVF- BP improving   - f/u urine culture    #Bacteriuria   - Rocephin D#2 and f/u urine culture     #Bradycardia   - chronic issue per cardio notes, home toprol dose was decreased in Aug 2021 from 50 mg daily to 25 mg daily  - HR down to 40s on tele   - holding toprol    #Alzheimer's Dementia with possible worsening confusion   - per PCP documentation. She is currently oriented to self and president only. Will cont to monitor  - cont aricept at lower dose 10 mg nightly-> 5 mg nightly  - hold sedating meds: norco, baclofen, trazodone     #CAD  - cont ASA, statin  - hold BB and imdur     #Chronic diastolic CHF  - compensated and with MARIO holding diuretics and administering IVF  - monitor fluid status    #Sp TAVR     #Chronic pain  - hold norco and baclofen   - cont gabapentin    #Anxiety/Depression  - cont buspar and celexa  - hold trazodone     #Closed head injury  - with healed laceration to scalp, initially closed with staples at [facility] but currently no staples in place    #Debility  - 2/2 advanced age, physical deconditioning, multiple sedating medications  - PT OT to eval  - recommendation for SNF at last admit, pt denied    DVT Prophylaxis: Lovenox   Diet: ADULT DIET;  Regular  Code Status: Full Code      Cheyenne Carter PA-C  12/1/2021 8:29 AM      Gamaliel Walker MD 12/1/2021 11:36 AM

## 2021-12-01 NOTE — FLOWSHEET NOTE
Pt appears to be resting comfortably. VSS.         12/01/21 0005   Vital Signs   Temp 97.4 °F (36.3 °C)   Temp Source Oral   Pulse (!) 48   Heart Rate Source Monitor   Resp 15   BP (!) 91/42  (IVF started)   BP Location Right upper arm   Patient Position Semi fowlers   Level of Consciousness Responds to Voice (1)   MEWS Score 3   Patient Currently in Pain Denies   Oxygen Therapy   SpO2 99 %   Pulse Oximeter Device Mode Intermittent   O2 Device Nasal cannula   O2 Flow Rate (L/min) 2 L/min

## 2021-12-02 LAB
ANION GAP SERPL CALCULATED.3IONS-SCNC: 12 MMOL/L (ref 3–16)
BASOPHILS ABSOLUTE: 0 K/UL (ref 0–0.2)
BASOPHILS RELATIVE PERCENT: 0.5 %
BUN BLDV-MCNC: 20 MG/DL (ref 7–20)
CALCIUM SERPL-MCNC: 8.3 MG/DL (ref 8.3–10.6)
CHLORIDE BLD-SCNC: 108 MMOL/L (ref 99–110)
CO2: 23 MMOL/L (ref 21–32)
CREAT SERPL-MCNC: 1 MG/DL (ref 0.6–1.2)
EOSINOPHILS ABSOLUTE: 0 K/UL (ref 0–0.6)
EOSINOPHILS RELATIVE PERCENT: 0.4 %
GFR AFRICAN AMERICAN: >60
GFR NON-AFRICAN AMERICAN: 53
GLUCOSE BLD-MCNC: 95 MG/DL (ref 70–99)
HCT VFR BLD CALC: 28.4 % (ref 36–48)
HEMOGLOBIN: 9.3 G/DL (ref 12–16)
LYMPHOCYTES ABSOLUTE: 1.6 K/UL (ref 1–5.1)
LYMPHOCYTES RELATIVE PERCENT: 22 %
MAGNESIUM: 1.6 MG/DL (ref 1.8–2.4)
MCH RBC QN AUTO: 29 PG (ref 26–34)
MCHC RBC AUTO-ENTMCNC: 32.7 G/DL (ref 31–36)
MCV RBC AUTO: 88.6 FL (ref 80–100)
MONOCYTES ABSOLUTE: 0.7 K/UL (ref 0–1.3)
MONOCYTES RELATIVE PERCENT: 8.9 %
NEUTROPHILS ABSOLUTE: 5 K/UL (ref 1.7–7.7)
NEUTROPHILS RELATIVE PERCENT: 68.2 %
ORGANISM: ABNORMAL
PDW BLD-RTO: 13.9 % (ref 12.4–15.4)
PLATELET # BLD: 188 K/UL (ref 135–450)
PMV BLD AUTO: 7.9 FL (ref 5–10.5)
POTASSIUM REFLEX MAGNESIUM: 3.5 MMOL/L (ref 3.5–5.1)
RBC # BLD: 3.2 M/UL (ref 4–5.2)
SODIUM BLD-SCNC: 143 MMOL/L (ref 136–145)
URINE CULTURE, ROUTINE: ABNORMAL
WBC # BLD: 7.4 K/UL (ref 4–11)

## 2021-12-02 PROCEDURE — 6360000002 HC RX W HCPCS: Performed by: INTERNAL MEDICINE

## 2021-12-02 PROCEDURE — 94640 AIRWAY INHALATION TREATMENT: CPT

## 2021-12-02 PROCEDURE — 2580000003 HC RX 258: Performed by: INTERNAL MEDICINE

## 2021-12-02 PROCEDURE — 36415 COLL VENOUS BLD VENIPUNCTURE: CPT

## 2021-12-02 PROCEDURE — 6370000000 HC RX 637 (ALT 250 FOR IP): Performed by: INTERNAL MEDICINE

## 2021-12-02 PROCEDURE — 99232 SBSQ HOSP IP/OBS MODERATE 35: CPT | Performed by: INTERNAL MEDICINE

## 2021-12-02 PROCEDURE — 97530 THERAPEUTIC ACTIVITIES: CPT

## 2021-12-02 PROCEDURE — 6370000000 HC RX 637 (ALT 250 FOR IP): Performed by: PHYSICIAN ASSISTANT

## 2021-12-02 PROCEDURE — 2580000003 HC RX 258: Performed by: PHYSICIAN ASSISTANT

## 2021-12-02 PROCEDURE — 85025 COMPLETE CBC W/AUTO DIFF WBC: CPT

## 2021-12-02 PROCEDURE — 83735 ASSAY OF MAGNESIUM: CPT

## 2021-12-02 PROCEDURE — 2060000000 HC ICU INTERMEDIATE R&B

## 2021-12-02 PROCEDURE — 97535 SELF CARE MNGMENT TRAINING: CPT

## 2021-12-02 PROCEDURE — 80048 BASIC METABOLIC PNL TOTAL CA: CPT

## 2021-12-02 PROCEDURE — 94761 N-INVAS EAR/PLS OXIMETRY MLT: CPT

## 2021-12-02 PROCEDURE — 97116 GAIT TRAINING THERAPY: CPT

## 2021-12-02 PROCEDURE — 6360000002 HC RX W HCPCS: Performed by: PHYSICIAN ASSISTANT

## 2021-12-02 RX ORDER — AMLODIPINE BESYLATE 5 MG/1
10 TABLET ORAL DAILY
Status: DISCONTINUED | OUTPATIENT
Start: 2021-12-02 | End: 2021-12-03 | Stop reason: HOSPADM

## 2021-12-02 RX ORDER — POTASSIUM CHLORIDE 20 MEQ/1
40 TABLET, EXTENDED RELEASE ORAL ONCE
Status: COMPLETED | OUTPATIENT
Start: 2021-12-02 | End: 2021-12-02

## 2021-12-02 RX ORDER — MAGNESIUM SULFATE 1 G/100ML
1000 INJECTION INTRAVENOUS ONCE
Status: DISCONTINUED | OUTPATIENT
Start: 2021-12-02 | End: 2021-12-02

## 2021-12-02 RX ORDER — METOPROLOL SUCCINATE 25 MG/1
25 TABLET, EXTENDED RELEASE ORAL DAILY
Status: DISCONTINUED | OUTPATIENT
Start: 2021-12-02 | End: 2021-12-03 | Stop reason: HOSPADM

## 2021-12-02 RX ORDER — HYDROCODONE BITARTRATE AND ACETAMINOPHEN 10; 325 MG/1; MG/1
1 TABLET ORAL EVERY 6 HOURS PRN
Status: DISCONTINUED | OUTPATIENT
Start: 2021-12-02 | End: 2021-12-03 | Stop reason: HOSPADM

## 2021-12-02 RX ORDER — TRAZODONE HYDROCHLORIDE 50 MG/1
50 TABLET ORAL NIGHTLY
Status: DISCONTINUED | OUTPATIENT
Start: 2021-12-03 | End: 2021-12-03 | Stop reason: HOSPADM

## 2021-12-02 RX ORDER — ISOSORBIDE MONONITRATE 30 MG/1
30 TABLET, EXTENDED RELEASE ORAL DAILY
Status: DISCONTINUED | OUTPATIENT
Start: 2021-12-02 | End: 2021-12-03 | Stop reason: HOSPADM

## 2021-12-02 RX ORDER — MAGNESIUM SULFATE IN WATER 40 MG/ML
2000 INJECTION, SOLUTION INTRAVENOUS ONCE
Status: COMPLETED | OUTPATIENT
Start: 2021-12-02 | End: 2021-12-02

## 2021-12-02 RX ADMIN — BUSPIRONE HYDROCHLORIDE 5 MG: 5 TABLET ORAL at 13:40

## 2021-12-02 RX ADMIN — HYDROCODONE BITARTRATE AND ACETAMINOPHEN 1 TABLET: 10; 325 TABLET ORAL at 20:23

## 2021-12-02 RX ADMIN — PANTOPRAZOLE SODIUM 40 MG: 40 TABLET, DELAYED RELEASE ORAL at 08:52

## 2021-12-02 RX ADMIN — ACETAMINOPHEN 650 MG: 325 TABLET ORAL at 03:41

## 2021-12-02 RX ADMIN — GABAPENTIN 100 MG: 100 CAPSULE ORAL at 20:13

## 2021-12-02 RX ADMIN — ATORVASTATIN CALCIUM 20 MG: 10 TABLET, FILM COATED ORAL at 08:52

## 2021-12-02 RX ADMIN — DIVALPROEX SODIUM 250 MG: 250 TABLET, DELAYED RELEASE ORAL at 08:52

## 2021-12-02 RX ADMIN — IPRATROPIUM BROMIDE AND ALBUTEROL 1 PUFF: 20; 100 SPRAY, METERED RESPIRATORY (INHALATION) at 14:17

## 2021-12-02 RX ADMIN — DIVALPROEX SODIUM 250 MG: 250 TABLET, DELAYED RELEASE ORAL at 20:13

## 2021-12-02 RX ADMIN — BUSPIRONE HYDROCHLORIDE 5 MG: 5 TABLET ORAL at 20:13

## 2021-12-02 RX ADMIN — CEFTRIAXONE SODIUM 1000 MG: 1 INJECTION, POWDER, FOR SOLUTION INTRAMUSCULAR; INTRAVENOUS at 17:41

## 2021-12-02 RX ADMIN — ISOSORBIDE MONONITRATE 30 MG: 30 TABLET ORAL at 08:52

## 2021-12-02 RX ADMIN — BUSPIRONE HYDROCHLORIDE 5 MG: 5 TABLET ORAL at 08:52

## 2021-12-02 RX ADMIN — HEPARIN SODIUM 5000 UNITS: 5000 INJECTION INTRAVENOUS; SUBCUTANEOUS at 20:13

## 2021-12-02 RX ADMIN — METOPROLOL SUCCINATE 25 MG: 25 TABLET, EXTENDED RELEASE ORAL at 08:52

## 2021-12-02 RX ADMIN — ASPIRIN 81 MG: 81 TABLET, COATED ORAL at 08:52

## 2021-12-02 RX ADMIN — AMLODIPINE BESYLATE 10 MG: 5 TABLET ORAL at 08:52

## 2021-12-02 RX ADMIN — CITALOPRAM HYDROBROMIDE 20 MG: 20 TABLET ORAL at 08:52

## 2021-12-02 RX ADMIN — HEPARIN SODIUM 5000 UNITS: 5000 INJECTION INTRAVENOUS; SUBCUTANEOUS at 13:44

## 2021-12-02 RX ADMIN — POTASSIUM CHLORIDE 40 MEQ: 1500 TABLET, EXTENDED RELEASE ORAL at 13:40

## 2021-12-02 RX ADMIN — GABAPENTIN 100 MG: 100 CAPSULE ORAL at 08:52

## 2021-12-02 RX ADMIN — GABAPENTIN 100 MG: 100 CAPSULE ORAL at 13:40

## 2021-12-02 RX ADMIN — DONEPEZIL HYDROCHLORIDE 5 MG: 5 TABLET, FILM COATED ORAL at 20:13

## 2021-12-02 RX ADMIN — IPRATROPIUM BROMIDE AND ALBUTEROL 1 PUFF: 20; 100 SPRAY, METERED RESPIRATORY (INHALATION) at 06:09

## 2021-12-02 RX ADMIN — MAGNESIUM SULFATE HEPTAHYDRATE 2000 MG: 2 INJECTION, SOLUTION INTRAVENOUS at 12:14

## 2021-12-02 RX ADMIN — HEPARIN SODIUM 5000 UNITS: 5000 INJECTION INTRAVENOUS; SUBCUTANEOUS at 05:42

## 2021-12-02 RX ADMIN — HYDROCODONE BITARTRATE AND ACETAMINOPHEN 1 TABLET: 10; 325 TABLET ORAL at 13:41

## 2021-12-02 RX ADMIN — POLYETHYLENE GLYCOL (3350) 17 G: 17 POWDER, FOR SOLUTION ORAL at 08:54

## 2021-12-02 RX ADMIN — SODIUM CHLORIDE: 9 INJECTION, SOLUTION INTRAVENOUS at 05:43

## 2021-12-02 RX ADMIN — Medication 10 ML: at 20:13

## 2021-12-02 RX ADMIN — Medication 10 ML: at 08:52

## 2021-12-02 RX ADMIN — IPRATROPIUM BROMIDE AND ALBUTEROL 1 PUFF: 20; 100 SPRAY, METERED RESPIRATORY (INHALATION) at 19:14

## 2021-12-02 ASSESSMENT — PAIN SCALES - GENERAL
PAINLEVEL_OUTOF10: 6
PAINLEVEL_OUTOF10: 9
PAINLEVEL_OUTOF10: 0
PAINLEVEL_OUTOF10: 9

## 2021-12-02 ASSESSMENT — PAIN DESCRIPTION - LOCATION
LOCATION: HEAD;NECK;BACK
LOCATION: HEAD;NECK;BACK

## 2021-12-02 ASSESSMENT — PAIN DESCRIPTION - PAIN TYPE
TYPE: CHRONIC PAIN
TYPE: CHRONIC PAIN

## 2021-12-02 ASSESSMENT — PAIN DESCRIPTION - PROGRESSION: CLINICAL_PROGRESSION: NOT CHANGED

## 2021-12-02 ASSESSMENT — PAIN DESCRIPTION - DESCRIPTORS: DESCRIPTORS: ACHING;SHARP

## 2021-12-02 NOTE — FLOWSHEET NOTE
12/02/21 1330   Vital Signs   Temp 98.2 °F (36.8 °C)   Temp Source Oral   Pulse 68   Heart Rate Source Monitor   Resp 16   BP (!) 176/52   BP Location Right Arm   Patient Position High fowlers   Level of Consciousness Alert (0)   MEWS Score 1   Patient Currently in Pain Yes   Pain Assessment   Pain Assessment 0-10   Oxygen Therapy   SpO2 93 %   O2 Device None (Room air)       Elevated pressures. Pt c/o pain 9/10, chronic back pain. PRN meds given.

## 2021-12-02 NOTE — FLOWSHEET NOTE
Pt appears to be resting comfortably. VSS. Per pt, no new needs at this time.           12/02/21 0012   Vital Signs   Temp 97 °F (36.1 °C)   Temp Source Oral   Pulse 57   Heart Rate Source Monitor   Resp 15   BP (!) 157/42   BP Location Right lower arm   Patient Position Semi fowlers   Level of Consciousness Alert (0)   MEWS Score 1   Patient Currently in Pain Denies   Oxygen Therapy   SpO2 92 %   O2 Device None (Room air)

## 2021-12-02 NOTE — PROGRESS NOTES
Shift assessment complete- see flow sheet. Patient is A/Ox4. VSS. Morning medications given without difficulty. Currently on room air, SpO2 WNL. Lung sounds diminished Denies shortness of breath currently. Denies CP. Patient denies any further needs. Call light explained and in reach. Bed alarm on. Will continue to monitor.

## 2021-12-02 NOTE — PROGRESS NOTES
Occupational Therapy Daily Treatment Note    Unit: PCU  Date:  12/2/2021  Patient Name:    Aris South  Admitting diagnosis:  Syncope and collapse [R55]  Somnolence [R40.0]  Narcotic addiction ([facility] [address] [F11.20]  Altered mental status [R41.82]  Sinus bradycardia [R00.1]  MARIO (acute kidney injury) ([facility] [address] [N17.9]  Closed head injury, subsequent encounter [S09.90XD]  History of recent fall [Z91.81]  Urinary tract infection in female [N39.0]  Admit Date:  11/30/2021  Precautions/Restrictions:  Fall risk, Bed/chair alarm, Lines -IV, Telemetry and Continuous pulse oximetry        Discharge Recommendations: Home 24 hr assist, with home PT and with home OT   DME needs for discharge: Needs Met       Therapy recommendations for staff:   Assist of 1 with use of rolling walker (RW) and gait belt for all transfers and ambulation to/from bathroom  within room    AM-PAC Score: AM-PAC Inpatient Daily Activity Raw Score: 18  Home Health S4 Level: Level 3- Safety        Treatment Time:  1950-3924  Treatment number:  2    Timed code treatment minutes: 30 minutes  Total treatment minutes:   30 minutes    History of Present Illness: per Dr Vernon Stands ED note 11/30:  Marylin Rodgers is a 79 y.o. female who presents to the emergency department      This is a somewhat of a confusing patient but we know is that she has a very heavy need for narcotics for chronic pain she apparently on November 24 was going to a pain management doctor when she tripped in the office and suffered a fairly large laceration to the left scalp she was stapled. And the staples to this day are still in she was just discharged from the hospital yesterday after apparently on her work-up for head injury they found that she had a potassium of 8.1 she apparently decided to stop taking some of her medication but kept taking her potassium.   Apparently this was treated she was seen by nephrology and was discharged yesterday her kidney function had returned to normal and her potassium was normal     Patient has history of dementia with Alzheimer's but apparently still lives home alone  She has a history of sinus bradycardia in the past  History of some chronic kidney disease  History of aortic valve replacement  History of obstructive sleep apnea due to obesity on CPAP  History of being on Depakote for depression history of E. coli UTI several months ago she presents extremely lethargic unable to get out of bed today. This apparently was new finding she had no focal findings there was no vomiting she presented slightly hypotensive dehydrated looking we did put a Prince catheter in and did see that her urine shows a UTI with 21-50 white cells and 3+ bacteria on a cath specimen culture has been sent off\"    Subjective:  Patient in chair with LEs elevated on a footstool. Required assistance to participate in OT session as patient states she recently did PT and is done with that. Somewhat receptive to OT's explanation of the difference between OT and PT, agreeable to grooming task. Pain   No  Rating: NA  Location:  Pain Medicine Status: No request made      Bed Mobility:   NA-patient up in chair at beginning and end of session    Transfer Training:   Declined up to bathroom for grooming tasks, \"I already did that\" with PT; reviewed importance of keeping strength up in preparation for DC home but patient continued to decline functional mobility.     Activity Tolerance   Pt completed therapy session with No adverse symptoms noted w/activity    ADL Training:   Upper body dressing: Not Tested  Upper body bathing:  Not Tested  Lower body dressing:  Not Tested  Lower body bathing:  Not Tested  Toileting:   Not Tested  Grooming/Hygiene:  SBA to complete denture care, oral hygiene and washing face    Therapeutic Exercise: NA, patient declined  N/A    Patient Education:   Role of OT  Recommendations for DC  Energy conservation techniques  Safe RW use/hand placement   Importance of

## 2021-12-02 NOTE — PROGRESS NOTES
Physical Therapy  Inpatient Physical Therapy Daily Treatment Note    Unit: PCU  Date:  12/2/2021  Patient Name:    Juliana Pleitez  Admitting diagnosis:  Syncope and collapse [R55]  Somnolence [R40.0]  Narcotic addiction ([facility] [address] [F11.20]  Altered mental status [R41.82]  Sinus bradycardia [R00.1]  MARIO (acute kidney injury) ([facility] [address] [N17.9]  Closed head injury, subsequent encounter [S09.90XD]  History of recent fall [Z91.81]  Urinary tract infection in female [N39.0]  Admit Date:  11/30/2021  Precautions/Restrictions:  Fall risk, Bed/chair alarm, Lines -IV and Prince catheter, Confusion and Telemetry      Discharge Recommendations: Home 24 hr assist and with home PT  and Home PT  DME needs for discharge: Needs Met       Therapy recommendation for EMS Transport: can transport by wheelchair    Therapy recommendations for staff:   Assist of 1 with use of rolling walker (RW) and gait belt for all transfers and ambulation to/from chair  to/from bathroom  within room    History of Present Illness: H & P as per Kyara Perez MD's note dated 12/1/2021  The patient is a 79 y. o. female with timers dementia, chronic kidney disease, obstructive sleep apnea, hypertension, hyperlipidemia, coronary artery disease, anxiety, chronic pain who presented to [facility] ED with complaint of \"syncope. \"  The patient cannot provide further history regarding why EMS was called to her house. Sang Sanders states she does not know why she was taken to the hospital. Sang Sanders was just admitted to [address] [place] [address] from 11/24 to 11/29/2021 after admission for fall, closed head injury, acute kidney injury, hyperkalemia.  She was treated with IV fluids and medication adjustments.  Her creatinine was 1.2 at time of discharge.  She was seen by physical therapy and Occupational Therapy during admission and placement in skilled nursing facility was recommended, but the patient refused. Sang Sanders was subsequently discharged home on 11/29/2021.  I spoke with her daughter, Beena Odom the phone in regards to the events that led to this admission.  Sonya states that the patient's sister was staying with her at the patient's home. Awa Bloom patient's sister states the patient did not get up at all during the night and when she went to wake her up in the morning she was slow to arouse therefore the sister called 46. Omelia Sovereign was no apparent syncope.  ER work-up revealed hypotension, MARIO, bacteriuria. Ximena Cardenas was treated with IV fluid and she is admitted for further care. Home Health S4 Level Recommendation: Level 3 Safety  AM-PAC Mobility Score   AM-PAC Inpatient Mobility Raw Score : 19       Treatment Time:  5259-0712  Treatment number: 2  Timed Code Treatment Minutes: 24 minutes  Total Treatment Minutes:  24  minutes    Cognition    A&O orientation not directly assessed. Able to follow 2 step commands    Subjective  Patient lying supine in bed with no family present   Pt agreeable to this PT tx. Pt states she would like to get cleaned up    Pain   No  Location: NA  Rating:    NA/10  Pain Medicine Status: Denies need     Bed Mobility   Supine to Sit:    Supervision  Sit to Supine:   Not Tested  Rolling:   Not Tested  Scooting:   Supervision   Comment: Pt requires increased time to complete. Gown change performed in supine. Transfer Training     Sit to stand:   Supervision  Stand to sit:   Supervision  Bed to Chair:   SBA with use of gait belt and rolling walker (RW)    Gait Training gait completed as indicated below  Distance:      50 ft  Deviations (firm surface/linoleum):  decreased jose manuel, increased JACKIE and forward flexed posture  Assistive Device Used:    gait belt and rolling walker (RW)  Level of Assist:    SBA  Comment: pt required increased time with turns and verbal cues for safety regarding IV line.  No SOB or dizziness reported this sessin    Stair Training deferred, pt does not have stairs in the home environment    Therapeutic Exercise Melody deferred secondary to treatment focus on functional mobility  NA    Balance  Sitting:  Good ; Supervision  Comments: sitting EOB    Standing: Good - ; SBA  Comments: with RW    Patient Education      Role of PT, POC, Discharge recommendations, safety awareness, transfer techniques, pacing activity, HEP and calling for assist with mobility. Positioning Needs       Pt up in chair, alarm set, positioned in proper neutral alignment and pressure relief provided. Call light provided and all needs within reach    Activity Tolerance   Pt completed therapy session with No adverse symptoms noted w/activity. Assessment :  Patient continues to progress well towards PT goals. Pt requires supervision/SBA for functional mobility and occasional cues for safety. Pt would benefit from continued skilled PT to address deficits. Recommending Home 24 hr assist and with home PT upon discharge as patient functioning below baseline level and would benefit from continued therapy services    Goals (all goals ongoing unless otherwise indicated)  To be met in 3 visits:  1). Independent with LE Ex x 10 reps     To be met in 6 visits:  1). Supine to/from sit: Supervision  2). Sit to/from stand: Supervision  3). Bed to chair: Supervision  4). Gait: Ambulate 50 ft.  with  Supervision and use of LRAD (least restrictive assistive device)  5). Tolerate B LE exercises 3 sets of 10-15 reps    Plan   Continue with plan of care. Signature: Schuyler Desai, PT, DPT      If patient discharges from this facility prior to next visit, this note will serve as the Discharge Summary.

## 2021-12-02 NOTE — PROGRESS NOTES
RT Inhaler-Nebulizer Bronchodilator Protocol Note    There is a bronchodilator order in the chart from a provider indicating to follow the RT Bronchodilator Protocol and there is an Initiate RT Inhaler-Nebulizer Bronchodilator Protocol order as well (see protocol at bottom of note). CXR Findings:  XR CHEST PORTABLE    Result Date: 11/30/2021  Stable mild left ventricular enlargement Hypoinflation with mild subsegmental linear atelectasis or less likely early infiltrates along the lung bases which is more prominent. The findings from the last RT Protocol Assessment were as follows:   History Pulmonary Disease: Chronic pulmonary disease  Respiratory Pattern: Dyspnea on exertion or RR 21-25 bpm  Breath Sounds: Intermittent or unilateral wheezes  Cough: Strong, spontaneous, non-productive  Indication for Bronchodilator Therapy:    Bronchodilator Assessment Score: 8    Aerosolized bronchodilator medication orders have been revised according to the RT Inhaler-Nebulizer Bronchodilator Protocol below. Respiratory Therapist to perform RT Therapy Protocol Assessment initially then follow the protocol. Repeat RT Therapy Protocol Assessment PRN for score 0-3 or on second treatment, BID, and PRN for scores above 3. No Indications - adjust the frequency to every 6 hours PRN wheezing or bronchospasm, if no treatments needed after 48 hours then discontinue using Per Protocol order mode. If indication present, adjust the RT bronchodilator orders based on the Bronchodilator Assessment Score as indicated below. Use Inhaler orders unless patient has one or more of the following: on home nebulizer, not able to hold breath for 10 seconds, is not alert and oriented, cannot activate and use MDI correctly, or respiratory rate 25 breaths per minute or more, then use the equivalent nebulizer order(s) with same Frequency and PRN reasons based on the score.   If a patient is on this medication at home then do not decrease Frequency below that used at home. 0-3 - enter or revise RT bronchodilator order(s) to equivalent RT Bronchodilator order with Frequency of every 4 hours PRN for wheezing or increased work of breathing using Per Protocol order mode. 4-6 - enter or revise RT Bronchodilator order(s) to two equivalent RT bronchodilator orders with one order with BID Frequency and one order with Frequency of every 4 hours PRN wheezing or increased work of breathing using Per Protocol order mode. 7-10 - enter or revise RT Bronchodilator order(s) to two equivalent RT bronchodilator orders with one order with TID Frequency and one order with Frequency of every 4 hours PRN wheezing or increased work of breathing using Per Protocol order mode. 11-13 - enter or revise RT Bronchodilator order(s) to one equivalent RT bronchodilator order with QID Frequency and an Albuterol order with Frequency of every 4 hours PRN wheezing or increased work of breathing using Per Protocol order mode. Greater than 13 - enter or revise RT Bronchodilator order(s) to one equivalent RT bronchodilator order with every 4 hours Frequency and an Albuterol order with Frequency of every 2 hours PRN wheezing or increased work of breathing using Per Protocol order mode.          Electronically signed by Sherley Sandoval RCP on 12/2/2021 at 2:50 AM

## 2021-12-02 NOTE — PROGRESS NOTES
Progress Note    Admit Date:  11/30/2021    81F with chronic pain, dementia, on many sedating meds presents with AMS, hypotension, MARIO, E Coli UTI     Subjective:  Ms. Kierra Dean is awake and alert today. Her mentation is much improved   - BP high today  - Crt normalized     Objective:   Patient Vitals for the past 4 hrs:   BP SpO2   12/02/21 0815 (!) 185/63 --   12/02/21 0610 -- 92 %            Intake/Output Summary (Last 24 hours) at 12/2/2021 2891  Last data filed at 12/2/2021 0012  Gross per 24 hour   Intake 660 ml   Output 1375 ml   Net -715 ml       Physical Exam:  Gen: Elderly female initially sleeping but easily arousable. No distress. Alert. Scalp laceration healing   Eyes: PERRL. No sclera icterus. No conjunctival injection. Periorbital bruising on left- improving  ENT: No discharge. Pharynx clear. Neck: No JVD. Trachea midline. Resp: No accessory muscle use. No crackles. No wheezes. No rhonchi. CV: Regular rate. Regular rhythm. No murmur. No rub. No edema. Capillary Refill: Brisk,< 3 seconds   Peripheral Pulses: +2 palpable, equal bilaterally   GI: Non-tender. Non-distended. No masses. No organomegaly. Normal bowel sounds. No hernia. Skin: Warm and dry. No nodule on exposed extremities. No rash on exposed extremities. Bruising to anterior right knee - appears resolving   M/S: No cyanosis. No joint deformity. No clubbing. Neuro: Awake. Grossly nonfocal    Psych: Oriented to person, place, month, year, president.  No anxiety or agitation      Scheduled Meds:   albuterol-ipratropium  1 puff Inhalation TID    polyethylene glycol  17 g Oral Daily    cefTRIAXone (ROCEPHIN) IV  1,000 mg IntraVENous Q24H    aspirin  81 mg Oral Daily    busPIRone  5 mg Oral TID    citalopram  20 mg Oral Daily    divalproex  250 mg Oral BID    donepezil  5 mg Oral Nightly    gabapentin  100 mg Oral TID    pantoprazole  40 mg Oral Daily    atorvastatin  20 mg Oral Daily    sodium chloride flush  5-40 mL IntraVENous 2 times per day    heparin (porcine)  5,000 Units SubCUTAneous 3 times per day       Continuous Infusions:   sodium chloride      sodium chloride 100 mL/hr at 12/02/21 0543       PRN Meds:  HYDROcodone-acetaminophen, albuterol-ipratropium, sodium chloride flush, sodium chloride, ondansetron **OR** ondansetron, polyethylene glycol, acetaminophen **OR** acetaminophen      Data:  CBC:   Recent Labs     11/30/21  1541   WBC 8.2   HGB 11.0*   HCT 34.0*   MCV 88.4        BMP:   Recent Labs     11/30/21  1541 12/01/21  0438    142   K 3.8 3.7   CL 97* 109   CO2 29 21   BUN 37* 34*   CREATININE 3.1* 2.3*     LIVER PROFILE:   Recent Labs     11/30/21  1541   AST 14*   ALT 8*   BILITOT 0.3   ALKPHOS 71     PT/INR:   Recent Labs     11/30/21  1541   PROTIME 11.4   INR 1.00       CULTURES  Blood: NGTD  Urine: E Coli   COVID 19, PCR: not detected      RADIOLOGY  XR CHEST PORTABLE   Final Result   Stable mild left ventricular enlargement      Hypoinflation with mild subsegmental linear atelectasis or less likely early   infiltrates along the lung bases which is more prominent. CT HEAD WO CONTRAST   Final Result   Mild atrophy and mild chronic microischemic disease scattered in the deep   white matter which is unchanged with no acute abnormality seen. Small scalp hematoma along the left parietal region with interval placement   of skin clips in the area. Adry Fairchild MD have reviewed the chart on [facility] and personally interviewed and examined patient, reviewed the data (labs and imaging) and after discussion with my PA formulated the plan. Agree with note with the following edits. HPI:     Mentation is completely back to normal.  She is complaining of back pain. Renal function back to normal.  She is eating better.   Physical therapy has seen her and recommended home with supervision    I reviewed the patient's Past Medical History, Past Surgical History, Medications, and Allergies. Physical exam:    BP (!) 184/55   Pulse 67   Temp 98.4 °F (36.9 °C) (Oral)   Resp 16   Ht 5' (1.524 m)   Wt 196 lb 4.8 oz (89 kg)   LMP  (LMP Unknown)   SpO2 95%   BMI 38.34 kg/m²     Gen: No distress. Alert. Eyes: PERRL. No sclera icterus. No conjunctival injection. ENT: No discharge. Pharynx clear. Neck: Trachea midline. Normal thyroid. Resp: No accessory muscle use. No crackles. No wheezes. No rhonchi. No dullness on percussion. CV: Regular rate. Regular rhythm. No murmur or rub. No edema. Oriented to time place and person        Assessment/Plan:    #MARIO- resolved   - suspect pre renal related to hypotension as well as use of arb, aldactone and lasix   - Crt 3.1 on admit-> 2.3 -> 1.0 after IVF  - cont monitoring  - of note, recently admitted to [facility] and dc'd 11/29 with similar presentation and seen by nephro at that time     #Hypotension in patient with history of HTN  - low BP on admit - suspect 2/2 sedating medications, poor PO intake, infection could also be playing a role   - initially held all BP meds and gave fluids. BP is now high. Restart metoprolol, norvasc, imdur. Cont to hold candesartan for now but possibly resume at discharge     #E Coli UTI  - Rocephin D#2 and f/u final urine culture      #Bradycardia   - chronic issue per cardio notes, home toprol dose was decreased in Aug 2021 from 50 mg daily to 25 mg daily  - HR down to 40s on tele on admit  - held toprol on admit. HR improved. Resume toprol at home dose 25 mg daily     #Alzheimer's Dementia with worsening confusion   - per PCP documentation she has dementia. On admit she was oriented to self and president only.  She is now alert and oriented x 4   - cont aricept at lower dose 10 mg nightly-> 5 mg nightly  - holding sedating meds: norco, baclofen, trazodone      #CAD  - cont ASA, statin  - resume BB and imdur      #Chronic diastolic CHF  - compensated and with MARIO holding diuretics and administering IVF  - monitor fluid status     #Sp TAVR      #Chronic pain  - holding baclofen   - cont gabapentin  - resume norco      #Anxiety/Depression  - cont buspar and celexa  - hold trazodone      #Closed head injury  - with healed laceration to scalp, initially closed with staples at [facility] but currently no staples in place     #Debility  - 2/2 advanced age, physical deconditioning, multiple sedating medications  - PT OT evaluated- recommended home with 24 hr assistance and PT OT   -of note, PT OT recommendation for SNF at last admit at [facility] and pt denied    #Anemia  - h/h drop likely dilutional, monitor    #Hypomagnesemia  - replace Mg and give 20 meq K for borderline low K    DVT Prophylaxis: Lovenox   Diet: ADULT DIET;  Regular  Code Status: Full Code    Rosina Herman PA-C  12/2/2021 8:10 AM        Michael Turpin MD 12/2/2021 9:46 AM

## 2021-12-03 VITALS
RESPIRATION RATE: 16 BRPM | TEMPERATURE: 98.2 F | OXYGEN SATURATION: 93 % | HEIGHT: 60 IN | HEART RATE: 94 BPM | DIASTOLIC BLOOD PRESSURE: 65 MMHG | BODY MASS INDEX: 40.09 KG/M2 | WEIGHT: 204.2 LBS | SYSTOLIC BLOOD PRESSURE: 160 MMHG

## 2021-12-03 LAB
ANION GAP SERPL CALCULATED.3IONS-SCNC: 8 MMOL/L (ref 3–16)
BASOPHILS ABSOLUTE: 0.1 K/UL (ref 0–0.2)
BASOPHILS RELATIVE PERCENT: 1.1 %
BUN BLDV-MCNC: 14 MG/DL (ref 7–20)
CALCIUM SERPL-MCNC: 8.4 MG/DL (ref 8.3–10.6)
CHLORIDE BLD-SCNC: 109 MMOL/L (ref 99–110)
CO2: 26 MMOL/L (ref 21–32)
CREAT SERPL-MCNC: 0.9 MG/DL (ref 0.6–1.2)
EOSINOPHILS ABSOLUTE: 0.3 K/UL (ref 0–0.6)
EOSINOPHILS RELATIVE PERCENT: 3.6 %
GFR AFRICAN AMERICAN: >60
GFR NON-AFRICAN AMERICAN: 60
GLUCOSE BLD-MCNC: 82 MG/DL (ref 70–99)
HCT VFR BLD CALC: 28.2 % (ref 36–48)
HEMOGLOBIN: 9.1 G/DL (ref 12–16)
LYMPHOCYTES ABSOLUTE: 2.3 K/UL (ref 1–5.1)
LYMPHOCYTES RELATIVE PERCENT: 31.5 %
MCH RBC QN AUTO: 29.1 PG (ref 26–34)
MCHC RBC AUTO-ENTMCNC: 32.4 G/DL (ref 31–36)
MCV RBC AUTO: 89.8 FL (ref 80–100)
MONOCYTES ABSOLUTE: 0.7 K/UL (ref 0–1.3)
MONOCYTES RELATIVE PERCENT: 9.2 %
NEUTROPHILS ABSOLUTE: 4 K/UL (ref 1.7–7.7)
NEUTROPHILS RELATIVE PERCENT: 54.6 %
PDW BLD-RTO: 14.3 % (ref 12.4–15.4)
PLATELET # BLD: 188 K/UL (ref 135–450)
PMV BLD AUTO: 8.2 FL (ref 5–10.5)
POTASSIUM REFLEX MAGNESIUM: 4.2 MMOL/L (ref 3.5–5.1)
RBC # BLD: 3.13 M/UL (ref 4–5.2)
SODIUM BLD-SCNC: 143 MMOL/L (ref 136–145)
WBC # BLD: 7.4 K/UL (ref 4–11)

## 2021-12-03 PROCEDURE — 6360000002 HC RX W HCPCS: Performed by: INTERNAL MEDICINE

## 2021-12-03 PROCEDURE — 6370000000 HC RX 637 (ALT 250 FOR IP): Performed by: PHYSICIAN ASSISTANT

## 2021-12-03 PROCEDURE — 2700000000 HC OXYGEN THERAPY PER DAY

## 2021-12-03 PROCEDURE — 80048 BASIC METABOLIC PNL TOTAL CA: CPT

## 2021-12-03 PROCEDURE — 85025 COMPLETE CBC W/AUTO DIFF WBC: CPT

## 2021-12-03 PROCEDURE — 6370000000 HC RX 637 (ALT 250 FOR IP): Performed by: INTERNAL MEDICINE

## 2021-12-03 PROCEDURE — 2580000003 HC RX 258: Performed by: INTERNAL MEDICINE

## 2021-12-03 PROCEDURE — 36415 COLL VENOUS BLD VENIPUNCTURE: CPT

## 2021-12-03 PROCEDURE — 94640 AIRWAY INHALATION TREATMENT: CPT

## 2021-12-03 PROCEDURE — 94761 N-INVAS EAR/PLS OXIMETRY MLT: CPT

## 2021-12-03 PROCEDURE — 99239 HOSP IP/OBS DSCHRG MGMT >30: CPT | Performed by: INTERNAL MEDICINE

## 2021-12-03 RX ORDER — NITROFURANTOIN 25; 75 MG/1; MG/1
100 CAPSULE ORAL 2 TIMES DAILY
Qty: 10 CAPSULE | Refills: 0 | Status: SHIPPED | OUTPATIENT
Start: 2021-12-03 | End: 2021-12-08

## 2021-12-03 RX ORDER — HYDRALAZINE HYDROCHLORIDE 10 MG/1
10 TABLET, FILM COATED ORAL ONCE
Status: DISCONTINUED | OUTPATIENT
Start: 2021-12-03 | End: 2021-12-03 | Stop reason: HOSPADM

## 2021-12-03 RX ADMIN — GABAPENTIN 100 MG: 100 CAPSULE ORAL at 14:36

## 2021-12-03 RX ADMIN — POLYETHYLENE GLYCOL (3350) 17 G: 17 POWDER, FOR SOLUTION ORAL at 09:45

## 2021-12-03 RX ADMIN — IPRATROPIUM BROMIDE AND ALBUTEROL 1 PUFF: 20; 100 SPRAY, METERED RESPIRATORY (INHALATION) at 07:38

## 2021-12-03 RX ADMIN — METOPROLOL SUCCINATE 25 MG: 25 TABLET, EXTENDED RELEASE ORAL at 09:44

## 2021-12-03 RX ADMIN — AMLODIPINE BESYLATE 10 MG: 5 TABLET ORAL at 09:44

## 2021-12-03 RX ADMIN — PANTOPRAZOLE SODIUM 40 MG: 40 TABLET, DELAYED RELEASE ORAL at 09:44

## 2021-12-03 RX ADMIN — HEPARIN SODIUM 5000 UNITS: 5000 INJECTION INTRAVENOUS; SUBCUTANEOUS at 05:51

## 2021-12-03 RX ADMIN — TRAZODONE HYDROCHLORIDE 50 MG: 50 TABLET ORAL at 00:01

## 2021-12-03 RX ADMIN — Medication 10 ML: at 09:45

## 2021-12-03 RX ADMIN — ASPIRIN 81 MG: 81 TABLET, COATED ORAL at 09:44

## 2021-12-03 RX ADMIN — ATORVASTATIN CALCIUM 20 MG: 10 TABLET, FILM COATED ORAL at 09:44

## 2021-12-03 RX ADMIN — DIVALPROEX SODIUM 250 MG: 250 TABLET, DELAYED RELEASE ORAL at 09:44

## 2021-12-03 RX ADMIN — IPRATROPIUM BROMIDE AND ALBUTEROL 1 PUFF: 20; 100 SPRAY, METERED RESPIRATORY (INHALATION) at 13:41

## 2021-12-03 RX ADMIN — CITALOPRAM HYDROBROMIDE 20 MG: 20 TABLET ORAL at 09:44

## 2021-12-03 RX ADMIN — BUSPIRONE HYDROCHLORIDE 5 MG: 5 TABLET ORAL at 14:36

## 2021-12-03 RX ADMIN — BUSPIRONE HYDROCHLORIDE 5 MG: 5 TABLET ORAL at 09:44

## 2021-12-03 RX ADMIN — GABAPENTIN 100 MG: 100 CAPSULE ORAL at 09:44

## 2021-12-03 RX ADMIN — ISOSORBIDE MONONITRATE 30 MG: 30 TABLET ORAL at 09:44

## 2021-12-03 NOTE — PROGRESS NOTES
PM assessment completed. Scheduled medications given per MAR. VSS room air, A/O x4, denies any needs at this time. This RN offered patient a bath, patient declined. Call light in reach, will monitor, bed alarm on.

## 2021-12-03 NOTE — PROGRESS NOTES
hours PRN for wheezing or increased work of breathing using Per Protocol order mode. 4-6 - enter or revise RT Bronchodilator order(s) to two equivalent RT bronchodilator orders with one order with BID Frequency and one order with Frequency of every 4 hours PRN wheezing or increased work of breathing using Per Protocol order mode. 7-10 - enter or revise RT Bronchodilator order(s) to two equivalent RT bronchodilator orders with one order with TID Frequency and one order with Frequency of every 4 hours PRN wheezing or increased work of breathing using Per Protocol order mode. 11-13 - enter or revise RT Bronchodilator order(s) to one equivalent RT bronchodilator order with QID Frequency and an Albuterol order with Frequency of every 4 hours PRN wheezing or increased work of breathing using Per Protocol order mode. Greater than 13 - enter or revise RT Bronchodilator order(s) to one equivalent RT bronchodilator order with every 4 hours Frequency and an Albuterol order with Frequency of every 2 hours PRN wheezing or increased work of breathing using Per Protocol order mode.        Electronically signed by Fela Cao RCP on 12/3/2021 at 7:43 AM

## 2021-12-03 NOTE — PLAN OF CARE
Problem: Falls - Risk of:  Goal: Will remain free from falls  Description: Will remain free from falls  12/3/2021 1528 by John Caro RN  Outcome: Completed  12/3/2021 0141 by Tuan Jose RN  Outcome: Ongoing  Goal: Absence of physical injury  Description: Absence of physical injury  12/3/2021 1528 by John Caro RN  Outcome: Completed  12/3/2021 0141 by Tuan Jose RN  Outcome: Ongoing     Problem: Pain:  Goal: Pain level will decrease  Description: Pain level will decrease  12/3/2021 1528 by John Caro RN  Outcome: Completed  12/3/2021 0141 by Tuan Jose RN  Outcome: Ongoing  Goal: Control of acute pain  Description: Control of acute pain  12/3/2021 1528 by John Caro RN  Outcome: Completed  12/3/2021 0141 by Tuan Jose RN  Outcome: Ongoing  Goal: Control of chronic pain  Description: Control of chronic pain  12/3/2021 1528 by John Caro RN  Outcome: Completed  12/3/2021 0141 by Tuan Jose RN  Outcome: Ongoing

## 2021-12-03 NOTE — CARE COORDINATION
DISCHARGE ORDER  Date/Time 12/3/2021 12:19 PM  Completed by: Chyna Pearson RN, Case Management    Patient Name: Marita Flores      : 1940  Admitting Diagnosis: Syncope and collapse [R55]  Somnolence [R40.0]  Narcotic addiction ([facility] [address] [F11.20]  Altered mental status [R41.82]  Sinus bradycardia [R00.1]  MARIO (acute kidney injury) ([facility] [address] [N17.9]  Closed head injury, subsequent encounter [S09.90XD]  History of recent fall [Z91.81]  Urinary tract infection in female [N39.0]      Admit order Date and Status:2021  (verify MD's last order for status of admission)      Noted discharge order. If applicable PT/OT recommendation at Discharge: home with 24 hr assist and home PT/OT. DME recommendation by PT/OT:n/a  Confirmed discharge plan  (pt): Yes  with whom__________pt_____  If pt confirmed DC plan does family need to be contacted by CM No if yes who_____n/a_  Discharge Plan: Reviewed chart. Role of discharge planner explained and patient verbalized understanding. Pt is a readmit. Pt is alert and oriented. Discharge order is noted. Pt is being d/c'd to home today. Pt states that her son will pick her up today. Pt is aware that PT/OT recommends home with 24 hr assist and home PT/OT. Pt states that her family will stay with her for 24 hr assist. Pt states that she is active with [facility] for SN/PT/OT and wants [facility] with this. CM called [facility] and spoke with [facility] ([phone]). She confirmed pt is active with [facility] and will accept back and will see pt tomorrow, as she is aware pt is a [employer]. [facility] states she will pull info from Epic, and Cm does not need to send orders to her. +eCOC, +HC orders. Pt's O2 sats are 94% on RA. Frida Amador RN is removing Prince for pt and pt to urinate before pt is discharged. No further discharge needs needed or noted. CM let Rebecca Fearing with [employer] know that pt was discharging to home today, as pt is active with [employer] and Nichol Cordero is her CM.  CM faxed KINA/AVS to Ed [employer] to [phone]. Reviewed chart. Role of discharge planner explained and patient verbalized understanding. Discharge order is noted. Has Home O2 in place on admit:  No  Informed of need to bring portable home O2 tank on day of discharge for nursing to connect prior to leaving:   No  Verbalized agreement/Understanding:   No    Discharge timeout done with Renata Bernal RN. All discharge needs and concerns addressed.

## 2021-12-03 NOTE — PROGRESS NOTES
RT Inhaler-Nebulizer Bronchodilator Protocol Note    There is a bronchodilator order in the chart from a provider indicating to follow the RT Bronchodilator Protocol and there is an Initiate RT Inhaler-Nebulizer Bronchodilator Protocol order as well (see protocol at bottom of note). CXR Findings:  No results found. The findings from the last RT Protocol Assessment were as follows:   History Pulmonary Disease: Chronic pulmonary disease  Respiratory Pattern: Dyspnea on exertion or RR 21-25 bpm  Breath Sounds: Intermittent or unilateral wheezes  Cough: Strong, spontaneous, non-productive  Indication for Bronchodilator Therapy: Decreased or absent breath sounds  Bronchodilator Assessment Score: 8    Aerosolized bronchodilator medication orders have been revised according to the RT Inhaler-Nebulizer Bronchodilator Protocol below. Respiratory Therapist to perform RT Therapy Protocol Assessment initially then follow the protocol. Repeat RT Therapy Protocol Assessment PRN for score 0-3 or on second treatment, BID, and PRN for scores above 3. No Indications - adjust the frequency to every 6 hours PRN wheezing or bronchospasm, if no treatments needed after 48 hours then discontinue using Per Protocol order mode. If indication present, adjust the RT bronchodilator orders based on the Bronchodilator Assessment Score as indicated below. Use Inhaler orders unless patient has one or more of the following: on home nebulizer, not able to hold breath for 10 seconds, is not alert and oriented, cannot activate and use MDI correctly, or respiratory rate 25 breaths per minute or more, then use the equivalent nebulizer order(s) with same Frequency and PRN reasons based on the score. If a patient is on this medication at home then do not decrease Frequency below that used at home.     0-3 - enter or revise RT bronchodilator order(s) to equivalent RT Bronchodilator order with Frequency of every 4 hours PRN for wheezing

## 2021-12-03 NOTE — PROGRESS NOTES
Discharge instructions reviewed with the patient. Verbalized understanding of all including prescribed medication, medication side effects/restrictions, and follow up care. Denies questions/concerns. Patient is alert/oriented, stable, and ambulatory at the time of discharge. Son is here to take pt home. Transport request put in.

## 2021-12-03 NOTE — PROGRESS NOTES
Shift assessment complete- see flow sheet. Patient is A/Ox4. VSS. Morning medications given without difficulty. Currently on room air, SpO2 WNL. Lung sounds diminished. Denies shortness of breath. Denies CP. No new complaints. Patient denies any further needs. Call light explained and in reach. Bed alarm on. Will continue to monitor.

## 2021-12-03 NOTE — DISCHARGE INSTR - COC
Continuity of Care Form    Patient Name: Catie Malcolm   :  1940  MRN:  [id number]    Admit date:  2021  Discharge date:  12/3/2021    Code Status Order: Full Code   Advance Directives:      Admitting Physician:  Anthony Sood MD  PCP: Richard Beckwith APRN - CNP    Discharging Nurse: Tiffanie Melendrez Unit/Room#: /4962-49  Discharging Unit Phone Number: [phone]    Emergency Contact:   Extended Emergency Contact Information  Primary Emergency Contact: Sonya Neri  Address: [address] [place] [address] [address] [facility] [address] [facility] Phone: [phone]  Mobile Phone: [phone]  Relation: Child  Secondary Emergency Contact: Robert Neff   [facility] [address] [facility] Phone: [phone]  Mobile Phone: [phone]  Relation: Child    Past Surgical History:  Past Surgical History:   Procedure Laterality Date    AORTIC VALVE REPLACEMENT      APPENDECTOMY      BLADDER REPAIR      3 TIMES    CARDIAC SURGERY      stents    CARPAL TUNNEL RELEASE      RIGHT    CHOLECYSTECTOMY, LAPAROSCOPIC  2017    with dr Tona Keita      has it it done twice    DIAGNOSTIC CARDIAC CATH LAB PROCEDURE      HYSTERECTOMY      JOINT REPLACEMENT      KATHLEEN TKR    NECK SURGERY      OTHER SURGICAL HISTORY  12    Full Interstim Implant    SHOULDER SURGERY      SKIN CANCER EXCISION      SPINAL FIXATION SURGERY WITH IMPLANT      SPINAL FIXATION SURGERY WITH IMPLANT      SPINAL FIXATION SURGERY WITH IMPLANT      [address]    TONSILLECTOMY      UPPER GASTROINTESTINAL ENDOSCOPY  11    UPPER GASTROINTESTINAL ENDOSCOPY      UPPER GASTROINTESTINAL ENDOSCOPY  2017    dilitation       Immunization History:   Immunization History   Administered Date(s) Administered    COVID-19, Pfizer, PF, 30mcg/0.3mL 2021, 2021    Influenza Virus Vaccine 2019, 2020 Pneumococcal Polysaccharide ([id number]) 2019, 2019, 2020    Tdap (Boostrix, Adacel) 2021       Active Problems:  Patient Active Problem List   Diagnosis Code    HTN (hypertension) I10    Hyperlipidemia E78.5    CAD (coronary artery disease) I25.10    Hypokalemia E87.6    Obesity (BMI 30-39. 9) E66.9    Anxiety F41.9    Primary insomnia F51.01    Epigastric pain R10.13    Chronic tension-type headache, intractable G44.221    MARIA C (obstructive sleep apnea) G47.33    Painful total knee replacement, initial encounter ([facility]) T84.84XA, Z96.659    Tremor, essential G25.0    TIA (transient ischemic attack) G45.9    Alzheimer's dementia without behavioral disturbance ([facility]) G30.9, F02.80    Sinus bradycardia R00.1    Acute on chronic congestive heart failure ([facility]) I50.9    Chronic kidney disease N18.9    History of transcatheter aortic valve replacement (TAVR) Z95.2    MARIO (acute kidney injury) ([facility] [address] N17.9    Syncope and collapse R55    Altered mental status R41.82    Closed head injury S09.90XA    Urinary tract infection in female N39.0       Isolation/Infection:   Isolation            No Isolation          Patient Infection Status       Infection Onset Added Last Indicated Last Indicated By Review Planned Expiration Resolved Resolved By    None active    Resolved    COVID-19 (Rule Out) 21 COVID-19 & Influenza Combo (Ordered)   21 Rule-Out Test Resulted    COVID-19 21 Covid-19 Ambulatory   10/08/21     COVID-19 (Rule Out) 21 Covid-19 Ambulatory (Ordered)   21 Rule-Out Test Resulted    COVID-19 (Rule Out) 21 COVID-19, Rapid (Ordered)   21 Rule-Out Test Resulted            Nurse Assessment:  Last Vital Signs: BP (!) 174/69   Pulse 94   Temp 98.2 °F (36.8 °C) (Oral)   Resp 18   Ht 5' (1.524 m)   Wt 204 lb 3.2 oz (92.6 kg)   LMP  (LMP Unknown)   SpO2 94%   BMI 39.88 kg/m² Last documented pain score (0-10 scale): Pain Level: 9  Last Weight:   Wt Readings from Last 1 Encounters:   12/03/21 204 lb 3.2 oz (92.6 kg)     Mental Status:  oriented, alert, coherent, logical, thought processes intact, and able to concentrate and follow conversation    IV Access:  - None    Nursing Mobility/ADLs:  Walking   Independent  Transfer  Independent  Bathing  Independent  Dressing  Independent  Toileting  Independent  Feeding  [address]  Independent  Med Delivery   whole    Wound Care Documentation and Therapy:        Elimination:  Continence: Bowel: Yes  Bladder: Yes  Urinary Catheter: None   Colostomy/Ileostomy/Ileal Conduit: No       Date of Last BM: 12/3/2021    Intake/Output Summary (Last 24 hours) at 12/3/2021 1216  Last data filed at 12/2/2021 2015  Gross per 24 hour   Intake 504 ml   Output 900 ml   Net -396 ml     I/O last 3 completed shifts: In: 634 [P. O.:624; I.V.:10]  Out: 900 [Urine:900]    Safety Concerns:     History of Falls (last 30 days) and At Risk for Falls    Impairments/Disabilities:      None    Nutrition Therapy:  Current Nutrition Therapy:   - Oral Diet:  General    Routes of Feeding: Oral  Liquids: Thin Liquids  Daily Fluid Restriction: no  Last Modified Barium Swallow with Video (Video Swallowing Test): not done    Treatments at the Time of Hospital Discharge:   Respiratory Treatments:   Oxygen Therapy:  is not on home oxygen therapy.   Ventilator:    - No ventilator support    Rehab Therapies: Physical Therapy, Occupational Therapy, and SN--Frontload  Weight Bearing Status/Restrictions: No weight bearing restirctions  Other Medical Equipment (for information only, NOT a DME order):  walker  Other Treatments:     Patient's personal belongings (please select all that are sent with patient):  clothes    RN SIGNATURE:  Electronically signed by Yosef Dorsey RN on 12/3/21 at 2:17 PM EST    CASE MANAGEMENT/SOCIAL WORK SECTION    Inpatient Status Date: 11/30/2021    Readmission Risk Assessment Score:  Readmission Risk              Risk of Unplanned Readmission:  33           Discharging to Facility/ Agency   Name: [facility]  Address:   Phone: [phone]  Fax: [phone]    / signature: Electronically signed by Florian Schneider RN on 12/3/21 at 12:17 PM EST    PHYSICIAN SECTION    Prognosis: {Prognosis:9395289693}    Condition at Discharge: [address] Patient Condition:[id number]}    Rehab Potential (if transferring to Rehab): {Prognosis:2677406467}    Recommended Labs or Other Treatments After Discharge: ***    Physician Certification: I certify the above information and transfer of Elena Terrell  is necessary for the continuing treatment of the diagnosis listed and that she requires Home Care for less 30 days.      Update Admission H&P: {CHP DME Changes in YQSPP:622906902}    PHYSICIAN SIGNATURE:  Electronically signed by Tristan Michel MD/Nery Franco RN on 12/3/21 at 12:17 PM EST

## 2021-12-03 NOTE — DISCHARGE SUMMARY
Name:  Marlen Nino  Room:  /9785-47  MRN:    [id number]    Discharge Summary      This discharge summary is in conjunction with a complete physical exam done on the day of discharge. Discharging Physician: David Martino MD      Admit: 11/30/2021  Discharge:   12/3/2021     Diagnoses this Admission    Principal Problem:    MARIO (acute kidney injury) ([address]  Active Problems:    HTN (hypertension)    Hyperlipidemia    CAD (coronary artery disease)    Alzheimer's dementia without behavioral disturbance (HCC)    Sinus bradycardia    Chronic kidney disease    Altered mental status    Closed head injury    Urinary tract infection in female  Resolved Problems:    * No resolved hospital problems. *          Procedures (Please Review Full Report for Details)  none    Consults    None      HPI:      The patient is a 79 y.o. female with timers dementia, chronic kidney disease, obstructive sleep apnea, hypertension, hyperlipidemia, coronary artery disease, anxiety, chronic pain who presented to [facility] ED with complaint of \"syncope. \"  The patient cannot provide further history regarding why EMS was called to her house. She states she does not know why she was taken to the hospital.  She was just admitted to [facility] from 11/24 to 11/29/2021 after admission for fall, closed head injury, acute kidney injury, hyperkalemia. She was treated with IV fluids and medication adjustments. Her creatinine was 1.2 at time of discharge. She was seen by physical therapy and Occupational Therapy during admission and placement in skilled nursing facility was recommended, but the patient refused. She was subsequently discharged home on 11/29/2021. I spoke with her daughter, Izabela Colbert, over the phone in regards to the events that led to this admission. Sonya states that the patient's sister was staying with her at the patient's home.   The patient's sister states the patient did not get up at all during the night and when she went to wake her up in the morning she was slow to arouse therefore the sister called 911. There was no apparent syncope. ER work-up revealed hypotension, MARIO, bacteriuria. She was treated with IV fluid and she is admitted for further care. Physical Exam at Discharge:  BP (!) 180/42   Pulse 68   Temp 97.4 °F (36.3 °C) (Oral)   Resp 18   Ht 5' (1.524 m)   Wt 204 lb 3.2 oz (92.6 kg)   LMP  (LMP Unknown)   SpO2 95%   BMI 39.88 kg/m²     Gen: No distress. Alert. Eyes: PERRL. No sclera icterus. No conjunctival injection. ENT: No discharge. Pharynx clear. Neck: Trachea midline. Normal thyroid. Resp: No accessory muscle use. No crackles. No wheezes. No rhonchi. No dullness on percussion. CV: Regular rate. Regular rhythm. No murmur or rub. No edema. Oriented to time place and person      Hospital Course  #MARIO- resolved   - suspect pre renal related to hypotension as well as use of arb, aldactone and lasix   - Crt 3.1 on admit-> 2.3 -> 0.9 after IVF  - cont monitoring  - of note, recently admitted to [facility] and dc'd 11/29 with similar presentation and seen by nephro at that time     #Hypotension in patient with history of HTN  - low BP on admit - suspect 2/2 sedating medications, poor PO intake, infection could also be playing a role   - initially held all BP meds and gave fluids. BP is now high. Restarted metoprolol, norvasc, imdur. Cont'd to hold candesartan for now but resumed at discharge     #E Coli UTI  - Rocephin D#3 and f/u final urine culture   E. Coli  D/c on Macrobid     #Bradycardia   - chronic issue per cardio notes, home toprol dose was decreased in Aug 2021 from 50 mg daily to 25 mg daily  - HR down to 40s on tele on admit  - held toprol on admit. HR improved. Resumed toprol at home dose 25 mg daily      #Alzheimer's Dementia with worsening confusion   - per PCP documentation she has dementia.  On admit she was oriented to self and president only.  She is now alert and oriented x 4   - cont'd aricept at lower dose 10 mg nightly-> 5 mg nightly  - held sedating meds: norco, baclofen, trazodone      #CAD  - cont'd ASA, statin  - resumed BB and imdur      #Chronic diastolic CHF  - compensated and with MARIO holding diuretics and administering IVF  - monitored fluid status     #Sp TAVR      #Chronic pain  - held baclofen   - cont'd gabapentin  - resumed norco      #Anxiety/Depression  - cont'd buspar and celexa  - held trazodone      #Closed head injury  - with healed laceration to scalp, initially closed with staples at [facility] but currently no staples in place     #Debility  - 2/2 advanced age, physical deconditioning, multiple sedating medications  - PT OT evaluated- recommended home with 24 hr assistance and PT OT   -of note, PT OT recommendation for SNF at last admit at [facility] and pt denied     #Anemia  - h/h drop likely dilutional, monitored     #Hypomagnesemia  - replaced Mg and give 20 meq K for borderline low K     DVT Prophylaxis: Lovenox     CBC:   Recent Labs     11/30/21  1541 12/02/21  0812 12/03/21  0427   WBC 8.2 7.4 7.4   HGB 11.0* 9.3* 9.1*   HCT 34.0* 28.4* 28.2*   MCV 88.4 88.6 89.8    188 188     BMP:   Recent Labs     12/01/21  0438 12/02/21  0812 12/03/21  0427    143 143   K 3.7 3.5 4.2    108 109   CO2 21 23 26   BUN 34* 20 14   CREATININE 2.3* 1.0 0.9     LIVER PROFILE:   Recent Labs     11/30/21  1541   AST 14*   ALT 8*   BILITOT 0.3   ALKPHOS 71     PT/INR:   Recent Labs     11/30/21  1541   PROTIME 11.4   INR 1.00     APTT: No results for input(s): APTT in the last 72 hours.   UA:  Recent Labs     11/30/21  1639   COLORU Yellow   PHUR 5.5   WBCUA 21-50*   RBCUA 5-10*   MUCUS Rare*   BACTERIA 3+*   CLARITYU SL CLOUDY*   SPECGRAV >=1.030   LEUKOCYTESUR MODERATE*   UROBILINOGEN 0.2   BILIRUBINUR Negative   BLOODU TRACE-INTACT*   GLUCOSEU Negative     CULTURES  Blood: NGTD  Urine: E Coli   COVID 19, PCR: not detected      RADIOLOGY    XR CHEST PORTABLE Final Result   Stable mild left ventricular enlargement      Hypoinflation with mild subsegmental linear atelectasis or less likely early   infiltrates along the lung bases which is more prominent. CT HEAD WO CONTRAST   Final Result   Mild atrophy and mild chronic microischemic disease scattered in the deep   white matter which is unchanged with no acute abnormality seen. Small scalp hematoma along the left parietal region with interval placement   of skin clips in the area. Discharge Medications     Medication List      START taking these medications    nitrofurantoin (macrocrystal-monohydrate) 100 MG capsule  Commonly known as: MACROBID  Take 1 capsule by mouth 2 times daily for 5 days        CHANGE how you take these medications    baclofen 10 MG tablet  Commonly known as: LIORESAL  TAKE 1 TABLET EVERY 8 HOURS AS NEEDED  What changed: See the new instructions.      furosemide 40 MG tablet  Commonly known as: LASIX  Take 1 tablet by mouth daily  What changed: additional instructions        CONTINUE taking these medications    acetaminophen 325 MG tablet  Commonly known as: TYLENOL     albuterol-ipratropium  MCG/ACT Aers inhaler  Commonly known as: COMBIVENT RESPIMAT  Inhale 1 puff into the lungs every 6 hours     amLODIPine 10 MG tablet  Commonly known as: NORVASC  TAKE (1) TABLET DAILY     Aspirin 81 81 MG EC tablet  Generic drug: aspirin     azelastine 0.1 % nasal spray  Commonly known as: ASTELIN  1 spray by Nasal route 2 times daily Use in each nostril as directed     Benefiber Powd     busPIRone 5 MG tablet  Commonly known as: BUSPAR  TAKE 1 OR 2 TABLET(S) IN THE EVENING AS NEEDED FOR ANXIETY     candesartan 8 MG tablet  Commonly known as: ATACAND     cetirizine 10 MG tablet  Commonly known as: ZYRTEC  TAKE 1 TABLET BY MOUTH DAILY     citalopram 20 MG tablet  Commonly known as: CELEXA  TAKE 1.5 TABLETS BY MOUTH DAILY     divalproex 250 MG DR tablet  Commonly known as: DEPAKOTE     donepezil 10 MG tablet  Commonly known as: ARICEPT  TAKE 1 TABLET IN THE EVENING     gabapentin 100 MG capsule  Commonly known as: NEURONTIN     HYDROcodone-acetaminophen  MG per tablet  Commonly known as: NORCO     isosorbide mononitrate 30 MG extended release tablet  Commonly known as: IMDUR     Klor-Con Sprinkle 10 MEQ extended release capsule  Generic drug: potassium chloride  TAKE 2 CAPSULES IN THE   MORNING AND 2 CAPSULES IN  THE EVENING     lidocaine 5 % ointment  Commonly known as: XYLOCAINE  Apply topically as needed. metoprolol succinate 25 MG extended release tablet  Commonly known as: TOPROL XL     Myrbetriq 50 MG Tb24  Generic drug: mirabegron     Nitrostat 0.4 MG SL tablet  Generic drug: nitroGLYCERIN  USE 1 TABLET UNDER TONGUE AS NEEDED FOR CHEST PAIN MAY REPEAT EVERY 5MIN. UP TO 3. THEN GO TO ER. Nyamyc 439016 UNIT/GM powder  Generic drug: nystatin  APPLY 3 TIMES DAILY     pantoprazole 40 MG tablet  Commonly known as: PROTONIX  Take 1 tablet by mouth daily     polyethylene glycol 17 GM/SCOOP powder  Commonly known as: GLYCOLAX     Restasis 0.05 % ophthalmic emulsion  Generic drug: cycloSPORINE  Place 1 drop into both eyes as needed (dry eyes)     Roller Walker Misc  1 each by Does not apply route daily     simvastatin 40 MG tablet  Commonly known as: ZOCOR     spironolactone 25 MG tablet  Commonly known as: ALDACTONE  Take 1 tablet by mouth daily     traZODone 100 MG tablet  Commonly known as: DESYREL  TAKE 1 OR 2 TABLETS AT BEDTIME        STOP taking these medications    ciprofloxacin 250 MG tablet  Commonly known as: Cipro           Where to Get Your Medications      These medications were sent to [address] [place] [address] [address]    Phone: [phone]   · nitrofurantoin (macrocrystal-monohydrate) 100 MG capsule           Discharge Condition/Location: Stable    Follow Up:   Follow up with PCP. See Dr Regina Castañeda in two weeks for anemia work up. More than 30 mts spent.          Montrell Richard MD 12/3/2021 9:02 AM

## 2021-12-04 LAB
BLOOD CULTURE, ROUTINE: NORMAL
CULTURE, BLOOD 2: NORMAL

## 2021-12-06 ENCOUNTER — CARE COORDINATION (OUTPATIENT)
Dept: CASE MANAGEMENT | Age: 81
End: 2021-12-06

## 2021-12-06 DIAGNOSIS — F41.9 ANXIETY: Primary | ICD-10-CM

## 2021-12-06 PROCEDURE — 1111F DSCHRG MED/CURRENT MED MERGE: CPT | Performed by: NURSE PRACTITIONER

## 2021-12-06 NOTE — CARE COORDINATION
reviewed discharge instructions, medical action plan and red flags with patient who verbalized understanding. Patient given an opportunity to ask questions and does not have any further questions or concerns at this time. Were discharge instructions available to patient? Yes. Reviewed appropriate site of care based on symptoms and resources available to patient including: PCP, Specialist and Home health. The patient agrees to contact the PCP office for questions related to their healthcare. Medication reconciliation was performed with patient, who verbalizes understanding of administration of home medications. COVID Risk Education    COVID-19 and Influenza A&B Not Detected on 11/30/2021. Patient completed Pfizer 2- step COVID vaccine and booster dose is recommended per chart review. Patient was given an opportunity to verbalize any questions and concerns and agrees to contact CTN or health care provider for questions related to their healthcare. Today's weight 193# and states was 190# yesterday. Taking furosemide 40mg daily and took additional 20mg today for the weight gain. Reviewed daily weights are same time of day, dry weight. She v/u. She is clear mentally today able to recall the readmission and medication changes. States therapy visiting today between 2-3pm. Doesn't think nurse has visited since home from hospital. CTN will follow up with Dc on the SN. Taking hydrocodone BID. Denies needs. States she has family support. Offered TCM visit which she declined - said she would call the office and they would give her one next day. States she wanted to get her COVID booster while inpatient but they did not offer it. She is aware this can be scheduled in the community and encouraged to discuss with her PCP. CTN provided contact information for future needs. [facility] Spoke with Susan. She notes only PT OT ordered.  After review she notes SN also included in order. She was updated that patient DC home Friday, visits were to be \"frontloaded\" and that she was also a readmission. She will schedule SN ASAP. Plan for follow-up call in 3-5 days based on severity of symptoms and risk factors. Care Transitions 24 Hour Call    Schedule Follow Up Appointment with PCP: Declined  Do you have any ongoing symptoms?: No  Do you have a copy of your discharge instructions?: Yes  Do you have all of your prescriptions and are they filled?: Yes  Have you been contacted by a [facility] Pharmacist?: No  Have you scheduled your follow up appointment?: No  Were you discharged with any Home Care or Post Acute Services: Yes  Post Acute Services:  Outpatient/Community Services, Home Health (Comment: [facility] 3x week MWF; [facility], [facility].  PT OT )  Do you feel like you have everything you need to keep you well at home?: Yes  Care Transitions Interventions         Follow Up  Future Appointments   Date Time Provider Carla Sims   3/29/2022 10:20 AM KALI Hankins - HENRIETTA Wilson RN

## 2021-12-08 RX ORDER — POLYETHYLENE GLYCOL 3350 17 G/17G
POWDER, FOR SOLUTION ORAL
Qty: 510 G | Refills: 1 | Status: SHIPPED | OUTPATIENT
Start: 2021-12-08 | End: 2022-01-06

## 2021-12-09 ENCOUNTER — CARE COORDINATION (OUTPATIENT)
Dept: CASE MANAGEMENT | Age: 81
End: 2021-12-09

## 2021-12-09 NOTE — CARE COORDINATION
Rick 45 Transitions Follow Up Call    2021    Patient: Kimberly Aguilar  Patient : 1940   MRN: [id number]  Reason for Admission: MARIO, hypotension in patient with hx HTN, E Coli UTI, bradycardia, Alz Dementia with worsening confusion, CAD, chronic dCHF, s/p TAVR, chronic pain, anxiety/depression, closed head injury with healed laceration to scalp, debility, anemia, hypomagnesemia. Discharge Date: 12/3/21 RARS: Readmission Risk Score: 23.4 ( )         Spoke with: Kimberly Aguilar (patient)    The nurse visits this afternoon. Reports weight is fluctuating within 3-4#. Only SOB with exertion. She is brief, denies needs at this time and politely ends call. Care Transitions Subsequent and Final Call    Schedule Follow Up Appointment with PCP: Completed  Subsequent and Final Calls  Do you have any ongoing symptoms?: No  Have your medications changed?: No  Do you have any questions related to your medications?: No  Do you currently have any active services?: Yes  Are you currently active with any services?: Outpatient/Community Services, Home Health  Do you have any needs or concerns that I can assist you with?: No  Identified Barriers: Impairment  Care Transitions Interventions  Other Interventions:            Follow Up  Future Appointments   Date Time Provider Carla Sims   2021 10:00 AM Lillian Dalton, APRN - CNP [facility] [place] - [facility]   3/29/2022 10:20 AM Lillian Dalton, APRN - CNP [facility] [place] - JEFF Lee RN

## 2021-12-13 ENCOUNTER — OFFICE VISIT (OUTPATIENT)
Dept: FAMILY MEDICINE CLINIC | Age: 81
End: 2021-12-13
Payer: MEDICARE

## 2021-12-13 VITALS
OXYGEN SATURATION: 95 % | HEART RATE: 56 BPM | BODY MASS INDEX: 36.91 KG/M2 | WEIGHT: 189 LBS | SYSTOLIC BLOOD PRESSURE: 92 MMHG | DIASTOLIC BLOOD PRESSURE: 56 MMHG

## 2021-12-13 DIAGNOSIS — Z09 HOSPITAL DISCHARGE FOLLOW-UP: Primary | ICD-10-CM

## 2021-12-13 PROCEDURE — 99214 OFFICE O/P EST MOD 30 MIN: CPT | Performed by: NURSE PRACTITIONER

## 2021-12-13 PROCEDURE — 1111F DSCHRG MED/CURRENT MED MERGE: CPT | Performed by: NURSE PRACTITIONER

## 2021-12-13 ASSESSMENT — ENCOUNTER SYMPTOMS
RESPIRATORY NEGATIVE: 1
GASTROINTESTINAL NEGATIVE: 1

## 2021-12-13 NOTE — PROGRESS NOTES
Post-Discharge Transitional Care Management Services or Hospital Follow Up      Kimberly Aguilar   YOB: 1940    Date of Office Visit:  12/13/2021  Date of Hospital Admission: 11/30/21  Date of Hospital Discharge: 12/3/21  Readmission Risk Score(high >=14%. Medium >=10%):Readmission Risk Score: 23.4 ( )      Care management risk score Rising risk (score 2-5) and Complex Care (Scores >=6): 9     Non face to face  following discharge, date last encounter closed (first attempt may have been earlier): 12/6/2021 12:40 PM 12/6/2021 12:40 PM    Call initiated 2 business days of discharge: Yes     Patient Active Problem List   Diagnosis    HTN (hypertension)    Hyperlipidemia    CAD (coronary artery disease)    Hypokalemia    Obesity (BMI 30-39. 9)    Anxiety    Primary insomnia    Epigastric pain    Chronic tension-type headache, intractable    MARIA C (obstructive sleep apnea)    Painful total knee replacement, initial encounter ([facility])    Tremor, essential    TIA (transient ischemic attack)    Alzheimer's dementia without behavioral disturbance ([facility])    Sinus bradycardia    Acute on chronic congestive heart failure ([facility])    Chronic kidney disease    History of transcatheter aortic valve replacement (TAVR)    MARIO (acute kidney injury) ([facility])    Syncope and collapse    Altered mental status    Closed head injury    Urinary tract infection in female       Allergies   Allergen Reactions    Latex     Levofloxacin Other (See Comments)     Burns mouth per patient    Azithromycin     Codeine Nausea Only    Keflex [Cephalexin]     Morphine     Pentazocine Lactate     Sulfa Antibiotics Hives    Tape [Adhesive Tape]      No bandaids       Medications listed as ordered at the time of discharge from hospital  @DISCHARGEMEDSLIST(<NOROUTINE> error)@      Medications marked \"taking\" at this time  Outpatient Medications Marked as Taking for the 12/13/21 encounter (Office Visit) with Lillian Hoffman, APRN - CNP   Medication Sig Dispense Refill    polyethylene glycol (GLYCOLAX) 17 GM/SCOOP powder DISSOLVE AND DRINK 17 GRAMS DAILY AS DIRECTED 510 g 1    spironolactone (ALDACTONE) 25 MG tablet Take 1 tablet by mouth daily 30 tablet 3    donepezil (ARICEPT) 10 MG tablet TAKE 1 TABLET IN THE EVENING 30 tablet 11    traZODone (DESYREL) 100 MG tablet TAKE 1 OR 2 TABLETS AT BEDTIME 60 tablet 3    divalproex (DEPAKOTE) 250 MG DR tablet Take 250 mg by mouth 2 times daily      MYRBETRIQ 50 MG TB24 50 mg daily       metoprolol succinate (TOPROL XL) 25 MG extended release tablet Take 25 mg by mouth daily       acetaminophen (TYLENOL) 325 MG tablet       cetirizine (ZYRTEC) 10 MG tablet TAKE 1 TABLET BY MOUTH DAILY 30 tablet 3    NYAMYC [id number] UNIT/GM powder APPLY 3 TIMES DAILY 60 g 1    citalopram (CELEXA) 20 MG tablet TAKE 1.5 TABLETS BY MOUTH DAILY 45 tablet 5    azelastine (ASTELIN) 0.1 % nasal spray 1 spray by Nasal route 2 times daily Use in each nostril as directed 2 Bottle 1    cycloSPORINE (RESTASIS) 0.05 % ophthalmic emulsion Place 1 drop into both eyes as needed (dry eyes) 1 vial 3    Wheat Dextrin (BENEFIBER) POWD Take 4 g by mouth daily       furosemide (LASIX) 40 MG tablet Take 1 tablet by mouth daily (Patient taking differently: Take 40 mg by mouth daily 7/6/2021-f/up at cardiology office - take additional 20mg daily if weight >186#) 60 tablet 3    gabapentin (NEURONTIN) 100 MG capsule Take 100 mg by mouth 3 times daily.  busPIRone (BUSPAR) 5 MG tablet TAKE 1 OR 2 TABLET(S) IN THE EVENING AS NEEDED FOR ANXIETY 60 tablet 4    pantoprazole (PROTONIX) 40 MG tablet Take 1 tablet by mouth daily 90 tablet 0    simvastatin (ZOCOR) 40 MG tablet TAKE ONE TABLET NIGHTLY.       aspirin (ASPIRIN 81) 81 MG EC tablet Take 81 mg by mouth daily      candesartan (ATACAND) 8 MG tablet Take 8 mg by mouth daily      HYDROcodone-acetaminophen (NORCO)  MG per tablet Take 1 tablet by mouth every 6 hours as needed.  baclofen (LIORESAL) 10 MG tablet TAKE 1 TABLET EVERY 8 HOURS AS NEEDED (Patient taking differently: Take 10 mg by mouth daily ) 30 tablet 0    Misc. Devices (ROLLER [place]) MISC 1 each by Does not apply route daily 1 each 0    isosorbide mononitrate (IMDUR) 30 MG extended release tablet Take 30 mg by mouth daily      albuterol-ipratropium (COMBIVENT RESPIMAT)  MCG/ACT AERS inhaler Inhale 1 puff into the lungs every 6 hours (Patient taking differently: Inhale 1 puff into the lungs every 6 hours as needed ) 1 Inhaler 5    KLOR-CON SPRINKLE 10 MEQ extended release capsule TAKE 2 CAPSULES IN THE   MORNING AND 2 CAPSULES IN  THE EVENING 120 capsule 3    amLODIPine (NORVASC) 10 MG tablet TAKE (1) TABLET DAILY 30 tablet 5    NITROSTAT 0.4 MG SL tablet USE 1 TABLET UNDER TONGUE AS NEEDED FOR CHEST PAIN MAY REPEAT EVERY 5MIN. UP TO 3. THEN GO TO ER. 25 tablet 3    lidocaine (XYLOCAINE) 5 % ointment Apply topically as needed. 1 Tube 3        Medications patient taking as of now reconciled against medications ordered at time of hospital discharge: Yes    Chief Complaint   Patient presents with   [address] from Hospital       HPI    Inpatient course: Discharge summary reviewed- see chart. Interval history/Current status: stable. Patient presents today for a follow up in regards to recent hospitalizations. Patient reports doing well otherwise. Patient continues to be on antibiotics for UTI. Patient eating/drinking ok. Patient reports feeling weak. Patient reports taking her medications as prescribed. Patient is accompanied by her sister today. Sister reports that she continues to be on antibiotics. Patient has an appointment with cardiologist on Wednesday. Review of Systems   Constitutional: Negative. HENT: Negative. Respiratory: Negative. Gastrointestinal: Negative. Genitourinary: Negative. Musculoskeletal: Negative. Skin: Negative.     Neurological: Positive for weakness. Psychiatric/Behavioral: Negative. Vitals:    12/13/21 0959   BP: (!) 92/56   Pulse: 56   SpO2: 95%   Weight: 189 lb (85.7 kg)     Body mass index is 36.91 kg/m². Wt Readings from Last 3 Encounters:   12/13/21 189 lb (85.7 kg)   12/03/21 204 lb 3.2 oz (92.6 kg)   11/29/21 177 lb 14.4 oz (80.7 kg)     BP Readings from Last 3 Encounters:   12/13/21 (!) 92/56   12/03/21 (!) 160/65   11/29/21 (!) 143/43       Physical Exam  Constitutional:       Appearance: Normal appearance. She is not ill-appearing. HENT:      Head: Normocephalic and atraumatic. Right Ear: Tympanic membrane normal.      Left Ear: Tympanic membrane normal.      Nose: Nose normal.      Mouth/Throat:      Mouth: Mucous membranes are moist.      Pharynx: Oropharynx is clear. Eyes:      Extraocular Movements: Extraocular movements intact. Conjunctiva/sclera: Conjunctivae normal.      Pupils: Pupils are equal, round, and reactive to light. Cardiovascular:      Rate and Rhythm: Normal rate and regular rhythm. Pulses: Normal pulses. Pulmonary:      Effort: Pulmonary effort is normal.      Breath sounds: Normal breath sounds. Abdominal:      Palpations: Abdomen is soft. Tenderness: There is no abdominal tenderness. There is no right CVA tenderness, left CVA tenderness or guarding. Musculoskeletal:      Cervical back: Normal range of motion and neck supple. Comments: Trace edema BLE, non pitting limited mobility due to ongoing chronic back pain. Use of cane and [place] for ambulatory needs   Skin:     General: Skin is warm and dry. Capillary Refill: Capillary refill takes 2 to 3 seconds. Neurological:      Mental Status: She is alert and oriented to person, place, and time. Motor: Weakness present. Psychiatric:         Mood and Affect: Mood normal.         Behavior: Behavior normal.       Assessment/Plan:  1. Hospital discharge follow-up  Patient presents today for hospital follow-up. Patient has been admitted twice since previous checkup. Most recent hospitalization was from 11/30-12/4 for fall, sinus bradycardia, head injury, UTI/MARIO. Patient reports that since being home she is doing well. Patient continues to have passport services 5 days a week. Patient reports eating and drinking appropriately. Patient continues to be on antibiotics for UTI. Patient asymptomatic at this time. Patient denies any episodes of dizziness, lightheadedness, chest pain or shortness of breath. Patient's blood pressure noted to be lower today than previous visits. Patient reports that she has taken her medications as prescribed. Patient reports that her blood pressure this morning was 138/56. Patient reports that she has an appointment with her cardiologist on Wednesday therefore did recommend her discussing her blood pressure medications with them for possible adjustment.   Patient to continue with current treatment management follow-up in 3 months, sooner for new or worsening symptoms.  - MS DISCHARGE MEDS RECONCILED W/ CURRENT OUTPATIENT MED LIST        Medical Decision Making: straightforward

## 2021-12-13 NOTE — PATIENT INSTRUCTIONS
Please read the healthy family handout that you were given and share it with your family. Please compare this printed medication list with your medications at home to be sure they are the same. If you have any medications that are different please contact us immediately at [phone]. Also review your allergies that we have listed, these may also include medications that you have not been able to tolerate, make sure everything listed is correct. If you have any allergies that are different please contact us immediately at [phone].

## 2021-12-13 NOTE — DISCHARGE SUMMARY
Hospital Medicine Discharge Summary    Patient ID: Radha Arevalo      Patient's PCP: KALI Riley CNP    Admit Date: 11/24/2021     Discharge Date: 11/29/2021      Admitting Physician: Sivan Clay DO     Discharge Physician: Jani Oquendo DO     Discharge Diagnoses: 1. MARIO                                          2.Closed head injury                                          3.Hyperkalemia                                          4.Knee pain       Active Hospital Problems    Diagnosis     MARIO (acute kidney injury) ([facility] [address] [N17.9]        The patient was seen and examined on day of discharge and this discharge summary is in conjunction with any daily progress note from day of discharge. Hospital Course: Pt was admitted for fall that involved a scalp laceration   and closed head head injury. She was found to have MARIO and was seen by nephrology for this. She complained of left knee pain and had  a negative xray. PT worked with her and had her up and weight bearing. She discharged and returned back to her nursing home facility. Physical Exam Performed:     BP (!) 143/43   Pulse 58   Temp 98.2 °F (36.8 °C) (Oral)   Resp 16   Ht 5' (1.524 m)   Wt 177 lb 14.4 oz (80.7 kg)   LMP  (LMP Unknown)   SpO2 97%   BMI 34.74 kg/m²       General appearance:  No apparent distress, appears stated age and cooperative. HEENT:  Normal cephalic, atraumatic without obvious deformity. Pupils equal, round, and reactive to light. Extra ocular muscles intact. Conjunctivae/corneas clear. Neck: Supple, with full range of motion. No jugular venous distention. Trachea midline. Respiratory:  Normal respiratory effort. Clear to auscultation, bilaterally without Rales/Wheezes/Rhonchi. Cardiovascular:  Regular rate and rhythm with normal S1/S2 without murmurs, rubs or gallops. Abdomen: Soft, non-tender, non-distended with normal bowel sounds. Musculoskeletal:  No clubbing, cyanosis or edema bilaterally.   Full range of motion without deformity. Skin: Skin color, texture, turgor normal.  No rashes or lesions. Neurologic:  Neurovascularly intact without any focal sensory/motor deficits. Cranial nerves: II-XII intact, grossly non-focal.  Psychiatric:  Alert and oriented, thought content appropriate, normal insight  Capillary Refill: Brisk,< 3 seconds   Peripheral Pulses: +2 palpable, equal bilaterally       Labs: For convenience and continuity at follow-up the following most recent labs are provided:      CBC:    Lab Results   Component Value Date    WBC 7.4 12/03/2021    HGB 9.1 12/03/2021    HCT 28.2 12/03/2021     12/03/2021       Renal:    Lab Results   Component Value Date     12/03/2021    K 4.2 12/03/2021     12/03/2021    CO2 26 12/03/2021    BUN 14 12/03/2021    CREATININE 0.9 12/03/2021    CALCIUM 8.4 12/03/2021    PHOS 4.6 11/24/2021         Significant Diagnostic Studies    Radiology:   XR KNEE LEFT (1-2 VIEWS)   Final Result   Left knee prior arthroplasty with anatomic alignment and intact hardware. No   acute fracture. CT CHEST ABDOMEN PELVIS WO CONTRAST   Final Result   1. No acute traumatic abnormality. 2. Diverticulosis without scan evidence for diverticulitis. CT Head WO Contrast   Final Result   No acute intracranial abnormality. Mild soft tissue swelling left frontal scalp, likely soft tissue contusion. No acute bony abnormality. CT Cervical Spine WO Contrast   Final Result   No acute abnormality of the cervical spine. XR PELVIS (1-2 VIEWS)   Final Result   No acute osseous abnormality. If there is continued clinical concern for occult fracture, follow-up   radiographs or MRI may be helpful for further evaluation. XR LUMBAR SPINE (2-3 VIEWS)   Final Result   Remote postsurgical changes L3 through S1 and degenerative changes at L1-L2   with grade 1 listhesis. No acute osseous abnormality.          XR CHEST PORTABLE   Final Result   No (LASIX) 40 MG tablet Take 1 tablet by mouth daily, Disp-60 tablet, R-3Normal      gabapentin (NEURONTIN) 100 MG capsule Take 100 mg by mouth 3 times daily. Historical Med      busPIRone (BUSPAR) 5 MG tablet TAKE 1 OR 2 TABLET(S) IN THE EVENING AS NEEDED FOR ANXIETY, Disp-60 tablet, R-4Normal      pantoprazole (PROTONIX) 40 MG tablet Take 1 tablet by mouth daily, Disp-90 tablet,R-0Normal      simvastatin (ZOCOR) 40 MG tablet TAKE ONE TABLET NIGHTLY. Historical Med      aspirin (ASPIRIN 81) 81 MG EC tablet Take 81 mg by mouth dailyHistorical Med      candesartan (ATACAND) 8 MG tablet Take 8 mg by mouth dailyHistorical Med      HYDROcodone-acetaminophen (NORCO)  MG per tablet Take 1 tablet by mouth every 6 hours as needed. Historical Med      baclofen (LIORESAL) 10 MG tablet TAKE 1 TABLET EVERY 8 HOURS AS NEEDED, Disp-30 tablet, R-0Normal      Misc. Devices (ROLLER [place]) MISC DAILY Starting Wed 5/22/2019, Disp-1 each, R-0, Print      isosorbide mononitrate (IMDUR) 30 MG extended release tablet Take 30 mg by mouth dailyHistorical Med      albuterol-ipratropium (COMBIVENT RESPIMAT)  MCG/ACT AERS inhaler Inhale 1 puff into the lungs every 6 hours, Disp-1 Inhaler, R-5Normal      KLOR-CON SPRINKLE 10 MEQ extended release capsule TAKE 2 CAPSULES IN THE   MORNING AND 2 CAPSULES IN  THE EVENING, Disp-120 capsule, R-3Normal      amLODIPine (NORVASC) 10 MG tablet TAKE (1) TABLET DAILY, Disp-30 tablet, R-5      NITROSTAT 0.4 MG SL tablet USE 1 TABLET UNDER TONGUE AS NEEDED FOR CHEST PAIN MAY REPEAT EVERY 5MIN. UP TO 3. THEN GO TO ER., Disp-25 tablet, R-3      lidocaine (XYLOCAINE) 5 % ointment Apply topically as needed. , Disp-1 Tube, R-3, Normal             Time Spent on discharge is more than 15 minutes in the examination, evaluation, counseling and review of medications and discharge plan.       Signed:    Kathleen Sherman DO   12/13/2021      Thank you KALI Lorenzana - HENRIETTA for the opportunity to be involved in this patient's care. If you have any questions or concerns please feel free to contact me at [phone].

## 2021-12-16 ENCOUNTER — CARE COORDINATION (OUTPATIENT)
Dept: CASE MANAGEMENT | Age: 81
End: 2021-12-16

## 2021-12-16 NOTE — CARE COORDINATION
[facility] Transitions Follow Up Call    2021    Patient: Feliz Tobias  Patient : 1940   MRN: [id number]  Reason for Admission: MARIO, hypotension in patient with hx HTN, E Coli UTI, bradycardia, Alz Dementia with worsening confusion, CAD, chronic dCHF, s/p TAVR, chronic pain, anxiety/depression, closed head injury with healed laceration to scalp, debility, anemia, hypomagnesemia. Discharge Date: 12/3/21 RARS: Readmission Risk Score: 23.4 ( )         Spoke with: Feliz Tobias (patient)    Went to cardiology office yesterday and her HR was 43. He called and instructed her to stop metoprolol immediately. She already took it today. Her BLE are swollen and tight up to her knees. She can't find compression stockings to fit. CTN encouraged elevation at rest and mobility. Labwork was done and she states her \"kidneys are shutting down\". States her potassium was 8 and they stopped her furosemide as well. States she has another appt on 2021 with Dr Juli Hernandez at [facility]. Reviewed fluid and salt restrictions and states they are being honored. [facility]. continues. Therapy visited today and also instructed her to be mobile each hour and do exercises for ROM. She denies needs at this time. States she needs some oxygen tanks to sleep with at night. She does not have oxygen at home. States the cardiology office told her to go to PCP to set it up and she needs it right away. Discussed that oxygen testing needs to be done and cannot just be ordered. Not sure if she would require a walk test and/or overnight sleep study. CTN attempted to schedule next day appt with PCP but no availability in Epic. David Goodson was agreeable to call being routed to PCP office to see about testing/appt/etc. Suresh Leeer at PCP office answered phone.  After discussion it was decided that Suresh Backer will discuss patient's request for oxygen at night and that she was instructed by her cardiology office to get the order from her PCP. They will notify patient of plan/testing/etc. Patient is in agreement with plan. Care Transitions Subsequent and Final Call    Schedule Follow Up Appointment with PCP: Completed  Subsequent and Final Calls  Do you have any ongoing symptoms?: Yes  Onset of Patient-reported symptoms: Other  Patient-reported symptoms: Other  Interventions for patient-reported symptoms: Notified PCP/Physician  Have your medications changed?: Yes  Patient Reports: cardiology stopped metoprolol and furosemide  Do you have any questions related to your medications?: No  Do you currently have any active services?: Yes  Are you currently active with any services?: Outpatient/Community Services, Home Health  Do you have any needs or concerns that I can assist you with?: Yes  Patient-reported Needs or Concerns: \"I need oxygen tanks\"  Identified Barriers: Other  Care Transitions Interventions  Other Interventions:            Follow Up  Future Appointments   Date Time Provider Carla Sims   3/29/2022 10:20 AM KALI Arboleda - CNP [facility] Abbie Camarillo RN

## 2021-12-20 ENCOUNTER — HOSPITAL ENCOUNTER (OUTPATIENT)
Age: 81
Setting detail: SPECIMEN
Discharge: HOME | End: 2021-12-20
Payer: MEDICARE

## 2021-12-20 LAB
ALBUMIN SERPL-MCNC: 4.1 G/DL (ref 3.4–5)
ANION GAP SERPL CALCULATED.3IONS-SCNC: 9 MMOL/L (ref 3–16)
BUN BLDV-MCNC: 35 MG/DL (ref 7–20)
CALCIUM SERPL-MCNC: 9.4 MG/DL (ref 8.3–10.6)
CHLORIDE BLD-SCNC: 106 MMOL/L (ref 99–110)
CO2: 27 MMOL/L (ref 21–32)
CREAT SERPL-MCNC: 2.2 MG/DL (ref 0.6–1.2)
GFR AFRICAN AMERICAN: 26
GFR NON-AFRICAN AMERICAN: 21
GLUCOSE BLD-MCNC: 82 MG/DL (ref 70–99)
PHOSPHORUS: 3.4 MG/DL (ref 2.5–4.9)
POTASSIUM SERPL-SCNC: 4.6 MMOL/L (ref 3.5–5.1)
SODIUM BLD-SCNC: 142 MMOL/L (ref 136–145)

## 2021-12-20 PROCEDURE — 80069 RENAL FUNCTION PANEL: CPT

## 2021-12-21 ENCOUNTER — TELEPHONE (OUTPATIENT)
Dept: FAMILY MEDICINE CLINIC | Age: 81
End: 2021-12-21

## 2021-12-21 NOTE — TELEPHONE ENCOUNTER
Per Lillian Dalton patient needs a referral for a sleep study. Will need to do a new referral. Dr. Veronica Delgadillo' s it has been more than 3 years.

## 2021-12-22 ENCOUNTER — CARE COORDINATION (OUTPATIENT)
Dept: CASE MANAGEMENT | Age: 81
End: 2021-12-22

## 2021-12-22 NOTE — CARE COORDINATION
[facility] Transitions Follow Up Call    2021    Patient: Olivia Posada  Patient : 1940   MRN: [id number]  Reason for Admission: MARIO, hypotension in patient with hx HTN, E Coli UTI, bradycardia, Alz Dementia with worsening confusion, CAD, chronic dCHF, s/p TAVR, chronic pain, anxiety/depression, closed head injury with healed laceration to scalp, debility, anemia, hypomagnesemia. Discharge Date: 12/3/21 RARS: Readmission Risk Score: 23.4 ( )         Spoke with: Olivia Posada (patient)    Completed OV with [facility] yesterday. Has echocardiogram scheduled. Her PCP office is setting her up with new pulm who can evaluate for sleep study. She is active with [facility] and knows how to reach them for prn needs. SN draws labwork weekly for Dr Elvis Ramírez. She is weighing and doing VS daily and nurse calls in to Elvis Ramírez as well. She denies needs today. Care Transitions Subsequent and Final Call    Schedule Follow Up Appointment with PCP: Completed  Subsequent and Final Calls  Do you have any ongoing symptoms?: No  Have your medications changed?: No  Do you have any questions related to your medications?: No  Do you currently have any active services?: Yes  Are you currently active with any services?: Outpatient/Community Services, Home Health  Do you have any needs or concerns that I can assist you with?: No  Care Transitions Interventions  No Identified Needs  Other Interventions:            Follow Up  Future Appointments   Date Time Provider Carla Sims   3/29/2022 10:20 AM Jenell Cranker[facility]

## 2021-12-28 ENCOUNTER — HOSPITAL ENCOUNTER (OUTPATIENT)
Age: 81
Setting detail: SPECIMEN
Discharge: HOME | End: 2021-12-28
Payer: MEDICARE

## 2021-12-28 LAB
ALBUMIN SERPL-MCNC: 4 G/DL (ref 3.4–5)
ANION GAP SERPL CALCULATED.3IONS-SCNC: 12 MMOL/L (ref 3–16)
BUN BLDV-MCNC: 20 MG/DL (ref 7–20)
CALCIUM SERPL-MCNC: 9.4 MG/DL (ref 8.3–10.6)
CHLORIDE BLD-SCNC: 108 MMOL/L (ref 99–110)
CO2: 23 MMOL/L (ref 21–32)
CREAT SERPL-MCNC: 1.6 MG/DL (ref 0.6–1.2)
GFR AFRICAN AMERICAN: 37
GFR NON-AFRICAN AMERICAN: 31
GLUCOSE BLD-MCNC: 119 MG/DL (ref 70–99)
PHOSPHORUS: 3.3 MG/DL (ref 2.5–4.9)
POTASSIUM SERPL-SCNC: 4.2 MMOL/L (ref 3.5–5.1)
SODIUM BLD-SCNC: 143 MMOL/L (ref 136–145)

## 2021-12-28 PROCEDURE — 80069 RENAL FUNCTION PANEL: CPT

## 2021-12-28 RX ORDER — CITALOPRAM 20 MG/1
30 TABLET ORAL DAILY
Qty: 45 TABLET | Refills: 5 | Status: SHIPPED | OUTPATIENT
Start: 2021-12-28 | End: 2022-07-11

## 2021-12-29 ENCOUNTER — CARE COORDINATION (OUTPATIENT)
Dept: CASE MANAGEMENT | Age: 81
End: 2021-12-29

## 2021-12-29 DIAGNOSIS — J30.9 ALLERGIC RHINITIS, UNSPECIFIED SEASONALITY, UNSPECIFIED TRIGGER: ICD-10-CM

## 2021-12-29 RX ORDER — CETIRIZINE HYDROCHLORIDE 10 MG/1
10 TABLET ORAL DAILY
Qty: 30 TABLET | Refills: 3 | Status: SHIPPED | OUTPATIENT
Start: 2021-12-29 | End: 2022-03-18 | Stop reason: SDUPTHER

## 2021-12-29 NOTE — CARE COORDINATION
[facility] Transitions Follow Up Call    2021    Patient: Rg Chapman  Patient : 1940   MRN: [id number]  Reason for Admission: MARIO, hypotension in patient with hx HTN, E Coli UTI, bradycardia, Alz Dementia with worsening confusion, CAD, chronic dCHF, s/p TAVR, chronic pain, anxiety/depression, closed head injury with healed laceration to scalp, debility, anemia, hypomagnesemia. Discharge Date: 12/3/21 RARS: Readmission Risk Score: 23.4 ( )         Spoke with: Rg Chapman (patient)    Has plans in late January for echocardiogram and other cardiac testing. Active with [facility]. The SN visits weekly on Tuesday and was there yesterday. Had labwork drawn for Dr Parth Sierra. Feeling weak today. Expects PT tomorrow. Denies fall/injury. States her weight fluctuates 183-189#. She limits her salt and fluid intake. She denies needs at this time. She knows how to reach [facility] for prn concerns. CTN outreach complete. Care Transitions Subsequent and Final Call    Schedule Follow Up Appointment with PCP: Completed  Subsequent and Final Calls  Do you have any ongoing symptoms?: Yes  Onset of Patient-reported symptoms: Other  Patient-reported symptoms: Weakness  Interventions for patient-reported symptoms: Other  Have your medications changed?: No  Do you have any questions related to your medications?: No  Do you currently have any active services?: Yes  Are you currently active with any services?: Outpatient/Community Services, Home Health  Do you have any needs or concerns that I can assist you with?: No  Identified Barriers: Impairment  Care Transitions Interventions  No Identified Needs  Other Interventions:            Follow Up  Future Appointments   Date Time Provider Carla Sims   3/29/2022 10:20 AM KALI Lorenzana - CNP [facility]  Abbie Mccarty RN

## 2022-01-04 ENCOUNTER — HOSPITAL ENCOUNTER (OUTPATIENT)
Age: 82
Discharge: HOME | End: 2022-01-04
Payer: MEDICARE

## 2022-01-04 LAB
ALBUMIN SERPL-MCNC: 4.1 G/DL (ref 3.4–5)
ANION GAP SERPL CALCULATED.3IONS-SCNC: 11 MMOL/L (ref 3–16)
BUN BLDV-MCNC: 18 MG/DL (ref 7–20)
CALCIUM SERPL-MCNC: 9.1 MG/DL (ref 8.3–10.6)
CHLORIDE BLD-SCNC: 106 MMOL/L (ref 99–110)
CO2: 25 MMOL/L (ref 21–32)
CREAT SERPL-MCNC: 1.2 MG/DL (ref 0.6–1.2)
GFR AFRICAN AMERICAN: 52
GFR NON-AFRICAN AMERICAN: 43
GLUCOSE BLD-MCNC: 68 MG/DL (ref 70–99)
PHOSPHORUS: 2.7 MG/DL (ref 2.5–4.9)
POTASSIUM SERPL-SCNC: 4.2 MMOL/L (ref 3.5–5.1)
SODIUM BLD-SCNC: 142 MMOL/L (ref 136–145)

## 2022-01-04 PROCEDURE — 80069 RENAL FUNCTION PANEL: CPT

## 2022-01-04 PROCEDURE — 36415 COLL VENOUS BLD VENIPUNCTURE: CPT

## 2022-01-05 ENCOUNTER — TELEPHONE (OUTPATIENT)
Dept: FAMILY MEDICINE CLINIC | Age: 82
End: 2022-01-05

## 2022-01-05 NOTE — TELEPHONE ENCOUNTER
Refill Request     Last Seen: Last Seen Department: 12/13/21  Last Seen by PCP: 12/13/2021    Last Written: 8/23/21 60 g 1 refill                           12/8/21 510 g 1 refill     Next Appointment: 3/29/22     Future Appointments   Date Time Provider Carla Sims   3/29/2022 10:20 AM KALI Robledo - CNP [facility] [place] [facility]       Future appointment scheduled      Requested Prescriptions     Pending Prescriptions Disp Refills    [address] [address] UNIT/GM powder [Pharmacy Med Name: [id number] [id number] UNIT/GM POWD [id number] Powder] 60 g 1     Sig: APPLY TO AFFECTED AREA(S) 3 TIMES DAILY    polyethylene glycol (GLYCOLAX) 17 GM/SCOOP powder [Pharmacy Med Name: POLYETHYLENE GLYCOL 3350 PO 17 Powder] 510 g 1     Sig: DISSOLVE AND DRINK 17 GRAMS DAILY AS DIRECTED

## 2022-01-05 NOTE — TELEPHONE ENCOUNTER
Patient was referred to pulmonology for a sleep study. Luh was booked to fat out. [facility] They are out of network for her insurance. Patient was advised she will need to contact her insurance to get a list of pulmonary providers that are in her Network.

## 2022-01-06 RX ORDER — POLYETHYLENE GLYCOL 3350 17 G/17G
POWDER, FOR SOLUTION ORAL
Qty: 510 G | Refills: 1 | Status: SHIPPED | OUTPATIENT
Start: 2022-01-06

## 2022-01-06 RX ORDER — NYSTATIN 100000 [USP'U]/G
POWDER TOPICAL
Qty: 60 G | Refills: 1 | Status: SHIPPED | OUTPATIENT
Start: 2022-01-06 | End: 2022-04-11 | Stop reason: SDUPTHER

## 2022-01-11 ENCOUNTER — HOSPITAL ENCOUNTER (OUTPATIENT)
Age: 82
Discharge: HOME | End: 2022-01-11
Payer: MEDICARE

## 2022-01-11 LAB
ALBUMIN SERPL-MCNC: 4.1 G/DL (ref 3.4–5)
ANION GAP SERPL CALCULATED.3IONS-SCNC: 10 MMOL/L (ref 3–16)
BUN BLDV-MCNC: 25 MG/DL (ref 7–20)
CALCIUM SERPL-MCNC: 9.3 MG/DL (ref 8.3–10.6)
CHLORIDE BLD-SCNC: 108 MMOL/L (ref 99–110)
CO2: 26 MMOL/L (ref 21–32)
CREAT SERPL-MCNC: 1.5 MG/DL (ref 0.6–1.2)
GFR AFRICAN AMERICAN: 40
GFR NON-AFRICAN AMERICAN: 33
GLUCOSE BLD-MCNC: 98 MG/DL (ref 70–99)
PHOSPHORUS: 3.5 MG/DL (ref 2.5–4.9)
POTASSIUM SERPL-SCNC: 4.4 MMOL/L (ref 3.5–5.1)
SODIUM BLD-SCNC: 144 MMOL/L (ref 136–145)

## 2022-01-11 PROCEDURE — 80069 RENAL FUNCTION PANEL: CPT

## 2022-01-18 ENCOUNTER — HOSPITAL ENCOUNTER (OUTPATIENT)
Age: 82
Setting detail: SPECIMEN
Discharge: HOME | End: 2022-01-18
Payer: MEDICARE

## 2022-01-18 LAB
ALBUMIN SERPL-MCNC: 4.1 G/DL (ref 3.4–5)
ANION GAP SERPL CALCULATED.3IONS-SCNC: 10 MMOL/L (ref 3–16)
BUN BLDV-MCNC: 22 MG/DL (ref 7–20)
CALCIUM SERPL-MCNC: 9.3 MG/DL (ref 8.3–10.6)
CHLORIDE BLD-SCNC: 107 MMOL/L (ref 99–110)
CO2: 26 MMOL/L (ref 21–32)
CREAT SERPL-MCNC: 1.3 MG/DL (ref 0.6–1.2)
GFR AFRICAN AMERICAN: 47
GFR NON-AFRICAN AMERICAN: 39
GLUCOSE BLD-MCNC: 118 MG/DL (ref 70–99)
PHOSPHORUS: 4 MG/DL (ref 2.5–4.9)
POTASSIUM SERPL-SCNC: 4.1 MMOL/L (ref 3.5–5.1)
SODIUM BLD-SCNC: 143 MMOL/L (ref 136–145)

## 2022-01-18 PROCEDURE — 80069 RENAL FUNCTION PANEL: CPT

## 2022-01-26 NOTE — PROGRESS NOTES
Health Maintenance Due   Topic Date Due   • Pneumococcal Vaccine 65+ (2 of 2 - PPSV23) 08/01/2019   • Colonoscopy Risk  12/06/2021       Patient is due for topics as listed above but is not proceeding with Immunization(s) Pneumococcal at this time.      Hospitalist Progress Note      PCP: Stuart Neumann, APRN - CNP    Date of Admission: 11/24/2021    Chief Complaint: fall    Hospital Course: 25-year-old female with past medical history of COPD, depression, hyperlipidemia, hypertension, sleep apnea on CPAP, TIA, chronic pain admitted after a fall at doctor's office.       Subjective: Patient complains of pain all over denies any chest pain or shortness of breath no nausea vomiting she walks with a cane and walker at home history of chronic pain syndrome on oxycodone by pain management. Medications:  Reviewed    Infusion Medications    sodium chloride      dextrose       Scheduled Medications    sodium zirconium cyclosilicate  10 g Oral TID    sodium chloride flush  10 mL IntraVENous 2 times per day    heparin (porcine)  5,000 Units SubCUTAneous 3 times per day    atorvastatin  20 mg Oral Daily    pantoprazole  40 mg Oral Daily    trospium  20 mg Oral Daily    gabapentin  100 mg Oral TID    divalproex  250 mg Oral BID    aspirin  81 mg Oral Daily    azelastine  1 spray Each Nostril BID    donepezil  10 mg Oral Nightly     PRN Meds: HYDROcodone 5 mg - acetaminophen, ipratropium-albuterol, sodium chloride flush, sodium chloride, promethazine **OR** ondansetron, acetaminophen **OR** acetaminophen, glucose, dextrose, glucagon (rDNA), dextrose      Intake/Output Summary (Last 24 hours) at 11/26/2021 0938  Last data filed at 11/26/2021 0514  Gross per 24 hour   Intake 840 ml   Output 1300 ml   Net -460 ml       Physical Exam Performed:    BP (!) 137/55   Pulse 74   Temp 98.4 °F (36.9 °C) (Axillary)   Resp 12   Ht 5' (1.524 m)   Wt 186 lb 15.2 oz (84.8 kg)   LMP  (LMP Unknown)   SpO2 97%   BMI 36.51 kg/m²     General appearance: No apparent distress, appears stated age and cooperative. HEENT: Pupils equal, round, and reactive to light. Conjunctivae/corneas clear. Neck: Supple, with full range of motion. No jugular venous distention.  Trachea midline. Respiratory:  Normal respiratory effort. Clear to auscultation, bilaterally without Rales/Wheezes/Rhonchi. Cardiovascular: Regular rate and rhythm with normal S1/S2 without murmurs, rubs or gallops. Abdomen: Soft, non-tender, non-distended with normal bowel sounds. Musculoskeletal: No clubbing, cyanosis or edema bilaterally. Full range of motion without deformity. Skin: Skin color, texture, turgor normal.  No rashes or lesions. Neurologic:  Neurovascularly intact without any focal sensory/motor deficits. Cranial nerves: II-XII intact, grossly non-focal.  Psychiatric: Alert and oriented, thought content appropriate, normal insight  Capillary Refill: Brisk,3 seconds, normal   Peripheral Pulses: +2 palpable, equal bilaterally       Labs:   Recent Labs     11/24/21  1520 11/25/21  0949 11/26/21  0459   WBC 10.7 7.2 7.4   HGB 11.3* 11.1* 10.7*   HCT 35.0* 33.7* 32.3*    164 161     Recent Labs     11/24/21  1848 11/24/21  2235 11/25/21  0949 11/25/21  1322 11/26/21  0500      < > 139 136 139   K 5.9*   < > 6.2* 5.8* 5.2*      < > 102 102 104   CO2 22   < > 28 25 27   BUN 32*   < > 30* 29* 26*   CREATININE 3.0*   < > 2.8* 2.3* 1.8*   CALCIUM 9.2   < > 9.5 9.1 8.4   PHOS 4.6  --   --   --   --     < > = values in this interval not displayed. Recent Labs     11/24/21  1639   AST 9*   ALT <5*   BILITOT <0.2   ALKPHOS 81     Recent Labs     11/24/21  1935   INR 1.03     Recent Labs     11/24/21  1639   TROPONINI <0.01       Urinalysis:      Lab Results   Component Value Date    NITRU Negative 11/24/2021    WBCUA 17 06/14/2019    BACTERIA 1+ 06/14/2019    RBCUA 0 06/14/2019    BLOODU Negative 11/24/2021    SPECGRAV 1.020 11/24/2021    GLUCOSEU Negative 11/24/2021    GLUCOSEU Neg 05/12/2011       Radiology:  XR KNEE LEFT (1-2 VIEWS)   Final Result   Left knee prior arthroplasty with anatomic alignment and intact hardware. No   acute fracture.          CT CHEST ABDOMEN PELVIS WO CONTRAST Final Result   1. No acute traumatic abnormality. 2. Diverticulosis without scan evidence for diverticulitis. CT Head WO Contrast   Final Result   No acute intracranial abnormality. Mild soft tissue swelling left frontal scalp, likely soft tissue contusion. No acute bony abnormality. CT Cervical Spine WO Contrast   Final Result   No acute abnormality of the cervical spine. XR PELVIS (1-2 VIEWS)   Final Result   No acute osseous abnormality. If there is continued clinical concern for occult fracture, follow-up   radiographs or MRI may be helpful for further evaluation. XR LUMBAR SPINE (2-3 VIEWS)   Final Result   Remote postsurgical changes L3 through S1 and degenerative changes at L1-L2   with grade 1 listhesis. No acute osseous abnormality. XR CHEST PORTABLE   Final Result   No acute abnormality. Stable cardiomegaly. Assessment/Plan:    Active Hospital Problems    Diagnosis     MARIO (acute kidney injury) ([facility] [address] [N17.9]      1. This is a 76-year-old female admitted after a fall with laceration requiring staples on the head , CT of the head negative in the emergency room ,continue with the supportive care. 2.  Hyperkalemia given 1 dose of Kayexalate on 11/25/2021 nephrology consulted and following. 3.  Acute renal failure nephrology consulted. Acute kidney injury continues to improve check daily BMP. 4.  Left knee pain x-ray of the knee negative, history of chronic pain syndrome continue with home medication. 5.  Consult physical Occupational Therapy    DVT Prophylaxis: Heparin subcu  Diet: ADULT DIET;  Regular; Low Potassium (Less than 3000 mg/day)  Code Status: Full Code    PT/OT Eval Status: Mirna Clay MD

## 2022-02-09 DIAGNOSIS — F41.9 ANXIETY: ICD-10-CM

## 2022-02-09 RX ORDER — BUSPIRONE HYDROCHLORIDE 5 MG/1
TABLET ORAL
Qty: 60 TABLET | Refills: 4 | Status: SHIPPED | OUTPATIENT
Start: 2022-02-09 | End: 2022-08-26

## 2022-03-03 DIAGNOSIS — F51.01 PRIMARY INSOMNIA: ICD-10-CM

## 2022-03-03 RX ORDER — TRAZODONE HYDROCHLORIDE 100 MG/1
TABLET ORAL
Qty: 60 TABLET | Refills: 3 | Status: SHIPPED | OUTPATIENT
Start: 2022-03-03 | End: 2022-09-16

## 2022-03-18 DIAGNOSIS — J30.9 ALLERGIC RHINITIS, UNSPECIFIED SEASONALITY, UNSPECIFIED TRIGGER: ICD-10-CM

## 2022-03-18 RX ORDER — CETIRIZINE HYDROCHLORIDE 10 MG/1
10 TABLET ORAL DAILY
Qty: 90 TABLET | Refills: 1 | Status: SHIPPED | OUTPATIENT
Start: 2022-03-18 | End: 2022-09-14

## 2022-03-18 NOTE — TELEPHONE ENCOUNTER
Future appt scheduled 03/29/2022             Last appt 12/13/2022    Last Written     cetirizine (ZYRTEC) 10 MG tablet  12/29/2021  #30  3 RF       Refilled medication per verbal order from provider.

## 2022-04-07 ENCOUNTER — HOSPITAL ENCOUNTER (OUTPATIENT)
Dept: PHYSICAL THERAPY | Age: 82
Setting detail: THERAPIES SERIES
Discharge: HOME | End: 2022-04-07
Payer: MEDICARE

## 2022-04-07 PROCEDURE — 97112 NEUROMUSCULAR REEDUCATION: CPT

## 2022-04-07 PROCEDURE — 97163 PT EVAL HIGH COMPLEX 45 MIN: CPT

## 2022-04-07 ASSESSMENT — PAIN DESCRIPTION - DESCRIPTORS: DESCRIPTORS: ACHING;SORE;SHARP

## 2022-04-07 ASSESSMENT — PAIN DESCRIPTION - ORIENTATION: ORIENTATION: RIGHT;LEFT;UPPER;LOWER

## 2022-04-07 ASSESSMENT — PAIN DESCRIPTION - PAIN TYPE: TYPE: CHRONIC PAIN

## 2022-04-07 ASSESSMENT — PAIN SCALES - GENERAL: PAINLEVEL_OUTOF10: 9

## 2022-04-07 ASSESSMENT — PAIN DESCRIPTION - FREQUENCY: FREQUENCY: CONTINUOUS

## 2022-04-07 NOTE — FLOWSHEET NOTE
[facility], [facility], [address] [place] [address]  Phone: [phone]  Fax [phone]    Physical Therapy Daily Treatment Note    Date:  2022    Patient Name:  Kb Bonilla    :  1940  MRN: [id number]  Restrictions/Precautions:    Medical/Treatment Diagnosis Information:  · Diagnosis: M51.36 (ICD-10-CM) - DDD (degenerative disc disease), lumbar  Insurance/Certification information:  PT Insurance Information: [employer] Medicare and Medicaid  Physician Information:  Referring Practitioner: Elly Johnson MD  Plan of care signed (Y/N):  N  Visit# / total visits:  1/10     G-Code (if applicable):           Physical FS Primary Measure 40  Predicted Points of Change  4  Predicted # of visits   13  Predicted Discharge FS Score 44      Medicare Cap (if applicable):  210 = total amount used, updated 2022    Time in:   12:30      Timed Treatment: 10 Total Treatment Time:  45  Time out: 1:15  ________________________________________________________________________________________    Pain Level:    /10  SUBJECTIVE:  See initial eval    OBJECTIVE:     Exercise/Equipment Resistance/Repetitions Other comments                                                                                                                                             Other Therapeutic Activities:  Education regarding POC including demonstration with models of anatomy and physiology in order to maximize benefits of treatment; total neuro re-ed 10 minutes    Manual Treatments:         Modalities:      Test/Measurements:  See initial eval       ASSESSMENT:     See initial eval    Treatment/Activity Tolerance:   []Patient tolerated treatment well [] Patient limited by fatique  [x]Patient limited by pain [] Patient limited by other medical complications  [] Other:     Goals:    Short term goals  Time Frame for Short term goals: 5 weeks  Short term goal 1: pt will tolerate 30 minutes of aquatic therapy  Short term goal 2: pt will be indep in HEP  Short term goal 3: pt will decrease pain 25-50%  Short term goal 4: pt will decreased TUG score by 10 sec and increase LUCERO by 10 points in order to decrease risk for falls  Short term goal 5: pt will return to traveling to see grandchildren           Plan: [] Continue per plan of care [] Alter current plan (see comments)   [x] Plan of care initiated [] Hold pending MD visit [] Discharge      Plan for Next Session:  Aquatic PT    Re-Certification Due Date:         Signature:  Salvador Cornejo PT

## 2022-04-07 NOTE — PROGRESS NOTES
LUCERO BALANCE SCALE 14-Item Long Form Original Version     Patient Name: Flynn Ramirez   Date: 4/7/2022    1. SITTING TO STANDING   INSTRUCTIONS: Please stand up. Try not to use your hands for support. (4) able to stand without using hands and stabilize independently   (3) able to stand independently using hands   (2) able to stand using hands after several tries   (1) needs minimal aid to stand or to stabilize   (0) needs moderate or maximal assist to stand   Score: 3    2. STANDING UNSUPPORTED   INSTRUCTIONS: Please stand for two minutes without holding. (4) able to stand safely 2 minutes   (3) able to stand 2 minutes with supervision   (2) able to stand 30 seconds unsupported   (1) needs several tries to stand 30 seconds unsupported   (0) unable to stand 30 seconds unassisted If a subject is able to stand 2   minutes unsupported, score full points for sitting unsupported. Proceed to   item #4. Score: 3     3. SITTING WITH BACK UNSUPPORTED BUT FEET SUPPORTED   ON FLOOR OR ON A STOOL   INSTRUCTIONS: Please sit with arms folded for 2 minutes. (4) able to sit safely and securely 2 minutes   (3) able to sit 2 minutes under supervision   (2) able to sit 30 seconds   (1) able to sit 10 seconds   (0) unable to sit without support 10 seconds   Score: 4     4. STANDING TO SITTING   INSTRUCTIONS: Please sit down. (4) sits safely with minimal use of hands   (3) controls descent by using hands   (2) uses back of legs against chair to control descent   (1) sits independently but has uncontrolled descent   (0) needs assistance to sit   Score: 3    5. TRANSFERS   INSTRUCTIONS: Arrange chairs(s) for a pivot transfer. Ask subject to   transfer one way toward a seat with armrests and one way toward a seat   without armrests. You may use two chairs (one with and one without   armrests) or a bed and a chair.    (4) able to transfer safely with minor use of hands   (3) able to transfer safely definite need of hands   (2) able to transfer with verbal cueing and/or supervision   (1) needs one person to assist   (0) needs two people to assist or supervise to be safe   Score: 3    6. STANDING UNSUPPORTED WITH EYES CLOSED   INSTRUCTIONS: Please close your eyes and stand still for 10 seconds. (4) able to stand 10 seconds safely   (3) able to stand 10 seconds with supervision   (2) able to stand 3 seconds   (1) unable to keep eyes closed 3 seconds but stays steady   (0) needs help to keep from falling   Score: 3    7. STANDING UNSUPPORTED WITH FEET TOGETHER   INSTRUCTIONS: Place your feet together and stand without holding. (4) able to place feet together independently and stand 1 minute safely   (3) able to place feet together independently and stand for 1 minute with   supervision   (2) able to place feet together independently but unable to hold for 30 seconds   (1) needs help to attain position but able to stand 15 seconds feet together   (0) needs help to attain position and unable to hold for 15 seconds   Score: 2    8. REACHING FORWARD WITH OUTSTRETCHED ARM WHILE   STANDING   INSTRUCTIONS: Lift arm to 90 degrees. Stretch out your fingers and reach   forward as far as you can. (Examiner places a ruler at end of fingertips when   arm is at 90 degrees. Fingers should not touch the ruler while reaching   forward. The recorded measure is the distance forward that the finger reaches   while the subject is in the most forward lean position. When possible, ask   subject to use both arms when reaching to avoid rotation of the trunk.). (4) can reach forward confidently >25 cm (10 inches)   (3) can reach forward >12 cm safely (5 inches)   (2) can reach forward >5 cm safely (2 inches)   (1) reaches forward but needs supervision   (0) loses balance while trying/requires external support   Score: 2    9.  OBJECT FROM FLOOR FROM A STANDING POSITION   INSTRUCTIONS:  shoe/slipper which is placed in front of your feet.    (4) able to  slipper safely and easily   (3) able to  slipper but needs supervision   (2) unable to  but reaches 2-5cm (1-2 inches) from slipper and keeps   balance independently   (1) unable to  and needs supervision while trying   (0) unable to try/needs assist to keep from losing balance or falling   Score: 1    10. TURNING TO LOOK BEHIND OVER LEFT AND RIGHT   SHOULDERS WHILE STANDING   INSTRUCTIONS: Turn to look directly behind you over toward left shoulder. Repeat to the right. Examiner may pick an object to look at directly behind the   subject to encourage a better twist turn. (4) looks behind from both sides and weight shifts well   (3) looks behind one side only other side shows less weight shift   (2) turns sideways only but maintains balance   (1) needs supervision when turning   (0) needs assist to keep from losing balance or falling   Score: 2    11. TURN 360 DEGREES   INSTRUCTIONS: Turn completely around in a full [place]. Pause. Then turn a   full [place] in the other direction. (4) able to turn 360 degrees safely in 4 seconds or less   (3) able to turn 360 degrees safely one side only in 4 seconds or less   (2) able to turn 360 degrees safely but slowly   (1) needs close supervision or verbal cueing   (0) needs assistance while turning   Score: 2 (6 seconds to each direction)    12. PLACING ALTERNATE FOOT ON STEP OR STOOL WHILE   STANDING UNSUPPORTED   INSTRUCTIONS: Place each foot alternately on the step/stool. Continue until   each foot has touched the step/stool four times. (4) able to stand independently and safely and complete 8 steps in 20 seconds   (3) able to stand independently and complete 8 steps >20 seconds   (2) able to complete 4 steps without aid with supervision   (1) able to complete >2 steps needs minimal assist   (0) needs assistance to keep from falling/unable to try   Score: 0    13.  STANDING UNSUPPORTED ONE FOOT IN FRONT   INSTRUCTIONS: (DEMONSTRATE TO SUBJECT) Place one foot directly in   front of the other. If you feel that you cannot place your foot directly in front,   try to step far enough ahead that the heel of your forward foot is ahead of the   toes of the other foot. (To score 3 points, the length of the step should exceed   the length of the other foot and the width of the stance should approximate the   subject's normal stride width). (4) able to place foot tandem independently and hold 30 seconds   (3) able to place foot ahead of other independently and hold 30 seconds   (2) able to take small step independently and hold 30 seconds   (1) needs help to step but can hold 15 seconds   (0) loses balance while stepping or standing   Score: 0    14. STANDING ON ONE LEG   INSTRUCTIONS: Stand on one leg as long as you can without holding. (4) able to lift leg independently and hold >10 seconds   (3) able to lift leg independently and hold 5-10 seconds   (2) able to lift leg independently and hold = or >3 seconds   (1) tries to lift leg unable to hold 3 seconds but remains standing   independently   (0) unable to try or needs assist to prevent fall   Score: 0    Total Score: 28/56   Max score 56,a person scoring below 45 is considered to be at risk for falling.      Completed by: Jitendra Bailey PT, EDELT

## 2022-04-07 NOTE — PROGRESS NOTES
[facility], [place] [address] [address], [address]   Phone: [phone]  Fax [phone]        Outpatient Physical Therapy  Phone: [phone] Fax: [phone]     To: Venkat Vega DO    From: Tez Peter PT, PT     Patient: Gail Mackenzie      : 1940  Diagnosis:  Lumbar DDD     Date: 2022  Treatment Diagnosis:      Physical Therapy Certification/Re-Certification Form  Dear Dr. Monico Reese,  The following patient has been evaluated for physical therapy services and for therapy to continue, Medicare requires monthly physician review of the treatment plan. Please review the attached evaluation and/or summary of the patient's plan of care, and verify that you agree therapy should continue by signing the attached document and sending it back to our office. Plan of Care/Treatment to date:  [x] Therapeutic Exercise   [] Modalities:  [x] Therapeutic Activity     [] Ultrasound  [] Electrical Stimulation   [x] Gait Training      [] Cervical Traction [] Lumbar Traction  [x] Neuromuscular Re-education  [] Hot/Coldpack [] Iontophoresis    [x] Instruction in HEP      Other:  [x] Manual Therapy       []                        [x] Aquatic Therapy       []                      ? Frequency/Duration:  # Days per week: [] 1 day # Weeks: [] 1 week [x] 5 weeks      [x] 2 days? [] 2 weeks [] 6 weeks     [] 3 days   [] 3 weeks [] 7 weeks     [] 4 days   [] 4 weeks [] 8 weeks    Rehab Potential: [] Excellent [x] Good [] Fair  [] Poor       Electronically signed by:  Tez Peter, PT, DPT      If you have any questions or concerns, please don't hesitate to call.   Thank you for your referral.    Physician Signature:________________________________Date:__________________  By signing above, therapists plan is approved by physician        Physical Therapy  Initial Assessment  Date: 2022  Patient Name: Gail Mackenzie  MRN: [id number]  : 1940          Restrictions   fall risk    Subjective   General  Chart Reviewed: Yes  Patient assessed for rehabilitation services?: Yes  Family / Caregiver Present: No  Referring Practitioner: Elly Johnson MD  Referral Date : 22  Diagnosis: M51.36 (ICD-10-CM) - DDD (degenerative disc disease), lumbar  PT Visit Information  Onset Date: 22  PT Insurance Information: [place] Medicare and Medicaid  Subjective  Subjective: Pt reports that she \"just cannot do anything\". Has had multiple orthopedic surgeries (B TKR, B shoulder sx, 2 cervical sx, 4 lumbar sx, B carpal tunnel, and others). \"I need to do something or I won't be walking much longer\". Pain level is 9.5 or greater at all times. She has caregivers \"around the clock\". Has had heart trouble for the past few years (has had a couple surgeries \"stents\" put in). Pt states that she gets short of breath easily. Using a SPC in clinic, but uses a 4WW at home (cannot get it in/out of car). Lives in an apartment with everything close. Pain Screening  Patient Currently in Pain: Yes  Pain Assessment  Pain Assessment: 0-10  Pain Level: 9  Pain Type: Chronic pain  Pain Location: Back;Hand;Head;Hip;Knee;Neck; Shoulder  Pain Orientation: Right;Left;Upper; Lower  Pain Descriptors: Aching;Sore;Sharp  Pain Frequency: Continuous  Vital Signs  Patient Currently in Pain: Yes    C-SSRS Triggered by Intake questionnaire (Past 2 wk assessment):   [x] No, Questionnaire did not trigger screening.   [] Yes, Patient intake triggered further evaluation      [] C-SSRS Screening completed  [] PCP notified via Plan of Care  [] Emergency services notified    Social/Functional History  Social/Functional History  Type of Home: Apartment  Home Layout: One level  Home Access: Level entry  Bathroom Shower/Tub: Walk-in shower  Bathroom Equipment: Grab bars in shower; Shower chair  Home Equipment: 4 wheeled walker;Cane  Receives Help From: [employer] (receives help for cooking, cleaning, laundry, groceries)  ADL Assistance: Needs assistance  Bath: Moderate assistance  Homemaking Assistance: Needs assistance  Meal Prep: Moderate  Laundry: Total  Vacuuming: Total  Cleaning: Total  Gardening: Unable to assess (comment)  Yard Work: Unable to assess (comment)  Shopping: Total  : Unable to assess (comment)  Homemaking Responsibilities: No  Ambulation Assistance: Independent (with SPC)  Transfer Assistance: Independent  Active : Yes (short distances)  Occupation: Retired    Objective     Observation/Palpation  Posture: Poor  Observation: Pt ambulates with SPC, unsteady upon turning in waiting room, minimal foot clearance B and forward flexed posture.   Body Mechanics: unable to do SLS stance    PROM RLE (degrees)  R Hip Extension 0-10: unable to extend hip to neutral in standing  PROM LLE (degrees)  L Hip Extension 0-10: unable to extend hip to neutral in standing  Spine  Lumbar: lumbar flexion limited by 25% and seen in conjuction with knee flexion; side bend decreased 75%, extension unable to achieve neutral  Special Tests: neg slump B    Strength RLE  Strength RLE: Exception  R Hip Flexion: 3/5  R Hip Extension: 3-/5  R Hip ABduction: 3-/5  R Knee Flexion: 3+/5  R Knee Extension: 4-/5  R Ankle Dorsiflexion: 3+/5  Strength LLE  Strength LLE: Exception  L Hip Flexion: 3/5  L Hip Extension: 3-/5  L Hip ABduction: 3-/5  L Knee Flexion: 3+/5  L Knee Extension: 4-/5  L Ankle Dorsiflexion: 3+/5  Strength Other  Other: pt with 'jumping' motions during strength testing     Additional Measures  Flexibility: hamstrings, gastroc and piriformis all moderately limited due to pain  Special Tests: LUCERO Balance Test- 28/56 (listed as high fall risk); TUG 29 sec; (-) clonus and Babinski B  Sensation  Overall Sensation Status: Impaired  Light Touch: Partial deficits in the LLE;Partial deficits in the RLE        Assessment   Conditions Requiring Skilled Therapeutic Intervention  Body structures, Functions, Activity limitations: Decreased functional mobility ; Decreased ADL status; Decreased ROM; Decreased strength;Decreased endurance;Decreased balance;Decreased high-level IADLs;Decreased posture; Increased pain  Assessment: Pt is a 67 y.o female referred to aquatic therapy for low back pain and lumbar DDD. Pt presents limited by pain for all motions and strength testing. Pt also presents at a high risk for falls as shown by the LUCERO (28/56) and TUG (29 sec). Pt will benefit from aquatic therapy to increase mobility and strength and decrease pain.   Prognosis: Fair  Decision Making: High Complexity  REQUIRES PT FOLLOW UP: Yes  Activity Tolerance  Activity Tolerance: Patient limited by pain         Plan   Plan  Times per week: 2x/wk for 5 weeks to include aquatic therapy, ther-ex, neuro re-ed/balance, postural training, gait training, therapeutic activity, manual therapy, pt education, and modalities PRN    Goals  Short term goals  Time Frame for Short term goals: 5 weeks  Short term goal 1: pt will tolerate 30 minutes of aquatic therapy  Short term goal 2: pt will be indep in HEP  Short term goal 3: pt will decrease pain 25-50%  Short term goal 4: pt will decreased TUG score by 10 sec and increase LUCERO by 10 points in order to decrease risk for falls  Short term goal 5: pt will return to traveling to see grandchildren       Therapy Time   Individual Concurrent Group Co-treatment   Time In 1230         Time Out 1315         Minutes 45         Timed Code Treatment Minutes: 10 Minutes       Tenzin Scott PT , DPT [id number]

## 2022-04-07 NOTE — FLOWSHEET NOTE
Physical Therapy Aquatic Flow Sheet  Date:  4/7/2022    Patient Name:  Tremayne Smith    Restrictions:  Fall risk  Medical/Treatment Diagnosis Information:  · Diagnosis: M51.36 (ICD-10-CM) - DDD (degenerative disc disease), lumbar  Insurance/Certification information:  PT Insurance Information: [place] Medicare and Medicaid  Physician Information:  Referring Practitioner: Juan Carlos Negron MD  Plan of care signed (Y/N):  N  Visit# / total visits:  1/10       Pain level: /10   Electronically signed by:  Greg Manuel PT, PT    Medicare Cap (if applicable):  205 = total amount used, updated 4/7/2022    Key  B= Belt DB= Dumbells T= Theratube   H= Hydrotone N= Noodles W= Weights   P= Paddles S= Speedo equipment K= Kickboard     Exercises/Activities   Warm-up/Amb    Exercises      Slow forward  nv with noodle  HR/TR  nv    Slow sideways  nv with noodle  Marches  nv    Slow backwards    Mini-squats  nv    Medium forward    4-way SLR  nv    Medium sideways    Hip circles/fig 8      Small shuffle    Hamstring curls  nv    Jog    Knee extension      Braiding    Pelvic tilts  nv    Bicycling    Scap squeezes          Shoulder flex/ext      Functional    Shoulder abd/add      Step    Shoulder H. abd/add      Lifting    Shoulder IR/ER      Hand to opp knee    Rowing      Push down squat    Bilateral pull down      UE PNF    Push/pull      LE PNF    Push downs      Wall push ups    Arm circles      SLS    Elbow flex/ext          Chin tuck      Stretching    UT shrugs/rolls      Gastroc/Soleus  nv  Rocking horse      Hamstring  nv        SKTC  nv  Other      Piriformis          Hip flexor          Ladder pull          Pec stretch          Post deltoid           Time In:      Timed Code Treatment Minutes:       Total Treatment Minutes:      Treatment/Activity Tolerance:   [] Patient tolerated treatment well [] Patient limited by fatigue   [] Patient limited by pain [] Patient limited by other medical complications  [] Other: Prognosis: [] Good [] Fair  [] Poor    Patient Requires Follow-up:  [] Yes  [] No    Plan: [] Continue per plan of care [] Alter current plan (see comments)   [] Plan of care initiated [] Hold pending MD visit [] Discharge    See Weekly Progress Note: [] Yes  [] No  Next due:

## 2022-04-08 NOTE — TELEPHONE ENCOUNTER
Refill Request     Last Seen: Last Seen Department: Visit date not found  Last Seen by PCP: 12/13/2021    Last Written: 1/6/2022 60 g with 1     Next Appointment:   Future Appointments   Date Time Provider Carla Sims   4/12/2022  2:30 PM SCHEDULE, MHAZ AQUATIC RM MHAZ PT [facility]   4/12/2022  5:00 PM Virgie Nice, APRN - CNP [facility]   4/14/2022  2:30 PM SCHEDULE, MHAZ AQUATIC RM MHAZ PT [facility]   4/19/2022  2:30 PM SCHEDULE, MHAZ AQUATIC RM MHAZ PT [facility]   4/21/2022  2:30 PM SCHEDULE, MHAZ AQUATIC RM MHAZ PT [facility]   4/26/2022  2:30 PM SCHEDULE, MHAZ AQUATIC RM MHAZ PT [facility]   4/28/2022  2:30 PM SCHEDULE, MHAZ AQUATIC RM MHAZ PT [facility]   5/3/2022  2:30 PM SCHEDULE, MHAZ AQUATIC RM MHAZ PT [facility]   5/5/2022  2:30 PM SCHEDULE, MHAZ AQUATIC RM MHAZ PT [facility]   5/5/2022  3:30 PM Dai Guzman, PT MHAZ PT [facility]   5/10/2022  2:30 PM SCHEDULE, MHAZ AQUATIC RM MHAZ PT [facility]   5/12/2022  2:30 PM SCHEDULE, MHAZ AQUATIC RM MHAZ PT [facility]   5/17/2022  2:30 PM SCHEDULE, MHAZ AQUATIC RM MHAZ PT [facility]   5/19/2022  2:30 PM SCHEDULE, MHAZ AQUATIC RM MHAZ PT [facility]       Future appointment scheduled      Requested Prescriptions     Pending Prescriptions Disp Refills    [id number] [id number] UNIT/GM powder [Pharmacy Med Name: [id number] [id number] UNIT/GM POWD [id number] Powder] 60 g 1     Sig: APPLY TO AFFECTED AREA(S) 3 TIMES DAILY

## 2022-04-11 RX ORDER — NYSTATIN 100000 [USP'U]/G
POWDER TOPICAL
Qty: 60 G | Refills: 1 | Status: SHIPPED | OUTPATIENT
Start: 2022-04-11 | End: 2022-07-11

## 2022-04-12 ENCOUNTER — HOSPITAL ENCOUNTER (OUTPATIENT)
Dept: PHYSICAL THERAPY | Age: 82
Setting detail: THERAPIES SERIES
Discharge: HOME | End: 2022-04-12
Payer: MEDICARE

## 2022-04-12 ENCOUNTER — OFFICE VISIT (OUTPATIENT)
Dept: FAMILY MEDICINE CLINIC | Age: 82
End: 2022-04-12
Payer: MEDICARE

## 2022-04-12 VITALS
TEMPERATURE: 97.9 F | DIASTOLIC BLOOD PRESSURE: 77 MMHG | OXYGEN SATURATION: 95 % | WEIGHT: 190.25 LBS | HEART RATE: 73 BPM | SYSTOLIC BLOOD PRESSURE: 182 MMHG | BODY MASS INDEX: 37.16 KG/M2

## 2022-04-12 DIAGNOSIS — G30.9 ALZHEIMER'S DEMENTIA WITHOUT BEHAVIORAL DISTURBANCE, UNSPECIFIED TIMING OF DEMENTIA ONSET: ICD-10-CM

## 2022-04-12 DIAGNOSIS — N18.9 CHRONIC KIDNEY DISEASE, UNSPECIFIED CKD STAGE: ICD-10-CM

## 2022-04-12 DIAGNOSIS — F41.9 ANXIETY: ICD-10-CM

## 2022-04-12 DIAGNOSIS — I10 ESSENTIAL HYPERTENSION: ICD-10-CM

## 2022-04-12 DIAGNOSIS — F51.01 PRIMARY INSOMNIA: ICD-10-CM

## 2022-04-12 DIAGNOSIS — G25.0 TREMOR, ESSENTIAL: ICD-10-CM

## 2022-04-12 DIAGNOSIS — E78.5 HYPERLIPIDEMIA, UNSPECIFIED HYPERLIPIDEMIA TYPE: ICD-10-CM

## 2022-04-12 DIAGNOSIS — F02.80 ALZHEIMER'S DEMENTIA WITHOUT BEHAVIORAL DISTURBANCE, UNSPECIFIED TIMING OF DEMENTIA ONSET: ICD-10-CM

## 2022-04-12 DIAGNOSIS — I10 PRIMARY HYPERTENSION: Primary | ICD-10-CM

## 2022-04-12 PROCEDURE — 97113 AQUATIC THERAPY/EXERCISES: CPT

## 2022-04-12 PROCEDURE — 99214 OFFICE O/P EST MOD 30 MIN: CPT | Performed by: NURSE PRACTITIONER

## 2022-04-12 PROCEDURE — 97150 GROUP THERAPEUTIC PROCEDURES: CPT

## 2022-04-12 RX ORDER — HYDRALAZINE HYDROCHLORIDE 10 MG/1
10 TABLET, FILM COATED ORAL 3 TIMES DAILY
COMMUNITY
Start: 2022-01-18

## 2022-04-12 ASSESSMENT — ENCOUNTER SYMPTOMS
DIARRHEA: 0
ABDOMINAL PAIN: 0
CHEST TIGHTNESS: 0
COUGH: 0
BACK PAIN: 1
SHORTNESS OF BREATH: 0
SORE THROAT: 0
VOMITING: 0
NAUSEA: 0
RHINORRHEA: 0
WHEEZING: 0

## 2022-04-12 NOTE — PATIENT INSTRUCTIONS
Please read the healthy family handout that you were given and share it with your family. Please compare this printed medication list with your medications at home to be sure they are the same. If you have any medications that are different please contact us immediately at [phone]. Also review your allergies that we have listed, these may also include medications that you have not been able to tolerate, make sure everything listed is correct. If you have any allergies that are different please contact us immediately at [phone].

## 2022-04-12 NOTE — PROGRESS NOTES
CHIEF COMPLAINT  Chief Complaint   Patient presents with    Check-Up     HTN        HPI   Cari Avilez is a 79 y.o. female who presents to the office for check up. Patient reports ongoing all over body pain in which she has seen pain management for. Patient reports that she is waiting on injections to be approved by her insurance. Patient reports that she did start water therapy today and will be going for the next several weeks twice a week as well. Patient denies any new unusual fatigue or unexplained weight loss. No chest pain or shortness of breath. No abdominal pain or discomfort. Patient reports shortness of breath upon exertion. No increase in edema. No dysuria, hematuria, urinary urgency, frequency retention. Patient reports taking medications as prescribed. Patient reports that she did recently have blood pressure medications adjusted per her cardiologist.  Patient reports checking blood pressure on a regular basis at home. No other complaints, modifying factors or associated symptoms. Nursing notes reviewed.    Past Medical History:   Diagnosis Date    Arthritis     BCC (basal cell carcinoma of skin)     RT clavicle    Bladder infection     frequently    CAD (coronary artery disease)     stents    Cancer (HCC)     skin    CHF (congestive heart failure) (HCC)     Chronic back pain     COPD (chronic obstructive pulmonary disease) (HCC)     Depression     Depression     Hypercholesteremia     Hypertension     Pain in shoulder [id number]    pain in left shoulder, taking steroid injections for steroid    Reflux     Rheumatic fever     as a child    Sleep apnea     uses CPAP    TIA (transient ischemic attack)     Urinary incontinence     Wears glasses      Past Surgical History:   Procedure Laterality Date    AORTIC VALVE REPLACEMENT      APPENDECTOMY      BLADDER REPAIR      3 TIMES    CARDIAC SURGERY      stents    CARPAL TUNNEL RELEASE      RIGHT    CHOLECYSTECTOMY, LAPAROSCOPIC  01/06/2017    with dr Kayla Wolf      has it it done twice    DIAGNOSTIC CARDIAC CATH LAB PROCEDURE      HYSTERECTOMY      JOINT REPLACEMENT      KATHLEEN TKR    NECK SURGERY      OTHER SURGICAL HISTORY  9/28/12    Full Interstim Implant    SHOULDER SURGERY      SKIN CANCER EXCISION      SPINAL FIXATION SURGERY WITH IMPLANT  2001    SPINAL FIXATION SURGERY WITH IMPLANT  2004    SPINAL FIXATION SURGERY WITH IMPLANT  2007    SPINE SURGERY  1999    TONSILLECTOMY      UPPER GASTROINTESTINAL ENDOSCOPY  4/22/11    UPPER GASTROINTESTINAL ENDOSCOPY      UPPER GASTROINTESTINAL ENDOSCOPY  03/23/2017    dilitation     Family History   Problem Relation Age of Onset    Arthritis Mother     Cancer Mother     Depression Mother     Heart Disease Mother     High Blood Pressure Mother     High Cholesterol Mother     Miscarriages / [place] Mother     Stroke Mother     Early Death Mother     Hearing Loss Mother     Mental Illness Mother     Vision Loss Mother     Arthritis Brother     Depression Brother     Diabetes Brother     Hearing Loss Brother     Heart Disease Brother     High Blood Pressure Brother     High Cholesterol Brother     Vision Loss Brother      Social History     Socioeconomic History    Marital status:      Spouse name: Not on file    Number of children: 6    Years of education: 8    Highest education level: Not on file   Occupational History    Occupation: retired   Tobacco Use    Smoking status: Never Smoker    Smokeless tobacco: Never Used   Vaping Use    Vaping Use: Never used   Substance and Sexual Activity    Alcohol use: No    Drug use: No    Sexual activity: Not Currently   Other Topics Concern    Not on file   Social History Narrative    Not on file     Social Determinants of Health     Financial Resource Strain:     Difficulty of Paying Living Expenses: Not on file   Food Insecurity:     Worried About Running Out of Food in the Last Year: Not on file    Pat of Food in the Last Year: Not on file   Transportation Needs:     Lack of Transportation (Medical): Not on file    Lack of Transportation (Non-Medical):  Not on file   Physical Activity:     Days of Exercise per Week: Not on file    Minutes of Exercise per Session: Not on file   Stress:     Feeling of Stress : Not on file   Social Connections:     Frequency of Communication with Friends and Family: Not on file    Frequency of Social Gatherings with Friends and Family: Not on file    Attends [employer] Services: Not on file    Active Member of Clubs or Organizations: Not on file    Attends Club or Organization Meetings: Not on file    Marital Status: Not on file   Intimate Partner Violence:     Fear of Current or Ex-Partner: Not on file    Emotionally Abused: Not on file    Physically Abused: Not on file    Sexually Abused: Not on file   Housing Stability:     Unable to Pay for Housing in the Last Year: Not on file    Number of Places Lived in the Last Year: Not on file    Unstable Housing in the Last Year: Not on file     Current Outpatient Medications   Medication Sig Dispense Refill    hydrALAZINE (APRESOLINE) 10 MG tablet Take 10 mg by mouth 3 times daily      NYAMYC 240057 UNIT/GM powder APPLY TO AFFECTED AREA(S) 3 TIMES DAILY 60 g 1    cetirizine (ZYRTEC) 10 MG tablet Take 1 tablet by mouth daily 90 tablet 1    traZODone (DESYREL) 100 MG tablet TAKE 1 OR 2 TABLETS AT BEDTIME 60 tablet 3    busPIRone (BUSPAR) 5 MG tablet TAKE 1 OR 2 TABLET(S) IN THE EVENING AS NEEDED FOR ANXIETY 60 tablet 4    polyethylene glycol (GLYCOLAX) 17 GM/SCOOP powder DISSOLVE AND DRINK 17 GRAMS DAILY AS DIRECTED 510 g 1    citalopram (CELEXA) 20 MG tablet TAKE 1.5 TABLETS BY MOUTH DAILY 45 tablet 5    spironolactone (ALDACTONE) 25 MG tablet Take 1 tablet by mouth daily 30 tablet 3    donepezil (ARICEPT) 10 MG tablet TAKE 1 TABLET IN THE EVENING (Patient taking differently: 5 mg ) 30 tablet 11    divalproex (DEPAKOTE) 250 MG DR tablet Take 250 mg by mouth 2 times daily      azelastine (ASTELIN) 0.1 % nasal spray 1 spray by Nasal route 2 times daily Use in each nostril as directed 2 Bottle 1    cycloSPORINE (RESTASIS) 0.05 % ophthalmic emulsion Place 1 drop into both eyes as needed (dry eyes) 1 vial 3    furosemide (LASIX) 40 MG tablet Take 1 tablet by mouth daily 60 tablet 3    gabapentin (NEURONTIN) 100 MG capsule Take 100 mg by mouth 3 times daily.  pantoprazole (PROTONIX) 40 MG tablet Take 1 tablet by mouth daily 90 tablet 0    simvastatin (ZOCOR) 40 MG tablet TAKE ONE TABLET NIGHTLY.  aspirin (ASPIRIN 81) 81 MG EC tablet Take 81 mg by mouth daily      candesartan (ATACAND) 8 MG tablet Take 8 mg by mouth daily      HYDROcodone-acetaminophen (NORCO)  MG per tablet Take 1 tablet by mouth every 6 hours as needed.  baclofen (LIORESAL) 10 MG tablet TAKE 1 TABLET EVERY 8 HOURS AS NEEDED (Patient taking differently: Take 10 mg by mouth daily ) 30 tablet 0    Misc. Devices (ROLLER WALKER) MISC 1 each by Does not apply route daily 1 each 0    isosorbide mononitrate (IMDUR) 30 MG extended release tablet Take 30 mg by mouth daily      amLODIPine (NORVASC) 10 MG tablet TAKE (1) TABLET DAILY 30 tablet 5    NITROSTAT 0.4 MG SL tablet USE 1 TABLET UNDER TONGUE AS NEEDED FOR CHEST PAIN MAY REPEAT EVERY 5MIN. UP TO 3. THEN GO TO ER. 25 tablet 3    lidocaine (XYLOCAINE) 5 % ointment Apply topically as needed. 1 Tube 3    albuterol-ipratropium (COMBIVENT RESPIMAT)  MCG/ACT AERS inhaler Inhale 1 puff into the lungs every 6 hours (Patient taking differently: Inhale 1 puff into the lungs every 6 hours as needed ) 1 Inhaler 5     No current facility-administered medications for this visit.      Allergies   Allergen Reactions    Latex     Levofloxacin Other (See Comments)     Burns mouth per patient  Azithromycin     Codeine Nausea Only    Keflex [Cephalexin]     Morphine     Pentazocine Lactate     Sulfa Antibiotics Hives    Tape [Adhesive Tape]      No bandaids       REVIEW OF SYSTEMS  Review of Systems   Constitutional: Negative for activity change, appetite change, chills and fever. HENT: Negative for congestion, rhinorrhea and sore throat. Eyes: Negative for visual disturbance. Respiratory: Negative for cough, chest tightness, shortness of breath and wheezing. Cardiovascular: Negative for chest pain and leg swelling. Gastrointestinal: Negative for abdominal pain, diarrhea, nausea and vomiting. Genitourinary: Negative for dysuria, frequency, hematuria and urgency. Musculoskeletal: Positive for arthralgias, back pain, gait problem and myalgias. Skin: Negative for rash. Neurological: Positive for tremors. Negative for dizziness, weakness, light-headedness, numbness and headaches. Psychiatric/Behavioral: Negative for sleep disturbance. The patient is not nervous/anxious. PHYSICAL EXAM  BP (!) 182/77   Pulse 73   Temp 97.9 °F (36.6 °C) (Oral)   Wt 190 lb 4 oz (86.3 kg)   LMP  (LMP Unknown)   SpO2 95%   BMI 37.16 kg/m²   Physical Exam  Constitutional:       Appearance: Normal appearance. She is not ill-appearing. HENT:      Head: Normocephalic and atraumatic. Right Ear: Tympanic membrane normal.      Left Ear: Tympanic membrane normal.      Nose: Nose normal.      Mouth/Throat:      Mouth: Mucous membranes are moist.      Pharynx: Oropharynx is clear. Eyes:      Extraocular Movements: Extraocular movements intact. Conjunctiva/sclera: Conjunctivae normal.      Pupils: Pupils are equal, round, and reactive to light. Cardiovascular:      Rate and Rhythm: Normal rate and regular rhythm. Pulses: Normal pulses. Pulmonary:      Effort: Pulmonary effort is normal.      Breath sounds: Normal breath sounds. Abdominal:      Palpations: Abdomen is soft. Tenderness: There is no abdominal tenderness. There is no right CVA tenderness, left CVA tenderness or guarding. Musculoskeletal:      Cervical back: Normal range of motion and neck supple. Comments: Trace edema BLE, non pitting limited mobility due to ongoing chronic back pain. Use of cane and [place] for ambulatory needs   Skin:     General: Skin is warm and dry. Capillary Refill: Capillary refill takes 2 to 3 seconds. Neurological:      Mental Status: She is alert and oriented to person, place, and time. Motor: Weakness present. Psychiatric:         Mood and Affect: Mood normal.         Behavior: Behavior normal.          ASSESSMENT/PLAN:   1. Primary hypertension  Stable. Blood pressure noted to be elevated. Patient reports that when she checks it at home it was 127/90. Patient reports that she did recently see Dr. Anjali Womack who adjusted her blood pressure medications. Patient reports that metoprolol was discontinued and she is now on hydralazine 3 times a day. Patient continues to be on spironolactone 25 mg daily, amlodipine 10 mg daily, and isosorbide mononitrate 30 mg daily and candesartan 8 mg daily. Patient denies any dizziness, lightheadedness, chest pain or worsening shortness of breath upon exertion. I did recommend the patient continue to check blood pressure on a daily basis and call cardiologist if blood pressure continues to be elevated for possible medication adjustments. Patient verbalized and acknowledges. 2. Chronic kidney disease, unspecified CKD stage  Stable. Labs previously performed. No new or worsening symptoms. Continue to monitor with diet and weighing self daily. Follow-up in 6 months, sooner for new or worsening symptoms. 3. Tremor, essential/ Alzheimer's dementia without behavioral disturbance, unspecified timing of dementia onset ([facility] [address]  Stable. Managed by neurology.   Patient recently reports adjustments of her medications per her neurologist.  Patient reports that Aricept was decreased to 5 mg daily, Depakote to 50 mg twice a day and Ubrevly 100 mg daily. Patient reports that she will follow up again with him next month. Patient reports doing well on current medications continue with current treatment management follow-up in 6 months, sooner for new or worsening symptoms. 5. Primary insomnia  Stable versus uncontrolled. Patient currently on trazodone 200 mg at night. Patient reports that she continues to not sleep because of her ongoing pain. We discussed the importance of following up with pain management for further work-up and evaluation of her pain and achieving further control. We did discuss the possibility of changing her sleep medications as well. Patient verbalized and acknowledges with plan of care follow-up in 6 months, sooner for new or worsening symptoms. 6. Hyperlipidemia, unspecified hyperlipidemia type  Stable. Patient compliant with simvastatin 40 mg daily. Continue with current treatment management follow-up in 6 months, sooner for new or worsening symptoms. 7. Anxiety  Stable. Patient compliant with citalopram 1-1/2 tablets daily and BuSpar 2 tablets at nighttime. Patient reports doing well. No new or worsening anxiety. Continue with current treatment management follow-up in 6 months, sooner for new or worsening symptoms. The note was completed using Dragon voice recognition transcription. Every effort was made to ensure accuracy; however, inadvertent  transcription errors may be present despite my best efforts to edit errors.     Oscar Bartlett, KALI - CNP

## 2022-04-12 NOTE — FLOWSHEET NOTE
Physical Therapy Aquatic Flow Sheet  Date:  4/12/2022    Patient Name:  Tyrese Sutton    Restrictions:  Fall risk  Medical/Treatment Diagnosis Information:  · Diagnosis: M51.36 (ICD-10-CM) - DDD (degenerative disc disease), lumbar  Insurance/Certification information:  PT Insurance Information: [place] Medicare and Medicaid  Physician Information:  Referring Practitioner: Galen Finney MD  Plan of care signed (Y/N):  N  Visit# / total visits:  2/10       Pain level: /10   Electronically signed by:  Juvenal Hutchinson PT, PT    Medicare Cap (if applicable):  158 = total amount used, updated 4/12/2022    Key  B= Belt DB= Dumbells T= Theratube   H= Hydrotone N= Noodles W= Weights   P= Paddles S= Speedo equipment K= Kickboard     Exercises/Activities   Warm-up/Amb    Exercises      Slow forward  2 laps with noodle  HR/TR  15x    Slow sideways  2 laps with noodle  Marches  2 mins    Slow backwards    Mini-squats  15x    Medium forward    4-way SLR  10x B    Medium sideways    Hip circles/fig 8  10x B    Small shuffle    Hamstring curls  15x B    Jog    Knee extension      Braiding    Pelvic tilts  10x5\"     Bicycling    Scap squeezes          Shoulder flex/ext      Functional    Shoulder abd/add      Step    Shoulder H. abd/add      Lifting    Shoulder IR/ER      Hand to opp knee    Rowing      Push down squat    Bilateral pull down      UE PNF    Push/pull      LE PNF    Push downs      Wall push ups    Arm circles      SLS    Elbow flex/ext          Chin tuck      Stretching    UT shrugs/rolls      Gastroc/Soleus  2x20\" B  Rocking horse      Hamstring  2x20\" B        SKTC    Other      Piriformis          Hip flexor          Ladder pull          Pec stretch          Post deltoid           Time In:  2:00    Timed Code Treatment Minutes:  15    Total Treatment Minutes:  40 (1 aquatic individual, 1 aquatic group)    Treatment/Activity Tolerance:   [x] Patient tolerated treatment well [] Patient limited by fatigue   [] Patient limited by pain [] Patient limited by other medical complications  [] Other:     Prognosis: [] Good [x] Fair  [] Poor    Patient Requires Follow-up:  [x] Yes  [] No    Plan: [x] Continue per plan of care [] Alter current plan (see comments)   [] Plan of care initiated [] Hold pending MD visit [] Discharge    See Weekly Progress Note: [] Yes  [x] No  Next due:

## 2022-04-14 ENCOUNTER — HOSPITAL ENCOUNTER (OUTPATIENT)
Dept: PHYSICAL THERAPY | Age: 82
Setting detail: THERAPIES SERIES
Discharge: HOME | End: 2022-04-14
Payer: MEDICARE

## 2022-04-14 PROCEDURE — 97113 AQUATIC THERAPY/EXERCISES: CPT

## 2022-04-14 PROCEDURE — 97150 GROUP THERAPEUTIC PROCEDURES: CPT

## 2022-04-14 NOTE — FLOWSHEET NOTE
Physical Therapy Aquatic Flow Sheet  Date:  4/14/2022    Patient Name:  Bernabe Crum    Restrictions:  Fall risk  Medical/Treatment Diagnosis Information:  · Diagnosis: M51.36 (ICD-10-CM) - DDD (degenerative disc disease), lumbar  Insurance/Certification information:  PT Insurance Information: [place] Medicare and Medicaid  Physician Information:  Referring Practitioner: Ramo Alvarez MD  Plan of care signed (Y/N):  N  Visit# / total visits: 3/10       Pain level: 8/10 neck and shoulders   Electronically signed by:  Juanito Dean PT, PT    Medicare Cap (if applicable):  617 = total amount used, updated 4/14/2022    Key  B= Belt DB= Dumbells T= Theratube   H= Hydrotone N= Noodles W= Weights   P= Paddles S= Speedo equipment K= Kickboard     Exercises/Activities   Warm-up/Amb    Exercises      Slow forward  2 laps with noodle  HR/TR  15x    Slow sideways  2 laps with noodle  Marches  2 mins    Slow backwards    Mini-squats  15x    Medium forward    4-way SLR  10x B    Medium sideways    Hip circles/fig 8  10x B    Small shuffle    Hamstring curls  15x B    Jog    Knee extension      Braiding    Pelvic tilts  10x5\"     Bicycling    Scap squeezes          Shoulder flex/ext      Functional    Shoulder abd/add      Step    Shoulder H. abd/add      Lifting    Shoulder IR/ER      Hand to opp knee    Rowing      Push down squat    Bilateral pull down      UE PNF    Push/pull      LE PNF    Push downs      Wall push ups    Arm circles      SLS    Elbow flex/ext          Chin tuck      Stretching    UT shrugs/rolls      Gastroc/Soleus  2x20\" B  Rocking horse      Hamstring  2x20\" B        SKTC    Other      Piriformis          Hip flexor          Ladder pull          Pec stretch          Post deltoid           Time In:  2:00    Timed Code Treatment Minutes:  15    Total Treatment Minutes:  40 (1 aquatic individual, 1 aquatic group)    Treatment/Activity Tolerance:   [x] Patient tolerated treatment well [] Patient limited by fatigue   [] Patient limited by pain [] Patient limited by other medical complications  [] Other:     Prognosis: [] Good [x] Fair  [] Poor    Patient Requires Follow-up:  [x] Yes  [] No    Plan: [x] Continue per plan of care [] Alter current plan (see comments)   [] Plan of care initiated [] Hold pending MD visit [] Discharge    See Weekly Progress Note: [] Yes  [x] No  Next due:

## 2022-04-19 ENCOUNTER — HOSPITAL ENCOUNTER (OUTPATIENT)
Dept: PHYSICAL THERAPY | Age: 82
Setting detail: THERAPIES SERIES
Discharge: HOME | End: 2022-04-19
Payer: MEDICARE

## 2022-04-19 PROCEDURE — 97113 AQUATIC THERAPY/EXERCISES: CPT

## 2022-04-19 PROCEDURE — 97150 GROUP THERAPEUTIC PROCEDURES: CPT

## 2022-04-19 NOTE — FLOWSHEET NOTE
Physical Therapy Aquatic Flow Sheet  Date:  4/19/2022    Patient Name:  Camille Vann    Restrictions:  Fall risk  Medical/Treatment Diagnosis Information:  · Diagnosis: M51.36 (ICD-10-CM) - DDD (degenerative disc disease), lumbar  Insurance/Certification information:  PT Insurance Information: [place] Medicare and Medicaid  Physician Information:  Referring Practitioner: Amarilis Sy MD  Plan of care signed (Y/N):  N  Visit# / total visits: 4/10       Pain level: 8/10 neck and shoulders   Electronically signed by:  Susan Messer PT, PT    Medicare Cap (if applicable):  437 = total amount used, updated 4/19/2022    Key  B= Belt DB= Dumbells T= Theratube   H= Hydrotone N= Noodles W= Weights   P= Paddles S= Speedo equipment K= Kickboard     Exercises/Activities   Warm-up/Amb    Exercises      Slow forward  2 laps with noodle  HR/TR  15x    Slow sideways  2 laps with noodle  Marches  2 mins    Slow backwards  2 laps with noodle  Mini-squats  15x    Medium forward    4-way SLR  15x B    Medium sideways    Hip circles/fig 8  15x B    Small shuffle    Hamstring curls  15x B    Jog    Knee extension      Braiding    Pelvic tilts  10x5\"     Bicycling    Scap squeezes          Shoulder flex/ext      Functional    Shoulder abd/add      Step    Shoulder H. abd/add      Lifting    Shoulder IR/ER      Hand to opp knee    Rowing      Push down squat    Bilateral pull down      UE PNF    Push/pull      LE PNF    Push downs      Wall push ups    Arm circles      SLS    Elbow flex/ext          Chin tuck      Stretching    UT shrugs/rolls      Gastroc/Soleus  2x20\" B  Rocking horse      Hamstring  2x20\" B        SKTC    Other      Piriformis          Hip flexor          Ladder pull          Pec stretch          Post deltoid           Time In:  2:00    Timed Code Treatment Minutes:  15    Total Treatment Minutes:  40 (1 aquatic individual, 1 aquatic group)    Treatment/Activity Tolerance:   [x] Patient tolerated treatment well [] Patient limited by fatigue   [] Patient limited by pain [] Patient limited by other medical complications  [] Other:     Prognosis: [] Good [x] Fair  [] Poor    Patient Requires Follow-up:  [x] Yes  [] No    Plan: [x] Continue per plan of care [] Alter current plan (see comments)   [] Plan of care initiated [] Hold pending MD visit [] Discharge    See Weekly Progress Note: [] Yes  [x] No  Next due:

## 2022-04-21 ENCOUNTER — HOSPITAL ENCOUNTER (OUTPATIENT)
Dept: PHYSICAL THERAPY | Age: 82
Setting detail: THERAPIES SERIES
Discharge: HOME | End: 2022-04-21
Payer: MEDICARE

## 2022-04-21 NOTE — PROGRESS NOTES
Physical Therapy  Cancellation/No-show Note  Patient Name:  Draon Deluna  :  1940   Date:  2022  Cancels to date: 1  No-shows to date: 0    For today's appointment patient:  [x] Cancelled  [] Rescheduled appointment  [] No-show     Reason given by patient:  [] Patient ill  [x] Conflicting appointment  [] No transportation    [] Conflict with work  [] No reason given  [] Other:     Comments:      Electronically signed by:  Yareli Mendez PT

## 2022-04-26 ENCOUNTER — HOSPITAL ENCOUNTER (OUTPATIENT)
Dept: PHYSICAL THERAPY | Age: 82
Setting detail: THERAPIES SERIES
Discharge: HOME | End: 2022-04-26
Payer: MEDICARE

## 2022-04-26 PROCEDURE — 97113 AQUATIC THERAPY/EXERCISES: CPT

## 2022-04-26 PROCEDURE — 97150 GROUP THERAPEUTIC PROCEDURES: CPT

## 2022-04-26 NOTE — FLOWSHEET NOTE
Physical Therapy Aquatic Flow Sheet  Date:  4/26/2022    Patient Name:  Laurence Holcomb    Restrictions:  Fall risk  Medical/Treatment Diagnosis Information:  · Diagnosis: M51.36 (ICD-10-CM) - DDD (degenerative disc disease), lumbar  Insurance/Certification information:  PT Insurance Information: [place] Medicare and Medicaid  Physician Information:  Referring Practitioner: Sorin Greenwood MD  Plan of care signed (Y/N):  N  Visit# / total visits: 5/10       Pain level: 8/10 neck and shoulders   Electronically signed by:  Chayo Jackson PT, PT    Medicare Cap (if applicable):  187 = total amount used, updated 4/26/2022    Key  B= Belt DB= Dumbells T= Theratube   H= Hydrotone N= Noodles W= Weights   P= Paddles S= Speedo equipment K= Kickboard     Exercises/Activities   Warm-up/Amb    Exercises      Slow forward  2 laps with noodle  HR/TR  15x    Slow sideways  2 laps with noodle  Marches  2 mins    Slow backwards  2 laps with noodle  Mini-squats  15x    Medium forward    4-way SLR  15x B    Medium sideways    Hip circles/fig 8  15x B    Small shuffle    Hamstring curls  15x B    Jog    Knee extension      Braiding    Pelvic tilts  10x5\"     Bicycling    Scap squeezes          Shoulder flex/ext      Functional    Shoulder abd/add      Step    Shoulder H. abd/add      Lifting    Shoulder IR/ER      Hand to opp knee    Rowing      Push down squat    Bilateral pull down      UE PNF    Push/pull      LE PNF    Push downs      Wall push ups    Arm circles      SLS    Elbow flex/ext          Chin tuck      Stretching    UT shrugs/rolls      Gastroc/Soleus  2x20\" B  Rocking horse      Hamstring  2x20\" B        SKTC    Other      Piriformis          Hip flexor          Ladder pull          Pec stretch          Post deltoid           Time In:  2:00    Timed Code Treatment Minutes:  15    Total Treatment Minutes:  40 (1 aquatic individual, 1 aquatic group)    Treatment/Activity Tolerance:   [x] Patient tolerated treatment well [] Patient limited by fatigue   [] Patient limited by pain [] Patient limited by other medical complications  [] Other:     Prognosis: [] Good [x] Fair  [] Poor    Patient Requires Follow-up:  [x] Yes  [] No    Plan: [x] Continue per plan of care [] Alter current plan (see comments)   [] Plan of care initiated [] Hold pending MD visit [] Discharge    See Weekly Progress Note: [] Yes  [x] No  Next due:

## 2022-04-28 ENCOUNTER — HOSPITAL ENCOUNTER (OUTPATIENT)
Dept: PHYSICAL THERAPY | Age: 82
Setting detail: THERAPIES SERIES
Discharge: HOME | End: 2022-04-28
Payer: MEDICARE

## 2022-04-28 NOTE — PROGRESS NOTES
Physical Therapy  Cancellation/No-show Note  Patient Name:  Flynn Ramirez  :  1940   Date:  2022  Cancels to date: 2  No-shows to date: 0    For today's appointment patient:  [x] Cancelled  [] Rescheduled appointment  [] No-show     Reason given by patient:  [] Patient ill  [] Conflicting appointment  [] No transportation    [] Conflict with work  [] No reason given  [x] Other:     Comments:  Family in town    Electronically signed by:  Tawanda Cortes PT

## 2022-05-03 ENCOUNTER — HOSPITAL ENCOUNTER (OUTPATIENT)
Dept: PHYSICAL THERAPY | Age: 82
Setting detail: THERAPIES SERIES
Discharge: HOME | End: 2022-05-03
Payer: MEDICARE

## 2022-05-03 PROCEDURE — 97150 GROUP THERAPEUTIC PROCEDURES: CPT | Performed by: PHYSICAL THERAPIST

## 2022-05-03 PROCEDURE — 97113 AQUATIC THERAPY/EXERCISES: CPT | Performed by: PHYSICAL THERAPIST

## 2022-05-03 NOTE — FLOWSHEET NOTE
Physical Therapy Aquatic Flow Sheet  Date:  5/3/2022    Patient Name:  Lisa Katz    Restrictions:  Fall risk  Medical/Treatment Diagnosis Information:  · Diagnosis: M51.36 (ICD-10-CM) - DDD (degenerative disc disease), lumbar  Insurance/Certification information:  PT Insurance Information: [place] Medicare and Medicaid  Physician Information:  Referring Practitioner: Natalie Glynn MD  Plan of care signed (Y/N):  N  Visit# / total visits: 6/10       Pain level: 8/10 neck and shoulders   Electronically signed by: Kavon Sanchez PT, PT  Subjective: Patient reports doing more, but still has pain when work is complete. 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   Medicare Cap (if applicable):  271 = total amount used, updated 5/3/2022    Key  B= Belt DB= Dumbells T= Theratube   H= Hydrotone N= Noodles W= Weights   P= Paddles S= Speedo equipment K= Kickboard     Exercises/Activities   Warm-up/Amb    Exercises      Slow forward  2 laps with noodle  HR/TR  20x    Slow sideways  2 laps with noodle  Marches  2 mins    Slow backwards  2 laps with noodle  Mini-squats  20x    Medium forward    4-way SLR  15x B    Medium sideways    Hip circles/fig 8  15x B    Small shuffle    Hamstring curls  15x B    Jog    Knee extension      Braiding    Pelvic tilts  10x5\"     Bicycling    Scap squeezes          Shoulder flex/ext      Functional    Shoulder abd/add      Step  x5 B  Shoulder H. abd/add      Lifting    Shoulder IR/ER      Hand to opp knee    Rowing      Push down squat    Bilateral pull down      UE PNF    Push/pull      LE PNF    Push downs      Wall push ups    Arm circles      SLS    Elbow flex/ext          Chin tuck      Stretching    UT shrugs/rolls      Gastroc/Soleus  2x20\" B  Rocking horse      Hamstring  2x20\" B        SKTC    Other      Piriformis          Hip flexor          Ladder pull          Pec stretch          Post deltoid           Time In:  2:00    Timed Code Treatment Minutes:  15    Total Treatment Minutes:  40 (1 aquatic individual, 1 aquatic group)    Treatment/Activity Tolerance:   [x] Patient tolerated treatment well [] Patient limited by fatigue   [] Patient limited by pain [] Patient limited by other medical complications  [] Other:     Prognosis: [] Good [x] Fair  [] Poor    Patient Requires Follow-up:  [x] Yes  [] No    Plan: [x] Continue per plan of care [] Alter current plan (see comments)   [] Plan of care initiated [] Hold pending MD visit [] Discharge    See Weekly Progress Note: [] Yes  [x] No  Next due:

## 2022-05-05 ENCOUNTER — APPOINTMENT (OUTPATIENT)
Dept: PHYSICAL THERAPY | Age: 82
End: 2022-05-05
Payer: MEDICARE

## 2022-05-05 ENCOUNTER — HOSPITAL ENCOUNTER (OUTPATIENT)
Dept: PHYSICAL THERAPY | Age: 82
Setting detail: THERAPIES SERIES
Discharge: HOME | End: 2022-05-05
Payer: MEDICARE

## 2022-05-05 PROCEDURE — 97113 AQUATIC THERAPY/EXERCISES: CPT

## 2022-05-05 PROCEDURE — 97150 GROUP THERAPEUTIC PROCEDURES: CPT

## 2022-05-05 NOTE — FLOWSHEET NOTE
Physical Therapy Aquatic Flow Sheet  Date:  5/5/2022    Patient Name:  Lee Ann Costa    Restrictions:  Fall risk  Medical/Treatment Diagnosis Information:  · Diagnosis: M51.36 (ICD-10-CM) - DDD (degenerative disc disease), lumbar  Insurance/Certification information:  PT Insurance Information: [place] Medicare and Medicaid  Physician Information:  Referring Practitioner: Keven Ware MD  Plan of care signed (Y/N):  N  Visit# / total visits: 7/10       Pain level: 8/10 neck and shoulders   Electronically signed by:  Yary Roberson PT, PT  Subjective: Patient reports doing more, but still has pain when work is complete. 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   Medicare Cap (if applicable):  025 = total amount used, updated 5/5/2022    Key  B= Belt DB= Dumbells T= Theratube   H= Hydrotone N= Noodles W= Weights   P= Paddles S= Speedo equipment K= Kickboard     Exercises/Activities   Warm-up/Amb    Exercises      Slow forward  2 laps with noodle  HR/TR  20x    Slow sideways  2 laps with noodle  Marches  2 mins    Slow backwards  2 laps with noodle  Mini-squats  20x    Medium forward    4-way SLR  15x B    Medium sideways    Hip circles/fig 8  15x B    Small shuffle    Hamstring curls  15x B    Jog    Knee extension      Braiding    Pelvic tilts  10x5\"     Bicycling    Scap squeezes          Shoulder flex/ext      Functional    Shoulder abd/add      Step  x5 B  Shoulder H. abd/add      Lifting    Shoulder IR/ER      Hand to opp knee    Rowing      Push down squat    Bilateral pull down      UE PNF    Push/pull      LE PNF    Push downs      Wall push ups    Arm circles      SLS    Elbow flex/ext          Chin tuck      Stretching    UT shrugs/rolls      Gastroc/Soleus  2x20\" B  Rocking horse      Hamstring  2x20\" B        SKTC    Other      Piriformis          Hip flexor          Ladder pull          Pec stretch          Post deltoid           Time In:  2:00    Timed Code Treatment Minutes:  15    Total Treatment Minutes:  40 (1 aquatic individual, 1 aquatic group)    Treatment/Activity Tolerance:   [x] Patient tolerated treatment well [] Patient limited by fatigue   [] Patient limited by pain [] Patient limited by other medical complications  [] Other:     Prognosis: [] Good [x] Fair  [] Poor    Patient Requires Follow-up:  [x] Yes  [] No    Plan: [x] Continue per plan of care [] Alter current plan (see comments)   [] Plan of care initiated [] Hold pending MD visit [] Discharge    See Weekly Progress Note: [] Yes  [x] No  Next due:

## 2022-05-10 ENCOUNTER — HOSPITAL ENCOUNTER (OUTPATIENT)
Dept: PHYSICAL THERAPY | Age: 82
Setting detail: THERAPIES SERIES
Discharge: HOME | End: 2022-05-10
Payer: MEDICARE

## 2022-05-10 PROCEDURE — 97150 GROUP THERAPEUTIC PROCEDURES: CPT

## 2022-05-10 PROCEDURE — 97113 AQUATIC THERAPY/EXERCISES: CPT

## 2022-05-10 NOTE — FLOWSHEET NOTE
Physical Therapy Aquatic Flow Sheet  Date:  5/10/2022    Patient Name:  Pauline Casey    Restrictions:  Fall risk  Medical/Treatment Diagnosis Information:  · Diagnosis: M51.36 (ICD-10-CM) - DDD (degenerative disc disease), lumbar  Insurance/Certification information:  PT Insurance Information: [place] Medicare and Medicaid  Physician Information:  Referring Practitioner: Kristine Shaffer MD  Plan of care signed (Y/N):  N  Visit# / total visits: 8/10       Pain level: 8/10 neck and shoulders   Electronically signed by:  Salvador Cornejo PT, PT  Subjective: Patient reports doing more, but still has pain when work is complete. 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   Medicare Cap (if applicable):  692 = total amount used, updated 5/10/2022    Key  B= Belt DB= Dumbells T= Theratube   H= Hydrotone N= Noodles W= Weights   P= Paddles S= Speedo equipment K= Kickboard     Exercises/Activities   Warm-up/Amb    Exercises      Slow forward  2 laps with noodle  HR/TR  20x    Slow sideways  2 laps with noodle  Marches  2 mins    Slow backwards  2 laps with noodle  Mini-squats  20x    Medium forward    4-way SLR  15x B    Medium sideways    Hip circles/fig 8  15x B    Small shuffle    Hamstring curls  15x B    Jog    Knee extension      Braiding    Pelvic tilts  10x5\"     Bicycling    Scap squeezes          Shoulder flex/ext      Functional    Shoulder abd/add      Step  x5 B  Shoulder H. abd/add      Lifting    Shoulder IR/ER      Hand to opp knee    Rowing      Push down squat    Bilateral pull down      UE PNF    Push/pull      LE PNF    Push downs      Wall push ups    Arm circles      SLS    Elbow flex/ext          Chin tuck      Stretching    UT shrugs/rolls      Gastroc/Soleus  2x20\" B  Rocking horse      Hamstring  2x20\" B        SKTC    Other      Piriformis          Hip flexor          Ladder pull          Pec stretch          Post deltoid           Time In:  2:00    Timed Code Treatment Minutes:  15    Total Treatment Minutes:  40 (1 aquatic individual, 1 aquatic group)    Treatment/Activity Tolerance:   [x] Patient tolerated treatment well [] Patient limited by fatigue   [] Patient limited by pain [] Patient limited by other medical complications  [] Other:     Prognosis: [] Good [x] Fair  [] Poor    Patient Requires Follow-up:  [x] Yes  [] No    Plan: [x] Continue per plan of care [] Alter current plan (see comments)   [] Plan of care initiated [] Hold pending MD visit [] Discharge    See Weekly Progress Note: [] Yes  [x] No  Next due:

## 2022-05-12 ENCOUNTER — HOSPITAL ENCOUNTER (OUTPATIENT)
Dept: PHYSICAL THERAPY | Age: 82
Setting detail: THERAPIES SERIES
Discharge: HOME | End: 2022-05-12
Payer: MEDICARE

## 2022-05-12 PROCEDURE — 97150 GROUP THERAPEUTIC PROCEDURES: CPT

## 2022-05-12 PROCEDURE — 97750 PHYSICAL PERFORMANCE TEST: CPT

## 2022-05-12 PROCEDURE — 97113 AQUATIC THERAPY/EXERCISES: CPT

## 2022-05-12 PROCEDURE — 97110 THERAPEUTIC EXERCISES: CPT

## 2022-05-12 NOTE — PROGRESS NOTES
[facility] BALANCE SCALE 14-Item Long Form Original Version     Patient Name: Pauline Casey   Date: 5/12/2022    1. SITTING TO STANDING   INSTRUCTIONS: Please stand up. Try not to use your hands for support. (4) able to stand without using hands and stabilize independently   (3) able to stand independently using hands   (2) able to stand using hands after several tries   (1) needs minimal aid to stand or to stabilize   (0) needs moderate or maximal assist to stand   Score: 4 (3 at eval)    2. STANDING UNSUPPORTED   INSTRUCTIONS: Please stand for two minutes without holding. (4) able to stand safely 2 minutes   (3) able to stand 2 minutes with supervision   (2) able to stand 30 seconds unsupported   (1) needs several tries to stand 30 seconds unsupported   (0) unable to stand 30 seconds unassisted If a subject is able to stand 2   minutes unsupported, score full points for sitting unsupported. Proceed to   item #4. Score: 4 (3 at eval)      3. SITTING WITH BACK UNSUPPORTED BUT FEET SUPPORTED   ON FLOOR OR ON A STOOL   INSTRUCTIONS: Please sit with arms folded for 2 minutes. (4) able to sit safely and securely 2 minutes   (3) able to sit 2 minutes under supervision   (2) able to sit 30 seconds   (1) able to sit 10 seconds   (0) unable to sit without support 10 seconds   Score: 4     4. STANDING TO SITTING   INSTRUCTIONS: Please sit down. (4) sits safely with minimal use of hands   (3) controls descent by using hands   (2) uses back of legs against chair to control descent   (1) sits independently but has uncontrolled descent   (0) needs assistance to sit   Score: 4 (3 at eval)    5. TRANSFERS   INSTRUCTIONS: Arrange chairs(s) for a pivot transfer. Ask subject to   transfer one way toward a seat with armrests and one way toward a seat   without armrests. You may use two chairs (one with and one without   armrests) or a bed and a chair.    (4) able to transfer safely with minor use of hands   (3) able to transfer safely definite need of hands   (2) able to transfer with verbal cueing and/or supervision   (1) needs one person to assist   (0) needs two people to assist or supervise to be safe   Score: 4 (3 at eval)    6. STANDING UNSUPPORTED WITH EYES CLOSED   INSTRUCTIONS: Please close your eyes and stand still for 10 seconds. (4) able to stand 10 seconds safely   (3) able to stand 10 seconds with supervision   (2) able to stand 3 seconds   (1) unable to keep eyes closed 3 seconds but stays steady   (0) needs help to keep from falling   Score: 3    7. STANDING UNSUPPORTED WITH FEET TOGETHER   INSTRUCTIONS: Place your feet together and stand without holding. (4) able to place feet together independently and stand 1 minute safely   (3) able to place feet together independently and stand for 1 minute with   supervision   (2) able to place feet together independently but unable to hold for 30 seconds   (1) needs help to attain position but able to stand 15 seconds feet together   (0) needs help to attain position and unable to hold for 15 seconds   Score: 4 (2 at eval)    8. REACHING FORWARD WITH OUTSTRETCHED ARM WHILE   STANDING   INSTRUCTIONS: Lift arm to 90 degrees. Stretch out your fingers and reach   forward as far as you can. (Examiner places a ruler at end of fingertips when   arm is at 90 degrees. Fingers should not touch the ruler while reaching   forward. The recorded measure is the distance forward that the finger reaches   while the subject is in the most forward lean position. When possible, ask   subject to use both arms when reaching to avoid rotation of the trunk.). (4) can reach forward confidently >25 cm (10 inches)   (3) can reach forward >12 cm safely (5 inches)   (2) can reach forward >5 cm safely (2 inches)   (1) reaches forward but needs supervision   (0) loses balance while trying/requires external support   Score: 3 (2 at eval)    9.   OBJECT FROM FLOOR FROM A STANDING POSITION INSTRUCTIONS:  shoe/slipper which is placed in front of your feet. (4) able to  slipper safely and easily   (3) able to  slipper but needs supervision   (2) unable to  but reaches 2-5cm (1-2 inches) from slipper and keeps   balance independently   (1) unable to  and needs supervision while trying   (0) unable to try/needs assist to keep from losing balance or falling   Score: 3 (1 at eval)    10. TURNING TO LOOK BEHIND OVER LEFT AND RIGHT   SHOULDERS WHILE STANDING   INSTRUCTIONS: Turn to look directly behind you over toward left shoulder. Repeat to the right. Examiner may pick an object to look at directly behind the   subject to encourage a better twist turn. (4) looks behind from both sides and weight shifts well   (3) looks behind one side only other side shows less weight shift   (2) turns sideways only but maintains balance   (1) needs supervision when turning   (0) needs assist to keep from losing balance or falling   Score: 3 (2 at eval)    11. TURN 360 DEGREES   INSTRUCTIONS: Turn completely around in a full Jackson. Pause. Then turn a   full Jackson in the other direction. (4) able to turn 360 degrees safely in 4 seconds or less   (3) able to turn 360 degrees safely one side only in 4 seconds or less   (2) able to turn 360 degrees safely but slowly   (1) needs close supervision or verbal cueing   (0) needs assistance while turning   Score: 2 (6 seconds to each direction)    12. PLACING ALTERNATE FOOT ON STEP OR STOOL WHILE   STANDING UNSUPPORTED   INSTRUCTIONS: Place each foot alternately on the step/stool. Continue until   each foot has touched the step/stool four times.    (4) able to stand independently and safely and complete 8 steps in 20 seconds   (3) able to stand independently and complete 8 steps >20 seconds   (2) able to complete 4 steps without aid with supervision   (1) able to complete >2 steps needs minimal assist   (0) needs assistance to keep from falling/unable to try   Score: 0    13. STANDING UNSUPPORTED ONE FOOT IN FRONT   INSTRUCTIONS: (DEMONSTRATE TO SUBJECT) Place one foot directly in   front of the other. If you feel that you cannot place your foot directly in front,   try to step far enough ahead that the heel of your forward foot is ahead of the   toes of the other foot. (To score 3 points, the length of the step should exceed   the length of the other foot and the width of the stance should approximate the   subject's normal stride width). (4) able to place foot tandem independently and hold 30 seconds   (3) able to place foot ahead of other independently and hold 30 seconds   (2) able to take small step independently and hold 30 seconds   (1) needs help to step but can hold 15 seconds   (0) loses balance while stepping or standing   Score: 1 (0 at eval)    14. STANDING ON ONE LEG   INSTRUCTIONS: Stand on one leg as long as you can without holding. (4) able to lift leg independently and hold >10 seconds   (3) able to lift leg independently and hold 5-10 seconds   (2) able to lift leg independently and hold = or >3 seconds   (1) tries to lift leg unable to hold 3 seconds but remains standing   independently   (0) unable to try or needs assist to prevent fall   Score: 0    Total Score: 39/56 (28/56 at eval)   Max score 56,a person scoring below 45 is considered to be at risk for falling.      Completed by: Mi Trujillo, PT, DPT

## 2022-05-12 NOTE — PROGRESS NOTES
[facility], [address]  Phone: [phone]  Fax [phone]    Physical Therapy Daily Treatment/Progress Note    Date:  2022    Patient Name:  Toni Rose    :  1940  MRN: [id number]  Restrictions/Precautions:    Medical/Treatment Diagnosis Information:  · Diagnosis: M51.36 (ICD-10-CM) - DDD (degenerative disc disease), lumbar  Insurance/Certification information:  PT Insurance Information: [place] [employer] and Medicaid  Physician Information:  Referring Practitioner: Yordy Barkley MD  Plan of care signed (Y/N):  N  Visit# / total visits:  9/10     G-Code (if applicable):           Physical FS Primary Measure 40 36  Predicted Points of Change  4  Predicted # of visits   13  Predicted Discharge FS Score 44      Medicare Cap (if applicable):  868 = total amount used, updated 2022    Time in:   1:15     Timed Treatment: 45 Total Treatment Time:  45  Time out: 2:00  ________________________________________________________________________________________    Pain Level:    /10  SUBJECTIVE:  Pt reports that the pool has helped her to become more limber. She notes that it is easier to get in and out of the car, participate in more cooking, dusting the counters, and helping with dishes. She continues to have difficulty with short distance walking. Unable to go into store without a motorized cart.      OBJECTIVE:     Exercise/Equipment Resistance/Repetitions Other comments                                                                                                                                             Other Therapeutic Activities:  Education regarding POC including demonstration with models of anatomy and physiology in order to maximize benefits of treatment; total neuro re-ed 10 minutes    Manual Treatments:         Modalities:      Test/Measurements:      Observation/Palpation  Posture: Poor  Observation: Pt continues to ambulate with SPC, but no observation of unsteadiness and improved foot clearance. Continues with increased double limb support, but no worries of patient dragging foot (At eval: Pt ambulates with SPC, unsteady upon turning in waiting room, minimal foot clearance B and forward flexed posture)  Body Mechanics: unable to do SLS stance     PROM RLE (degrees)  R Hip Extension 0-10: unable to extend hip to neutral in standing  PROM LLE (degrees)  L Hip Extension 0-10: unable to extend hip to neutral in standing  Spine  Lumbar: lumbar flexion limited by 25% and seen in conjuction with knee flexion; side bend decreased 75%, extension unable to achieve neutral  Special Tests: neg slump B     Strength RLE  Strength RLE: Exception  R Hip Flexion: 3+/5 (3/5 at eval)  R Hip Extension: 3+/5 (3-/5 at eval)  R Hip ABduction: 3+/5 (3-/5 at eval)  R Knee Flexion: 4-/5 (3+/5 at eval)  R Knee Extension: 4-/5  R Ankle Dorsiflexion: 4-/5 (3+/5 at eval)  Strength LLE  Strength LLE: Exception  L Hip Flexion: 3+/5 (3/5 at eval)  L Hip Extension: 3+/5 (3-/5 at eval)  L Hip ABduction: 3+/5 (3-/5 at eval)  L Knee Flexion: 4-/5 (3+/5 at eval)  L Knee Extension: 4-/5  L Ankle Dorsiflexion: 4-/5 (3+/5 at eval)  Strength Other  Other: pt with 'jumping' motions during strength testing    Additional Measures  Flexibility: hamstrings, gastroc and piriformis all moderately limited due to pain  Special Tests: LUCERO Balance Test- 39/56 (28/56 at eval); TUG 25 (29 sec at eval); (-) clonus and Babinski B  Sensation  Overall Sensation Status: Impaired  Light Touch: Partial deficits in the LLE;Partial deficits in the RLE       ASSESSMENT:    After 9 PT visits, pt is demonstrating improved B LE strength, improved gait speed and improved balance. She notes that functionally she is able to participate in daily tasks more easily and for longer durations.  She continues to be limited in medium-long ambulation and would benefit from continued aquatic PT. Requesting additional 2x/week for 5 more weeks.      Treatment/Activity Tolerance:   []Patient tolerated treatment well [] Patient limited by fatique  [x]Patient limited by pain [] Patient limited by other medical complications  [] Other:     Goals:    Short term goals  Time Frame for Short term goals: 5 weeks  Short term goal 1: pt will tolerate 30 minutes of aquatic therapy - met  Short term goal 2: pt will be indep in HEP - met  Short term goal 3: pt will decrease pain 25-50% - met  Short term goal 4: pt will decreased TUG score by 5 sec and increase LUCERO by 10 points in order to decrease risk for falls - met  Short term goal 5: pt will return to traveling to see grandchildren - not met        Plan: [x] Continue per plan of care [] Alter current plan (see comments)   [] Plan of care initiated [] Hold pending MD visit [] Discharge      Plan for Next Session:  Aquatic PT    Re-Certification Due Date:     today    Signature:  Geovanni Haskins, PT , DPT [id number]

## 2022-05-12 NOTE — FLOWSHEET NOTE
Physical Therapy Aquatic Flow Sheet  Date:  5/12/2022    Patient Name:  Saqib Adan    Restrictions:  Fall risk  Medical/Treatment Diagnosis Information:  · Diagnosis: M51.36 (ICD-10-CM) - DDD (degenerative disc disease), lumbar  Insurance/Certification information:  PT Insurance Information: [place] [employer] and Medicaid  Physician Information:  Referring Practitioner: Trenton Grider MD  Plan of care signed (Y/N):  N  Visit# / total visits: 9/10       Pain level: 8/10 neck and shoulders   Electronically signed by:  Mi Trujillo PT, PT  Subjective: see PN  Medicare Cap (if applicable):  414 = total amount used, updated 5/12/2022    Key  B= Belt DB= Dumbells T= Theratube   H= Hydrotone N= Noodles W= Weights   P= Paddles S= Speedo equipment K= Kickboard     Exercises/Activities   Warm-up/Amb    Exercises      Slow forward  2 laps with noodle  HR/TR  20x    Slow sideways  2 laps with noodle  Marches  2 mins    Slow backwards  2 laps with noodle  Mini-squats  20x    Medium forward    4-way SLR  15x B    Medium sideways    Hip circles/fig 8  15x B    Small shuffle    Hamstring curls  15x B    Jog    Knee extension      Braiding    Pelvic tilts  10x5\"     Bicycling    Scap squeezes          Shoulder flex/ext      Functional    Shoulder abd/add      Step  x5 B  Shoulder H. abd/add      Lifting    Shoulder IR/ER      Hand to opp knee    Rowing      Push down squat    Bilateral pull down      UE PNF    Push/pull      LE PNF    Push downs      Wall push ups    Arm circles      SLS    Elbow flex/ext          Chin tuck      Stretching    UT shrugs/rolls      Gastroc/Soleus  2x20\" B  Rocking horse      Hamstring  2x20\" B        SKTC    Other      Piriformis          Hip flexor          Ladder pull          Pec stretch          Post deltoid           Time In:  2:00    Timed Code Treatment Minutes:  15    Total Treatment Minutes:  40 (1 aquatic individual, 1 aquatic group)    Treatment/Activity Tolerance:   [x] Patient tolerated treatment well [] Patient limited by fatigue   [] Patient limited by pain [] Patient limited by other medical complications  [] Other:     Prognosis: [] Good [x] Fair  [] Poor    Patient Requires Follow-up:  [x] Yes  [] No    Plan: [x] Continue per plan of care [] Alter current plan (see comments)   [] Plan of care initiated [] Hold pending MD visit [] Discharge    See Weekly Progress Note: [] Yes  [x] No  Next due:

## 2022-05-17 ENCOUNTER — HOSPITAL ENCOUNTER (OUTPATIENT)
Dept: PHYSICAL THERAPY | Age: 82
Setting detail: THERAPIES SERIES
Discharge: HOME | End: 2022-05-17
Payer: MEDICARE

## 2022-05-17 NOTE — PROGRESS NOTES
Physical Therapy  Cancellation/No-show Note  Patient Name:  Yudi Friend  :  1940   Date:  2022  Cancels to date: 2  No-shows to date: 1    For today's appointment patient:  [] Cancelled  [] Rescheduled appointment  [x] No-show     Reason given by patient:  [] Patient ill  [] Conflicting appointment  [] No transportation    [] Conflict with work  [] No reason given  [] Other:     Comments:      Electronically signed by:  Juancarlos Ratliff PT

## 2022-05-19 ENCOUNTER — HOSPITAL ENCOUNTER (OUTPATIENT)
Dept: PHYSICAL THERAPY | Age: 82
Setting detail: THERAPIES SERIES
Discharge: HOME | End: 2022-05-19
Payer: MEDICARE

## 2022-05-19 NOTE — PROGRESS NOTES
Physical Therapy  Cancellation/No-show Note  Patient Name:  Camille Vann  :  1940   Date:  2022  Cancels to date:3  No-shows to date: 1    For today's appointment patient:  [x] Cancelled  [] Rescheduled appointment  [] No-show     Reason given by patient:  [] Patient ill  [] Conflicting appointment  [] No transportation    [] Conflict with work  [] No reason given  [x] Other:     Comments:  Cancelled remaining appointments as transportation was too expensive and she is in too much pain.     Electronically signed by:  Susan Messer PT

## 2022-05-24 ENCOUNTER — APPOINTMENT (OUTPATIENT)
Dept: PHYSICAL THERAPY | Age: 82
End: 2022-05-24
Payer: MEDICARE

## 2022-05-26 ENCOUNTER — APPOINTMENT (OUTPATIENT)
Dept: PHYSICAL THERAPY | Age: 82
End: 2022-05-26
Payer: MEDICARE

## 2022-05-31 ENCOUNTER — APPOINTMENT (OUTPATIENT)
Dept: PHYSICAL THERAPY | Age: 82
End: 2022-05-31
Payer: MEDICARE

## 2022-06-24 RX ORDER — BUSPIRONE HYDROCHLORIDE 10 MG/1
TABLET ORAL
Qty: 60 TABLET | Refills: 2 | Status: SHIPPED | OUTPATIENT
Start: 2022-06-24 | End: 2022-10-17

## 2022-07-11 RX ORDER — NYSTATIN 100000 [USP'U]/G
POWDER TOPICAL
Qty: 60 G | Refills: 1 | Status: SHIPPED | OUTPATIENT
Start: 2022-07-11 | End: 2022-08-25

## 2022-07-11 RX ORDER — CITALOPRAM 20 MG/1
TABLET ORAL
Qty: 45 TABLET | Refills: 1 | Status: SHIPPED | OUTPATIENT
Start: 2022-07-11

## 2022-07-25 ENCOUNTER — HOSPITAL ENCOUNTER (OUTPATIENT)
Age: 82
Discharge: HOME | End: 2022-07-25
Payer: MEDICARE

## 2022-07-25 ENCOUNTER — HOSPITAL ENCOUNTER (OUTPATIENT)
Dept: GENERAL RADIOLOGY | Age: 82
Discharge: HOME | End: 2022-07-25
Payer: MEDICARE

## 2022-07-25 DIAGNOSIS — M48.062 LUMBAR STENOSIS WITH NEUROGENIC CLAUDICATION: ICD-10-CM

## 2022-07-25 PROCEDURE — 72110 X-RAY EXAM L-2 SPINE 4/>VWS: CPT

## 2022-08-12 ENCOUNTER — TELEPHONE (OUTPATIENT)
Dept: FAMILY MEDICINE CLINIC | Age: 82
End: 2022-08-12

## 2022-08-12 NOTE — LETTER
[address]  [address]  Phone: [phone]  Fax: [phone]    Jeremy Rizo, [address]         August 15, 2022     Patient: Aris South   YOB: 1940   Date of Visit: 8/12/2022       To Whom It May Concern: It is my medical opinion that Bhavin Mckeon requires a disability parking placard for the following reasons:  She cannot walk 200 feet without stopping to rest.  Duration of need: permanent    If you have any questions or concerns, please don't hesitate to call.     Sincerely,        Moncho Kee CNP

## 2022-08-24 NOTE — TELEPHONE ENCOUNTER
Refill Request     CONFIRM preferrred pharmacy with the patient. If Mail Order Rx - Pend for 90 day refill. Last Seen: Last Seen Department: Visit date not found  Last Seen by PCP: 4/12/2022    Last Written: 07/11/2022 60 g 1 refill    Next Appointment:   No future appointments.     Requested Prescriptions     Pending Prescriptions Disp Refills    [address] [id number] UNIT/GM powder [Pharmacy Med Name: [id number] [id number] UNIT/GM POWD [id number] Powder] 60 g 1     Sig: APPLY TO AFFECTED AREA(S) 3 TIMES DAILY

## 2022-08-25 RX ORDER — NYSTATIN 100000 [USP'U]/G
POWDER TOPICAL
Qty: 60 G | Refills: 1 | Status: SHIPPED | OUTPATIENT
Start: 2022-08-25 | End: 2022-10-04 | Stop reason: SDUPTHER

## 2022-08-25 NOTE — PROGRESS NOTES
CHIEF COMPLAINT  Chief Complaint   Patient presents with    Foot Swelling    Joint Swelling        HPI   Ree Flores is a 79 y.o. female who presents to the office complaining of bilateral lower extremity edema. Patient reports that she has noticed that over the past 3 weeks. Patient reports that she did have an appointment with her cardiologist via phone visit but never heard from them when it was time for her appointment. Patient reports that she has noticed increase in swelling as well as shortness of breath upon exertion. No dizziness lightheadedness, chest pain. Patient reports that she has not changed anything with her diet or medications. Patient reports that she props her legs up when she is at home. No other complaints, modifying factors or associated symptoms. Nursing notes reviewed.    Past Medical History:   Diagnosis Date    Arthritis     BCC (basal cell carcinoma of skin)     RT clavicle    Bladder infection     frequently    CAD (coronary artery disease)     stents    Cancer (HCC)     skin    CHF (congestive heart failure) (HCC)     Chronic back pain     COPD (chronic obstructive pulmonary disease) (HCC)     Depression     Depression     Hypercholesteremia     Hypertension     Pain in shoulder [id number]    pain in left shoulder, taking steroid injections for steroid    Reflux     Rheumatic fever     as a child    Sleep apnea     uses CPAP    TIA (transient ischemic attack)     Urinary incontinence     Wears glasses      Past Surgical History:   Procedure Laterality Date    AORTIC VALVE REPLACEMENT      APPENDECTOMY      BLADDER REPAIR      3 TIMES    CARDIAC SURGERY      stents    CARPAL TUNNEL RELEASE      RIGHT    CHOLECYSTECTOMY, LAPAROSCOPIC  01/06/2017    with dr Catrachita Sharpe      has it it done twice    DIAGNOSTIC CARDIAC CATH LAB PROCEDURE      HYSTERECTOMY (CERVIX STATUS UNKNOWN)      JOINT REPLACEMENT      KATHLEEN TKR NECK SURGERY      OTHER SURGICAL HISTORY  9/28/12    Full Interstim Implant    SHOULDER SURGERY      SKIN CANCER EXCISION      SPINAL FIXATION SURGERY WITH IMPLANT  2001    SPINAL FIXATION SURGERY WITH IMPLANT  2004    SPINAL FIXATION SURGERY WITH IMPLANT  2007    SPINE SURGERY  1999    TONSILLECTOMY      UPPER GASTROINTESTINAL ENDOSCOPY  4/22/11    UPPER GASTROINTESTINAL ENDOSCOPY      UPPER GASTROINTESTINAL ENDOSCOPY  03/23/2017    dilitation     Family History   Problem Relation Age of Onset    Arthritis Mother     Cancer Mother     Depression Mother     Heart Disease Mother     High Blood Pressure Mother     High Cholesterol Mother     Miscarriages / [place] Mother     Stroke Mother     Early Death Mother     Hearing Loss Mother     Mental Illness Mother     Vision Loss Mother     Arthritis Brother     Depression Brother     Diabetes Brother     Hearing Loss Brother     Heart Disease Brother     High Blood Pressure Brother     High Cholesterol Brother     Vision Loss Brother      Social History     Socioeconomic History    Marital status:      Spouse name: Not on file    Number of children: 8    Years of education: 10    Highest education level: Not on file   Occupational History    Occupation: retired   Tobacco Use    Smoking status: Never    Smokeless tobacco: Never   Vaping Use    Vaping Use: Never used   Substance and Sexual Activity    Alcohol use: No    Drug use: No    Sexual activity: Not Currently   Other Topics Concern    Not on file   Social History Narrative    Not on file     Social Determinants of Health     Financial Resource Strain: Not on file   Food Insecurity: Not on file   Transportation Needs: Not on file   Physical Activity: Not on file   Stress: Not on file   Social Connections: Not on file   Intimate Partner Violence: Not on file   Housing Stability: Not on file     Current Outpatient Medications   Medication Sig Dispense Refill    diclofenac (VOLTAREN) 75 MG EC tablet 1 tab(s) furosemide (LASIX) 40 MG tablet Take 1 tablet by mouth See Admin Instructions Take 40mg in the morning and 40mg in the evening over the next 4 days, then resume normal dosing of 40mg AM and 20mg PM. Take Potassium supplement with lasix. 60 tablet 3    hydrALAZINE (APRESOLINE) 10 MG tablet Take 10 mg by mouth 3 times daily      spironolactone (ALDACTONE) 25 MG tablet Take 1 tablet by mouth daily 30 tablet 3    pantoprazole (PROTONIX) 40 MG tablet Take 1 tablet by mouth daily 90 tablet 0    aspirin 81 MG EC tablet Take 81 mg by mouth daily      candesartan (ATACAND) 8 MG tablet Take 8 mg by mouth daily      HYDROcodone-acetaminophen (NORCO)  MG per tablet Take 1 tablet by mouth every 6 hours as needed. baclofen (LIORESAL) 10 MG tablet TAKE 1 TABLET EVERY 8 HOURS AS NEEDED (Patient taking differently: Take 10 mg by mouth daily) 30 tablet 0    albuterol-ipratropium (COMBIVENT RESPIMAT)  MCG/ACT AERS inhaler Inhale 1 puff into the lungs every 6 hours (Patient taking differently: Inhale 1 puff into the lungs every 6 hours as needed) 1 Inhaler 5    amLODIPine (NORVASC) 10 MG tablet TAKE (1) TABLET DAILY 30 tablet 5    lidocaine (XYLOCAINE) 5 % ointment Apply topically as needed.  1 Tube 3    NYAMYC 977445 UNIT/GM powder APPLY TO AFFECTED AREA(S) 3 TIMES DAILY 60 g 1    citalopram (CELEXA) 20 MG tablet TAKE 1 & 1/2 TAB ONCE DAILY 45 tablet 1    busPIRone (BUSPAR) 10 MG tablet TAKE 1-2 TABS IN THE EVENING AS NEEDED FOR ANXIETY 60 tablet 2    cetirizine (ZYRTEC) 10 MG tablet Take 1 tablet by mouth daily 90 tablet 1    traZODone (DESYREL) 100 MG tablet TAKE 1 OR 2 TABLETS AT BEDTIME 60 tablet 3    busPIRone (BUSPAR) 5 MG tablet TAKE 1 OR 2 TABLET(S) IN THE EVENING AS NEEDED FOR ANXIETY 60 tablet 4    polyethylene glycol (GLYCOLAX) 17 GM/SCOOP powder DISSOLVE AND DRINK 17 GRAMS DAILY AS DIRECTED 510 g 1    donepezil (ARICEPT) 10 MG tablet TAKE 1 TABLET IN THE EVENING (Patient taking differently: 5 mg ) 30 tablet 11    divalproex (DEPAKOTE) 250 MG DR tablet Take 250 mg by mouth 2 times daily      azelastine (ASTELIN) 0.1 % nasal spray 1 spray by Nasal route 2 times daily Use in each nostril as directed 2 Bottle 1    cycloSPORINE (RESTASIS) 0.05 % ophthalmic emulsion Place 1 drop into both eyes as needed (dry eyes) 1 vial 3    gabapentin (NEURONTIN) 100 MG capsule Take 100 mg by mouth 3 times daily. simvastatin (ZOCOR) 40 MG tablet TAKE ONE TABLET NIGHTLY. Misc. Devices (ROLLER WALKER) MISC 1 each by Does not apply route daily 1 each 0    isosorbide mononitrate (IMDUR) 30 MG extended release tablet Take 30 mg by mouth daily      NITROSTAT 0.4 MG SL tablet USE 1 TABLET UNDER TONGUE AS NEEDED FOR CHEST PAIN MAY REPEAT EVERY 5MIN. UP TO 3. THEN GO TO ER. 25 tablet 3     No current facility-administered medications for this visit. Allergies   Allergen Reactions    Latex     Levofloxacin Other (See Comments)     Burns mouth per patient    Azithromycin     Codeine Nausea Only    Keflex [Cephalexin]     Morphine     Pentazocine Lactate     Sulfa Antibiotics Hives    Tape [Adhesive Tape]      No bandaids       REVIEW OF SYSTEMS  Review of Systems   Constitutional:  Negative for activity change, appetite change, chills and fever. HENT:  Negative for congestion, rhinorrhea and sore throat. Eyes:  Negative for visual disturbance. Respiratory:  Positive for shortness of breath. Negative for cough, chest tightness and wheezing. Cardiovascular:  Positive for leg swelling. Negative for chest pain. Gastrointestinal:  Negative for abdominal pain, diarrhea, nausea and vomiting. Genitourinary:  Negative for dysuria, frequency, hematuria and urgency. Musculoskeletal:  Positive for arthralgias, back pain, gait problem and myalgias. Skin:  Negative for rash. Neurological:  Negative for dizziness, weakness, light-headedness, numbness and headaches. Psychiatric/Behavioral:  Negative for sleep disturbance. in the evening. Patient reports taking potassium supplement 20 mill equivalents daily. Patient denies any dizziness, lightheadedness, chest pain or tightness. Patient reports that she was scheduled to have an appointment with her cardiologist via telephone visit but did not hear from them when the appointment was scheduled. Patient reports that she has not changed anything in her diet to increase her swelling. Patient reports that she elevates her legs when at home. Labs ordered today and pending. I did recommend her calling Dr. Vázquez Case office to reschedule her appointment. Lasix will be increased for the next 4 days. Patient will continue with 40 mg in the morning however she will take 40 mg in the evening and call on Tuesday with update. Aware if symptoms were to worsen or progress she can go to nearby ER for prompt evaluation.  - CBC with Auto Differential  - Comprehensive Metabolic Panel  - Brain Natriuretic Peptide    2. Dyspnea on exertion   See above #1  - Brain Natriuretic Peptide       The note was completed using Dragon voice recognition transcription. Every effort was made to ensure accuracy; however, inadvertent  transcription errors may be present despite my best efforts to edit errors.     Lexy Mantilla, KALI - CNP

## 2022-08-26 ENCOUNTER — OFFICE VISIT (OUTPATIENT)
Dept: FAMILY MEDICINE CLINIC | Age: 82
End: 2022-08-26
Payer: MEDICARE

## 2022-08-26 VITALS
DIASTOLIC BLOOD PRESSURE: 70 MMHG | BODY MASS INDEX: 38.47 KG/M2 | OXYGEN SATURATION: 92 % | TEMPERATURE: 97.5 F | WEIGHT: 197 LBS | HEART RATE: 65 BPM | SYSTOLIC BLOOD PRESSURE: 118 MMHG

## 2022-08-26 DIAGNOSIS — R06.09 DYSPNEA ON EXERTION: ICD-10-CM

## 2022-08-26 DIAGNOSIS — M79.89 LEG SWELLING: Primary | ICD-10-CM

## 2022-08-26 LAB
A/G RATIO: 1.7 (ref 1.1–2.2)
ALBUMIN SERPL-MCNC: 4.1 G/DL (ref 3.4–5)
ALP BLD-CCNC: 89 U/L (ref 40–129)
ALT SERPL-CCNC: 9 U/L (ref 10–40)
ANION GAP SERPL CALCULATED.3IONS-SCNC: 14 MMOL/L (ref 3–16)
AST SERPL-CCNC: 11 U/L (ref 15–37)
BASOPHILS ABSOLUTE: 0.1 K/UL (ref 0–0.2)
BASOPHILS RELATIVE PERCENT: 0.6 %
BILIRUB SERPL-MCNC: <0.2 MG/DL (ref 0–1)
BUN BLDV-MCNC: 30 MG/DL (ref 7–20)
CALCIUM SERPL-MCNC: 9.5 MG/DL (ref 8.3–10.6)
CHLORIDE BLD-SCNC: 102 MMOL/L (ref 99–110)
CO2: 26 MMOL/L (ref 21–32)
CREAT SERPL-MCNC: 2.5 MG/DL (ref 0.6–1.2)
EOSINOPHILS ABSOLUTE: 0.2 K/UL (ref 0–0.6)
EOSINOPHILS RELATIVE PERCENT: 2.1 %
GFR AFRICAN AMERICAN: 22
GFR NON-AFRICAN AMERICAN: 18
GLUCOSE BLD-MCNC: 89 MG/DL (ref 70–99)
HCT VFR BLD CALC: 33.5 % (ref 36–48)
HEMOGLOBIN: 11 G/DL (ref 12–16)
LYMPHOCYTES ABSOLUTE: 1.4 K/UL (ref 1–5.1)
LYMPHOCYTES RELATIVE PERCENT: 17.6 %
MCH RBC QN AUTO: 28.6 PG (ref 26–34)
MCHC RBC AUTO-ENTMCNC: 32.7 G/DL (ref 31–36)
MCV RBC AUTO: 87.3 FL (ref 80–100)
MONOCYTES ABSOLUTE: 0.7 K/UL (ref 0–1.3)
MONOCYTES RELATIVE PERCENT: 8.6 %
NEUTROPHILS ABSOLUTE: 5.5 K/UL (ref 1.7–7.7)
NEUTROPHILS RELATIVE PERCENT: 71.1 %
PDW BLD-RTO: 14.9 % (ref 12.4–15.4)
PLATELET # BLD: 201 K/UL (ref 135–450)
PMV BLD AUTO: 7.7 FL (ref 5–10.5)
POTASSIUM SERPL-SCNC: 5.8 MMOL/L (ref 3.5–5.1)
PRO-BNP: 162 PG/ML (ref 0–449)
RBC # BLD: 3.84 M/UL (ref 4–5.2)
SODIUM BLD-SCNC: 142 MMOL/L (ref 136–145)
TOTAL PROTEIN: 6.5 G/DL (ref 6.4–8.2)
WBC # BLD: 7.7 K/UL (ref 4–11)

## 2022-08-26 PROCEDURE — 1124F ACP DISCUSS-NO DSCNMKR DOCD: CPT | Performed by: NURSE PRACTITIONER

## 2022-08-26 PROCEDURE — 36415 COLL VENOUS BLD VENIPUNCTURE: CPT | Performed by: NURSE PRACTITIONER

## 2022-08-26 PROCEDURE — 99213 OFFICE O/P EST LOW 20 MIN: CPT | Performed by: NURSE PRACTITIONER

## 2022-08-26 RX ORDER — DICLOFENAC SODIUM 75 MG/1
75 TABLET, DELAYED RELEASE ORAL 2 TIMES DAILY
Qty: 60 TABLET | Refills: 3 | Status: CANCELLED | OUTPATIENT
Start: 2022-08-26

## 2022-08-26 RX ORDER — OXYCODONE HYDROCHLORIDE AND ACETAMINOPHEN 5; 325 MG/1; MG/1
1 TABLET ORAL EVERY 12 HOURS PRN
COMMUNITY

## 2022-08-26 RX ORDER — DICLOFENAC SODIUM 75 MG/1
75 TABLET, DELAYED RELEASE ORAL 2 TIMES DAILY
COMMUNITY

## 2022-08-26 RX ORDER — ACETAMINOPHEN 325 MG/1
650 TABLET ORAL EVERY 6 HOURS PRN
COMMUNITY

## 2022-08-26 RX ORDER — FUROSEMIDE 40 MG/1
40 TABLET ORAL 2 TIMES DAILY
Qty: 60 TABLET | Refills: 0 | Status: SHIPPED
Start: 2022-08-26 | End: 2022-08-26 | Stop reason: CLARIF

## 2022-08-26 RX ORDER — FUROSEMIDE 40 MG/1
40 TABLET ORAL SEE ADMIN INSTRUCTIONS
Qty: 60 TABLET | Refills: 3 | Status: ON HOLD
Start: 2022-08-26 | End: 2022-09-18

## 2022-08-26 RX ORDER — CYCLOSPORINE 0 G/ML
1 SOLUTION/ DROPS OPHTHALMIC; TOPICAL 2 TIMES DAILY
COMMUNITY
End: 2022-10-04

## 2022-08-26 ASSESSMENT — ENCOUNTER SYMPTOMS
CHEST TIGHTNESS: 0
RHINORRHEA: 0
VOMITING: 0
ABDOMINAL PAIN: 0
SHORTNESS OF BREATH: 1
WHEEZING: 0
COUGH: 0
NAUSEA: 0
SORE THROAT: 0
DIARRHEA: 0
BACK PAIN: 1

## 2022-08-26 NOTE — PATIENT INSTRUCTIONS
Take 40mg in the morning and 40mg in the evening over the next 4 days then resume normal dosing (40mg in the morning and 20mg in the evening) call Tuesday with update on swelling. Also call Dr Lew Bustillo office in regards to symptoms and rescheduling missed appointment. Please read the healthy family handout that you were given and share it with your family. Please compare this printed medication list with your medications at home to be sure they are the same. If you have any medications that are different please contact us immediately at [phone]. Also review your allergies that we have listed, these may also include medications that you have not been able to tolerate, make sure everything listed is correct. If you have any allergies that are different please contact us immediately at [phone]. You may receive a survey in the mail or by email asking about your experience during your visit today. Please complete and return to us so we know how we are serving you.

## 2022-08-31 ENCOUNTER — TELEPHONE (OUTPATIENT)
Dept: FAMILY MEDICINE CLINIC | Age: 82
End: 2022-08-31

## 2022-09-06 ENCOUNTER — HOSPITAL ENCOUNTER (OUTPATIENT)
Age: 82
Discharge: HOME | End: 2022-09-06
Payer: MEDICARE

## 2022-09-06 ENCOUNTER — NURSE ONLY (OUTPATIENT)
Dept: FAMILY MEDICINE CLINIC | Age: 82
End: 2022-09-06
Payer: MEDICARE

## 2022-09-06 DIAGNOSIS — N28.9 ABNORMAL KIDNEY FUNCTION: Primary | ICD-10-CM

## 2022-09-06 LAB
A/G RATIO: 1.9 (ref 1.1–2.2)
ALBUMIN SERPL-MCNC: 4.3 G/DL (ref 3.4–5)
ALP BLD-CCNC: 90 U/L (ref 40–129)
ALT SERPL-CCNC: 8 U/L (ref 10–40)
ANION GAP SERPL CALCULATED.3IONS-SCNC: 12 MMOL/L (ref 3–16)
ANION GAP SERPL CALCULATED.3IONS-SCNC: 14 MMOL/L (ref 3–16)
AST SERPL-CCNC: 10 U/L (ref 15–37)
BILIRUB SERPL-MCNC: <0.2 MG/DL (ref 0–1)
BUN BLDV-MCNC: 46 MG/DL (ref 7–20)
BUN BLDV-MCNC: 49 MG/DL (ref 7–20)
CALCIUM SERPL-MCNC: 8.8 MG/DL (ref 8.3–10.6)
CALCIUM SERPL-MCNC: 9.1 MG/DL (ref 8.3–10.6)
CHLORIDE BLD-SCNC: 103 MMOL/L (ref 99–110)
CHLORIDE BLD-SCNC: 104 MMOL/L (ref 99–110)
CO2: 23 MMOL/L (ref 21–32)
CO2: 25 MMOL/L (ref 21–32)
CREAT SERPL-MCNC: 2.7 MG/DL (ref 0.6–1.2)
CREAT SERPL-MCNC: 3.2 MG/DL (ref 0.6–1.2)
GFR AFRICAN AMERICAN: 17
GFR AFRICAN AMERICAN: 20
GFR NON-AFRICAN AMERICAN: 14
GFR NON-AFRICAN AMERICAN: 17
GLUCOSE BLD-MCNC: 89 MG/DL (ref 70–99)
GLUCOSE BLD-MCNC: 94 MG/DL (ref 70–99)
POTASSIUM SERPL-SCNC: 5.4 MMOL/L (ref 3.5–5.1)
POTASSIUM SERPL-SCNC: 5.4 MMOL/L (ref 3.5–5.1)
SODIUM BLD-SCNC: 140 MMOL/L (ref 136–145)
SODIUM BLD-SCNC: 141 MMOL/L (ref 136–145)
TOTAL PROTEIN: 6.6 G/DL (ref 6.4–8.2)

## 2022-09-06 PROCEDURE — 36415 COLL VENOUS BLD VENIPUNCTURE: CPT | Performed by: NURSE PRACTITIONER

## 2022-09-06 PROCEDURE — 80048 BASIC METABOLIC PNL TOTAL CA: CPT

## 2022-09-06 NOTE — PROGRESS NOTES
Sue Screen blood out  lower rt forearm  with 23 gauge Butterfly needle, without incident, applied pressure to draw site and applied bandaid, 1  jovany tubes.

## 2022-09-16 DIAGNOSIS — F51.01 PRIMARY INSOMNIA: ICD-10-CM

## 2022-09-16 RX ORDER — TRAZODONE HYDROCHLORIDE 100 MG/1
TABLET ORAL
Qty: 60 TABLET | Refills: 1 | Status: ON HOLD | OUTPATIENT
Start: 2022-09-16 | End: 2022-09-18

## 2022-09-17 ENCOUNTER — HOSPITAL ENCOUNTER (EMERGENCY)
Age: 82
Discharge: ANOTHER HOSPITAL | DRG: 684 | End: 2022-09-17
Attending: STUDENT IN AN ORGANIZED HEALTH CARE EDUCATION/TRAINING PROGRAM | Admitting: INTERNAL MEDICINE
Payer: MEDICARE

## 2022-09-17 ENCOUNTER — HOSPITAL ENCOUNTER (INPATIENT)
Age: 82
LOS: 2 days | Discharge: HOME HEALTH | DRG: 683 | End: 2022-09-20
Attending: INTERNAL MEDICINE | Admitting: INTERNAL MEDICINE
Payer: MEDICARE

## 2022-09-17 ENCOUNTER — APPOINTMENT (OUTPATIENT)
Dept: GENERAL RADIOLOGY | Age: 82
DRG: 684 | End: 2022-09-17
Payer: MEDICARE

## 2022-09-17 ENCOUNTER — APPOINTMENT (OUTPATIENT)
Dept: CT IMAGING | Age: 82
DRG: 684 | End: 2022-09-17
Payer: MEDICARE

## 2022-09-17 VITALS
HEART RATE: 56 BPM | HEIGHT: 63 IN | RESPIRATION RATE: 12 BRPM | DIASTOLIC BLOOD PRESSURE: 58 MMHG | BODY MASS INDEX: 32.6 KG/M2 | WEIGHT: 184 LBS | SYSTOLIC BLOOD PRESSURE: 141 MMHG | OXYGEN SATURATION: 97 %

## 2022-09-17 DIAGNOSIS — H53.8 BLURRED VISION: Primary | ICD-10-CM

## 2022-09-17 DIAGNOSIS — N17.9 AKI (ACUTE KIDNEY INJURY) (HCC): ICD-10-CM

## 2022-09-17 LAB
A/G RATIO: 1.4 (ref 1.1–2.2)
ALBUMIN SERPL-MCNC: 4.3 G/DL (ref 3.4–5)
ALP BLD-CCNC: 91 U/L (ref 40–129)
ALT SERPL-CCNC: 8 U/L (ref 10–40)
ANION GAP SERPL CALCULATED.3IONS-SCNC: 12 MMOL/L (ref 3–16)
AST SERPL-CCNC: 16 U/L (ref 15–37)
BASOPHILS ABSOLUTE: 0.1 K/UL (ref 0–0.2)
BASOPHILS RELATIVE PERCENT: 1 %
BILIRUB SERPL-MCNC: 0.3 MG/DL (ref 0–1)
BUN BLDV-MCNC: 54 MG/DL (ref 7–20)
CALCIUM SERPL-MCNC: 9.3 MG/DL (ref 8.3–10.6)
CHLORIDE BLD-SCNC: 101 MMOL/L (ref 99–110)
CHP ED QC CHECK: YES
CO2: 25 MMOL/L (ref 21–32)
CREAT SERPL-MCNC: 4 MG/DL (ref 0.6–1.2)
EOSINOPHILS ABSOLUTE: 0.1 K/UL (ref 0–0.6)
EOSINOPHILS RELATIVE PERCENT: 1.3 %
GFR AFRICAN AMERICAN: 13
GFR NON-AFRICAN AMERICAN: 11
GLUCOSE BLD-MCNC: 103 MG/DL (ref 70–99)
GLUCOSE BLD-MCNC: 140 MG/DL
GLUCOSE BLD-MCNC: 140 MG/DL (ref 70–99)
HCT VFR BLD CALC: 33.8 % (ref 36–48)
HEMOGLOBIN: 10.9 G/DL (ref 12–16)
INFLUENZA A: NOT DETECTED
INFLUENZA B: NOT DETECTED
INR BLD: 0.96 (ref 0.87–1.14)
LYMPHOCYTES ABSOLUTE: 1.6 K/UL (ref 1–5.1)
LYMPHOCYTES RELATIVE PERCENT: 20.1 %
MCH RBC QN AUTO: 28.1 PG (ref 26–34)
MCHC RBC AUTO-ENTMCNC: 32.3 G/DL (ref 31–36)
MCV RBC AUTO: 87 FL (ref 80–100)
MONOCYTES ABSOLUTE: 0.6 K/UL (ref 0–1.3)
MONOCYTES RELATIVE PERCENT: 8.3 %
NEUTROPHILS ABSOLUTE: 5.4 K/UL (ref 1.7–7.7)
NEUTROPHILS RELATIVE PERCENT: 69.3 %
PDW BLD-RTO: 13.7 % (ref 12.4–15.4)
PERFORMED ON: ABNORMAL
PLATELET # BLD: 178 K/UL (ref 135–450)
PMV BLD AUTO: 8.1 FL (ref 5–10.5)
POTASSIUM REFLEX MAGNESIUM: 5.6 MMOL/L (ref 3.5–5.1)
POTASSIUM SERPL-SCNC: 5 MMOL/L (ref 3.5–5.1)
PROTHROMBIN TIME: 12.6 SEC (ref 11.7–14.5)
RBC # BLD: 3.89 M/UL (ref 4–5.2)
SARS-COV-2 RNA, RT PCR: NOT DETECTED
SODIUM BLD-SCNC: 138 MMOL/L (ref 136–145)
TOTAL PROTEIN: 7.3 G/DL (ref 6.4–8.2)
TROPONIN: <0.01 NG/ML
WBC # BLD: 7.7 K/UL (ref 4–11)

## 2022-09-17 PROCEDURE — 99285 EMERGENCY DEPT VISIT HI MDM: CPT

## 2022-09-17 PROCEDURE — 85610 PROTHROMBIN TIME: CPT

## 2022-09-17 PROCEDURE — 2580000003 HC RX 258: Performed by: STUDENT IN AN ORGANIZED HEALTH CARE EDUCATION/TRAINING PROGRAM

## 2022-09-17 PROCEDURE — 84484 ASSAY OF TROPONIN QUANT: CPT

## 2022-09-17 PROCEDURE — 71045 X-RAY EXAM CHEST 1 VIEW: CPT

## 2022-09-17 PROCEDURE — 36415 COLL VENOUS BLD VENIPUNCTURE: CPT

## 2022-09-17 PROCEDURE — 84132 ASSAY OF SERUM POTASSIUM: CPT

## 2022-09-17 PROCEDURE — 70450 CT HEAD/BRAIN W/O DYE: CPT

## 2022-09-17 PROCEDURE — G0379 DIRECT REFER HOSPITAL OBSERV: HCPCS

## 2022-09-17 PROCEDURE — 80053 COMPREHEN METABOLIC PANEL: CPT

## 2022-09-17 PROCEDURE — G0378 HOSPITAL OBSERVATION PER HR: HCPCS

## 2022-09-17 PROCEDURE — 85025 COMPLETE CBC W/AUTO DIFF WBC: CPT

## 2022-09-17 PROCEDURE — 87636 SARSCOV2 & INF A&B AMP PRB: CPT

## 2022-09-17 PROCEDURE — 1200000000 HC SEMI PRIVATE

## 2022-09-17 PROCEDURE — 93005 ELECTROCARDIOGRAM TRACING: CPT | Performed by: STUDENT IN AN ORGANIZED HEALTH CARE EDUCATION/TRAINING PROGRAM

## 2022-09-17 RX ORDER — DEXTROSE MONOHYDRATE 100 MG/ML
INJECTION, SOLUTION INTRAVENOUS CONTINUOUS PRN
Status: DISCONTINUED | OUTPATIENT
Start: 2022-09-17 | End: 2022-09-17

## 2022-09-17 RX ORDER — TRAZODONE HYDROCHLORIDE 100 MG/1
100 TABLET ORAL NIGHTLY
Status: CANCELLED | OUTPATIENT
Start: 2022-09-17

## 2022-09-17 RX ORDER — SODIUM CHLORIDE 9 MG/ML
INJECTION, SOLUTION INTRAVENOUS CONTINUOUS
Status: CANCELLED | OUTPATIENT
Start: 2022-09-17

## 2022-09-17 RX ORDER — BUSPIRONE HYDROCHLORIDE 10 MG/1
10 TABLET ORAL EVERY EVENING
Status: CANCELLED | OUTPATIENT
Start: 2022-09-17

## 2022-09-17 RX ORDER — HEPARIN SODIUM 5000 [USP'U]/ML
5000 INJECTION, SOLUTION INTRAVENOUS; SUBCUTANEOUS EVERY 8 HOURS SCHEDULED
Status: DISCONTINUED | OUTPATIENT
Start: 2022-09-18 | End: 2022-09-20 | Stop reason: HOSPADM

## 2022-09-17 RX ORDER — ASPIRIN 81 MG/1
81 TABLET ORAL DAILY
Status: DISCONTINUED | OUTPATIENT
Start: 2022-09-18 | End: 2022-09-20 | Stop reason: HOSPADM

## 2022-09-17 RX ORDER — BACLOFEN 10 MG/1
10 TABLET ORAL DAILY
Status: CANCELLED | OUTPATIENT
Start: 2022-09-17

## 2022-09-17 RX ORDER — ACETAMINOPHEN 325 MG/1
650 TABLET ORAL EVERY 6 HOURS PRN
Status: CANCELLED | OUTPATIENT
Start: 2022-09-17

## 2022-09-17 RX ORDER — LIDOCAINE 50 MG/G
OINTMENT TOPICAL PRN
Status: CANCELLED | OUTPATIENT
Start: 2022-09-17

## 2022-09-17 RX ORDER — AMLODIPINE BESYLATE 5 MG/1
10 TABLET ORAL DAILY
Status: CANCELLED | OUTPATIENT
Start: 2022-09-17

## 2022-09-17 RX ORDER — 0.9 % SODIUM CHLORIDE 0.9 %
500 INTRAVENOUS SOLUTION INTRAVENOUS ONCE
Status: COMPLETED | OUTPATIENT
Start: 2022-09-17 | End: 2022-09-17

## 2022-09-17 RX ORDER — PANTOPRAZOLE SODIUM 40 MG/1
40 TABLET, DELAYED RELEASE ORAL DAILY
Status: CANCELLED | OUTPATIENT
Start: 2022-09-17

## 2022-09-17 RX ORDER — ATORVASTATIN CALCIUM 40 MG/1
80 TABLET, FILM COATED ORAL NIGHTLY
Status: CANCELLED | OUTPATIENT
Start: 2022-09-17

## 2022-09-17 RX ORDER — HYDROCODONE BITARTRATE AND ACETAMINOPHEN 10; 325 MG/1; MG/1
1 TABLET ORAL EVERY 6 HOURS PRN
Status: CANCELLED | OUTPATIENT
Start: 2022-09-17

## 2022-09-17 RX ORDER — POLYVINYL ALCOHOL 14 MG/ML
1 SOLUTION/ DROPS OPHTHALMIC DAILY
Refills: 3 | Status: CANCELLED | OUTPATIENT
Start: 2022-09-17

## 2022-09-17 RX ORDER — ONDANSETRON 4 MG/1
4 TABLET, ORALLY DISINTEGRATING ORAL EVERY 8 HOURS PRN
Status: CANCELLED | OUTPATIENT
Start: 2022-09-17

## 2022-09-17 RX ORDER — HYDRALAZINE HYDROCHLORIDE 10 MG/1
10 TABLET, FILM COATED ORAL 3 TIMES DAILY
Status: CANCELLED | OUTPATIENT
Start: 2022-09-17

## 2022-09-17 RX ORDER — ONDANSETRON 2 MG/ML
4 INJECTION INTRAMUSCULAR; INTRAVENOUS EVERY 6 HOURS PRN
Status: CANCELLED | OUTPATIENT
Start: 2022-09-17

## 2022-09-17 RX ORDER — ASPIRIN 300 MG/1
300 SUPPOSITORY RECTAL DAILY
Status: DISCONTINUED | OUTPATIENT
Start: 2022-09-18 | End: 2022-09-20 | Stop reason: HOSPADM

## 2022-09-17 RX ORDER — ONDANSETRON 4 MG/1
4 TABLET, ORALLY DISINTEGRATING ORAL EVERY 8 HOURS PRN
Status: DISCONTINUED | OUTPATIENT
Start: 2022-09-17 | End: 2022-09-20 | Stop reason: HOSPADM

## 2022-09-17 RX ORDER — POLYETHYLENE GLYCOL 3350 17 G/17G
17 POWDER, FOR SOLUTION ORAL DAILY PRN
Status: CANCELLED | OUTPATIENT
Start: 2022-09-17

## 2022-09-17 RX ORDER — ONDANSETRON 2 MG/ML
4 INJECTION INTRAMUSCULAR; INTRAVENOUS EVERY 6 HOURS PRN
Status: DISCONTINUED | OUTPATIENT
Start: 2022-09-17 | End: 2022-09-20 | Stop reason: HOSPADM

## 2022-09-17 RX ORDER — POLYETHYLENE GLYCOL 3350 17 G/17G
17 POWDER, FOR SOLUTION ORAL DAILY PRN
Status: DISCONTINUED | OUTPATIENT
Start: 2022-09-17 | End: 2022-09-20 | Stop reason: HOSPADM

## 2022-09-17 RX ORDER — POLYETHYLENE GLYCOL 3350 17 G/17G
17 POWDER, FOR SOLUTION ORAL DAILY
Status: CANCELLED | OUTPATIENT
Start: 2022-09-17

## 2022-09-17 RX ORDER — LABETALOL HYDROCHLORIDE 5 MG/ML
10 INJECTION, SOLUTION INTRAVENOUS EVERY 10 MIN PRN
Status: DISCONTINUED | OUTPATIENT
Start: 2022-09-17 | End: 2022-09-20 | Stop reason: HOSPADM

## 2022-09-17 RX ORDER — CITALOPRAM 20 MG/1
30 TABLET ORAL DAILY
Status: CANCELLED | OUTPATIENT
Start: 2022-09-17

## 2022-09-17 RX ORDER — ISOSORBIDE MONONITRATE 30 MG/1
30 TABLET, EXTENDED RELEASE ORAL DAILY
Status: CANCELLED | OUTPATIENT
Start: 2022-09-17

## 2022-09-17 RX ORDER — ASPIRIN 81 MG/1
81 TABLET ORAL DAILY
Status: CANCELLED | OUTPATIENT
Start: 2022-09-17

## 2022-09-17 RX ORDER — AZELASTINE 1 MG/ML
1 SPRAY, METERED NASAL 2 TIMES DAILY
Status: CANCELLED | OUTPATIENT
Start: 2022-09-17

## 2022-09-17 RX ORDER — HEPARIN SODIUM 5000 [USP'U]/ML
5000 INJECTION, SOLUTION INTRAVENOUS; SUBCUTANEOUS EVERY 8 HOURS SCHEDULED
Status: CANCELLED | OUTPATIENT
Start: 2022-09-17

## 2022-09-17 RX ADMIN — SODIUM CHLORIDE 500 ML: 9 INJECTION, SOLUTION INTRAVENOUS at 19:15

## 2022-09-17 ASSESSMENT — PAIN SCALES - GENERAL: PAINLEVEL_OUTOF10: 0

## 2022-09-17 ASSESSMENT — PAIN - FUNCTIONAL ASSESSMENT: PAIN_FUNCTIONAL_ASSESSMENT: NONE - DENIES PAIN

## 2022-09-17 NOTE — ED NOTES
Consult to [facility] at 0571 for [facility] hospitalist      Deandre Palma  09/17/22 [id number]

## 2022-09-17 NOTE — ED NOTES
Code stroke called to stroke team at [id number] [facility]  09/17/22 [address]    Stroke team called back at 1608 Dr. Cruzito Nuñez  09/17/22 [phone]

## 2022-09-17 NOTE — ED NOTES
CT of head reordered due to CT tech states that order is not crossing over.      Stephan Llanos RN  09/17/22 [id number]

## 2022-09-17 NOTE — ED NOTES
Repeat potassium 5.0; discussion with Dr. Larry Loredo. See discontinuation of orders.      Juan Carlos Sandoval RN  09/17/22 [id number]

## 2022-09-17 NOTE — ED PROVIDER NOTES
Emergency Department Encounter    Patient: Campos Parada  MRN: [id number]  : 1940  Date of Evaluation: 2022  ED Provider:  Savanna Richards MD    Triage Chief Complaint:   Blurred Vision (Began approx 1 hour ago. Pt states that she was seeing spots and objects.)    [place]:  Campos Parada is a 79 y.o. female with history seen below presenting with complaints of blurred vision and episode of dizziness. States that started around 230. States she was in her normal state of health prior to this. States that she acutely began having blurred vision and mild dizziness. She denies any exacerbating or provoking factors. States that on the way and the symptoms began resolving. States that symptoms are currently mild. States she has a very mild change in vision currently. Denies any dizziness or lightheadedness. Denies motor or sensory changes. Denies recent falls or trauma. Denies headache. Denies any chest pain or shortness of breath increasing at baseline. Denies abdominal pain, nausea vomiting, diarrhea constipation, urinary symptoms.     ROS - see HPI, below listed is current ROS at time of my eval:  At least 14 systems reviewed, negative other HPI    Past Medical History:   Diagnosis Date    Arthritis     BCC (basal cell carcinoma of skin)     RT clavicle    Bladder infection     frequently    CAD (coronary artery disease)     stents    Cancer (HCC)     skin    CHF (congestive heart failure) (HCC)     Chronic back pain     COPD (chronic obstructive pulmonary disease) (HCC)     Depression     Depression     Hypercholesteremia     Hypertension     Pain in shoulder     pain in left shoulder, taking steroid injections for steroid    Reflux     Rheumatic fever     as a child    Sleep apnea     uses CPAP    TIA (transient ischemic attack)     Urinary incontinence     Wears glasses      Past Surgical History:   Procedure Laterality Date    AORTIC VALVE REPLACEMENT      APPENDECTOMY      BLADDER REPAIR      3 TIMES    CARDIAC SURGERY      stents    CARPAL TUNNEL RELEASE      RIGHT    CHOLECYSTECTOMY, LAPAROSCOPIC  01/06/2017    with dr Jade Mario      has it it done twice    DIAGNOSTIC CARDIAC CATH LAB PROCEDURE      HYSTERECTOMY (CERVIX STATUS UNKNOWN)      JOINT REPLACEMENT      KATHLEEN TKR    NECK SURGERY      OTHER SURGICAL HISTORY  9/28/12    Full Interstim Implant    SHOULDER SURGERY      SKIN CANCER EXCISION      SPINAL FIXATION SURGERY WITH IMPLANT  2001    SPINAL FIXATION SURGERY WITH IMPLANT  2004    SPINAL FIXATION SURGERY WITH IMPLANT  2007    SPINE SURGERY  1999    TONSILLECTOMY      UPPER GASTROINTESTINAL ENDOSCOPY  4/22/11    UPPER GASTROINTESTINAL ENDOSCOPY      UPPER GASTROINTESTINAL ENDOSCOPY  03/23/2017    dilitation     Family History   Problem Relation Age of Onset    Arthritis Mother     Cancer Mother     Depression Mother     Heart Disease Mother     High Blood Pressure Mother     High Cholesterol Mother     Miscarriages / [place] Mother     Stroke Mother     Early Death Mother     Hearing Loss Mother     Mental Illness Mother     Vision Loss Mother     Arthritis Brother     Depression Brother     Diabetes Brother     Hearing Loss Brother     Heart Disease Brother     High Blood Pressure Brother     High Cholesterol Brother     Vision Loss Brother      Social History     Socioeconomic History    Marital status:      Spouse name: Not on file    Number of children: 8    Years of education: 10    Highest education level: Not on file   Occupational History    Occupation: retired   Tobacco Use    Smoking status: Never    Smokeless tobacco: Never   Vaping Use    Vaping Use: Never used   Substance and Sexual Activity    Alcohol use: No    Drug use: No    Sexual activity: Not Currently   Other Topics Concern    Not on file   Social History Narrative    Not on file     Social Determinants of Health     Financial Resource Strain: Not on file   Food Insecurity: Not on file   Transportation Needs: Not on file   Physical Activity: Not on file   Stress: Not on file   Social Connections: Not on file   Intimate Partner Violence: Not on file   Housing Stability: Not on file     No current facility-administered medications for this encounter. Current Outpatient Medications   Medication Sig Dispense Refill    traZODone (DESYREL) 100 MG tablet TAKE 1-2 TABS AT BEDTIME 60 tablet 1    diclofenac (VOLTAREN) 75 MG EC tablet 1 tab(s)      furosemide (LASIX) 40 MG tablet Take 1 tablet by mouth See Admin Instructions Take 40mg in the morning and 40mg in the evening over the next 4 days, then resume normal dosing of 40mg AM and 20mg PM. Take Potassium supplement with lasix. 60 tablet 3    acetaminophen (TYLENOL) 325 MG tablet Take 650 mg by mouth every 6 hours as needed for Pain      cycloSPORINE, PF, (CEQUA) 0.09 % SOLN Apply to eye      oxyCODONE-acetaminophen (PERCOCET) 5-325 MG per tablet Take 1 tablet by mouth every 12 hours as needed for Pain. [address] [id number] UNIT/GM powder APPLY TO AFFECTED AREA(S) 3 TIMES DAILY 60 g 1    citalopram (CELEXA) 20 MG tablet TAKE 1 & 1/2 TAB ONCE DAILY 45 tablet 1    busPIRone (BUSPAR) 10 MG tablet TAKE 1-2 TABS IN THE EVENING AS NEEDED FOR ANXIETY 60 tablet 2    hydrALAZINE (APRESOLINE) 10 MG tablet Take 10 mg by mouth 3 times daily      polyethylene glycol (GLYCOLAX) 17 GM/SCOOP powder DISSOLVE AND DRINK 17 GRAMS DAILY AS DIRECTED 510 g 1    spironolactone (ALDACTONE) 25 MG tablet Take 1 tablet by mouth daily 30 tablet 3    azelastine (ASTELIN) 0.1 % nasal spray 1 spray by Nasal route 2 times daily Use in each nostril as directed 2 Bottle 1    cycloSPORINE (RESTASIS) 0.05 % ophthalmic emulsion Place 1 drop into both eyes as needed (dry eyes) 1 vial 3    gabapentin (NEURONTIN) 100 MG capsule Take 100 mg by mouth 3 times daily.        pantoprazole (PROTONIX) 40 MG tablet Take 1 tablet by mouth daily 90 tablet 0 simvastatin (ZOCOR) 40 MG tablet TAKE ONE TABLET NIGHTLY. aspirin 81 MG EC tablet Take 81 mg by mouth daily      candesartan (ATACAND) 8 MG tablet Take 8 mg by mouth daily      HYDROcodone-acetaminophen (NORCO)  MG per tablet Take 1 tablet by mouth every 6 hours as needed. baclofen (LIORESAL) 10 MG tablet TAKE 1 TABLET EVERY 8 HOURS AS NEEDED (Patient taking differently: Take 10 mg by mouth daily) 30 tablet 0    Misc. Devices (ROLLER WALKER) MISC 1 each by Does not apply route daily 1 each 0    isosorbide mononitrate (IMDUR) 30 MG extended release tablet Take 30 mg by mouth daily      albuterol-ipratropium (COMBIVENT RESPIMAT)  MCG/ACT AERS inhaler Inhale 1 puff into the lungs every 6 hours (Patient taking differently: Inhale 1 puff into the lungs every 6 hours as needed) 1 Inhaler 5    amLODIPine (NORVASC) 10 MG tablet TAKE (1) TABLET DAILY 30 tablet 5    NITROSTAT 0.4 MG SL tablet USE 1 TABLET UNDER TONGUE AS NEEDED FOR CHEST PAIN MAY REPEAT EVERY 5MIN. UP TO 3. THEN GO TO ER. 25 tablet 3    lidocaine (XYLOCAINE) 5 % ointment Apply topically as needed. 1 Tube 3     Allergies   Allergen Reactions    Latex     Levofloxacin Other (See Comments)     Burns mouth per patient    Azithromycin     Codeine Nausea Only    Keflex [Cephalexin]     Morphine     Pentazocine Lactate     Sulfa Antibiotics Hives    Tape [Adhesive Tape]      No bandaids       Nursing Notes Reviewed    Physical Exam:  Triage VS:    ED Triage Vitals [09/17/22 1600]   Enc Vitals Group      BP (!) 146/54      Heart Rate 58      Resp 18      Temp       Temp src       SpO2 96 %      Weight 184 lb (83.5 kg)      Height 5' 3\" (1.6 m)      Head Circumference       Peak Flow       Pain Score       Pain Loc       Pain Edu? Excl. in [address]? My pulse ox interpretation is - normal    General appearance:  No acute distress. Skin:  Warm. Dry. Eye:  Extraocular movements intact.      Ears, nose, mouth and throat:  Oral mucosa moist Neck:  Trachea midline. Extremity:  No swelling. Normal ROM     Heart:  Regular rate and rhythm, normal S1 & S2, no extra heart sounds. Perfusion:  intact  Respiratory:  Lungs clear to auscultation bilaterally. Respirations nonlabored. Abdominal:  Normal bowel sounds. Soft. Nontender. Non distended. Back:  No CVA tenderness to palpation     Neurological:  Alert and oriented times 3. No focal neuro deficits. No facial asymmetry, normal sensation light touch of the face, extraocular movements are intact, pupils are 3 mm reactive bilaterally, no gross visual field deficits, no pronator drift of the bilateral upper and lower extremities. Patient describes decreased sensation over the left upper extremity compared to the right upper extremity. States the bilateral lower extremities have normal sensation bilaterally. Normal finger-nose-finger and heel shin, no dysarthria dysphagia          Psychiatric:  Appropriate    I have reviewed and interpreted all of the currently available lab results from this visit (if applicable):  Results for orders placed or performed during the hospital encounter of 09/17/22   POCT Glucose   Result Value Ref Range    POC Glucose 140 (H) 70 - 99 mg/dl    Performed on ACCU-CHEK       Radiographs (if obtained):  Radiologist's Report Reviewed:  No results found. EKG (if obtained): (All EKG's are interpreted by myself in the absence of a cardiologist)  Sinus bradycardia, ventricular rate 55, QRS duration 98, QTc 451, no significant ST elevation or depression    MDM:    58-year-old female presenting with blurred vision episode dizziness. History missing above. Vitals on presentation are reassuring and patient afebrile satting on room air. Patient states symptoms are improving. They state that they last about an hour and a half. Physical exam can be seen above. Stroke alert was called on presentation. Did discuss with [facility] stroke neurology.   With improving

## 2022-09-18 LAB
ANION GAP SERPL CALCULATED.3IONS-SCNC: 15 MMOL/L (ref 3–16)
BACTERIA: ABNORMAL /HPF
BILIRUBIN URINE: NEGATIVE
BLOOD, URINE: NEGATIVE
BUN BLDV-MCNC: 43 MG/DL (ref 7–20)
CALCIUM SERPL-MCNC: 8.9 MG/DL (ref 8.3–10.6)
CHLORIDE BLD-SCNC: 106 MMOL/L (ref 99–110)
CHLORIDE URINE RANDOM: 72 MMOL/L
CHOLESTEROL, TOTAL: 112 MG/DL (ref 0–199)
CLARITY: ABNORMAL
CO2: 21 MMOL/L (ref 21–32)
COLOR: YELLOW
CREAT SERPL-MCNC: 3.5 MG/DL (ref 0.6–1.2)
CREATININE URINE: 47.7 MG/DL (ref 28–259)
EKG ATRIAL RATE: 153 BPM
EKG DIAGNOSIS: NORMAL
EKG Q-T INTERVAL: 472 MS
EKG QRS DURATION: 98 MS
EKG QTC CALCULATION (BAZETT): 451 MS
EKG R AXIS: 0 DEGREES
EKG T AXIS: -3 DEGREES
EKG VENTRICULAR RATE: 55 BPM
EPITHELIAL CELLS, UA: 0 /HPF (ref 0–5)
ESTIMATED AVERAGE GLUCOSE: 119.8 MG/DL
GFR AFRICAN AMERICAN: 15
GFR NON-AFRICAN AMERICAN: 12
GLUCOSE BLD-MCNC: 84 MG/DL (ref 70–99)
GLUCOSE URINE: NEGATIVE MG/DL
HBA1C MFR BLD: 5.8 %
HCT VFR BLD CALC: 33 % (ref 36–48)
HDLC SERPL-MCNC: 44 MG/DL (ref 40–60)
HEMOGLOBIN: 11 G/DL (ref 12–16)
HYALINE CASTS: 0 /LPF (ref 0–8)
KETONES, URINE: NEGATIVE MG/DL
LDL CHOLESTEROL CALCULATED: 47 MG/DL
LEUKOCYTE ESTERASE, URINE: ABNORMAL
MCH RBC QN AUTO: 29.7 PG (ref 26–34)
MCHC RBC AUTO-ENTMCNC: 33.3 G/DL (ref 31–36)
MCV RBC AUTO: 89 FL (ref 80–100)
MICROSCOPIC EXAMINATION: YES
NITRITE, URINE: POSITIVE
PDW BLD-RTO: 13.9 % (ref 12.4–15.4)
PH UA: 8 (ref 5–8)
PLATELET # BLD: 161 K/UL (ref 135–450)
PMV BLD AUTO: 7.9 FL (ref 5–10.5)
POTASSIUM REFLEX MAGNESIUM: 5.5 MMOL/L (ref 3.5–5.1)
PROTEIN PROTEIN: 15 MG/DL
PROTEIN UA: NEGATIVE MG/DL
PROTEIN/CREAT RATIO: 0.3 MG/DL
RBC # BLD: 3.7 M/UL (ref 4–5.2)
RBC UA: 1 /HPF (ref 0–4)
REASON FOR REJECTION: NORMAL
REJECTED TEST: NORMAL
SODIUM BLD-SCNC: 142 MMOL/L (ref 136–145)
SODIUM URINE: 102 MMOL/L
SPECIFIC GRAVITY UA: 1.01 (ref 1–1.03)
TOTAL CK: 69 U/L (ref 26–192)
TRIGL SERPL-MCNC: 104 MG/DL (ref 0–150)
URINE REFLEX TO CULTURE: YES
URINE TYPE: ABNORMAL
UROBILINOGEN, URINE: 0.2 E.U./DL
VLDLC SERPL CALC-MCNC: 21 MG/DL
WBC # BLD: 6.3 K/UL (ref 4–11)
WBC UA: 154 /HPF (ref 0–5)

## 2022-09-18 PROCEDURE — 82550 ASSAY OF CK (CPK): CPT

## 2022-09-18 PROCEDURE — 84156 ASSAY OF PROTEIN URINE: CPT

## 2022-09-18 PROCEDURE — 97530 THERAPEUTIC ACTIVITIES: CPT

## 2022-09-18 PROCEDURE — 87186 SC STD MICRODIL/AGAR DIL: CPT

## 2022-09-18 PROCEDURE — G0378 HOSPITAL OBSERVATION PER HR: HCPCS

## 2022-09-18 PROCEDURE — 6360000002 HC RX W HCPCS: Performed by: NURSE PRACTITIONER

## 2022-09-18 PROCEDURE — 2580000003 HC RX 258: Performed by: NURSE PRACTITIONER

## 2022-09-18 PROCEDURE — 87086 URINE CULTURE/COLONY COUNT: CPT

## 2022-09-18 PROCEDURE — 97162 PT EVAL MOD COMPLEX 30 MIN: CPT

## 2022-09-18 PROCEDURE — 97110 THERAPEUTIC EXERCISES: CPT

## 2022-09-18 PROCEDURE — 36415 COLL VENOUS BLD VENIPUNCTURE: CPT

## 2022-09-18 PROCEDURE — 94760 N-INVAS EAR/PLS OXIMETRY 1: CPT

## 2022-09-18 PROCEDURE — 80048 BASIC METABOLIC PNL TOTAL CA: CPT

## 2022-09-18 PROCEDURE — 82570 ASSAY OF URINE CREATININE: CPT

## 2022-09-18 PROCEDURE — 83036 HEMOGLOBIN GLYCOSYLATED A1C: CPT

## 2022-09-18 PROCEDURE — 6370000000 HC RX 637 (ALT 250 FOR IP): Performed by: NURSE PRACTITIONER

## 2022-09-18 PROCEDURE — 1200000000 HC SEMI PRIVATE

## 2022-09-18 PROCEDURE — 81001 URINALYSIS AUTO W/SCOPE: CPT

## 2022-09-18 PROCEDURE — 84300 ASSAY OF URINE SODIUM: CPT

## 2022-09-18 PROCEDURE — 87088 URINE BACTERIA CULTURE: CPT

## 2022-09-18 PROCEDURE — 93010 ELECTROCARDIOGRAM REPORT: CPT | Performed by: INTERNAL MEDICINE

## 2022-09-18 PROCEDURE — 80061 LIPID PANEL: CPT

## 2022-09-18 PROCEDURE — 85027 COMPLETE CBC AUTOMATED: CPT

## 2022-09-18 PROCEDURE — 6370000000 HC RX 637 (ALT 250 FOR IP): Performed by: STUDENT IN AN ORGANIZED HEALTH CARE EDUCATION/TRAINING PROGRAM

## 2022-09-18 PROCEDURE — 82436 ASSAY OF URINE CHLORIDE: CPT

## 2022-09-18 PROCEDURE — 96372 THER/PROPH/DIAG INJ SC/IM: CPT

## 2022-09-18 RX ORDER — DONEPEZIL HYDROCHLORIDE 10 MG/1
10 TABLET, FILM COATED ORAL NIGHTLY
Status: DISCONTINUED | OUTPATIENT
Start: 2022-09-18 | End: 2022-09-20 | Stop reason: HOSPADM

## 2022-09-18 RX ORDER — DONEPEZIL HYDROCHLORIDE 10 MG/1
10 TABLET, FILM COATED ORAL NIGHTLY
COMMUNITY
End: 2022-10-04

## 2022-09-18 RX ORDER — OXYCODONE HYDROCHLORIDE AND ACETAMINOPHEN 5; 325 MG/1; MG/1
1 TABLET ORAL EVERY 4 HOURS PRN
Status: DISCONTINUED | OUTPATIENT
Start: 2022-09-18 | End: 2022-09-20 | Stop reason: HOSPADM

## 2022-09-18 RX ORDER — ATORVASTATIN CALCIUM 20 MG/1
20 TABLET, FILM COATED ORAL DAILY
Status: DISCONTINUED | OUTPATIENT
Start: 2022-09-18 | End: 2022-09-20 | Stop reason: HOSPADM

## 2022-09-18 RX ORDER — GABAPENTIN 100 MG/1
100 CAPSULE ORAL 3 TIMES DAILY
Status: DISCONTINUED | OUTPATIENT
Start: 2022-09-18 | End: 2022-09-20 | Stop reason: HOSPADM

## 2022-09-18 RX ORDER — TRAZODONE HYDROCHLORIDE 100 MG/1
100 TABLET ORAL NIGHTLY PRN
Status: DISCONTINUED | OUTPATIENT
Start: 2022-09-18 | End: 2022-09-20 | Stop reason: HOSPADM

## 2022-09-18 RX ORDER — SPIRONOLACTONE 25 MG/1
25 TABLET ORAL DAILY
Status: DISCONTINUED | OUTPATIENT
Start: 2022-09-18 | End: 2022-09-20 | Stop reason: HOSPADM

## 2022-09-18 RX ORDER — AMLODIPINE BESYLATE 10 MG/1
10 TABLET ORAL DAILY
Status: DISCONTINUED | OUTPATIENT
Start: 2022-09-18 | End: 2022-09-20 | Stop reason: HOSPADM

## 2022-09-18 RX ORDER — TORSEMIDE 20 MG/1
40 TABLET ORAL DAILY
Status: ON HOLD | COMMUNITY
End: 2022-09-20 | Stop reason: SDUPTHER

## 2022-09-18 RX ORDER — VALSARTAN 80 MG/1
40 TABLET ORAL DAILY
Status: DISCONTINUED | OUTPATIENT
Start: 2022-09-18 | End: 2022-09-20 | Stop reason: HOSPADM

## 2022-09-18 RX ORDER — POLYVINYL ALCOHOL 14 MG/ML
1 SOLUTION/ DROPS OPHTHALMIC EVERY 4 HOURS PRN
Refills: 3 | Status: DISCONTINUED | OUTPATIENT
Start: 2022-09-18 | End: 2022-09-20 | Stop reason: HOSPADM

## 2022-09-18 RX ORDER — UBROGEPANT 100 MG/1
1 TABLET ORAL PRN
COMMUNITY

## 2022-09-18 RX ORDER — SODIUM CHLORIDE 9 MG/ML
INJECTION, SOLUTION INTRAVENOUS CONTINUOUS
Status: ACTIVE | OUTPATIENT
Start: 2022-09-18 | End: 2022-09-19

## 2022-09-18 RX ORDER — TRAZODONE HYDROCHLORIDE 100 MG/1
100 TABLET ORAL NIGHTLY PRN
COMMUNITY

## 2022-09-18 RX ADMIN — HEPARIN SODIUM 5000 UNITS: 5000 INJECTION INTRAVENOUS; SUBCUTANEOUS at 13:36

## 2022-09-18 RX ADMIN — HEPARIN SODIUM 5000 UNITS: 5000 INJECTION INTRAVENOUS; SUBCUTANEOUS at 06:00

## 2022-09-18 RX ADMIN — HEPARIN SODIUM 5000 UNITS: 5000 INJECTION INTRAVENOUS; SUBCUTANEOUS at 21:09

## 2022-09-18 RX ADMIN — CITALOPRAM HYDROBROMIDE 30 MG: 20 TABLET ORAL at 08:20

## 2022-09-18 RX ADMIN — GABAPENTIN 100 MG: 100 CAPSULE ORAL at 13:36

## 2022-09-18 RX ADMIN — TRAZODONE HYDROCHLORIDE 100 MG: 100 TABLET ORAL at 20:32

## 2022-09-18 RX ADMIN — SODIUM CHLORIDE: 9 INJECTION, SOLUTION INTRAVENOUS at 20:33

## 2022-09-18 RX ADMIN — GABAPENTIN 100 MG: 100 CAPSULE ORAL at 20:23

## 2022-09-18 RX ADMIN — ATORVASTATIN CALCIUM 20 MG: 20 TABLET, FILM COATED ORAL at 08:24

## 2022-09-18 RX ADMIN — GABAPENTIN 100 MG: 100 CAPSULE ORAL at 08:20

## 2022-09-18 RX ADMIN — AMLODIPINE BESYLATE 10 MG: 10 TABLET ORAL at 08:20

## 2022-09-18 RX ADMIN — OXYCODONE AND ACETAMINOPHEN 1 TABLET: 5; 325 TABLET ORAL at 23:35

## 2022-09-18 RX ADMIN — SODIUM CHLORIDE: 9 INJECTION, SOLUTION INTRAVENOUS at 08:23

## 2022-09-18 RX ADMIN — PIPERACILLIN AND TAZOBACTAM 3375 MG: 3; .375 INJECTION, POWDER, FOR SOLUTION INTRAVENOUS at 22:30

## 2022-09-18 RX ADMIN — SODIUM ZIRCONIUM CYCLOSILICATE 10 G: 10 POWDER, FOR SUSPENSION ORAL at 12:05

## 2022-09-18 RX ADMIN — DONEPEZIL HYDROCHLORIDE 10 MG: 10 TABLET, FILM COATED ORAL at 20:23

## 2022-09-18 RX ADMIN — ASPIRIN 81 MG: 81 TABLET, COATED ORAL at 08:20

## 2022-09-18 ASSESSMENT — PAIN DESCRIPTION - DESCRIPTORS
DESCRIPTORS: ACHING
DESCRIPTORS: ACHING

## 2022-09-18 ASSESSMENT — PAIN SCALES - GENERAL
PAINLEVEL_OUTOF10: 9
PAINLEVEL_OUTOF10: 8

## 2022-09-18 ASSESSMENT — PAIN DESCRIPTION - LOCATION: LOCATION: OTHER (COMMENT)

## 2022-09-18 ASSESSMENT — PAIN DESCRIPTION - PAIN TYPE
TYPE: CHRONIC PAIN
TYPE: CHRONIC PAIN

## 2022-09-18 ASSESSMENT — PAIN DESCRIPTION - ONSET: ONSET: ON-GOING

## 2022-09-18 ASSESSMENT — PAIN DESCRIPTION - FREQUENCY: FREQUENCY: CONTINUOUS

## 2022-09-18 NOTE — ED NOTES
[employer] and this nurse began to change the patient's clothing after she messed the bed. Patient was irate, this nurse was not notified that the patient had to urinate. Patient was calm after she was changed and apologized for her previous attitude with this nurse.      Sorin Jiang RN  09/17/22 [id number]

## 2022-09-18 NOTE — PROGRESS NOTES
Admitted to [phone] from [facility] ED with blurred vision. NIH 2 with slight droop and numbness, tingling to extremities. She does report this to be ongoing and increasing in the last couple months. She also reports multiple falls at home. Oriented her to room, unit routine, safety protocol, and plan of care as ordered. Call light provided and all safety protocols in place. Placed on bedside monitor and verified with [facility]. Stroke education started. Salvatore Ramirez RN

## 2022-09-18 NOTE — CONSULTS
Neurology Consult - 09/18/22    Impression:  Diplopia and vertigo. Also c/o ataxia. Concern for posterior circulation stroke. Chronic neck and back pain. Multiple stroke risk factors. Plan:   Await MRI. Await echo. Continue antiplatelet for secondary stroke prevention. Continue statin agent for secondary stroke prevention. Goal LDL<70. Permissive hypertension for 24 more hours. Continue telemetry to assess for atrial fibrillation. Recommend attempts at normoglycemia when possible. Rehab efforts as needed. Above discussed with patient. See dictated note. #[id number]        --  Thank you for allowing me to participate in the care of your patient with high level of medical complexity requiring a high level of medical decision making. If I can be of any further assistance, please do not hesitate to contact me. Timi Mahoney, DO  [address] Neuroscience

## 2022-09-18 NOTE — CONSULTS
Nephrology Consult Note          [facility]      [email or web address]         Phone: [phone]      9/18/2022 8:39 AM     Patient: Martha Conte [id number]  [id number]  Date of Admit: 9/17/2022 LOS: 0 days  Referring physician:    Plan:  MARIO is most likely due to IV volume depletion  IVF  Check Kidney US, urine studies  Low potassium diet  Lokelma  Check CK  Hold spironolactone and valsartan for now  Check bladder scan and straight cath in case of urinary retention. Avoid nephrotoxins  Monitor input, output and vitals closely  Daily standing weight if possible  Monitor labs closely  Renally dose all medications      Thank you for allowing us to participate in this patient's care    In case of any question please call us at our 24 hour answering service [phone] or from 7 AM to 5 PM via [employer] or cell number    Serenity Byrnes MD  [facility]ph[facility]  [facility]  [place], [address]  Fax: [phone]  Office: [phone]     Assessment & Plan     Renal function:  Acute kidney Injury:     CK:? Urine studies:  UA:?  Renal imaging:/  Evidence of urinary tract obstruction: ? Nephrotoxic exposures including NSAIDs use or contrast exposure:   Recent Antibiotics use: No  Hemodynamic insults: BP stable. ECHO: 2021  Left ventricular systolic function is normal with ejection fraction estimated at 55-60 %. No regional wall motion abnormalities are noted. There is moderate concentric left ventricular hypertrophy. Grade I diastolic dysfunction with normal filling pressure. Mild aortic regurgitation is present. It appears to be paravalvular, at 5 O clock position in parasternal short axis view. .  The right atrium is mildly dilated. Mild tricuspid regurgitation. Normal systolic pulmonary artery pressure (SPAP) estimated at 39 mmHg (RA pressure 15 mmHg). Mild pulmonic regurgitation present.    Echo 9/04/2019 55-60%, mild LVH, DD1, trivial AI probable paravalvular    Known Cirrhosis: No  Current infection or sepsis: No  TMA suspected: No    MARIO is most likely due to IV volume depletion while on Losartan and diuretics. IVF  Check Kidney US, urine studies  Check CK        Electrolytes:  Hyperkalemia. Lab Results   Component Value Date    CREATININE 3.5 (H) 09/18/2022    BUN 43 (H) 09/18/2022     09/18/2022    K 5.5 (H) 09/18/2022     09/18/2022    CO2 21 09/18/2022      Lab Results   Component Value Date    CALCIUM 8.9 09/18/2022    PHOS 4.0 01/18/2022         Acid/Base status:  Stable      Volume status/BP:  BP stable. On room air. Hematology:   No results found for: IRON, TIBC, FERRITIN  Lab Results   Component Value Date    WBC 6.3 09/18/2022    HGB 11.0 (L) 09/18/2022    HCT 33.0 (L) 09/18/2022    MCV 89.0 09/18/2022     09/18/2022           Reason for Consult     Reason for consult: MARIO          History of Present Illness   Susu Joyce is a 79 y.o. with PMH significant for CAD, CHF, COPD, HTN, HLD and TIA presented to [facility] in transfer from [facility] ED for stroke work up. Pt presented to [facility] with onset of blurred vision and dizziness. Labs showed MARIO and nephrology consulted. Patient reported some falls intermittently ? No urinary or GI symptoms  No SOB on room air  Denied NSAIDs use. Review of Systems     Positive in bold or unable to assess     GEN: Fever, chills, night sweats. HEENT: Changes in vision, sore throat, rhinorrhea   CVS: Chest pain, palpitations, swelling or edema in legs  Pulmonary: Cough, hemoptysis, SOB  GI: Nausea, vomiting, diarrhea, constipation, abdominal pain  : Bladder incontinence, dysuria, hematuria. MSK: Muscle or joint or bone pains  Skin: Rashes, ulcers, skin thickness  CNS: Headache, dizziness, confusion, focal weakness, seizure. Psych: Anxiety, agitation, depression. Reviewed all 12 systems, negative except as above.      Past Medical History     Past Medical History: Diagnosis Date    Arthritis     BCC (basal cell carcinoma of skin)     RT clavicle    Bladder infection     frequently    CAD (coronary artery disease)     stents    Cancer (HCC)     skin    CHF (congestive heart failure) (HCC)     Chronic back pain     COPD (chronic obstructive pulmonary disease) (HCC)     Depression     Depression     Hypercholesteremia     Hypertension     Pain in shoulder [id number]    pain in left shoulder, taking steroid injections for steroid    Reflux     Rheumatic fever     as a child    Sleep apnea     uses CPAP    TIA (transient ischemic attack)     Urinary incontinence     Wears glasses          Past Surgical History     Past Surgical History:   Procedure Laterality Date    AORTIC VALVE REPLACEMENT      APPENDECTOMY      BLADDER REPAIR      3 TIMES    CARDIAC SURGERY      stents    CARPAL TUNNEL RELEASE      RIGHT    CHOLECYSTECTOMY, LAPAROSCOPIC  01/06/2017    with dr Cee Hamilton      has it it done twice    DIAGNOSTIC CARDIAC CATH LAB PROCEDURE      HYSTERECTOMY (CERVIX STATUS UNKNOWN)      JOINT REPLACEMENT      KATHLEEN TKR    NECK SURGERY      OTHER SURGICAL HISTORY  9/28/12    Full Interstim Implant    SHOULDER SURGERY      SKIN CANCER EXCISION      SPINAL FIXATION SURGERY WITH IMPLANT  2001    SPINAL FIXATION SURGERY WITH IMPLANT  2004    SPINAL FIXATION SURGERY WITH IMPLANT  2007    SPINE SURGERY  1999    TONSILLECTOMY      UPPER GASTROINTESTINAL ENDOSCOPY  4/22/11    UPPER GASTROINTESTINAL ENDOSCOPY      UPPER GASTROINTESTINAL ENDOSCOPY  03/23/2017    dilitation         Family History     Family History   Problem Relation Age of Onset    Arthritis Mother     Cancer Mother     Depression Mother     Heart Disease Mother     High Blood Pressure Mother     High Cholesterol Mother     Miscarriages / [place] Mother     Stroke Mother     Early Death Mother     Hearing Loss Mother     Mental Illness Mother     Vision Loss Mother Arthritis Brother     Depression Brother     Diabetes Brother     Hearing Loss Brother     Heart Disease Brother     High Blood Pressure Brother     High Cholesterol Brother     Vision Loss Brother          Social History     Social History     Tobacco Use    Smoking status: Never    Smokeless tobacco: Never   Substance Use Topics    Alcohol use: No          Past medical, family, and social histories were reviewed as previously documented. Updates were made as necessary. Inpatient Medications and Allergies       Scheduled Meds:   amLODIPine  10 mg Oral Daily    [Held by provider] valsartan  40 mg Oral Daily    gabapentin  100 mg Oral TID    atorvastatin  20 mg Oral Daily    [Held by provider] spironolactone  25 mg Oral Daily    donepezil  10 mg Oral Nightly    citalopram  30 mg Oral Daily    heparin (porcine)  5,000 Units SubCUTAneous 3 times per day    aspirin  81 mg Oral Daily    Or    aspirin  300 mg Rectal Daily         Allergies: Allergies   Allergen Reactions    Latex     Levofloxacin Other (See Comments)     Burns mouth per patient    Azithromycin     Codeine Nausea Only    Keflex [Cephalexin]     Morphine     Pentazocine Lactate     Sulfa Antibiotics Hives    Tape [Adhesive Tape]      No bandaids         Vital Signs and Input Output     Vitals:    09/18/22 0800   BP: (!) 144/51   Pulse: 69   Resp: 20   Temp: 98.7 °F (37.1 °C)   SpO2: 99%           Intake/Output Summary (Last 24 hours) at 9/18/2022 0839  Last data filed at 9/18/2022 1916  Gross per 24 hour   Intake --   Output 500 ml   Net -500 ml           Physical Exam     General appearance: NAD  Head: Normocephalic, without obvious abnormality, atraumatic   Mouth: Moist mucous membrane  Neck: Supple. Lungs: Good air entry bilaterally and no respiratory distress on RA  Heart: S1, S2.   Abdomen: soft, non-tender non-distended  Extremities: No leg edema.    Skin: No concerning rashes noted  Psych: good eye contact, normal affect  Neuro: AAO x 3, non focal.       Laboratory Data   See above    Diagnostic Studies   Pertinent images reviewed

## 2022-09-18 NOTE — PROGRESS NOTES
NAME:  Nickie Astudillo  YOB: 1940  MEDICAL RECORD NUMBER:  [id number]  TODAYS DATE:  9/18/2022    Discussed personal risk factors for Stroke /TIA with patient/family, and ways to reduce the risk for a recurrent stroke. Patient's personal risk factors which were identified are:     [] Alcohol Abuse: check with your physician before any alcohol consumption. [] Atrial fibrillation: may cause blood clots. [] Drug Abuse: Seek help, talk with your doctor  [] Clotting Disorder  [] Diabetes  [] Family history of stroke or heart disease  [x] High Blood Pressure/Hypertension: work with your physician.  [] High cholesterol: monitor cholesterol levels with your physician. [x] Overweight/Obesity: work with your physician for your ideal body weight.  [] Physical Inactivity: get regular exercise as directed by your physician. [x] Personal history of previous TIA or stroke  [x] Poor Diet; decrease salt (sodium) in your diet, follow diet directed by physician. [] Smoking: Cigarette/Cigar: stop smoking. Advised pt. that you can reduce your risk for stroke/TIA by modifying/controlling the risk factors that you have. Pt.advised to take the medications as prescribed, which will be detailed in the discharge instructions, and to not stop taking them without consulting their physician. In addition, pt. advised to maintain a healthy diet, exercise regularly and to not smoke. [facility]'s Stroke treatment and prevention, Managing your recovery  notebook  provided and/or reviewed  with patient/family. The notebook includes, but not limited to, sections addressing warning signs & symptoms of a stroke, which are: sudden numbness or weakness especially on one side of the body, sudden confusion, difficulty speaking or understanding, sudden changes in vision, sudden dizziness or loss of balance/ coordination, sudden severe headache, syncope and seizure.   The need to call EMS ([phone]) immediately if signs & symptoms occur is emphasized . The notebook also provides education on Stroke community resources and stroke advocacy. The need for follow-up after discharge was highlighted with patient/family with them being able to repeat understanding of the importance of this.       Electronically signed by Salvador Lucio RN on 9/18/2022 at 3:00 AM

## 2022-09-18 NOTE — PROGRESS NOTES
Hospitalist Progress Note      PCP: KALI Sheikh - CNP    Date of Admission: 9/17/2022    Chief Complaint: Blurred vision    Hospital Course: 79 y.o. female with PMHx of CAD, CHF, COPD, HTN, HLD and TIA presented to [facility] in transfer from [facility] ED for stroke work up. Pt presented to [facility] with onset of blurred vision and dizziness which began suddenly at 1430. Symptoms improving on arrival to ED. She denies weakness of extremities, difficulty walking or speaking. No headache. No recent trauma. No chest pain or shortness of breath. Symptoms completely resolved at this time. Subjective: Patient sitting in bed. Chief complaint was her chronic generalized pain head to toe from numerous injuries and surgeries. When asked about the blurred vision she stated she had some this morning when the TV was first turned on, more like double vision. But then it resolved with time. Reports she is always had vision problems in her right eye. Denies chest pain shortness breath palpitation abdominal pain. Reviewed plan of care, no further needs or questions.     Assessment/Plan:    Possible TIA/CVA  - with symptoms: dizziness and blurred vision  - head CT neg for acute pathology  - MRI and ECHO to be done Monday  - ASA, statin  - consult neurology   - check lipids, HBA1c  - allow permissive HTN, SBP < 220, DBP < 110     Acute on Chronic kidney failure  - Renal function is just of baseline 2.5, today 4.0  - Monitor renal function and avoid Nephrotoxic agents as able   -Start IV fluids  -Hold ACE and diuretics  -Consult nephrology     Alzheimer's disease  - supportive care  - continue home meds     Essential (primary) hypertension   - monitor blood pressure  -Hold ARB in diuretics secondary to MARIO     Hyperlipidemia   - continue statin    Active Hospital Problems    Diagnosis     Blurred vision [H53.8]      Priority: Medium       Medications:  Reviewed    Infusion Medications    sodium chloride 100 mL/hr at 09/18/22 4822     Scheduled Medications    amLODIPine  10 mg Oral Daily    [Held by provider] valsartan  40 mg Oral Daily    gabapentin  100 mg Oral TID    atorvastatin  20 mg Oral Daily    [Held by provider] spironolactone  25 mg Oral Daily    donepezil  10 mg Oral Nightly    citalopram  30 mg Oral Daily    sodium zirconium cyclosilicate  10 g Oral Once    heparin (porcine)  5,000 Units SubCUTAneous 3 times per day    aspirin  81 mg Oral Daily    Or    aspirin  300 mg Rectal Daily     PRN Meds: albuterol-ipratropium, polyvinyl alcohol, traZODone, ondansetron **OR** ondansetron, polyethylene glycol, perflutren lipid microspheres, labetalol      Intake/Output Summary (Last 24 hours) at 9/18/2022 0971  Last data filed at 9/18/2022 4505  Gross per 24 hour   Intake --   Output 500 ml   Net -500 ml       Physical Exam Performed:    BP (!) 144/51   Pulse 69   Temp 98.7 °F (37.1 °C) (Oral)   Resp 20   Ht 5' 3\" (1.6 m)   Wt 193 lb 5.5 oz (87.7 kg)   LMP  (LMP Unknown)   SpO2 99%   BMI 34.25 kg/m²     General appearance: No apparent distress, appears stated age and cooperative. HEENT: Pupils equal, round, and reactive to light. Conjunctivae/corneas clear. Neck: Supple, with full range of motion. No jugular venous distention. Trachea midline. Respiratory:  Normal respiratory effort. Clear to auscultation, bilaterally without Rales/Wheezes/Rhonchi. Cardiovascular: Regular rate and rhythm with normal S1/S2 without murmurs, rubs or gallops. Abdomen: Soft, non-tender, non-distended with normal bowel sounds. Musculoskeletal: No clubbing, cyanosis or edema bilaterally. Full range of motion without deformity. Skin: Skin color, texture, turgor normal.  No rashes or lesions. Neurologic:  Neurovascularly intact without any focal sensory/motor deficits.  Cranial nerves: II-XII intact, grossly non-focal.  Psychiatric: Alert and oriented, thought content appropriate, normal insight  Capillary Refill: Brisk,< 3 seconds   Peripheral Pulses: +2 palpable, equal bilaterally       Labs:   Recent Labs     09/17/22  1633 09/18/22  0509   WBC 7.7 6.3   HGB 10.9* 11.0*   HCT 33.8* 33.0*    161     Recent Labs     09/17/22  1633 09/17/22  1815 09/18/22  0417     --  142   K 5.6* 5.0 5.5*     --  106   CO2 25  --  21   BUN 54*  --  43*   CREATININE 4.0*  --  3.5*   CALCIUM 9.3  --  8.9     Recent Labs     09/17/22  1633   AST 16   ALT 8*   BILITOT 0.3   ALKPHOS 91     Recent Labs     09/17/22  1633   INR 0.96     Recent Labs     09/17/22  1633 09/18/22  0417   CKTOTAL  --  69   TROPONINI <0.01  --        Urinalysis:      Lab Results   Component Value Date/Time    NITRU Negative 11/30/2021 04:39 PM    WBCUA 21-50 11/30/2021 04:39 PM    BACTERIA 3+ 11/30/2021 04:39 PM    RBCUA 5-10 11/30/2021 04:39 PM    BLOODU TRACE-INTACT 11/30/2021 04:39 PM    SPECGRAV >=1.030 11/30/2021 04:39 PM    GLUCOSEU Negative 11/30/2021 04:39 PM    GLUCOSEU Neg 05/12/2011 01:39 AM       Radiology:  MRI brain without contrast    (Results Pending)   US RENAL COMPLETE    (Results Pending)           DVT Prophylaxis: Lovenox  Diet: ADULT DIET; Regular; Low Potassium (Less than 3000 mg/day)  Code Status: Full Code    PT/OT Eval Status: Eval ordered    Dispo -likely home once medically stable    KALI Mccoy - CNP      NOTE:  This report was transcribed using voice recognition software. Every effort was made to ensure accuracy; however, inadvertent computerized transcription errors may be present.

## 2022-09-18 NOTE — CONSULTS
[address] [place] [address]                                  CONSULTATION    PATIENT NAME: Smita Anderson                     :        1940  MED REC NO:   [id number]                          ROOM:       1519  ACCOUNT NO:   [de-identified]                           ADMIT DATE: 2022  PROVIDER:     Yanni Borrego [place]    CONSULT DATE:  2022    REFERRING PROVIDER:  Merna Francisco NP    REASON CONSULTATION:  Dizziness, blurred vision. HISTORY OF PRESENT ILLNESS:  The patient is an 44-year-old female seen  in neurologic consultation at your request secondary to the above. She  states that she was in her usual state of health until abruptly  yesterday she started experiencing symptoms while visiting an elderly  friend. She states that her symptoms initially consisted of dizziness  and vision changes. When asked specifically what her symptoms consisted  of, she states \"all kinds of so many things were going on. I was out of  my rounds. \" She states \"there is other with all kinds of crazy things  going on. It was not like me. \"  She admits having trouble eating  because of her eyes. She reports diplopia which started yesterday at  the same time as her other symptoms. She states that her symptoms  continued to persist though are much improved currently. She states  that when she was trying to stand up earlier today, she was unable to  stand or comply with the recommendations as per therapy or nursing. It  is not entirely clear if she was actually seen by PT or not. She states  that she was eventually able to get up into the chair at the bedside  with help. She denies any similar symptoms. She is a somewhat  challenging historian due to intermixing her prior history with her  current history. She does report having significant issues with her  neck and back and provides a long history regarding this as well. According to the chart, her symptoms had resolved at times she get to  the emergency room though she denies this. She does report having a  history of headaches and neck pain though denies any new headaches. She  denies any new or focal weakness though she states that her left side is  weaker than the right at baseline. She denies any changes in  consciousness. PAST MEDICAL HISTORY:  She has a history of arthritis, basal cell  carcinoma, bladder infection, coronary artery disease, congestive heart  failure, chronic back pain, chronic neck pain, COPD, depression,  hypercholesterolemia, hypertension, chronic shoulder pain,  gastroesophageal reflux, rheumatic fever, sleep apnea, prior TIA,  urinary incontinence. PAST SURGICAL HISTORY:  She has had numerous prior surgeries including  aortic valve replacement, appendectomy, bladder repair, cardiac surgery,  carpal tunnel release surgery on the right, cholecystectomy, coronary  angioplasty with stent placement, hysterectomy, joint replacement  bilaterally in the knees, prior neck surgery, full InterStim implant  surgery, shoulder surgery, skin cancer excision, numerous prior back  surgeries, tonsillectomy. FAMILY HISTORY:  She reports a stroke with her mother and multiple  children with early heart attacks. Unfortunately, she has had three of  five children passed due to heart attack in the mid 45s. SOCIAL HISTORY:  She denies any alcohol, tobacco, illicit drug usage. ALLERGIES:  Numerous and include LEVOFLOXACIN, AZITHROMYCIN, CODEINE,  KEFLEX, MORPHINE, PENTAZOCINE, SULFA ANTIBIOTICS, ADHESIVE TAPE and  LATEX. MEDICATIONS:  Her outpatient medications listed include trazodone,  torsemide, diclofenac, Tylenol, cyclosporin solution, Percocet, Nyamyc,  citalopram, buspirone, hydralazine, GlycoLax, spironolactone,  gabapentin, Protonix, Zocor, aspirin, Atacand and baclofen. REVIEW OF SYSTEMS:  As above and per chart which has been reviewed by  me. All other remaining review of systems is negative on examination. PHYSICAL EMANATION:  VITAL SIGNS:  Temperature 97.8, respiratory rate 20, pulse 69, blood  pressure 125/55. GENERAL:  She is sitting in chair in no obvious distress. She appears  roughly of her stated age. HEENT:  Head is normocephalic and atraumatic. There are no Rawls signs  or raccoon eyes noted. NECK:  Supple without nuchal rigidity. There are no carotid or  vertebral bruits auscultated. HEART:  Regular rate and rhythm. LUNGS:  Clear auscultation bilaterally. ABDOMEN:  Soft, nontender. EXTREMITIES:  Without significant cyanosis, clubbing or edema. Peripheral pulses are intact bilaterally. NEUROLOGIC:  Higher cortical function/mental status. She is currently  awake, alert and oriented to person, place and time. She is aware of  her current location and is able to state the name of the correct  hospital.  She is able to state the month and year. She has a slightly  limited fund of knowledge. She appears to have relatively appropriate  insight and judgment regarding her current condition. Her attention  span is somewhat decreased. She is somewhat tangential in her speech. Cranial examination: The fundi were not visualized. Visual fields  appear to be full to confrontational testing bilaterally. Extraocular  muscle movements are full without obvious nystagmus. Facial sensation  is intact bilaterally. Facial muscle movements are intact without  weakness. Hearing is intact to conversation. Soft palate elevates  symmetrically. Shoulder shrug is symmetric. Tongue protrudes in  midline without atrophy or fasciculations. Motor examination:  She moves all four extremities to command. There  does not appear to be any obvious focal weakness noted. There is a  tremor noted with movement.   This disappears with rest.  Reflexes:  Deep tendon reflexes are 2+/4 bilaterally in biceps, triceps,  brachioradialis and essentially absent at the patellar regions  bilaterally. Plantar responses are flexor bilaterally. Sensory examination:  Sensation to pinprick is intact in all four  extremities. Coordination:  She has some mild difficulty with finger-nose-finger on  the left though this is somewhat inconsistent. She is able to do  finger-nose-finger, rapid alternating movements and rapid repetitive  movements on the right. There is no obvious dysdiadochokinesis or  dysmetria. There is no obvious dysdiadochokinesis on the left. Gait and station:  Her gait was not assessed due to her significant fall  risk. DIAGNOSTIC STUDIES:  Sodium 142, potassium 5.5, BUN 43, creatinine 3.5,  GFR 12.  CK 69, LDL 47, hemoglobin A1c 5.8. White blood cell count 6.3. CT scan of the head was performed yesterday afternoon and showed no  acute intracranial abnormality. There was some atrophy and chronic  small vessel ischemic changes noted. I have personally reviewed these  images myself and agree with the radiologist's conclusion. IMPRESSION:  1. This 79-year-old female with reported diplopia, vertigo, and ataxia. Given her symptoms, there is a strong concern for posterior circulation  ischemic infarct. 2.  Chronic neck and back pain. 3.  Multiple stroke risk factors, including hypertension, hyperlipidemia  and obstructive sleep apnea. PLAN:  1. Await MRI of the brain. It is unclear when this can get done. She  has a cardiac monitor in place though it is unclear exactly as to why.  2.  Await echocardiogram.  3.  Recommend interrogating the cardiac monitor when able. 4.  Continue antiplatelet agent for secondary stroke prevention. 5.  Continue statin agent for secondary stroke prevention. Goal LDL  less than 70.  6.  Permissive hypertension for another 24 more hours. Would advise  limiting or avoiding hypotension in case there is hypoperfusion to an  area of new ischemic infarct.   7.  Continue telemetry to assess for atrial fibrillation. 8.  Recommend attempts at normoglycemia when possible. 9.  Rehab efforts as needed. 10.  The above was discussed with the patient. Thank you for allowing me to participate in the care of this patient  with high medical complexity requiring a high level of medical decision  making. If I could be of any further assistance, please do not hesitate  to contact me.         Montserrat Shahid DO    D: 09/18/2022 13:10:02       T: 09/18/2022 13:15:56     [email or web address]  Job#: [id number]     Doc#: [id number]    CC:

## 2022-09-18 NOTE — PROGRESS NOTES
Physical Therapy  Facility/Department: [facility] PROGRESSIVE CARE  Physical Therapy Initial Assessment    Name: Radha Arevalo  : 1940  MRN: [id number]  Date of Service: 2022    Discharge Recommendations:       Radha Arevalo scored a 17/24 on the AM-PAC short mobility form. Current research shows that an AM-PAC score of 17 or less is typically not associated with a discharge to the patient's home setting. Based on the patient's AM-PAC score and their current functional mobility deficits, it is recommended that the patient have 3-5 sessions per week of Physical Therapy at d/c to increase the patient's independence. Please see assessment section for further patient specific details. Pt is below baseline level of mobility. Pt currently CGA woith all aspects of mobility. Pt lives alone and insists she is going home. Pt currently not amb a functional distance for discharge home. Explained this to pt however she continues to insist she will go home. Pt could benefit from continued skilled therapy in an inpatient setting. Will continue to monitor progress and if able to walk a household distance prior to discharge may be able to return home. If patient discharges prior to next session this note will serve as a discharge summary. Please see below for the latest assessment towards goals. Patient Diagnosis(es): There were no encounter diagnoses. Past Medical History:  has a past medical history of Arthritis, BCC (basal cell carcinoma of skin), Bladder infection, CAD (coronary artery disease), Cancer ([address] CHF (congestive heart failure) ([address] Chronic back pain, COPD (chronic obstructive pulmonary disease) ([address] Depression, Depression, Hypercholesteremia, Hypertension, Pain in shoulder, Reflux, Rheumatic fever, Sleep apnea, TIA (transient ischemic attack), Urinary incontinence, and Wears glasses. Past Surgical History:  has a past surgical history that includes bladder repair; Hysterectomy;  Neck surgery; shoulder surgery; Carpal tunnel release; Tonsillectomy; Appendectomy; Upper gastrointestinal endoscopy (4/22/11); Spine surgery (1999); Spinal fixation surgery with implant (2001); Spinal fixation surgery with implant (2004); Spinal fixation surgery with implant (2007); joint replacement; Coronary angioplasty with stent; Cardiac surgery; Skin cancer excision; Colonoscopy; Upper gastrointestinal endoscopy; other surgical history (9/28/12); Cholecystectomy, laparoscopic (01/06/2017); Upper gastrointestinal endoscopy (03/23/2017); Diagnostic Cardiac Cath Lab Procedure; and Aortic valve replacement. Assessment   Assessment: Pt is below baseline level of mobility. Pt currently CGA woith all aspects of mobility. Pt lives alone and insists she is going home. Pt currently not amb a functional distance for discharge home. Explained this to pt however she continues to insist she will go home. Pt could benefit from continued skilled therapy in an inpatient setting. Will continue to monitor progress and if able to walk a household distance prior to discharge may be able to return home. Treatment Diagnosis: decreased ROM, weakness, assistance with mobility  Therapy Prognosis: Good  Decision Making: Medium Complexity  History: 79 y.o. female with PMHx of CAD, CHF, COPD, HTN, HLD and TIA presented to [facility] in transfer from [facility] ED for stroke work up. Pt presented to [facility] with onset of blurred vision and dizziness which began suddenly at 1430. Symptoms improving on arrival to ED. She denies weakness of extremities, difficulty walking or speaking. No headache. Symptoms resolved. Exam: dec ROM x 4 extremities, generalized weakness  Clinical Presentation: assistance with bed mob, transfers and amb  Barriers to Learning: n/a  Requires PT Follow-Up: Yes  Activity Tolerance  Activity Tolerance: Patient limited by pain; Patient limited by fatigue;Patient limited by endurance     Plan   Plan  Plan: 3-5 times per week  Safety Devices  Type of Devices: All fall risk precautions in place, Call light within reach, Chair alarm in place     Restrictions  Restrictions/Precautions  Restrictions/Precautions: Bed Alarm  Position Activity Restriction  Other position/activity restrictions: fall risk     Subjective   Pain: pain in legs 9/10 c/o burning/stinging. This occurs at home takes gabapentin, says is worse since here. Pt has multiple c/o pain  General  Chart Reviewed: Yes  Patient assessed for rehabilitation services?: Yes  Additional Pertinent Hx: 79 y.o. female with PMHx of CAD, CHF, COPD, HTN, HLD and TIA presented to [facility] in transfer from [facility] ED for stroke work up. Pt presented to [facility] with onset of blurred vision and dizziness which began suddenly at 1430. Symptoms improving on arrival to ED. She denies weakness of extremities, difficulty walking or speaking. No headache. Symptoms resolved. Family / Caregiver Present: No  Referring Practitioner: Krista Mata,  Referral Date : 09/17/22  Diagnosis: Pt came in for TIA/CVA workup pending MRI and neuro consult.  Pt has acute on chronic kidney         Social/Functional History  Social/Functional History  Lives With: Alone  Type of Home: Apartment  Home Layout: One level  Home Access: Level entry  Bathroom Shower/Tub: Walk-in shower  Bathroom Toilet: Handicap height  Bathroom Equipment: Grab bars in shower, Grab bars around toilet, Shower chair, Toilet raiser  Home Equipment: [employer], 4 wheeled  Has the patient had two or more falls in the past year or any fall with injury in the past year?: Yes  Receives Help From: Personal care attendant (3 hours everyday)  ADL Assistance: Needs assistance  Toileting: Independent  Homemaking Assistance: Needs assistance  Ambulation Assistance: Independent  Transfer Assistance: Independent  Active : Yes  Mode of Transportation: Car  Vision/Hearing  Vision  Vision: Impaired  Vision (09/18/22 1124)  Mobility Inpatient CMS 0-100% Score: 50.57 (09/18/22 1124)  Mobility Inpatient CMS G-Code Modifier : CK (09/18/22 1124)     Goals  Short Term Goals  Time Frame for Short term goals: 6 sessions  Short term goal 1: transfer with RW with supervision  Short term goal 2: ambulate 50 feet with RW supervision  Short term goal 3: 15 reps elma LE exs with cues  Patient Goals   Patient goals : to go home       Education  Patient Education  Education Given To: Patient  Education Provided: Role of Therapy;Plan of Care;Transfer Training  Education Method: Verbal;Demonstration  Barriers to Learning: None      Therapy Time   Individual Concurrent Group Co-treatment   Time In 1030         Time Out 1118         Minutes 48         Timed Code Treatment Minutes: [address] [facility], [address], PT

## 2022-09-18 NOTE — ED NOTES
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 She is also on spironolactone, advised patient her potassium supplement was not needed this evening.   Potassium level is 5.0     Dieudonne Hogue RN  09/17/22 0684

## 2022-09-18 NOTE — H&P
Hospital Medicine History & Physical      PCP: Shawna Woodard APRN - CNP    Date of Admission: 9/17/2022    Date of Service: Pt seen/examined on 9/17/2022 and placed in Observation. Chief Complaint:  blurred vision      History Of Present Illness:      79 y.o. female with PMHx of CAD, CHF, COPD, HTN, HLD and TIA presented to [facility] in transfer from [facility] ED for stroke work up. Pt presented to [facility] with onset of blurred vision and dizziness which began suddenly at 1430. Symptoms improving on arrival to ED. She denies weakness of extremities, difficulty walking or speaking. No headache. No recent trauma. No chest pain or shortness of breath. Symptoms completely resolved at this time.      Past Medical History:          Diagnosis Date    Arthritis     BCC (basal cell carcinoma of skin)     RT clavicle    Bladder infection     frequently    CAD (coronary artery disease)     stents    Cancer (HCC)     skin    CHF (congestive heart failure) (HCC)     Chronic back pain     COPD (chronic obstructive pulmonary disease) (HCC)     Depression     Depression     Hypercholesteremia     Hypertension     Pain in shoulder [id number]    pain in left shoulder, taking steroid injections for steroid    Reflux     Rheumatic fever     as a child    Sleep apnea     uses CPAP    TIA (transient ischemic attack)     Urinary incontinence     Wears glasses        Past Surgical History:          Procedure Laterality Date    AORTIC VALVE REPLACEMENT      APPENDECTOMY      BLADDER REPAIR      3 TIMES    CARDIAC SURGERY      stents    CARPAL TUNNEL RELEASE      RIGHT    CHOLECYSTECTOMY, LAPAROSCOPIC  01/06/2017    with dr Goodwin Fix      has it it done twice    DIAGNOSTIC CARDIAC CATH LAB PROCEDURE      HYSTERECTOMY (CERVIX STATUS UNKNOWN)      JOINT REPLACEMENT      KATHLEEN TKR    NECK SURGERY      OTHER SURGICAL HISTORY  9/28/12    Full Interstim Implant SHOULDER SURGERY      SKIN CANCER EXCISION      SPINAL FIXATION SURGERY WITH IMPLANT  2001    SPINAL FIXATION SURGERY WITH IMPLANT  2004    SPINAL FIXATION SURGERY WITH IMPLANT  2007    SPINE SURGERY  1999    TONSILLECTOMY      UPPER GASTROINTESTINAL ENDOSCOPY  4/22/11    UPPER GASTROINTESTINAL ENDOSCOPY      UPPER GASTROINTESTINAL ENDOSCOPY  03/23/2017    dilitation       Medications Prior to Admission:      Prior to Admission medications    Medication Sig Start Date End Date Taking? Authorizing Provider   traZODone (DESYREL) 100 MG tablet TAKE 1-2 TABS AT BEDTIME 9/16/22   Roselie Reveal, APRN - CNP   diclofenac (VOLTAREN) 75 MG EC tablet 1 tab(s)    Historical Provider, MD   furosemide (LASIX) 40 MG tablet Take 1 tablet by mouth See Admin Instructions Take 40mg in the morning and 40mg in the evening over the next 4 days, then resume normal dosing of 40mg AM and 20mg PM. Take Potassium supplement with lasix. 8/26/22   Francescalie Reveal, APRN - CNP   acetaminophen (TYLENOL) 325 MG tablet Take 650 mg by mouth every 6 hours as needed for Pain    Historical Provider, MD   cycloSPORINE, PF, (CEQUA) 0.09 % SOLN Apply to eye    Historical Provider, MD   oxyCODONE-acetaminophen (PERCOCET) 5-325 MG per tablet Take 1 tablet by mouth every 12 hours as needed for Pain.     [place]   [id number] [id number] UNIT/GM powder APPLY TO AFFECTED AREA(S) 3 TIMES DAILY 8/25/22   Roselie Reveal, APRN - CNP   citalopram (CELEXA) 20 MG tablet TAKE 1 & 1/2 TAB ONCE DAILY 7/11/22   Roselie Reveal, APRN - CNP   busPIRone (BUSPAR) 10 MG tablet TAKE 1-2 TABS IN THE EVENING AS NEEDED FOR ANXIETY 6/24/22   Roselie Reveal, APRN - CNP   hydrALAZINE (APRESOLINE) 10 MG tablet Take 10 mg by mouth 3 times daily 1/18/22   Historical Provider, MD   polyethylene glycol (GLYCOLAX) 17 GM/SCOOP powder DISSOLVE AND DRINK 17 GRAMS DAILY AS DIRECTED 1/6/22   Roselie Reveal, APRN - CNP   spironolactone (ALDACTONE) 25 MG tablet Take 1 tablet by mouth daily 11/29/21 Concha Haskins,    azelastine (ASTELIN) 0.1 % nasal spray 1 spray by Nasal route 2 times daily Use in each nostril as directed 6/16/21   KALI Talley CNP   cycloSPORINE (RESTASIS) 0.05 % ophthalmic emulsion Place 1 drop into both eyes as needed (dry eyes) 6/16/21   KALI Talley CNP   gabapentin (NEURONTIN) 100 MG capsule Take 100 mg by mouth 3 times daily. Historical Provider, MD   traZODone (DESYREL) 100 MG tablet TAKE 1 OR 2 TABLETS AT BEDTIME 5/21/21   KALI Talley CNP   pantoprazole (PROTONIX) 40 MG tablet Take 1 tablet by mouth daily 8/5/20   KALI Valencia CNP   simvastatin (ZOCOR) 40 MG tablet TAKE ONE TABLET NIGHTLY. 9/9/19   Historical Provider, MD   aspirin 81 MG EC tablet Take 81 mg by mouth daily 3/4/20   Historical Provider, MD   candesartan (ATACAND) 8 MG tablet Take 8 mg by mouth daily    Historical Provider, MD   HYDROcodone-acetaminophen (NORCO)  MG per tablet Take 1 tablet by mouth every 6 hours as needed. 4/1/20   Historical Provider, MD   baclofen (LIORESAL) 10 MG tablet TAKE 1 TABLET EVERY 8 HOURS AS NEEDED  Patient taking differently: Take 10 mg by mouth daily 12/30/19   KALI Patel CNP   Misc. Devices (ROLLER Gretta Carmelina) MISC 1 each by Does not apply route daily 5/22/19   KALI Patel CNP   isosorbide mononitrate (IMDUR) 30 MG extended release tablet Take 30 mg by mouth daily    Historical Provider, MD   albuterol-ipratropium (COMBIVENT RESPIMAT)  MCG/ACT AERS inhaler Inhale 1 puff into the lungs every 6 hours  Patient taking differently: Inhale 1 puff into the lungs every 6 hours as needed 4/18/18   Huber Smith DO   amLODIPine (NORVASC) 10 MG tablet TAKE (1) TABLET DAILY 2/8/17   Huber Smith DO   NITROSTAT 0.4 MG SL tablet USE 1 TABLET UNDER TONGUE AS NEEDED FOR CHEST PAIN MAY REPEAT EVERY 5MIN. UP TO 3. THEN GO TO ER. 7/6/15   Tricia Carroll MD   lidocaine (XYLOCAINE) 5 % ointment Apply topically as needed. 3/13/15   Carlos Smith MD       Allergies:  Latex, Levofloxacin, Azithromycin, Codeine, Keflex [cephalexin], Morphine, Pentazocine lactate, Sulfa antibiotics, and Tape [adhesive tape]    Social History:      The patient currently lives home    TOBACCO:   reports that she has never smoked. She has never used smokeless tobacco.  ETOH:   reports no history of alcohol use. Family History:      Reviewed in detail positive as follows:        Problem Relation Age of Onset    Arthritis Mother     Cancer Mother     Depression Mother     Heart Disease Mother     High Blood Pressure Mother     High Cholesterol Mother     Miscarriages / [place] Mother     Stroke Mother     Early Death Mother     Hearing Loss Mother     Mental Illness Mother     Vision Loss Mother     Arthritis Brother     Depression Brother     Diabetes Brother     Hearing Loss Brother     Heart Disease Brother     High Blood Pressure Brother     High Cholesterol Brother     Vision Loss Brother        REVIEW OF SYSTEMS:   Pertinent positives as noted in the HPI. All other systems reviewed and negative. PHYSICAL EXAM PERFORMED:    BP (!) 139/50   Pulse 53   Temp 98.1 °F (36.7 °C) (Oral)   Resp 17   Ht 5' 3\" (1.6 m)   Wt 187 lb 13.3 oz (85.2 kg)   LMP  (LMP Unknown)   SpO2 95%   BMI 33.27 kg/m²     General appearance:  Well developed, well nourished, elderly female lying in hospital bed, sleeping but arouses easily. Pt is in no apparent distress, appears stated age and cooperative. HEENT:  Normal cephalic, atraumatic without obvious deformity. Pupils equal, round, and reactive to light. Conjunctivae/corneas clear. Neck: Supple, with full range of motion. No jugular venous distention. Trachea midline. Respiratory:  Normal respiratory effort. Clear to auscultation, bilaterally without accessory muscle use. Cardiovascular:  Regular rate and rhythm without murmurs, no lower extremity edema.   Abdomen: Soft, non-tender, non-distended, without rebound or guarding. Normal bowel sounds. Musculoskeletal:  Moves all extremities equally. Full range of motion without deformity. Skin: Skin warm, dry and intact. No rashes or lesions. Neurologic:  Neurovascularly intact without any focal sensory/motor deficits.  Cranial nerves: II-XII intact, grossly non-focal.  Psychiatric:  Alert and oriented, thought content appropriate, normal insight  Capillary Refill: Brisk,< 3 seconds   Peripheral Pulses: +2 palpable, equal bilaterally       Labs:     Recent Labs     09/17/22  1633   WBC 7.7   HGB 10.9*   HCT 33.8*        Recent Labs     09/17/22  1633 09/17/22  1815     --    K 5.6* 5.0     --    CO2 25  --    BUN 54*  --    CREATININE 4.0*  --    CALCIUM 9.3  --      Recent Labs     09/17/22  1633   AST 16   ALT 8*   BILITOT 0.3   ALKPHOS 91     Recent Labs     09/17/22  1633   INR 0.96     Recent Labs     09/17/22  1633   TROPONINI <0.01       Urinalysis:      Lab Results   Component Value Date/Time    NITRU Negative 11/30/2021 04:39 PM    WBCUA 21-50 11/30/2021 04:39 PM    BACTERIA 3+ 11/30/2021 04:39 PM    RBCUA 5-10 11/30/2021 04:39 PM    BLOODU TRACE-INTACT 11/30/2021 04:39 PM    SPECGRAV >=1.030 11/30/2021 04:39 PM    GLUCOSEU Negative 11/30/2021 04:39 PM    GLUCOSEU Neg 05/12/2011 01:39 AM       Radiology:         ASSESSMENT:    Active Hospital Problems    Diagnosis Date Noted    Blurred vision [H53.8] 09/17/2022     Priority: Medium         PLAN:    Possible TIA/CVA  - with symptoms: dizziness and blurred vision  - admit to [facility]BS to RO TIA/CVA  - head CT neg for acute pathology  - MRI and ECHO  - ASA, statin  - consult neurology   - check lipids, HBA1c  - allow permissive HTN, SBP < 220, DBP < 110    Acute on Chronic kidney failure  - Renal function is just of baseline 2.5, today 4.0  - Monitor renal function and avoid Nephrotoxic agents as able     Alzheimer's disease  - supportive care  - continue home meds    Essential (primary) hypertension   - monitor blood pressure  - continue home meds     Hyperlipidemia   - continue statin    DVT Prophylaxis: Heparin  Diet: ADULT DIET; Regular  Code Status: Full Code    PT/OT Eval Status: pending    [address] [facility], APRN - CNP    Thank you KAIL Grajeda CNP for the opportunity to be involved in this patient's care. If you have any questions or concerns please feel free to contact me at [phone].

## 2022-09-19 ENCOUNTER — APPOINTMENT (OUTPATIENT)
Dept: ULTRASOUND IMAGING | Age: 82
DRG: 683 | End: 2022-09-19
Attending: INTERNAL MEDICINE
Payer: MEDICARE

## 2022-09-19 ENCOUNTER — APPOINTMENT (OUTPATIENT)
Dept: MRI IMAGING | Age: 82
DRG: 683 | End: 2022-09-19
Attending: INTERNAL MEDICINE
Payer: MEDICARE

## 2022-09-19 LAB
ANION GAP SERPL CALCULATED.3IONS-SCNC: 9 MMOL/L (ref 3–16)
BASOPHILS ABSOLUTE: 0 K/UL (ref 0–0.2)
BASOPHILS RELATIVE PERCENT: 0.5 %
BUN BLDV-MCNC: 30 MG/DL (ref 7–20)
CALCIUM SERPL-MCNC: 8.9 MG/DL (ref 8.3–10.6)
CHLORIDE BLD-SCNC: 108 MMOL/L (ref 99–110)
CO2: 23 MMOL/L (ref 21–32)
CREAT SERPL-MCNC: 2.1 MG/DL (ref 0.6–1.2)
EOSINOPHILS ABSOLUTE: 0.1 K/UL (ref 0–0.6)
EOSINOPHILS RELATIVE PERCENT: 0.7 %
GFR AFRICAN AMERICAN: 27
GFR NON-AFRICAN AMERICAN: 22
GLUCOSE BLD-MCNC: 106 MG/DL (ref 70–99)
HCT VFR BLD CALC: 33.7 % (ref 36–48)
HEMOGLOBIN: 10.9 G/DL (ref 12–16)
LYMPHOCYTES ABSOLUTE: 1 K/UL (ref 1–5.1)
LYMPHOCYTES RELATIVE PERCENT: 10.1 %
MCH RBC QN AUTO: 28.6 PG (ref 26–34)
MCHC RBC AUTO-ENTMCNC: 32.3 G/DL (ref 31–36)
MCV RBC AUTO: 88.5 FL (ref 80–100)
MONOCYTES ABSOLUTE: 0.6 K/UL (ref 0–1.3)
MONOCYTES RELATIVE PERCENT: 6.3 %
NEUTROPHILS ABSOLUTE: 7.7 K/UL (ref 1.7–7.7)
NEUTROPHILS RELATIVE PERCENT: 82.4 %
PDW BLD-RTO: 13.6 % (ref 12.4–15.4)
PLATELET # BLD: 144 K/UL (ref 135–450)
PMV BLD AUTO: 7.9 FL (ref 5–10.5)
POTASSIUM REFLEX MAGNESIUM: 5 MMOL/L (ref 3.5–5.1)
RBC # BLD: 3.8 M/UL (ref 4–5.2)
SODIUM BLD-SCNC: 140 MMOL/L (ref 136–145)
TOTAL CK: 53 U/L (ref 26–192)
WBC # BLD: 9.4 K/UL (ref 4–11)

## 2022-09-19 PROCEDURE — 92523 SPEECH SOUND LANG COMPREHEN: CPT

## 2022-09-19 PROCEDURE — 97166 OT EVAL MOD COMPLEX 45 MIN: CPT

## 2022-09-19 PROCEDURE — 85025 COMPLETE CBC W/AUTO DIFF WBC: CPT

## 2022-09-19 PROCEDURE — 2580000003 HC RX 258: Performed by: NURSE PRACTITIONER

## 2022-09-19 PROCEDURE — 76770 US EXAM ABDO BACK WALL COMP: CPT

## 2022-09-19 PROCEDURE — 6360000002 HC RX W HCPCS: Performed by: NURSE PRACTITIONER

## 2022-09-19 PROCEDURE — 93308 TTE F-UP OR LMTD: CPT

## 2022-09-19 PROCEDURE — 92610 EVALUATE SWALLOWING FUNCTION: CPT

## 2022-09-19 PROCEDURE — 97530 THERAPEUTIC ACTIVITIES: CPT

## 2022-09-19 PROCEDURE — 70551 MRI BRAIN STEM W/O DYE: CPT

## 2022-09-19 PROCEDURE — 82550 ASSAY OF CK (CPK): CPT

## 2022-09-19 PROCEDURE — 1200000000 HC SEMI PRIVATE

## 2022-09-19 PROCEDURE — 6370000000 HC RX 637 (ALT 250 FOR IP): Performed by: NURSE PRACTITIONER

## 2022-09-19 PROCEDURE — 36415 COLL VENOUS BLD VENIPUNCTURE: CPT

## 2022-09-19 PROCEDURE — 80048 BASIC METABOLIC PNL TOTAL CA: CPT

## 2022-09-19 RX ORDER — BACLOFEN 10 MG/1
10 TABLET ORAL DAILY
Status: DISCONTINUED | OUTPATIENT
Start: 2022-09-19 | End: 2022-09-20 | Stop reason: HOSPADM

## 2022-09-19 RX ORDER — BUSPIRONE HYDROCHLORIDE 10 MG/1
20 TABLET ORAL NIGHTLY
Status: DISCONTINUED | OUTPATIENT
Start: 2022-09-19 | End: 2022-09-20 | Stop reason: HOSPADM

## 2022-09-19 RX ADMIN — CITALOPRAM HYDROBROMIDE 30 MG: 20 TABLET ORAL at 07:54

## 2022-09-19 RX ADMIN — AMLODIPINE BESYLATE 10 MG: 10 TABLET ORAL at 07:55

## 2022-09-19 RX ADMIN — HEPARIN SODIUM 5000 UNITS: 5000 INJECTION INTRAVENOUS; SUBCUTANEOUS at 13:41

## 2022-09-19 RX ADMIN — ASPIRIN 81 MG: 81 TABLET, COATED ORAL at 07:55

## 2022-09-19 RX ADMIN — ATORVASTATIN CALCIUM 20 MG: 20 TABLET, FILM COATED ORAL at 07:55

## 2022-09-19 RX ADMIN — GABAPENTIN 100 MG: 100 CAPSULE ORAL at 07:55

## 2022-09-19 RX ADMIN — ONDANSETRON 4 MG: 4 TABLET, ORALLY DISINTEGRATING ORAL at 02:53

## 2022-09-19 RX ADMIN — PIPERACILLIN AND TAZOBACTAM 3375 MG: 3; .375 INJECTION, POWDER, FOR SOLUTION INTRAVENOUS at 13:00

## 2022-09-19 RX ADMIN — BUSPIRONE HYDROCHLORIDE 20 MG: 10 TABLET ORAL at 20:44

## 2022-09-19 RX ADMIN — BACLOFEN 10 MG: 10 TABLET ORAL at 07:54

## 2022-09-19 RX ADMIN — GABAPENTIN 100 MG: 100 CAPSULE ORAL at 13:41

## 2022-09-19 RX ADMIN — HEPARIN SODIUM 5000 UNITS: 5000 INJECTION INTRAVENOUS; SUBCUTANEOUS at 20:56

## 2022-09-19 RX ADMIN — TRAZODONE HYDROCHLORIDE 100 MG: 100 TABLET ORAL at 20:45

## 2022-09-19 RX ADMIN — DONEPEZIL HYDROCHLORIDE 10 MG: 10 TABLET, FILM COATED ORAL at 20:44

## 2022-09-19 RX ADMIN — PIPERACILLIN AND TAZOBACTAM 3375 MG: 3; .375 INJECTION, POWDER, FOR SOLUTION INTRAVENOUS at 20:56

## 2022-09-19 RX ADMIN — BUSPIRONE HYDROCHLORIDE 20 MG: 10 TABLET ORAL at 01:30

## 2022-09-19 RX ADMIN — HEPARIN SODIUM 5000 UNITS: 5000 INJECTION INTRAVENOUS; SUBCUTANEOUS at 06:08

## 2022-09-19 RX ADMIN — OXYCODONE AND ACETAMINOPHEN 1 TABLET: 5; 325 TABLET ORAL at 20:45

## 2022-09-19 RX ADMIN — GABAPENTIN 100 MG: 100 CAPSULE ORAL at 20:44

## 2022-09-19 ASSESSMENT — PAIN SCALES - GENERAL
PAINLEVEL_OUTOF10: 8

## 2022-09-19 ASSESSMENT — PAIN DESCRIPTION - PAIN TYPE
TYPE: CHRONIC PAIN
TYPE: CHRONIC PAIN

## 2022-09-19 ASSESSMENT — PAIN DESCRIPTION - ONSET: ONSET: ON-GOING

## 2022-09-19 ASSESSMENT — PAIN DESCRIPTION - LOCATION
LOCATION: GENERALIZED
LOCATION: GENERALIZED

## 2022-09-19 ASSESSMENT — PAIN SCALES - WONG BAKER
WONGBAKER_NUMERICALRESPONSE: 8
WONGBAKER_NUMERICALRESPONSE: 8

## 2022-09-19 ASSESSMENT — PAIN DESCRIPTION - DESCRIPTORS
DESCRIPTORS: ACHING
DESCRIPTORS: ACHING

## 2022-09-19 ASSESSMENT — PAIN DESCRIPTION - FREQUENCY: FREQUENCY: CONTINUOUS

## 2022-09-19 NOTE — PROGRESS NOTES
Hospitalist Progress Note      PCP: KALI Gomez - CNP    Date of Admission: 9/17/2022    Chief Complaint: Blurred vision    Hospital Course: 79 y.o. female with PMHx of CAD, CHF, COPD, HTN, HLD and TIA presented to [facility] in transfer from [facility] ED for stroke work up. Pt presented to [facility] with onset of blurred vision and dizziness which began suddenly at 1430. Symptoms improving on arrival to ED. She denies weakness of extremities, difficulty walking or speaking. No headache. No recent trauma. No chest pain or shortness of breath. Symptoms completely resolved at this time. Subjective: Patient laying in bed, just got back from MRI and echo. Awaiting results. Informed has a UTI. Waiting urine cultures. States she feels better, vision stable today. Reviewed plan of care, denies further needs or questions. Assessment/Plan:    Possible TIA/CVA  - with symptoms: dizziness and blurred vision  - head CT neg for acute pathology  - MRI and ECHO ending  - ASA, statin  - consult neurology   - check lipids, HBA1c  - allow permissive HTN, SBP < 220, DBP < 110     Acute on Chronic kidney failure  - Renal function is just of baseline 2.5, admit 4.0, today 2.1  - Monitor renal function and avoid Nephrotoxic agents as able   -Continue IV fluids  -Hold ACE and diuretics  -Consult nephrology     UTI present on admission  -Awaiting urine cultures  -Continue Zosyn, patient has numerous antibiotic allergies, but is tolerating Zosyn well.      Alzheimer's disease  - supportive care  - continue home meds     Essential (primary) hypertension   - monitor blood pressure  -Hold ARB in diuretics secondary to MARIO     Hyperlipidemia   - continue statin    Active Hospital Problems    Diagnosis     Blurred vision [H53.8]      Priority: Medium    MARIO (acute kidney injury) ([facility] [address] [N17.9]        Medications:  Reviewed    Infusion Medications       Scheduled Medications    baclofen  10 mg Oral Daily busPIRone  20 mg Oral Nightly    amLODIPine  10 mg Oral Daily    [Held by provider] valsartan  40 mg Oral Daily    gabapentin  100 mg Oral TID    atorvastatin  20 mg Oral Daily    [Held by provider] spironolactone  25 mg Oral Daily    donepezil  10 mg Oral Nightly    citalopram  30 mg Oral Daily    piperacillin-tazobactam  3,375 mg IntraVENous Q12H    heparin (porcine)  5,000 Units SubCUTAneous 3 times per day    aspirin  81 mg Oral Daily    Or    aspirin  300 mg Rectal Daily     PRN Meds: albuterol-ipratropium, polyvinyl alcohol, traZODone, oxyCODONE-acetaminophen, ondansetron **OR** ondansetron, polyethylene glycol, perflutren lipid microspheres, labetalol      Intake/Output Summary (Last 24 hours) at 9/19/2022 1948  Last data filed at 9/19/2022 1855  Gross per 24 hour   Intake 540 ml   Output 1400 ml   Net -860 ml       Physical Exam Performed:    BP (!) 126/101   Pulse 72   Temp 99.1 °F (37.3 °C) (Oral)   Resp 19   Ht 5' 3\" (1.6 m)   Wt 203 lb 14.8 oz (92.5 kg)   LMP  (LMP Unknown)   SpO2 95%   BMI 36.12 kg/m²     General appearance: No apparent distress, appears stated age and cooperative. HEENT: Pupils equal, round, and reactive to light. Conjunctivae/corneas clear. Neck: Supple, with full range of motion. No jugular venous distention. Trachea midline. Respiratory:  Normal respiratory effort. Clear to auscultation, bilaterally without Rales/Wheezes/Rhonchi. Cardiovascular: Regular rate and rhythm with normal S1/S2 without murmurs, rubs or gallops. Abdomen: Soft, non-tender, non-distended with normal bowel sounds. Musculoskeletal: No clubbing, cyanosis or edema bilaterally. Full range of motion without deformity. Skin: Skin color, texture, turgor normal.  No rashes or lesions. Neurologic:  Neurovascularly intact without any focal sensory/motor deficits.  Cranial nerves: II-XII intact, grossly non-focal.  Psychiatric: Alert and oriented, thought content appropriate, normal insight  Capillary Refill: Brisk,< 3 seconds   Peripheral Pulses: +2 palpable, equal bilaterally       Labs:   Recent Labs     09/17/22  1633 09/18/22  0509 09/19/22  0829   WBC 7.7 6.3 9.4   HGB 10.9* 11.0* 10.9*   HCT 33.8* 33.0* 33.7*    161 144     Recent Labs     09/17/22  1633 09/17/22  1815 09/18/22  0417 09/19/22  0829     --  142 140   K 5.6* 5.0 5.5* 5.0     --  106 108   CO2 25  --  21 23   BUN 54*  --  43* 30*   CREATININE 4.0*  --  3.5* 2.1*   CALCIUM 9.3  --  8.9 8.9     Recent Labs     09/17/22  1633   AST 16   ALT 8*   BILITOT 0.3   ALKPHOS 91     Recent Labs     09/17/22  1633   INR 0.96     Recent Labs     09/17/22  1633 09/18/22  0417 09/19/22  0600   CKTOTAL  --  69 53   TROPONINI <0.01  --   --        Urinalysis:      Lab Results   Component Value Date/Time    NITRU POSITIVE 09/18/2022 01:45 PM    WBCUA 154 09/18/2022 01:45 PM    BACTERIA 4+ 09/18/2022 01:45 PM    RBCUA 1 09/18/2022 01:45 PM    BLOODU Negative 09/18/2022 01:45 PM    SPECGRAV 1.010 09/18/2022 01:45 PM    GLUCOSEU Negative 09/18/2022 01:45 PM    GLUCOSEU Neg 05/12/2011 01:39 AM       Radiology:  MRI brain without contrast   Final Result   1. No acute infarct or acute intracranial process identified. 2. Mild chronic small vessel ischemic changes. US RENAL COMPLETE    (Results Pending)           DVT Prophylaxis: Lovenox  Diet: ADULT DIET; Regular; Low Sodium (2 gm); Low Potassium (Less than 3000 mg/day)  Code Status: Full Code    PT/OT Eval Status: Eval ordered, and SNF but patient refuses. Agreeable to home PT OT    Dispo -likely home once medically stable    Melania Garcia, APRN - CNP      NOTE:  This report was transcribed using voice recognition software. Every effort was made to ensure accuracy; however, inadvertent computerized transcription errors may be present.

## 2022-09-19 NOTE — PLAN OF CARE
Problem: Discharge Planning  Goal: Discharge to home or other facility with appropriate resources  Outcome: Progressing  Flowsheets (Taken 9/19/2022 0335)  Discharge to home or other facility with appropriate resources: Identify barriers to discharge with patient and caregiver     Problem: Skin/Tissue Integrity  Goal: Absence of new skin breakdown  Description: 1. Monitor for areas of redness and/or skin breakdown  2. Assess vascular access sites hourly  3. Every 4-6 hours minimum:  Change oxygen saturation probe site  4. Every 4-6 hours:  If on nasal continuous positive airway pressure, respiratory therapy assess nares and determine need for appliance change or resting period.   Outcome: Progressing     Problem: Safety - Adult  Goal: Free from fall injury  Outcome: Progressing  Flowsheets (Taken 9/19/2022 0335)  Free From Fall Injury: Instruct family/caregiver on patient safety     Problem: Pain  Goal: Verbalizes/displays adequate comfort level or baseline comfort level  Outcome: Progressing  Flowsheets (Taken 9/19/2022 0335)  Verbalizes/displays adequate comfort level or baseline comfort level:   Encourage patient to monitor pain and request assistance   Assess pain using appropriate pain scale

## 2022-09-19 NOTE — ACP (ADVANCE CARE PLANNING)
Advance Care Planning     Advance Care Planning Activator (Inpatient)  Conversation Note      Date of ACP Conversation: 9/19/2022     Conversation Conducted with: Patient with Decision Making Capacity    ACP Activator: Kun Romano RN    Health Care Decision Maker:     Current Designated Health Care Decision Maker:     Primary Decision Maker: Bel Chelsiegabbie Child - [phone]    Care Preferences    Ventilation: \"If you were in your present state of health and suddenly became very ill and were unable to breathe on your own, what would your preference be about the use of a ventilator (breathing machine) if it were available to you? \"      Would the patient desire the use of ventilator (breathing machine)?: yes    \"If your health worsens and it becomes clear that your chance of recovery is unlikely, what would your preference be about the use of a ventilator (breathing machine) if it were available to you? \"     Would the patient desire the use of ventilator (breathing machine)?: No      Resuscitation  \"CPR works best to restart the heart when there is a sudden event, like a heart attack, in someone who is otherwise healthy. Unfortunately, CPR does not typically restart the heart for people who have serious health conditions or who are very sick. \"    \"In the event your heart stopped as a result of an underlying serious health condition, would you want attempts to be made to restart your heart (answer \"yes\" for attempt to resuscitate) or would you prefer a natural death (answer \"no\" for do not attempt to resuscitate)? \" yes       [] Yes   [x] No   Educated Patient / Garrett Seay regarding differences between Advance Directives and portable DNR orders.     Length of ACP Conversation in minutes: 5 minutes     Conversation Outcomes:  [x] ACP discussion completed  [] Existing advance directive reviewed with patient; no changes to patient's previously recorded wishes  [] New Advance Directive completed  [] Portable Do Not Rescitate prepared for Provider review and signature  [] POLST/POST/MOLST/MOST prepared for Provider review and signature      Follow-up plan:    [] Schedule follow-up conversation to continue planning  [] Referred individual to Provider for additional questions/concerns   [] Advised patient/agent/surrogate to review completed ACP document and update if needed with changes in condition, patient preferences or care setting    [x] This note routed to one or more involved healthcare providers  Electronically signed by Nellie Lizarraga RN Case Management [phone] on 9/19/2022 at 2:08 PM

## 2022-09-19 NOTE — PROGRESS NOTES
CHIEF COMPLAINT:    Lesion (located on right post shoulder, groin, present for 3 weeks, raised, no history of skin cancer)    HISTORY OF PRESENT ILLNESS:  Ijeoma Parham is a 26 year old White female who presents with history of spots on the right shoulder and groin area for a few weeks.  No pain, itch, or bleeding.  No change in size.  Great aunt with history of melanoma.    No personal or family history of skin cancer.    REVIEW OF SYSTEMS:  No fevers or chills    PHYSICAL EXAMINATION:  There were no vitals filed for this visit.  General:  well-appearing, oriented, no acute distress  Mood/affect:  normal  Back:  Brown regular papule right upper back  Left lower extremity:  Left medial thigh with grouped brown papules    ASSESSMENT/PLAN:  1. Nevus - back  Discussed benign clinical appearance of nevi and reassured  Discussed ABCDs of melanoma and sun protection  Discussed having patient return to clinic if she notices any new or changing moles for evaluation and possible biopsy    2. Neoplasm - left medial thigh  Discussed concern for condyloma vs melanocytic  Recommended biopsy today    SHAVE BIOPSY PROCEDURE NOTE  Location: left medial thigh  Pre-op Diagnosis: condyloma vs nevus    After discussion of risks including bleeding, scarring, and infection, informed consent was obtained.  The designated area was prepped with Hibiclens and anesthesia was obtained with 1 mL of 1% lidocaine with 1:100,000 epinephrine.  A shave biopsy using a #15 blade was performed to obtain the superficial portion of the lesion.  Hemostasis was obtained with pressure and aluminum chloride.  Wound care instructions were provided in verbal and written form.  The specimen was sent for routine histopathological evaluation.        Patient will follow-up pending biopsy/lab results       [facility]                                               Progress note    CC: blurry vision. Summary:   Jake Martinez is being seen by nephrology for Acute kidney injury (MARIO). She is a 79 y.o. female with a PMH significant for CAD, CHF, COPD, hypertension, hyperlipidemia who was admitted to [facility] from [facility] 9/17/2022 with  blurry vision and dizziness that started on the day of arrival. No other muscle weakness was noted. She was noted to have a Cr of 4.0 which is up from her baseline. It also seems that she might have an underlying CKD. Interval History  - seen at bedside  - BP slightly elevated, 150/57  - spironolactone, loop diuretics on hold  - Cr improving, down to 2.1 today    Plan:   - keep off diuretics today  - current BP is acceptable. - enocurage PO intake,      Marisol Palmer MD  [facility]  Office: [phone]    Assessment:   MARIO on CKD-IIIb:  - baseline cr seems to be rapidly trending up. - Cr in 1/2022 was 1.3-1.5. Cr in 8/2022 was 2.5  - Cr on admission 4.0  - UA with nitrites and leukocytes. UPCR 0.3 g/g  - she was on torsemide 40 mg daily, spironolactone 25 mg daily PTA. - suspect MARIO to be hemodynamic in etiology. Improving. Hypertension:  - allowing for permissive hypertension given suspicion for posterior circulation CVA  - home regimen: unsure if she was taking torsemide or furosemide, hydralazine 10 mg TID, imdur 30 mg daily, spironolactone 25 mg daily. CHFpEF:  - LVEF 55-60%, G1DD      ROS:     Positives Listed Bold. All other remaining systems are negative. Constitutional:  fever, chills, weakness, weight change, fatigue,      Skin:  rash, pruritus, hair loss, bruising, dry skin, petechiae. Head, Face, Neck   headaches, swelling,  cervical adenopathy.      Respiratory: shortness of breath, cough, or wheezing  Cardiovascular: chest pain, palpitations, dizzy, edema  Gastrointestinal: nausea, vomiting, diarrhea, constipation,belly pain Yellow skin, blood in stool  Musculoskeletal:  back pain, muscle weakness, gait problems,       joint pain or swelling. Genitourinary:  dysuria, poor urine flow, flank pain, blood in urine  Neurologic:  vertigo, TIA'S, syncope, seizures, focal weakness  Psychosocial:  insomnia, anxiety, or depression. Additional positive findings: -     PMH:   Past medical history, surgical history, social history, family history are reviewed and updated as appropriate. Reviewed current medication list.   Allergies reviewed and updated as needed. PE:   Vitals:    09/19/22 1256   BP:    Pulse:    Resp:    Temp: 98.6 °F (37 °C)   SpO2:        General appearance: in NAD, fully alert and oriented. Comfortable. HEENT: EOM intact, no icterus. Trachea is midline. Neck : No masses, appears symmetrical, no JVD  Respiratory: Respiratory effort appears normal, bilateral equal chest rise, no wheeze, no crackles  Cardiovascular: Ausculation shows RRR no edema  Abdomen: No visible mass or tenderness, non distended. Musculoskeletal:  Joints with no swelling or deformity. Skin:no rashes, ulcers, induration, no jaundice. Neuro: face symmetric, no focal deficits. Appropriate responses.        Lab Results   Component Value Date    CREATININE 2.1 (H) 09/19/2022    BUN 30 (H) 09/19/2022     09/19/2022    K 5.0 09/19/2022     09/19/2022    CO2 23 09/19/2022      Lab Results   Component Value Date    WBC 9.4 09/19/2022    HGB 10.9 (L) 09/19/2022    HCT 33.7 (L) 09/19/2022    MCV 88.5 09/19/2022     09/19/2022     Lab Results   Component Value Date    CALCIUM 8.9 09/19/2022    PHOS 4.0 01/18/2022

## 2022-09-19 NOTE — PROGRESS NOTES
Occupational Therapy  Facility/Department: [address] PROGRESSIVE CARE  Occupational Therapy Initial Assessment and Tentative D/C      Name: Gucci Nolan  : 1940  MRN: [id number]  Date of Service: 2022    Discharge Recommendations: Gucci Nolan scored a 17/24 on the AM-PAC ADL Inpatient form. Current research shows that an AM-PAC score of 17 or less is typically not associated with a discharge to the patient's home setting. If patient discharges prior to next session this note will serve as a discharge summary. Please see below for the latest assessment towards goals. Continue to assess pending progress, 3-5 sessions per week, 2-3 sessions per week (pt reports plan to return home; anticipate increased assist needed for ADLs)  OT Equipment Recommendations  Equipment Needed: No       Patient Diagnosis(es): There were no encounter diagnoses. Past Medical History:  has a past medical history of Arthritis, BCC (basal cell carcinoma of skin), Bladder infection, CAD (coronary artery disease), Cancer ([facility] [address] CHF (congestive heart failure) ([facility] [address] Chronic back pain, COPD (chronic obstructive pulmonary disease) ([facility] [address] Depression, Depression, Hypercholesteremia, Hypertension, Pain in shoulder, Reflux, Rheumatic fever, Sleep apnea, TIA (transient ischemic attack), Urinary incontinence, and Wears glasses. Past Surgical History:  has a past surgical history that includes bladder repair; Hysterectomy; Neck surgery; shoulder surgery; Carpal tunnel release; Tonsillectomy; Appendectomy; Upper gastrointestinal endoscopy (11); Spine surgery (); Spinal fixation surgery with implant (); Spinal fixation surgery with implant (); Spinal fixation surgery with implant (); joint replacement; Coronary angioplasty with stent; Cardiac surgery; Skin cancer excision; Colonoscopy; Upper gastrointestinal endoscopy; other surgical history (12); Cholecystectomy, laparoscopic (2017);  Upper gastrointestinal endoscopy (03/23/2017); Diagnostic Cardiac Cath Lab Procedure; and Aortic valve replacement. Assessment   Performance deficits / Impairments: Decreased functional mobility ; Decreased strength;Decreased endurance;Decreased ADL status; Decreased balance;Decreased high-level IADLs  Assessment: 79 y.o. female with PMHx of CAD, CHF, COPD, HTN, HLD and TIA presented to [facility] in transfer from [facility] ED for stroke work up. Pt presented to [facility] with onset of blurred vision and dizziness which began suddenly at 1430. Symptoms improving on arrival to ED. She denies weakness of extremities, difficulty walking or speaking. No headache. No recent trauma. No chest pain or shortness of breath. Symptoms completely resolved at this time. PTA pt from home alone where pt was Ind with mobility with 4WW and assist for ADLs. Pt currently requires SBA/CGA and RW for mobility and transfers. No overt LOB. Anticipate pt needing up to Max A for ADLs. Pt will benefit from skilled OT services at this time. Pt reports her plan is to return home. Pt reports some assist from aide. Anticipate pt with need for ongoing OT at time of D/C. HHOT vs. SNF.   Prognosis: Fair  Decision Making: Medium Complexity  Exam: see above  Assistance / Modification: RW  REQUIRES OT FOLLOW-UP: Yes  Activity Tolerance  Activity Tolerance: Patient Tolerated treatment well        Plan   Plan  Times per Week: 3-5x  Current Treatment Recommendations: Strengthening, Endurance training, Neuromuscular re-education, Patient/Caregiver education & training, Safety education & training, Self-Care / ADL, Functional mobility training, Balance training     Restrictions  Restrictions/Precautions  Restrictions/Precautions: Fall Risk  Position Activity Restriction  Other position/activity restrictions: fall risk    Subjective   General  Chart Reviewed: Yes  Patient assessed for rehabilitation services?: Yes  Additional Pertinent Hx: per CNP note, 79 y.o. female with PMHx of CAD, CHF, COPD, HTN, HLD and TIA presented to [facility] in transfer from [facility] ED for stroke work up. Pt presented to [facility] with onset of blurred vision and dizziness which began suddenly at 1430. Symptoms improving on arrival to ED. She denies weakness of extremities, difficulty walking or speaking. No headache. No recent trauma. No chest pain or shortness of breath. Symptoms completely resolved at this time. Family / Caregiver Present: No  Referring Practitioner: KALI Bassett CNP  Subjective  Subjective: Pt agreeable to OT evaluation. Pt reports generalized chronic pain rating 8/10. General Comment  Comments: okay for therapy per RN. Social/Functional History  Social/Functional History  Lives With: Alone  Type of Home: Apartment  Home Layout: One level  Home Access: Level entry  Bathroom Shower/Tub: Walk-in shower  Bathroom Toilet: Handicap height  Bathroom Equipment: Grab bars in shower, Grab bars around toilet, Shower chair, Toilet raiser  Home Equipment: Nile Ibarra, 4 wheeled  Has the patient had two or more falls in the past year or any fall with injury in the past year?: Yes  Receives Help From: Personal care attendant (3 hours everyday)  ADL Assistance: Needs assistance  Toileting: Independent  Homemaking Assistance: Needs assistance  Ambulation Assistance: Independent  Transfer Assistance: Independent  Active : Yes  Mode of Transportation: Car       Objective   Heart Rate: 76  Heart Rate Source: Monitor  BP: (!) 150/57  BP Location: Left upper arm  BP Method: Automatic  Patient Position: Semi fowlers  MAP (Calculated): 88  Resp: 23  SpO2: 92 %  O2 Device: None (Room air)             Safety Devices  Type of Devices: All fall risk precautions in place;Call light within reach; Chair alarm in place;Gait belt  Restraints  Restraints Initially in Place: No  Bed Mobility Training  Bed Mobility Training: Yes  Overall Level of Assistance: Stand-by assistance  Supine to Sit: Stand-by assistance  Scooting: Stand-by assistance  Balance  Sitting: Intact  Standing: Impaired (RW)  Standing - Static: Good  Standing - Dynamic: Fair  Transfer Training  Transfer Training: Yes  Overall Level of Assistance: Contact-guard assistance;Stand-by assistance (RW)  Sit to Stand: Contact-guard assistance;Stand-by assistance (RW)  Stand to Sit: Contact-guard assistance;Stand-by assistance  Bed to Chair: Contact-guard assistance;Stand-by assistance (RW)  Gait  Overall Level of Assistance: Contact-guard assistance;Stand-by assistance (no overt LOB)  Distance (ft): 25 Feet  Assistive Device: Walker, rolling     AROM: Within functional limits  PROM: Within functional limits  Strength: Within functional limits  Coordination: Within functional limits  Tone: Normal  ADL  LE Dressing:  Moderate assistance;Maximum assistance (assist to don bilateral socks; assist to don/doff brief; assist to thread B LEs into brief; pt able to manage over hips with SBA/CGA for balance)  Toileting: Dependent/Total (purewick)  Additional Comments: Anticipate pt needing up to Max A for ADLs based on ROM, strength, and balance              Vision  Vision Exceptions: Wears glasses at all times  Hearing  Hearing Exceptions: Hard of hearing/hearing concerns;Right hearing aid  Cognition  Overall Cognitive Status: WFL  Orientation  Overall Orientation Status: Within Functional Limits  Orientation Level: Oriented X4                  Education Given To: Patient  Education Provided: Role of Therapy;Plan of Care;Transfer Training;ADL Adaptive Strategies  Education Method: Demonstration;Verbal  Barriers to Learning: None  Education Outcome: Continued education needed          AM-PAC Score        AM-PAC Inpatient Daily Activity Raw Score: 17 (09/19/22 0937)  AM-PAC Inpatient ADL T-Scale Score : 37.26 (09/19/22 0937)  ADL Inpatient CMS 0-100% Score: 50.11 (09/19/22 0952)  ADL Inpatient CMS G-Code Modifier : CK (09/19/22 0937)    Tinneti Score       Goals  Short Term Goals  Time Frame for Short term goals: prior to D/C  Short Term Goal 1: complete functional mobility and transfers with supervision  Short Term Goal 2: complete bathing and dressing with Min A  Short Term Goal 3: complete toileting with Min A  Short Term Goal 4: complete grooming in stance at sink with supervision  Long Term Goals  Time Frame for Long term goals : STG=LTG  Patient Goals   Patient goals : return home       Therapy Time   Individual Concurrent Group Co-treatment   Time In 0905         Time Out 0935         Minutes 30         Timed Code Treatment Minutes: 15 Minutes (15 minute eval)       Brody Maldonado, OTR/L

## 2022-09-19 NOTE — DISCHARGE INSTR - COC
Continuity of Care Form    Patient Name: Chico Hong   :  1940  MRN:  [id number]    Admit date:  2022  Discharge date:  2022    Code Status Order: Full Code   Advance Directives:     Admitting Physician:  Brett Soliz MD  PCP: Joleen Montenegro, APRN - CNP    Discharging Nurse: Samir Torres [address] [place] [address] Unit/Room#: K6Z-9229/3074-85  Discharging Unit Phone Number: [phone]    Emergency Contact:   Extended Emergency Contact Information  Primary Emergency Contact: Sonya Neri  Address: [address] [place] [address], [facility] [address] [facility] [address] [facility] Phone: [phone]  Mobile Phone: [phone]  Relation: Child  Secondary Emergency Contact: Robret Neff   [facility] [address] [facility] Phone: [phone]  Mobile Phone: [phone]  Relation: Child    Past Surgical History:  Past Surgical History:   Procedure Laterality Date    AORTIC VALVE REPLACEMENT      APPENDECTOMY      BLADDER REPAIR      [address] [facility], LAPAROSCOPIC  2017    with dr Tre Castrejon      has it it done twice    DIAGNOSTIC CARDIAC CATH LAB PROCEDURE      HYSTERECTOMY (CERVIX STATUS UNKNOWN)      JOINT REPLACEMENT      KATHLEEN TKR    NECK SURGERY      OTHER SURGICAL HISTORY  12    Full Interstim Implant    SHOULDER SURGERY      SKIN CANCER EXCISION      SPINAL FIXATION SURGERY WITH IMPLANT      SPINAL FIXATION SURGERY WITH IMPLANT      SPINAL FIXATION SURGERY WITH IMPLANT      SPINE SURGERY      TONSILLECTOMY      UPPER GASTROINTESTINAL ENDOSCOPY  11    UPPER GASTROINTESTINAL ENDOSCOPY      UPPER GASTROINTESTINAL ENDOSCOPY  2017    dilitation       Immunization History:   Immunization History   Administered Date(s) Administered    COVID-19, PFIZER PURPLE top, DILUTE for use, (age 15 y+), 30mcg/0.3mL 2021, 2021, 2022    Influenza Virus Vaccine 2019, 2020    Pneumococcal Polysaccharide ([id number]) 2019, 2019, 2020    Tdap (Boostrix, Adacel) 2021       Active Problems:  Patient Active Problem List   Diagnosis Code    HTN (hypertension) I10    Hyperlipidemia E78.5    CAD (coronary artery disease) I25.10    Hypokalemia E87.6    Obesity (BMI 30-39. 9) E66.9    Anxiety F41.9    Primary insomnia F51.01    Epigastric pain R10.13    Chronic tension-type headache, intractable G44.221    MARIA C (obstructive sleep apnea) G47.33    Painful total knee replacement, initial encounter ([facility]) T84.84XA, Z96.659    Tremor, essential G25.0    TIA (transient ischemic attack) G45.9    Alzheimer's dementia without behavioral disturbance ([facility]) G30.9, F02.80    Sinus bradycardia R00.1    Acute on chronic congestive heart failure ([facility]) I50.9    Chronic kidney disease N18.9    History of transcatheter aortic valve replacement (TAVR) Z95.2    MARIO (acute kidney injury) ([facility] [address] N17.9    Syncope and collapse R55    Altered mental status R41.82    Closed head injury S09.90XA    Urinary tract infection in female N39.0    Blurred vision H53.8       Isolation/Infection:   Isolation            No Isolation          Patient Infection Status       Infection Onset Added Last Indicated Last Indicated By Review Planned Expiration Resolved Resolved By    None active    Resolved    COVID-19 (Rule Out) 22 COVID-19 & Influenza Combo (Ordered)   22 Rule-Out Test Resulted    COVID-19 (Rule Out) 21 COVID-19 & Influenza Combo (Ordered)   21 Rule-Out Test Resulted    COVID-19 21 Covid-19 Ambulatory   10/08/21     COVID-19 (Rule Out) 21 Covid-19 Ambulatory (Ordered)   21 Rule-Out Test Resulted    COVID-19 (Rule Out) 0621 COVID-19, Rapid (Ordered)   21 Rule-Out Test Resulted            Nurse Assessment:  Last Vital Signs: BP (!) 150/57   Pulse 76   Temp 98.6 °F (37 °C) (Oral)   Resp 23   Ht 5' 3\" (1.6 m)   Wt 203 lb 14.8 oz (92.5 kg)   LMP  (LMP Unknown)   SpO2 92%   BMI 36.12 kg/m²     Last documented pain score (0-10 scale): Pain Level: 8  Last Weight:   Wt Readings from Last 1 Encounters:   09/19/22 203 lb 14.8 oz (92.5 kg)     Mental Status:  oriented and alert    IV Access:  - None    Nursing Mobility/ADLs:  Walking   Assisted  Transfer  Assisted  Bathing  Assisted  Dressing  Assisted  Toileting  Assisted  Feeding  Independent  Med Admin  Independent  Med Delivery   whole    Wound Care Documentation and Therapy:  Wound 09/17/22 Sacrum Medial pink area approx 6 cm x 4 cm with nonblanchable 2cm round area on left buttock non blanchable (Active)   Wound Etiology Pressure Stage 1 09/17/22 2245   Dressing Status Reinforced dressing 09/18/22 1921   Wound Cleansed Other (Comment) 09/17/22 2245   Dressing/Treatment Barrier film;Protective barrier 09/17/22 2245   Wound Length (cm) 2 cm 09/17/22 2245   Wound Width (cm) 2 cm 09/17/22 2245   Wound Surface Area (cm^2) 4 cm^2 09/17/22 2245   Wound Assessment Pink/red 09/18/22 1921   Drainage Amount None 09/18/22 1921   Nia-wound Assessment Fragile 09/17/22 2245   Margins Attached edges 09/17/22 2245   Number of days: 1        Elimination:  Continence: Bowel: Yes  Bladder: Yes  Urinary Catheter: None   Colostomy/Ileostomy/Ileal Conduit: No       Date of Last BM: 9/19/22    Intake/Output Summary (Last 24 hours) at 9/19/2022 1410  Last data filed at 9/19/2022 1010  Gross per 24 hour   Intake 480 ml   Output 700 ml   Net -220 ml     I/O last 3 completed shifts: In: 480 [P.O.:480]  Out: 1200 [Urine:1200]    Safety Concerns:      At Risk for Falls    Impairments/Disabilities:      None    Nutrition Therapy:  Current Nutrition Therapy:   - Oral Diet:  General    Routes of Feeding: Oral  Liquids: No Restrictions  Daily Fluid Restriction: no  Last Modified Barium Swallow with Video (Video Swallowing Test): not done    Treatments at the Time of Hospital Discharge:   Respiratory Treatments: n/a  Oxygen Therapy:  is not on home oxygen therapy. Ventilator:    - No ventilator support    Rehab Therapies: Physical Therapy and Occupational Therapy  Weight Bearing Status/Restrictions: No weight bearing restrictions  Other Medical Equipment (for information only, NOT a DME order):  walker  Other Treatments: n/a    Patient's personal belongings (please select all that are sent with patient):  None    RN SIGNATURE:  Electronically signed by Neeraj Jung RN on 9/20/22 at 10:36 AM EDT    CASE MANAGEMENT/SOCIAL WORK SECTION    Inpatient Status Date: 9/18/2022    Readmission Risk Assessment Score:  Readmission Risk              Risk of Unplanned Readmission:  19           Discharging to Facility/ Agency   Name: [facility]  Address:   [place] [address]  Phone:  [phone]  Fax:  [phone]    Dialysis Facility (if applicable)   Name:  Address:  Dialysis Schedule:  Phone:  Fax:    / signature: Electronically signed by Jose Eduardo Cifuentes RN on 9/19/22 at 2:10 PM EDT    PHYSICIAN SECTION    Prognosis: Good    Condition at Discharge: Stable    Rehab Potential (if transferring to Rehab): Good    Recommended Labs or Other Treatments After Discharge: pt ot    Physician Certification: I certify the above information and transfer of Kimberly Aguilar  is necessary for the continuing treatment of the diagnosis listed and that she requires Home Care for greater 30 days.      Update Admission H&P: No change in H&P    PHYSICIAN SIGNATURE:  Electronically signed by KALI Barnes CNP on 9/20/22 at 10:20 AM EDT

## 2022-09-19 NOTE — CARE COORDINATION
INITIAL CASE MANAGEMENT ASSESSMENT    Reviewed chart, met with patient to assess possible discharge needs. Explained Case Management role/services. Living Situation: Confirmed address, lives alone in a 1 level house, 0 steps. ADLs: Patient able to ambulate with walker. Patient gets assistance from her aide with dressing/bathing. Aide completes homemaking duties. DME: 4 wheeled walker, cane, shower chair, toilet raiser, grab bars    PT/OT Recs: Discharge Recommendations:       Valaria Riedel scored a 17/24 on the AM-PAC short mobility form. Current research shows that an AM-PAC score of 17 or less is typically not associated with a discharge to the patient's home setting. Based on the patient's AM-PAC score and their current functional mobility deficits, it is recommended that the patient have 3-5 sessions per week of Physical Therapy at d/c to increase the patient's independence. Please see assessment section for further patient specific details. Pt is below baseline level of mobility. Pt currently CGA woith all aspects of mobility. Pt lives alone and insists she is going home. Pt currently not amb a functional distance for discharge home. Explained this to pt however she continues to insist she will go home. Pt could benefit from continued skilled therapy in an inpatient setting. Will continue to monitor progress and if able to walk a household distance prior to discharge may be able to return home. If patient discharges prior to next session this note will serve as a discharge summary. Please see below for the latest assessment towards goals. Discharge Recommendations: Valaria Riedel scored a 17/24 on the AM-PAC ADL Inpatient form. Current research shows that an AM-PAC score of 17 or less is typically not associated with a discharge to the patient's home setting. If patient discharges prior to next session this note will serve as a discharge summary.   Please see below for the latest assessment towards goals. Continue to assess pending progress, 3-5 sessions per week, 2-3 sessions per week (pt reports plan to return home; anticipate increased assist needed for ADLs)  OT Equipment Recommendations  Equipment Needed: No     Active Services: Home health aide thru [facility] 7 days/week for 3 hrs /day     Transportation: Active      Medications: Uses [address] [place] [address] in [facility] -- no issues obtaining/affording medications. PCP: KALI Gomez      HD/PD: n/a    PLAN/COMMENTS: Patient wants to return home. Patient declines skilled nursing facility. Agrees to home care PT/OT. Home care list given. Patient has no preference but a history with [facility]. Patient agrees to use this facility again. Spoke to [facility]/ [facility], they can accept, will need home care orders. The Plan for Transition of Care is related to the following treatment goals: home care, strengthening    The patient was provided with a choice of provider and agrees   with the discharge plan. [x] Yes [] No    Freedom of choice list was provided with basic dialogue that supports the patient's individualized plan of care/goals, treatment preferences and shares the quality data associated with the providers. [x] Yes [] No    SW/CM provided contact information for patient or family to call with any questions. SW/CM will follow and assist as needed.     Electronically signed by Nalini Barber RN Case Management [phone] on 9/19/2022 at 2:06 PM

## 2022-09-19 NOTE — PROGRESS NOTES
Speech Language/Pathology   SPEECH LANGUAGE AND CLINICAL BEDSIDE SWALLOWING EVALUATION   DISCHARGE SUMMARY  Speech Therapy Department     Melody Grubbs  AGE: 79 y.o. GENDER: female  : 1940  [id number]  EPISODE DATE:  2022    MEDICAL DIAGNOSIS: CVA r/ot  ONSET: 2022     No chief complaint on file.       PAST MEDICAL HISTORY        Diagnosis Date    Arthritis     BCC (basal cell carcinoma of skin)     RT clavicle    Bladder infection     frequently    CAD (coronary artery disease)     stents    Cancer (HCC)     skin    CHF (congestive heart failure) (HCC)     Chronic back pain     COPD (chronic obstructive pulmonary disease) ([facility] [address]     Depression     Depression     Hypercholesteremia     Hypertension     Pain in shoulder [id number]    pain in left shoulder, taking steroid injections for steroid    Reflux     Rheumatic fever     as a child    Sleep apnea     uses CPAP    TIA (transient ischemic attack)     Urinary incontinence     Wears glasses        PAST SURGICAL HISTORY    Past Surgical History:   Procedure Laterality Date    AORTIC VALVE REPLACEMENT      APPENDECTOMY      BLADDER REPAIR      3 TIMES    CARDIAC SURGERY      stents    CARPAL TUNNEL RELEASE      RIGHT    CHOLECYSTECTOMY, LAPAROSCOPIC  2017    with dr Uday Jackson      has it it done twice    DIAGNOSTIC CARDIAC CATH LAB PROCEDURE      HYSTERECTOMY (CERVIX STATUS UNKNOWN)      JOINT REPLACEMENT      KATHLEEN TKR    NECK SURGERY      OTHER SURGICAL HISTORY  12    Full Interstim Implant    SHOULDER SURGERY      SKIN CANCER EXCISION      SPINAL FIXATION SURGERY WITH IMPLANT      SPINAL FIXATION SURGERY WITH IMPLANT      SPINAL FIXATION SURGERY WITH IMPLANT      SPINE SURGERY      TONSILLECTOMY      UPPER GASTROINTESTINAL ENDOSCOPY  10    UPPER GASTROINTESTINAL ENDOSCOPY      UPPER GASTROINTESTINAL ENDOSCOPY  2017    dilitation ALLERGIES    Allergies   Allergen Reactions    Latex     Levofloxacin Other (See Comments)     Burns mouth per patient    Azithromycin     Codeine Nausea Only    Keflex [Cephalexin]     Morphine     Pentazocine Lactate     Sulfa Antibiotics Hives    Tape [Adhesive Tape]      No bandaids     CHART REVIEW:  9/17/2022 admitted with c/o blurred vision  MD ADMISSION H&P HPI:  79 y.o. female with PMHx of CAD, CHF, COPD, HTN, HLD and TIA presented to [facility] in transfer from [facility] ED for stroke work up. Pt presented to [facility] with onset of blurred vision and dizziness which began suddenly at 1430. Symptoms improving on arrival to ED. She denies weakness of extremities, difficulty walking or speaking. No headache. No recent trauma. No chest pain or shortness of breath. Symptoms completely resolved at this time. IMAGING:  CXR: 9/17/2022  Impression   1. No acute abnormality. CT HEAD: 9/17/2022  Impression   No acute intracranial findings. Volume loss and chronic microvascular ischemic changes. BRAIN MRI: ordered 9/17/2022    Social/Functional History  Lives With: Alone  ADL Assistance: Needs assistance  Homemaking Assistance: Needs assistance  Active : Yes  Vision Exceptions: Wears glasses at all times  Hearing Exceptions: Hard of hearing/hearing concerns;Right hearing aid      Reason for referral:  Oliver Dominguez was referred for a Speech-Language and Bedside Swallow Evaluation to assess the efficiency of communicative effectiveness; the efficiency of swallow function, rule out aspiration and make recommendations regarding safe dietary consistencies, effective compensatory strategies, and safe eating environment.         Dysphagia Treatment Diagnosis: Oropharyngeal Dysphagia   Speech Language Treatment Diagnosis: Cognitive-Language      IMPRESSIONS  Dysphagia Diagnosis:   Mild oropharyngeal dysphagia characterized by decreased mastication 2/2 missing lower teeth, delayed swallow, and decreased laryngeal elevation. No overt signs/symptoms of penetration/aspiration. Endorses prior need for esophageal stretching which may exacerbate dysphagia symptoms at times? SLP Diagnosis:   Motor speech and receptive/expressive/cognitive language appear grossly WFL    Recommended Diet:  Solids: IDDSI 7 Regular Solids;    Liquids: IDDSI 0 Thin Liquids;  Meds: Meds PO as tolerated  Compensatory Swallowing Strategies : Upright as possible for all oral intake, Remain upright for 30-45 minutes after meals      PLAN  Requires SLP Intervention: No    Prognosis  Speech Therapy Prognosis  Prognosis: Good      Education  Consulted and agree with results and recommendations: Patient, RN  Patient Education: Completed on results/recs; patient declines additional follow-up  Patient Education Response: Verbalizes understanding      Discharge Recommendations:    D/C Recommendations: No follow up therapy recommended post discharge    OBJECTIVE  ASSESSMENT: Included Clinical Evaluation of Swallowing; Oral Motor Speech Evaluation and Cognitive-Linguistic Assessment  Oral/Motor  Labial: No impairment  Lingual: No impairment  Velum: No Impairment  Mandible: No impairment  Gag: No Impairment  Motor Speech  Intelligibility: No impairment  Overall Impairment Severity: None  Auditory Comprehension  Comprehension: Within Functional Limits  Reading Comprehension  Reading Status: Within functional limits  Verbal Expression  Verbal Expression: Within functional limits  Pragmatics/Social Functioning  Pragmatics: Within functional limits  Cognitive-Language  Overall Orientation Status: Within Functional Limits  Attention: Within Functional Limits  Memory: Within Functional Limits  Problem Solving: Within Functional Limits  Numeric Reasoning: Within Functional Limits  Safety/Judgment: Within Functional Limits  Dysphagia  Oral Phase: prolonged but adequate mastication d/t missing lower teeth  Pharyngeal Phase: delayed swallow: all; decreased laryngeal elevation: all      Please accept this as Speech Therapy Discharge status, if pt is discharged prior to next therapy session. Timed Code Treatment: 0  Total Treatment Time: 35       SIGNED:   Vanice Hodgkin.  Elisa Stover, [address] [place] [address], [id number]  Speech-Language Pathologist  Portable phone: [phone]  09/19/22 9:05 AM

## 2022-09-20 VITALS
HEART RATE: 77 BPM | RESPIRATION RATE: 20 BRPM | BODY MASS INDEX: 34.69 KG/M2 | TEMPERATURE: 97.5 F | WEIGHT: 195.77 LBS | OXYGEN SATURATION: 94 % | DIASTOLIC BLOOD PRESSURE: 55 MMHG | SYSTOLIC BLOOD PRESSURE: 158 MMHG | HEIGHT: 63 IN

## 2022-09-20 LAB
ANION GAP SERPL CALCULATED.3IONS-SCNC: 10 MMOL/L (ref 3–16)
BASOPHILS ABSOLUTE: 0.1 K/UL (ref 0–0.2)
BASOPHILS RELATIVE PERCENT: 0.9 %
BUN BLDV-MCNC: 23 MG/DL (ref 7–20)
CALCIUM SERPL-MCNC: 9 MG/DL (ref 8.3–10.6)
CHLORIDE BLD-SCNC: 106 MMOL/L (ref 99–110)
CO2: 23 MMOL/L (ref 21–32)
CREAT SERPL-MCNC: 1.6 MG/DL (ref 0.6–1.2)
EOSINOPHILS ABSOLUTE: 0 K/UL (ref 0–0.6)
EOSINOPHILS RELATIVE PERCENT: 0.5 %
GFR AFRICAN AMERICAN: 37
GFR NON-AFRICAN AMERICAN: 31
GLUCOSE BLD-MCNC: 144 MG/DL (ref 70–99)
HCT VFR BLD CALC: 34 % (ref 36–48)
HEMOGLOBIN: 11 G/DL (ref 12–16)
LYMPHOCYTES ABSOLUTE: 0.8 K/UL (ref 1–5.1)
LYMPHOCYTES RELATIVE PERCENT: 14.3 %
MCH RBC QN AUTO: 28.9 PG (ref 26–34)
MCHC RBC AUTO-ENTMCNC: 32.5 G/DL (ref 31–36)
MCV RBC AUTO: 89 FL (ref 80–100)
MONOCYTES ABSOLUTE: 0.2 K/UL (ref 0–1.3)
MONOCYTES RELATIVE PERCENT: 4.2 %
NEUTROPHILS ABSOLUTE: 4.7 K/UL (ref 1.7–7.7)
NEUTROPHILS RELATIVE PERCENT: 80.1 %
ORGANISM: ABNORMAL
PDW BLD-RTO: 13.5 % (ref 12.4–15.4)
PLATELET # BLD: 134 K/UL (ref 135–450)
PMV BLD AUTO: 7.7 FL (ref 5–10.5)
POTASSIUM REFLEX MAGNESIUM: 4.9 MMOL/L (ref 3.5–5.1)
RBC # BLD: 3.82 M/UL (ref 4–5.2)
SODIUM BLD-SCNC: 139 MMOL/L (ref 136–145)
URINE CULTURE, ROUTINE: ABNORMAL
WBC # BLD: 5.9 K/UL (ref 4–11)

## 2022-09-20 PROCEDURE — 6360000002 HC RX W HCPCS: Performed by: NURSE PRACTITIONER

## 2022-09-20 PROCEDURE — 80048 BASIC METABOLIC PNL TOTAL CA: CPT

## 2022-09-20 PROCEDURE — 36415 COLL VENOUS BLD VENIPUNCTURE: CPT

## 2022-09-20 PROCEDURE — 85025 COMPLETE CBC W/AUTO DIFF WBC: CPT

## 2022-09-20 PROCEDURE — 6370000000 HC RX 637 (ALT 250 FOR IP): Performed by: NURSE PRACTITIONER

## 2022-09-20 PROCEDURE — 97116 GAIT TRAINING THERAPY: CPT

## 2022-09-20 PROCEDURE — 94760 N-INVAS EAR/PLS OXIMETRY 1: CPT

## 2022-09-20 PROCEDURE — 2580000003 HC RX 258: Performed by: NURSE PRACTITIONER

## 2022-09-20 PROCEDURE — 97110 THERAPEUTIC EXERCISES: CPT

## 2022-09-20 RX ORDER — TORSEMIDE 20 MG/1
20 TABLET ORAL DAILY
Qty: 30 TABLET | Refills: 3 | Status: SHIPPED | OUTPATIENT
Start: 2022-09-20

## 2022-09-20 RX ORDER — ONDANSETRON 4 MG/1
4 TABLET, ORALLY DISINTEGRATING ORAL EVERY 8 HOURS PRN
Qty: 21 TABLET | Refills: 0 | Status: SHIPPED | OUTPATIENT
Start: 2022-09-20 | End: 2022-09-27

## 2022-09-20 RX ORDER — AMOXICILLIN 500 MG/1
500 CAPSULE ORAL 2 TIMES DAILY
Qty: 20 CAPSULE | Refills: 0 | Status: SHIPPED | OUTPATIENT
Start: 2022-09-20 | End: 2022-09-25

## 2022-09-20 RX ADMIN — ASPIRIN 81 MG: 81 TABLET, COATED ORAL at 08:37

## 2022-09-20 RX ADMIN — HEPARIN SODIUM 5000 UNITS: 5000 INJECTION INTRAVENOUS; SUBCUTANEOUS at 05:39

## 2022-09-20 RX ADMIN — ATORVASTATIN CALCIUM 20 MG: 20 TABLET, FILM COATED ORAL at 08:37

## 2022-09-20 RX ADMIN — CITALOPRAM HYDROBROMIDE 30 MG: 20 TABLET ORAL at 08:37

## 2022-09-20 RX ADMIN — GABAPENTIN 100 MG: 100 CAPSULE ORAL at 08:37

## 2022-09-20 RX ADMIN — AMLODIPINE BESYLATE 10 MG: 10 TABLET ORAL at 08:37

## 2022-09-20 RX ADMIN — PIPERACILLIN AND TAZOBACTAM 3375 MG: 3; .375 INJECTION, POWDER, FOR SOLUTION INTRAVENOUS at 09:29

## 2022-09-20 RX ADMIN — ONDANSETRON 4 MG: 2 INJECTION INTRAMUSCULAR; INTRAVENOUS at 01:33

## 2022-09-20 RX ADMIN — BACLOFEN 10 MG: 10 TABLET ORAL at 08:37

## 2022-09-20 RX ADMIN — OXYCODONE AND ACETAMINOPHEN 1 TABLET: 5; 325 TABLET ORAL at 08:36

## 2022-09-20 ASSESSMENT — PAIN DESCRIPTION - DESCRIPTORS
DESCRIPTORS: ACHING;DISCOMFORT
DESCRIPTORS: ACHING;DISCOMFORT

## 2022-09-20 ASSESSMENT — PAIN SCALES - WONG BAKER: WONGBAKER_NUMERICALRESPONSE: 8

## 2022-09-20 ASSESSMENT — PAIN DESCRIPTION - LOCATION
LOCATION: GENERALIZED
LOCATION: GENERALIZED

## 2022-09-20 ASSESSMENT — PAIN DESCRIPTION - PAIN TYPE: TYPE: CHRONIC PAIN

## 2022-09-20 ASSESSMENT — PAIN DESCRIPTION - ONSET: ONSET: ON-GOING

## 2022-09-20 ASSESSMENT — PAIN SCALES - GENERAL
PAINLEVEL_OUTOF10: 8
PAINLEVEL_OUTOF10: 8

## 2022-09-20 ASSESSMENT — PAIN DESCRIPTION - FREQUENCY: FREQUENCY: CONTINUOUS

## 2022-09-20 NOTE — PLAN OF CARE
Problem: Discharge Planning  Goal: Discharge to home or other facility with appropriate resources  Outcome: Progressing  Flowsheets (Taken 9/20/2022 0313)  Discharge to home or other facility with appropriate resources:   Identify barriers to discharge with patient and caregiver   Arrange for needed discharge resources and transportation as appropriate     Problem: Skin/Tissue Integrity  Goal: Absence of new skin breakdown  Description: 1. Monitor for areas of redness and/or skin breakdown  2. Assess vascular access sites hourly  3. Every 4-6 hours minimum:  Change oxygen saturation probe site  4. Every 4-6 hours:  If on nasal continuous positive airway pressure, respiratory therapy assess nares and determine need for appliance change or resting period.   Outcome: Progressing     Problem: Safety - Adult  Goal: Free from fall injury  Outcome: Progressing     Problem: Pain  Goal: Verbalizes/displays adequate comfort level or baseline comfort level  Outcome: Progressing  Flowsheets (Taken 9/20/2022 0313)  Verbalizes/displays adequate comfort level or baseline comfort level:   Encourage patient to monitor pain and request assistance   Assess pain using appropriate pain scale   Administer analgesics based on type and severity of pain and evaluate response

## 2022-09-20 NOTE — PROGRESS NOTES
Progress Note    Updates  No neurologic complaints.       Active Ambulatory Problems     Diagnosis Date Noted    HTN (hypertension) 06/27/2011    Hyperlipidemia 12/12/2011    CAD (coronary artery disease) 12/12/2011    Hypokalemia 01/16/2012    Obesity (BMI 30-39.9) 12/14/2015    Anxiety 12/14/2015    Primary insomnia 12/14/2015    Epigastric pain 12/02/2016    Chronic tension-type headache, intractable 12/02/2016    MARIA C (obstructive sleep apnea) 12/16/2016    Painful total knee replacement, initial encounter ([facility] [address] 10/18/2018    Tremor, essential 11/05/2018    TIA (transient ischemic attack) 01/21/2019    Alzheimer's dementia without behavioral disturbance ([facility] [address] 01/21/2019    Sinus bradycardia 06/09/2021    Acute on chronic congestive heart failure (HCC)     Chronic kidney disease     History of transcatheter aortic valve replacement (TAVR)     MARIO (acute kidney injury) ([facility] [address] 11/24/2021    Syncope and collapse 11/30/2021    Altered mental status 11/30/2021    Closed head injury 12/01/2021    Urinary tract infection in female     Blurred vision 09/17/2022     Past Medical History:   Diagnosis Date    Arthritis     BCC (basal cell carcinoma of skin)     Bladder infection     Cancer (HCC)     CHF (congestive heart failure) (HCC)     Chronic back pain     COPD (chronic obstructive pulmonary disease) (HCC)     Depression     Depression     Hypercholesteremia     Hypertension     Pain in shoulder [id number]    Reflux     Rheumatic fever     Sleep apnea     Urinary incontinence     Wears glasses      Current Outpatient Medications:     ondansetron (ZOFRAN-ODT) 4 MG disintegrating tablet, Take 1 tablet by mouth every 8 hours as needed for Nausea or Vomiting, Disp: 21 tablet, Rfl: 0    amoxicillin (AMOXIL) 500 MG capsule, Take 1 capsule by mouth 2 times daily for 5 days, Disp: 20 capsule, Rfl: 0    torsemide (DEMADEX) 20 MG tablet, Take 1 tablet by mouth daily, Disp: 30 tablet, Rfl: 3    donepezil (ARICEPT) 10 MG tablet, Take 10 mg by mouth nightly, Disp: , Rfl:     Ubrogepant (UBRELVY) 100 MG TABS, Take 1 tablet by mouth as needed (headache), Disp: , Rfl:     traZODone (DESYREL) 100 MG tablet, Take 100 mg by mouth nightly as needed for Sleep, Disp: , Rfl:     diclofenac (VOLTAREN) 75 MG EC tablet, 75 mg 2 times daily, Disp: , Rfl:     acetaminophen (TYLENOL) 325 MG tablet, Take 650 mg by mouth every 6 hours as needed for Pain, Disp: , Rfl:     cycloSPORINE, PF, (CEQUA) 0.09 % SOLN, Place 1 drop into both eyes in the morning and at bedtime, Disp: , Rfl:     oxyCODONE-acetaminophen (PERCOCET) 5-325 MG per tablet, Take 1 tablet by mouth every 12 hours as needed for Pain., Disp: , Rfl:     NYAMYC [id number] UNIT/GM powder, APPLY TO AFFECTED AREA(S) 3 TIMES DAILY, Disp: 60 g, Rfl: 1    citalopram (CELEXA) 20 MG tablet, TAKE 1 & 1/2 TAB ONCE DAILY, Disp: 45 tablet, Rfl: 1    busPIRone (BUSPAR) 10 MG tablet, TAKE 1-2 TABS IN THE EVENING AS NEEDED FOR ANXIETY, Disp: 60 tablet, Rfl: 2    hydrALAZINE (APRESOLINE) 10 MG tablet, Take 10 mg by mouth 3 times daily, Disp: , Rfl:     polyethylene glycol (GLYCOLAX) 17 GM/SCOOP powder, DISSOLVE AND DRINK 17 GRAMS DAILY AS DIRECTED, Disp: 510 g, Rfl: 1    spironolactone (ALDACTONE) 25 MG tablet, Take 1 tablet by mouth daily, Disp: 30 tablet, Rfl: 3    gabapentin (NEURONTIN) 100 MG capsule, Take 100 mg by mouth 3 times daily.  , Disp: , Rfl:     pantoprazole (PROTONIX) 40 MG tablet, Take 1 tablet by mouth daily, Disp: 90 tablet, Rfl: 0    simvastatin (ZOCOR) 40 MG tablet, TAKE ONE TABLET NIGHTLY., Disp: , Rfl:     aspirin 81 MG EC tablet, Take 81 mg by mouth daily, Disp: , Rfl:     candesartan (ATACAND) 8 MG tablet, Take 8 mg by mouth daily, Disp: , Rfl:     baclofen (LIORESAL) 10 MG tablet, TAKE 1 TABLET EVERY 8 HOURS AS NEEDED, Disp: 30 tablet, Rfl: 0    isosorbide mononitrate (IMDUR) 30 MG extended release tablet, Take 30 mg by mouth daily, Disp: , Rfl:     albuterol-ipratropium (COMBIVENT RESPIMAT)  MCG/ACT AERS inhaler, Inhale 1 puff into the lungs every 6 hours, Disp: 1 Inhaler, Rfl: 5    amLODIPine (NORVASC) 10 MG tablet, TAKE (1) TABLET DAILY, Disp: 30 tablet, Rfl: 5    NITROSTAT 0.4 MG SL tablet, USE 1 TABLET UNDER TONGUE AS NEEDED FOR CHEST PAIN MAY REPEAT EVERY 5MIN. UP TO 3. THEN GO TO ER., Disp: 25 tablet, Rfl: 3    lidocaine (XYLOCAINE) 5 % ointment, Apply topically as needed. , Disp: 1 Tube, Rfl: 3    Exam  Blood pressure (!) 158/55, pulse 77, temperature 97.5 °F (36.4 °C), temperature source Oral, resp. rate 20, height 5' 3\" (1.6 m), weight 195 lb 12.3 oz (88.8 kg), SpO2 94 %, not currently breastfeeding. Constitutional    Vital signs: BP, HR, and RR reviewed   General alert, no distress  Eyes: unable to visualize the fundi  Cardiovascular: no peripheral edema. Psychiatric: cooperative with examination, no psychotic behavior noted. Neurologic  Mental status:   orientation to person and place   Attention intact as able to attend well to the exam     Language fluent in conversation   Comprehension intact; follows simple commands  Cranial nerves:   CN2: visual fields full  CN 3,4,6: extraocular muscles intact  CN7:face symmetric without dysarthria  CN8: hearing grossly intact  CN12: tongue midline with protrusion  Strength: good strength in all 4 extremities   Sensory: light touch intact in all 4 extremities. Cerebellar/coordination: finger nose finger normal without ataxia  Tone: normal in all 4 extremities  Gait: deferred at this time for safety. ROS  Constitutional- No weight loss or fevers  Eyes- No diplopia. No photophobia. Ears/nose/throat- No dysphagia. No Dysarthria  Cardiovascular- No palpitations. No chest pain  Respiratory- No dyspnea. No Cough  Gastrointestinal- No Abdominal pain. No Vomiting. Genitourinary- No incontinence. No urinary retention  Musculoskeletal- No myalgia. No arthralgia  Skin- No rash. No easy bruising. Psychiatric- No depression.  No anxiety  Endocrine- No diabetes. No thyroid issues. Hematologic- No bleeding difficulty. No fatigue  Neurologic- No weakness. No Headache. Labs  HgA1c 5.8  LDL 47    BUN 54 -> 23  Cr 4.0 -> 1.6    Urine culture + e.coli    Studies  MRI brain w/o 9/19/22, independently reviewed  Impression   1. No acute infarct or acute intracranial process identified. 2. Mild chronic small vessel ischemic changes. Impression/Recommendations  Dizziness/blurred vision/diplopia. This was in the setting of an MARIO and UTI. She is currently at her baseline. Her brain MRI was w/out any evidence of stroke. Continue antiplatelet/statin for stroke prophylaxis. Interrogate cardiac monitor. We will sign off. Please call back w/ any additional questions or concerns. Thank you.      Arline Santacruz NP  [address] [place] [address] [address] Neurology    A copy of this note was provided for Dr Esperanza bey. providers found

## 2022-09-20 NOTE — CARE COORDINATION
CASE MANAGEMENT DISCHARGE SUMMARY:    DISCHARGE DATE: 9/20/22    DISCHARGED TO: Home with home care    HOME CARE AGENCY:   Name: [facility]  Address:   [address] [facility] [place] [address]  Phone:  [phone]  Fax:  [phone]    TRANSPORTATION: Daughter in law             TIME: later afternoon    COMMENTS: Met with patient. Patient tells me she will return home today& agrees to [facility]. Spoke to Mulu Brown at [facility], aware of dc today & able to pull orders via Epic. Notified [facility] of dc & to resume aide services. No additional needs to note.     Electronically signed by Sergio Padron RN Case Management [phone] on 9/20/2022 at 10:25 AM

## 2022-09-20 NOTE — DISCHARGE SUMMARY
Hospital Medicine Discharge Summary    Patient ID: Olivia Posada      Patient's PCP: Jenell Cranker, APRN - CNP    Admit Date: 9/17/2022     Discharge Date: 9/20/2022      Admitting Physician: No admitting provider for patient encounter. Discharge Physician: KALI Booth CNP     Discharge Diagnoses  UTI  Blurred vision  Acute on chronic kidney disease  Past medical history of Alzheimer's, hypertension, hyperlipidemia      Hospital Course: 79 y.o. female with PMHx of CAD, CHF, COPD, HTN, HLD and TIA presented to [facility] in transfer from [facility] ED for stroke work up. Pt presented to [facility] with onset of blurred vision and dizziness which began suddenly at 1430. Symptoms improving on arrival to ED. She denies weakness of extremities, difficulty walking or speaking. No headache. No recent trauma. No chest pain or shortness of breath. Symptoms completely resolved at this time. Possible TIA/CVA-ruled out  - with symptoms: dizziness and blurred vision  - head CT neg for acute pathology  - MRI and ECHO both nonacute  - ASA, statin  - consult neurology, signed off. -PT recommended home PT OT. Agreed to home PT OT, orders were placed. Acute on Chronic kidney failure  - Renal function  baseline 2.5, admit 4.0, today 1.6  - Monitor renal function and avoid Nephrotoxic agents as able   -Resumed Zoom. Decrease torsemide dose from 40 to 20 mg daily. Recent EF 60%. UTI present on admission  -Treated with IV Zosyn given numerous antibiotic allergies. She tolerated Zosyn, but did report some nausea and vomiting overnight. Although she has had dose of Zosyn today, and is eating well without any nausea or vomiting.  -Urine culture grew E. coli, sensitive to ampicillin.   Prescribed amoxicillin to complete a 7-day course of antibiotic therapy.  -Patient instructed she can take Zofran ODT with doses of amoxicillin to prevent nausea from antibiotic    Alzheimer's disease  - supportive care  - continue home meds     Essential (primary) hypertension   - monitor blood pressure  -Stable, ready resumed ARB at discharge     Hyperlipidemia   - continue statin      Patient stated they felt well and wanted to go home. Patient denied chest pain, shortness of breath, palpitations, abdominal pain, nausea vomiting, diarrhea, dysuria, headache lightheadedness or dizziness. Appetite good. Voiding without difficulty. Reviewed plan of care with patient, verbalized understanding and agreement. Denied further questions or needs. Physical Exam Performed:     BP (!) 158/55   Pulse 77   Temp 97.5 °F (36.4 °C) (Oral)   Resp 20   Ht 5' 3\" (1.6 m)   Wt 195 lb 12.3 oz (88.8 kg)   LMP  (LMP Unknown)   SpO2 94%   BMI 34.68 kg/m²       General appearance:  No apparent distress, appears stated age and cooperative. HEENT:  Normal cephalic, atraumatic without obvious deformity. Pupils equal, round, and reactive to light. Extra ocular muscles intact. Conjunctivae/corneas clear. Neck: Supple, with full range of motion. No jugular venous distention. Trachea midline. Respiratory:  Normal respiratory effort. Clear to auscultation, bilaterally without Rales/Wheezes/Rhonchi. Cardiovascular:  Regular rate and rhythm with normal S1/S2 without murmurs, rubs or gallops. Abdomen: Soft, non-tender, non-distended with normal bowel sounds. Musculoskeletal:  No clubbing, cyanosis or edema bilaterally. Full range of motion without deformity. Skin: Skin color, texture, turgor normal.  No rashes or lesions. Neurologic:  Neurovascularly intact without any focal sensory/motor deficits. Cranial nerves: II-XII intact, grossly non-focal.  Psychiatric:  Alert and oriented, thought content appropriate, normal insight  Capillary Refill: Brisk,< 3 seconds   Peripheral Pulses: +2 palpable, equal bilaterally       Labs:  For convenience and continuity at follow-up the following most recent labs are provided:      CBC:    Lab Results   Component Value Date/Time    WBC 5.9 09/20/2022 09:08 AM    HGB 11.0 09/20/2022 09:08 AM    HCT 34.0 09/20/2022 09:08 AM     09/20/2022 09:08 AM       Renal:    Lab Results   Component Value Date/Time     09/20/2022 09:08 AM    K 4.9 09/20/2022 09:08 AM     09/20/2022 09:08 AM    CO2 23 09/20/2022 09:08 AM    BUN 23 09/20/2022 09:08 AM    CREATININE 1.6 09/20/2022 09:08 AM    CALCIUM 9.0 09/20/2022 09:08 AM    PHOS 4.0 01/18/2022 09:50 AM         Significant Diagnostic Studies    Radiology:   MRI brain without contrast   Final Result   1. No acute infarct or acute intracranial process identified. 2. Mild chronic small vessel ischemic changes.          US RENAL COMPLETE    (Results Pending)          Consults:     IP CONSULT TO NEUROLOGY  IP CONSULT TO NEPHROLOGY  IP CONSULT TO HOME CARE NEEDS    Disposition: Stable    Condition at Discharge: Stable    Discharge Instructions/Follow-up:      Follow up with pcp in 1 week  Follow up with nephrology as directed     Code Status:  Prior     Activity: activity as tolerated    Diet: cardiac diet      Discharge Medications:     Discharge Medication List as of 9/20/2022 10:39 AM             Details   ondansetron (ZOFRAN-ODT) 4 MG disintegrating tablet Take 1 tablet by mouth every 8 hours as needed for Nausea or Vomiting, Disp-21 tablet, R-0Normal      amoxicillin (AMOXIL) 500 MG capsule Take 1 capsule by mouth 2 times daily for 5 days, Disp-20 capsule, R-0Normal                Details   torsemide (DEMADEX) 20 MG tablet Take 1 tablet by mouth daily, Disp-30 tablet, R-3Normal                Details   donepezil (ARICEPT) 10 MG tablet Take 10 mg by mouth nightlyHistorical Med      Ubrogepant (UBRELVY) 100 MG TABS Take 1 tablet by mouth as needed (headache)Historical Med      traZODone (DESYREL) 100 MG tablet Take 100 mg by mouth nightly as needed for SleepHistorical Med      diclofenac (VOLTAREN) 75 MG EC tablet 75 mg 2 times dailyHistorical Med      acetaminophen (TYLENOL) 325 MG tablet Take 650 mg by mouth every 6 hours as needed for PainHistorical Med      cycloSPORINE, PF, (CEQUA) 0.09 % SOLN Place 1 drop into both eyes in the morning and at bedtimeHistorical Med      oxyCODONE-acetaminophen (PERCOCET) 5-325 MG per tablet Take 1 tablet by mouth every 12 hours as needed for Pain. Historical Med      NYAMYC [id number] UNIT/GM powder APPLY TO AFFECTED AREA(S) 3 TIMES DAILY, Disp-60 g, R-1Normal      citalopram (CELEXA) 20 MG tablet TAKE 1 & 1/2 TAB ONCE DAILY, Disp-45 tablet, R-1Normal      busPIRone (BUSPAR) 10 MG tablet TAKE 1-2 TABS IN THE EVENING AS NEEDED FOR ANXIETY, Disp-60 tablet, R-2Normal      hydrALAZINE (APRESOLINE) 10 MG tablet Take 10 mg by mouth 3 times dailyHistorical Med      polyethylene glycol (GLYCOLAX) 17 GM/SCOOP powder DISSOLVE AND DRINK 17 GRAMS DAILY AS DIRECTED, Disp-510 g, R-1Normal      spironolactone (ALDACTONE) 25 MG tablet Take 1 tablet by mouth daily, Disp-30 tablet, R-3Normal      gabapentin (NEURONTIN) 100 MG capsule Take 100 mg by mouth 3 times daily. Historical Med      pantoprazole (PROTONIX) 40 MG tablet Take 1 tablet by mouth daily, Disp-90 tablet,R-0Normal      simvastatin (ZOCOR) 40 MG tablet TAKE ONE TABLET NIGHTLY. Historical Med      aspirin 81 MG EC tablet Take 81 mg by mouth dailyHistorical Med      candesartan (ATACAND) 8 MG tablet Take 8 mg by mouth dailyHistorical Med      baclofen (LIORESAL) 10 MG tablet TAKE 1 TABLET EVERY 8 HOURS AS NEEDED, Disp-30 tablet, R-0Normal      isosorbide mononitrate (IMDUR) 30 MG extended release tablet Take 30 mg by mouth dailyHistorical Med      albuterol-ipratropium (COMBIVENT RESPIMAT)  MCG/ACT AERS inhaler Inhale 1 puff into the lungs every 6 hours, Disp-1 Inhaler, R-5Normal      amLODIPine (NORVASC) 10 MG tablet TAKE (1) TABLET DAILY, Disp-30 tablet, R-5      NITROSTAT 0.4 MG SL tablet USE 1 TABLET UNDER TONGUE AS NEEDED FOR CHEST PAIN MAY REPEAT EVERY 5MIN. UP TO 3. THEN GO TO ER., Disp-25 tablet, R-3      lidocaine (XYLOCAINE) 5 % ointment Apply topically as needed. , Disp-1 Tube, R-3, Normal             Time Spent on discharge is more than 30 minutes in the examination, evaluation, counseling and review of medications and discharge plan. Signed: KALI Rhodes CNP   9/21/2022    The patient was seen and examined on day of discharge and this discharge summary is in conjunction with any daily progress note from day of discharge. Thank you KALI Maria CNP for the opportunity to be involved in this patient's care. If you have any questions or concerns please feel free to contact me at [phone]. NOTE:  This report was transcribed using voice recognition software. Every effort was made to ensure accuracy; however, inadvertent computerized transcription errors may be present.

## 2022-09-20 NOTE — PROGRESS NOTES
Physical Therapy  [facility]  Physical Therapy Daily Treatment Note    Name: Donna Whitmore  : 1940  MRN: [id number]  Date of Service: 2022    Discharge Recommendations: Donna Whitmore scored a 18/24 on the AM-PAC short mobility form. Current research shows that an AM-PAC score of 18 or greater is typically associated with a discharge to the patient's home setting. Based on the patient's AM-PAC score and their current functional mobility deficits, it is recommended that the patient have 2-3 sessions per week of Physical Therapy at d/c to increase the patient's independence. At this time, this patient demonstrates the endurance and safety to discharge home with HHPT and assist PRN and a follow up treatment frequency of 2-3x/wk. Please see assessment section for further patient specific details. If patient discharges prior to next session this note will serve as a discharge summary. Please see below for the latest assessment towards goals. Home with Home health PT, Home with assist PRN   PT Equipment Recommendations  Equipment Needed: No (Pt has rollator at home)      Patient Diagnosis(es): There were no encounter diagnoses. Past Medical History:  has a past medical history of Arthritis, BCC (basal cell carcinoma of skin), Bladder infection, CAD (coronary artery disease), Cancer ([address] CHF (congestive heart failure) ([address] Chronic back pain, COPD (chronic obstructive pulmonary disease) ([address] Depression, Depression, Hypercholesteremia, Hypertension, Pain in shoulder, Reflux, Rheumatic fever, Sleep apnea, TIA (transient ischemic attack), Urinary incontinence, and Wears glasses. Past Surgical History:  has a past surgical history that includes bladder repair; Hysterectomy; Neck surgery; shoulder surgery; Carpal tunnel release; Tonsillectomy; Appendectomy; Upper gastrointestinal endoscopy (11); Spine surgery ();  Spinal fixation surgery with implant (2001); Spinal fixation surgery with implant (2004); Spinal fixation surgery with implant (2007); joint replacement; Coronary angioplasty with stent; Cardiac surgery; Skin cancer excision; Colonoscopy; Upper gastrointestinal endoscopy; other surgical history (9/28/12); Cholecystectomy, laparoscopic (01/06/2017); Upper gastrointestinal endoscopy (03/23/2017); Diagnostic Cardiac Cath Lab Procedure; and Aortic valve replacement. Assessment   Body Structures, Functions, Activity Limitations Requiring Skilled Therapeutic Intervention: Decreased functional mobility ; Decreased strength;Decreased endurance;Decreased balance; Increased pain  Assessment: Pt presents today with slight improvement in functional mobility. Pt was able to perform transfers and ambulation with SBA and use of the RW. Pt demonstrated safe and steady use of the RW and did not require safety cues. Pt was also able to ambulate increased distances (60'- household distance) with safe and steady gait. At this time, the pt will continue to benefit from skilled PT to facilitate return to PLOF and to promote independence. Recommend HHPT and assist PRN at discharge. Treatment Diagnosis: Impaired functional mobility  History: 79 y.o. female with PMHx of CAD, CHF, COPD, HTN, HLD and TIA presented to [facility] in transfer from [facility] ED for stroke work up. Pt presented to [facility] with onset of blurred vision and dizziness which began suddenly at 1430. Symptoms improving on arrival to ED. She denies weakness of extremities, difficulty walking or speaking. No headache. Symptoms resolved. Requires PT Follow-Up: Yes  Activity Tolerance  Activity Tolerance Comments: Pt tolerated the PT treatment session well with no significant limitations     Plan   Plan  Plan: 3-5 times per week  Safety Devices  Type of Devices:  All fall risk precautions in place, Call light within reach, Gait belt, Left in chair  Restraints  Restraints Initially in Place: No Restrictions  Restrictions/Precautions  Restrictions/Precautions: Fall Risk, Up as Tolerated (High fall risk)  Position Activity Restriction  Other position/activity restrictions: fall risk     Subjective   General  Chart Reviewed: Yes  Patient assessed for rehabilitation services?: Yes  Additional Pertinent Hx: 79 y.o. female with PMHx of CAD, CHF, COPD, HTN, HLD and TIA presented to [facility] in transfer from [facility] ED for stroke work up. Pt presented to [facility] with onset of blurred vision and dizziness which began suddenly at 1430. Symptoms improving on arrival to ED. She denies weakness of extremities, difficulty walking or speaking. No headache. Symptoms resolved. Response To Previous Treatment: Patient with no complaints from previous session. Family / Caregiver Present: No  Referring Practitioner: Joann Naidu,  Referral Date : 09/17/22  Diagnosis: Pt came in for TIA/CVA workup pending MRI and neuro consult. Pt has acute on chronic kidney  Follows Commands: Within Functional Limits  General Comment  Comments: Pt seated in recliner on arrival, agreeable to participate in PT treatment session. Subjective  Subjective: Pt states \"I have pain all the time, from the top of my head to the bottom of my feet\", rated 8/10.  Pain medication in place prior to arrival.     Social/Functional History  Social/Functional History  Lives With: Alone  Type of Home: Apartment  Home Layout: One level  Home Access: Level entry  Bathroom Shower/Tub: Walk-in shower  Bathroom Toilet: Handicap height  Bathroom Equipment: Grab bars in shower, Grab bars around toilet, Shower chair, Toilet raiser  Home Equipment: Jose Carlos Cordero, 4 wheeled  Has the patient had two or more falls in the past year or any fall with injury in the past year?: Yes  Receives Help From: Personal care attendant (3 hours everyday)  ADL Assistance: Needs assistance  Toileting: Independent  Homemaking Assistance: Needs assistance  Ambulation Assistance: Independent  Transfer Assistance: Independent  Active : Yes  Mode of Transportation: Car    Cognition   Cognition  Overall Cognitive Status: WFL     Objective   Observation/Palpation  Observation: Pt on RA on arrival       Bed mobility  Bed Mobility Comments: Not completed as pt seated in recliner on arrival and at the end of the treatment session.   Transfers  Sit to Stand: Stand by assistance (Up to RW)  Stand to sit: Stand by assistance (With RW)  Ambulation  Surface: level tile  Device: Rolling Walker  Assistance: Stand by assistance  Quality of Gait: Decreased speed, normal JACKIE, slight trunk flexion throughout the gait cycle, steady with no LOB, decreased step lengths and heights  Distance: 60'     Balance  Standing - Static:  (SBA wth B UE support on RW)  Standing - Dynamic:  (SBA with B UE support on RW)  Exercise Treatment: Pt participated in seated AROM therapeutic exercises including: marches, LAQ, ankle plantarflexion, and ankle dorsiflexion (1 x 10 B) (Pt educated to perform these exercises once per day upon discharge in 1 set of 10 each.)      AM-PAC Score  AM-PAC Inpatient Mobility Raw Score : 18 (09/20/22 1127)  AM-PAC Inpatient T-Scale Score : 43.63 (09/20/22 1127)  Mobility Inpatient CMS 0-100% Score: 46.58 (09/20/22 1127)  Mobility Inpatient CMS G-Code Modifier : CK (09/20/22 1127)     Goals  Short Term Goals  Time Frame for Short term goals: 6 sessions  Short term goal 1: transfer with RW with supervision  Short term goal 2: ambulate 50 feet with RW supervision  Short term goal 3: 15 reps elma LE exs with cues- met 9/20/22  Patient Goals   Patient goals : to go home  *One goal met and updated 9/20/22    Education  Patient Education  Education Given To: Patient  Education Provided: Role of Therapy;Plan of Care;Home Exercise Program  Education Method: Verbal  Barriers to Learning: None  Education Outcome: Verbalized understanding    Therapy Time   Individual Concurrent Group Co-treatment   Time In 1055         Time Out 1120         Minutes 25         Timed Code Treatment Minutes: 22 Minutes       Carlos Etienne, PT  Norah Oro, DPT [id number]

## 2022-09-20 NOTE — PROGRESS NOTES
Discharge paperwork reviewed with patient. Patient educated on CHF education, diet and fluid restriction. Patient also educated on the changes made to medications as well as new medications. Medications being filled at [facility] and delivered to patient's room before discharge. Patient made aware. IV removed without complication. Heart monitor removed. Security delivered 4 rings and 3 chargers to patient's room. Belongings are packed. Patient to be transported home by daughter.

## 2022-09-20 NOTE — PLAN OF CARE
Problem: Discharge Planning  Goal: Discharge to home or other facility with appropriate resources  9/20/2022 0914 by Merced Franklin RN  Outcome: Progressing     Problem: Skin/Tissue Integrity  Goal: Absence of new skin breakdown  Description: 1. Monitor for areas of redness and/or skin breakdown  2. Assess vascular access sites hourly  3. Every 4-6 hours minimum:  Change oxygen saturation probe site  4. Every 4-6 hours:  If on nasal continuous positive airway pressure, respiratory therapy assess nares and determine need for appliance change or resting period.   9/20/2022 0914 by Merced Franklin, RN  Outcome: Progressing     Problem: Safety - Adult  Goal: Free from fall injury  9/20/2022 0914 by Merced Franklin RN  Outcome: Progressing     Problem: Pain  Goal: Verbalizes/displays adequate comfort level or baseline comfort level  9/20/2022 0914 by Merced Franklin RN  Outcome: Progressing

## 2022-09-22 RX ORDER — CETIRIZINE HYDROCHLORIDE 10 MG/1
TABLET ORAL
Qty: 90 TABLET | Refills: 0 | Status: SHIPPED | OUTPATIENT
Start: 2022-09-22

## 2022-09-27 PROBLEM — R41.82 ALTERED MENTAL STATUS: Status: RESOLVED | Noted: 2021-11-30 | Resolved: 2022-09-27

## 2022-10-04 ENCOUNTER — OFFICE VISIT (OUTPATIENT)
Dept: FAMILY MEDICINE CLINIC | Age: 82
End: 2022-10-04
Payer: MEDICARE

## 2022-10-04 VITALS
OXYGEN SATURATION: 94 % | WEIGHT: 183.5 LBS | SYSTOLIC BLOOD PRESSURE: 112 MMHG | DIASTOLIC BLOOD PRESSURE: 65 MMHG | TEMPERATURE: 97.4 F | BODY MASS INDEX: 32.51 KG/M2 | HEART RATE: 65 BPM

## 2022-10-04 DIAGNOSIS — F41.9 ANXIETY: ICD-10-CM

## 2022-10-04 DIAGNOSIS — H04.123 DRY EYES: ICD-10-CM

## 2022-10-04 DIAGNOSIS — G30.9 ALZHEIMER'S DEMENTIA WITHOUT BEHAVIORAL DISTURBANCE (HCC): ICD-10-CM

## 2022-10-04 DIAGNOSIS — N18.9 CHRONIC KIDNEY DISEASE, UNSPECIFIED CKD STAGE: ICD-10-CM

## 2022-10-04 DIAGNOSIS — E78.00 PURE HYPERCHOLESTEROLEMIA: ICD-10-CM

## 2022-10-04 DIAGNOSIS — I10 HYPERTENSION, UNSPECIFIED TYPE: ICD-10-CM

## 2022-10-04 DIAGNOSIS — F02.80 ALZHEIMER'S DEMENTIA WITHOUT BEHAVIORAL DISTURBANCE (HCC): ICD-10-CM

## 2022-10-04 DIAGNOSIS — I50.9 ACUTE ON CHRONIC CONGESTIVE HEART FAILURE, UNSPECIFIED HEART FAILURE TYPE (HCC): Primary | ICD-10-CM

## 2022-10-04 LAB
BILIRUBIN, POC: NORMAL
BLOOD URINE, POC: NORMAL
CLARITY, POC: CLEAR
COLOR, POC: YELLOW
GLUCOSE URINE, POC: NORMAL
KETONES, POC: NORMAL
LEUKOCYTE EST, POC: NORMAL
NITRITE, POC: NORMAL
PH, POC: 5
PROTEIN, POC: NORMAL
SPECIFIC GRAVITY, POC: 1.01
UROBILINOGEN, POC: 0.2

## 2022-10-04 PROCEDURE — 36415 COLL VENOUS BLD VENIPUNCTURE: CPT | Performed by: NURSE PRACTITIONER

## 2022-10-04 PROCEDURE — 1124F ACP DISCUSS-NO DSCNMKR DOCD: CPT | Performed by: NURSE PRACTITIONER

## 2022-10-04 PROCEDURE — 99214 OFFICE O/P EST MOD 30 MIN: CPT | Performed by: NURSE PRACTITIONER

## 2022-10-04 PROCEDURE — 81002 URINALYSIS NONAUTO W/O SCOPE: CPT | Performed by: NURSE PRACTITIONER

## 2022-10-04 RX ORDER — NYSTATIN 100000 [USP'U]/G
POWDER TOPICAL
Qty: 60 G | Refills: 1 | Status: SHIPPED | OUTPATIENT
Start: 2022-10-04

## 2022-10-04 RX ORDER — OLOPATADINE HYDROCHLORIDE 1 MG/ML
1 SOLUTION/ DROPS OPHTHALMIC ONCE
Qty: 5 ML | Refills: 1 | Status: SHIPPED | OUTPATIENT
Start: 2022-10-04 | End: 2022-10-04

## 2022-10-04 RX ORDER — DIVALPROEX SODIUM 250 MG/1
250 TABLET, DELAYED RELEASE ORAL 2 TIMES DAILY
COMMUNITY

## 2022-10-04 ASSESSMENT — PATIENT HEALTH QUESTIONNAIRE - PHQ9
SUM OF ALL RESPONSES TO PHQ QUESTIONS 1-9: 2
2. FEELING DOWN, DEPRESSED OR HOPELESS: 1
SUM OF ALL RESPONSES TO PHQ QUESTIONS 1-9: 2
SUM OF ALL RESPONSES TO PHQ QUESTIONS 1-9: 2
SUM OF ALL RESPONSES TO PHQ9 QUESTIONS 1 & 2: 2
1. LITTLE INTEREST OR PLEASURE IN DOING THINGS: 1
SUM OF ALL RESPONSES TO PHQ QUESTIONS 1-9: 2

## 2022-10-04 ASSESSMENT — ENCOUNTER SYMPTOMS
COUGH: 0
CHEST TIGHTNESS: 0
ABDOMINAL PAIN: 0
RHINORRHEA: 0
NAUSEA: 0
VOMITING: 0
SORE THROAT: 0
DIARRHEA: 0
SHORTNESS OF BREATH: 0
WHEEZING: 0

## 2022-10-04 NOTE — PATIENT INSTRUCTIONS
Please read the healthy family handout that you were given and share it with your family. Please compare this printed medication list with your medications at home to be sure they are the same. If you have any medications that are different please contact us immediately at [phone]. Also review your allergies that we have listed, these may also include medications that you have not been able to tolerate, make sure everything listed is correct. If you have any allergies that are different please contact us immediately at [phone]. You may receive a survey in the mail or by email asking about your experience during your visit today. Please complete and return to us so we know how we are serving you.

## 2022-10-04 NOTE — PROGRESS NOTES
CHIEF COMPLAINT  Chief Complaint   Patient presents with    Check-Up     HTN          HPI   Joe Feldman is a 79 y.o. female who presents to the office with daughter for checkup. Patient reports that she has been hospitalized twice over the past month. Patient reports that she had kidney failure and visual changes. Patient reports she was initially hospitalized at [facility] and transferred to [facility] and discharged home. Patient reports being home for a few days and getting air care to [facility] for altered mental status. Patient continues to follow with neurology and reports that she has an appointment in November. Patient reports that she also seen cardiology and nephrology during hospitalization. Patient reports no follow-up with nephrology since discharge. Patient reports that she has a cardiac monitor in place currently but is not scheduled to see cardiology until February. Patient reports taking medications as prescribed. No other complaints, modifying factors or associated symptoms. Nursing notes reviewed.    Past Medical History:   Diagnosis Date    Altered mental status 11/30/2021    Arthritis     BCC (basal cell carcinoma of skin)     RT clavicle    Bladder infection     frequently    CAD (coronary artery disease)     stents    Cancer (HCC)     skin    CHF (congestive heart failure) (HCC)     Chronic back pain     COPD (chronic obstructive pulmonary disease) ([facility] [address]     Depression     Depression     Hypercholesteremia     Hypertension     Hypokalemia 1/16/2012    Pain in shoulder [id number]    pain in left shoulder, taking steroid injections for steroid    Reflux     Rheumatic fever     as a child    Sleep apnea     uses CPAP    TIA (transient ischemic attack)     Urinary incontinence     Wears glasses      Past Surgical History:   Procedure Laterality Date    AORTIC VALVE REPLACEMENT      APPENDECTOMY      BLADDER REPAIR      3 TIMES    CARDIAC SURGERY      stents    CARPAL TUNNEL RELEASE      RIGHT    CHOLECYSTECTOMY, LAPAROSCOPIC  01/06/2017    with dr Carlos Werner      has it it done twice    DIAGNOSTIC CARDIAC CATH LAB PROCEDURE      HYSTERECTOMY (CERVIX STATUS UNKNOWN)      JOINT REPLACEMENT      KATHLEEN TKR    NECK SURGERY      OTHER SURGICAL HISTORY  9/28/12    Full Interstim Implant    SHOULDER SURGERY      SKIN CANCER EXCISION      SPINAL FIXATION SURGERY WITH IMPLANT  2001    SPINAL FIXATION SURGERY WITH IMPLANT  2004    SPINAL FIXATION SURGERY WITH IMPLANT  2007    SPINE SURGERY  1999    TONSILLECTOMY      UPPER GASTROINTESTINAL ENDOSCOPY  4/22/11    UPPER GASTROINTESTINAL ENDOSCOPY      UPPER GASTROINTESTINAL ENDOSCOPY  03/23/2017    dilitation     Family History   Problem Relation Age of Onset    Arthritis Mother     Cancer Mother     Depression Mother     Heart Disease Mother     High Blood Pressure Mother     High Cholesterol Mother     Miscarriages / [place] Mother     Stroke Mother     Early Death Mother     Hearing Loss Mother     Mental Illness Mother     Vision Loss Mother     Arthritis Brother     Depression Brother     Diabetes Brother     Hearing Loss Brother     Heart Disease Brother     High Blood Pressure Brother     High Cholesterol Brother     Vision Loss Brother      Social History     Socioeconomic History    Marital status:      Spouse name: Not on file    Number of children: 8    Years of education: 10    Highest education level: Not on file   Occupational History    Occupation: retired   Tobacco Use    Smoking status: Never    Smokeless tobacco: Never   Vaping Use    Vaping Use: Never used   Substance and Sexual Activity    Alcohol use: No    Drug use: No    Sexual activity: Not Currently   Other Topics Concern    Not on file   Social History Narrative    Not on file     Social Determinants of Health     Financial Resource Strain: Not on file   Food Insecurity: Not on file   Transportation Needs: Not on file   Physical Activity: Not on file   Stress: Not on file   Social Connections: Not on file   Intimate Partner Violence: Not on file   Housing Stability: Not on file     Current Outpatient Medications   Medication Sig Dispense Refill    nystatin ([address]) 590781 UNIT/GM powder APPLY TO AFFECTED AREA(S) 3 TIMES DAILY 60 g 1    olopatadine (PATANOL) 0.1 % ophthalmic solution Place 1 drop into both eyes once for 1 dose 5 mL 1    cetirizine (ZYRTEC) 10 MG tablet TAKE (1) TABLET DAILY 90 tablet 0    torsemide (DEMADEX) 20 MG tablet Take 1 tablet by mouth daily 30 tablet 3    Ubrogepant (UBRELVY) 100 MG TABS Take 1 tablet by mouth as needed (headache)      traZODone (DESYREL) 100 MG tablet Take 100 mg by mouth nightly as needed for Sleep      diclofenac (VOLTAREN) 75 MG EC tablet 75 mg 2 times daily      acetaminophen (TYLENOL) 325 MG tablet Take 650 mg by mouth every 6 hours as needed for Pain      citalopram (CELEXA) 20 MG tablet TAKE 1 & 1/2 TAB ONCE DAILY 45 tablet 1    busPIRone (BUSPAR) 10 MG tablet TAKE 1-2 TABS IN THE EVENING AS NEEDED FOR ANXIETY 60 tablet 2    hydrALAZINE (APRESOLINE) 10 MG tablet Take 10 mg by mouth 3 times daily      polyethylene glycol (GLYCOLAX) 17 GM/SCOOP powder DISSOLVE AND DRINK 17 GRAMS DAILY AS DIRECTED 510 g 1    spironolactone (ALDACTONE) 25 MG tablet Take 1 tablet by mouth daily 30 tablet 3    gabapentin (NEURONTIN) 100 MG capsule Take 100 mg by mouth in the morning and at bedtime. pantoprazole (PROTONIX) 40 MG tablet Take 1 tablet by mouth daily 90 tablet 0    simvastatin (ZOCOR) 40 MG tablet TAKE ONE TABLET NIGHTLY.       aspirin 81 MG EC tablet Take 81 mg by mouth daily      candesartan (ATACAND) 8 MG tablet Take 8 mg by mouth daily      baclofen (LIORESAL) 10 MG tablet TAKE 1 TABLET EVERY 8 HOURS AS NEEDED 30 tablet 0    isosorbide mononitrate (IMDUR) 30 MG extended release tablet Take 30 mg by mouth daily      albuterol-ipratropium (COMBIVENT RESPIMAT)  MCG/ACT AERS inhaler Inhale 1 puff into the lungs every 6 hours 1 Inhaler 5    amLODIPine (NORVASC) 10 MG tablet TAKE (1) TABLET DAILY 30 tablet 5    NITROSTAT 0.4 MG SL tablet USE 1 TABLET UNDER TONGUE AS NEEDED FOR CHEST PAIN MAY REPEAT EVERY 5MIN. UP TO 3. THEN GO TO ER. 25 tablet 3    lidocaine (XYLOCAINE) 5 % ointment Apply topically as needed. 1 Tube 3    oxyCODONE-acetaminophen (PERCOCET) 5-325 MG per tablet Take 1 tablet by mouth every 12 hours as needed for Pain. No current facility-administered medications for this visit. Allergies   Allergen Reactions    Latex     Levofloxacin Other (See Comments)     Burns mouth per patient    Azithromycin     Codeine Nausea Only    Keflex [Cephalexin]     Morphine     Pentazocine Lactate     Sulfa Antibiotics Hives    Tape [Adhesive Tape]      No bandaids       REVIEW OF SYSTEMS  Review of Systems   Constitutional:  Negative for activity change, appetite change, chills and fever. HENT:  Negative for congestion, rhinorrhea and sore throat. Eyes:  Negative for visual disturbance. Respiratory:  Negative for cough, chest tightness, shortness of breath and wheezing. Cardiovascular:  Negative for chest pain and leg swelling. Gastrointestinal:  Negative for abdominal pain, diarrhea, nausea and vomiting. Genitourinary:  Negative for dysuria, frequency, hematuria and urgency. Musculoskeletal:  Positive for arthralgias and gait problem. Skin:  Negative for rash. Neurological:  Positive for tremors and weakness. Negative for dizziness, light-headedness, numbness and headaches. Psychiatric/Behavioral:  Negative for sleep disturbance. The patient is not nervous/anxious. PHYSICAL EXAM  /65   Pulse 65   Temp 97.4 °F (36.3 °C) (Oral)   Wt 183 lb 8 oz (83.2 kg)   LMP  (LMP Unknown)   SpO2 94%   BMI 32.51 kg/m²   Physical Exam  Constitutional:       Appearance: Normal appearance. She is not toxic-appearing.    HENT:      Head: Normocephalic. Right Ear: Tympanic membrane normal.      Left Ear: Tympanic membrane normal.      Nose: Nose normal.      Mouth/Throat:      Mouth: Mucous membranes are moist.      Pharynx: Oropharynx is clear. Eyes:      Extraocular Movements: Extraocular movements intact. Conjunctiva/sclera: Conjunctivae normal.      Pupils: Pupils are equal, round, and reactive to light. Cardiovascular:      Rate and Rhythm: Normal rate. Pulses: Normal pulses. Pulmonary:      Effort: Pulmonary effort is normal.      Breath sounds: Normal breath sounds. Abdominal:      Palpations: Abdomen is soft. Tenderness: There is no guarding. Musculoskeletal:         General: Normal range of motion. Cervical back: Normal range of motion and neck supple. Right lower leg: No edema. Left lower leg: No edema. Skin:     General: Skin is warm and dry. Capillary Refill: Capillary refill takes less than 2 seconds. Findings: No rash. Neurological:      Mental Status: She is alert and oriented to person, place, and time. Psychiatric:         Mood and Affect: Mood normal.         Behavior: Behavior normal.      ASSESSMENT/PLAN:   1. Acute on chronic congestive heart failure, unspecified heart failure type ([place])  Stable. Managed by cardiology. Patient reports currently wearing a Holter monitor. Patient currently on Demadex 20 mg daily and spironolactone 25 mg daily. No significant edema noted. No new or worsening shortness of breath. Continue current treatment management follow-up in 6 months, sooner for new or worsening symptoms. 2. Hypertension, unspecified type  Stable. Managed by cardiology. Patient currently reports being on hydralazine 10 mg 3 times a day, spironolactone 25 mg daily, candesartan 8 mg daily and amlodipine 10 mg daily. Continue with current treatment management follow-up in 6 months, sooner for new or worsening symptoms.     3. Chronic kidney disease, unspecified CKD stage  Stable versus uncontrolled. Most recent BMP on 9/20 upon discharge kidney function returned to baseline with creatinine 1.6, BUN 23, GFR of 37. Patient reports that when she went back to  she did see a nephrologist however I am unable to see who patient seen or recent labs. Patient does not see nephrology on a current basis therefore I did place referral for patient to follow-up. Labs ordered today and pending. Patient also reports that she had a urinary tract infection that was asymptomatic but on antibiotics. Recommendations for repeat urinalysis performed at today's visit. We will follow-up with results. Continue current treatment follow-up in 6 months, sooner for new or worsening symptoms.  - KRISTYN - Lara Monae MD, Nephrology, [facility]  - POCT Urinalysis no Micro  - Comprehensive Metabolic Panel    4. Alzheimer's dementia without behavioral disturbance ([facility] [address]  Stable. Managed by neurology. Patient previously on Aricept but discontinued due to changes in status. Continue current treatment management no new or worsening symptoms. Follow-up with neurology next month, sooner for new or worsening symptoms. 5. Anxiety  Stable. Patient compliant with citalopram 20 mg daily and BuSpar 10 mg daily. Continue current treatment management follow-up in 6 months, sooner for new or worsening symptoms. 6. Pure hypercholesterolemia  Stable. Recent lipids reviewed and stable. Patient compliant with simvastatin 40 mg daily. Continue with current treatment management follow-up in 6 months, sooner for new or worsening symptoms. 7. Dry eyes  Patient reports seeing ophthalmology in the past.  Patient reports that she was given patanol during hospitalization which did help. Patient requesting refill today. Prescription sent to pharmacy patient continue to follow-up for any new or worsening symptoms.   - olopatadine (PATANOL) 0.1 % ophthalmic solution; Place 1 drop into both eyes once for 1 dose Dispense: 5 mL; Refill: 1       The note was completed using Dragon voice recognition transcription. Every effort was made to ensure accuracy; however, inadvertent  transcription errors may be present despite my best efforts to edit errors.     KALI Hyde - CNP [place] MOTT [id number]

## 2022-10-05 LAB
A/G RATIO: 1.9 (ref 1.1–2.2)
ALBUMIN SERPL-MCNC: 4.6 G/DL (ref 3.4–5)
ALP BLD-CCNC: 98 U/L (ref 40–129)
ALT SERPL-CCNC: 9 U/L (ref 10–40)
ANION GAP SERPL CALCULATED.3IONS-SCNC: 15 MMOL/L (ref 3–16)
AST SERPL-CCNC: 15 U/L (ref 15–37)
BILIRUB SERPL-MCNC: 0.4 MG/DL (ref 0–1)
BUN BLDV-MCNC: 39 MG/DL (ref 7–20)
CALCIUM SERPL-MCNC: 10.2 MG/DL (ref 8.3–10.6)
CHLORIDE BLD-SCNC: 101 MMOL/L (ref 99–110)
CO2: 25 MMOL/L (ref 21–32)
CREAT SERPL-MCNC: 2.5 MG/DL (ref 0.6–1.2)
GFR AFRICAN AMERICAN: 22
GFR NON-AFRICAN AMERICAN: 18
GLUCOSE BLD-MCNC: 95 MG/DL (ref 70–99)
POTASSIUM SERPL-SCNC: 5.7 MMOL/L (ref 3.5–5.1)
SODIUM BLD-SCNC: 141 MMOL/L (ref 136–145)
TOTAL PROTEIN: 7 G/DL (ref 6.4–8.2)

## 2022-10-06 DIAGNOSIS — E87.5 SERUM POTASSIUM ELEVATED: Primary | ICD-10-CM

## 2022-10-11 ENCOUNTER — TELEPHONE (OUTPATIENT)
Dept: FAMILY MEDICINE CLINIC | Age: 82
End: 2022-10-11

## 2022-10-11 NOTE — TELEPHONE ENCOUNTER
Left message for patient to call  I called [facility] nephrology and was able to get patient scheduled at the [place] office on Dec 8th at 2:45 with Dr Darrin Cordova   The phone number to call if she needs to change appt is [phone]  The address is [address] [place] [address], [address] [place], [address]

## 2022-10-11 NOTE — TELEPHONE ENCOUNTER
Need to advise patient that Dr Bird Roe office canceled her appt on 11/30.   He wants her to follow up with [facility] nephrology since that is who she was seen by when she was in the hospital.

## 2022-10-12 ENCOUNTER — TELEPHONE (OUTPATIENT)
Dept: FAMILY MEDICINE CLINIC | Age: 82
End: 2022-10-12

## 2022-10-12 NOTE — TELEPHONE ENCOUNTER
[phone] (Home Phone) I called pt back who stated Dr Alesia Christie office called her yesterday after looking at her scans and wanted to see her tomorrow at [facility].

## 2022-10-12 NOTE — TELEPHONE ENCOUNTER
----- Message from Kimberley Pulido sent at 10/12/2022 10:43 AM EDT -----  Subject: Message to Provider    QUESTIONS  Information for Provider? Pt called and said she is going to another   doctor for her kidneys Dr Angelica Montero  ---------------------------------------------------------------------------  --------------  [id number] [employer]  [phone]; OK to leave message on voicemail  ---------------------------------------------------------------------------  --------------  SCRIPT ANSWERS  Relationship to Patient?  Self

## 2022-10-13 ENCOUNTER — HOSPITAL ENCOUNTER (OUTPATIENT)
Age: 82
Discharge: HOME | End: 2022-10-13
Payer: MEDICARE

## 2022-10-13 DIAGNOSIS — E87.5 SERUM POTASSIUM ELEVATED: ICD-10-CM

## 2022-10-13 LAB
ALBUMIN SERPL-MCNC: 3.8 G/DL (ref 3.4–5)
ANION GAP SERPL CALCULATED.3IONS-SCNC: 10 MMOL/L (ref 3–16)
BUN BLDV-MCNC: 30 MG/DL (ref 7–20)
CALCIUM SERPL-MCNC: 9.2 MG/DL (ref 8.3–10.6)
CHLORIDE BLD-SCNC: 104 MMOL/L (ref 99–110)
CO2: 27 MMOL/L (ref 21–32)
CREAT SERPL-MCNC: 3.2 MG/DL (ref 0.6–1.2)
GFR AFRICAN AMERICAN: 17
GFR NON-AFRICAN AMERICAN: 14
GLUCOSE BLD-MCNC: 98 MG/DL (ref 70–99)
PHOSPHORUS: 4.3 MG/DL (ref 2.5–4.9)
POTASSIUM SERPL-SCNC: 5.4 MMOL/L (ref 3.5–5.1)
SODIUM BLD-SCNC: 141 MMOL/L (ref 136–145)

## 2022-10-13 PROCEDURE — 80069 RENAL FUNCTION PANEL: CPT

## 2022-10-13 PROCEDURE — 36415 COLL VENOUS BLD VENIPUNCTURE: CPT

## 2022-10-17 RX ORDER — BUSPIRONE HYDROCHLORIDE 10 MG/1
TABLET ORAL
Qty: 60 TABLET | Refills: 2 | Status: SHIPPED | OUTPATIENT
Start: 2022-10-17

## 2022-10-18 ENCOUNTER — HOSPITAL ENCOUNTER (OUTPATIENT)
Age: 82
Discharge: HOME | End: 2022-10-18
Payer: MEDICARE

## 2022-10-18 LAB
ALBUMIN SERPL-MCNC: 3.8 G/DL (ref 3.4–5)
ANION GAP SERPL CALCULATED.3IONS-SCNC: 12 MMOL/L (ref 3–16)
BUN BLDV-MCNC: 18 MG/DL (ref 7–20)
CALCIUM SERPL-MCNC: 9.5 MG/DL (ref 8.3–10.6)
CHLORIDE BLD-SCNC: 108 MMOL/L (ref 99–110)
CO2: 23 MMOL/L (ref 21–32)
CREAT SERPL-MCNC: 1.6 MG/DL (ref 0.6–1.2)
GFR SERPL CREATININE-BSD FRML MDRD: 32 ML/MIN/{1.73_M2}
GLUCOSE BLD-MCNC: 112 MG/DL (ref 70–99)
PHOSPHORUS: 2.3 MG/DL (ref 2.5–4.9)
POTASSIUM SERPL-SCNC: 5 MMOL/L (ref 3.5–5.1)
SODIUM BLD-SCNC: 143 MMOL/L (ref 136–145)

## 2022-10-18 PROCEDURE — 36415 COLL VENOUS BLD VENIPUNCTURE: CPT

## 2022-10-18 PROCEDURE — 80069 RENAL FUNCTION PANEL: CPT

## 2022-11-03 ENCOUNTER — OFFICE VISIT (OUTPATIENT)
Dept: FAMILY MEDICINE CLINIC | Age: 82
End: 2022-11-03
Payer: MEDICARE

## 2022-11-03 VITALS
DIASTOLIC BLOOD PRESSURE: 69 MMHG | TEMPERATURE: 97.8 F | OXYGEN SATURATION: 94 % | HEART RATE: 81 BPM | WEIGHT: 188.25 LBS | SYSTOLIC BLOOD PRESSURE: 138 MMHG | BODY MASS INDEX: 33.35 KG/M2

## 2022-11-03 DIAGNOSIS — R05.2 SUBACUTE COUGH: ICD-10-CM

## 2022-11-03 DIAGNOSIS — J06.9 VIRAL URI: Primary | ICD-10-CM

## 2022-11-03 LAB
INFLUENZA A ANTIGEN, POC: NEGATIVE
INFLUENZA B ANTIGEN, POC: NEGATIVE

## 2022-11-03 PROCEDURE — 3074F SYST BP LT 130 MM HG: CPT | Performed by: NURSE PRACTITIONER

## 2022-11-03 PROCEDURE — 87804 INFLUENZA ASSAY W/OPTIC: CPT | Performed by: NURSE PRACTITIONER

## 2022-11-03 PROCEDURE — 99213 OFFICE O/P EST LOW 20 MIN: CPT | Performed by: NURSE PRACTITIONER

## 2022-11-03 PROCEDURE — 1124F ACP DISCUSS-NO DSCNMKR DOCD: CPT | Performed by: NURSE PRACTITIONER

## 2022-11-03 PROCEDURE — 3078F DIAST BP <80 MM HG: CPT | Performed by: NURSE PRACTITIONER

## 2022-11-03 RX ORDER — ALBUTEROL SULFATE 90 UG/1
AEROSOL, METERED RESPIRATORY (INHALATION)
Qty: 18 G | Refills: 5 | Status: SHIPPED | OUTPATIENT
Start: 2022-11-03

## 2022-11-03 ASSESSMENT — ENCOUNTER SYMPTOMS
SORE THROAT: 1
COUGH: 1
GASTROINTESTINAL NEGATIVE: 1
SHORTNESS OF BREATH: 1
EYES NEGATIVE: 1

## 2022-11-03 NOTE — PATIENT INSTRUCTIONS
Please read the healthy family handout that you were given and share it with your family. Please compare this printed medication list with your medications at home to be sure they are the same. If you have any medications that are different please contact us immediately at [phone]. Also review your allergies that we have listed, these may also include medications that you have not been able to tolerate, make sure everything listed is correct. If you have any allergies that are different please contact us immediately at [phone]. You may receive a survey in the mail or by email asking about your experience during your visit today. Please complete and return to us so we know how we are serving you.

## 2022-11-03 NOTE — PROGRESS NOTES
CHIEF COMPLAINT  Chief Complaint   Patient presents with    Cough     chill    Chills        HPI   Marbella Pacheco is a 79 y.o. female who presents to the office complaining of cough, congestion, body aches, sore throat, ear fullness, headache and chills x 3 days. Patient reports home COVID test was negative. Patient reports chest tightness and shortness of breath upon exertion. No vomiting or diarrhea. Patient reports taking over-the-counter Robitussin. No other complaints, modifying factors or associated symptoms. Nursing notes reviewed.    Past Medical History:   Diagnosis Date    Altered mental status 11/30/2021    Arthritis     BCC (basal cell carcinoma of skin)     RT clavicle    Bladder infection     frequently    CAD (coronary artery disease)     stents    Cancer (HCC)     skin    CHF (congestive heart failure) ([facility])     Chronic back pain     COPD (chronic obstructive pulmonary disease) ([facility] [address]     Depression     Depression     Hypercholesteremia     Hypertension     Hypokalemia 1/16/2012    Pain in shoulder [id number]    pain in left shoulder, taking steroid injections for steroid    Reflux     Rheumatic fever     as a child    Sleep apnea     uses CPAP    TIA (transient ischemic attack)     Urinary incontinence     Wears glasses      Past Surgical History:   Procedure Laterality Date    AORTIC VALVE REPLACEMENT      APPENDECTOMY      BLADDER REPAIR      3 TIMES    CARDIAC SURGERY      stents    CARPAL TUNNEL RELEASE      RIGHT    CHOLECYSTECTOMY, LAPAROSCOPIC  01/06/2017    with dr Addy Orozco      has it it done twice    DIAGNOSTIC CARDIAC CATH LAB PROCEDURE      HYSTERECTOMY (CERVIX STATUS UNKNOWN)      JOINT REPLACEMENT      KATHLEEN TKR    NECK SURGERY      OTHER SURGICAL HISTORY  9/28/12    Full Interstim Implant    SHOULDER SURGERY      SKIN CANCER EXCISION      SPINAL FIXATION SURGERY WITH IMPLANT  2001    SPINAL FIXATION SURGERY WITH IMPLANT  2004    SPINAL FIXATION SURGERY WITH IMPLANT  2007    SPINE SURGERY  1999    TONSILLECTOMY      UPPER GASTROINTESTINAL ENDOSCOPY  4/22/11    UPPER GASTROINTESTINAL ENDOSCOPY      UPPER GASTROINTESTINAL ENDOSCOPY  03/23/2017    dilitation     Family History   Problem Relation Age of Onset    Arthritis Mother     Cancer Mother     Depression Mother     Heart Disease Mother     High Blood Pressure Mother     High Cholesterol Mother     Miscarriages / [place] Mother     Stroke Mother     Early Death Mother     Hearing Loss Mother     Mental Illness Mother     Vision Loss Mother     Arthritis Brother     Depression Brother     Diabetes Brother     Hearing Loss Brother     Heart Disease Brother     High Blood Pressure Brother     High Cholesterol Brother     Vision Loss Brother      Social History     Socioeconomic History    Marital status:      Spouse name: Not on file    Number of children: 8    Years of education: 10    Highest education level: Not on file   Occupational History    Occupation: retired   Tobacco Use    Smoking status: Never    Smokeless tobacco: Never   Vaping Use    Vaping Use: Never used   Substance and Sexual Activity    Alcohol use: No    Drug use: No    Sexual activity: Not Currently   Other Topics Concern    Not on file   Social History Narrative    Not on file     Social Determinants of Health     Financial Resource Strain: Not on file   Food Insecurity: Not on file   Transportation Needs: Not on file   Physical Activity: Not on file   Stress: Not on file   Social Connections: Not on file   Intimate Partner Violence: Not on file   Housing Stability: Not on file     Current Outpatient Medications   Medication Sig Dispense Refill    albuterol sulfate HFA (VENTOLIN HFA) 108 (90 Base) MCG/ACT inhaler INHALE 2 PUFFS FOUR TIMES DAILY AS NEEDED FOR WHEEZING 18 g 5    busPIRone (BUSPAR) 10 MG tablet TAKE 1-2 TABS IN THE EVENING AS NEEDED FOR ANXIETY 60 tablet 2    nystatin ([address]) 418241 UNIT/GM powder APPLY TO AFFECTED AREA(S) 3 TIMES DAILY 60 g 1    divalproex (DEPAKOTE) 250 MG DR tablet Take 250 mg by mouth in the morning and at bedtime From neurologist      cetirizine (ZYRTEC) 10 MG tablet TAKE (1) TABLET DAILY 90 tablet 0    torsemide (DEMADEX) 20 MG tablet Take 1 tablet by mouth daily 30 tablet 3    Ubrogepant (UBRELVY) 100 MG TABS Take 1 tablet by mouth as needed (headache)      traZODone (DESYREL) 100 MG tablet Take 100 mg by mouth nightly as needed for Sleep      diclofenac (VOLTAREN) 75 MG EC tablet 75 mg 2 times daily      acetaminophen (TYLENOL) 325 MG tablet Take 650 mg by mouth every 6 hours as needed for Pain      oxyCODONE-acetaminophen (PERCOCET) 5-325 MG per tablet Take 1 tablet by mouth every 12 hours as needed for Pain. citalopram (CELEXA) 20 MG tablet TAKE 1 & 1/2 TAB ONCE DAILY 45 tablet 1    hydrALAZINE (APRESOLINE) 10 MG tablet Take 10 mg by mouth 3 times daily      polyethylene glycol (GLYCOLAX) 17 GM/SCOOP powder DISSOLVE AND DRINK 17 GRAMS DAILY AS DIRECTED 510 g 1    spironolactone (ALDACTONE) 25 MG tablet Take 1 tablet by mouth daily 30 tablet 3    gabapentin (NEURONTIN) 100 MG capsule Take 100 mg by mouth in the morning and at bedtime. pantoprazole (PROTONIX) 40 MG tablet Take 1 tablet by mouth daily 90 tablet 0    simvastatin (ZOCOR) 40 MG tablet TAKE ONE TABLET NIGHTLY.       aspirin 81 MG EC tablet Take 81 mg by mouth daily      candesartan (ATACAND) 8 MG tablet Take 8 mg by mouth daily      baclofen (LIORESAL) 10 MG tablet TAKE 1 TABLET EVERY 8 HOURS AS NEEDED 30 tablet 0    isosorbide mononitrate (IMDUR) 30 MG extended release tablet Take 30 mg by mouth daily      albuterol-ipratropium (COMBIVENT RESPIMAT)  MCG/ACT AERS inhaler Inhale 1 puff into the lungs every 6 hours 1 Inhaler 5    amLODIPine (NORVASC) 10 MG tablet TAKE (1) TABLET DAILY 30 tablet 5    NITROSTAT 0.4 MG SL tablet USE 1 TABLET UNDER TONGUE AS NEEDED FOR CHEST PAIN MAY REPEAT EVERY 5MIN. UP TO 3. THEN GO TO ER. 25 tablet 3    lidocaine (XYLOCAINE) 5 % ointment Apply topically as needed. 1 Tube 3     No current facility-administered medications for this visit. Allergies   Allergen Reactions    Latex     Levofloxacin Other (See Comments)     Burns mouth per patient    Azithromycin     Codeine Nausea Only    Keflex [Cephalexin]     Morphine     Pentazocine Lactate     Sulfa Antibiotics Hives    Tape [Adhesive Tape]      No bandaids       REVIEW OF SYSTEMS  Review of Systems   Constitutional:  Positive for appetite change, chills and fatigue. HENT:  Positive for congestion, ear pain, postnasal drip and sore throat. Eyes: Negative. Respiratory:  Positive for cough and shortness of breath. Cardiovascular: Negative. Gastrointestinal: Negative. Genitourinary: Negative. Musculoskeletal:  Positive for arthralgias and myalgias. Skin: Negative. Neurological:  Positive for headaches. Psychiatric/Behavioral: Negative. PHYSICAL EXAM  /69   Pulse 81   Temp 97.8 °F (36.6 °C) (Oral)   Wt 188 lb 4 oz (85.4 kg)   LMP  (LMP Unknown)   SpO2 94%   BMI 33.35 kg/m²   Physical Exam  Constitutional:       Appearance: Normal appearance. HENT:      Head: Normocephalic. Right Ear: Tympanic membrane normal.      Left Ear: Tympanic membrane normal.      Nose: Congestion present. Mouth/Throat:      Mouth: Mucous membranes are moist.      Pharynx: Oropharynx is clear. No posterior oropharyngeal erythema. Eyes:      Extraocular Movements: Extraocular movements intact. Conjunctiva/sclera: Conjunctivae normal.   Cardiovascular:      Rate and Rhythm: Normal rate. Pulses: Normal pulses. Pulmonary:      Effort: Pulmonary effort is normal.   Abdominal:      Palpations: Abdomen is soft. Tenderness: There is no guarding. Musculoskeletal:         General: Normal range of motion. Cervical back: Normal range of motion and neck supple.    Skin: General: Skin is warm and dry. Capillary Refill: Capillary refill takes less than 2 seconds. Findings: No rash. Neurological:      Mental Status: She is alert and oriented to person, place, and time. ASSESSMENT/PLAN:   1. Viral URI  Patient presents today with complaints of cough, congestion, headache,, decreased appetite and chills. Patient reports that symptoms started 3 days ago. Home COVID test this morning negative. No vomiting or diarrhea. Patient reports taking over-the-counter decongestants/cough suppressant. Patient reports at times cough is productive with white/green sputum. Patient reports that her daughter had similar symptoms. We did discuss symptoms are viral in nature and no clinical indication for antibiotics at this time. Recommendations for patient to continue using inhaler, over-the-counter decongestants and cough suppressants as well as immune support vitamins. Rapid influenza negative. Patient notified in the office. Patient aware of supportive measures and follow-up if symptoms do not improve or worsen. Patient verbalized and acknowledges with plan of care at this time. 2. Cough  See above #1  - albuterol sulfate HFA (VENTOLIN HFA) 108 (90 Base) MCG/ACT inhaler; INHALE 2 PUFFS FOUR TIMES DAILY AS NEEDED FOR WHEEZING  Dispense: 18 g; Refill: 5  - POCT Influenza A/B Antigen ([facility])       The note was completed using Dragon voice recognition transcription. Every effort was made to ensure accuracy; however, inadvertent  transcription errors may be present despite my best efforts to edit errors.     Ulises Salamanca, APRN - CNP

## 2022-11-10 RX ORDER — TRAZODONE HYDROCHLORIDE 100 MG/1
100 TABLET ORAL NIGHTLY PRN
Qty: 60 TABLET | Refills: 1 | Status: SHIPPED | OUTPATIENT
Start: 2022-11-10

## 2022-11-10 NOTE — TELEPHONE ENCOUNTER
Future appt scheduled 0 appt scheduled   Return in about 6 months (around 4/4/2023),                        Last appt 11/03/2022        Last written 09/16/2022    traZODone (DESYREL) 100 MG tablet  #60  1 RF       Discontinued by: Dee Dee [facility] [place] [facility] on 9/18/2022 01:26   Reason: LIST CLEANUP

## 2022-11-11 RX ORDER — CITALOPRAM 20 MG/1
TABLET ORAL
Qty: 45 TABLET | Refills: 1 | Status: SHIPPED | OUTPATIENT
Start: 2022-11-11

## 2022-11-11 NOTE — TELEPHONE ENCOUNTER
Future appt scheduled 0 appt scheduled   Return in about 6 months (around 4/4/2023),       Last Written 07/11/2022    citalopram (CELEXA) 20 MG tablet  #45  1 Rf

## 2022-11-25 ENCOUNTER — HOSPITAL ENCOUNTER (OUTPATIENT)
Age: 82
Discharge: HOME | End: 2022-11-25
Payer: MEDICARE

## 2022-11-25 LAB
ALBUMIN SERPL-MCNC: 4.3 G/DL (ref 3.4–5)
ANION GAP SERPL CALCULATED.3IONS-SCNC: 8 MMOL/L (ref 3–16)
BUN BLDV-MCNC: 20 MG/DL (ref 7–20)
CALCIUM SERPL-MCNC: 9.2 MG/DL (ref 8.3–10.6)
CHLORIDE BLD-SCNC: 106 MMOL/L (ref 99–110)
CO2: 27 MMOL/L (ref 21–32)
CREAT SERPL-MCNC: 1.7 MG/DL (ref 0.6–1.2)
GFR SERPL CREATININE-BSD FRML MDRD: 29 ML/MIN/{1.73_M2}
GLUCOSE BLD-MCNC: 93 MG/DL (ref 70–99)
PHOSPHORUS: 3.6 MG/DL (ref 2.5–4.9)
POTASSIUM SERPL-SCNC: 4.7 MMOL/L (ref 3.5–5.1)
SODIUM BLD-SCNC: 141 MMOL/L (ref 136–145)

## 2022-11-25 PROCEDURE — 36415 COLL VENOUS BLD VENIPUNCTURE: CPT

## 2022-11-25 PROCEDURE — 80069 RENAL FUNCTION PANEL: CPT

## 2022-12-12 RX ORDER — CITALOPRAM 20 MG/1
TABLET ORAL
Qty: 45 TABLET | Refills: 3 | Status: SHIPPED | OUTPATIENT
Start: 2022-12-12

## 2022-12-12 RX ORDER — TRAZODONE HYDROCHLORIDE 100 MG/1
TABLET ORAL
Qty: 60 TABLET | Refills: 1 | Status: SHIPPED | OUTPATIENT
Start: 2022-12-12

## 2022-12-12 RX ORDER — NYSTATIN 100000 [USP'U]/G
POWDER TOPICAL
Qty: 60 G | Refills: 2 | Status: SHIPPED | OUTPATIENT
Start: 2022-12-12

## 2022-12-12 NOTE — TELEPHONE ENCOUNTER
Date of last refill of Trazodone was 11/10/22, # of pills given 60 and # of refills given 1. Their next appointment is 4/4/23, the last date patient was seen was 10/4/22. Does patient have medication agreement on file? No  Has drug screen been done in last 12 months if needed?  no

## 2022-12-27 RX ORDER — BUSPIRONE HYDROCHLORIDE 10 MG/1
TABLET ORAL
Qty: 60 TABLET | Refills: 2 | Status: SHIPPED | OUTPATIENT
Start: 2022-12-27

## 2022-12-27 RX ORDER — CETIRIZINE HYDROCHLORIDE 10 MG/1
TABLET ORAL
Qty: 90 TABLET | Refills: 0 | Status: SHIPPED | OUTPATIENT
Start: 2022-12-27

## 2022-12-27 NOTE — TELEPHONE ENCOUNTER
Future appt scheduled 0 appt scheduled                       Last appt 11/03/2022      Last Written 10/17/2022    busPIRone (BUSPAR) 10 MG tablet  #60  2 RF

## 2022-12-27 NOTE — TELEPHONE ENCOUNTER
Future appt scheduled 0 appt scheduled                       Last appt 11/03/2022        Last Written 09/22/2022    cetirizine (ZYRTEC) 10 MG tablet  #90  0 RF

## 2022-12-29 ENCOUNTER — TELEPHONE (OUTPATIENT)
Dept: FAMILY MEDICINE CLINIC | Age: 82
End: 2022-12-29

## 2022-12-29 DIAGNOSIS — K59.00 CONSTIPATION, UNSPECIFIED CONSTIPATION TYPE: Primary | ICD-10-CM

## 2022-12-29 RX ORDER — DOCUSATE SODIUM 100 MG/1
100 CAPSULE, LIQUID FILLED ORAL 2 TIMES DAILY
Qty: 60 CAPSULE | Refills: 0 | Status: SHIPPED | OUTPATIENT
Start: 2022-12-29 | End: 2023-01-28

## 2022-12-29 NOTE — TELEPHONE ENCOUNTER
Patient is having trouble with constipation. She is currently using a fiber powder, polyethylene glyocol, in her drink in the am which is not helping. She states that she has tried all different types of laxatives and they don't work for her.   Asking if she could try Ulisesmatthew Rodriguez

## 2022-12-29 NOTE — TELEPHONE ENCOUNTER
Patient needs to make she is drinking plenty of water to aid in bowel movements. I can call in a stool softener if patient would like, but would also recommend OTC probiotic. If symptoms do not improve she will need to see GI.

## 2022-12-29 NOTE — TELEPHONE ENCOUNTER
Patient said she took laxative last night but was only able to have very small bowel movement.   She is not having any other symptoms, she said she has had problems with constipation off and on for years

## 2023-01-12 RX ORDER — POLYETHYLENE GLYCOL 3350 17 G/17G
POWDER, FOR SOLUTION ORAL
Qty: 510 G | Refills: 3 | Status: SHIPPED | OUTPATIENT
Start: 2023-01-12

## 2023-01-23 DIAGNOSIS — K59.00 CONSTIPATION, UNSPECIFIED CONSTIPATION TYPE: ICD-10-CM

## 2023-01-23 RX ORDER — DOCUSATE SODIUM 100 MG/1
CAPSULE, LIQUID FILLED ORAL
Qty: 60 CAPSULE | Refills: 0 | Status: SHIPPED | OUTPATIENT
Start: 2023-01-23

## 2023-02-16 RX ORDER — TRAZODONE HYDROCHLORIDE 100 MG/1
200 TABLET ORAL NIGHTLY
Qty: 60 TABLET | Refills: 0 | Status: SHIPPED | OUTPATIENT
Start: 2023-02-16 | End: 2023-03-18

## 2023-02-16 NOTE — TELEPHONE ENCOUNTER
LOV 11/3/22  FOV None    Pt states that she takes 2 of the 100mg tablets at night time. Needs a new order. Date of last refill of this med was 12/12/22, # of pills given 60 and # of refills given 1.

## 2023-02-20 DIAGNOSIS — K59.00 CONSTIPATION, UNSPECIFIED CONSTIPATION TYPE: ICD-10-CM

## 2023-02-20 RX ORDER — DOCUSATE SODIUM 100 MG/1
CAPSULE, LIQUID FILLED ORAL
Qty: 60 CAPSULE | Refills: 0 | Status: SHIPPED | OUTPATIENT
Start: 2023-02-20

## 2023-02-20 NOTE — TELEPHONE ENCOUNTER
Future appt scheduled 0 appt scheduled  Return in about 6 months (around 4/4/2023),                  Last appt 11/03/2022      Last Written  01/23/2023    docusate sodium (COLACE) 100 MG capsule  #60  0 RF

## 2023-03-15 DIAGNOSIS — K59.00 CONSTIPATION, UNSPECIFIED CONSTIPATION TYPE: ICD-10-CM

## 2023-03-15 NOTE — TELEPHONE ENCOUNTER
[phone] (Home Phone) LM for pt to call back and schedule next follow up appt       Future appt scheduled 0 appt scheduled  Return in about 6 months (around 4/4/2023),                  Last appt 11/03/2022       cetirizine (ZYRTEC) 10 MG tablet  12/27/2022  #90  0 RF     docusate sodium (COLACE) 100 MG capsule  02/20/2023  #60  0 RF

## 2023-03-17 RX ORDER — CETIRIZINE HYDROCHLORIDE 10 MG/1
TABLET ORAL
Qty: 90 TABLET | Refills: 0 | Status: SHIPPED | OUTPATIENT
Start: 2023-03-17

## 2023-03-17 RX ORDER — DOCUSATE SODIUM 100 MG/1
CAPSULE, LIQUID FILLED ORAL
Qty: 60 CAPSULE | Refills: 0 | Status: SHIPPED | OUTPATIENT
Start: 2023-03-17

## 2023-03-26 NOTE — PROGRESS NOTES
mg daily. Continue with current treatment management follow-up in 6 months, sooner for new or worsening symptoms.  - Lipid, Fasting; Future         The note was completed using Dragon voice recognition transcription. Every effort was made to ensure accuracy; however, inadvertent  transcription errors may be present despite my best efforts to edit errors.     Molly Abdalla, KALI - CNP

## 2023-03-27 ENCOUNTER — OFFICE VISIT (OUTPATIENT)
Dept: FAMILY MEDICINE CLINIC | Age: 83
End: 2023-03-27
Payer: MEDICARE

## 2023-03-27 VITALS
BODY MASS INDEX: 30.48 KG/M2 | RESPIRATION RATE: 16 BRPM | HEART RATE: 83 BPM | HEIGHT: 63 IN | WEIGHT: 172 LBS | OXYGEN SATURATION: 95 % | DIASTOLIC BLOOD PRESSURE: 65 MMHG | SYSTOLIC BLOOD PRESSURE: 118 MMHG

## 2023-03-27 DIAGNOSIS — F41.9 ANXIETY: ICD-10-CM

## 2023-03-27 DIAGNOSIS — N18.9 CHRONIC KIDNEY DISEASE, UNSPECIFIED CKD STAGE: ICD-10-CM

## 2023-03-27 DIAGNOSIS — I25.10 CORONARY ARTERY DISEASE INVOLVING NATIVE CORONARY ARTERY OF NATIVE HEART WITHOUT ANGINA PECTORIS: Primary | ICD-10-CM

## 2023-03-27 DIAGNOSIS — G47.33 OSA (OBSTRUCTIVE SLEEP APNEA): ICD-10-CM

## 2023-03-27 DIAGNOSIS — I50.9 ACUTE ON CHRONIC CONGESTIVE HEART FAILURE, UNSPECIFIED HEART FAILURE TYPE (HCC): ICD-10-CM

## 2023-03-27 DIAGNOSIS — E78.00 PURE HYPERCHOLESTEROLEMIA: ICD-10-CM

## 2023-03-27 DIAGNOSIS — I25.118 ATHEROSCLEROTIC HEART DISEASE OF NATIVE CORONARY ARTERY WITH OTHER FORMS OF ANGINA PECTORIS (HCC): ICD-10-CM

## 2023-03-27 DIAGNOSIS — G30.9 ALZHEIMER'S DEMENTIA WITHOUT BEHAVIORAL DISTURBANCE (HCC): ICD-10-CM

## 2023-03-27 DIAGNOSIS — I10 HYPERTENSION, UNSPECIFIED TYPE: ICD-10-CM

## 2023-03-27 DIAGNOSIS — F02.80 ALZHEIMER'S DEMENTIA WITHOUT BEHAVIORAL DISTURBANCE (HCC): ICD-10-CM

## 2023-03-27 PROCEDURE — 1124F ACP DISCUSS-NO DSCNMKR DOCD: CPT | Performed by: NURSE PRACTITIONER

## 2023-03-27 PROCEDURE — 3078F DIAST BP <80 MM HG: CPT | Performed by: NURSE PRACTITIONER

## 2023-03-27 PROCEDURE — 99214 OFFICE O/P EST MOD 30 MIN: CPT | Performed by: NURSE PRACTITIONER

## 2023-03-27 PROCEDURE — 3074F SYST BP LT 130 MM HG: CPT | Performed by: NURSE PRACTITIONER

## 2023-03-27 RX ORDER — CITALOPRAM 20 MG/1
TABLET ORAL
Qty: 45 TABLET | Refills: 3 | Status: SHIPPED | OUTPATIENT
Start: 2023-03-27

## 2023-03-27 RX ORDER — GABAPENTIN 300 MG/1
CAPSULE ORAL
COMMUNITY
Start: 2023-03-15 | End: 2023-03-27

## 2023-03-27 RX ORDER — BUSPIRONE HYDROCHLORIDE 10 MG/1
TABLET ORAL
Qty: 60 TABLET | Refills: 3 | Status: SHIPPED | OUTPATIENT
Start: 2023-03-27

## 2023-03-27 RX ORDER — OXYCODONE AND ACETAMINOPHEN 7.5; 325 MG/1; MG/1
TABLET ORAL
COMMUNITY
Start: 2023-03-18

## 2023-03-27 ASSESSMENT — PATIENT HEALTH QUESTIONNAIRE - PHQ9
SUM OF ALL RESPONSES TO PHQ QUESTIONS 1-9: 1
2. FEELING DOWN, DEPRESSED OR HOPELESS: 1
SUM OF ALL RESPONSES TO PHQ9 QUESTIONS 1 & 2: 1
SUM OF ALL RESPONSES TO PHQ QUESTIONS 1-9: 1
SUM OF ALL RESPONSES TO PHQ QUESTIONS 1-9: 1
1. LITTLE INTEREST OR PLEASURE IN DOING THINGS: 0
SUM OF ALL RESPONSES TO PHQ QUESTIONS 1-9: 1

## 2023-03-27 ASSESSMENT — ENCOUNTER SYMPTOMS
NAUSEA: 0
SHORTNESS OF BREATH: 0
SORE THROAT: 0
WHEEZING: 0
RHINORRHEA: 0
COUGH: 0
CHEST TIGHTNESS: 0
VOMITING: 0
ABDOMINAL PAIN: 0
DIARRHEA: 0

## 2023-04-07 ENCOUNTER — NURSE ONLY (OUTPATIENT)
Dept: FAMILY MEDICINE CLINIC | Age: 83
End: 2023-04-07
Payer: MEDICARE

## 2023-04-07 DIAGNOSIS — E78.00 PURE HYPERCHOLESTEROLEMIA: ICD-10-CM

## 2023-04-07 DIAGNOSIS — I25.10 CORONARY ARTERY DISEASE INVOLVING NATIVE CORONARY ARTERY OF NATIVE HEART WITHOUT ANGINA PECTORIS: ICD-10-CM

## 2023-04-07 LAB
ALBUMIN SERPL-MCNC: 4.3 G/DL (ref 3.4–5)
ALBUMIN/GLOB SERPL: 1.7 {RATIO} (ref 1.1–2.2)
ALP SERPL-CCNC: 94 U/L (ref 40–129)
ALT SERPL-CCNC: <5 U/L (ref 10–40)
ANION GAP SERPL CALCULATED.3IONS-SCNC: 15 MMOL/L (ref 3–16)
AST SERPL-CCNC: 8 U/L (ref 15–37)
BASOPHILS # BLD: 0.1 K/UL (ref 0–0.2)
BASOPHILS NFR BLD: 1.1 %
BILIRUB SERPL-MCNC: 0.3 MG/DL (ref 0–1)
BUN SERPL-MCNC: 25 MG/DL (ref 7–20)
CALCIUM SERPL-MCNC: 9.6 MG/DL (ref 8.3–10.6)
CHLORIDE SERPL-SCNC: 100 MMOL/L (ref 99–110)
CHOLEST SERPL-MCNC: 137 MG/DL (ref 0–199)
CO2 SERPL-SCNC: 27 MMOL/L (ref 21–32)
CREAT SERPL-MCNC: 2.3 MG/DL (ref 0.6–1.2)
DEPRECATED RDW RBC AUTO: 15.5 % (ref 12.4–15.4)
EOSINOPHIL # BLD: 0.2 K/UL (ref 0–0.6)
EOSINOPHIL NFR BLD: 3.2 %
GFR SERPLBLD CREATININE-BSD FMLA CKD-EPI: 20 ML/MIN/{1.73_M2}
GLUCOSE SERPL-MCNC: 94 MG/DL (ref 70–99)
HCT VFR BLD AUTO: 38.9 % (ref 36–48)
HDLC SERPL-MCNC: 53 MG/DL (ref 40–60)
HGB BLD-MCNC: 12.7 G/DL (ref 12–16)
LDL CHOLESTEROL CALCULATED: 58 MG/DL
LYMPHOCYTES # BLD: 2 K/UL (ref 1–5.1)
LYMPHOCYTES NFR BLD: 30.2 %
MCH RBC QN AUTO: 28 PG (ref 26–34)
MCHC RBC AUTO-ENTMCNC: 32.8 G/DL (ref 31–36)
MCV RBC AUTO: 85.5 FL (ref 80–100)
MONOCYTES # BLD: 0.4 K/UL (ref 0–1.3)
MONOCYTES NFR BLD: 6 %
NEUTROPHILS # BLD: 3.9 K/UL (ref 1.7–7.7)
NEUTROPHILS NFR BLD: 59.5 %
PLATELET # BLD AUTO: 205 K/UL (ref 135–450)
PMV BLD AUTO: 8.4 FL (ref 5–10.5)
POTASSIUM SERPL-SCNC: 4.2 MMOL/L (ref 3.5–5.1)
PROT SERPL-MCNC: 6.8 G/DL (ref 6.4–8.2)
RBC # BLD AUTO: 4.55 M/UL (ref 4–5.2)
SODIUM SERPL-SCNC: 142 MMOL/L (ref 136–145)
TRIGL SERPL-MCNC: 128 MG/DL (ref 0–150)
VLDLC SERPL CALC-MCNC: 26 MG/DL
WBC # BLD AUTO: 6.6 K/UL (ref 4–11)

## 2023-04-07 PROCEDURE — 36415 COLL VENOUS BLD VENIPUNCTURE: CPT | Performed by: NURSE PRACTITIONER

## 2023-04-17 ENCOUNTER — HOSPITAL ENCOUNTER (OUTPATIENT)
Age: 83
Discharge: HOME | End: 2023-04-17
Payer: MEDICARE

## 2023-04-17 LAB
ALBUMIN SERPL-MCNC: 4.1 G/DL (ref 3.4–5)
ANION GAP SERPL CALCULATED.3IONS-SCNC: 12 MMOL/L (ref 3–16)
BILIRUB UR QL STRIP.AUTO: NEGATIVE
BUN SERPL-MCNC: 24 MG/DL (ref 7–20)
CALCIUM SERPL-MCNC: 9.5 MG/DL (ref 8.3–10.6)
CHLORIDE SERPL-SCNC: 103 MMOL/L (ref 99–110)
CLARITY UR: CLEAR
CO2 SERPL-SCNC: 25 MMOL/L (ref 21–32)
COLOR UR: YELLOW
CREAT SERPL-MCNC: 1.9 MG/DL (ref 0.6–1.2)
DEPRECATED RDW RBC AUTO: 15.1 % (ref 12.4–15.4)
GFR SERPLBLD CREATININE-BSD FMLA CKD-EPI: 26 ML/MIN/{1.73_M2}
GLUCOSE SERPL-MCNC: 103 MG/DL (ref 70–99)
GLUCOSE UR STRIP.AUTO-MCNC: NEGATIVE MG/DL
HCT VFR BLD AUTO: 38.6 % (ref 36–48)
HGB BLD-MCNC: 12.5 G/DL (ref 12–16)
HGB UR QL STRIP.AUTO: NEGATIVE
KETONES UR STRIP.AUTO-MCNC: NEGATIVE MG/DL
LEUKOCYTE ESTERASE UR QL STRIP.AUTO: NEGATIVE
MCH RBC QN AUTO: 27.6 PG (ref 26–34)
MCHC RBC AUTO-ENTMCNC: 32.4 G/DL (ref 31–36)
MCV RBC AUTO: 85.1 FL (ref 80–100)
NITRITE UR QL STRIP.AUTO: NEGATIVE
PH UR STRIP.AUTO: 5.5 [PH] (ref 5–8)
PHOSPHATE SERPL-MCNC: 3.6 MG/DL (ref 2.5–4.9)
PLATELET # BLD AUTO: 180 K/UL (ref 135–450)
PMV BLD AUTO: 7.4 FL (ref 5–10.5)
POTASSIUM SERPL-SCNC: 4.5 MMOL/L (ref 3.5–5.1)
PROT UR STRIP.AUTO-MCNC: NEGATIVE MG/DL
RBC # BLD AUTO: 4.53 M/UL (ref 4–5.2)
SODIUM SERPL-SCNC: 140 MMOL/L (ref 136–145)
SP GR UR STRIP.AUTO: 1.01 (ref 1–1.03)
UA DIPSTICK W REFLEX MICRO PNL UR: NORMAL
URN SPEC COLLECT METH UR: NORMAL
UROBILINOGEN UR STRIP-ACNC: 0.2 E.U./DL
WBC # BLD AUTO: 7.8 K/UL (ref 4–11)

## 2023-04-17 PROCEDURE — 80069 RENAL FUNCTION PANEL: CPT

## 2023-04-17 PROCEDURE — 81003 URINALYSIS AUTO W/O SCOPE: CPT

## 2023-04-17 PROCEDURE — 82306 VITAMIN D 25 HYDROXY: CPT

## 2023-04-17 PROCEDURE — 82570 ASSAY OF URINE CREATININE: CPT

## 2023-04-17 PROCEDURE — 83970 ASSAY OF PARATHORMONE: CPT

## 2023-04-17 PROCEDURE — 84156 ASSAY OF PROTEIN URINE: CPT

## 2023-04-17 PROCEDURE — 85027 COMPLETE CBC AUTOMATED: CPT

## 2023-04-17 NOTE — TELEPHONE ENCOUNTER
Future appt scheduled 09/28/2023                       Last appt 03/27/2023      Last Written 02/16/2023    traZODone (DESYREL) 100 MG tablet  #60  0 RF

## 2023-04-18 LAB
25(OH)D3 SERPL-MCNC: 33.8 NG/ML
CREAT UR-MCNC: 45.1 MG/DL (ref 28–259)
PROT UR-MCNC: <4 MG/DL
PROT/CREAT UR-RTO: NORMAL MG/DL
PTH-INTACT SERPL-MCNC: 137.8 PG/ML (ref 14–72)

## 2023-04-18 RX ORDER — TRAZODONE HYDROCHLORIDE 100 MG/1
TABLET ORAL
Qty: 60 TABLET | Refills: 1 | Status: SHIPPED | OUTPATIENT
Start: 2023-04-18

## 2023-05-11 ENCOUNTER — OFFICE VISIT (OUTPATIENT)
Dept: FAMILY MEDICINE CLINIC | Age: 83
End: 2023-05-11
Payer: MEDICARE

## 2023-05-11 VITALS
BODY MASS INDEX: 30.47 KG/M2 | SYSTOLIC BLOOD PRESSURE: 145 MMHG | HEART RATE: 84 BPM | WEIGHT: 172 LBS | OXYGEN SATURATION: 91 % | DIASTOLIC BLOOD PRESSURE: 72 MMHG

## 2023-05-11 DIAGNOSIS — N39.0 URINARY TRACT INFECTION WITHOUT HEMATURIA, SITE UNSPECIFIED: Primary | ICD-10-CM

## 2023-05-11 DIAGNOSIS — L50.9 URTICARIA: ICD-10-CM

## 2023-05-11 LAB
BILIRUBIN, POC: ABNORMAL
BLOOD URINE, POC: ABNORMAL
CLARITY, POC: CLEAR
COLOR, POC: YELLOW
GLUCOSE URINE, POC: ABNORMAL
KETONES, POC: ABNORMAL
LEUKOCYTE EST, POC: ABNORMAL
NITRITE, POC: ABNORMAL
PH, POC: 5
PROTEIN, POC: ABNORMAL
SPECIFIC GRAVITY, POC: 1.01
UROBILINOGEN, POC: 0.2

## 2023-05-11 PROCEDURE — 99213 OFFICE O/P EST LOW 20 MIN: CPT | Performed by: NURSE PRACTITIONER

## 2023-05-11 PROCEDURE — 3078F DIAST BP <80 MM HG: CPT | Performed by: NURSE PRACTITIONER

## 2023-05-11 PROCEDURE — 1124F ACP DISCUSS-NO DSCNMKR DOCD: CPT | Performed by: NURSE PRACTITIONER

## 2023-05-11 PROCEDURE — 81002 URINALYSIS NONAUTO W/O SCOPE: CPT | Performed by: NURSE PRACTITIONER

## 2023-05-11 PROCEDURE — 3077F SYST BP >= 140 MM HG: CPT | Performed by: NURSE PRACTITIONER

## 2023-05-11 RX ORDER — NITROFURANTOIN 25; 75 MG/1; MG/1
100 CAPSULE ORAL 2 TIMES DAILY
Qty: 10 CAPSULE | Refills: 0 | Status: SHIPPED | OUTPATIENT
Start: 2023-05-11 | End: 2023-05-16

## 2023-05-11 RX ORDER — HYDROXYZINE HYDROCHLORIDE 25 MG/1
25 TABLET, FILM COATED ORAL EVERY 8 HOURS PRN
Qty: 30 TABLET | Refills: 0 | Status: SHIPPED | OUTPATIENT
Start: 2023-05-11 | End: 2023-05-21

## 2023-05-11 SDOH — ECONOMIC STABILITY: FOOD INSECURITY: WITHIN THE PAST 12 MONTHS, YOU WORRIED THAT YOUR FOOD WOULD RUN OUT BEFORE YOU GOT MONEY TO BUY MORE.: NEVER TRUE

## 2023-05-11 SDOH — ECONOMIC STABILITY: FOOD INSECURITY: WITHIN THE PAST 12 MONTHS, THE FOOD YOU BOUGHT JUST DIDN'T LAST AND YOU DIDN'T HAVE MONEY TO GET MORE.: NEVER TRUE

## 2023-05-11 SDOH — ECONOMIC STABILITY: INCOME INSECURITY: HOW HARD IS IT FOR YOU TO PAY FOR THE VERY BASICS LIKE FOOD, HOUSING, MEDICAL CARE, AND HEATING?: NOT HARD AT ALL

## 2023-05-11 SDOH — ECONOMIC STABILITY: HOUSING INSECURITY
IN THE LAST 12 MONTHS, WAS THERE A TIME WHEN YOU DID NOT HAVE A STEADY PLACE TO SLEEP OR SLEPT IN A SHELTER (INCLUDING NOW)?: NO

## 2023-05-11 ASSESSMENT — ENCOUNTER SYMPTOMS
ROS SKIN COMMENTS: ITCHING
RESPIRATORY NEGATIVE: 1
GASTROINTESTINAL NEGATIVE: 1

## 2023-05-11 NOTE — PROGRESS NOTES
Cephalexin Hives, Itching and Rash     Other reaction(s): hives      Pentazocine Hives, Itching and Rash    Sulfa Antibiotics Hives, Itching and Rash     Sores all over  Other reaction(s): hives  Other reaction(s): hives         REVIEW OF SYSTEMS  Review of Systems   Constitutional: Negative. HENT: Negative. Respiratory: Negative. Cardiovascular: Negative. Gastrointestinal: Negative. Genitourinary:  Positive for dysuria, frequency and urgency. Musculoskeletal: Negative. Skin: Negative. Itching   Neurological: Negative. Hematological: Negative. Psychiatric/Behavioral: Negative. PHYSICAL EXAM  BP (!) 145/72   Pulse 84   Wt 172 lb (78 kg)   LMP  (LMP Unknown)   SpO2 91%   BMI 30.47 kg/m²   Physical Exam  Constitutional:       Appearance: Normal appearance. She is not toxic-appearing. HENT:      Head: Normocephalic. Right Ear: External ear normal.      Left Ear: External ear normal.      Nose: Nose normal.      Mouth/Throat:      Mouth: Mucous membranes are moist.      Pharynx: Oropharynx is clear. Eyes:      Conjunctiva/sclera: Conjunctivae normal.      Pupils: Pupils are equal, round, and reactive to light. Cardiovascular:      Rate and Rhythm: Normal rate. Pulses: Normal pulses. Pulmonary:      Effort: Pulmonary effort is normal.   Abdominal:      Palpations: Abdomen is soft. Tenderness: There is no guarding. Musculoskeletal:         General: Normal range of motion. Cervical back: Normal range of motion and neck supple. Skin:     General: Skin is warm and dry. Capillary Refill: Capillary refill takes less than 2 seconds. Neurological:      Mental Status: She is alert and oriented to person, place, and time. ASSESSMENT/PLAN:   1. Urinary tract infection without hematuria, site unspecified  Patient presents with urinary urgency, frequency, and dysuria. He reports it started approximately 3 days ago.   No hematuria, urinary

## 2023-05-12 LAB — BACTERIA UR CULT: NORMAL

## 2023-05-19 ENCOUNTER — TELEPHONE (OUTPATIENT)
Dept: FAMILY MEDICINE CLINIC | Age: 83
End: 2023-05-19

## 2023-05-19 DIAGNOSIS — K59.00 CONSTIPATION, UNSPECIFIED CONSTIPATION TYPE: ICD-10-CM

## 2023-05-19 RX ORDER — TRAZODONE HYDROCHLORIDE 100 MG/1
200 TABLET ORAL NIGHTLY
Qty: 60 TABLET | Refills: 1 | Status: SHIPPED | OUTPATIENT
Start: 2023-05-19

## 2023-05-19 RX ORDER — DOCUSATE SODIUM 100 MG/1
CAPSULE, LIQUID FILLED ORAL
Qty: 60 CAPSULE | Refills: 0 | Status: SHIPPED | OUTPATIENT
Start: 2023-05-19

## 2023-05-19 NOTE — TELEPHONE ENCOUNTER
Called [facility] to make sure everything was OK and Chantelle said that they had already delivered her meds to her so she got them.

## 2023-05-19 NOTE — TELEPHONE ENCOUNTER
Future appt scheduled 09/28/223                 Last appt 05/11/2023      Last Written 04/13/2023    docusate sodium (COLACE) 100 MG capsule  #60  0 RF

## 2023-05-19 NOTE — TELEPHONE ENCOUNTER
Pt called. States that her Trazodone should be 2 at night and not one. She's out and they will not give her anymore. Pls advise. Uses [facility].

## 2023-06-07 DIAGNOSIS — L50.9 URTICARIA: ICD-10-CM

## 2023-06-07 NOTE — TELEPHONE ENCOUNTER
Future appt scheduled 09/28/2023                  Last appt 05/11/2023      Last Written 05/11/2023    hydrOXYzine HCl (ATARAX) 25 MG tablet  #30  0 RF

## 2023-06-08 RX ORDER — HYDROXYZINE HYDROCHLORIDE 25 MG/1
TABLET, FILM COATED ORAL
Qty: 30 TABLET | Refills: 0 | Status: SHIPPED | OUTPATIENT
Start: 2023-06-08

## 2023-06-08 RX ORDER — TRAZODONE HYDROCHLORIDE 100 MG/1
TABLET ORAL
Qty: 60 TABLET | Refills: 3 | OUTPATIENT
Start: 2023-06-08

## 2023-06-16 DIAGNOSIS — K59.00 CONSTIPATION, UNSPECIFIED CONSTIPATION TYPE: ICD-10-CM

## 2023-06-16 DIAGNOSIS — L50.9 URTICARIA: ICD-10-CM

## 2023-06-16 DIAGNOSIS — F41.9 ANXIETY: ICD-10-CM

## 2023-06-16 DIAGNOSIS — N39.0 URINARY TRACT INFECTION WITHOUT HEMATURIA, SITE UNSPECIFIED: ICD-10-CM

## 2023-06-16 RX ORDER — NITROFURANTOIN 25; 75 MG/1; MG/1
CAPSULE ORAL
Qty: 10 CAPSULE | Refills: 0 | OUTPATIENT
Start: 2023-06-16

## 2023-06-16 RX ORDER — HYDROXYZINE HYDROCHLORIDE 25 MG/1
TABLET, FILM COATED ORAL
Qty: 30 TABLET | Refills: 0 | OUTPATIENT
Start: 2023-06-16

## 2023-06-19 RX ORDER — DOCUSATE SODIUM 100 MG/1
CAPSULE, LIQUID FILLED ORAL
Qty: 60 CAPSULE | Refills: 1 | Status: SHIPPED | OUTPATIENT
Start: 2023-06-19

## 2023-06-19 RX ORDER — BUSPIRONE HYDROCHLORIDE 10 MG/1
TABLET ORAL
Qty: 60 TABLET | Refills: 2 | Status: SHIPPED | OUTPATIENT
Start: 2023-06-19

## 2023-06-26 ENCOUNTER — OFFICE VISIT (OUTPATIENT)
Dept: FAMILY MEDICINE CLINIC | Age: 83
End: 2023-06-26
Payer: MEDICARE

## 2023-06-26 ENCOUNTER — HOSPITAL ENCOUNTER (OUTPATIENT)
Age: 83
Discharge: HOME | End: 2023-06-26
Payer: MEDICARE

## 2023-06-26 VITALS
BODY MASS INDEX: 29.58 KG/M2 | WEIGHT: 167 LBS | SYSTOLIC BLOOD PRESSURE: 111 MMHG | DIASTOLIC BLOOD PRESSURE: 66 MMHG | OXYGEN SATURATION: 90 % | TEMPERATURE: 97.9 F | HEART RATE: 77 BPM

## 2023-06-26 DIAGNOSIS — R11.0 NAUSEA: ICD-10-CM

## 2023-06-26 DIAGNOSIS — K92.1 MELENA: ICD-10-CM

## 2023-06-26 DIAGNOSIS — R10.13 EPIGASTRIC PAIN: ICD-10-CM

## 2023-06-26 DIAGNOSIS — K92.1 MELENA: Primary | ICD-10-CM

## 2023-06-26 LAB
ALBUMIN SERPL-MCNC: 4.3 G/DL (ref 3.4–5)
ALBUMIN/GLOB SERPL: 1.3 {RATIO} (ref 1.1–2.2)
ALP SERPL-CCNC: 99 U/L (ref 40–129)
ALT SERPL-CCNC: <5 U/L (ref 10–40)
ANION GAP SERPL CALCULATED.3IONS-SCNC: 16 MMOL/L (ref 3–16)
AST SERPL-CCNC: 12 U/L (ref 15–37)
BASOPHILS # BLD: 0.1 K/UL (ref 0–0.2)
BASOPHILS NFR BLD: 0.9 %
BILIRUB SERPL-MCNC: <0.2 MG/DL (ref 0–1)
BUN SERPL-MCNC: 23 MG/DL (ref 7–20)
CALCIUM SERPL-MCNC: 9.7 MG/DL (ref 8.3–10.6)
CHLORIDE SERPL-SCNC: 100 MMOL/L (ref 99–110)
CO2 SERPL-SCNC: 24 MMOL/L (ref 21–32)
CREAT SERPL-MCNC: 2.2 MG/DL (ref 0.6–1.2)
DEPRECATED RDW RBC AUTO: 13.1 % (ref 12.4–15.4)
EOSINOPHIL # BLD: 0.2 K/UL (ref 0–0.6)
EOSINOPHIL NFR BLD: 2 %
GFR SERPLBLD CREATININE-BSD FMLA CKD-EPI: 22 ML/MIN/{1.73_M2}
GLUCOSE SERPL-MCNC: 144 MG/DL (ref 70–99)
HCT VFR BLD AUTO: 40.6 % (ref 36–48)
HGB BLD-MCNC: 13.2 G/DL (ref 12–16)
LIPASE SERPL-CCNC: 8 U/L (ref 13–60)
LYMPHOCYTES # BLD: 2.1 K/UL (ref 1–5.1)
LYMPHOCYTES NFR BLD: 23.4 %
MCH RBC QN AUTO: 28.3 PG (ref 26–34)
MCHC RBC AUTO-ENTMCNC: 32.4 G/DL (ref 31–36)
MCV RBC AUTO: 87.3 FL (ref 80–100)
MONOCYTES # BLD: 0.6 K/UL (ref 0–1.3)
MONOCYTES NFR BLD: 6.2 %
NEUTROPHILS # BLD: 6 K/UL (ref 1.7–7.7)
NEUTROPHILS NFR BLD: 67.5 %
PLATELET # BLD AUTO: 229 K/UL (ref 135–450)
PMV BLD AUTO: 7.1 FL (ref 5–10.5)
POTASSIUM SERPL-SCNC: 3.9 MMOL/L (ref 3.5–5.1)
PROT SERPL-MCNC: 7.5 G/DL (ref 6.4–8.2)
RBC # BLD AUTO: 4.66 M/UL (ref 4–5.2)
SODIUM SERPL-SCNC: 140 MMOL/L (ref 136–145)
WBC # BLD AUTO: 9 K/UL (ref 4–11)

## 2023-06-26 PROCEDURE — 36415 COLL VENOUS BLD VENIPUNCTURE: CPT

## 2023-06-26 PROCEDURE — 83690 ASSAY OF LIPASE: CPT

## 2023-06-26 PROCEDURE — 85025 COMPLETE CBC W/AUTO DIFF WBC: CPT

## 2023-06-26 PROCEDURE — 80053 COMPREHEN METABOLIC PANEL: CPT

## 2023-06-26 PROCEDURE — 99214 OFFICE O/P EST MOD 30 MIN: CPT | Performed by: FAMILY MEDICINE

## 2023-06-26 PROCEDURE — 3074F SYST BP LT 130 MM HG: CPT | Performed by: FAMILY MEDICINE

## 2023-06-26 PROCEDURE — 3078F DIAST BP <80 MM HG: CPT | Performed by: FAMILY MEDICINE

## 2023-06-26 PROCEDURE — 1124F ACP DISCUSS-NO DSCNMKR DOCD: CPT | Performed by: FAMILY MEDICINE

## 2023-07-06 ENCOUNTER — TELEPHONE (OUTPATIENT)
Dept: FAMILY MEDICINE CLINIC | Age: 83
End: 2023-07-06

## 2023-07-06 NOTE — TELEPHONE ENCOUNTER
Pt called b/c she had a referral for Dr Tiffanie Peng, she said that she's tried to call and there's no answer. So, I called them and they answered right away. She stated that she's already a patient of Dr Deandra aGytan (whose in the same practice). She'll have to call [phone]. I gave her this information.

## 2023-07-10 DIAGNOSIS — L50.9 URTICARIA: ICD-10-CM

## 2023-07-10 DIAGNOSIS — F41.9 ANXIETY: ICD-10-CM

## 2023-07-10 RX ORDER — BUSPIRONE HYDROCHLORIDE 10 MG/1
TABLET ORAL
Qty: 60 TABLET | Refills: 2 | Status: SHIPPED | OUTPATIENT
Start: 2023-07-10

## 2023-07-10 RX ORDER — HYDROXYZINE HYDROCHLORIDE 25 MG/1
TABLET, FILM COATED ORAL
Qty: 30 TABLET | Refills: 2 | Status: SHIPPED | OUTPATIENT
Start: 2023-07-10

## 2023-07-10 RX ORDER — TRAZODONE HYDROCHLORIDE 100 MG/1
TABLET ORAL
Qty: 60 TABLET | Refills: 1 | Status: SHIPPED | OUTPATIENT
Start: 2023-07-10

## 2023-07-10 RX ORDER — POLYETHYLENE GLYCOL 3350 17 G/17G
POWDER ORAL
Qty: 510 G | Refills: 2 | Status: SHIPPED | OUTPATIENT
Start: 2023-07-10

## 2023-07-14 RX ORDER — NYSTATIN 100000 [USP'U]/G
POWDER TOPICAL
Qty: 60 G | Refills: 2 | Status: SHIPPED | OUTPATIENT
Start: 2023-07-14

## 2023-07-17 DIAGNOSIS — L50.9 URTICARIA: ICD-10-CM

## 2023-07-17 RX ORDER — HYDROXYZINE HYDROCHLORIDE 25 MG/1
TABLET, FILM COATED ORAL
Qty: 30 TABLET | Refills: 0 | Status: SHIPPED | OUTPATIENT
Start: 2023-07-17

## 2023-07-17 NOTE — TELEPHONE ENCOUNTER
Future appt scheduled 09/28/2023               Last appt 05/11/2023      Last Written 07/10/2023    hydrOXYzine HCl (ATARAX) 25 MG tablet  #30  2 RF

## 2023-08-03 DIAGNOSIS — K59.00 CONSTIPATION, UNSPECIFIED CONSTIPATION TYPE: ICD-10-CM

## 2023-08-04 RX ORDER — DOCUSATE SODIUM 100 MG/1
CAPSULE, LIQUID FILLED ORAL
Qty: 60 CAPSULE | Refills: 1 | Status: SHIPPED | OUTPATIENT
Start: 2023-08-04

## 2023-08-08 ENCOUNTER — TELEPHONE (OUTPATIENT)
Dept: ORTHOPEDIC SURGERY | Age: 83
End: 2023-08-08

## 2023-08-08 NOTE — TELEPHONE ENCOUNTER
Submitted PA for Docusate Sodium 100MG capsules  Via [employer] Key: [id number]  STATUS: PENDING. Follow up done daily; if no response in three days we will refax for status check. If another three days goes by with no response we will call the insurance for status.

## 2023-08-09 ENCOUNTER — TELEPHONE (OUTPATIENT)
Dept: FAMILY MEDICINE CLINIC | Age: 83
End: 2023-08-09

## 2023-08-09 NOTE — TELEPHONE ENCOUNTER
Docusate Sodium denied. Called [facility] and asked them how much it is cash price or OTC. He said it's about $4-$5 OTC for 100 of them. I called and left patient a detailed message regarding this.

## 2023-08-09 NOTE — TELEPHONE ENCOUNTER
Received DENIAL for Docusate Sodium 100MG capsules; denial letter attached. If this requires a response please respond to the pool ( [id number] [phone]). Thank you please advise patient.

## 2023-08-21 ENCOUNTER — TELEPHONE (OUTPATIENT)
Dept: FAMILY MEDICINE CLINIC | Age: 83
End: 2023-08-21

## 2023-08-21 NOTE — TELEPHONE ENCOUNTER
Called pt to see if she was interested in buying OTC docusate sodium 100 mg soft gel since her insurance will not cover due to her being able to but it OTC. Pt requested we send something like it in. I advised her to call her insurance to see which one they will cover then to call us back.

## 2023-08-22 ENCOUNTER — TELEPHONE (OUTPATIENT)
Dept: FAMILY MEDICINE CLINIC | Age: 83
End: 2023-08-22

## 2023-08-22 NOTE — TELEPHONE ENCOUNTER
----- Message from Cheyenne Huerta sent at 8/22/2023  4:44 PM EDT -----  Subject: Medication Problem    Medication: hydrOXYzine HCl (ATARAX) 25 MG tablet  Dosage: 1 tablet 3 times a day   Ordering Provider: Alessanrda Paris    Question/Problem: Patient advising medication is not working and she   continues to itch all over all kristen.        Pharmacy: [facility], [address] [facility]   [phone] Mal Ortiz [phone]    ---------------------------------------------------------------------------  --------------  Katerina ANTHONY  [phone]; OK to leave message on voicemail  ---------------------------------------------------------------------------  --------------    SCRIPT ANSWERS  Relationship to Patient: Self

## 2023-08-23 NOTE — TELEPHONE ENCOUNTER
Patient states the hydroxyzine is not helping at all, she has been taking it for awhile as directed.   She said she still itches all over wants to know if there is something else you can prescribe

## 2023-08-24 NOTE — TELEPHONE ENCOUNTER
Patient notified, she states she does not want to see dermatology and will discuss when she comes in for next appt

## 2023-08-29 ENCOUNTER — HOSPITAL ENCOUNTER (OUTPATIENT)
Age: 83
Discharge: HOME | End: 2023-08-29
Payer: MEDICARE

## 2023-08-29 LAB
ALBUMIN SERPL-MCNC: 4.4 G/DL (ref 3.4–5)
ANION GAP SERPL CALCULATED.3IONS-SCNC: 13 MMOL/L (ref 3–16)
BILIRUB UR QL STRIP.AUTO: NEGATIVE
BUN SERPL-MCNC: 34 MG/DL (ref 7–20)
CALCIUM SERPL-MCNC: 9.8 MG/DL (ref 8.3–10.6)
CHLORIDE SERPL-SCNC: 100 MMOL/L (ref 99–110)
CLARITY UR: CLEAR
CO2 SERPL-SCNC: 29 MMOL/L (ref 21–32)
COLOR UR: YELLOW
CREAT SERPL-MCNC: 2.1 MG/DL (ref 0.6–1.2)
DEPRECATED RDW RBC AUTO: 13.3 % (ref 12.4–15.4)
GFR SERPLBLD CREATININE-BSD FMLA CKD-EPI: 23 ML/MIN/{1.73_M2}
GLUCOSE SERPL-MCNC: 99 MG/DL (ref 70–99)
GLUCOSE UR STRIP.AUTO-MCNC: NEGATIVE MG/DL
HCT VFR BLD AUTO: 38.9 % (ref 36–48)
HGB BLD-MCNC: 12.5 G/DL (ref 12–16)
HGB UR QL STRIP.AUTO: NEGATIVE
KETONES UR STRIP.AUTO-MCNC: NEGATIVE MG/DL
LEUKOCYTE ESTERASE UR QL STRIP.AUTO: NEGATIVE
MCH RBC QN AUTO: 27.8 PG (ref 26–34)
MCHC RBC AUTO-ENTMCNC: 32.2 G/DL (ref 31–36)
MCV RBC AUTO: 86.3 FL (ref 80–100)
NITRITE UR QL STRIP.AUTO: NEGATIVE
PH UR STRIP.AUTO: 5.5 [PH] (ref 5–8)
PHOSPHATE SERPL-MCNC: 4.1 MG/DL (ref 2.5–4.9)
PLATELET # BLD AUTO: 181 K/UL (ref 135–450)
PMV BLD AUTO: 7.5 FL (ref 5–10.5)
POTASSIUM SERPL-SCNC: 4.3 MMOL/L (ref 3.5–5.1)
PROT UR STRIP.AUTO-MCNC: NEGATIVE MG/DL
RBC # BLD AUTO: 4.51 M/UL (ref 4–5.2)
SODIUM SERPL-SCNC: 142 MMOL/L (ref 136–145)
SP GR UR STRIP.AUTO: 1.01 (ref 1–1.03)
UA DIPSTICK W REFLEX MICRO PNL UR: NORMAL
URN SPEC COLLECT METH UR: NORMAL
UROBILINOGEN UR STRIP-ACNC: 0.2 E.U./DL
WBC # BLD AUTO: 6.9 K/UL (ref 4–11)

## 2023-08-29 PROCEDURE — 80069 RENAL FUNCTION PANEL: CPT

## 2023-08-29 PROCEDURE — 84156 ASSAY OF PROTEIN URINE: CPT

## 2023-08-29 PROCEDURE — 82306 VITAMIN D 25 HYDROXY: CPT

## 2023-08-29 PROCEDURE — 85027 COMPLETE CBC AUTOMATED: CPT

## 2023-08-29 PROCEDURE — 83970 ASSAY OF PARATHORMONE: CPT

## 2023-08-29 PROCEDURE — 82570 ASSAY OF URINE CREATININE: CPT

## 2023-08-29 PROCEDURE — 81003 URINALYSIS AUTO W/O SCOPE: CPT

## 2023-08-30 LAB
25(OH)D3 SERPL-MCNC: 35.8 NG/ML
CREAT UR-MCNC: 68.4 MG/DL (ref 28–259)
PROT UR-MCNC: 7 MG/DL
PROT/CREAT UR-RTO: 0.1 MG/DL
PTH-INTACT SERPL-MCNC: 142 PG/ML (ref 14–72)

## 2023-09-07 ENCOUNTER — OFFICE VISIT (OUTPATIENT)
Dept: FAMILY MEDICINE CLINIC | Age: 83
End: 2023-09-07
Payer: MEDICARE

## 2023-09-07 VITALS
SYSTOLIC BLOOD PRESSURE: 113 MMHG | HEART RATE: 79 BPM | DIASTOLIC BLOOD PRESSURE: 71 MMHG | BODY MASS INDEX: 30.54 KG/M2 | HEIGHT: 63 IN | TEMPERATURE: 97.8 F | WEIGHT: 172.38 LBS | OXYGEN SATURATION: 90 %

## 2023-09-07 DIAGNOSIS — Z01.818 PRE-OP EXAMINATION: Primary | ICD-10-CM

## 2023-09-07 PROCEDURE — 3078F DIAST BP <80 MM HG: CPT | Performed by: NURSE PRACTITIONER

## 2023-09-07 PROCEDURE — 1036F TOBACCO NON-USER: CPT | Performed by: NURSE PRACTITIONER

## 2023-09-07 PROCEDURE — G8417 CALC BMI ABV UP PARAM F/U: HCPCS | Performed by: NURSE PRACTITIONER

## 2023-09-07 PROCEDURE — 3074F SYST BP LT 130 MM HG: CPT | Performed by: NURSE PRACTITIONER

## 2023-09-07 PROCEDURE — G8427 DOCREV CUR MEDS BY ELIG CLIN: HCPCS | Performed by: NURSE PRACTITIONER

## 2023-09-07 PROCEDURE — 1124F ACP DISCUSS-NO DSCNMKR DOCD: CPT | Performed by: NURSE PRACTITIONER

## 2023-09-07 PROCEDURE — 99213 OFFICE O/P EST LOW 20 MIN: CPT | Performed by: NURSE PRACTITIONER

## 2023-09-07 PROCEDURE — G8399 PT W/DXA RESULTS DOCUMENT: HCPCS | Performed by: NURSE PRACTITIONER

## 2023-09-07 PROCEDURE — 1090F PRES/ABSN URINE INCON ASSESS: CPT | Performed by: NURSE PRACTITIONER

## 2023-09-07 NOTE — PROGRESS NOTES
Pre Op Med History  Cold/Chronic Cough/Tuberculosis  --  no  Bronchitis/COPD  --  yes - COPD   Asthma/SOB  --  no  Rheumatic Fever/Heart Murmur  --  yes - childhood   High Blood Pressure/Low Blood Pressure  --  yes - high   Swelling of Feet/Fluid In Lungs  --  yes - swelling in feet   Heart Attack/Chest Pain  --  no  Irregular, Fast Heartbeats  --  yes - unsure--unable to tell   Do you bruise easily?   --  yes   Anemia  --  no  Diabetes/Low Blood Sugar  --  no  Are you Pregnant  --  N/A  Last Menstrual Period  --  N/A  Serious Illness During Pregnancy  --  no  Breast Disease  --  no  Kidney Disease  -- yes--stage 3   Jaundice/Hepatitis  --  no  Hiatal Hernia/Peptic Ulcer Disease  --  no  Convulsions/Epilepsy/Stroke  --  no  Polio/Meningitis Paralysis  --  no  Back Pain/ Slipped Disc  --  yes -   Psychological Disease  --  no  Thyroid Disease  --  no  Glaucoma/Visual Impairment  --  yes - visual impairment   Skeletal Deformities/Defects/Arthritis --  yes - arthritis   Loose Teeth/Caps on Front Teeth  --  no--dentures--upper and lower   Have you had any Surgery  --  yes  Any History of anesthesia complication in self or family  -- no  Prostate Disease  --  N/A  Cancer  --  yes - skin cancer   DVT/PE/PVD --  no  Weight Loss/Gain  --  no
Bowel sounds are normally active                    Tenderness - none                    Rebound or rididity - none  Neurologic:  A&O x 3  Skin: Warm and dry, turgor normal           No rash            No lesion  Psychiatric: mood and affect intact                     speech and thought processes seem appropriate     Assessment and Plan:  1. Pre-op examination  Patient presents today for preop examination. She is scheduled for pain pump insertion on 9/18/2023 with Dr Arleth Hutchison. She has any recent illness or infection. Patient has known history of hypertension, coronary artery disease, TIA, acute on chronic CHF, chronic kidney disease, Alzheimer's dementia, tremor, hyperlipidemia, obesity, anxiety, insomnia, migraine. Patient denies any current episodes of chest pain, palpitations, or worsening dyspnea upon exertion. Patient denies any current use of anticoagulation therapy or NSAIDs. Patient does report daily use of aspirin and supplements but aware to discontinue 5 days prior. Previous labs performed per nephrologist.  Chronic kidney dysfunction noted no new or worsening symptoms. Patient previously received cardiac clearance from cardiologist . It is in my medical opinion at this time that this patient is clinically stable and at moderate risk to proceed with intended surgery/anesthesia as planned. The note was completed using Dragon voice recognition transcription. Every effort was made to ensure accuracy; however, inadvertent  transcription errors may be present despite my best efforts to edit errors.

## 2023-09-08 RX ORDER — CETIRIZINE HYDROCHLORIDE 10 MG/1
TABLET ORAL
Qty: 90 TABLET | Refills: 0 | Status: SHIPPED | OUTPATIENT
Start: 2023-09-08

## 2023-09-12 DIAGNOSIS — F41.9 ANXIETY: ICD-10-CM

## 2023-09-14 RX ORDER — CITALOPRAM 20 MG/1
TABLET ORAL
Qty: 45 TABLET | Refills: 3 | Status: SHIPPED | OUTPATIENT
Start: 2023-09-14

## 2023-09-25 NOTE — PROGRESS NOTES
reports chronic itching. Patient reports follow-up appointment with dermatology on Thursday. Patient given prescription for Atarax to take as needed. Patient verbalized and acknowledges. - hydrOXYzine HCl (ATARAX) 25 MG tablet; Take 1 tablet by mouth every 8 hours as needed for Itching  Dispense: 30 tablet; Refill: 0    13. Nausea  Patient reports nausea x2 days. Patient reports that today is the first day she has been up and moving since having her pain pump inserted on 9/18. Patient reports also having esophageal stretching performed but continues to have belching and burping. We did discuss the concerns. Patient be given Zofran for nausea however I did recommend that she discuss this with GI. Patient advised on rest and taking it easy due to recent surgery. Patient verbalized and acknowledges. - ondansetron (ZOFRAN) 4 MG tablet; Take 1 tablet by mouth 3 times daily as needed for Nausea or Vomiting  Dispense: 30 tablet; Refill: 0         The note was completed using Dragon voice recognition transcription. Every effort was made to ensure accuracy; however, inadvertent  transcription errors may be present despite my best efforts to edit errors.     Jose Mcguire, KALI - CNP

## 2023-09-26 ENCOUNTER — OFFICE VISIT (OUTPATIENT)
Dept: FAMILY MEDICINE CLINIC | Age: 83
End: 2023-09-26
Payer: MEDICARE

## 2023-09-26 VITALS
WEIGHT: 177.38 LBS | SYSTOLIC BLOOD PRESSURE: 127 MMHG | DIASTOLIC BLOOD PRESSURE: 72 MMHG | HEART RATE: 70 BPM | HEIGHT: 63 IN | OXYGEN SATURATION: 93 % | BODY MASS INDEX: 31.43 KG/M2

## 2023-09-26 DIAGNOSIS — E78.00 PURE HYPERCHOLESTEROLEMIA: ICD-10-CM

## 2023-09-26 DIAGNOSIS — I50.9 ACUTE ON CHRONIC CONGESTIVE HEART FAILURE, UNSPECIFIED HEART FAILURE TYPE (HCC): ICD-10-CM

## 2023-09-26 DIAGNOSIS — G47.33 OSA (OBSTRUCTIVE SLEEP APNEA): ICD-10-CM

## 2023-09-26 DIAGNOSIS — L50.9 URTICARIA: ICD-10-CM

## 2023-09-26 DIAGNOSIS — F02.80 ALZHEIMER'S DEMENTIA WITHOUT BEHAVIORAL DISTURBANCE (HCC): ICD-10-CM

## 2023-09-26 DIAGNOSIS — N18.9 CHRONIC KIDNEY DISEASE, UNSPECIFIED CKD STAGE: ICD-10-CM

## 2023-09-26 DIAGNOSIS — I25.118 ATHEROSCLEROTIC HEART DISEASE OF NATIVE CORONARY ARTERY WITH OTHER FORMS OF ANGINA PECTORIS (HCC): Primary | ICD-10-CM

## 2023-09-26 DIAGNOSIS — F51.01 PRIMARY INSOMNIA: ICD-10-CM

## 2023-09-26 DIAGNOSIS — R11.0 NAUSEA: ICD-10-CM

## 2023-09-26 DIAGNOSIS — G44.221 CHRONIC TENSION-TYPE HEADACHE, INTRACTABLE: ICD-10-CM

## 2023-09-26 DIAGNOSIS — G25.0 TREMOR, ESSENTIAL: ICD-10-CM

## 2023-09-26 DIAGNOSIS — G30.9 ALZHEIMER'S DEMENTIA WITHOUT BEHAVIORAL DISTURBANCE (HCC): ICD-10-CM

## 2023-09-26 DIAGNOSIS — I10 HYPERTENSION, UNSPECIFIED TYPE: ICD-10-CM

## 2023-09-26 DIAGNOSIS — F41.9 ANXIETY: ICD-10-CM

## 2023-09-26 PROBLEM — Z96.659 PAINFUL TOTAL KNEE REPLACEMENT, INITIAL ENCOUNTER (HCC): Status: RESOLVED | Noted: 2018-10-18 | Resolved: 2023-09-26

## 2023-09-26 PROBLEM — T84.84XA PAINFUL TOTAL KNEE REPLACEMENT, INITIAL ENCOUNTER (HCC): Status: RESOLVED | Noted: 2018-10-18 | Resolved: 2023-09-26

## 2023-09-26 PROBLEM — R55 SYNCOPE AND COLLAPSE: Status: RESOLVED | Noted: 2021-11-30 | Resolved: 2023-09-26

## 2023-09-26 PROBLEM — S09.90XA CLOSED HEAD INJURY: Status: RESOLVED | Noted: 2021-12-01 | Resolved: 2023-09-26

## 2023-09-26 PROBLEM — H53.8 BLURRED VISION: Status: RESOLVED | Noted: 2022-09-17 | Resolved: 2023-09-26

## 2023-09-26 PROBLEM — N17.9 AKI (ACUTE KIDNEY INJURY) (HCC): Status: RESOLVED | Noted: 2021-11-24 | Resolved: 2023-09-26

## 2023-09-26 PROCEDURE — G8399 PT W/DXA RESULTS DOCUMENT: HCPCS | Performed by: NURSE PRACTITIONER

## 2023-09-26 PROCEDURE — 3078F DIAST BP <80 MM HG: CPT | Performed by: NURSE PRACTITIONER

## 2023-09-26 PROCEDURE — 99214 OFFICE O/P EST MOD 30 MIN: CPT | Performed by: NURSE PRACTITIONER

## 2023-09-26 PROCEDURE — 1124F ACP DISCUSS-NO DSCNMKR DOCD: CPT | Performed by: NURSE PRACTITIONER

## 2023-09-26 PROCEDURE — 3074F SYST BP LT 130 MM HG: CPT | Performed by: NURSE PRACTITIONER

## 2023-09-26 PROCEDURE — G8427 DOCREV CUR MEDS BY ELIG CLIN: HCPCS | Performed by: NURSE PRACTITIONER

## 2023-09-26 PROCEDURE — 1036F TOBACCO NON-USER: CPT | Performed by: NURSE PRACTITIONER

## 2023-09-26 PROCEDURE — 1090F PRES/ABSN URINE INCON ASSESS: CPT | Performed by: NURSE PRACTITIONER

## 2023-09-26 PROCEDURE — G8417 CALC BMI ABV UP PARAM F/U: HCPCS | Performed by: NURSE PRACTITIONER

## 2023-09-26 RX ORDER — HYDROXYZINE HYDROCHLORIDE 25 MG/1
25 TABLET, FILM COATED ORAL EVERY 8 HOURS PRN
Qty: 30 TABLET | Refills: 0 | Status: SHIPPED | OUTPATIENT
Start: 2023-09-26 | End: 2023-10-06

## 2023-09-26 RX ORDER — ONDANSETRON 4 MG/1
4 TABLET, FILM COATED ORAL 3 TIMES DAILY PRN
Qty: 30 TABLET | Refills: 0 | Status: SHIPPED | OUTPATIENT
Start: 2023-09-26

## 2023-09-26 ASSESSMENT — ENCOUNTER SYMPTOMS
VOMITING: 0
WHEEZING: 0
RHINORRHEA: 0
NAUSEA: 1
COUGH: 0
DIARRHEA: 0
SHORTNESS OF BREATH: 0
CHEST TIGHTNESS: 0
SORE THROAT: 0
ABDOMINAL PAIN: 0
TROUBLE SWALLOWING: 1

## 2023-10-05 DIAGNOSIS — L50.9 URTICARIA: ICD-10-CM

## 2023-10-05 DIAGNOSIS — F41.9 ANXIETY: ICD-10-CM

## 2023-10-05 DIAGNOSIS — K59.00 CONSTIPATION, UNSPECIFIED CONSTIPATION TYPE: ICD-10-CM

## 2023-10-06 RX ORDER — HYDROXYZINE HYDROCHLORIDE 25 MG/1
TABLET, FILM COATED ORAL
Qty: 30 TABLET | Refills: 0 | Status: SHIPPED | OUTPATIENT
Start: 2023-10-06

## 2023-10-06 RX ORDER — BUSPIRONE HYDROCHLORIDE 10 MG/1
TABLET ORAL
Qty: 60 TABLET | Refills: 3 | Status: SHIPPED | OUTPATIENT
Start: 2023-10-06

## 2023-10-06 RX ORDER — DOCUSATE SODIUM 100 MG/1
CAPSULE, LIQUID FILLED ORAL
Qty: 60 CAPSULE | Refills: 1 | Status: SHIPPED | OUTPATIENT
Start: 2023-10-06

## 2023-10-09 DIAGNOSIS — R11.0 NAUSEA: ICD-10-CM

## 2023-10-09 RX ORDER — ONDANSETRON 4 MG/1
TABLET, FILM COATED ORAL
Qty: 30 TABLET | Refills: 0 | Status: SHIPPED | OUTPATIENT
Start: 2023-10-09

## 2023-10-15 ENCOUNTER — APPOINTMENT (OUTPATIENT)
Dept: GENERAL RADIOLOGY | Age: 83
DRG: 690 | End: 2023-10-15
Attending: STUDENT IN AN ORGANIZED HEALTH CARE EDUCATION/TRAINING PROGRAM
Payer: MEDICARE

## 2023-10-15 ENCOUNTER — HOSPITAL ENCOUNTER (INPATIENT)
Age: 83
LOS: 2 days | Discharge: HOME HEALTH | DRG: 690 | End: 2023-10-17
Attending: STUDENT IN AN ORGANIZED HEALTH CARE EDUCATION/TRAINING PROGRAM | Admitting: INTERNAL MEDICINE
Payer: MEDICARE

## 2023-10-15 ENCOUNTER — APPOINTMENT (OUTPATIENT)
Dept: CT IMAGING | Age: 83
DRG: 690 | End: 2023-10-15
Attending: STUDENT IN AN ORGANIZED HEALTH CARE EDUCATION/TRAINING PROGRAM
Payer: MEDICARE

## 2023-10-15 DIAGNOSIS — N30.00 ACUTE CYSTITIS WITHOUT HEMATURIA: ICD-10-CM

## 2023-10-15 DIAGNOSIS — R41.82 ALTERED MENTAL STATUS, UNSPECIFIED ALTERED MENTAL STATUS TYPE: Primary | ICD-10-CM

## 2023-10-15 PROBLEM — N39.0 UTI (URINARY TRACT INFECTION): Status: ACTIVE | Noted: 2023-10-15

## 2023-10-15 LAB
ALBUMIN SERPL-MCNC: 4 G/DL (ref 3.4–5)
ALBUMIN/GLOB SERPL: 1.7 {RATIO} (ref 1.1–2.2)
ALP SERPL-CCNC: 90 U/L (ref 40–129)
ALT SERPL-CCNC: 7 U/L (ref 10–40)
AMMONIA PLAS-SCNC: 13 UMOL/L (ref 11–51)
AMPHETAMINES UR QL SCN>1000 NG/ML: ABNORMAL
ANION GAP SERPL CALCULATED.3IONS-SCNC: 10 MMOL/L (ref 3–16)
AST SERPL-CCNC: 17 U/L (ref 15–37)
BACTERIA URNS QL MICRO: ABNORMAL /HPF
BARBITURATES UR QL SCN>200 NG/ML: ABNORMAL
BASE EXCESS BLDV CALC-SCNC: 3.9 MMOL/L (ref -3–3)
BASOPHILS # BLD: 0.1 K/UL (ref 0–0.2)
BASOPHILS NFR BLD: 1.5 %
BENZODIAZ UR QL SCN>200 NG/ML: ABNORMAL
BILIRUB SERPL-MCNC: <0.2 MG/DL (ref 0–1)
BILIRUB UR QL STRIP.AUTO: NEGATIVE
BUN SERPL-MCNC: 29 MG/DL (ref 7–20)
CALCIUM SERPL-MCNC: 9.3 MG/DL (ref 8.3–10.6)
CANNABINOIDS UR QL SCN>50 NG/ML: ABNORMAL
CHLORIDE SERPL-SCNC: 102 MMOL/L (ref 99–110)
CK SERPL-CCNC: 135 U/L (ref 26–192)
CLARITY UR: ABNORMAL
CO2 BLDV-SCNC: 31 MMOL/L
CO2 SERPL-SCNC: 31 MMOL/L (ref 21–32)
COCAINE UR QL SCN: ABNORMAL
COHGB MFR BLDV: 1.4 % (ref 0–1.5)
COLOR UR: YELLOW
CREAT SERPL-MCNC: 2.5 MG/DL (ref 0.6–1.2)
D DIMER: 1.25 UG/ML FEU (ref 0–0.6)
DEPRECATED RDW RBC AUTO: 13.4 % (ref 12.4–15.4)
DRUG SCREEN COMMENT UR-IMP: ABNORMAL
EOSINOPHIL # BLD: 0.3 K/UL (ref 0–0.6)
EOSINOPHIL NFR BLD: 4.1 %
EPI CELLS #/AREA URNS HPF: ABNORMAL /HPF (ref 0–5)
ETHANOLAMINE SERPL-MCNC: NORMAL MG/DL (ref 0–0.08)
FENTANYL SCREEN, URINE: ABNORMAL
GFR SERPLBLD CREATININE-BSD FMLA CKD-EPI: 18 ML/MIN/{1.73_M2}
GLUCOSE SERPL-MCNC: 115 MG/DL (ref 70–99)
GLUCOSE UR STRIP.AUTO-MCNC: 100 MG/DL
HCO3 BLDV-SCNC: 29.5 MMOL/L (ref 23–29)
HCT VFR BLD AUTO: 34.3 % (ref 36–48)
HGB BLD-MCNC: 11.3 G/DL (ref 12–16)
HGB UR QL STRIP.AUTO: ABNORMAL
KETONES UR STRIP.AUTO-MCNC: NEGATIVE MG/DL
LACTATE BLDV-SCNC: 1.4 MMOL/L (ref 0.4–2)
LEUKOCYTE ESTERASE UR QL STRIP.AUTO: ABNORMAL
LYMPHOCYTES # BLD: 1.4 K/UL (ref 1–5.1)
LYMPHOCYTES NFR BLD: 21 %
MCH RBC QN AUTO: 28 PG (ref 26–34)
MCHC RBC AUTO-ENTMCNC: 33.1 G/DL (ref 31–36)
MCV RBC AUTO: 84.6 FL (ref 80–100)
METHADONE UR QL SCN>300 NG/ML: ABNORMAL
METHGB MFR BLDV: 0.3 %
MONOCYTES # BLD: 0.6 K/UL (ref 0–1.3)
MONOCYTES NFR BLD: 9.6 %
NEUTROPHILS # BLD: 4.3 K/UL (ref 1.7–7.7)
NEUTROPHILS NFR BLD: 63.8 %
NITRITE UR QL STRIP.AUTO: POSITIVE
NT-PROBNP SERPL-MCNC: 453 PG/ML (ref 0–449)
O2 THERAPY: ABNORMAL
OPIATES UR QL SCN>300 NG/ML: ABNORMAL
OXYCODONE UR QL SCN: POSITIVE
PCO2 BLDV: 48.6 MMHG (ref 40–50)
PCP UR QL SCN>25 NG/ML: ABNORMAL
PH BLDV: 7.4 [PH] (ref 7.35–7.45)
PH UR STRIP.AUTO: 6 [PH] (ref 5–8)
PH UR STRIP: 6 [PH]
PLATELET # BLD AUTO: 169 K/UL (ref 135–450)
PMV BLD AUTO: 7.6 FL (ref 5–10.5)
PO2 BLDV: 31.4 MMHG (ref 25–40)
POTASSIUM SERPL-SCNC: 4.9 MMOL/L (ref 3.5–5.1)
PROT SERPL-MCNC: 6.4 G/DL (ref 6.4–8.2)
PROT UR STRIP.AUTO-MCNC: ABNORMAL MG/DL
RBC # BLD AUTO: 4.05 M/UL (ref 4–5.2)
RBC #/AREA URNS HPF: ABNORMAL /HPF (ref 0–4)
SAO2 % BLDV: 60 %
SODIUM SERPL-SCNC: 143 MMOL/L (ref 136–145)
SP GR UR STRIP.AUTO: 1.01 (ref 1–1.03)
TROPONIN, HIGH SENSITIVITY: 22 NG/L (ref 0–14)
UA DIPSTICK W REFLEX MICRO PNL UR: YES
URN SPEC COLLECT METH UR: ABNORMAL
UROBILINOGEN UR STRIP-ACNC: 0.2 E.U./DL
WBC # BLD AUTO: 6.7 K/UL (ref 4–11)
WBC #/AREA URNS HPF: ABNORMAL /HPF (ref 0–5)

## 2023-10-15 PROCEDURE — 83605 ASSAY OF LACTIC ACID: CPT

## 2023-10-15 PROCEDURE — 71045 X-RAY EXAM CHEST 1 VIEW: CPT

## 2023-10-15 PROCEDURE — 80307 DRUG TEST PRSMV CHEM ANLYZR: CPT

## 2023-10-15 PROCEDURE — 84443 ASSAY THYROID STIM HORMONE: CPT

## 2023-10-15 PROCEDURE — 93005 ELECTROCARDIOGRAM TRACING: CPT | Performed by: STUDENT IN AN ORGANIZED HEALTH CARE EDUCATION/TRAINING PROGRAM

## 2023-10-15 PROCEDURE — 84484 ASSAY OF TROPONIN QUANT: CPT

## 2023-10-15 PROCEDURE — 85379 FIBRIN DEGRADATION QUANT: CPT

## 2023-10-15 PROCEDURE — 6370000000 HC RX 637 (ALT 250 FOR IP): Performed by: STUDENT IN AN ORGANIZED HEALTH CARE EDUCATION/TRAINING PROGRAM

## 2023-10-15 PROCEDURE — 81001 URINALYSIS AUTO W/SCOPE: CPT

## 2023-10-15 PROCEDURE — 70450 CT HEAD/BRAIN W/O DYE: CPT

## 2023-10-15 PROCEDURE — 82550 ASSAY OF CK (CPK): CPT

## 2023-10-15 PROCEDURE — 87186 SC STD MICRODIL/AGAR DIL: CPT

## 2023-10-15 PROCEDURE — 87077 CULTURE AEROBIC IDENTIFY: CPT

## 2023-10-15 PROCEDURE — 82077 ASSAY SPEC XCP UR&BREATH IA: CPT

## 2023-10-15 PROCEDURE — 82140 ASSAY OF AMMONIA: CPT

## 2023-10-15 PROCEDURE — 1200000000 HC SEMI PRIVATE

## 2023-10-15 PROCEDURE — 36415 COLL VENOUS BLD VENIPUNCTURE: CPT

## 2023-10-15 PROCEDURE — 87086 URINE CULTURE/COLONY COUNT: CPT

## 2023-10-15 PROCEDURE — 99285 EMERGENCY DEPT VISIT HI MDM: CPT

## 2023-10-15 PROCEDURE — 85025 COMPLETE CBC W/AUTO DIFF WBC: CPT

## 2023-10-15 PROCEDURE — 83880 ASSAY OF NATRIURETIC PEPTIDE: CPT

## 2023-10-15 PROCEDURE — 82803 BLOOD GASES ANY COMBINATION: CPT

## 2023-10-15 PROCEDURE — 80053 COMPREHEN METABOLIC PANEL: CPT

## 2023-10-15 RX ORDER — NITROFURANTOIN 25; 75 MG/1; MG/1
100 CAPSULE ORAL ONCE
Status: COMPLETED | OUTPATIENT
Start: 2023-10-15 | End: 2023-10-15

## 2023-10-15 RX ADMIN — NITROFURANTOIN MONOHYDRATE/MACROCRYSTALS 100 MG: 75; 25 CAPSULE ORAL at 23:24

## 2023-10-16 ENCOUNTER — APPOINTMENT (OUTPATIENT)
Dept: INTERVENTIONAL RADIOLOGY/VASCULAR | Age: 83
DRG: 690 | End: 2023-10-16
Payer: MEDICARE

## 2023-10-16 LAB
EKG ATRIAL RATE: 67 BPM
EKG DIAGNOSIS: NORMAL
EKG P AXIS: 66 DEGREES
EKG P-R INTERVAL: 156 MS
EKG Q-T INTERVAL: 436 MS
EKG QRS DURATION: 100 MS
EKG QTC CALCULATION (BAZETT): 460 MS
EKG R AXIS: 35 DEGREES
EKG T AXIS: 16 DEGREES
EKG VENTRICULAR RATE: 67 BPM
PROCALCITONIN SERPL IA-MCNC: 0.02 NG/ML (ref 0–0.15)
SARS-COV-2 RDRP RESP QL NAA+PROBE: NOT DETECTED
TSH SERPL DL<=0.005 MIU/L-ACNC: 2.76 UIU/ML (ref 0.27–4.2)
VALPROATE SERPL-MCNC: 14.7 UG/ML (ref 50–100)

## 2023-10-16 PROCEDURE — 6360000002 HC RX W HCPCS: Performed by: INTERNAL MEDICINE

## 2023-10-16 PROCEDURE — C1751 CATH, INF, PER/CENT/MIDLINE: HCPCS

## 2023-10-16 PROCEDURE — 76937 US GUIDE VASCULAR ACCESS: CPT

## 2023-10-16 PROCEDURE — 36415 COLL VENOUS BLD VENIPUNCTURE: CPT

## 2023-10-16 PROCEDURE — 87040 BLOOD CULTURE FOR BACTERIA: CPT

## 2023-10-16 PROCEDURE — 99223 1ST HOSP IP/OBS HIGH 75: CPT | Performed by: INTERNAL MEDICINE

## 2023-10-16 PROCEDURE — 97535 SELF CARE MNGMENT TRAINING: CPT

## 2023-10-16 PROCEDURE — 36410 VNPNXR 3YR/> PHY/QHP DX/THER: CPT

## 2023-10-16 PROCEDURE — 6360000002 HC RX W HCPCS: Performed by: NURSE PRACTITIONER

## 2023-10-16 PROCEDURE — 1200000000 HC SEMI PRIVATE

## 2023-10-16 PROCEDURE — 6370000000 HC RX 637 (ALT 250 FOR IP): Performed by: INTERNAL MEDICINE

## 2023-10-16 PROCEDURE — 97116 GAIT TRAINING THERAPY: CPT

## 2023-10-16 PROCEDURE — 84145 PROCALCITONIN (PCT): CPT

## 2023-10-16 PROCEDURE — 2580000003 HC RX 258: Performed by: INTERNAL MEDICINE

## 2023-10-16 PROCEDURE — 80164 ASSAY DIPROPYLACETIC ACD TOT: CPT

## 2023-10-16 PROCEDURE — 2580000003 HC RX 258: Performed by: NURSE PRACTITIONER

## 2023-10-16 PROCEDURE — 97530 THERAPEUTIC ACTIVITIES: CPT

## 2023-10-16 PROCEDURE — 87635 SARS-COV-2 COVID-19 AMP PRB: CPT

## 2023-10-16 PROCEDURE — 97166 OT EVAL MOD COMPLEX 45 MIN: CPT

## 2023-10-16 PROCEDURE — 93010 ELECTROCARDIOGRAM REPORT: CPT | Performed by: INTERNAL MEDICINE

## 2023-10-16 PROCEDURE — 05HC33Z INSERTION OF INFUSION DEVICE INTO LEFT BASILIC VEIN, PERCUTANEOUS APPROACH: ICD-10-PCS | Performed by: INTERNAL MEDICINE

## 2023-10-16 PROCEDURE — 97162 PT EVAL MOD COMPLEX 30 MIN: CPT

## 2023-10-16 RX ORDER — ISOSORBIDE MONONITRATE 30 MG/1
30 TABLET, EXTENDED RELEASE ORAL DAILY
Status: DISCONTINUED | OUTPATIENT
Start: 2023-10-16 | End: 2023-10-16

## 2023-10-16 RX ORDER — ATORVASTATIN CALCIUM 40 MG/1
40 TABLET, FILM COATED ORAL DAILY
Status: DISCONTINUED | OUTPATIENT
Start: 2023-10-16 | End: 2023-10-17 | Stop reason: HOSPADM

## 2023-10-16 RX ORDER — DOCUSATE SODIUM 100 MG/1
100 CAPSULE, LIQUID FILLED ORAL DAILY
Status: DISCONTINUED | OUTPATIENT
Start: 2023-10-16 | End: 2023-10-17 | Stop reason: HOSPADM

## 2023-10-16 RX ORDER — SODIUM CHLORIDE 0.9 % (FLUSH) 0.9 %
5-40 SYRINGE (ML) INJECTION PRN
Status: DISCONTINUED | OUTPATIENT
Start: 2023-10-16 | End: 2023-10-17 | Stop reason: HOSPADM

## 2023-10-16 RX ORDER — ASPIRIN 81 MG/1
81 TABLET, CHEWABLE ORAL DAILY
Status: DISCONTINUED | OUTPATIENT
Start: 2023-10-16 | End: 2023-10-17 | Stop reason: HOSPADM

## 2023-10-16 RX ORDER — BUSPIRONE HYDROCHLORIDE 10 MG/1
10 TABLET ORAL 3 TIMES DAILY
Status: DISCONTINUED | OUTPATIENT
Start: 2023-10-16 | End: 2023-10-17 | Stop reason: HOSPADM

## 2023-10-16 RX ORDER — AMLODIPINE BESYLATE 5 MG/1
10 TABLET ORAL NIGHTLY
Status: DISCONTINUED | OUTPATIENT
Start: 2023-10-16 | End: 2023-10-17 | Stop reason: HOSPADM

## 2023-10-16 RX ORDER — ONDANSETRON 4 MG/1
4 TABLET, ORALLY DISINTEGRATING ORAL EVERY 8 HOURS PRN
Status: DISCONTINUED | OUTPATIENT
Start: 2023-10-16 | End: 2023-10-17 | Stop reason: HOSPADM

## 2023-10-16 RX ORDER — ACETAMINOPHEN 325 MG/1
650 TABLET ORAL EVERY 6 HOURS PRN
Status: DISCONTINUED | OUTPATIENT
Start: 2023-10-16 | End: 2023-10-17 | Stop reason: HOSPADM

## 2023-10-16 RX ORDER — CITALOPRAM 20 MG/1
10 TABLET ORAL DAILY
Status: DISCONTINUED | OUTPATIENT
Start: 2023-10-16 | End: 2023-10-17 | Stop reason: HOSPADM

## 2023-10-16 RX ORDER — OXYCODONE AND ACETAMINOPHEN 7.5; 325 MG/1; MG/1
1 TABLET ORAL EVERY 8 HOURS PRN
Status: DISCONTINUED | OUTPATIENT
Start: 2023-10-16 | End: 2023-10-17 | Stop reason: HOSPADM

## 2023-10-16 RX ORDER — ENOXAPARIN SODIUM 100 MG/ML
30 INJECTION SUBCUTANEOUS DAILY
Status: DISCONTINUED | OUTPATIENT
Start: 2023-10-16 | End: 2023-10-17 | Stop reason: HOSPADM

## 2023-10-16 RX ORDER — OXYCODONE HYDROCHLORIDE AND ACETAMINOPHEN 5; 325 MG/1; MG/1
1 TABLET ORAL EVERY 8 HOURS PRN
Status: DISCONTINUED | OUTPATIENT
Start: 2023-10-16 | End: 2023-10-16

## 2023-10-16 RX ORDER — PANTOPRAZOLE SODIUM 40 MG/1
40 TABLET, DELAYED RELEASE ORAL
Status: DISCONTINUED | OUTPATIENT
Start: 2023-10-17 | End: 2023-10-17 | Stop reason: HOSPADM

## 2023-10-16 RX ORDER — ACETAMINOPHEN 650 MG/1
650 SUPPOSITORY RECTAL EVERY 6 HOURS PRN
Status: DISCONTINUED | OUTPATIENT
Start: 2023-10-16 | End: 2023-10-17 | Stop reason: HOSPADM

## 2023-10-16 RX ORDER — GABAPENTIN 100 MG/1
100 CAPSULE ORAL 3 TIMES DAILY
Status: DISCONTINUED | OUTPATIENT
Start: 2023-10-16 | End: 2023-10-16

## 2023-10-16 RX ORDER — SODIUM CHLORIDE 9 MG/ML
INJECTION, SOLUTION INTRAVENOUS CONTINUOUS
Status: DISCONTINUED | OUTPATIENT
Start: 2023-10-16 | End: 2023-10-17

## 2023-10-16 RX ORDER — SODIUM CHLORIDE 0.9 % (FLUSH) 0.9 %
5-40 SYRINGE (ML) INJECTION EVERY 12 HOURS SCHEDULED
Status: DISCONTINUED | OUTPATIENT
Start: 2023-10-16 | End: 2023-10-17 | Stop reason: HOSPADM

## 2023-10-16 RX ORDER — POLYETHYLENE GLYCOL 3350 17 G/17G
17 POWDER, FOR SOLUTION ORAL DAILY PRN
Status: DISCONTINUED | OUTPATIENT
Start: 2023-10-16 | End: 2023-10-17 | Stop reason: HOSPADM

## 2023-10-16 RX ORDER — SODIUM CHLORIDE 9 MG/ML
INJECTION, SOLUTION INTRAVENOUS PRN
Status: DISCONTINUED | OUTPATIENT
Start: 2023-10-16 | End: 2023-10-17 | Stop reason: HOSPADM

## 2023-10-16 RX ORDER — ONDANSETRON 2 MG/ML
4 INJECTION INTRAMUSCULAR; INTRAVENOUS EVERY 6 HOURS PRN
Status: DISCONTINUED | OUTPATIENT
Start: 2023-10-16 | End: 2023-10-17 | Stop reason: HOSPADM

## 2023-10-16 RX ADMIN — ASPIRIN 81 MG: 81 TABLET, CHEWABLE ORAL at 10:35

## 2023-10-16 RX ADMIN — DOCUSATE SODIUM 100 MG: 100 CAPSULE, LIQUID FILLED ORAL at 10:36

## 2023-10-16 RX ADMIN — CEFTRIAXONE 1000 MG: 1 INJECTION, POWDER, FOR SOLUTION INTRAMUSCULAR; INTRAVENOUS at 11:55

## 2023-10-16 RX ADMIN — BUSPIRONE HYDROCHLORIDE 10 MG: 10 TABLET ORAL at 14:26

## 2023-10-16 RX ADMIN — ATORVASTATIN CALCIUM 40 MG: 40 TABLET, FILM COATED ORAL at 10:36

## 2023-10-16 RX ADMIN — CITALOPRAM 10 MG: 20 TABLET, FILM COATED ORAL at 10:35

## 2023-10-16 RX ADMIN — BUSPIRONE HYDROCHLORIDE 10 MG: 10 TABLET ORAL at 21:10

## 2023-10-16 RX ADMIN — SODIUM CHLORIDE, PRESERVATIVE FREE 10 ML: 5 INJECTION INTRAVENOUS at 21:11

## 2023-10-16 RX ADMIN — BUSPIRONE HYDROCHLORIDE 10 MG: 10 TABLET ORAL at 10:35

## 2023-10-16 RX ADMIN — SODIUM CHLORIDE: 9 INJECTION, SOLUTION INTRAVENOUS at 11:54

## 2023-10-16 RX ADMIN — AMLODIPINE BESYLATE 10 MG: 5 TABLET ORAL at 21:11

## 2023-10-16 RX ADMIN — SODIUM CHLORIDE, PRESERVATIVE FREE 10 ML: 5 INJECTION INTRAVENOUS at 13:00

## 2023-10-16 RX ADMIN — ENOXAPARIN SODIUM 30 MG: 100 INJECTION SUBCUTANEOUS at 10:36

## 2023-10-16 NOTE — ED TRIAGE NOTES
Pt was found in her car sleeping in a [employer] parking lot. EMS called, pt alert and oriented to self and time but unable to recall todays events.

## 2023-10-16 NOTE — ED NOTES
Pt sleeping in bed at this time. No distress noted. Bed locked and in lowest position, side rails up x2. Call light in reach.      Dwayne Chaney RN  10/16/23 0017

## 2023-10-16 NOTE — H&P
Hospital Medicine History & Physical      PCP: KALI Celeste CNP    Date of Admission: 10/15/2023    Date of Service: Pt seen/examined on 10/16/23      Chief Complaint:    Chief Complaint   Patient presents with    Altered Mental Status     Pt was found in her car sleeping in a [employer] parking lot. EMS called, pt alert and oriented to self and time but unable to recall todays events. History Of Present Illness: The patient is a 79 y.o. female with PMH of frequent UTIs, CAD s/p stents, CHF, chronic back pain, COPD, depression, HTN, HLD, MARIA C on CPAP, and TIA who presents to [facility] with AMS. Pt was found sleeping in her car in [employer] parking lot. In ED CT of head non acute. Urinalysis consistent with acute cystitis with hematuria. Pt is now awake and alert x3. She remembers leaving birthday party, unsure how she ended up in [employer] parking lot. She denies any urinary symptoms. Denies fever, cough, chest pain, dyspnea, abdominal pain, nausea, vomiting, or diarrhea. History obtained from the patient and review of EMR.      Past Medical History:        Diagnosis Date    MARIO (acute kidney injury) ([address]) 11/24/2021    Altered mental status 11/30/2021    Arthritis     BCC (basal cell carcinoma of skin)     RT clavicle    Bladder infection     frequently    Blurred vision 9/17/2022    CAD (coronary artery disease)     stents    Cancer (HCC)     skin    CHF (congestive heart failure) (HCC)     Chronic back pain     Closed head injury 12/1/2021    COPD (chronic obstructive pulmonary disease) ([address])     Depression     Depression     Hypercholesteremia     Hypertension     Hypokalemia 1/16/2012    Pain in shoulder [id number]    pain in left shoulder, taking steroid injections for steroid    Painful total knee replacement, initial encounter ([address]) 10/18/2018    Reflux     Rheumatic fever     as a child    Sleep apnea     uses CPAP    Syncope and collapse 11/30/2021    TIA (transient ischemic attack)

## 2023-10-16 NOTE — ED NOTES
PIV attempted times two RN's on duty and unsuccessful. Clinical notified.       Shannan Hammond RN  10/16/23 8949

## 2023-10-16 NOTE — DISCHARGE INSTRUCTIONS
Your information:  Name: Yousuf Burns  : 1940    Your instructions:      Avoid driving at nights  Hold baclofen until seen by PCP, follow-up appointment as listed  Avoid cetrizine and hydroxizine on same day     What to do after you leave the hospital:    Recommended diet: regular diet, monitor fluid intake due to CHF    Recommended activity: activity as tolerated        The following personal items were collected during your admission and were returned to you:    Belongings  Dental Appliances: Uppers, Lowers  Vision - Corrective Lenses: Eyeglasses  Hearing Aid: None  Clothing: At bedside  Jewelry: None  Body Piercings Removed: No  Electronic Devices: Cell Phone  Weapons (Notify Protective Services/Security): None  Other Valuables: Purse  Home Medications: None  Valuables Given To: Patient  Provide Name(s) of Who Valuable(s) Were Given To: patient    Information obtained by:  By signing below, I understand that if any problems occur once I leave the hospital I am to contact PCP. I understand and acknowledge receipt of the instructions indicated above. Heart Failure Resources:  Heart Failure Interactive Workbook:  Go to https://RightAnswers. [employer]/publication/?e=618928 for a Free Heart Failure Interactive Workbook provided by [employer]. This interactive workbook will provide information on Healthier Living with Heart Failure. Please copy and paste link into search bar. Use your mouse to scroll through the pages. HF [place] marika:   Heart Failure Free smart phone marika available for iPhone and Android download. Use your phone to track sodium intake, fluid intake, symptoms, and weight. Low Sodium Diet / Recipes:  Go to www. [employer]. org website for [employer] which is Low Sodium! [employer] is a dialysis company, but this website offers free seasonal cookbooks. Each quarter, they will release 25-30 new recipes with a breakdown of calories, sodium, and glucose.  You can also

## 2023-10-16 NOTE — ED NOTES
Pt transferred to EMS stretcher and out of ED at this time. Pt stable at time of transfer.      Uma Hwang RN  10/16/23 9896

## 2023-10-16 NOTE — ED PROVIDER NOTES
TAKE ONE (1) TO TWO (2) TABLETS IN THE EVENING AS NEEDED FOR ANXIETY 60 tablet 3    citalopram (CELEXA) 20 MG tablet TAKE ONE (1) AND ONE HALF (1/2) TABLET ONCE DAILY 45 tablet 3    cetirizine (ZYRTEC) 10 MG tablet TAKE (1) TABLET DAILY 90 tablet 0    nystatin (NYAMYC) 907199 UNIT/GM powder APPLY TO AFFECTED AREA(S) THREE (3) TIMES DAILY 60 g 2    traZODone (DESYREL) 100 MG tablet TAKE ONE (1) TABLET BY MOUTH NIGHTLY AS NEEDED FOR SLEEP 60 tablet 1    polyethylene glycol (MIRALAX) 17 GM/SCOOP POWD powder MIX AND DRINK 17 GRAMS GRAMS IN 8 OUNCES OF LIQUID ONCE DAILY AS DIRECTED 510 g 2    oxyCODONE-acetaminophen (PERCOCET) 7.5-325 MG per tablet       albuterol sulfate HFA (VENTOLIN HFA) 108 (90 Base) MCG/ACT inhaler INHALE 2 PUFFS FOUR TIMES DAILY AS NEEDED FOR WHEEZING 18 g 5    divalproex (DEPAKOTE) 250 MG DR tablet Take 1 tablet by mouth in the morning and at bedtime From neurologist      torsemide (DEMADEX) 20 MG tablet Take 1 tablet by mouth daily 30 tablet 3    Ubrogepant (UBRELVY) 100 MG TABS Take 1 tablet by mouth as needed (headache)      acetaminophen (TYLENOL) 325 MG tablet Take 2 tablets by mouth every 6 hours as needed for Pain      oxyCODONE-acetaminophen (PERCOCET) 5-325 MG per tablet Take 1 tablet by mouth every 12 hours as needed for Pain.      hydrALAZINE (APRESOLINE) 10 MG tablet Take 1 tablet by mouth 3 times daily      spironolactone (ALDACTONE) 25 MG tablet Take 1 tablet by mouth daily 30 tablet 3    gabapentin (NEURONTIN) 100 MG capsule Take 1 capsule by mouth in the morning and at bedtime. pantoprazole (PROTONIX) 40 MG tablet Take 1 tablet by mouth daily 90 tablet 0    simvastatin (ZOCOR) 40 MG tablet TAKE ONE TABLET NIGHTLY.       aspirin 81 MG EC tablet Take 1 tablet by mouth daily      candesartan (ATACAND) 8 MG tablet Take 1 tablet by mouth daily      baclofen (LIORESAL) 10 MG tablet TAKE 1 TABLET EVERY 8 HOURS AS NEEDED 30 tablet 0    isosorbide mononitrate (IMDUR) 30 MG extended

## 2023-10-17 VITALS
WEIGHT: 173.9 LBS | TEMPERATURE: 98.6 F | HEIGHT: 60 IN | OXYGEN SATURATION: 94 % | RESPIRATION RATE: 18 BRPM | HEART RATE: 61 BPM | DIASTOLIC BLOOD PRESSURE: 47 MMHG | SYSTOLIC BLOOD PRESSURE: 128 MMHG | BODY MASS INDEX: 34.14 KG/M2

## 2023-10-17 LAB
ANION GAP SERPL CALCULATED.3IONS-SCNC: 7 MMOL/L (ref 3–16)
BUN SERPL-MCNC: 19 MG/DL (ref 7–20)
CALCIUM SERPL-MCNC: 9 MG/DL (ref 8.3–10.6)
CHLORIDE SERPL-SCNC: 103 MMOL/L (ref 99–110)
CO2 SERPL-SCNC: 28 MMOL/L (ref 21–32)
CREAT SERPL-MCNC: 1.6 MG/DL (ref 0.6–1.2)
GFR SERPLBLD CREATININE-BSD FMLA CKD-EPI: 32 ML/MIN/{1.73_M2}
GLUCOSE SERPL-MCNC: 92 MG/DL (ref 70–99)
POTASSIUM SERPL-SCNC: 4.7 MMOL/L (ref 3.5–5.1)
SODIUM SERPL-SCNC: 138 MMOL/L (ref 136–145)

## 2023-10-17 PROCEDURE — 6360000002 HC RX W HCPCS: Performed by: INTERNAL MEDICINE

## 2023-10-17 PROCEDURE — 36415 COLL VENOUS BLD VENIPUNCTURE: CPT

## 2023-10-17 PROCEDURE — 99238 HOSP IP/OBS DSCHRG MGMT 30/<: CPT | Performed by: INTERNAL MEDICINE

## 2023-10-17 PROCEDURE — 2580000003 HC RX 258: Performed by: NURSE PRACTITIONER

## 2023-10-17 PROCEDURE — 80048 BASIC METABOLIC PNL TOTAL CA: CPT

## 2023-10-17 PROCEDURE — 6370000000 HC RX 637 (ALT 250 FOR IP): Performed by: INTERNAL MEDICINE

## 2023-10-17 PROCEDURE — 2580000003 HC RX 258: Performed by: INTERNAL MEDICINE

## 2023-10-17 PROCEDURE — 6360000002 HC RX W HCPCS: Performed by: NURSE PRACTITIONER

## 2023-10-17 RX ORDER — TRAZODONE HYDROCHLORIDE 100 MG/1
100 TABLET ORAL NIGHTLY
Status: DISCONTINUED | OUTPATIENT
Start: 2023-10-17 | End: 2023-10-17

## 2023-10-17 RX ORDER — TRAZODONE HYDROCHLORIDE 100 MG/1
100 TABLET ORAL NIGHTLY
Status: DISCONTINUED | OUTPATIENT
Start: 2023-10-17 | End: 2023-10-17 | Stop reason: HOSPADM

## 2023-10-17 RX ORDER — TRAZODONE HYDROCHLORIDE 100 MG/1
100 TABLET ORAL ONCE
Status: COMPLETED | OUTPATIENT
Start: 2023-10-17 | End: 2023-10-17

## 2023-10-17 RX ORDER — NITROFURANTOIN 25; 75 MG/1; MG/1
100 CAPSULE ORAL 2 TIMES DAILY
Qty: 6 CAPSULE | Refills: 0 | Status: SHIPPED | OUTPATIENT
Start: 2023-10-17 | End: 2023-10-20

## 2023-10-17 RX ADMIN — ENOXAPARIN SODIUM 30 MG: 100 INJECTION SUBCUTANEOUS at 09:37

## 2023-10-17 RX ADMIN — ATORVASTATIN CALCIUM 40 MG: 40 TABLET, FILM COATED ORAL at 09:37

## 2023-10-17 RX ADMIN — SODIUM CHLORIDE, PRESERVATIVE FREE 10 ML: 5 INJECTION INTRAVENOUS at 09:38

## 2023-10-17 RX ADMIN — CEFTRIAXONE 1000 MG: 1 INJECTION, POWDER, FOR SOLUTION INTRAMUSCULAR; INTRAVENOUS at 09:39

## 2023-10-17 RX ADMIN — DOCUSATE SODIUM 100 MG: 100 CAPSULE, LIQUID FILLED ORAL at 09:37

## 2023-10-17 RX ADMIN — BUSPIRONE HYDROCHLORIDE 10 MG: 10 TABLET ORAL at 09:37

## 2023-10-17 RX ADMIN — TRAZODONE HYDROCHLORIDE 100 MG: 100 TABLET ORAL at 03:13

## 2023-10-17 RX ADMIN — PANTOPRAZOLE SODIUM 40 MG: 40 TABLET, DELAYED RELEASE ORAL at 06:08

## 2023-10-17 RX ADMIN — ASPIRIN 81 MG: 81 TABLET, CHEWABLE ORAL at 09:37

## 2023-10-17 RX ADMIN — CITALOPRAM 10 MG: 20 TABLET, FILM COATED ORAL at 09:37

## 2023-10-17 NOTE — PLAN OF CARE
Problem: Discharge Planning  Goal: Discharge to home or other facility with appropriate resources  10/17/2023 1149 by Marcel Rojas RN  Outcome: Adequate for Discharge  10/17/2023 0241 by Lolis Kothari RN  Outcome: Progressing     Problem: ABCDS Injury Assessment  Goal: Absence of physical injury  10/17/2023 1149 by Marcel Rojas RN  Outcome: Adequate for Discharge  10/17/2023 0241 by Lolis Kothari RN  Outcome: Progressing     Problem: Safety - Adult  Goal: Free from fall injury  10/17/2023 1149 by Marcel Rojas RN  Outcome: Adequate for Discharge  10/17/2023 0241 by Lolis Kothari RN  Outcome: Progressing     Problem: Chronic Conditions and Co-morbidities  Goal: Patient's chronic conditions and co-morbidity symptoms are monitored and maintained or improved  10/17/2023 1149 by Marcel Rojas RN  Outcome: Adequate for Discharge  10/17/2023 0241 by Lolis Kothari RN  Outcome: Progressing

## 2023-10-17 NOTE — DISCHARGE INSTR - COC
Continuity of Care Form    Patient Name: Santiago Chan   :  1940  MRN:  [id number]    Admit date:  10/15/2023  Discharge date:  ***    Code Status Order: Full Code   Advance Directives:     Admitting Physician: Trice Mayen MD  PCP: KALI Butterfield - CNP    Discharging Nurse: [facility] Unit/Room#: 0210/0210-01  Discharging Unit Phone Number: ***    Emergency Contact:   Extended Emergency Contact Information  Primary Emergency Contact: BarnwellSonya kern  Address: [address], [address] [facility] of [address] [address] [email or web address] Phone: [phone]  Mobile Phone: [phone]  Relation: Child   needed? No  Secondary Emergency Contact: Robert Neff   [place] of [address] [address] [place] Phone: [phone]  Mobile Phone: [phone]  Relation: Child   needed?  No    Past Surgical History:  Past Surgical History:   Procedure Laterality Date    AORTIC VALVE REPLACEMENT      APPENDECTOMY      BLADDER REPAIR      3 TIMES    CARDIAC SURGERY      stents    CARPAL TUNNEL RELEASE      RIGHT    CHOLECYSTECTOMY, LAPAROSCOPIC  2017    with dr Cathy Feng      has it it done twice    DIAGNOSTIC CARDIAC CATH LAB PROCEDURE      HYSTERECTOMY (CERVIX STATUS UNKNOWN)      IR MIDLINE CATH  10/16/2023    IR MIDLINE CATH 10/16/2023 [facility] SPECIAL PROCEDURES    JOINT REPLACEMENT      KATHLEEN TKR    NECK SURGERY      OTHER SURGICAL HISTORY  11    Full Interstim Implant    SHOULDER SURGERY      SKIN CANCER EXCISION      SPINAL FIXATION SURGERY WITH IMPLANT      SPINAL FIXATION SURGERY WITH IMPLANT      SPINAL FIXATION SURGERY WITH IMPLANT      SPINE SURGERY      TONSILLECTOMY      UPPER GASTROINTESTINAL ENDOSCOPY  10    UPPER GASTROINTESTINAL ENDOSCOPY      UPPER GASTROINTESTINAL ENDOSCOPY  2017    dilitation       Immunization History:   Immunization History   Administered

## 2023-10-17 NOTE — DISCHARGE SUMMARY
Name:  Fermin Giles  Room:  0210/0210-01  MRN:    [id number]    IM Discharge Summary    Discharging Physician:  Camila Donato MD    Admit: 10/15/2023    Discharge:      PCP      KALI Hamilton - CNP    Diagnoses and hospital course  this Admission      Acute delirium      - She was driving home from a birthday party found by EMS sleeping in her car in a [employer] parking lot next morning  and pt not sure how she ended up in a parking lot. - etiology unclear - suspect sec to meds vs pt felt sleepy and drove to parking lot ( suspect she has mild cognitive decline )      - urine drug screen + for oxycodone pt is prescribed  - admitted to floor and remains stable since admission   - pt awake and alert x3 at this time   - checked Depakote level low   - CT of head showed no acute abnormalities  - no other issues noted    Pt recommended to Avoid driving at nights  Hold baclofen until seen by PCP  Avoid cetrizine and hydroxizine on same day               Acute cystitis with hematuria  - noted with frequent ecoli UTIs  - multiple allergies listed  - given Macrobid in ED   - changed to Rocephin and tolerated well   - cx pending.  Change to macrobid at dc        Hypoxia  - oxygen sats dropped to 88%  - she was placed on 2 liters n/c  - D-dimer elevated, CTA pulmonary not done in ED due to CKD  - check COVID- negative  - quickly weaned off to RA       CKD stage 4  - baseline creatinine recently has been 1.9-2.2  - creatinine on admission 2.5 on admission  - hold diuretics  - gentle hydration  - monitor      CAD  S/p PCI to RCA 5/2017 and 11/2017  Elevated troponin  - troponin 22, repeat   - denies chest pain   - continue aspirin and statin      Bradycardia  - chronic per chart review, history of symptomatic bradycardia   - metoprolol stopped previously   - monitor on telemetry      Chronic diastolic CHF  - appears  compensated  -resume  diuretics   - daily weights, low sodium diet, strict I/O  - monitor         Aortic

## 2023-10-17 NOTE — CARE COORDINATION
Case Management Assessment  Initial Evaluation    Date/Time of Evaluation: 10/17/2023 3:01 PM  Assessment Completed by: Tommie Quiroz RN    If patient is discharged prior to next notation, then this note serves as note for discharge by case management. Patient Name: Heather Rey                   YOB: 1940  Diagnosis: UTI (urinary tract infection) [N39.0]  Acute cystitis without hematuria [N30.00]  Altered mental status, unspecified altered mental status type [R41.82]                   Date / Time: 10/15/2023  8:03 PM    Patient Admission Status: Inpatient   Readmission Risk (Low < 19, Mod (19-27), High > 27): Readmission Risk Score: 11.1    Current PCP: KALI Parker CNP  PCP verified by CM?  (Jessica)    Chart Reviewed: Yes      History Provided by: Patient  Patient Orientation: Alert and Oriented    Patient Cognition: Alert    Hospitalization in the last 30 days (Readmission):  No    If yes, Readmission Assessment in CM Navigator will be completed. Advance Directives:      Code Status: Full Code   Patient's Primary Decision Maker is: Legal Next of Kin    Primary Decision MakeLamont Huber [facility] - [phone]    Discharge Planning:    Patient lives with: Alone Type of Home: House  Primary Care Giver: Family  Patient Support Systems include: Family Members   Current Financial resources: Medicare ([employer] Medicare, Medicaid)  Current community resources: Other (Comment) (Active passport JAYDE Ross)  Current services prior to admission: Other (Comment) (Active passport [facility])            Current DME:              Type of Home Care services:  Nursing Services, OT, PT    ADLS  Prior functional level: Assistance with the following:, Cooking, Housework, Shopping, Mobility  Current functional level: Assistance with the following:, Cooking, Housework, Shopping, Mobility    PT AM-PAC:   /24  OT AM-PAC: 17 /24    Family can provide assistance at DC:  Yes  Would you like Case Management

## 2023-10-17 NOTE — ACP (ADVANCE CARE PLANNING)
Advance Care Planning     General Advance Care Planning (ACP) Conversation    Date of Conversation: 10/15/2023  Conducted with: Patient with Decision Making Capacity    Healthcare Decision Maker:    Primary Decision Maker: Flora Nino - [phone]  Click here to complete Healthcare Decision Makers including selection of the Healthcare Decision Maker Relationship (ie \"Primary\"). Today we documented Decision Maker(s) consistent with Legal Next of Kin hierarchy.     Content/Action Overview:  DECLINED ACP Conversation - will revisit periodically  Reviewed DNR/DNI and patient elects Full Code (Attempt Resuscitation)        Length of Voluntary ACP Conversation in minutes:  <16 minutes (Non-Billable)    Marisol Hernandez RN

## 2023-10-17 NOTE — DISCHARGE INSTR - DIET

## 2023-10-18 ENCOUNTER — CARE COORDINATION (OUTPATIENT)
Dept: CASE MANAGEMENT | Age: 83
End: 2023-10-18

## 2023-10-18 LAB
BACTERIA UR CULT: ABNORMAL
ORGANISM: ABNORMAL

## 2023-10-18 NOTE — CARE COORDINATION
Care Transitions Initial Follow Up Call    Call within 2 business days of discharge: Yes      Patient: Italo Overcast Patient : 1940   MRN: [id number]  Reason for Admission: 2 days -> acute cystitis w/ hematuria-frequent E Coli UTIs, hypoxia, CKD4, CAD s/p PCI to RCA 2017 and 2017, bradycardia, chronic dCHF, aortic stenosis s/p TAVR 2018, COPD no AE, Alzheimer's Dementia, essential tremor, anxiety, chronic pain, MARIA C-does not tolerate CPAP -> home with Senior , refused SNF, AM-PAC 15, Passport HHA (dtr) 5 days/wk, avoid driving at night, avoid cetirizine and hydroxyzine on same day  Discharge Date: 10/17/23 RARS: Readmission Risk Score: 11.1      Last Discharge Facility       Date Complaint Diagnosis Description Type Department Provider    10/15/23 Altered Mental Status Altered mental status, unspecified altered mental status type . .. ED to Hosp-Admission (Discharged) (ADMITTED) [address] [place] [address]; [facility]. .. 1027-spoke with patient. States she is waiting for a call about transportation and asks CTN to call back later. CTN confirmed with [facility]. that they have the referral for home care services and are working on getting SN out for [facility]. 1533-attempted call back to patient and unable to reach or leave message because vm not set up.      Follow Up  Future Appointments   Date Time Provider [address]   10/23/2023  4:00 PM KALI Sutton - CNP [facility] [place] [facility]   3/26/2024 12:00 PM KALI Sutton - [address]     Gibson Saleem, RN  Care Transition Nurse  [phone] mobile

## 2023-10-19 ENCOUNTER — CARE COORDINATION (OUTPATIENT)
Dept: CASE MANAGEMENT | Age: 83
End: 2023-10-19

## 2023-10-19 DIAGNOSIS — I10 BENIGN ESSENTIAL HTN: Primary | ICD-10-CM

## 2023-10-19 PROCEDURE — 1111F DSCHRG MED/CURRENT MED MERGE: CPT | Performed by: NURSE PRACTITIONER

## 2023-10-19 NOTE — CARE COORDINATION
Care Transitions Initial Follow Up Call    Call within 2 business days of discharge: Yes    Patient Current Location: Home: [address] [place] [address]    Care Transition Nurse contacted the patient by telephone to perform post hospital discharge assessment. Provided introduction to self, and explanation of the Care Transition Nurse role. Patient: Marycruz Rizvi Patient : 1940   MRN: [id number]  Reason for Admission: 2 days -> acute cystitis w/ hematuria-frequent E Coli UTIs, hypoxia, CKD4, CAD s/p PCI to RCA 2017 and 2017, bradycardia, chronic dCHF, aortic stenosis s/p TAVR 2018, COPD no AE, Alzheimer's Dementia, essential tremor, anxiety, chronic pain, MARIA C-does not tolerate CPAP -> home with Senior , refused SNF, AM-PAC 15, Passport HHA (dtr) 5 days/wk, avoid driving at night, avoid cetirizine and hydroxyzine on same day  Discharge Date: 10/17/23 RARS: Readmission Risk Score: 11.1      Last Discharge Facility       Date Complaint Diagnosis Description Type Department Provider    10/15/23 Altered Mental Status Altered mental status, unspecified altered mental status type . .. ED to Hosp-Admission (Discharged) (ADMITTED) [address] [place]; Jorge A Mejias. .. Challenges to be reviewed by the provider   Additional needs identified to be addressed with provider: No         Method of communication with provider: none. [facility] States someone was there this morning. She found out a family member passed away and has had a lot of phone calls. She did allow med review today. Someone is with her at this time. States she is doing okay. CTN was able to review date/time of TCM visit. She is aware and voiced understanding. Denied needs and politely ended call. Care Transition Nurse reviewed discharge instructions, medical action plan, and red flags with patient who verbalized understanding.  The patient was given an opportunity to ask

## 2023-10-20 LAB
BACTERIA BLD CULT ORG #2: NORMAL
BACTERIA BLD CULT: NORMAL

## 2023-10-25 ENCOUNTER — CARE COORDINATION (OUTPATIENT)
Dept: CASE MANAGEMENT | Age: 83
End: 2023-10-25

## 2023-10-25 NOTE — CARE COORDINATION
Care Transitions Follow Up Call      Patient: Santiago Chan  Patient : 1940   MRN: [id number]  Reason for Admission: 2 days -> acute cystitis w/ hematuria-frequent E Coli UTIs, hypoxia, CKD4, CAD s/p PCI to RCA 2017 and 2017, bradycardia, chronic dCHF, aortic stenosis s/p TAVR 2018, COPD no AE, Alzheimer's Dementia, essential tremor, anxiety, chronic pain, MARIA C-does not tolerate CPAP -> home with Senior HC, refused SNF, AM-PAC 15, Passport HHA (dtr) 5 days/wk, avoid driving at night, avoid cetirizine and hydroxyzine on same day  Discharge Date: 10/17/23 RARS: Readmission Risk Score: 11.1    Attempted to contact patient, but unable to reach or leave message at this time. CTN notes TCM visit on 10/23 canceled and not rescheduled at this time. Will outreach another time.      Follow Up  Future Appointments   Date Time Provider [address] [place] [address]   3/26/2024 12:00 PM Jose Mcguire APRN - [address]     Matthew Andrade, RN  Care Transition Nurse  [phone] mobile

## 2023-10-26 ENCOUNTER — CARE COORDINATION (OUTPATIENT)
Dept: CASE MANAGEMENT | Age: 83
End: 2023-10-26

## 2023-10-26 NOTE — CARE COORDINATION
Care Transitions Follow Up Call      Patient: Burt Sacks  Patient : 1940   MRN: [id number]  Reason for Admission: 2 days -> acute cystitis w/ hematuria-frequent E Coli UTIs, hypoxia, CKD4, CAD s/p PCI to RCA 2017 and 2017, bradycardia, chronic dCHF, aortic stenosis s/p TAVR 2018, COPD no AE, Alzheimer's Dementia, essential tremor, anxiety, chronic pain, MARIA C-does not tolerate CPAP -> home with Senior HC, refused SNF, AM-PAC 15, Passport HHA (dtr) 5 days/wk, avoid driving at night, avoid cetirizine and hydroxyzine on same day  Discharge Date: 10/17/23 RARS: Readmission Risk Score: 11.1    Unable to reach or leave message - mailbox not accepting messages. 2nd attempt. Care transition program ended at this time  UTR.      Follow Up  Future Appointments   Date Time Provider [address] [place] [address]   3/26/2024 12:00 PM Sia Aguirre, APRN - [address] Sabine Bautista RN  Care Transition Nurse  [phone] mobile

## 2023-11-02 RX ORDER — DICLOFENAC SODIUM 75 MG/1
TABLET, DELAYED RELEASE ORAL
Qty: 60 TABLET | Refills: 5 | Status: SHIPPED | OUTPATIENT
Start: 2023-11-02

## 2023-11-02 RX ORDER — POLYETHYLENE GLYCOL 3350 17 G/17G
POWDER ORAL
Qty: 510 G | Refills: 5 | Status: SHIPPED | OUTPATIENT
Start: 2023-11-02

## 2023-11-10 ENCOUNTER — OFFICE VISIT (OUTPATIENT)
Dept: FAMILY MEDICINE CLINIC | Age: 83
End: 2023-11-10

## 2023-11-10 VITALS
OXYGEN SATURATION: 91 % | HEIGHT: 60 IN | DIASTOLIC BLOOD PRESSURE: 67 MMHG | BODY MASS INDEX: 33.84 KG/M2 | HEART RATE: 74 BPM | WEIGHT: 172.38 LBS | SYSTOLIC BLOOD PRESSURE: 115 MMHG

## 2023-11-10 DIAGNOSIS — Z87.440 HISTORY OF UTI: ICD-10-CM

## 2023-11-10 DIAGNOSIS — Z87.440 HISTORY OF UTI: Primary | ICD-10-CM

## 2023-11-10 DIAGNOSIS — R41.0 ACUTE DELIRIUM: ICD-10-CM

## 2023-11-10 DIAGNOSIS — Z09 HOSPITAL DISCHARGE FOLLOW-UP: Primary | ICD-10-CM

## 2023-11-10 DIAGNOSIS — N30.01 ACUTE CYSTITIS WITH HEMATURIA: ICD-10-CM

## 2023-11-10 LAB
ALBUMIN SERPL-MCNC: 3.9 G/DL (ref 3.4–5)
ALBUMIN/GLOB SERPL: 1.9 {RATIO} (ref 1.1–2.2)
ALP SERPL-CCNC: 105 U/L (ref 40–129)
ALT SERPL-CCNC: 9 U/L (ref 10–40)
ANION GAP SERPL CALCULATED.3IONS-SCNC: 13 MMOL/L (ref 3–16)
AST SERPL-CCNC: 13 U/L (ref 15–37)
BILIRUB SERPL-MCNC: <0.2 MG/DL (ref 0–1)
BILIRUBIN, POC: NEGATIVE
BLOOD URINE, POC: ABNORMAL
BUN SERPL-MCNC: 21 MG/DL (ref 7–20)
CALCIUM SERPL-MCNC: 9.4 MG/DL (ref 8.3–10.6)
CHLORIDE SERPL-SCNC: 107 MMOL/L (ref 99–110)
CLARITY, POC: ABNORMAL
CO2 SERPL-SCNC: 24 MMOL/L (ref 21–32)
COLOR, POC: ABNORMAL
CREAT SERPL-MCNC: 2.2 MG/DL (ref 0.6–1.2)
GFR SERPLBLD CREATININE-BSD FMLA CKD-EPI: 21 ML/MIN/{1.73_M2}
GLUCOSE SERPL-MCNC: 106 MG/DL (ref 70–99)
GLUCOSE URINE, POC: NEGATIVE
KETONES, POC: ABNORMAL
LEUKOCYTE EST, POC: ABNORMAL
NITRITE, POC: NEGATIVE
PH, POC: 5.5
POTASSIUM SERPL-SCNC: 4.1 MMOL/L (ref 3.5–5.1)
PROT SERPL-MCNC: 6 G/DL (ref 6.4–8.2)
PROTEIN, POC: >=300
SODIUM SERPL-SCNC: 144 MMOL/L (ref 136–145)
SPECIFIC GRAVITY, POC: 1.03
UROBILINOGEN, POC: 0.2

## 2023-11-11 LAB
ANISOCYTOSIS BLD QL SMEAR: ABNORMAL
BASOPHILS # BLD: 0 K/UL (ref 0–0.2)
BASOPHILS NFR BLD: 0 %
DEPRECATED RDW RBC AUTO: 13.9 % (ref 12.4–15.4)
EOSINOPHIL # BLD: 0.3 K/UL (ref 0–0.6)
EOSINOPHIL NFR BLD: 3 %
HCT VFR BLD AUTO: 36.5 % (ref 36–48)
HGB BLD-MCNC: 12.1 G/DL (ref 12–16)
LYMPHOCYTES # BLD: 2.5 K/UL (ref 1–5.1)
LYMPHOCYTES NFR BLD: 27 %
MCH RBC QN AUTO: 28.3 PG (ref 26–34)
MCHC RBC AUTO-ENTMCNC: 33 G/DL (ref 31–36)
MCV RBC AUTO: 85.6 FL (ref 80–100)
MONOCYTES # BLD: 0.6 K/UL (ref 0–1.3)
MONOCYTES NFR BLD: 6 %
NEUTROPHILS # BLD: 6 K/UL (ref 1.7–7.7)
NEUTROPHILS NFR BLD: 55 %
NEUTS BAND NFR BLD MANUAL: 9 % (ref 0–7)
OVALOCYTES BLD QL SMEAR: ABNORMAL
PLATELET # BLD AUTO: 166 K/UL (ref 135–450)
PLATELET BLD QL SMEAR: ADEQUATE
PMV BLD AUTO: 9.5 FL (ref 5–10.5)
POIKILOCYTOSIS BLD QL SMEAR: ABNORMAL
RBC # BLD AUTO: 4.27 M/UL (ref 4–5.2)
SLIDE REVIEW: ABNORMAL
WBC # BLD AUTO: 9.3 K/UL (ref 4–11)

## 2023-11-12 LAB
BACTERIA UR CULT: ABNORMAL
ORGANISM: ABNORMAL

## 2023-11-13 DIAGNOSIS — R82.79 POSITIVE URINE CULTURE: Primary | ICD-10-CM

## 2023-11-13 RX ORDER — NITROFURANTOIN 25; 75 MG/1; MG/1
100 CAPSULE ORAL 2 TIMES DAILY
Qty: 20 CAPSULE | Refills: 0 | Status: SHIPPED | OUTPATIENT
Start: 2023-11-13 | End: 2023-11-23

## 2023-11-14 PROBLEM — N39.0 UTI (URINARY TRACT INFECTION): Status: RESOLVED | Noted: 2023-10-15 | Resolved: 2023-11-14

## 2023-11-25 DIAGNOSIS — K59.00 CONSTIPATION, UNSPECIFIED CONSTIPATION TYPE: ICD-10-CM

## 2023-11-27 RX ORDER — DOCUSATE SODIUM 100 MG/1
CAPSULE, LIQUID FILLED ORAL
Qty: 60 CAPSULE | Refills: 1 | Status: ON HOLD | OUTPATIENT
Start: 2023-11-27

## 2023-11-27 RX ORDER — TRAZODONE HYDROCHLORIDE 100 MG/1
TABLET ORAL
Qty: 60 TABLET | Refills: 2 | Status: ON HOLD | OUTPATIENT
Start: 2023-11-27

## 2023-11-30 DIAGNOSIS — L50.9 URTICARIA: ICD-10-CM

## 2023-11-30 RX ORDER — HYDROXYZINE HYDROCHLORIDE 25 MG/1
TABLET, FILM COATED ORAL
Qty: 90 TABLET | Refills: 1 | Status: ON HOLD | OUTPATIENT
Start: 2023-11-30

## 2023-11-30 RX ORDER — CETIRIZINE HYDROCHLORIDE 10 MG/1
TABLET ORAL
Qty: 90 TABLET | Refills: 1 | Status: ON HOLD | OUTPATIENT
Start: 2023-11-30

## 2023-12-03 ENCOUNTER — HOSPITAL ENCOUNTER (INPATIENT)
Age: 83
LOS: 9 days | Discharge: HOME HEALTH | DRG: 918 | End: 2023-12-12
Attending: STUDENT IN AN ORGANIZED HEALTH CARE EDUCATION/TRAINING PROGRAM | Admitting: INTERNAL MEDICINE
Payer: MEDICARE

## 2023-12-03 ENCOUNTER — APPOINTMENT (OUTPATIENT)
Dept: CT IMAGING | Age: 83
End: 2023-12-03
Payer: MEDICARE

## 2023-12-03 ENCOUNTER — HOSPITAL ENCOUNTER (EMERGENCY)
Age: 83
Discharge: ANOTHER HOSPITAL | End: 2023-12-03
Attending: EMERGENCY MEDICINE
Payer: MEDICARE

## 2023-12-03 ENCOUNTER — APPOINTMENT (OUTPATIENT)
Dept: GENERAL RADIOLOGY | Age: 83
End: 2023-12-03
Payer: MEDICARE

## 2023-12-03 VITALS
OXYGEN SATURATION: 95 % | HEART RATE: 57 BPM | BODY MASS INDEX: 34.4 KG/M2 | DIASTOLIC BLOOD PRESSURE: 56 MMHG | SYSTOLIC BLOOD PRESSURE: 118 MMHG | RESPIRATION RATE: 16 BRPM | TEMPERATURE: 97.6 F | WEIGHT: 176.15 LBS

## 2023-12-03 DIAGNOSIS — R25.1 TREMOR DUE TO DISORDER OF CNS: ICD-10-CM

## 2023-12-03 DIAGNOSIS — R41.82 ALTERED MENTAL STATUS, UNSPECIFIED ALTERED MENTAL STATUS TYPE: Primary | ICD-10-CM

## 2023-12-03 DIAGNOSIS — G96.9 TREMOR DUE TO DISORDER OF CNS: ICD-10-CM

## 2023-12-03 DIAGNOSIS — R74.8 ELEVATED CK: ICD-10-CM

## 2023-12-03 DIAGNOSIS — R29.2 HYPOREFLEXIA: ICD-10-CM

## 2023-12-03 DIAGNOSIS — R79.89 ELEVATED TROPONIN: ICD-10-CM

## 2023-12-03 LAB
ALBUMIN SERPL-MCNC: 3.7 G/DL (ref 3.4–5)
ALBUMIN/GLOB SERPL: 1.2 {RATIO} (ref 1.1–2.2)
ALP SERPL-CCNC: 82 U/L (ref 40–129)
ALT SERPL-CCNC: 6 U/L (ref 10–40)
AMPHETAMINES UR QL SCN>1000 NG/ML: NORMAL
ANION GAP SERPL CALCULATED.3IONS-SCNC: 13 MMOL/L (ref 3–16)
AST SERPL-CCNC: 25 U/L (ref 15–37)
BARBITURATES UR QL SCN>200 NG/ML: NORMAL
BASE EXCESS BLDA CALC-SCNC: 0.6 MMOL/L (ref -3–3)
BASE EXCESS BLDV CALC-SCNC: 0.4 MMOL/L (ref -3–3)
BASOPHILS # BLD: 0.1 K/UL (ref 0–0.2)
BASOPHILS NFR BLD: 1 %
BENZODIAZ UR QL SCN>200 NG/ML: NORMAL
BILIRUB SERPL-MCNC: <0.2 MG/DL (ref 0–1)
BILIRUB UR QL STRIP.AUTO: NEGATIVE
BUN SERPL-MCNC: 32 MG/DL (ref 7–20)
CALCIUM SERPL-MCNC: 9.2 MG/DL (ref 8.3–10.6)
CANNABINOIDS UR QL SCN>50 NG/ML: NORMAL
CHLORIDE SERPL-SCNC: 107 MMOL/L (ref 99–110)
CK SERPL-CCNC: 660 U/L (ref 26–192)
CLARITY UR: CLEAR
CO2 BLDA-SCNC: 27.7 MMOL/L
CO2 BLDV-SCNC: 28 MMOL/L
CO2 SERPL-SCNC: 25 MMOL/L (ref 21–32)
COCAINE UR QL SCN: NORMAL
COHGB MFR BLDA: 0.3 % (ref 0–1.5)
COHGB MFR BLDV: 2.7 % (ref 0–1.5)
COLOR UR: YELLOW
CREAT SERPL-MCNC: 2.9 MG/DL (ref 0.6–1.2)
DEPRECATED RDW RBC AUTO: 14.2 % (ref 12.4–15.4)
DRUG SCREEN COMMENT UR-IMP: NORMAL
EKG ATRIAL RATE: 59 BPM
EKG DIAGNOSIS: NORMAL
EKG P AXIS: 67 DEGREES
EKG P-R INTERVAL: 182 MS
EKG Q-T INTERVAL: 480 MS
EKG QRS DURATION: 104 MS
EKG QTC CALCULATION (BAZETT): 475 MS
EKG R AXIS: -5 DEGREES
EKG T AXIS: 14 DEGREES
EKG VENTRICULAR RATE: 59 BPM
EOSINOPHIL # BLD: 0.1 K/UL (ref 0–0.6)
EOSINOPHIL NFR BLD: 2 %
ETHANOLAMINE SERPL-MCNC: NORMAL MG/DL (ref 0–0.08)
FENTANYL SCREEN, URINE: NORMAL
GFR SERPLBLD CREATININE-BSD FMLA CKD-EPI: 15 ML/MIN/{1.73_M2}
GLUCOSE SERPL-MCNC: 91 MG/DL (ref 70–99)
GLUCOSE UR STRIP.AUTO-MCNC: NEGATIVE MG/DL
HCO3 BLDA-SCNC: 26.3 MMOL/L (ref 21–29)
HCO3 BLDV-SCNC: 26.2 MMOL/L (ref 23–29)
HCT VFR BLD AUTO: 33.2 % (ref 36–48)
HGB BLD-MCNC: 10.8 G/DL (ref 12–16)
HGB BLDA-MCNC: 11.1 G/DL (ref 12–16)
HGB UR QL STRIP.AUTO: NEGATIVE
INR PPP: 0.92 (ref 0.84–1.16)
KETONES UR STRIP.AUTO-MCNC: ABNORMAL MG/DL
LACTATE BLDV-SCNC: 0.9 MMOL/L (ref 0.4–2)
LEUKOCYTE ESTERASE UR QL STRIP.AUTO: NEGATIVE
LYMPHOCYTES # BLD: 1.1 K/UL (ref 1–5.1)
LYMPHOCYTES NFR BLD: 15.8 %
MAGNESIUM SERPL-MCNC: 1.9 MG/DL (ref 1.8–2.4)
MCH RBC QN AUTO: 27.6 PG (ref 26–34)
MCHC RBC AUTO-ENTMCNC: 32.4 G/DL (ref 31–36)
MCV RBC AUTO: 85.1 FL (ref 80–100)
METHADONE UR QL SCN>300 NG/ML: NORMAL
METHGB MFR BLDA: 0.3 %
METHGB MFR BLDV: 0.3 %
MONOCYTES # BLD: 0.6 K/UL (ref 0–1.3)
MONOCYTES NFR BLD: 8.7 %
NEUTROPHILS # BLD: 5.1 K/UL (ref 1.7–7.7)
NEUTROPHILS NFR BLD: 72.5 %
NITRITE UR QL STRIP.AUTO: NEGATIVE
O2 CT VFR BLDV CALC: 15 VOL %
O2 THERAPY: ABNORMAL
O2 THERAPY: ABNORMAL
OPIATES UR QL SCN>300 NG/ML: NORMAL
OXYCODONE UR QL SCN: NORMAL
PCO2 BLDA: 46.8 MMHG (ref 35–45)
PCO2 BLDV: 47 MMHG (ref 40–50)
PCP UR QL SCN>25 NG/ML: NORMAL
PH BLDA: 7.37 [PH] (ref 7.35–7.45)
PH BLDV: 7.36 [PH] (ref 7.35–7.45)
PH UR STRIP.AUTO: 5.5 [PH] (ref 5–8)
PH UR STRIP: 5.5 [PH]
PLATELET # BLD AUTO: 123 K/UL (ref 135–450)
PMV BLD AUTO: 9.4 FL (ref 5–10.5)
PO2 BLDA: 93.8 MMHG (ref 75–108)
PO2 BLDV: 56.7 MMHG (ref 25–40)
POTASSIUM SERPL-SCNC: 4.4 MMOL/L (ref 3.5–5.1)
PROT SERPL-MCNC: 6.7 G/DL (ref 6.4–8.2)
PROT UR STRIP.AUTO-MCNC: NEGATIVE MG/DL
PROTHROMBIN TIME: 12.3 SEC (ref 11.5–14.8)
RBC # BLD AUTO: 3.9 M/UL (ref 4–5.2)
SAO2 % BLDA: 96.9 %
SAO2 % BLDV: 88 %
SARS-COV-2 RDRP RESP QL NAA+PROBE: NOT DETECTED
SODIUM SERPL-SCNC: 145 MMOL/L (ref 136–145)
SP GR UR STRIP.AUTO: 1.01 (ref 1–1.03)
TROPONIN, HIGH SENSITIVITY: 31 NG/L (ref 0–14)
TROPONIN, HIGH SENSITIVITY: 34 NG/L (ref 0–14)
UA COMPLETE W REFLEX CULTURE PNL UR: ABNORMAL
UA DIPSTICK W REFLEX MICRO PNL UR: ABNORMAL
URN SPEC COLLECT METH UR: ABNORMAL
UROBILINOGEN UR STRIP-ACNC: 0.2 E.U./DL
WBC # BLD AUTO: 7 K/UL (ref 4–11)

## 2023-12-03 PROCEDURE — 93010 ELECTROCARDIOGRAM REPORT: CPT | Performed by: INTERNAL MEDICINE

## 2023-12-03 PROCEDURE — 82077 ASSAY SPEC XCP UR&BREATH IA: CPT

## 2023-12-03 PROCEDURE — 80053 COMPREHEN METABOLIC PANEL: CPT

## 2023-12-03 PROCEDURE — 1200000000 HC SEMI PRIVATE

## 2023-12-03 PROCEDURE — 70450 CT HEAD/BRAIN W/O DYE: CPT

## 2023-12-03 PROCEDURE — 93005 ELECTROCARDIOGRAM TRACING: CPT | Performed by: EMERGENCY MEDICINE

## 2023-12-03 PROCEDURE — 83605 ASSAY OF LACTIC ACID: CPT

## 2023-12-03 PROCEDURE — 85610 PROTHROMBIN TIME: CPT

## 2023-12-03 PROCEDURE — 6360000004 HC RX CONTRAST MEDICATION: Performed by: EMERGENCY MEDICINE

## 2023-12-03 PROCEDURE — 83735 ASSAY OF MAGNESIUM: CPT

## 2023-12-03 PROCEDURE — 82550 ASSAY OF CK (CPK): CPT

## 2023-12-03 PROCEDURE — 85025 COMPLETE CBC W/AUTO DIFF WBC: CPT

## 2023-12-03 PROCEDURE — 72125 CT NECK SPINE W/O DYE: CPT

## 2023-12-03 PROCEDURE — 99285 EMERGENCY DEPT VISIT HI MDM: CPT

## 2023-12-03 PROCEDURE — 82803 BLOOD GASES ANY COMBINATION: CPT

## 2023-12-03 PROCEDURE — 87635 SARS-COV-2 COVID-19 AMP PRB: CPT

## 2023-12-03 PROCEDURE — 70498 CT ANGIOGRAPHY NECK: CPT

## 2023-12-03 PROCEDURE — 84484 ASSAY OF TROPONIN QUANT: CPT

## 2023-12-03 PROCEDURE — 80307 DRUG TEST PRSMV CHEM ANLYZR: CPT

## 2023-12-03 PROCEDURE — 81003 URINALYSIS AUTO W/O SCOPE: CPT

## 2023-12-03 PROCEDURE — 36415 COLL VENOUS BLD VENIPUNCTURE: CPT

## 2023-12-03 PROCEDURE — 71045 X-RAY EXAM CHEST 1 VIEW: CPT

## 2023-12-03 RX ADMIN — IOMEPROL INJECTION 80 ML: 714 INJECTION, SOLUTION INTRAVASCULAR at 16:54

## 2023-12-03 ASSESSMENT — ENCOUNTER SYMPTOMS
ABDOMINAL PAIN: 0
SHORTNESS OF BREATH: 0
CHEST TIGHTNESS: 0
COUGH: 0

## 2023-12-03 NOTE — ED NOTES
Trent Pavon at the transfer center is paging the hospitalist at [facility].       Michaela Goldmann  12/03/23 1842       Michaela Goldmann  12/03/23 1843 Complex Repair And O-T Advancement Flap Text: The defect edges were debeveled with a #15 scalpel blade.  The primary defect was closed partially with a complex linear closure.  Given the location of the remaining defect, shape of the defect and the proximity to free margins an O-T advancement flap was deemed most appropriate for complete closure of the defect.  Using a sterile surgical marker, an appropriate advancement flap was drawn incorporating the defect and placing the expected incisions within the relaxed skin tension lines where possible.    The area thus outlined was incised deep to adipose tissue with a #15 scalpel blade.  The skin margins were undermined to an appropriate distance in all directions utilizing iris scissors.

## 2023-12-03 NOTE — ED NOTES
Upon assessment, pt independently moves all four extremities voluntarily. Pt is now alert and communicating appropriately.       Kurtis Johnson RN  12/03/23 6863

## 2023-12-03 NOTE — ED NOTES
Straight cath performed with RN and Medic. Sterile technique performed and pt tolerated well. Urine collected and walked to lab. Prior to removing clothes, pt was able to lift up hips to place chucks pad underneath her. Pt was straight cathed and then purewick applied.       Demarcus Pierre, [address] [place] [address]  12/03/23 0195

## 2023-12-03 NOTE — ED NOTES
MD at bedside for evaluation.    Pt to [facility], [address] [place] [address]  12/03/23 5401

## 2023-12-03 NOTE — ED NOTES
Pt calm and resting quietly with equal rise and fall of chest. Pt in NAD, RR even and unlabored. Side rails up, bed locked and in lowest position. Pt updated on plan of care. No needs at this time. Pt instructed on use of call light if needed.       Nereyda Del Toro, [address]  12/03/23 2533

## 2023-12-03 NOTE — ED NOTES
Pt placed on CMU, pulse ox, and NIBP. Pt given call light and instructed on its use. No new needs at this time. Will cont to monitor.       Shaunna Silvestre  12/03/23 2796

## 2023-12-03 NOTE — ED NOTES
C-collar applied prior to pt transferring to CT table.  Spinal precautions maintained throughout     Shaunna Rosenthal  12/03/23 4943

## 2023-12-04 LAB
ALBUMIN SERPL-MCNC: 3.4 G/DL (ref 3.4–5)
ALBUMIN/GLOB SERPL: 1.3 {RATIO} (ref 1.1–2.2)
ALP SERPL-CCNC: 84 U/L (ref 40–129)
ALT SERPL-CCNC: 10 U/L (ref 10–40)
AMMONIA PLAS-SCNC: 26 UMOL/L (ref 11–51)
ANION GAP SERPL CALCULATED.3IONS-SCNC: 12 MMOL/L (ref 3–16)
AST SERPL-CCNC: 23 U/L (ref 15–37)
BASE EXCESS BLDV CALC-SCNC: -0.9 MMOL/L (ref -3–3)
BASOPHILS # BLD: 0.1 K/UL (ref 0–0.2)
BASOPHILS NFR BLD: 2 %
BILIRUB SERPL-MCNC: 0.3 MG/DL (ref 0–1)
BUN SERPL-MCNC: 27 MG/DL (ref 7–20)
CALCIUM SERPL-MCNC: 8.8 MG/DL (ref 8.3–10.6)
CHLORIDE SERPL-SCNC: 106 MMOL/L (ref 99–110)
CO2 BLDV-SCNC: 27 MMOL/L
CO2 SERPL-SCNC: 26 MMOL/L (ref 21–32)
COHGB MFR BLDV: 2.5 % (ref 0–1.5)
CREAT SERPL-MCNC: 2.6 MG/DL (ref 0.6–1.2)
DEPRECATED RDW RBC AUTO: 13.9 % (ref 12.4–15.4)
EKG ATRIAL RATE: 72 BPM
EKG DIAGNOSIS: NORMAL
EKG P AXIS: 69 DEGREES
EKG P-R INTERVAL: 168 MS
EKG Q-T INTERVAL: 446 MS
EKG QRS DURATION: 106 MS
EKG QTC CALCULATION (BAZETT): 488 MS
EKG R AXIS: -14 DEGREES
EKG T AXIS: -5 DEGREES
EKG VENTRICULAR RATE: 72 BPM
EOSINOPHIL # BLD: 0.3 K/UL (ref 0–0.6)
EOSINOPHIL NFR BLD: 4.9 %
FOLATE SERPL-MCNC: >20 NG/ML (ref 4.78–24.2)
GFR SERPLBLD CREATININE-BSD FMLA CKD-EPI: 18 ML/MIN/{1.73_M2}
GLUCOSE SERPL-MCNC: 86 MG/DL (ref 70–99)
HCO3 BLDV-SCNC: 25.5 MMOL/L (ref 23–29)
HCT VFR BLD AUTO: 35.7 % (ref 36–48)
HGB BLD-MCNC: 11.5 G/DL (ref 12–16)
LYMPHOCYTES # BLD: 1.4 K/UL (ref 1–5.1)
LYMPHOCYTES NFR BLD: 25.6 %
MCH RBC QN AUTO: 27.7 PG (ref 26–34)
MCHC RBC AUTO-ENTMCNC: 32.1 G/DL (ref 31–36)
MCV RBC AUTO: 86.2 FL (ref 80–100)
METHGB MFR BLDV: 0.4 %
MONOCYTES # BLD: 0.4 K/UL (ref 0–1.3)
MONOCYTES NFR BLD: 7.8 %
NEUTROPHILS # BLD: 3.2 K/UL (ref 1.7–7.7)
NEUTROPHILS NFR BLD: 59.7 %
O2 THERAPY: ABNORMAL
PCO2 BLDV: 49.4 MMHG (ref 40–50)
PH BLDV: 7.33 [PH] (ref 7.35–7.45)
PLATELET # BLD AUTO: 134 K/UL (ref 135–450)
PMV BLD AUTO: 8.6 FL (ref 5–10.5)
PO2 BLDV: 48.4 MMHG (ref 25–40)
POTASSIUM SERPL-SCNC: 4.2 MMOL/L (ref 3.5–5.1)
PROT SERPL-MCNC: 6.1 G/DL (ref 6.4–8.2)
RBC # BLD AUTO: 4.14 M/UL (ref 4–5.2)
SAO2 % BLDV: 82 %
SODIUM SERPL-SCNC: 144 MMOL/L (ref 136–145)
TSH SERPL DL<=0.005 MIU/L-ACNC: 0.5 UIU/ML (ref 0.27–4.2)
VIT B12 SERPL-MCNC: >2000 PG/ML (ref 211–911)
WBC # BLD AUTO: 5.3 K/UL (ref 4–11)

## 2023-12-04 PROCEDURE — 97530 THERAPEUTIC ACTIVITIES: CPT

## 2023-12-04 PROCEDURE — 93010 ELECTROCARDIOGRAM REPORT: CPT | Performed by: INTERNAL MEDICINE

## 2023-12-04 PROCEDURE — 2700000000 HC OXYGEN THERAPY PER DAY

## 2023-12-04 PROCEDURE — 85025 COMPLETE CBC W/AUTO DIFF WBC: CPT

## 2023-12-04 PROCEDURE — 82746 ASSAY OF FOLIC ACID SERUM: CPT

## 2023-12-04 PROCEDURE — 97162 PT EVAL MOD COMPLEX 30 MIN: CPT

## 2023-12-04 PROCEDURE — 6360000002 HC RX W HCPCS: Performed by: STUDENT IN AN ORGANIZED HEALTH CARE EDUCATION/TRAINING PROGRAM

## 2023-12-04 PROCEDURE — 2580000003 HC RX 258: Performed by: STUDENT IN AN ORGANIZED HEALTH CARE EDUCATION/TRAINING PROGRAM

## 2023-12-04 PROCEDURE — 97535 SELF CARE MNGMENT TRAINING: CPT

## 2023-12-04 PROCEDURE — 82607 VITAMIN B-12: CPT

## 2023-12-04 PROCEDURE — 94761 N-INVAS EAR/PLS OXIMETRY MLT: CPT

## 2023-12-04 PROCEDURE — 84443 ASSAY THYROID STIM HORMONE: CPT

## 2023-12-04 PROCEDURE — 97116 GAIT TRAINING THERAPY: CPT

## 2023-12-04 PROCEDURE — 1200000000 HC SEMI PRIVATE

## 2023-12-04 PROCEDURE — 82803 BLOOD GASES ANY COMBINATION: CPT

## 2023-12-04 PROCEDURE — 93005 ELECTROCARDIOGRAM TRACING: CPT | Performed by: STUDENT IN AN ORGANIZED HEALTH CARE EDUCATION/TRAINING PROGRAM

## 2023-12-04 PROCEDURE — 97166 OT EVAL MOD COMPLEX 45 MIN: CPT

## 2023-12-04 PROCEDURE — 6370000000 HC RX 637 (ALT 250 FOR IP): Performed by: STUDENT IN AN ORGANIZED HEALTH CARE EDUCATION/TRAINING PROGRAM

## 2023-12-04 PROCEDURE — 82140 ASSAY OF AMMONIA: CPT

## 2023-12-04 PROCEDURE — 80053 COMPREHEN METABOLIC PANEL: CPT

## 2023-12-04 RX ORDER — POLYETHYLENE GLYCOL 3350 17 G/17G
17 POWDER, FOR SOLUTION ORAL DAILY PRN
Status: DISCONTINUED | OUTPATIENT
Start: 2023-12-04 | End: 2023-12-12 | Stop reason: HOSPADM

## 2023-12-04 RX ORDER — SODIUM CHLORIDE 9 MG/ML
INJECTION, SOLUTION INTRAVENOUS PRN
Status: DISCONTINUED | OUTPATIENT
Start: 2023-12-04 | End: 2023-12-12 | Stop reason: HOSPADM

## 2023-12-04 RX ORDER — ACETAMINOPHEN 650 MG/1
650 SUPPOSITORY RECTAL EVERY 6 HOURS PRN
Status: DISCONTINUED | OUTPATIENT
Start: 2023-12-04 | End: 2023-12-12 | Stop reason: HOSPADM

## 2023-12-04 RX ORDER — ONDANSETRON 4 MG/1
4 TABLET, ORALLY DISINTEGRATING ORAL EVERY 8 HOURS PRN
Status: DISCONTINUED | OUTPATIENT
Start: 2023-12-04 | End: 2023-12-12 | Stop reason: HOSPADM

## 2023-12-04 RX ORDER — ASPIRIN 81 MG/1
81 TABLET ORAL DAILY
Status: DISCONTINUED | OUTPATIENT
Start: 2023-12-04 | End: 2023-12-12 | Stop reason: HOSPADM

## 2023-12-04 RX ORDER — HEPARIN SODIUM 5000 [USP'U]/ML
5000 INJECTION, SOLUTION INTRAVENOUS; SUBCUTANEOUS EVERY 8 HOURS SCHEDULED
Status: DISCONTINUED | OUTPATIENT
Start: 2023-12-04 | End: 2023-12-12 | Stop reason: HOSPADM

## 2023-12-04 RX ORDER — ATORVASTATIN CALCIUM 40 MG/1
40 TABLET, FILM COATED ORAL DAILY
Status: DISCONTINUED | OUTPATIENT
Start: 2023-12-04 | End: 2023-12-12 | Stop reason: HOSPADM

## 2023-12-04 RX ORDER — SODIUM CHLORIDE 0.9 % (FLUSH) 0.9 %
5-40 SYRINGE (ML) INJECTION PRN
Status: DISCONTINUED | OUTPATIENT
Start: 2023-12-04 | End: 2023-12-12 | Stop reason: HOSPADM

## 2023-12-04 RX ORDER — PANTOPRAZOLE SODIUM 40 MG/1
40 TABLET, DELAYED RELEASE ORAL DAILY
Status: DISCONTINUED | OUTPATIENT
Start: 2023-12-04 | End: 2023-12-04

## 2023-12-04 RX ORDER — SODIUM CHLORIDE 0.9 % (FLUSH) 0.9 %
5-40 SYRINGE (ML) INJECTION EVERY 12 HOURS SCHEDULED
Status: DISCONTINUED | OUTPATIENT
Start: 2023-12-04 | End: 2023-12-12 | Stop reason: HOSPADM

## 2023-12-04 RX ORDER — ACETAMINOPHEN 325 MG/1
650 TABLET ORAL EVERY 6 HOURS PRN
Status: DISCONTINUED | OUTPATIENT
Start: 2023-12-04 | End: 2023-12-12 | Stop reason: HOSPADM

## 2023-12-04 RX ORDER — ALBUTEROL SULFATE 90 UG/1
1 AEROSOL, METERED RESPIRATORY (INHALATION) EVERY 6 HOURS PRN
Status: DISCONTINUED | OUTPATIENT
Start: 2023-12-04 | End: 2023-12-12 | Stop reason: HOSPADM

## 2023-12-04 RX ORDER — ONDANSETRON 2 MG/ML
4 INJECTION INTRAMUSCULAR; INTRAVENOUS EVERY 6 HOURS PRN
Status: DISCONTINUED | OUTPATIENT
Start: 2023-12-04 | End: 2023-12-12 | Stop reason: HOSPADM

## 2023-12-04 RX ADMIN — HEPARIN SODIUM 5000 UNITS: 5000 INJECTION INTRAVENOUS; SUBCUTANEOUS at 15:00

## 2023-12-04 RX ADMIN — HEPARIN SODIUM 5000 UNITS: 5000 INJECTION INTRAVENOUS; SUBCUTANEOUS at 23:42

## 2023-12-04 RX ADMIN — SODIUM CHLORIDE, PRESERVATIVE FREE 10 ML: 5 INJECTION INTRAVENOUS at 23:43

## 2023-12-04 ASSESSMENT — PAIN SCALES - GENERAL
PAINLEVEL_OUTOF10: 0

## 2023-12-04 ASSESSMENT — LIFESTYLE VARIABLES
HOW OFTEN DO YOU HAVE A DRINK CONTAINING ALCOHOL: NEVER
HOW MANY STANDARD DRINKS CONTAINING ALCOHOL DO YOU HAVE ON A TYPICAL DAY: PATIENT DOES NOT DRINK

## 2023-12-04 NOTE — PROGRESS NOTES
Physical Therapy  Facility/Department: [place] [address] - MED SURG/ORTHO  Physical Therapy Initial Assessment/Treatment    Name: Esperanza Burr  : 1940  MRN: [id number]  Date of Service: 2023    Discharge Recommendations:  Subacute/Skilled Nursing Facility   PT Equipment Recommendations  Equipment Needed: No    Therapy discharge recommendations take into account each patient's current medical complexities and are subject to input/oversight from the patient's healthcare team.     Barriers to Home Discharge:   [] Steps to access home entry or bed/bath:   [x] Unable to transfer, ambulate, or propel wheelchair household distances without assist   [x] Limited available assist at home upon discharge    [] Patient or family requests d/c to post-acute facility    [x] Poor cognition/safety awareness for d/c to home alone    [] Unable to maintain ordered weight bearing status    [] Patient with salient signs of long-standing immobility   [] Decreased independence with ADLs   [x] Increased risk for falls   [] Other:    If pt is unable to be seen after this session, please let this note serve as discharge summary. Please see case management note for discharge disposition. Thank you. Patient Diagnosis(es): There were no encounter diagnoses. Past Medical History:  has a past medical history of MARIO (acute kidney injury) ([address]), Altered mental status, Arthritis, BCC (basal cell carcinoma of skin), Bladder infection, Blurred vision, CAD (coronary artery disease), Cancer ([address]), CHF (congestive heart failure) ([address]), Chronic back pain, Closed head injury, COPD (chronic obstructive pulmonary disease) ([address]), Depression, Depression, Hypercholesteremia, Hypertension, Hypokalemia, Pain in shoulder, Painful total knee replacement, initial encounter ([address]), Reflux, Rheumatic fever, Sleep apnea, Syncope and collapse, TIA (transient ischemic attack), Urinary incontinence, Urinary tract infection in female, and Wears glasses.   Past

## 2023-12-04 NOTE — PLAN OF CARE
TODAY'S DATE:  12/4/2023      Current NIHSS AKILA previous a 9      Discussed personal risk factors for Stroke/TIA with patient/family, and ways to reduce the risk for a recurrent stroke. Patient's personal risk factors which were identified are:   []   Alcohol Abuse: check with your physician before any alcohol consumption. []   Atrial fibrillation: may cause blood clots  []   Drug Abuse: Seek help, talk with your doctor  []   Clotting Disorder  []   Diabetes  []   Family history of stroke or heart disease  []   High Blood Pressure/Hypertension: work with your physician  []   High cholesterol: monitor cholesterol levels with your physician  [x]   Overweight/Obesity: work with your physician for your ideal body weight  [x]   Physical Inactivity: get regular exercise as directed by your physician  []   Personal history of previous TIA or stroke  []   Poor Diet: decrease salt (sodium) in your diet, follow diet directed by physician  []   Smoking: stop smoking      Reviewed the Following Education with Patient and/or Family:   - Signs and Symptoms of Stroke  - Personal risk factors   - How to activate EMS (911)   - Importance of Follow Up Appointments at Discharge   - Importance of Compliance with Medications Prescribed at Discharge  - Available community resources and stroke advocacy groups if needed    Patient and/or family member: with no evidence of learning. Stroke Education booklet given to patient/family (or verified, if given already), which reviews above information.  yes         Electronically signed by Jeani Angelucci, RN on 12/4/2023 at 2:10 AM

## 2023-12-04 NOTE — ED NOTES
Pt Awake and Arousable to tactile stimulation but returns back to sleep quickly if not disturbed, pt NAD, resp e/u on 3L NC o2, transported out by EMS to [address] [place] [address], [address] [place] [address]  12/03/23 6485

## 2023-12-04 NOTE — ED NOTES
Report called to MARINO Lockhart all quesetions/concerns addressed.      Keesha Levy RN  12/03/23 3493

## 2023-12-04 NOTE — PROGRESS NOTES
Chart reviewed. Pt seen and examined and agree with plan of care. Pending MRI. Pt appears to be ao x 4. Pt is upset about her interaction with prior medical staff prior to admission to [facility]. May need to consider neuro eval if not improving in 24-48hr. Attempted to call daughter Dudley Ortega this evening and contact numbers are incorrect. Will try again tomorrow.

## 2023-12-04 NOTE — CARE COORDINATION
Chart reviewed day 1. Pt is followed by IM. Assessment completed and full assessment to be entered ASAP. Pt IPTA alone in apartment. Pt [facility] w/[facility] (through [facility]) 5x a week for 3hrs a day, dtr povides service through [employer]. Pt w/dementia and int confusion. Pt w/SNF recs: Dtrs 1st preference is [address] and 2nd is OV.  Electronically signed by Clarice Duong RN on 12/4/2023 at 3:58 PM

## 2023-12-04 NOTE — PROGRESS NOTES
Occupational Therapy  Facility/Department: [place] [id number] - MED SURG/ORTHO  Occupational Therapy Initial Assessment    Name: Jomar Hanson  : 1940  MRN: [id number]  Date of Service: 2023    Discharge Recommendations:  Subacute/Skilled Nursing Facility  Therapy discharge recommendations take into account each patient's current medical complexities and are subject to input/oversight from the patient's healthcare team.   Barriers to Home Discharge:     [x] Unable to transfer, ambulate, or propel wheelchair household distances without assist   [x] Limited available assist at home upon discharge    [x] Poor cognition/safety awareness for d/c to home alone      [x] Patient is at risk for falls due to: elma LE weakness/back pain      If pt is unable to be seen after this session, please let this note serve as discharge summary. Please see case management note for discharge disposition. Thank you. Patient Diagnosis(es): There were no encounter diagnoses. Past Medical History:  has a past medical history of MARIO (acute kidney injury) ([address]), Altered mental status, Arthritis, BCC (basal cell carcinoma of skin), Bladder infection, Blurred vision, CAD (coronary artery disease), Cancer ([address]), CHF (congestive heart failure) ([address]), Chronic back pain, Closed head injury, COPD (chronic obstructive pulmonary disease) ([address]), Depression, Depression, Hypercholesteremia, Hypertension, Hypokalemia, Pain in shoulder, Painful total knee replacement, initial encounter ([address]), Reflux, Rheumatic fever, Sleep apnea, Syncope and collapse, TIA (transient ischemic attack), Urinary incontinence, Urinary tract infection in female, and Wears glasses. Past Surgical History:  has a past surgical history that includes bladder repair; Hysterectomy; Neck surgery; shoulder surgery; Carpal tunnel release; Tonsillectomy; Appendectomy; Upper gastrointestinal endoscopy (11); Spine surgery ();  Spinal fixation surgery with implant

## 2023-12-05 ENCOUNTER — APPOINTMENT (OUTPATIENT)
Dept: GENERAL RADIOLOGY | Age: 83
DRG: 918 | End: 2023-12-05
Attending: STUDENT IN AN ORGANIZED HEALTH CARE EDUCATION/TRAINING PROGRAM
Payer: MEDICARE

## 2023-12-05 LAB
ALBUMIN SERPL-MCNC: 3.5 G/DL (ref 3.4–5)
ANION GAP SERPL CALCULATED.3IONS-SCNC: 13 MMOL/L (ref 3–16)
BASOPHILS # BLD: 0.1 K/UL (ref 0–0.2)
BASOPHILS NFR BLD: 0.9 %
BUN SERPL-MCNC: 19 MG/DL (ref 7–20)
CALCIUM SERPL-MCNC: 9 MG/DL (ref 8.3–10.6)
CHLORIDE SERPL-SCNC: 104 MMOL/L (ref 99–110)
CK SERPL-CCNC: 591 U/L (ref 26–192)
CO2 SERPL-SCNC: 25 MMOL/L (ref 21–32)
CREAT SERPL-MCNC: 1.7 MG/DL (ref 0.6–1.2)
DEPRECATED RDW RBC AUTO: 13.7 % (ref 12.4–15.4)
EOSINOPHIL # BLD: 0.1 K/UL (ref 0–0.6)
EOSINOPHIL NFR BLD: 0.6 %
GFR SERPLBLD CREATININE-BSD FMLA CKD-EPI: 29 ML/MIN/{1.73_M2}
GLUCOSE SERPL-MCNC: 124 MG/DL (ref 70–99)
HCT VFR BLD AUTO: 36 % (ref 36–48)
HGB BLD-MCNC: 11.7 G/DL (ref 12–16)
LACTATE BLDV-SCNC: 1.3 MMOL/L (ref 0.4–2)
LYMPHOCYTES # BLD: 1.5 K/UL (ref 1–5.1)
LYMPHOCYTES NFR BLD: 18.4 %
MAGNESIUM SERPL-MCNC: 1.9 MG/DL (ref 1.8–2.4)
MCH RBC QN AUTO: 27.8 PG (ref 26–34)
MCHC RBC AUTO-ENTMCNC: 32.4 G/DL (ref 31–36)
MCV RBC AUTO: 85.9 FL (ref 80–100)
MONOCYTES # BLD: 0.6 K/UL (ref 0–1.3)
MONOCYTES NFR BLD: 7 %
NEUTROPHILS # BLD: 5.9 K/UL (ref 1.7–7.7)
NEUTROPHILS NFR BLD: 73.1 %
PHOSPHATE SERPL-MCNC: 2.1 MG/DL (ref 2.5–4.9)
PLATELET # BLD AUTO: 143 K/UL (ref 135–450)
PMV BLD AUTO: 8.5 FL (ref 5–10.5)
POTASSIUM SERPL-SCNC: 4 MMOL/L (ref 3.5–5.1)
RBC # BLD AUTO: 4.19 M/UL (ref 4–5.2)
SODIUM SERPL-SCNC: 142 MMOL/L (ref 136–145)
WBC # BLD AUTO: 8.1 K/UL (ref 4–11)

## 2023-12-05 PROCEDURE — 85025 COMPLETE CBC W/AUTO DIFF WBC: CPT

## 2023-12-05 PROCEDURE — 74018 RADEX ABDOMEN 1 VIEW: CPT

## 2023-12-05 PROCEDURE — 2580000003 HC RX 258: Performed by: STUDENT IN AN ORGANIZED HEALTH CARE EDUCATION/TRAINING PROGRAM

## 2023-12-05 PROCEDURE — 83605 ASSAY OF LACTIC ACID: CPT

## 2023-12-05 PROCEDURE — 2700000000 HC OXYGEN THERAPY PER DAY

## 2023-12-05 PROCEDURE — 80069 RENAL FUNCTION PANEL: CPT

## 2023-12-05 PROCEDURE — 6370000000 HC RX 637 (ALT 250 FOR IP): Performed by: NURSE PRACTITIONER

## 2023-12-05 PROCEDURE — 1200000000 HC SEMI PRIVATE

## 2023-12-05 PROCEDURE — 83735 ASSAY OF MAGNESIUM: CPT

## 2023-12-05 PROCEDURE — 6360000002 HC RX W HCPCS: Performed by: NURSE PRACTITIONER

## 2023-12-05 PROCEDURE — 36415 COLL VENOUS BLD VENIPUNCTURE: CPT

## 2023-12-05 PROCEDURE — 94761 N-INVAS EAR/PLS OXIMETRY MLT: CPT

## 2023-12-05 PROCEDURE — 97116 GAIT TRAINING THERAPY: CPT

## 2023-12-05 PROCEDURE — 82550 ASSAY OF CK (CPK): CPT

## 2023-12-05 PROCEDURE — 97530 THERAPEUTIC ACTIVITIES: CPT

## 2023-12-05 RX ORDER — OLANZAPINE 5 MG/1
5 TABLET, ORALLY DISINTEGRATING ORAL ONCE
Status: COMPLETED | OUTPATIENT
Start: 2023-12-05 | End: 2023-12-05

## 2023-12-05 RX ORDER — HYDRALAZINE HYDROCHLORIDE 20 MG/ML
10 INJECTION INTRAMUSCULAR; INTRAVENOUS ONCE
Status: COMPLETED | OUTPATIENT
Start: 2023-12-05 | End: 2023-12-05

## 2023-12-05 RX ORDER — GABAPENTIN 100 MG/1
100 CAPSULE ORAL 2 TIMES DAILY
Status: ON HOLD | COMMUNITY
End: 2023-12-06 | Stop reason: CLARIF

## 2023-12-05 RX ORDER — FAMOTIDINE 20 MG/1
20 TABLET, FILM COATED ORAL DAILY
COMMUNITY

## 2023-12-05 RX ADMIN — OLANZAPINE 5 MG: 5 TABLET, ORALLY DISINTEGRATING ORAL at 23:08

## 2023-12-05 RX ADMIN — SODIUM CHLORIDE, PRESERVATIVE FREE 10 ML: 5 INJECTION INTRAVENOUS at 22:16

## 2023-12-05 RX ADMIN — HYDRALAZINE HYDROCHLORIDE 10 MG: 20 INJECTION, SOLUTION INTRAMUSCULAR; INTRAVENOUS at 22:09

## 2023-12-05 ASSESSMENT — PAIN SCALES - GENERAL
PAINLEVEL_OUTOF10: 0
PAINLEVEL_OUTOF10: 9

## 2023-12-05 ASSESSMENT — PAIN DESCRIPTION - ORIENTATION: ORIENTATION: POSTERIOR

## 2023-12-05 ASSESSMENT — PAIN DESCRIPTION - DESCRIPTORS: DESCRIPTORS: ACHING

## 2023-12-05 ASSESSMENT — PAIN DESCRIPTION - LOCATION: LOCATION: BACK;NECK;SHOULDER

## 2023-12-05 NOTE — PROGRESS NOTES
Hospital Medicine Progress Note      Date of Admission: 12/3/2023  Hospital Day: 3    Chief Admission Complaint:  AMS, fall     Subjective:  resting in chair, irritated about some social security paperwork being taken from her, again attempted to call Sonya this evening but phone disconnected    Presenting Admission History:         Dyan Leon is a 79 y.o. female with Alzheimer's dementia, hypertension, hyperlipidemia, obesity/MARIA C, essential tremor, chronic diastolic heart failure,Hx of TAVR, suspected COPD, CKD stage IV, tobacco use presented as a transfer from [address] [place] [address] with AMS. Patient was brought in by EMS to [place] ER after her daughter found her slumped in the chair and unable to get up. Patient was admitted a month ago for very similar presentation at [facility]. Additionally there was also complaint of frequent falls over the past 2 weeks. Upon arrival to ED at [place], patient underwent CT brain CT cervical spine and CTA head and neck which did not reveal any acute abnormalities except for mentioned above. Patient was accepted by my colleague earlier yesterday afternoon. Upon arrival to [facility], patient was alert oriented x 3, conversational but somnolent. The only thing that she could tell me was she has been having difficulty ambulating and has been falling. Patient lives alone. Assessment/Plan:      Current Principal Problem:  Altered mental status      Altered mental status, manifesting as disorientation, somnolence & difficulty ambulating  -Improving, alert oriented x 3 during my evaluation, conversational but still somnolent  Likely related to polypharmacy in the setting of decreased reserve due to Alzheimer's dementia, suspicion for CVA remains but low based on lack of lateralization or focal symptoms. LKN unknown approximately more than 24 hours, NIH 9 on arrival to ED  CT brain and CT cervical spine personally reviewed, no acute hemorrhage, fracture or dislocation.  CTA head and neck

## 2023-12-05 NOTE — PROGRESS NOTES
Phillipr contacted Dr. Carroll [place] office the office stated they had gotten a new computer system and the information had not transferred over. Writer was told to call [employer] at [phone]. Attempt/message left will continue to reach out for information.

## 2023-12-05 NOTE — PROGRESS NOTES
[employer] ([phone])[facility] and sent information on pain pump placement.  Information placed in patients chart

## 2023-12-05 NOTE — CARE COORDINATION
Case Management Assessment  Initial Evaluation    Date/Time of Evaluation: 12/5/2023 9:13 AM  Assessment Completed by: Arjun Chau RN    If patient is discharged prior to next notation, then this note serves as note for discharge by case management. Patient Name: Nav Main                   YOB: 1940  Diagnosis: Altered mental status [R41.82]                   Date / Time: 12/3/2023 11:53 PM    Patient Admission Status: Inpatient   Readmission Risk (Low < 19, Mod (19-27), High > 27): Readmission Risk Score: 16.5    Current PCP: KAIL Stark CNP  PCP verified by CM? Yes    Chart Reviewed: Yes      History Provided by: Child/Family  Patient Orientation: Alert and Oriented, Person, Place, Situation, Self (int confusion)    Patient Cognition: Dementia / Early Alzheimer's    Hospitalization in the last 30 days (Readmission):  No    If yes, Readmission Assessment in  Navigator will be completed. Advance Directives:      Code Status: Full Code   Patient's Primary Decision Maker is: Legal Next of Kin    Primary Decision MakeIdalia Hernandez Child - [phone]    Discharge Planning:    Patient lives with: Alone Type of Home: Apartment  Primary Care Giver:  Other (Comment) (Self vs [facility] staff)  Patient Support Systems include: Children   Current Financial resources: Medicaid, Medicare  Current community resources: ECF/Home Care  Current services prior to admission: Home Care            Current DME:              Type of Home Care services:  [employer]    ADLS  Prior functional level: Assistance with the following:, Cooking, Housework, Shopping  Current functional level: Assistance with the following:, Bathing, Dressing, Toileting, Cooking, Housework, Shopping, Mobility    PT AM-PAC: 17 /24  OT AM-PAC: 16 /24    Family can provide assistance at DC: Yes (5x a week 3hrs a day)  Would you like Case Management to discuss the discharge plan with any other family members/significant

## 2023-12-05 NOTE — PROGRESS NOTES
Physical Therapy  Facility/Department: [facility] C5 - MED SURG/ORTHO  Daily Treatment Note  NAME: Ion Calderon  : 1940  MRN: [id number]    Date of Service: 2023    Discharge Recommendations:  Subacute/Skilled Nursing Facility   PT Equipment Recommendations  Equipment Needed: No    Patient Diagnosis(es): There were no encounter diagnoses. Assessment   Assessment: Pt seems confused, agitated and defensive. Pt did particpate in ther toileting, gait and hand hygiene with RW. Recommended for con't skilled PT and SNF at D/C. Activity Tolerance: Patient tolerated evaluation without incident;Treatment limited secondary to decreased cognition;Patient limited by fatigue  Equipment Needed: No     Plan    Physical Therapy Plan  General Plan: 3-5 times per week  Current Treatment Recommendations: Strengthening;Gait training;Balance training;Functional mobility training;Transfer training; Endurance training;Pain management; Neuromuscular re-education; Safety education & training;Patient/Caregiver education & training;Home exercise program;Therapeutic activities     Restrictions  Restrictions/Precautions  Restrictions/Precautions: Fall Risk, General Precautions  Position Activity Restriction  Other position/activity restrictions: up with assist,     Subjective    Subjective  Subjective: Pt confused, thinks staff is listening to all of her conversations thru her call bell, is convinced they put something in her food to make it dry(shows me the meal ticket) and that the nurses are not allowing her visitors  Pain: denies pain at rest  Cognition  Overall Cognitive Status: Exceptions  Following Commands:  Follows one step commands with increased time  Attention Span: Attends with cues to redirect  Memory: Decreased recall of recent events;Decreased recall of precautions  Safety Judgement: Decreased awareness of need for assistance;Decreased awareness of need for safety  Insights: Decreased awareness of deficits  Initiation:

## 2023-12-05 NOTE — CARE COORDINATION
Chart reviewed day 2. Pt is followed by IM. Awaiting MRI. Pt w/improved mentation. Pt w/SNF recs, per pt preference referrals placed to [facility] and [facility]. Awaiting response from [facility], [address] [place] [address] Ave. can accept which is pts 2nd choice. Pt IPTA alone. Will follow for needs as they arise. Electronically signed by Rios Nolasco RN on 12/5/2023 at 9:06 AM    [facility] can accept pt, will need 24 hr free avasure.  Electronically signed by Rios Nolasco RN on 12/5/2023 at 11:41 AM

## 2023-12-06 ENCOUNTER — APPOINTMENT (OUTPATIENT)
Dept: MRI IMAGING | Age: 83
DRG: 918 | End: 2023-12-06
Attending: STUDENT IN AN ORGANIZED HEALTH CARE EDUCATION/TRAINING PROGRAM
Payer: MEDICARE

## 2023-12-06 ENCOUNTER — APPOINTMENT (OUTPATIENT)
Dept: GENERAL RADIOLOGY | Age: 83
DRG: 918 | End: 2023-12-06
Attending: STUDENT IN AN ORGANIZED HEALTH CARE EDUCATION/TRAINING PROGRAM
Payer: MEDICARE

## 2023-12-06 LAB
ALBUMIN SERPL-MCNC: 3.5 G/DL (ref 3.4–5)
ANION GAP SERPL CALCULATED.3IONS-SCNC: 13 MMOL/L (ref 3–16)
BASOPHILS # BLD: 0.1 K/UL (ref 0–0.2)
BASOPHILS NFR BLD: 0.9 %
BUN SERPL-MCNC: 17 MG/DL (ref 7–20)
CALCIUM SERPL-MCNC: 9.2 MG/DL (ref 8.3–10.6)
CHLORIDE SERPL-SCNC: 104 MMOL/L (ref 99–110)
CO2 SERPL-SCNC: 22 MMOL/L (ref 21–32)
CREAT SERPL-MCNC: 1.4 MG/DL (ref 0.6–1.2)
DEPRECATED RDW RBC AUTO: 13.7 % (ref 12.4–15.4)
EOSINOPHIL # BLD: 0.1 K/UL (ref 0–0.6)
EOSINOPHIL NFR BLD: 1.5 %
GFR SERPLBLD CREATININE-BSD FMLA CKD-EPI: 37 ML/MIN/{1.73_M2}
GLUCOSE SERPL-MCNC: 90 MG/DL (ref 70–99)
HCT VFR BLD AUTO: 35.9 % (ref 36–48)
HGB BLD-MCNC: 11.7 G/DL (ref 12–16)
LYMPHOCYTES # BLD: 1.5 K/UL (ref 1–5.1)
LYMPHOCYTES NFR BLD: 17.8 %
MAGNESIUM SERPL-MCNC: 2 MG/DL (ref 1.8–2.4)
MCH RBC QN AUTO: 28 PG (ref 26–34)
MCHC RBC AUTO-ENTMCNC: 32.7 G/DL (ref 31–36)
MCV RBC AUTO: 85.8 FL (ref 80–100)
MONOCYTES # BLD: 0.7 K/UL (ref 0–1.3)
MONOCYTES NFR BLD: 8 %
NEUTROPHILS # BLD: 6 K/UL (ref 1.7–7.7)
NEUTROPHILS NFR BLD: 71.8 %
PHOSPHATE SERPL-MCNC: 2.2 MG/DL (ref 2.5–4.9)
PLATELET # BLD AUTO: 143 K/UL (ref 135–450)
PMV BLD AUTO: 8.4 FL (ref 5–10.5)
POTASSIUM SERPL-SCNC: 4.1 MMOL/L (ref 3.5–5.1)
RBC # BLD AUTO: 4.19 M/UL (ref 4–5.2)
REASON FOR REJECTION: NORMAL
REJECTED TEST: NORMAL
SODIUM SERPL-SCNC: 139 MMOL/L (ref 136–145)
WBC # BLD AUTO: 8.4 K/UL (ref 4–11)

## 2023-12-06 PROCEDURE — 2700000000 HC OXYGEN THERAPY PER DAY

## 2023-12-06 PROCEDURE — 97116 GAIT TRAINING THERAPY: CPT

## 2023-12-06 PROCEDURE — 6360000002 HC RX W HCPCS: Performed by: STUDENT IN AN ORGANIZED HEALTH CARE EDUCATION/TRAINING PROGRAM

## 2023-12-06 PROCEDURE — 6370000000 HC RX 637 (ALT 250 FOR IP): Performed by: STUDENT IN AN ORGANIZED HEALTH CARE EDUCATION/TRAINING PROGRAM

## 2023-12-06 PROCEDURE — 97530 THERAPEUTIC ACTIVITIES: CPT

## 2023-12-06 PROCEDURE — 94761 N-INVAS EAR/PLS OXIMETRY MLT: CPT

## 2023-12-06 PROCEDURE — 74018 RADEX ABDOMEN 1 VIEW: CPT

## 2023-12-06 PROCEDURE — 2580000003 HC RX 258: Performed by: STUDENT IN AN ORGANIZED HEALTH CARE EDUCATION/TRAINING PROGRAM

## 2023-12-06 PROCEDURE — 97535 SELF CARE MNGMENT TRAINING: CPT

## 2023-12-06 PROCEDURE — 80069 RENAL FUNCTION PANEL: CPT

## 2023-12-06 PROCEDURE — 6370000000 HC RX 637 (ALT 250 FOR IP): Performed by: INTERNAL MEDICINE

## 2023-12-06 PROCEDURE — 1200000000 HC SEMI PRIVATE

## 2023-12-06 PROCEDURE — 85025 COMPLETE CBC W/AUTO DIFF WBC: CPT

## 2023-12-06 PROCEDURE — 97110 THERAPEUTIC EXERCISES: CPT

## 2023-12-06 PROCEDURE — 70551 MRI BRAIN STEM W/O DYE: CPT

## 2023-12-06 PROCEDURE — 83735 ASSAY OF MAGNESIUM: CPT

## 2023-12-06 PROCEDURE — 36415 COLL VENOUS BLD VENIPUNCTURE: CPT

## 2023-12-06 RX ORDER — HYDRALAZINE HYDROCHLORIDE 20 MG/ML
5 INJECTION INTRAMUSCULAR; INTRAVENOUS EVERY 6 HOURS PRN
Status: DISCONTINUED | OUTPATIENT
Start: 2023-12-06 | End: 2023-12-12 | Stop reason: HOSPADM

## 2023-12-06 RX ORDER — AMLODIPINE BESYLATE 2.5 MG/1
2.5 TABLET ORAL DAILY
Status: DISCONTINUED | OUTPATIENT
Start: 2023-12-06 | End: 2023-12-06

## 2023-12-06 RX ORDER — HYDRALAZINE HYDROCHLORIDE 10 MG/1
10 TABLET, FILM COATED ORAL EVERY 8 HOURS SCHEDULED
Status: DISCONTINUED | OUTPATIENT
Start: 2023-12-06 | End: 2023-12-11

## 2023-12-06 RX ORDER — AMLODIPINE BESYLATE 5 MG/1
5 TABLET ORAL DAILY
Status: DISCONTINUED | OUTPATIENT
Start: 2023-12-07 | End: 2023-12-07 | Stop reason: SDUPTHER

## 2023-12-06 RX ADMIN — SODIUM CHLORIDE, PRESERVATIVE FREE 10 ML: 5 INJECTION INTRAVENOUS at 08:13

## 2023-12-06 RX ADMIN — AMLODIPINE BESYLATE 2.5 MG: 2.5 TABLET ORAL at 09:53

## 2023-12-06 RX ADMIN — HYDRALAZINE HYDROCHLORIDE 10 MG: 10 TABLET, FILM COATED ORAL at 20:16

## 2023-12-06 RX ADMIN — SODIUM CHLORIDE, PRESERVATIVE FREE 5 ML: 5 INJECTION INTRAVENOUS at 20:16

## 2023-12-06 RX ADMIN — HEPARIN SODIUM 5000 UNITS: 5000 INJECTION INTRAVENOUS; SUBCUTANEOUS at 14:21

## 2023-12-06 RX ADMIN — HEPARIN SODIUM 5000 UNITS: 5000 INJECTION INTRAVENOUS; SUBCUTANEOUS at 06:35

## 2023-12-06 RX ADMIN — ATORVASTATIN CALCIUM 40 MG: 40 TABLET, FILM COATED ORAL at 08:13

## 2023-12-06 ASSESSMENT — PAIN SCALES - GENERAL: PAINLEVEL_OUTOF10: 5

## 2023-12-06 ASSESSMENT — PAIN DESCRIPTION - LOCATION: LOCATION: BACK

## 2023-12-06 NOTE — PROGRESS NOTES
Hospital Medicine Progress Note      Date of Admission: 12/3/2023  Hospital Day: 4    Chief Admission Complaint:  AMS, fall     Subjective:  resting in chair, son at bedside(feels pt improved and near baseline)    Presenting Admission History:         Chiquita Lesches is a 79 y.o. female with Alzheimer's dementia, hypertension, hyperlipidemia, obesity/MARIA C, essential tremor, chronic diastolic heart failure,Hx of TAVR, suspected COPD, CKD stage IV, tobacco use presented as a transfer from [address] [place] [address] with AMS. Patient was brought in by EMS to [place] ER after her daughter found her slumped in the chair and unable to get up. Patient was admitted a month ago for very similar presentation at [facility]. Additionally there was also complaint of frequent falls over the past 2 weeks. Upon arrival to ED at [place], patient underwent CT brain CT cervical spine and CTA head and neck which did not reveal any acute abnormalities except for mentioned above. Patient was accepted by my colleague earlier yesterday afternoon. Upon arrival to [facility], patient was alert oriented x 3, conversational but somnolent. The only thing that she could tell me was she has been having difficulty ambulating and has been falling. Patient lives alone. Assessment/Plan:      Current Principal Problem:  Altered mental status      Altered mental status, manifesting as disorientation, somnolence & difficulty ambulating  -Improving, alert oriented x 3 during my evaluation, conversational but still somnolent  Likely related to polypharmacy in the setting of decreased reserve due to Alzheimer's dementia, suspicion for CVA remains but low based on lack of lateralization or focal symptoms. LKN unknown approximately more than 24 hours, NIH 9 on arrival to ED  CT brain and CT cervical spine personally reviewed, no acute hemorrhage, fracture or dislocation. 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   Urine drug screen ORG Escherichia coli 11/10/2023 11:25 AM         Kasey Ocasio MD

## 2023-12-06 NOTE — CARE COORDINATION
Chart reviewed day 3. Pt is followed by IM. Cert placed today by Annamarie Cotto at [facility]. Pts AvaSure removed today @ -28-07-80. MRI being completed today. Anticipate d/c tomorrow after pt has been AvaSure free for 24 hrs. Pt IPTA alone, unable to safely to return home at this time. Will follow for needs as they allow.  Electronically signed by Eleazar Guardado RN on 12/6/2023 at 11:01 AM

## 2023-12-06 NOTE — PROGRESS NOTES
(RW)  Transfer Training  Transfer Training: Yes  Interventions: Verbal cues  Sit to Stand: Stand-by assistance (to R with HOB elevated and use of bed rail)  Stand to Sit: Stand-by assistance  Bed to Chair: Stand-by assistance  Toilet Transfer: Contact-guard assistance; Additional time; Adaptive equipment (use of grabs)  Gait Training  Gait Training: Yes  Gait  Overall Level of Assistance: Contact-guard assistance;Stand-by assistance  Distance (ft): 15 Feet (X 2 + 3')  Assistive Device: Walker, rolling;Gait belt  Interventions: Verbal cues; Safety awareness training  Base of Support: Narrowed  Speed/Shira: Slow  Step Length: Right shortened;Left shortened     PT Exercises  Exercise Treatment: BLE setaed exs: AP, KAREN, malcolm X 15     Safety Devices  Type of Devices: Left in chair;Chair alarm in place;Call light within reach;Nurse notified;Gait belt;Telesitter in use       Goals  Short Term Goals  Time Frame for Short Term Goals: 7 days (12/11/23) unless otherwise noted  Short Term Goal 1: Pt will perform bed mobility IND; 12/6 CGA/SBA  Short Term Goal 2: Pt will perform transfers with supervision and RW; 12/6 progressing  Short Term Goal 3: Pt will ambulate 50 ft with RW and supervision; 12/6 progressing  Short Term Goal 4: Pt will perform 12-15 reps of BLE exercises by 12/7/23; 12/6 met  Patient Goals   Patient Goals : \"to go home\"    Education  Patient Education  Education Given To: Patient  Education Provided: Role of Therapy;Plan of Care;Transfer Training;Precautions; Equipment; Fall Prevention Strategies  Barriers to Learning: Cognition; Hearing  Education Outcome: Verbalized understanding;Continued education needed    AM-PAC - Mobility    AM-PAC Basic Mobility - Inpatient   How much help is needed turning from your back to your side while in a flat bed without using bedrails?: None  How much help is needed moving from lying on your back to sitting on the side of a flat bed without using bedrails?: A Little  How much help is needed moving to and from a bed to a chair?: A Little  How much help is needed standing up from a chair using your arms?: A Little  How much help is needed walking in hospital room?: A Little  How much help is needed climbing 3-5 steps with a railing?: A Lot  AM-PAC Inpatient Mobility Raw Score : 18  AM-PAC Inpatient T-Scale Score : 43.63  Mobility Inpatient CMS 0-100% Score: 46.58  Mobility Inpatient CMS G-Code Modifier : CK         Therapy Time   Individual Concurrent Group Co-treatment   Time In 0921         Time Out 1000         Minutes 3 [facility], [address] [place] [address]

## 2023-12-06 NOTE — PROGRESS NOTES
Occupational Therapy  Facility/Department: [facility] C5 - MED SURG/ORTHO  Daily Treatment Note  NAME: Yousuf Burns  : 1940  MRN: [id number]    Date of Service: 2023    Discharge Recommendations:  Subacute/Skilled Nursing Facility       Therapy discharge recommendations take into account each patient's current medical complexities and are subject to input/oversight from the patient's healthcare team.      Barriers to Home Discharge:   [] Steps to access home entry or bed/bath:   [x] Unable to transfer, ambulate, or propel wheelchair household distances without assist   [x] Limited available assist at home upon discharge    [] Patient or family requests d/c to post-acute facility    [x] Poor cognition/safety awareness for d/c to home alone    [] Unable to maintain ordered weight bearing status    [] Patient with salient signs of long-standing immobility   [x] Decreased independence with ADLs   [x] Increased risk for falls   [] Other:    Patient Diagnosis(es): There were no encounter diagnoses. Assessment    Assessment: Pt demos good progress toward OT goals. Pt progressing to CGA-SBA with functional transfers and mobility using RW. Pt does demo decreased insight into safety with use of RW, cues throughout session for pt safety. Pt would benefit from continued skilled OT in SNF setting to facilitate return to baseline. Continue OT per POC. Activity Tolerance: Patient tolerated treatment well  Discharge Recommendations: Subacute/Skilled Nursing Facility      Plan   Occupational Therapy Plan  Times Per Week: 3-5x/ week     Restrictions  Restrictions/Precautions  Restrictions/Precautions: Fall Risk;General Precautions;Contact Precautions  Position Activity Restriction  Other position/activity restrictions: up with assist, contact for c-diff rule out    Subjective   Subjective  Subjective: Pt in bed at entry, agreeable to OT treatment.     Orientation  Overall Orientation Status: Within Functional

## 2023-12-06 NOTE — PLAN OF CARE
Pt scoring pain on 0-10 scale. Pain medications given per MAR. Pt instructed to call out when pain level increasing. Call light within reach.        Problem: Pain  Goal: Verbalizes/displays adequate comfort level or baseline comfort level  Outcome: Progressing

## 2023-12-07 LAB
ALBUMIN SERPL-MCNC: 3.9 G/DL (ref 3.4–5)
ANION GAP SERPL CALCULATED.3IONS-SCNC: 11 MMOL/L (ref 3–16)
ANION GAP SERPL CALCULATED.3IONS-SCNC: 13 MMOL/L (ref 3–16)
BASOPHILS # BLD: 0.1 K/UL (ref 0–0.2)
BASOPHILS NFR BLD: 1.3 %
BUN SERPL-MCNC: 13 MG/DL (ref 7–20)
BUN SERPL-MCNC: 13 MG/DL (ref 7–20)
CALCIUM SERPL-MCNC: 9.5 MG/DL (ref 8.3–10.6)
CALCIUM SERPL-MCNC: 9.6 MG/DL (ref 8.3–10.6)
CHLORIDE SERPL-SCNC: 100 MMOL/L (ref 99–110)
CHLORIDE SERPL-SCNC: 101 MMOL/L (ref 99–110)
CO2 SERPL-SCNC: 25 MMOL/L (ref 21–32)
CO2 SERPL-SCNC: 26 MMOL/L (ref 21–32)
CREAT SERPL-MCNC: 1.3 MG/DL (ref 0.6–1.2)
CREAT SERPL-MCNC: 1.4 MG/DL (ref 0.6–1.2)
DEPRECATED RDW RBC AUTO: 13.9 % (ref 12.4–15.4)
EKG ATRIAL RATE: 65 BPM
EKG DIAGNOSIS: NORMAL
EKG P AXIS: 44 DEGREES
EKG P-R INTERVAL: 142 MS
EKG Q-T INTERVAL: 436 MS
EKG QRS DURATION: 102 MS
EKG QTC CALCULATION (BAZETT): 453 MS
EKG R AXIS: -15 DEGREES
EKG T AXIS: 0 DEGREES
EKG VENTRICULAR RATE: 65 BPM
EOSINOPHIL # BLD: 0.2 K/UL (ref 0–0.6)
EOSINOPHIL NFR BLD: 2.9 %
GFR SERPLBLD CREATININE-BSD FMLA CKD-EPI: 37 ML/MIN/{1.73_M2}
GFR SERPLBLD CREATININE-BSD FMLA CKD-EPI: 40 ML/MIN/{1.73_M2}
GLUCOSE SERPL-MCNC: 100 MG/DL (ref 70–99)
GLUCOSE SERPL-MCNC: 100 MG/DL (ref 70–99)
HCT VFR BLD AUTO: 37.7 % (ref 36–48)
HGB BLD-MCNC: 12.3 G/DL (ref 12–16)
LYMPHOCYTES # BLD: 1.9 K/UL (ref 1–5.1)
LYMPHOCYTES NFR BLD: 24.1 %
MAGNESIUM SERPL-MCNC: 1.8 MG/DL (ref 1.8–2.4)
MCH RBC QN AUTO: 27.5 PG (ref 26–34)
MCHC RBC AUTO-ENTMCNC: 32.5 G/DL (ref 31–36)
MCV RBC AUTO: 84.6 FL (ref 80–100)
MONOCYTES # BLD: 0.6 K/UL (ref 0–1.3)
MONOCYTES NFR BLD: 8.3 %
NEUTROPHILS # BLD: 4.9 K/UL (ref 1.7–7.7)
NEUTROPHILS NFR BLD: 63.4 %
PHOSPHATE SERPL-MCNC: 2.9 MG/DL (ref 2.5–4.9)
PLATELET # BLD AUTO: 161 K/UL (ref 135–450)
PMV BLD AUTO: 8.1 FL (ref 5–10.5)
POTASSIUM SERPL-SCNC: 3.9 MMOL/L (ref 3.5–5.1)
POTASSIUM SERPL-SCNC: 4 MMOL/L (ref 3.5–5.1)
RBC # BLD AUTO: 4.46 M/UL (ref 4–5.2)
SODIUM SERPL-SCNC: 137 MMOL/L (ref 136–145)
SODIUM SERPL-SCNC: 139 MMOL/L (ref 136–145)
WBC # BLD AUTO: 7.7 K/UL (ref 4–11)

## 2023-12-07 PROCEDURE — 97530 THERAPEUTIC ACTIVITIES: CPT

## 2023-12-07 PROCEDURE — 97110 THERAPEUTIC EXERCISES: CPT

## 2023-12-07 PROCEDURE — 93010 ELECTROCARDIOGRAM REPORT: CPT | Performed by: INTERNAL MEDICINE

## 2023-12-07 PROCEDURE — 85025 COMPLETE CBC W/AUTO DIFF WBC: CPT

## 2023-12-07 PROCEDURE — 97535 SELF CARE MNGMENT TRAINING: CPT

## 2023-12-07 PROCEDURE — 97116 GAIT TRAINING THERAPY: CPT

## 2023-12-07 PROCEDURE — 2580000003 HC RX 258: Performed by: STUDENT IN AN ORGANIZED HEALTH CARE EDUCATION/TRAINING PROGRAM

## 2023-12-07 PROCEDURE — 6370000000 HC RX 637 (ALT 250 FOR IP): Performed by: NURSE PRACTITIONER

## 2023-12-07 PROCEDURE — 6360000002 HC RX W HCPCS: Performed by: STUDENT IN AN ORGANIZED HEALTH CARE EDUCATION/TRAINING PROGRAM

## 2023-12-07 PROCEDURE — 83735 ASSAY OF MAGNESIUM: CPT

## 2023-12-07 PROCEDURE — 93005 ELECTROCARDIOGRAM TRACING: CPT | Performed by: NURSE PRACTITIONER

## 2023-12-07 PROCEDURE — 94761 N-INVAS EAR/PLS OXIMETRY MLT: CPT

## 2023-12-07 PROCEDURE — 80069 RENAL FUNCTION PANEL: CPT

## 2023-12-07 PROCEDURE — 6370000000 HC RX 637 (ALT 250 FOR IP): Performed by: INTERNAL MEDICINE

## 2023-12-07 PROCEDURE — 6370000000 HC RX 637 (ALT 250 FOR IP): Performed by: STUDENT IN AN ORGANIZED HEALTH CARE EDUCATION/TRAINING PROGRAM

## 2023-12-07 PROCEDURE — 1200000000 HC SEMI PRIVATE

## 2023-12-07 PROCEDURE — 2700000000 HC OXYGEN THERAPY PER DAY

## 2023-12-07 RX ORDER — BUSPIRONE HYDROCHLORIDE 5 MG/1
10 TABLET ORAL NIGHTLY PRN
Status: DISCONTINUED | OUTPATIENT
Start: 2023-12-07 | End: 2023-12-12 | Stop reason: HOSPADM

## 2023-12-07 RX ORDER — ISOSORBIDE MONONITRATE 30 MG/1
30 TABLET, EXTENDED RELEASE ORAL DAILY
Status: DISCONTINUED | OUTPATIENT
Start: 2023-12-07 | End: 2023-12-12 | Stop reason: HOSPADM

## 2023-12-07 RX ORDER — HYDROXYZINE HYDROCHLORIDE 10 MG/1
25 TABLET, FILM COATED ORAL 3 TIMES DAILY PRN
Status: DISCONTINUED | OUTPATIENT
Start: 2023-12-07 | End: 2023-12-12 | Stop reason: HOSPADM

## 2023-12-07 RX ORDER — BUSPIRONE HYDROCHLORIDE 15 MG/1
15 TABLET ORAL ONCE
Status: COMPLETED | OUTPATIENT
Start: 2023-12-07 | End: 2023-12-07

## 2023-12-07 RX ORDER — VALSARTAN 80 MG/1
80 TABLET ORAL 2 TIMES DAILY
Status: DISCONTINUED | OUTPATIENT
Start: 2023-12-07 | End: 2023-12-11

## 2023-12-07 RX ORDER — AMLODIPINE BESYLATE 5 MG/1
10 TABLET ORAL DAILY
Status: DISCONTINUED | OUTPATIENT
Start: 2023-12-07 | End: 2023-12-12 | Stop reason: HOSPADM

## 2023-12-07 RX ORDER — TRAZODONE HYDROCHLORIDE 50 MG/1
100 TABLET ORAL NIGHTLY PRN
Status: DISCONTINUED | OUTPATIENT
Start: 2023-12-07 | End: 2023-12-12 | Stop reason: HOSPADM

## 2023-12-07 RX ORDER — CITALOPRAM 20 MG/1
30 TABLET ORAL DAILY
Status: DISCONTINUED | OUTPATIENT
Start: 2023-12-07 | End: 2023-12-12 | Stop reason: HOSPADM

## 2023-12-07 RX ADMIN — HYDRALAZINE HYDROCHLORIDE 10 MG: 10 TABLET, FILM COATED ORAL at 22:27

## 2023-12-07 RX ADMIN — HEPARIN SODIUM 5000 UNITS: 5000 INJECTION INTRAVENOUS; SUBCUTANEOUS at 15:14

## 2023-12-07 RX ADMIN — AMLODIPINE BESYLATE 10 MG: 5 TABLET ORAL at 08:13

## 2023-12-07 RX ADMIN — VALSARTAN 80 MG: 80 TABLET, FILM COATED ORAL at 10:45

## 2023-12-07 RX ADMIN — ATORVASTATIN CALCIUM 40 MG: 40 TABLET, FILM COATED ORAL at 08:12

## 2023-12-07 RX ADMIN — HYDRALAZINE HYDROCHLORIDE 10 MG: 10 TABLET, FILM COATED ORAL at 05:17

## 2023-12-07 RX ADMIN — BUSPIRONE HYDROCHLORIDE 15 MG: 15 TABLET ORAL at 18:37

## 2023-12-07 RX ADMIN — CITALOPRAM HYDROBROMIDE 30 MG: 20 TABLET ORAL at 08:13

## 2023-12-07 RX ADMIN — HYDRALAZINE HYDROCHLORIDE 10 MG: 10 TABLET, FILM COATED ORAL at 15:14

## 2023-12-07 RX ADMIN — HEPARIN SODIUM 5000 UNITS: 5000 INJECTION INTRAVENOUS; SUBCUTANEOUS at 22:27

## 2023-12-07 RX ADMIN — ISOSORBIDE MONONITRATE 30 MG: 30 TABLET, EXTENDED RELEASE ORAL at 08:12

## 2023-12-07 RX ADMIN — SODIUM CHLORIDE, PRESERVATIVE FREE 10 ML: 5 INJECTION INTRAVENOUS at 20:02

## 2023-12-07 RX ADMIN — SODIUM CHLORIDE, PRESERVATIVE FREE 10 ML: 5 INJECTION INTRAVENOUS at 08:13

## 2023-12-07 RX ADMIN — VALSARTAN 80 MG: 80 TABLET, FILM COATED ORAL at 20:02

## 2023-12-07 ASSESSMENT — PAIN SCALES - GENERAL
PAINLEVEL_OUTOF10: 0
PAINLEVEL_OUTOF10: 0

## 2023-12-07 NOTE — PROGRESS NOTES
Overnight pt had 18 bvt. Pt felt SOB and weaker than usually. Place pt on 2 liters O2 for confort. O2 sat %. Perfect served Cat De Los Santos NP. Vitals done, EKG done, labs done.

## 2023-12-07 NOTE — PROGRESS NOTES
Pt agrees to PT session; DIL present  Pain: Pt reports soreness unrated in neck, shoulders, and back  Orientation  Overall Orientation Status: Within Functional Limits     Objective   Vitals  Vitals:    12/07/23 1045   BP: (!) 154/60   Pulse:    Resp:    Temp:    SpO2:       Bed Mobility Training  Bed Mobility Training: Yes  Interventions: Verbal cues  Supine to Sit: Stand-by assistance; Additional time  Duration: 10  Balance  Sitting: Intact  Standing: Impaired (rw)  Standing - Static: Good  Standing - Dynamic: Fair;Constant support  Transfer Training  Transfer Training: Yes  Interventions: Verbal cues  Sit to Stand: Stand-by assistance  Stand to Sit: Stand-by assistance  Gait Training  Gait Training: Yes  Gait  Overall Level of Assistance: Stand-by assistance;Contact-guard assistance  Assistive Device: Walker, rolling;Gait belt  Interventions: Verbal cues; Safety awareness training  Base of Support: Narrowed  Speed/Shira: Slow  Step Length: Right shortened;Left shortened  Gait Abnormalities: Decreased step clearance;Shuffling gait;Trunk sway increased; Antalgic     PT Exercises  Exercise Treatment: performed BLE TE 15X EACH: AP, GS, HS HIP ABD, LAQ, MARCHES       Safety Devices  Type of Devices: Left in chair;Chair alarm in place;Call light within reach;Nurse notified;Gait belt;Telesitter in use       Goals  Short Term Goals  Time Frame for Short Term Goals: 7 days (12/11/23) unless otherwise noted  Short Term Goal 1: Pt will perform bed mobility IND  -12/07 sba  Short Term Goal 2: Pt will perform transfers with supervision and RW -12/07 sba rw  Short Term Goal 3: Pt will ambulate 50 ft with RW and supervision -12/07 90' rw sba/cga  Short Term Goal 4: Pt will perform 12-15 reps of BLE exercises by 12/7/23 -12/07 on-going  Patient Goals   Patient Goals : \"to go home\"    Education  Patient Education  Education Given To: Patient; Family  Education Provided: Role of Therapy;Plan of Care;Transfer Training;Precautions; Equipment; Fall Prevention Strategies  Barriers to Learning: Cognition; Hearing  Education Outcome: Verbalized understanding;Continued education needed    AM-PAC - Mobility    AM-PAC Basic Mobility - Inpatient   How much help is needed turning from your back to your side while in a flat bed without using bedrails?: None  How much help is needed moving from lying on your back to sitting on the side of a flat bed without using bedrails?: A Little  How much help is needed moving to and from a bed to a chair?: A Little  How much help is needed standing up from a chair using your arms?: A Little  How much help is needed walking in hospital room?: A Little  How much help is needed climbing 3-5 steps with a railing?: A Lot  AM-PAC Inpatient Mobility Raw Score : 18  AM-PAC Inpatient T-Scale Score : 43.63  Mobility Inpatient CMS 0-100% Score: 46.58  Mobility Inpatient CMS G-Code Modifier : CK         Therapy Time   Individual Concurrent Group Co-treatment   Time In 1045         Time Out 1123         Minutes 38         Timed Code Treatment Minutes: 2051 [facility] C Carmelina

## 2023-12-07 NOTE — PLAN OF CARE
Bed in lowest position, wheels locked, 2/4 side rails up, nonskid footwear on. Bed/ chair check alarm in place, call light within reach. Pt instructed to call out when needing assistance. Pt stated understanding.      Problem: Safety - Adult  Goal: Free from fall injury  12/7/2023 0825 by Irvin Wolf RN  Outcome: Progressing  12/7/2023 0654 by Dejan Jacobs RN  Outcome: Progressing

## 2023-12-07 NOTE — PROGRESS NOTES
Hospital Medicine Progress Note      Date of Admission: 12/3/2023  Hospital Day: 5      Chief Admission Complaint:  AMS, fall     Subjective:  resting in chair, daughter at bedside(feels pt improved and near baseline)     Presenting Admission History:          Madiha Jha is a 79 y.o. female with Alzheimer's dementia, hypertension, hyperlipidemia, obesity/MARIA C, essential tremor, chronic diastolic heart failure,Hx of TAVR, suspected COPD, CKD stage IV, tobacco use presented as a transfer from [address] [place] [address] with AMS. Patient was brought in by EMS to [place] ER after her daughter found her slumped in the chair and unable to get up. Patient was admitted a month ago for very similar presentation at [facility]. Additionally there was also complaint of frequent falls over the past 2 weeks. Upon arrival to ED at [place], patient underwent CT brain CT cervical spine and CTA head and neck which did not reveal any acute abnormalities except for mentioned above. Patient was accepted by my colleague earlier yesterday afternoon. Upon arrival to [facility], patient was alert oriented x 3, conversational but somnolent. The only thing that she could tell me was she has been having difficulty ambulating and has been falling. Patient lives alone. Assessment/Plan:      Current Principal Problem:  Altered mental status      Altered mental status, manifesting as disorientation, somnolence & difficulty ambulating  -Improving, alert oriented x 3 during my evaluation, conversational but still somnolent  Likely related to polypharmacy in the setting of decreased reserve due to Alzheimer's dementia, suspicion for CVA remains but low based on lack of lateralization or focal symptoms. LKN unknown approximately more than 24 hours, NIH 9 on arrival to ED  CT brain and CT cervical spine personally reviewed, no acute hemorrhage, fracture or dislocation. 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   Urine drug

## 2023-12-07 NOTE — PROGRESS NOTES
Occupational Therapy  Facility/Department: [facility] C5 - MED SURG/ORTHO  Daily Treatment Note  NAME: Nav Main  : 1940  MRN: [id number]    Date of Service: 2023    Discharge Recommendations:  Subacute/Skilled Nursing Facility       Therapy discharge recommendations take into account each patient's current medical complexities and are subject to input/oversight from the patient's healthcare team.   Barriers to Home Discharge:   [] Steps to access home entry or bed/bath:   [x] Unable to transfer, ambulate, or propel wheelchair household distances without assist   [x] Limited available assist at home upon discharge    [] Patient or family requests d/c to post-acute facility    [x] Poor cognition/safety awareness for d/c to home alone    [] Unable to maintain ordered weight bearing status    [] Patient with salient signs of long-standing immobility   [x] Decreased independence with ADLs   [x] Increased risk for falls   [] Other:    Patient Diagnosis(es): There were no encounter diagnoses. Assessment    Assessment: Pt continues to make good progress toward OT goals. Pt grossly SBA with RW for functional transfers and mobility. Pt continues to demo poor safety with hand placement, verbalizes understanding of education. Pt reports fatigue following primarily seated sponge bath and requested return to bed at [facility]. Pt functioning below her baseline, continue to recommend SNF at d/c. Activity Tolerance: Patient tolerated treatment well  Discharge Recommendations: Subacute/Skilled Nursing Facility      Plan   Occupational Therapy Plan  Times Per Week: 3-5x/ week     Restrictions  Restrictions/Precautions  Restrictions/Precautions: Fall Risk;General Precautions  Position Activity Restriction  Other position/activity restrictions: up with assist    Subjective   Subjective  Subjective: Pt resting in bed, requesting to get cleaned up. Pt agreeable to OT treatment.     Orientation  Overall Orientation Status: Within

## 2023-12-07 NOTE — CARE COORDINATION
Chart reviewed day 4. Pt is followed by IM. Per IM note pt w/Hypertension uncontrolled, home bp med restarted yesterday 12/6. Cert pending for [facility], placed yesterday by Rick Barber at facility. Pt IPTA alone, unsafe to return home at this time. Will follow for needs as they arise.  Electronically signed by Joi Adams RN on 12/7/2023 at 9:01 AM

## 2023-12-08 PROCEDURE — 6360000002 HC RX W HCPCS: Performed by: STUDENT IN AN ORGANIZED HEALTH CARE EDUCATION/TRAINING PROGRAM

## 2023-12-08 PROCEDURE — 1200000000 HC SEMI PRIVATE

## 2023-12-08 PROCEDURE — 6370000000 HC RX 637 (ALT 250 FOR IP): Performed by: STUDENT IN AN ORGANIZED HEALTH CARE EDUCATION/TRAINING PROGRAM

## 2023-12-08 PROCEDURE — 6370000000 HC RX 637 (ALT 250 FOR IP): Performed by: INTERNAL MEDICINE

## 2023-12-08 PROCEDURE — 6370000000 HC RX 637 (ALT 250 FOR IP): Performed by: NURSE PRACTITIONER

## 2023-12-08 PROCEDURE — 2580000003 HC RX 258: Performed by: STUDENT IN AN ORGANIZED HEALTH CARE EDUCATION/TRAINING PROGRAM

## 2023-12-08 RX ORDER — TORSEMIDE 20 MG/1
10 TABLET ORAL DAILY
Status: DISCONTINUED | OUTPATIENT
Start: 2023-12-08 | End: 2023-12-11

## 2023-12-08 RX ADMIN — HEPARIN SODIUM 5000 UNITS: 5000 INJECTION INTRAVENOUS; SUBCUTANEOUS at 06:44

## 2023-12-08 RX ADMIN — ISOSORBIDE MONONITRATE 30 MG: 30 TABLET, EXTENDED RELEASE ORAL at 09:03

## 2023-12-08 RX ADMIN — SODIUM CHLORIDE, PRESERVATIVE FREE 10 ML: 5 INJECTION INTRAVENOUS at 09:05

## 2023-12-08 RX ADMIN — TORSEMIDE 10 MG: 20 TABLET ORAL at 17:49

## 2023-12-08 RX ADMIN — HEPARIN SODIUM 5000 UNITS: 5000 INJECTION INTRAVENOUS; SUBCUTANEOUS at 13:25

## 2023-12-08 RX ADMIN — CITALOPRAM HYDROBROMIDE 30 MG: 20 TABLET ORAL at 09:04

## 2023-12-08 RX ADMIN — BUSPIRONE HYDROCHLORIDE 10 MG: 5 TABLET ORAL at 19:35

## 2023-12-08 RX ADMIN — ATORVASTATIN CALCIUM 40 MG: 40 TABLET, FILM COATED ORAL at 09:04

## 2023-12-08 RX ADMIN — HYDRALAZINE HYDROCHLORIDE 10 MG: 10 TABLET, FILM COATED ORAL at 21:14

## 2023-12-08 RX ADMIN — VALSARTAN 80 MG: 80 TABLET, FILM COATED ORAL at 19:35

## 2023-12-08 RX ADMIN — HYDRALAZINE HYDROCHLORIDE 10 MG: 10 TABLET, FILM COATED ORAL at 06:43

## 2023-12-08 RX ADMIN — HYDRALAZINE HYDROCHLORIDE 10 MG: 10 TABLET, FILM COATED ORAL at 13:25

## 2023-12-08 RX ADMIN — SODIUM CHLORIDE, PRESERVATIVE FREE 10 ML: 5 INJECTION INTRAVENOUS at 19:28

## 2023-12-08 RX ADMIN — HEPARIN SODIUM 5000 UNITS: 5000 INJECTION INTRAVENOUS; SUBCUTANEOUS at 21:13

## 2023-12-08 RX ADMIN — VALSARTAN 80 MG: 80 TABLET, FILM COATED ORAL at 09:04

## 2023-12-08 RX ADMIN — AMLODIPINE BESYLATE 10 MG: 5 TABLET ORAL at 09:04

## 2023-12-08 NOTE — CARE COORDINATION
Chart reviewed day 5. Pt is followed by IM. Hypertension better controlled per IM note yesterday, restarted arb. Cert pending for [facility], placed on 12/6. Pt IPTA alone, d/t pt current condition pt unsafe to return home at this time. Will follow for needs as they arise. Electronically signed by Perry Browne RN on 12/8/2023 at 9:20 AM    Awaiting Nephro consult.  Electronically signed by Perry Browne RN on 12/8/2023 at 2:35 PM

## 2023-12-08 NOTE — PLAN OF CARE
Problem: Discharge Planning  Goal: Discharge to home or other facility with appropriate resources  Outcome: Progressing  Flowsheets (Taken 12/8/2023 0900)  Discharge to home or other facility with appropriate resources: Identify barriers to discharge with patient and caregiver

## 2023-12-08 NOTE — CONSULTS
Thank you for considering [facility] for inpatient consultation. N  oted that the patient was recently seen and followed by [facility]        Please direct all renal related questions to  [facility] for this patient- [phone] -      [facility] [phone]     Informed: Dr Christa Og        No signature on file status. Please call with questions at -       [facility]  Ph: [phone], Fax: [phone]

## 2023-12-08 NOTE — PLAN OF CARE
Problem: Chronic Conditions and Co-morbidities  Goal: Patient's chronic conditions and co-morbidity symptoms are monitored and maintained or improved  12/7/2023 2054 by Alexis Mckay RN  Outcome: Progressing  12/7/2023 0654 by Génesis David RN  Outcome: Progressing     Problem: Discharge Planning  Goal: Discharge to home or other facility with appropriate resources  12/7/2023 2054 by Alexis Mckay RN  Outcome: Progressing  12/7/2023 0654 by Génesis David RN  Outcome: Progressing

## 2023-12-08 NOTE — CONSULTS
[facility] Dr. Fercho Villanueva for nephrology consult since he was already added to treatment team   Electronically signed by Rama Josue on 12/8/2023 at 2:10 PM

## 2023-12-08 NOTE — CONSULTS
[facility] Consult Note           Reason for Consult:  Hypertension  Requesting Physician:  Dr. Monroe Rosario    Chief Complaint:  Altered mental status  History Obtained From:  patient, electronic medical record    History of Present Ilness:    79year old female with Alzheimer's, HTN, MARIA C, diastolic CHF, s/p TAVR, CKD-3 presents with altered mental status. We have been asked to assist in further mgmt of her HTN. SBP's ~160 near admission, have been as high as 180's. Currently on BP regimen including amlodipine, hydralazine, imdur, & diovan. Presented with altered mental status, back to normal now. .  +weak, intake better, +abdominal pain/back pain/joint pain - chronic, no shortness of breath or chest pain.     Past Medical History:        Diagnosis Date    MARIO (acute kidney injury) ([address]) 11/24/2021    Altered mental status 11/30/2021    Arthritis     BCC (basal cell carcinoma of skin)     RT clavicle    Bladder infection     frequently    Blurred vision 9/17/2022    CAD (coronary artery disease)     stents    Cancer (HCC)     skin    CHF (congestive heart failure) (HCC)     Chronic back pain     Closed head injury 12/1/2021    COPD (chronic obstructive pulmonary disease) ([address])     Depression     Depression     Hypercholesteremia     Hypertension     Hypokalemia 1/16/2012    Pain in shoulder [id number]    pain in left shoulder, taking steroid injections for steroid    Painful total knee replacement, initial encounter ([address]) 10/18/2018    Reflux     Rheumatic fever     as a child    Sleep apnea     uses CPAP    Syncope and collapse 11/30/2021    TIA (transient ischemic attack)     Urinary incontinence     Urinary tract infection in female     Wears glasses        Past Surgical History:        Procedure Laterality Date    AORTIC VALVE REPLACEMENT      APPENDECTOMY      BLADDER REPAIR      3 TIMES    CARDIAC SURGERY      stents    CARPAL TUNNEL RELEASE      RIGHT output      Thank you for the consultation. Please do not hesitate to call with questions. ____________________________________  Laquita Anthony MD  [facility]  [email or web address]  Office: [phone]

## 2023-12-08 NOTE — DISCHARGE SUMMARY
Hospital Medicine Progress Note      Date of Admission: 12/3/2023  Hospital Day: 6      Chief Admission Complaint:  AMS, fall     Subjective:  resting in chair, daughter at bedside(feels pt improved and near baseline)     Presenting Admission History:          Jana Sharif is a 79 y.o. female with Alzheimer's dementia, hypertension, hyperlipidemia, obesity/MARIA C, essential tremor, chronic diastolic heart failure,Hx of TAVR, suspected COPD, CKD stage IV, tobacco use presented as a transfer from [address] [place] [address] with AMS. Patient was brought in by EMS to [place] [facility] after her daughter found her slumped in the chair and unable to get up. Patient was admitted a month ago for very similar presentation at [facility]. Additionally there was also complaint of frequent falls over the past 2 weeks. Upon arrival to ED at [place], patient underwent CT brain CT cervical spine and CTA head and neck which did not reveal any acute abnormalities except for mentioned above. Patient was accepted by my colleague earlier yesterday afternoon. Upon arrival to [facility], patient was alert oriented x 3, conversational but somnolent. The only thing that she could tell me was she has been having difficulty ambulating and has been falling. Patient lives alone. Assessment/Plan:      Current Principal Problem:  Altered mental status      Altered mental status, manifesting as disorientation, somnolence & difficulty ambulating  -Improving, alert oriented x 3 during initial evaluation here, pt was conversational but still somnolent  Likely related to polypharmacy in the setting of decreased reserve due to Alzheimer's dementia, suspicion for CVA remains but low based on lack of lateralization or focal symptoms. LKN unknown approximately more than 24 hours, NIH 9 on arrival to ED  CT brain and CT cervical spine personally reviewed, no acute hemorrhage, fracture or dislocation.  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 Infusion Medications    sodium chloride       Scheduled Medications    torsemide  10 mg Oral Daily    amLODIPine  10 mg Oral Daily    citalopram  30 mg Oral Daily    isosorbide mononitrate  30 mg Oral Daily    valsartan  80 mg Oral BID    hydrALAZINE  10 mg Oral 3 times per day    sodium chloride flush  5-40 mL IntraVENous 2 times per day    heparin (porcine)  5,000 Units SubCUTAneous 3 times per day    aspirin  81 mg Oral Daily    atorvastatin  40 mg Oral Daily     PRN Meds: busPIRone, hydrOXYzine HCl, traZODone, hydrALAZINE, sodium chloride flush, sodium chloride, ondansetron **OR** ondansetron, polyethylene glycol, acetaminophen **OR** acetaminophen, albuterol sulfate HFA     Labs:  Personally reviewed and interpreted for clinical significance. Recent Labs     12/06/23  0621 12/07/23  0310   WBC 8.4 7.7   HGB 11.7* 12.3   HCT 35.9* 37.7    161     Recent Labs     12/06/23  0727 12/07/23  0310    139  137   K 4.1 3.9  4.0    101  100   CO2 22 25  26   BUN 17 13  13   CREATININE 1.4* 1.4*  1.3*   CALCIUM 9.2 9.5  9.6   MG 2.00 1.80   PHOS 2.2* 2.9     No results for input(s): \"PROBNP\", \"TROPHS\" in the last 72 hours. No results for input(s): \"LABA1C\" in the last 72 hours. No results for input(s): \"AST\", \"ALT\", \"BILIDIR\", \"BILITOT\", \"ALKPHOS\" in the last 72 hours. No results for input(s): \"INR\", \"LACTA\", \"TSH\" in the last 72 hours.     Urine Cultures:   Lab Results   Component Value Date/Time

## 2023-12-09 LAB
ALBUMIN SERPL-MCNC: 3.4 G/DL (ref 3.4–5)
ANION GAP SERPL CALCULATED.3IONS-SCNC: 12 MMOL/L (ref 3–16)
BUN SERPL-MCNC: 15 MG/DL (ref 7–20)
CALCIUM SERPL-MCNC: 9 MG/DL (ref 8.3–10.6)
CHLORIDE SERPL-SCNC: 103 MMOL/L (ref 99–110)
CO2 SERPL-SCNC: 25 MMOL/L (ref 21–32)
CREAT SERPL-MCNC: 1.4 MG/DL (ref 0.6–1.2)
GFR SERPLBLD CREATININE-BSD FMLA CKD-EPI: 37 ML/MIN/{1.73_M2}
GLUCOSE SERPL-MCNC: 93 MG/DL (ref 70–99)
PHOSPHATE SERPL-MCNC: 3.5 MG/DL (ref 2.5–4.9)
POTASSIUM SERPL-SCNC: 4 MMOL/L (ref 3.5–5.1)
SODIUM SERPL-SCNC: 140 MMOL/L (ref 136–145)

## 2023-12-09 PROCEDURE — 6360000002 HC RX W HCPCS: Performed by: STUDENT IN AN ORGANIZED HEALTH CARE EDUCATION/TRAINING PROGRAM

## 2023-12-09 PROCEDURE — 2580000003 HC RX 258: Performed by: STUDENT IN AN ORGANIZED HEALTH CARE EDUCATION/TRAINING PROGRAM

## 2023-12-09 PROCEDURE — 6370000000 HC RX 637 (ALT 250 FOR IP): Performed by: STUDENT IN AN ORGANIZED HEALTH CARE EDUCATION/TRAINING PROGRAM

## 2023-12-09 PROCEDURE — 80069 RENAL FUNCTION PANEL: CPT

## 2023-12-09 PROCEDURE — 36415 COLL VENOUS BLD VENIPUNCTURE: CPT

## 2023-12-09 PROCEDURE — 6370000000 HC RX 637 (ALT 250 FOR IP): Performed by: INTERNAL MEDICINE

## 2023-12-09 PROCEDURE — 1200000000 HC SEMI PRIVATE

## 2023-12-09 PROCEDURE — 6370000000 HC RX 637 (ALT 250 FOR IP): Performed by: NURSE PRACTITIONER

## 2023-12-09 RX ADMIN — HEPARIN SODIUM 5000 UNITS: 5000 INJECTION INTRAVENOUS; SUBCUTANEOUS at 20:40

## 2023-12-09 RX ADMIN — HYDRALAZINE HYDROCHLORIDE 10 MG: 10 TABLET, FILM COATED ORAL at 20:40

## 2023-12-09 RX ADMIN — ISOSORBIDE MONONITRATE 30 MG: 30 TABLET, EXTENDED RELEASE ORAL at 08:01

## 2023-12-09 RX ADMIN — BUSPIRONE HYDROCHLORIDE 10 MG: 5 TABLET ORAL at 20:44

## 2023-12-09 RX ADMIN — VALSARTAN 80 MG: 80 TABLET, FILM COATED ORAL at 08:01

## 2023-12-09 RX ADMIN — HYDRALAZINE HYDROCHLORIDE 10 MG: 10 TABLET, FILM COATED ORAL at 06:45

## 2023-12-09 RX ADMIN — TRAZODONE HYDROCHLORIDE 100 MG: 50 TABLET ORAL at 20:44

## 2023-12-09 RX ADMIN — HEPARIN SODIUM 5000 UNITS: 5000 INJECTION INTRAVENOUS; SUBCUTANEOUS at 06:46

## 2023-12-09 RX ADMIN — HEPARIN SODIUM 5000 UNITS: 5000 INJECTION INTRAVENOUS; SUBCUTANEOUS at 14:11

## 2023-12-09 RX ADMIN — ATORVASTATIN CALCIUM 40 MG: 40 TABLET, FILM COATED ORAL at 08:01

## 2023-12-09 RX ADMIN — AMLODIPINE BESYLATE 10 MG: 5 TABLET ORAL at 08:01

## 2023-12-09 RX ADMIN — SODIUM CHLORIDE, PRESERVATIVE FREE 10 ML: 5 INJECTION INTRAVENOUS at 08:04

## 2023-12-09 RX ADMIN — VALSARTAN 80 MG: 80 TABLET, FILM COATED ORAL at 20:41

## 2023-12-09 RX ADMIN — HYDRALAZINE HYDROCHLORIDE 10 MG: 10 TABLET, FILM COATED ORAL at 14:11

## 2023-12-09 RX ADMIN — CITALOPRAM HYDROBROMIDE 30 MG: 20 TABLET ORAL at 08:01

## 2023-12-09 RX ADMIN — SODIUM CHLORIDE, PRESERVATIVE FREE 10 ML: 5 INJECTION INTRAVENOUS at 20:49

## 2023-12-09 RX ADMIN — TORSEMIDE 10 MG: 20 TABLET ORAL at 08:04

## 2023-12-09 ASSESSMENT — PAIN SCALES - GENERAL
PAINLEVEL_OUTOF10: 0
PAINLEVEL_OUTOF10: 0

## 2023-12-09 NOTE — DISCHARGE INSTR - COC
Continuity of Care Form    Patient Name: Susan Giles   :  1940  MRN:  [id number]    Admit date:  12/3/2023  Discharge date:  23    Code Status Order: Full Code   Advance Directives:     Admitting Physician:  No admitting provider for patient encounter. PCP: KALI Lockhart CNP    Discharging Nurse: [facility] Unit/Room#: 0521/0521-01  Discharging Unit Phone Number: [phone]    Emergency Contact:   Extended Emergency Contact Information  Primary Emergency Contact: Sonya Neri  Address: [address], [address] of [address] [place] Phone: [phone]  Mobile Phone: [phone]  Relation: Child   needed? No  Secondary Emergency Contact: Robert Neff   [place] of [address] [place] Phone: [phone]  Mobile Phone: [phone]  Relation: Child   needed?  No    Past Surgical History:  Past Surgical History:   Procedure Laterality Date    AORTIC VALVE REPLACEMENT      APPENDECTOMY      BLADDER REPAIR      3 TIMES    CARDIAC SURGERY      stents    CARPAL TUNNEL RELEASE      RIGHT    CHOLECYSTECTOMY, LAPAROSCOPIC  2017    with dr Shefali Davey      has it it done twice    DIAGNOSTIC CARDIAC CATH LAB PROCEDURE      HYSTERECTOMY (CERVIX STATUS UNKNOWN)      IR MIDLINE CATH  10/16/2023    IR MIDLINE CATH 10/16/2023 [facility] SPECIAL PROCEDURES    JOINT REPLACEMENT      KATHLEEN TKR    NECK SURGERY      OTHER SURGICAL HISTORY  12    Full Interstim Implant    SHOULDER SURGERY      SKIN CANCER EXCISION      SPINAL FIXATION SURGERY WITH IMPLANT      SPINAL FIXATION SURGERY WITH IMPLANT      SPINAL FIXATION SURGERY WITH IMPLANT      SPINE SURGERY      TONSILLECTOMY      UPPER GASTROINTESTINAL ENDOSCOPY  11    UPPER GASTROINTESTINAL ENDOSCOPY      UPPER GASTROINTESTINAL ENDOSCOPY  2017    dilitation       Immunization alert    IV Access:  - None    Nursing Mobility/ADLs:  Walking   Assisted  Transfer  Assisted  Bathing  Assisted  Dressing  Assisted  Toileting  Assisted  Feeding  Independent  Med Admin  Independent  Med Delivery   whole    Wound Care Documentation and Therapy:  Wound 09/17/22 Sacrum Medial pink area approx 6 cm x 4 cm with nonblanchable 2cm round area on left buttock non blanchable (Active)   Number of days: 447        Elimination:  Continence: Bowel: Yes  Bladder: Yes  Urinary Catheter: None   Colostomy/Ileostomy/Ileal Conduit: No       Date of Last BM: 12/11 per patient    Intake/Output Summary (Last 24 hours) at 12/9/2023 1813  Last data filed at 12/9/2023 1642  Gross per 24 hour   Intake 480 ml   Output 600 ml   Net -120 ml     I/O last 3 completed shifts: In: 10 [I.V.:10]  Out: 400 [Urine:400]    Safety Concerns: At Risk for Falls and history of falls    Impairments/Disabilities:      None    Nutrition Therapy:  Current Nutrition Therapy:   - Oral Diet:  Dysphagia - Soft and Bite Sized and Low Sodium (3-4gm)    Routes of Feeding: Oral  Liquids: No Restrictions  Daily Fluid Restriction: no  Last Modified Barium Swallow with Video (Video Swallowing Test): not done    Treatments at the Time of Hospital Discharge:   Respiratory Treatments: Inhalers  Oxygen Therapy:  is not on home oxygen therapy.   Ventilator:    - No ventilator support    Rehab Therapies: Physical Therapy, Occupational Therapy, and Nurse  Weight Bearing Status/Restrictions: No weight bearing restrictions  Other Medical Equipment (for information only, NOT a DME order):  walker  Other Treatments:     Patient's personal belongings (please select all that are sent with patient):  Glasses, Dentures upper    RN SIGNATURE:  {Esignature:[id number]}    CASE MANAGEMENT/SOCIAL WORK SECTION    Inpatient Status Date: 12/3/23    Readmission Risk Assessment Score:  Readmission Risk              Risk of Unplanned Readmission:  13           Discharging to

## 2023-12-09 NOTE — PROGRESS NOTES
Hospital Medicine Progress Note      Date of Admission: 12/3/2023  Hospital Day: 6      Chief Admission Complaint:  AMS, fall     Subjective:  resting in chair, good spirits today, bp still high     Presenting Admission History:          Kalyan Pearson is a 79 y.o. female with Alzheimer's dementia, hypertension, hyperlipidemia, obesity/MARIA C, essential tremor, chronic diastolic heart failure,Hx of TAVR, suspected COPD, CKD stage IV, tobacco use presented as a transfer from [address] [place] [address] with AMS. Patient was brought in by EMS to [facility] ER after her daughter found her slumped in the chair and unable to get up. Patient was admitted a month ago for very similar presentation at [facility]. Additionally there was also complaint of frequent falls over the past 2 weeks. Upon arrival to ED at [facility], patient underwent CT brain CT cervical spine and CTA head and neck which did not reveal any acute abnormalities except for mentioned above. Patient was accepted by my colleague earlier yesterday afternoon. Upon arrival to [facility], patient was alert oriented x 3, conversational but somnolent. The only thing that she could tell me was she has been having difficulty ambulating and has been falling. Patient lives alone. Assessment/Plan:       Current Principal Problem:  Altered mental status       Altered mental status, manifesting as disorientation, somnolence & difficulty ambulating  -Improving, alert oriented x 3 during initial evaluation here, pt was conversational but still somnolent  Likely related to polypharmacy in the setting of decreased reserve due to Alzheimer's dementia, suspicion for CVA remains but low based on lack of lateralization or focal symptoms. LKN unknown approximately more than 24 hours, NIH 9 on arrival to ED  CT brain and CT cervical spine personally reviewed, no acute hemorrhage, fracture or dislocation. 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   Urine drug

## 2023-12-09 NOTE — PROGRESS NOTES
Hospital Medicine Progress Note      Date of Admission: 12/3/2023  Hospital Day: 7      Chief Admission Complaint:  AMS, fall     Subjective:  resting in bed, remains in good spirits today, bp much improved     Presenting Admission History:          Sergio Chaparro is a 79 y.o. female with Alzheimer's dementia, hypertension, hyperlipidemia, obesity/MARIA C, essential tremor, chronic diastolic heart failure,Hx of TAVR, suspected COPD, CKD stage IV, tobacco use presented as a transfer from [address] [place] [address] with AMS. Patient was brought in by EMS to [place] ER after her daughter found her slumped in the chair and unable to get up. Patient was admitted a month ago for very similar presentation at [facility]. Additionally there was also complaint of frequent falls over the past 2 weeks. Upon arrival to ED at [place], patient underwent CT brain CT cervical spine and CTA head and neck which did not reveal any acute abnormalities except for mentioned above. Patient was accepted by my colleague earlier yesterday afternoon. Upon arrival to [facility], patient was alert oriented x 3, conversational but somnolent. The only thing that she could tell me was she has been having difficulty ambulating and has been falling. Patient lives alone. Assessment/Plan:      Current Principal Problem:  Altered mental status        Altered mental status, manifesting as disorientation, somnolence & difficulty ambulating  -Improving, alert oriented x 3 during initial evaluation here, pt was conversational but still somnolent  Likely related to polypharmacy in the setting of decreased reserve due to Alzheimer's dementia, suspicion for CVA remains but low based on lack of lateralization or focal symptoms. LKN unknown approximately more than 24 hours, NIH 9 on arrival to ED  CT brain and CT cervical spine personally reviewed, no acute hemorrhage, fracture or dislocation.  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 Infusion Medications    sodium chloride       Scheduled Medications    torsemide  10 mg Oral Daily    amLODIPine  10 mg Oral Daily    citalopram  30 mg Oral Daily    isosorbide mononitrate  30 mg Oral Daily    valsartan  80 mg Oral BID    hydrALAZINE  10 mg Oral 3 times per day    sodium chloride flush  5-40 mL IntraVENous 2 times per day    heparin (porcine)  5,000 Units SubCUTAneous 3 times per day    aspirin  81 mg Oral Daily    atorvastatin  40 mg Oral Daily     PRN Meds: busPIRone, hydrOXYzine HCl, traZODone, hydrALAZINE, sodium chloride flush, sodium chloride, ondansetron **OR** ondansetron, polyethylene glycol, acetaminophen **OR** acetaminophen, albuterol sulfate HFA     Labs:  Personally reviewed and interpreted for clinical significance. Recent Labs     12/07/23  0310   WBC 7.7   HGB 12.3   HCT 37.7        Recent Labs     12/07/23  0310 12/09/23  0526     137 140   K 3.9  4.0 4.0     100 103   CO2 25  26 25   BUN 13  13 15   CREATININE 1.4*  1.3* 1.4*   CALCIUM 9.5  9.6 9.0   MG 1.80  --    PHOS 2.9 3.5     No results for input(s): \"PROBNP\", \"TROPHS\" in the last 72 hours. No results for input(s): \"LABA1C\" in the last 72 hours. No results for input(s): \"AST\", \"ALT\", \"BILIDIR\", \"BILITOT\", \"ALKPHOS\" in the last 72 hours. No results for input(s): \"INR\", \"LACTA\", \"TSH\" in the last 72 hours.     Urine Cultures:   Lab Results   Component Value Date/Time    LABURIN >100,000 CFU/ml

## 2023-12-09 NOTE — PLAN OF CARE
Problem: Chronic Conditions and Co-morbidities  Goal: Patient's chronic conditions and co-morbidity symptoms are monitored and maintained or improved  Outcome: Progressing  Flowsheets (Taken 12/8/2023 0900 by Nette Green RN)  Care Plan - Patient's Chronic Conditions and Co-Morbidity Symptoms are Monitored and Maintained or Improved: Monitor and assess patient's chronic conditions and comorbid symptoms for stability, deterioration, or improvement     Problem: Discharge Planning  Goal: Discharge to home or other facility with appropriate resources  12/8/2023 2002 by Clary Barney RN  Outcome: Progressing  12/8/2023 1203 by Nette Green RN  Outcome: Progressing  Flowsheets (Taken 12/8/2023 0900)  Discharge to home or other facility with appropriate resources: Identify barriers to discharge with patient and caregiver

## 2023-12-10 PROCEDURE — 6370000000 HC RX 637 (ALT 250 FOR IP): Performed by: INTERNAL MEDICINE

## 2023-12-10 PROCEDURE — 6370000000 HC RX 637 (ALT 250 FOR IP): Performed by: STUDENT IN AN ORGANIZED HEALTH CARE EDUCATION/TRAINING PROGRAM

## 2023-12-10 PROCEDURE — 6370000000 HC RX 637 (ALT 250 FOR IP): Performed by: NURSE PRACTITIONER

## 2023-12-10 PROCEDURE — 6360000002 HC RX W HCPCS: Performed by: STUDENT IN AN ORGANIZED HEALTH CARE EDUCATION/TRAINING PROGRAM

## 2023-12-10 PROCEDURE — 2580000003 HC RX 258: Performed by: STUDENT IN AN ORGANIZED HEALTH CARE EDUCATION/TRAINING PROGRAM

## 2023-12-10 PROCEDURE — 1200000000 HC SEMI PRIVATE

## 2023-12-10 RX ADMIN — HYDROXYZINE HYDROCHLORIDE 25 MG: 25 TABLET, FILM COATED ORAL at 12:13

## 2023-12-10 RX ADMIN — HYDRALAZINE HYDROCHLORIDE 10 MG: 10 TABLET, FILM COATED ORAL at 22:41

## 2023-12-10 RX ADMIN — HYDRALAZINE HYDROCHLORIDE 10 MG: 10 TABLET, FILM COATED ORAL at 12:27

## 2023-12-10 RX ADMIN — HEPARIN SODIUM 5000 UNITS: 5000 INJECTION INTRAVENOUS; SUBCUTANEOUS at 05:24

## 2023-12-10 RX ADMIN — TRAZODONE HYDROCHLORIDE 100 MG: 50 TABLET ORAL at 20:43

## 2023-12-10 RX ADMIN — TORSEMIDE 10 MG: 20 TABLET ORAL at 08:40

## 2023-12-10 RX ADMIN — SODIUM CHLORIDE, PRESERVATIVE FREE 10 ML: 5 INJECTION INTRAVENOUS at 08:40

## 2023-12-10 RX ADMIN — VALSARTAN 80 MG: 80 TABLET, FILM COATED ORAL at 20:43

## 2023-12-10 RX ADMIN — BUSPIRONE HYDROCHLORIDE 10 MG: 5 TABLET ORAL at 20:47

## 2023-12-10 RX ADMIN — ATORVASTATIN CALCIUM 40 MG: 40 TABLET, FILM COATED ORAL at 08:40

## 2023-12-10 RX ADMIN — HEPARIN SODIUM 5000 UNITS: 5000 INJECTION INTRAVENOUS; SUBCUTANEOUS at 20:43

## 2023-12-10 RX ADMIN — ISOSORBIDE MONONITRATE 30 MG: 30 TABLET, EXTENDED RELEASE ORAL at 08:39

## 2023-12-10 RX ADMIN — VALSARTAN 80 MG: 80 TABLET, FILM COATED ORAL at 08:39

## 2023-12-10 RX ADMIN — SODIUM CHLORIDE, PRESERVATIVE FREE 10 ML: 5 INJECTION INTRAVENOUS at 20:44

## 2023-12-10 RX ADMIN — HEPARIN SODIUM 5000 UNITS: 5000 INJECTION INTRAVENOUS; SUBCUTANEOUS at 14:28

## 2023-12-10 RX ADMIN — CITALOPRAM HYDROBROMIDE 30 MG: 20 TABLET ORAL at 08:41

## 2023-12-10 RX ADMIN — AMLODIPINE BESYLATE 10 MG: 5 TABLET ORAL at 08:40

## 2023-12-10 RX ADMIN — HYDRALAZINE HYDROCHLORIDE 10 MG: 10 TABLET, FILM COATED ORAL at 05:23

## 2023-12-10 NOTE — PLAN OF CARE
Problem: Chronic Conditions and Co-morbidities  Goal: Patient's chronic conditions and co-morbidity symptoms are monitored and maintained or improved  Outcome: Progressing     Problem: Discharge Planning  Goal: Discharge to home or other facility with appropriate resources  Outcome: Progressing     Problem: Safety - Adult  Goal: Free from fall injury  Outcome: Progressing   Pt A&Ox4. Bed locked and in lowest position. Pt has call light within reach and calls appropriately    Problem: Skin/Tissue Integrity  Goal: Absence of new skin breakdown  Description: 1. Monitor for areas of redness and/or skin breakdown  2. Assess vascular access sites hourly  3. Every 4-6 hours minimum:  Change oxygen saturation probe site  4. Every 4-6 hours:  If on nasal continuous positive airway pressure, respiratory therapy assess nares and determine need for appliance change or resting period.   Outcome: Progressing     Problem: Pain  Goal: Verbalizes/displays adequate comfort level or baseline comfort level  Outcome: Progressing

## 2023-12-10 NOTE — PLAN OF CARE
Pt resting in bed quietly. Bed in lowest position, wheels locked, side rails up X2, non skid socks on. Bed check alarm engaged. Pt instructed not to get out of bed on own, to use call light for staff assistance when ambulating or other needs. Pt verbalizes understanding. Call light within reach. Will continue to monitor. Problem: Safety - Adult  Goal: Free from fall injury  12/10/2023 1720 by Lowell Castleman, RN  Outcome: Progressing         Pt encouraged and assisted by staff to turn to side every two hours using pillows for support. Pt educated on risk of developing skin breakdown. New sacral mepilex placed on pt's buttocks. Pt verbalizes understanding. Will continue to monitor. Problem: Skin/Tissue Integrity  Goal: Absence of new skin breakdown  Description: 1. Monitor for areas of redness and/or skin breakdown  2. Assess vascular access sites hourly  3. Every 4-6 hours minimum:  Change oxygen saturation probe site  4. Every 4-6 hours:  If on nasal continuous positive airway pressure, respiratory therapy assess nares and determine need for appliance change or resting period.   12/10/2023 1720 by Lowell Castleman, RN  Outcome: Progressing

## 2023-12-11 ENCOUNTER — APPOINTMENT (OUTPATIENT)
Dept: VASCULAR LAB | Age: 83
DRG: 918 | End: 2023-12-11
Attending: STUDENT IN AN ORGANIZED HEALTH CARE EDUCATION/TRAINING PROGRAM
Payer: MEDICARE

## 2023-12-11 LAB
ALBUMIN SERPL-MCNC: 3.6 G/DL (ref 3.4–5)
ANION GAP SERPL CALCULATED.3IONS-SCNC: 9 MMOL/L (ref 3–16)
BUN SERPL-MCNC: 21 MG/DL (ref 7–20)
CALCIUM SERPL-MCNC: 9.2 MG/DL (ref 8.3–10.6)
CHLORIDE SERPL-SCNC: 103 MMOL/L (ref 99–110)
CO2 SERPL-SCNC: 27 MMOL/L (ref 21–32)
CREAT SERPL-MCNC: 1.7 MG/DL (ref 0.6–1.2)
GFR SERPLBLD CREATININE-BSD FMLA CKD-EPI: 29 ML/MIN/{1.73_M2}
GLUCOSE SERPL-MCNC: 89 MG/DL (ref 70–99)
PHOSPHATE SERPL-MCNC: 4.1 MG/DL (ref 2.5–4.9)
POTASSIUM SERPL-SCNC: 3.8 MMOL/L (ref 3.5–5.1)
SODIUM SERPL-SCNC: 139 MMOL/L (ref 136–145)

## 2023-12-11 PROCEDURE — 6370000000 HC RX 637 (ALT 250 FOR IP): Performed by: STUDENT IN AN ORGANIZED HEALTH CARE EDUCATION/TRAINING PROGRAM

## 2023-12-11 PROCEDURE — 6360000002 HC RX W HCPCS: Performed by: STUDENT IN AN ORGANIZED HEALTH CARE EDUCATION/TRAINING PROGRAM

## 2023-12-11 PROCEDURE — 36415 COLL VENOUS BLD VENIPUNCTURE: CPT

## 2023-12-11 PROCEDURE — 97535 SELF CARE MNGMENT TRAINING: CPT

## 2023-12-11 PROCEDURE — 6370000000 HC RX 637 (ALT 250 FOR IP): Performed by: INTERNAL MEDICINE

## 2023-12-11 PROCEDURE — 2580000003 HC RX 258: Performed by: STUDENT IN AN ORGANIZED HEALTH CARE EDUCATION/TRAINING PROGRAM

## 2023-12-11 PROCEDURE — 1200000000 HC SEMI PRIVATE

## 2023-12-11 PROCEDURE — 97110 THERAPEUTIC EXERCISES: CPT

## 2023-12-11 PROCEDURE — 80069 RENAL FUNCTION PANEL: CPT

## 2023-12-11 PROCEDURE — 93975 VASCULAR STUDY: CPT

## 2023-12-11 PROCEDURE — 6370000000 HC RX 637 (ALT 250 FOR IP): Performed by: NURSE PRACTITIONER

## 2023-12-11 PROCEDURE — 97116 GAIT TRAINING THERAPY: CPT

## 2023-12-11 PROCEDURE — 2580000003 HC RX 258: Performed by: INTERNAL MEDICINE

## 2023-12-11 RX ORDER — SODIUM CHLORIDE 9 MG/ML
INJECTION, SOLUTION INTRAVENOUS CONTINUOUS
Status: DISCONTINUED | OUTPATIENT
Start: 2023-12-11 | End: 2023-12-12 | Stop reason: HOSPADM

## 2023-12-11 RX ORDER — HYDRALAZINE HYDROCHLORIDE 50 MG/1
50 TABLET, FILM COATED ORAL EVERY 8 HOURS SCHEDULED
Status: DISCONTINUED | OUTPATIENT
Start: 2023-12-11 | End: 2023-12-12 | Stop reason: HOSPADM

## 2023-12-11 RX ADMIN — AMLODIPINE BESYLATE 10 MG: 5 TABLET ORAL at 08:46

## 2023-12-11 RX ADMIN — TRAZODONE HYDROCHLORIDE 100 MG: 50 TABLET ORAL at 21:54

## 2023-12-11 RX ADMIN — VALSARTAN 80 MG: 80 TABLET, FILM COATED ORAL at 08:46

## 2023-12-11 RX ADMIN — TORSEMIDE 10 MG: 20 TABLET ORAL at 08:45

## 2023-12-11 RX ADMIN — HYDRALAZINE HYDROCHLORIDE 50 MG: 50 TABLET, FILM COATED ORAL at 15:04

## 2023-12-11 RX ADMIN — HYDRALAZINE HYDROCHLORIDE 50 MG: 50 TABLET, FILM COATED ORAL at 21:20

## 2023-12-11 RX ADMIN — SODIUM CHLORIDE: 9 INJECTION, SOLUTION INTRAVENOUS at 10:57

## 2023-12-11 RX ADMIN — ATORVASTATIN CALCIUM 40 MG: 40 TABLET, FILM COATED ORAL at 08:46

## 2023-12-11 RX ADMIN — HEPARIN SODIUM 5000 UNITS: 5000 INJECTION INTRAVENOUS; SUBCUTANEOUS at 04:46

## 2023-12-11 RX ADMIN — ISOSORBIDE MONONITRATE 30 MG: 30 TABLET, EXTENDED RELEASE ORAL at 08:46

## 2023-12-11 RX ADMIN — HEPARIN SODIUM 5000 UNITS: 5000 INJECTION INTRAVENOUS; SUBCUTANEOUS at 15:04

## 2023-12-11 RX ADMIN — BUSPIRONE HYDROCHLORIDE 10 MG: 5 TABLET ORAL at 21:54

## 2023-12-11 RX ADMIN — HEPARIN SODIUM 5000 UNITS: 5000 INJECTION INTRAVENOUS; SUBCUTANEOUS at 21:19

## 2023-12-11 RX ADMIN — HYDRALAZINE HYDROCHLORIDE 10 MG: 10 TABLET, FILM COATED ORAL at 04:46

## 2023-12-11 RX ADMIN — SODIUM CHLORIDE, PRESERVATIVE FREE 10 ML: 5 INJECTION INTRAVENOUS at 08:47

## 2023-12-11 RX ADMIN — CITALOPRAM HYDROBROMIDE 30 MG: 20 TABLET ORAL at 08:45

## 2023-12-11 NOTE — PROGRESS NOTES
Comprehensive Nutrition Assessment    Type and Reason for Visit:  Initial (LOS)    Nutrition Recommendations/Plan:   Continue Dysphagia Soft and Bite sized. Lessen diet restrictions to No Added Salt diet. Malnutrition Assessment:  Malnutrition Status: At risk for malnutrition (Comment) (12/11/23 1311)    Context:  Acute Illness     Findings of the 6 clinical characteristics of malnutrition:  Energy Intake:  Mild decrease in energy intake (Comment)  Weight Loss:  No significant weight loss     Body Fat Loss:  No significant body fat loss     Muscle Mass Loss:  No significant muscle mass loss    Fluid Accumulation:  Mild Extremities   Strength:  Not Performed    Nutrition Assessment:    LOS. Pt with PMH of Alzheimer's, HTN, HLD, HF, and CKD4 who presented with AMS and fall. Pt has had decreased PO intakes since admission d/t not being allowed to have salt with meals d/t Low Na diet restriction. Pt did have a packet of salt at lunch today and ate 100% of her meal. Pt declined all supplements. Pt also stating she doesn't like her food cut up. Will liberalize her diet slightly to help encourage PO intakes. Nutrition Related Findings:    +2 BLE edema; BM 12/9; Wound Type: Pressure Injury (to buttock, unclassified)       Current Nutrition Intake & Therapies:    Average Meal Intake: 1-25%, %  Average Supplements Intake: None Ordered  ADULT DIET; Dysphagia - Soft and Bite Sized; Low Sodium (2 gm)    Anthropometric Measures:  Height: 152.4 cm (5')  Ideal Body Weight (IBW): 100 lbs (45 kg)       Current Body Weight: 72.6 kg (160 lb 0.9 oz), 160.1 % IBW. Weight Source: Standing Scale  Current BMI (kg/m2): 31.3                          BMI Categories: Obese Class 1 (BMI 30.0-34. 9)    Estimated Daily Nutrient Needs:        Energy (kcal/day): 2512-5653 kcal (15-18 kcal/kg 73 kg CBW)     Protein (g/day): 63-94 gm (1.2-1.8 gm/IBW)     Fluid (ml/day): per provider, Hx of CHF    Nutrition Diagnosis:   Inadequate

## 2023-12-11 NOTE — FLOWSHEET NOTE
TODAY'S DATE:  12/11/2023      Current NIHSS N/A - not ordered      Discussed personal risk factors for Stroke/TIA with patient/family, and ways to reduce the risk for a recurrent stroke. Patient's personal risk factors which were identified are:   []   Alcohol Abuse: check with your physician before any alcohol consumption. []   Atrial fibrillation: may cause blood clots  []   Drug Abuse: Seek help, talk with your doctor  []   Clotting Disorder  []   Diabetes  [x]   Family history of stroke or heart disease  [x]   High Blood Pressure/Hypertension: work with your physician  [x]   High cholesterol: monitor cholesterol levels with your physician  []   Overweight/Obesity: work with your physician for your ideal body weight  [x]   Physical Inactivity: get regular exercise as directed by your physician  []   Personal history of previous TIA or stroke  [x]   Poor Diet: decrease salt (sodium) in your diet, follow diet directed by physician  []   Smoking: stop smoking      Reviewed the Following Education with Patient and/or Family:   - Signs and Symptoms of Stroke  - Personal risk factors   - How to activate EMS (911)   - Importance of Follow Up Appointments at Discharge   - Importance of Compliance with Medications Prescribed at Discharge  - Available community resources and stroke advocacy groups if needed    Patient and/or family member: verbalized understanding. Stroke Education booklet given to patient/family (or verified, if given already), which reviews above information.  yes         Electronically signed by Kobi Ro RN on 12/11/2023 at 11:38 AM

## 2023-12-11 NOTE — PLAN OF CARE
Pt resting in bed quietly. Bed in lowest position, wheels locked, side rails up X2, non skid socks on. Bed check alarm engaged. Pt instructed not to get out of bed on own, to use call light for staff assistance when ambulating or other needs. Pt verbalizes understanding. Call light within reach. Will continue to monitor. Problem: Safety - Adult  Goal: Free from fall injury  12/11/2023 1129 by Gera Bolaños RN  Outcome: Progressing  12/11/2023 0607 by Lisa Sood RN  Outcome: Progressing     Problem: Neurosensory - Adult  Goal: Achieves maximal functionality and self care  Outcome: Progressing         NIHSS not assessed due to not ordered at this time. Upon head-to-toe assessment, no changes or new deficits noted at this time. Vital signs stable and reassessed Q4 hours. Telemetry monitor continued. Fall precautions in place. Call light within reach. Educated pt on importance of calling out when getting out of bed. Will continue to assess and monitor. Problem: Neurosensory - Adult  Goal: Achieves stable or improved neurological status  Outcome: Progressing     Problem: Cardiovascular - Adult  Goal: Absence of cardiac dysrhythmias or at baseline  Outcome: Progressing     Problem: Cardiovascular - Adult  Goal: Maintains optimal cardiac output and hemodynamic stability  Outcome: Progressing     Problem: Cardiovascular - Adult  Goal: Absence of cardiac dysrhythmias or at baseline  Outcome: Progressing     Problem: Metabolic/Fluid and Electrolytes - Adult  Goal: Electrolytes maintained within normal limits  Outcome: Progressing     Problem: Metabolic/Fluid and Electrolytes - Adult  Goal: Electrolytes maintained within normal limits  Outcome: Progressing     Stage II wound reassessed. Sacral mepilex in place. Pt encouraged and assisted by staff to turn to side every two hours using pillows for support. Pt educated on risk of developing skin breakdown. Pt verbalizes understanding.  Will continue to

## 2023-12-11 NOTE — PLAN OF CARE
Problem: Chronic Conditions and Co-morbidities  Goal: Patient's chronic conditions and co-morbidity symptoms are monitored and maintained or improved  12/11/2023 0607 by Corie Lopez RN  Outcome: Progressing  Care Plan - Patient's Chronic Conditions and Co-Morbidity Symptoms are Monitored and Maintained or Improved: Monitor and assess patient's chronic conditions and comorbid symptoms for stability, deterioration, or improvement  Problem: Discharge Planning  Goal: Discharge to home or other facility with appropriate resources  Outcome: Progressing  Problem: Safety - Adult  Goal: Free from fall injury  Outcome: Progressing     Problem: Skin/Tissue Integrity  Goal: Absence of new skin breakdown  Description: 1. Monitor for areas of redness and/or skin breakdown  2. Assess vascular access sites hourly  3. Every 4-6 hours minimum:  Change oxygen saturation probe site  4. Every 4-6 hours:  If on nasal continuous positive airway pressure, respiratory therapy assess nares and determine need for appliance change or resting period.   Outcome: Progressing  Problem: Pain  Goal: Verbalizes/displays adequate comfort level or baseline comfort level  Outcome: Progressing

## 2023-12-11 NOTE — PROGRESS NOTES
Occupational Therapy  Facility/Department: [facility] C5 - MED SURG/ORTHO  Daily Treatment Note  NAME: Dyan Leon  : 1940  MRN: [id number]    Date of Service: 2023    Discharge Recommendations:  Subacute/Skilled Nursing Facility  OT Equipment Recommendations  Equipment Needed: No    Therapy discharge recommendations take into account each patient's current medical complexities and are subject to input/oversight from the patient's healthcare team.   Barriers to Home Discharge:   [] Steps to access home entry or bed/bath:   [x] Unable to transfer, ambulate, or propel wheelchair household distances without assist   [x] Limited available assist at home upon discharge    [] Patient or family requests d/c to post-acute facility    [x] Poor cognition/safety awareness for d/c to home alone    [] Unable to maintain ordered weight bearing status    [] Patient with salient signs of long-standing immobility   [x] Decreased independence with ADLs   [x] Increased risk for falls   [] Other:    Patient Diagnosis(es): There were no encounter diagnoses. Assessment    Assessment: Pt agreeable to OT treatment this date. Pt with improvement this date, able to complete functional mobility with SBA with RW. Pt completed toileting and hand hygiene with SBA. Pt would continue to benefit from acute OT at this time to improve functional mobility and ADL independence. D/c recs for SNF when medically ready. Activity Tolerance: Patient tolerated treatment well  Discharge Recommendations: Subacute/Skilled Nursing Facility  Equipment Needed: No      Plan   Occupational Therapy Plan  Times Per Week: 3-5x/ week  Current Treatment Recommendations: Strengthening;Balance training;Functional mobility training;Positioning; Safety education & training;Self-Care / ADL     Restrictions  Restrictions/Precautions  Restrictions/Precautions: Fall Risk;General Precautions  Position Activity Restriction  Other position/activity restrictions: up

## 2023-12-11 NOTE — CARE COORDINATION
Chart reviewed day 8. Pt is followed by IM and Nephro. Pt w/improved Bp. Awaiting Renal U/S, anticipate completion this am. Cert for [facility] placed on  is in MD review, message left for [place] [facility] [place] to check status. Pt is from home alone w/[facility]. Will follow for needs as they arise. Electronically signed by Bal Moulton RN on  at 5:15 AM    Cert needing Peer to Peer. Number: [phone]  Option: 4  Reference Number: [id number]  Payor: [employer] MEDICARE Plan: [employer] MED[employer]ARE ADVANTAGE*   Group Number: [id number] Insurance Type: INDEMNITY   Subscriber Name: Nikos Uribe : 1940   Subscriber ID: [id number]       Must be completed by 12 pm today.  made aware via face to face. MD requested updated PT/OT notes prior to completion, pt is currently in Cath Lab and new notes can not be provided at this time. Will follow. Electronically signed by Bal Moulton RN on 2023 at 10:38 AM    Per dtr if Pauline Tavarez is denied pt can come stay w/her while she recovers if pt is agreeable.  Electronically signed by Bal Moulton RN on 2023 at 12:48 PM

## 2023-12-11 NOTE — FLOWSHEET NOTE
TODAY'S DATE:  12/11/2023      Current NIHSS N/A - not ordered at this time      Discussed personal risk factors for Stroke/TIA with patient/family, and ways to reduce the risk for a recurrent stroke. Patient's personal risk factors which were identified are:   []   Alcohol Abuse: check with your physician before any alcohol consumption. []   Atrial fibrillation: may cause blood clots  []   Drug Abuse: Seek help, talk with your doctor  []   Clotting Disorder  []   Diabetes  [x]   Family history of stroke or heart disease  [x]   High Blood Pressure/Hypertension: work with your physician  [x]   High cholesterol: monitor cholesterol levels with your physician  [x]   Overweight/Obesity: work with your physician for your ideal body weight  [x]   Physical Inactivity: get regular exercise as directed by your physician  [x]   Personal history of previous TIA or stroke  []   Poor Diet: decrease salt (sodium) in your diet, follow diet directed by physician  []   Smoking: stop smoking      Reviewed the Following Education with Patient and/or Family:   - Signs and Symptoms of Stroke  - Personal risk factors   - How to activate EMS (911)   - Importance of Follow Up Appointments at Discharge   - Importance of Compliance with Medications Prescribed at Discharge  - Available community resources and stroke advocacy groups if needed    Patient and/or family member: verbalized understanding. Stroke Education booklet given to patient/family (or verified, if given already), which reviews above information.  yes         Electronically signed by Michael Myers RN on 12/11/2023 at 11:39 AM

## 2023-12-11 NOTE — PROGRESS NOTES
12/11/23 1506   Encounter Summary   Encounter Overview/Reason  Initial Encounter;Spiritual/Emotional Needs   Service Provided For: Patient   Referral/Consult From: [facility]   Support System Children   Last Encounter  12/11/23  (empathic listening, prayer, nurtured hope)   Complexity of Encounter Moderate   Begin Time 1420   End Time  1440   Total Time Calculated 20 min   Spiritual/Emotional needs   Type Spiritual Support   Grief, Loss, and Adjustments   Type Life Adjustments   Assessment/Intervention/Outcome   Assessment Concerns with suffering   Intervention Active listening;Explored/Affirmed feelings, thoughts, concerns;Explored Coping Skills/Resources;Nurtured Hope;Prayer (assurance of)/[place]   Outcome Comfort;Encouraged

## 2023-12-11 NOTE — PROGRESS NOTES
Physical Therapy  Facility/Department: [facility] C5 - MED SURG/ORTHO  Daily Treatment Note  NAME: Darcy Baird  : 1940  MRN: [id number]    Date of Service: 2023    Discharge Recommendations:  Subacute/Skilled Nursing Facility   PT Equipment Recommendations  Equipment Needed: No    Patient Diagnosis(es): There were no encounter diagnoses. Assessment   Assessment: Pt seen for bed mobility, transfers, gait and ther exs. Pt required grossly SBA for all mobility and particpated well with the exs today; cooperative and recommended for con't skilled PT and SNF at D/C. Activity Tolerance: Patient tolerated treatment well  Equipment Needed: No     Plan    Physical Therapy Plan  General Plan: 3-5 times per week  Current Treatment Recommendations: Strengthening;Gait training;Balance training;Functional mobility training;Transfer training; Endurance training;Pain management; Neuromuscular re-education; Safety education & training;Patient/Caregiver education & training;Home exercise program;Therapeutic activities     Restrictions  Restrictions/Precautions  Restrictions/Precautions: Fall Risk, General Precautions  Position Activity Restriction  Other position/activity restrictions: up with assist     Subjective    Subjective  Subjective: Pt agrees to PT session  Pain: Pt states \"i hurt all the time\"     Objective   Vitals  Vitals:    23    BP: (!) 116/56   Pulse: 67   Resp:    Temp:    SpO2: 97%          Bed Mobility Training  Bed Mobility Training: Yes  Interventions: Verbal cues  Supine to Sit: Stand-by assistance; Additional time (to the right)  Scooting: Supervision  Balance  Sitting: Intact  Standing: Impaired  Standing - Static: Constant support;Good (RW)  Standing - Dynamic: Constant support;Good (RW)  Transfer Training  Transfer Training: Yes  Interventions: Safety awareness training  Sit to Stand: Stand-by assistance; Adaptive equipment; Additional time (RW)  Stand to Sit: Stand-by assistance; Adaptive

## 2023-12-11 NOTE — PLAN OF CARE
Pt resting in bed quietly. Bed in lowest position, wheels locked, side rails up X2, non skid socks on. Bed check alarm engaged. Pt instructed not to get out of bed on own, to use call light for staff assistance when ambulating or other needs. Pt verbalizes understanding. Call light within reach. Will continue to monitor. Problem: Safety - Adult  Goal: Free from fall injury  12/11/2023 1129 by Teo Miranda RN  Outcome: Progressing     NIHSS not assessed due to not ordered at this time. Upon head-to-toe assessment, no changes or new deficits noted at this time. Vital signs stable and reassessed Q4 hours. Telemetry monitor continued. Fall precautions in place. Call light within reach. Educated pt on importance of calling out when getting out of bed. Will continue to assess and monitor.      Problem: Neurosensory - Adult  Goal: Achieves stable or improved neurological status  12/11/2023 1140 by Teo Miranda RN  Outcome: Progressing     Problem: Neurosensory - Adult  Goal: Achieves maximal functionality and self care  12/11/2023 1140 by Teo Miranda RN  Outcome: Progressing     Problem: Cardiovascular - Adult  Goal: Maintains optimal cardiac output and hemodynamic stability  12/11/2023 1140 by Teo Miranda RN  Outcome: Progressing     Problem: Cardiovascular - Adult  Goal: Absence of cardiac dysrhythmias or at baseline  12/11/2023 1140 by Teo Miranda RN  Outcome: Progressing     Problem: Metabolic/Fluid and Electrolytes - Adult  Goal: Electrolytes maintained within normal limits  12/11/2023 1140 by Teo Miranda RN  Outcome: Progressing    Problem: Metabolic/Fluid and Electrolytes - Adult  Goal: Hemodynamic stability and optimal renal function maintained  12/11/2023 1140 by Teo Miranda RN  Outcome: Progressing     Problem: Hematologic - Adult  Goal: Maintains hematologic stability  12/11/2023 1140 by Teo Miranda RN  Outcome: Progressing Stage II wound reassessed. Sacral mepilex replaced. Pt encouraged and assisted by staff to turn to side every two hours using pillows for support. Pt educated on risk of developing skin breakdown. Pt verbalizes understanding. Will continue to monitor. Problem: Skin/Tissue Integrity - Adult  Goal: Incisions, wounds, or drain sites healing without S/S of infection  12/11/2023 1140 by Leola Esparza RN  Outcome: Progressing    Problem: Skin/Tissue Integrity  Goal: Absence of new skin breakdown  Description: 1. Monitor for areas of redness and/or skin breakdown  2. Assess vascular access sites hourly  3. Every 4-6 hours minimum:  Change oxygen saturation probe site  4. Every 4-6 hours:  If on nasal continuous positive airway pressure, respiratory therapy assess nares and determine need for appliance change or resting period.   12/11/2023 1129 by Leola Esparza RN  Outcome: Progressing

## 2023-12-12 VITALS
DIASTOLIC BLOOD PRESSURE: 73 MMHG | SYSTOLIC BLOOD PRESSURE: 122 MMHG | RESPIRATION RATE: 16 BRPM | WEIGHT: 159.94 LBS | HEART RATE: 81 BPM | HEIGHT: 60 IN | BODY MASS INDEX: 31.4 KG/M2 | TEMPERATURE: 98.3 F | OXYGEN SATURATION: 91 %

## 2023-12-12 PROCEDURE — 6370000000 HC RX 637 (ALT 250 FOR IP): Performed by: STUDENT IN AN ORGANIZED HEALTH CARE EDUCATION/TRAINING PROGRAM

## 2023-12-12 PROCEDURE — 6360000002 HC RX W HCPCS: Performed by: STUDENT IN AN ORGANIZED HEALTH CARE EDUCATION/TRAINING PROGRAM

## 2023-12-12 PROCEDURE — 6370000000 HC RX 637 (ALT 250 FOR IP): Performed by: INTERNAL MEDICINE

## 2023-12-12 PROCEDURE — 6370000000 HC RX 637 (ALT 250 FOR IP): Performed by: NURSE PRACTITIONER

## 2023-12-12 PROCEDURE — 2580000003 HC RX 258: Performed by: STUDENT IN AN ORGANIZED HEALTH CARE EDUCATION/TRAINING PROGRAM

## 2023-12-12 PROCEDURE — 2580000003 HC RX 258: Performed by: INTERNAL MEDICINE

## 2023-12-12 RX ORDER — TORSEMIDE 10 MG/1
10 TABLET ORAL DAILY
Qty: 60 TABLET | Refills: 1 | Status: ON HOLD | OUTPATIENT
Start: 2023-12-12 | End: 2023-12-27

## 2023-12-12 RX ORDER — CITALOPRAM HYDROBROMIDE 10 MG/1
30 TABLET ORAL DAILY
Qty: 60 TABLET | Refills: 2 | Status: SHIPPED | OUTPATIENT
Start: 2023-12-12

## 2023-12-12 RX ORDER — HYDRALAZINE HYDROCHLORIDE 50 MG/1
50 TABLET, FILM COATED ORAL EVERY 8 HOURS SCHEDULED
Qty: 90 TABLET | Refills: 2 | Status: SHIPPED | OUTPATIENT
Start: 2023-12-12

## 2023-12-12 RX ORDER — ISOSORBIDE MONONITRATE 30 MG/1
30 TABLET, EXTENDED RELEASE ORAL DAILY
Qty: 30 TABLET | Refills: 2 | Status: SHIPPED | OUTPATIENT
Start: 2023-12-12

## 2023-12-12 RX ADMIN — HYDRALAZINE HYDROCHLORIDE 50 MG: 50 TABLET, FILM COATED ORAL at 05:42

## 2023-12-12 RX ADMIN — ACETAMINOPHEN 650 MG: 325 TABLET ORAL at 12:47

## 2023-12-12 RX ADMIN — HEPARIN SODIUM 5000 UNITS: 5000 INJECTION INTRAVENOUS; SUBCUTANEOUS at 05:42

## 2023-12-12 RX ADMIN — SODIUM CHLORIDE, PRESERVATIVE FREE 10 ML: 5 INJECTION INTRAVENOUS at 10:11

## 2023-12-12 RX ADMIN — ATORVASTATIN CALCIUM 40 MG: 40 TABLET, FILM COATED ORAL at 10:09

## 2023-12-12 RX ADMIN — SODIUM CHLORIDE: 9 INJECTION, SOLUTION INTRAVENOUS at 00:43

## 2023-12-12 RX ADMIN — CITALOPRAM HYDROBROMIDE 30 MG: 20 TABLET ORAL at 10:09

## 2023-12-12 RX ADMIN — ISOSORBIDE MONONITRATE 30 MG: 30 TABLET, EXTENDED RELEASE ORAL at 10:09

## 2023-12-12 RX ADMIN — AMLODIPINE BESYLATE 10 MG: 5 TABLET ORAL at 10:09

## 2023-12-12 ASSESSMENT — PAIN DESCRIPTION - LOCATION
LOCATION: HEAD
LOCATION: BACK

## 2023-12-12 ASSESSMENT — PAIN DESCRIPTION - DESCRIPTORS
DESCRIPTORS: ACHING
DESCRIPTORS: ACHING

## 2023-12-12 ASSESSMENT — PAIN SCALES - GENERAL
PAINLEVEL_OUTOF10: 8
PAINLEVEL_OUTOF10: 0

## 2023-12-12 NOTE — CARE COORDINATION
[facility]    Referral received from  to follow for home care services. [facility] unable to staff timely    Patient has no preference  [facility]; [facility] by [facility]. [place] office will call patient and pull referral from [facility]    Has passport services through [facility]    Dc to daughter address :  [address] [facility] at [address] [place] [address], [address] [place] [address].      Sukumar Villafana RN, BSN CTN [address] [place] [address]   [phone] fax [phone]  [facility] [address] [place] [address] [phone]

## 2023-12-12 NOTE — CARE COORDINATION
Chart reviewed day 9. Pt is followed by IM and Nephro. Anticipate d/c today. Pts cert was denied to [facility]. Plan is to d/c to [facility] at [address] [place] [address], [address] [place] [address]. Referral placed to [facility] for PT/OT. Pt has aide services through [facility] 5 days a week 3hrs a day. Will follow for needs as they arise.  Electronically signed by Tasha Rodriguez RN on 12/12/2023 at 8:37 AM

## 2023-12-12 NOTE — PROGRESS NOTES
A&Ox4. No complaints of /SOB, chest pain or heaviness. Appears weak. No complaints of pain or discomfort. Denies needs at the moment.

## 2023-12-12 NOTE — PLAN OF CARE
Problem: Chronic Conditions and Co-morbidities  Goal: Patient's chronic conditions and co-morbidity symptoms are monitored and maintained or improved  Outcome: Adequate for Discharge     Problem: Discharge Planning  Goal: Discharge to home or other facility with appropriate resources  Outcome: Adequate for Discharge     Problem: Safety - Adult  Goal: Free from fall injury  Outcome: Adequate for Discharge     Problem: Skin/Tissue Integrity  Goal: Absence of new skin breakdown  Description: 1. Monitor for areas of redness and/or skin breakdown  2. Assess vascular access sites hourly  3. Every 4-6 hours minimum:  Change oxygen saturation probe site  4. Every 4-6 hours:  If on nasal continuous positive airway pressure, respiratory therapy assess nares and determine need for appliance change or resting period.   Outcome: Adequate for Discharge     Problem: Pain  Goal: Verbalizes/displays adequate comfort level or baseline comfort level  Outcome: Adequate for Discharge     Problem: Neurosensory - Adult  Goal: Achieves stable or improved neurological status  Outcome: Adequate for Discharge  Goal: Achieves maximal functionality and self care  Outcome: Adequate for Discharge     Problem: Cardiovascular - Adult  Goal: Maintains optimal cardiac output and hemodynamic stability  Outcome: Adequate for Discharge  Goal: Absence of cardiac dysrhythmias or at baseline  Outcome: Adequate for Discharge     Problem: Skin/Tissue Integrity - Adult  Goal: Incisions, wounds, or drain sites healing without S/S of infection  Outcome: Adequate for Discharge     Problem: Metabolic/Fluid and Electrolytes - Adult  Goal: Electrolytes maintained within normal limits  Outcome: Adequate for Discharge  Goal: Hemodynamic stability and optimal renal function maintained  Outcome: Adequate for Discharge  Goal: Glucose maintained within prescribed range  Outcome: Adequate for Discharge     Problem: Hematologic - Adult  Goal: Maintains hematologic stability  Outcome: Adequate for Discharge     Problem: Nutrition Deficit:  Goal: Optimize nutritional status  Outcome: Adequate for Discharge

## 2023-12-12 NOTE — DISCHARGE SUMMARY
Hospital Medicine Discharge Summary    Patient: Jestine Duverney   : 1940     Admit Date: 12/3/2023   Discharge Date: 2023    Disposition:  []Home   [x]HHC  []SNF  []ECF  []Acute Rehab  []LTAC  []Hospice  Code status:  [x]Full  []DNR/CCA  []Limited (DNR/CCA with Do Not Intubate)  []DNRCC  Condition at Discharge: Stable  Primary Care Provider: KALI Salgado - CNP    Admitting Provider: Ryan Davis MD  Discharge Provider: Ryan Davis MD     Discharge Diagnoses: Active Hospital Problems    Diagnosis     Altered mental status [R41.82]        Presenting Admission History:        Jestine Duverney is a 79 y.o. female with Alzheimer's dementia, hypertension, hyperlipidemia, obesity/MARIA C, essential tremor, chronic diastolic heart failure,Hx of TAVR, suspected COPD, CKD stage IV, tobacco use presented as a transfer from [address] [place] [address] with [facility]. Patient was brought in by EMS to [facility] ER after her daughter found her slumped in the chair and unable to get up. Patient was admitted a month ago for very similar presentation at [facility]. Additionally there was also complaint of frequent falls over the past 2 weeks. Upon arrival to ED at [facility], patient underwent CT brain CT cervical spine and CTA head and neck which did not reveal any acute abnormalities except for mentioned above. Patient was accepted by my colleague earlier yesterday afternoon. Upon arrival to [facility], patient was alert oriented x 3, conversational but somnolent. The only thing that she could tell me was she has been having difficulty ambulating and has been falling. Patient lives alone.               Assessment/Plan:        Altered mental status, manifesting as disorientation, somnolence & difficulty ambulating  -Improving, alert oriented x 3 during initial evaluation here, pt was conversational but still somnolent  Likely related to polypharmacy in the setting of decreased reserve due to Alzheimer's dementia, suspicion for CVA

## 2023-12-12 NOTE — CARE COORDINATION
CASE MANAGEMENT DISCHARGE SUMMARY      Discharge to: Home w/dtr Sonya. [facility] (PT/OT/Nursing) and [facility] (Aide). IMM given: 12/12/23     New Durable Medical Equipment ordered/agency: n/a    Transportation:    Family/car:private w/son. Confirmed discharge plan with:     Patient: yes. Family:  yes. Dtr Sonya via phone. RN, name: Feng Callahan     Note: Discharging nurse to complete KINA, reconcile AVS, and place final copy with patient's discharge packet.

## 2023-12-12 NOTE — PROGRESS NOTES
Physician Progress Note      PATIENT:               Dominic Denise  CSN #:                  [id number]  :                       1940  ADMIT DATE:       12/3/2023 11:53 PM  [address] [facility] DATE:        2023 1:18 PM  RESPONDING  PROVIDER #:        Kourtney Oshea MD          QUERY TEXT:    Pt admitted with AMS with known Alzheimer's dementia. Work-up negative for   CVA. Patient was a/o x 3 on arrival.  Documentation notes \"Likely related to   polypharmacy in the setting of decreased reserve due to Alzheimer's dementia\". Please further specify: The medical record reflects the following:  Risk Factors: age, Alzheimers dementia, polypharmacy, CHF  Clinical Indicators: Altered mental status, manifesting as disorientation,   somnolence & difficulty ambulating  -Improving, alert oriented x 3 during initial evaluation here, pt was   conversational but still somnolent  Likely related to polypharmacy in the setting of decreased reserve due to   Alzheimer's dementia, UDS negative, MRI negative for ischemia  Treatment: CT, MRI, UDS, ua, ammonia, hold all sedatives hypnotics, and pain   medication    Thank you,  Preston Bolaños RN,BSN,CCDS,CRCR  Options provided:  -- AMS related to Alzheimers dementia that worsened as side effect from   polypharmacy, medications takes as prescribed  -- AMS related to Alzheimers dementia that worsened as side effect from   polypharmacy, medications not taken as prescribed  -- Other - I will add my own diagnosis  -- Disagree - Not applicable / Not valid  -- Disagree - Clinically unable to determine / Unknown  -- Refer to Clinical Documentation Reviewer    PROVIDER RESPONSE TEXT:    The AMS related to Alzheimer's dementia that worsened as side effect from   polypharmacy, medications not taken as prescribed.     Query created by: Preston Bolaños on 2023 12:15 PM      Electronically signed by:  Kourtney Oshea MD 2023 3:55 PM

## 2023-12-12 NOTE — PROGRESS NOTES
Pt d/c'd home. Removed peripheral IV and stopped bleeding. Catheter intact. Pt tolerated well. No redness noted at site. Notified [facility] and removed tele box. Reviewed d/c instructions, home meds, and  f/u information utilizing teach-back method. Scripts picked up from outpatient pharmacy and given to patient. Patient verbalized understanding. Patient wheeled off the unit to personal vehicle in stable condition.     Conchita Gillespie RN

## 2023-12-13 ENCOUNTER — CARE COORDINATION (OUTPATIENT)
Dept: CASE MANAGEMENT | Age: 83
End: 2023-12-13

## 2023-12-13 NOTE — CARE COORDINATION
Care Transitions Initial Follow Up Call    Call within 2 business days of discharge: Yes    Patient Current Location:  Home: [address] [place] [address] [facility]    Care Transition Nurse contacted the patient by telephone to perform post hospital discharge assessment. Verified name and  with patient as identifiers. Provided introduction to self, and explanation of the Care Transition Nurse role. Patient: Sergio Chaparro Patient : 1940   MRN: [id number]  Reason for Admission: altered mental status  Discharge Date: 23 RARS: Readmission Risk Score: 15.4      Last Discharge Facility       Date Complaint Diagnosis Description Type Department Provider    12/3/23   Admission (Discharged) Eva Buckner MD    12/3/23 Altered Mental Status Altered mental status, unspecified altered mental status type . .. ED (TRANSFER) [facility] Wellington Nava MD            Was this an external facility discharge? No Discharge Facility:     Challenges to be reviewed by the provider   Additional needs identified to be addressed with provider: Yes  Spoke to patient briefly and was unable to assist with TCM scheduling d/t patient hung up on call. Please assist with scheduling               Method of communication with provider: chart routing. Patient answered call and verified . Brief with conversation. Patient stated her daughter, JUAN DEVI currently with her and going to be going back to her house in [place] for a few days. Noted Sonya talking in background. Confirmed that they had spoken to [facility] and aware of temporary address change. Patient gave [facility] daughter's number of [phone] and consent was obtained to talk to daughter JUAN DEVI. CTN started to discuss medication reconciliation and new prescription, but patient stated she was going to call her cardiologist before starting new medication. Stated cardiologist was Dr Ruddy Martin and confirmed that she had office number.  Stated she had

## 2023-12-13 NOTE — TELEPHONE ENCOUNTER
135pm.   Attempted to call the patient again and voicemail is not set up on her phone. So, unable to leave her a message.

## 2023-12-15 ENCOUNTER — CARE COORDINATION (OUTPATIENT)
Dept: CASE MANAGEMENT | Age: 83
End: 2023-12-15

## 2023-12-15 NOTE — CARE COORDINATION
Care Transitions Follow Up Call    Patient: Carloz Be  Patient : 1940   MRN: [id number]  Reason for Admission: altered mental status  Discharge Date: 23 RARS: Readmission Risk Score: 15.4    Attempted to reach patient via phone for transition call.   No option to leave message      Follow Up  Future Appointments   Date Time Provider [address]   3/26/2024 12:00 PM KALI Cordova - HENRIETTA AGUILAR      Care Transitions Subsequent and Final Call    Subsequent and Final Calls  Care Transitions Interventions                          Other Interventions:             Minoo Wilson RN No

## 2023-12-20 ENCOUNTER — APPOINTMENT (OUTPATIENT)
Dept: CT IMAGING | Age: 83
DRG: 682 | End: 2023-12-20
Payer: MEDICARE

## 2023-12-20 ENCOUNTER — APPOINTMENT (OUTPATIENT)
Dept: GENERAL RADIOLOGY | Age: 83
DRG: 682 | End: 2023-12-20
Payer: MEDICARE

## 2023-12-20 ENCOUNTER — HOSPITAL ENCOUNTER (INPATIENT)
Age: 83
LOS: 7 days | Discharge: NURSING, REHAB OR LONG-TERM CARE | DRG: 682 | End: 2023-12-27
Attending: EMERGENCY MEDICINE | Admitting: HOSPITALIST
Payer: MEDICARE

## 2023-12-20 DIAGNOSIS — R41.82 ALTERED MENTAL STATUS, UNSPECIFIED ALTERED MENTAL STATUS TYPE: Primary | ICD-10-CM

## 2023-12-20 DIAGNOSIS — N17.9 AKI (ACUTE KIDNEY INJURY) (HCC): ICD-10-CM

## 2023-12-20 DIAGNOSIS — N39.0 URINARY TRACT INFECTION WITHOUT HEMATURIA, SITE UNSPECIFIED: ICD-10-CM

## 2023-12-20 PROBLEM — I35.0 AORTIC STENOSIS, SEVERE: Status: ACTIVE | Noted: 2018-08-22

## 2023-12-20 PROBLEM — G93.41 ACUTE METABOLIC ENCEPHALOPATHY: Chronic | Status: ACTIVE | Noted: 2023-12-20

## 2023-12-20 PROBLEM — I50.32 CHRONIC DIASTOLIC CONGESTIVE HEART FAILURE (HCC): Status: ACTIVE | Noted: 2022-10-13

## 2023-12-20 PROBLEM — M46.1 BILATERAL SACROILIITIS (HCC): Status: ACTIVE | Noted: 2022-07-19

## 2023-12-20 PROBLEM — G93.41 ACUTE METABOLIC ENCEPHALOPATHY: Status: ACTIVE | Noted: 2023-12-20

## 2023-12-20 PROBLEM — K21.9 GERD (GASTROESOPHAGEAL REFLUX DISEASE): Status: ACTIVE | Noted: 2019-04-09

## 2023-12-20 PROBLEM — T85.192D: Status: ACTIVE | Noted: 2019-09-03

## 2023-12-20 PROBLEM — J44.9 COPD (CHRONIC OBSTRUCTIVE PULMONARY DISEASE) (HCC): Chronic | Status: ACTIVE | Noted: 2019-04-09

## 2023-12-20 PROBLEM — I50.32 CHRONIC DIASTOLIC CONGESTIVE HEART FAILURE (HCC): Chronic | Status: ACTIVE | Noted: 2022-10-13

## 2023-12-20 LAB
ALBUMIN SERPL-MCNC: 4 G/DL (ref 3.4–5)
ALP SERPL-CCNC: 106 U/L (ref 40–129)
ALT SERPL-CCNC: 8 U/L (ref 10–40)
ANION GAP SERPL CALCULATED.3IONS-SCNC: 11 MMOL/L (ref 3–16)
APTT BLD: 29 SEC (ref 22.7–35.9)
AST SERPL-CCNC: 9 U/L (ref 15–37)
BACTERIA URNS QL MICRO: ABNORMAL /HPF
BASOPHILS # BLD: 0.1 K/UL (ref 0–0.2)
BASOPHILS NFR BLD: 0.7 %
BILIRUB DIRECT SERPL-MCNC: <0.2 MG/DL (ref 0–0.3)
BILIRUB INDIRECT SERPL-MCNC: ABNORMAL MG/DL (ref 0–1)
BILIRUB SERPL-MCNC: <0.2 MG/DL (ref 0–1)
BILIRUB UR QL STRIP.AUTO: NEGATIVE
BUN SERPL-MCNC: 50 MG/DL (ref 7–20)
CALCIUM SERPL-MCNC: 9 MG/DL (ref 8.3–10.6)
CHLORIDE SERPL-SCNC: 99 MMOL/L (ref 99–110)
CK SERPL-CCNC: 142 U/L (ref 26–192)
CLARITY UR: ABNORMAL
CO2 SERPL-SCNC: 29 MMOL/L (ref 21–32)
COLOR UR: YELLOW
CREAT SERPL-MCNC: 4.6 MG/DL (ref 0.6–1.2)
DEPRECATED RDW RBC AUTO: 14.8 % (ref 12.4–15.4)
EKG ATRIAL RATE: 63 BPM
EKG DIAGNOSIS: NORMAL
EKG P AXIS: 10 DEGREES
EKG P-R INTERVAL: 166 MS
EKG Q-T INTERVAL: 454 MS
EKG QRS DURATION: 96 MS
EKG QTC CALCULATION (BAZETT): 464 MS
EKG R AXIS: -8 DEGREES
EKG T AXIS: 4 DEGREES
EKG VENTRICULAR RATE: 63 BPM
EOSINOPHIL # BLD: 0.1 K/UL (ref 0–0.6)
EOSINOPHIL NFR BLD: 1.1 %
EPI CELLS #/AREA URNS HPF: ABNORMAL /HPF (ref 0–5)
GFR SERPLBLD CREATININE-BSD FMLA CKD-EPI: 9 ML/MIN/{1.73_M2}
GLUCOSE SERPL-MCNC: 105 MG/DL (ref 70–99)
GLUCOSE UR STRIP.AUTO-MCNC: NEGATIVE MG/DL
HCT VFR BLD AUTO: 39.4 % (ref 36–48)
HGB BLD-MCNC: 12.6 G/DL (ref 12–16)
HGB UR QL STRIP.AUTO: NEGATIVE
HYALINE CASTS #/AREA URNS LPF: ABNORMAL /LPF (ref 0–2)
INR PPP: 0.94 (ref 0.84–1.16)
KETONES UR STRIP.AUTO-MCNC: NEGATIVE MG/DL
LACTATE BLDV-SCNC: 1 MMOL/L (ref 0.4–2)
LEUKOCYTE ESTERASE UR QL STRIP.AUTO: ABNORMAL
LIPASE SERPL-CCNC: 9 U/L (ref 13–60)
LYMPHOCYTES # BLD: 1.5 K/UL (ref 1–5.1)
LYMPHOCYTES NFR BLD: 15.8 %
MAGNESIUM SERPL-MCNC: 2.2 MG/DL (ref 1.8–2.4)
MCH RBC QN AUTO: 27.6 PG (ref 26–34)
MCHC RBC AUTO-ENTMCNC: 32 G/DL (ref 31–36)
MCV RBC AUTO: 86.4 FL (ref 80–100)
MONOCYTES # BLD: 0.4 K/UL (ref 0–1.3)
MONOCYTES NFR BLD: 3.8 %
NEUTROPHILS # BLD: 7.7 K/UL (ref 1.7–7.7)
NEUTROPHILS NFR BLD: 78.6 %
NITRITE UR QL STRIP.AUTO: NEGATIVE
NT-PROBNP SERPL-MCNC: 677 PG/ML (ref 0–449)
PH UR STRIP.AUTO: 5.5 [PH] (ref 5–8)
PLATELET # BLD AUTO: 192 K/UL (ref 135–450)
PMV BLD AUTO: 8.2 FL (ref 5–10.5)
POTASSIUM SERPL-SCNC: 4.8 MMOL/L (ref 3.5–5.1)
PROT SERPL-MCNC: 7.3 G/DL (ref 6.4–8.2)
PROT UR STRIP.AUTO-MCNC: NEGATIVE MG/DL
PROTHROMBIN TIME: 12.6 SEC (ref 11.5–14.8)
RBC # BLD AUTO: 4.56 M/UL (ref 4–5.2)
RBC #/AREA URNS HPF: ABNORMAL /HPF (ref 0–4)
SODIUM SERPL-SCNC: 139 MMOL/L (ref 136–145)
SP GR UR STRIP.AUTO: >=1.03 (ref 1–1.03)
TROPONIN, HIGH SENSITIVITY: 44 NG/L (ref 0–14)
UA COMPLETE W REFLEX CULTURE PNL UR: YES
UA DIPSTICK W REFLEX MICRO PNL UR: YES
URN SPEC COLLECT METH UR: ABNORMAL
UROBILINOGEN UR STRIP-ACNC: 0.2 E.U./DL
WBC # BLD AUTO: 9.7 K/UL (ref 4–11)
WBC #/AREA URNS HPF: ABNORMAL /HPF (ref 0–5)

## 2023-12-20 PROCEDURE — 81001 URINALYSIS AUTO W/SCOPE: CPT

## 2023-12-20 PROCEDURE — 83735 ASSAY OF MAGNESIUM: CPT

## 2023-12-20 PROCEDURE — 80048 BASIC METABOLIC PNL TOTAL CA: CPT

## 2023-12-20 PROCEDURE — 99223 1ST HOSP IP/OBS HIGH 75: CPT | Performed by: HOSPITALIST

## 2023-12-20 PROCEDURE — 93010 ELECTROCARDIOGRAM REPORT: CPT | Performed by: INTERNAL MEDICINE

## 2023-12-20 PROCEDURE — 1200000000 HC SEMI PRIVATE

## 2023-12-20 PROCEDURE — 2700000000 HC OXYGEN THERAPY PER DAY

## 2023-12-20 PROCEDURE — 71045 X-RAY EXAM CHEST 1 VIEW: CPT

## 2023-12-20 PROCEDURE — 84484 ASSAY OF TROPONIN QUANT: CPT

## 2023-12-20 PROCEDURE — 85025 COMPLETE CBC W/AUTO DIFF WBC: CPT

## 2023-12-20 PROCEDURE — 80076 HEPATIC FUNCTION PANEL: CPT

## 2023-12-20 PROCEDURE — 87088 URINE BACTERIA CULTURE: CPT

## 2023-12-20 PROCEDURE — 70450 CT HEAD/BRAIN W/O DYE: CPT

## 2023-12-20 PROCEDURE — 87040 BLOOD CULTURE FOR BACTERIA: CPT

## 2023-12-20 PROCEDURE — 87186 SC STD MICRODIL/AGAR DIL: CPT

## 2023-12-20 PROCEDURE — 83605 ASSAY OF LACTIC ACID: CPT

## 2023-12-20 PROCEDURE — 87086 URINE CULTURE/COLONY COUNT: CPT

## 2023-12-20 PROCEDURE — 82550 ASSAY OF CK (CPK): CPT

## 2023-12-20 PROCEDURE — 83880 ASSAY OF NATRIURETIC PEPTIDE: CPT

## 2023-12-20 PROCEDURE — 2580000003 HC RX 258: Performed by: EMERGENCY MEDICINE

## 2023-12-20 PROCEDURE — 85610 PROTHROMBIN TIME: CPT

## 2023-12-20 PROCEDURE — 99285 EMERGENCY DEPT VISIT HI MDM: CPT

## 2023-12-20 PROCEDURE — 96365 THER/PROPH/DIAG IV INF INIT: CPT

## 2023-12-20 PROCEDURE — 94761 N-INVAS EAR/PLS OXIMETRY MLT: CPT

## 2023-12-20 PROCEDURE — 74176 CT ABD & PELVIS W/O CONTRAST: CPT

## 2023-12-20 PROCEDURE — 85730 THROMBOPLASTIN TIME PARTIAL: CPT

## 2023-12-20 PROCEDURE — 93005 ELECTROCARDIOGRAM TRACING: CPT | Performed by: EMERGENCY MEDICINE

## 2023-12-20 PROCEDURE — 36415 COLL VENOUS BLD VENIPUNCTURE: CPT

## 2023-12-20 PROCEDURE — 6360000002 HC RX W HCPCS: Performed by: EMERGENCY MEDICINE

## 2023-12-20 PROCEDURE — 83690 ASSAY OF LIPASE: CPT

## 2023-12-20 RX ORDER — SENNOSIDES A AND B 8.6 MG/1
1 TABLET, FILM COATED ORAL DAILY PRN
Status: DISCONTINUED | OUTPATIENT
Start: 2023-12-20 | End: 2023-12-27 | Stop reason: HOSPADM

## 2023-12-20 RX ORDER — SODIUM CHLORIDE 9 MG/ML
INJECTION, SOLUTION INTRAVENOUS PRN
Status: DISCONTINUED | OUTPATIENT
Start: 2023-12-20 | End: 2023-12-27 | Stop reason: HOSPADM

## 2023-12-20 RX ORDER — 0.9 % SODIUM CHLORIDE 0.9 %
1000 INTRAVENOUS SOLUTION INTRAVENOUS ONCE
Status: COMPLETED | OUTPATIENT
Start: 2023-12-20 | End: 2023-12-20

## 2023-12-20 RX ORDER — EPINEPHRINE 1 MG/ML
0.3 INJECTION, SOLUTION INTRAMUSCULAR; SUBCUTANEOUS DAILY PRN
Status: DISCONTINUED | OUTPATIENT
Start: 2023-12-20 | End: 2023-12-27 | Stop reason: HOSPADM

## 2023-12-20 RX ORDER — ACETAMINOPHEN 325 MG/1
650 TABLET ORAL EVERY 6 HOURS PRN
Status: DISCONTINUED | OUTPATIENT
Start: 2023-12-20 | End: 2023-12-27 | Stop reason: HOSPADM

## 2023-12-20 RX ORDER — ACETAMINOPHEN 650 MG/1
650 SUPPOSITORY RECTAL EVERY 6 HOURS PRN
Status: DISCONTINUED | OUTPATIENT
Start: 2023-12-20 | End: 2023-12-27 | Stop reason: HOSPADM

## 2023-12-20 RX ORDER — HEPARIN SODIUM 5000 [USP'U]/ML
5000 INJECTION, SOLUTION INTRAVENOUS; SUBCUTANEOUS EVERY 8 HOURS SCHEDULED
Status: DISCONTINUED | OUTPATIENT
Start: 2023-12-20 | End: 2023-12-27 | Stop reason: HOSPADM

## 2023-12-20 RX ORDER — SODIUM CHLORIDE 0.9 % (FLUSH) 0.9 %
5-40 SYRINGE (ML) INJECTION EVERY 12 HOURS SCHEDULED
Status: DISCONTINUED | OUTPATIENT
Start: 2023-12-20 | End: 2023-12-27 | Stop reason: HOSPADM

## 2023-12-20 RX ORDER — SPIRONOLACTONE 25 MG/1
25 TABLET ORAL DAILY
Status: ON HOLD | COMMUNITY
End: 2023-12-27 | Stop reason: HOSPADM

## 2023-12-20 RX ORDER — ONDANSETRON 2 MG/ML
4 INJECTION INTRAMUSCULAR; INTRAVENOUS EVERY 6 HOURS PRN
Status: DISCONTINUED | OUTPATIENT
Start: 2023-12-20 | End: 2023-12-27 | Stop reason: HOSPADM

## 2023-12-20 RX ORDER — POLYETHYLENE GLYCOL 3350 17 G/17G
17 POWDER, FOR SOLUTION ORAL DAILY PRN
Status: DISCONTINUED | OUTPATIENT
Start: 2023-12-20 | End: 2023-12-27 | Stop reason: HOSPADM

## 2023-12-20 RX ORDER — SODIUM CHLORIDE 9 MG/ML
INJECTION, SOLUTION INTRAVENOUS CONTINUOUS
Status: DISCONTINUED | OUTPATIENT
Start: 2023-12-20 | End: 2023-12-26

## 2023-12-20 RX ORDER — ONDANSETRON 4 MG/1
4 TABLET, ORALLY DISINTEGRATING ORAL EVERY 8 HOURS PRN
Status: DISCONTINUED | OUTPATIENT
Start: 2023-12-20 | End: 2023-12-27 | Stop reason: HOSPADM

## 2023-12-20 RX ORDER — SODIUM CHLORIDE 0.9 % (FLUSH) 0.9 %
5-40 SYRINGE (ML) INJECTION PRN
Status: DISCONTINUED | OUTPATIENT
Start: 2023-12-20 | End: 2023-12-27 | Stop reason: HOSPADM

## 2023-12-20 RX ADMIN — SODIUM CHLORIDE 1000 ML: 9 INJECTION, SOLUTION INTRAVENOUS at 16:44

## 2023-12-20 RX ADMIN — CEFTRIAXONE 1000 MG: 1 INJECTION, POWDER, FOR SOLUTION INTRAMUSCULAR; INTRAVENOUS at 19:18

## 2023-12-20 NOTE — ED PROVIDER NOTES
[facility] 1 ED  EMERGENCY DEPARTMENT ENCOUNTER        Pt Name: Italo Galaviz  MRN: [id number]  [facility] 1940  Date of evaluation: 12/20/2023  Provider: Catherine Ramírez DO  PCP: KALI Sutton CNP  Note Started: 3:40 PM EST 12/20/23    CHIEF COMPLAINT      AMS    HISTORY OF PRESENT ILLNESS: 1 or more Elements     Chief Complaint   Patient presents with    Altered Mental Status     Arrives via EMS to ED c/o altered mental status. Per EMS, patient has she has a pain pump but unknown where and what for. Patient responds to name only. History from : EMS  Limitations to history : Altered Mental Status    Italo Galaviz is a 79 y.o. female who presents to the emergency department secondary to concern for altered mental status. EMS was called by patient's family who noticed that she has been having worsening altered mental status throughout the day today. Reports that it started last night, but family did not give a specific time. Patient does have a pain pump for an unknown reason. Family does report that patient does get like this when she has urinary tract infection, but they are unsure if that was going on now. No meds given in route by EMS. Patient is usually verbal and alert and oriented x 2-3 at baseline. Past medical history noted below. Aside from what is stated above denies any other symptoms or modifying factors. reports that she has never smoked. She has never used smokeless tobacco. She reports that she does not drink alcohol and does not use drugs. Nursing Notes were all reviewed and agreed with or any disagreements addressed in HPI/MDM.   REVIEW OF SYSTEMS :    Review of Systems   Unable to perform ROS: Mental status change    Pertinent positive and negative findings as documented in the HPI  PAST MEDICAL HISTORY    has a past medical history of MARIO (acute kidney injury) ([address]) (11/24/2021), Altered mental status (11/30/2021), Arthritis, BCC (basal cell

## 2023-12-21 LAB
ALBUMIN SERPL-MCNC: 3.4 G/DL (ref 3.4–5)
ALBUMIN/GLOB SERPL: 1.1 {RATIO} (ref 1.1–2.2)
ALP SERPL-CCNC: 95 U/L (ref 40–129)
ALT SERPL-CCNC: 7 U/L (ref 10–40)
ANION GAP SERPL CALCULATED.3IONS-SCNC: 8 MMOL/L (ref 3–16)
AST SERPL-CCNC: 9 U/L (ref 15–37)
BASOPHILS # BLD: 0.1 K/UL (ref 0–0.2)
BASOPHILS NFR BLD: 0.8 %
BILIRUB SERPL-MCNC: <0.2 MG/DL (ref 0–1)
BUN SERPL-MCNC: 41 MG/DL (ref 7–20)
CALCIUM SERPL-MCNC: 8.9 MG/DL (ref 8.3–10.6)
CHLORIDE SERPL-SCNC: 109 MMOL/L (ref 99–110)
CO2 SERPL-SCNC: 24 MMOL/L (ref 21–32)
CREAT SERPL-MCNC: 2.6 MG/DL (ref 0.6–1.2)
DEPRECATED RDW RBC AUTO: 14.7 % (ref 12.4–15.4)
EOSINOPHIL # BLD: 0.1 K/UL (ref 0–0.6)
EOSINOPHIL NFR BLD: 1.4 %
GFR SERPLBLD CREATININE-BSD FMLA CKD-EPI: 18 ML/MIN/{1.73_M2}
GLUCOSE SERPL-MCNC: 95 MG/DL (ref 70–99)
HCT VFR BLD AUTO: 34.8 % (ref 36–48)
HGB BLD-MCNC: 11.3 G/DL (ref 12–16)
LYMPHOCYTES # BLD: 1.6 K/UL (ref 1–5.1)
LYMPHOCYTES NFR BLD: 20.6 %
MCH RBC QN AUTO: 27.9 PG (ref 26–34)
MCHC RBC AUTO-ENTMCNC: 32.5 G/DL (ref 31–36)
MCV RBC AUTO: 85.8 FL (ref 80–100)
MONOCYTES # BLD: 0.5 K/UL (ref 0–1.3)
MONOCYTES NFR BLD: 5.9 %
NEUTROPHILS # BLD: 5.6 K/UL (ref 1.7–7.7)
NEUTROPHILS NFR BLD: 71.3 %
PLATELET # BLD AUTO: 146 K/UL (ref 135–450)
PMV BLD AUTO: 8.1 FL (ref 5–10.5)
POTASSIUM SERPL-SCNC: 5.2 MMOL/L (ref 3.5–5.1)
PROT SERPL-MCNC: 6.5 G/DL (ref 6.4–8.2)
RBC # BLD AUTO: 4.05 M/UL (ref 4–5.2)
SODIUM SERPL-SCNC: 141 MMOL/L (ref 136–145)
WBC # BLD AUTO: 7.8 K/UL (ref 4–11)

## 2023-12-21 PROCEDURE — 97530 THERAPEUTIC ACTIVITIES: CPT

## 2023-12-21 PROCEDURE — 6360000002 HC RX W HCPCS: Performed by: NURSE PRACTITIONER

## 2023-12-21 PROCEDURE — 99233 SBSQ HOSP IP/OBS HIGH 50: CPT | Performed by: INTERNAL MEDICINE

## 2023-12-21 PROCEDURE — 2580000003 HC RX 258: Performed by: HOSPITALIST

## 2023-12-21 PROCEDURE — 51701 INSERT BLADDER CATHETER: CPT

## 2023-12-21 PROCEDURE — 36415 COLL VENOUS BLD VENIPUNCTURE: CPT

## 2023-12-21 PROCEDURE — 6370000000 HC RX 637 (ALT 250 FOR IP): Performed by: NURSE PRACTITIONER

## 2023-12-21 PROCEDURE — 97162 PT EVAL MOD COMPLEX 30 MIN: CPT

## 2023-12-21 PROCEDURE — 94761 N-INVAS EAR/PLS OXIMETRY MLT: CPT

## 2023-12-21 PROCEDURE — 2580000003 HC RX 258: Performed by: NURSE PRACTITIONER

## 2023-12-21 PROCEDURE — 85025 COMPLETE CBC W/AUTO DIFF WBC: CPT

## 2023-12-21 PROCEDURE — 97166 OT EVAL MOD COMPLEX 45 MIN: CPT

## 2023-12-21 PROCEDURE — 2700000000 HC OXYGEN THERAPY PER DAY

## 2023-12-21 PROCEDURE — 51798 US URINE CAPACITY MEASURE: CPT

## 2023-12-21 PROCEDURE — 6370000000 HC RX 637 (ALT 250 FOR IP): Performed by: HOSPITALIST

## 2023-12-21 PROCEDURE — 6360000002 HC RX W HCPCS: Performed by: HOSPITALIST

## 2023-12-21 PROCEDURE — 1200000000 HC SEMI PRIVATE

## 2023-12-21 PROCEDURE — 80053 COMPREHEN METABOLIC PANEL: CPT

## 2023-12-21 RX ORDER — CANDESARTAN 16 MG/1
8 TABLET ORAL 2 TIMES DAILY
Status: ON HOLD | COMMUNITY
End: 2023-12-27 | Stop reason: HOSPADM

## 2023-12-21 RX ORDER — BUSPIRONE HYDROCHLORIDE 10 MG/1
10 TABLET ORAL DAILY PRN
Status: DISCONTINUED | OUTPATIENT
Start: 2023-12-21 | End: 2023-12-27 | Stop reason: HOSPADM

## 2023-12-21 RX ORDER — FAMOTIDINE 20 MG/1
20 TABLET, FILM COATED ORAL DAILY
Status: DISCONTINUED | OUTPATIENT
Start: 2023-12-21 | End: 2023-12-27 | Stop reason: HOSPADM

## 2023-12-21 RX ORDER — CITALOPRAM 20 MG/1
30 TABLET ORAL DAILY
Status: DISCONTINUED | OUTPATIENT
Start: 2023-12-21 | End: 2023-12-27 | Stop reason: HOSPADM

## 2023-12-21 RX ORDER — DOCUSATE SODIUM 100 MG/1
100 CAPSULE, LIQUID FILLED ORAL 2 TIMES DAILY
Status: DISCONTINUED | OUTPATIENT
Start: 2023-12-21 | End: 2023-12-27 | Stop reason: HOSPADM

## 2023-12-21 RX ORDER — ATORVASTATIN CALCIUM 10 MG/1
20 TABLET, FILM COATED ORAL NIGHTLY
Status: DISCONTINUED | OUTPATIENT
Start: 2023-12-21 | End: 2023-12-27 | Stop reason: HOSPADM

## 2023-12-21 RX ORDER — ISOSORBIDE MONONITRATE 30 MG/1
30 TABLET, EXTENDED RELEASE ORAL DAILY
Status: DISCONTINUED | OUTPATIENT
Start: 2023-12-21 | End: 2023-12-27 | Stop reason: HOSPADM

## 2023-12-21 RX ORDER — ASPIRIN 81 MG/1
81 TABLET ORAL DAILY
Status: DISCONTINUED | OUTPATIENT
Start: 2023-12-21 | End: 2023-12-27 | Stop reason: HOSPADM

## 2023-12-21 RX ORDER — TORSEMIDE 20 MG/1
40 TABLET ORAL DAILY
Status: DISCONTINUED | OUTPATIENT
Start: 2023-12-21 | End: 2023-12-27 | Stop reason: HOSPADM

## 2023-12-21 RX ORDER — AMLODIPINE BESYLATE 5 MG/1
10 TABLET ORAL DAILY
Status: DISCONTINUED | OUTPATIENT
Start: 2023-12-21 | End: 2023-12-27 | Stop reason: HOSPADM

## 2023-12-21 RX ORDER — HYDROXYZINE HYDROCHLORIDE 25 MG/1
25 TABLET, FILM COATED ORAL 4 TIMES DAILY PRN
Status: DISCONTINUED | OUTPATIENT
Start: 2023-12-21 | End: 2023-12-27 | Stop reason: HOSPADM

## 2023-12-21 RX ORDER — CETIRIZINE HYDROCHLORIDE 10 MG/1
10 TABLET ORAL DAILY
Status: DISCONTINUED | OUTPATIENT
Start: 2023-12-21 | End: 2023-12-27 | Stop reason: HOSPADM

## 2023-12-21 RX ORDER — TRAZODONE HYDROCHLORIDE 100 MG/1
100 TABLET ORAL NIGHTLY PRN
Status: DISCONTINUED | OUTPATIENT
Start: 2023-12-21 | End: 2023-12-27 | Stop reason: HOSPADM

## 2023-12-21 RX ADMIN — Medication 10 ML: at 00:00

## 2023-12-21 RX ADMIN — DICLOFENAC SODIUM 4 G: 10 GEL TOPICAL at 22:10

## 2023-12-21 RX ADMIN — HEPARIN SODIUM 5000 UNITS: 5000 INJECTION INTRAVENOUS; SUBCUTANEOUS at 13:53

## 2023-12-21 RX ADMIN — CETIRIZINE HYDROCHLORIDE 10 MG: 10 TABLET ORAL at 15:17

## 2023-12-21 RX ADMIN — HEPARIN SODIUM 5000 UNITS: 5000 INJECTION INTRAVENOUS; SUBCUTANEOUS at 06:46

## 2023-12-21 RX ADMIN — FAMOTIDINE 20 MG: 20 TABLET, FILM COATED ORAL at 15:17

## 2023-12-21 RX ADMIN — BUSPIRONE HYDROCHLORIDE 10 MG: 10 TABLET ORAL at 22:05

## 2023-12-21 RX ADMIN — HYDROXYZINE HYDROCHLORIDE 25 MG: 25 TABLET ORAL at 22:22

## 2023-12-21 RX ADMIN — HEPARIN SODIUM 5000 UNITS: 5000 INJECTION INTRAVENOUS; SUBCUTANEOUS at 00:03

## 2023-12-21 RX ADMIN — TRAZODONE HYDROCHLORIDE 100 MG: 100 TABLET ORAL at 22:05

## 2023-12-21 RX ADMIN — CITALOPRAM HYDROBROMIDE 30 MG: 20 TABLET ORAL at 15:17

## 2023-12-21 RX ADMIN — ASPIRIN 81 MG: 81 TABLET, COATED ORAL at 15:17

## 2023-12-21 RX ADMIN — SODIUM CHLORIDE: 9 INJECTION, SOLUTION INTRAVENOUS at 10:23

## 2023-12-21 RX ADMIN — SODIUM CHLORIDE: 9 INJECTION, SOLUTION INTRAVENOUS at 00:02

## 2023-12-21 RX ADMIN — HEPARIN SODIUM 5000 UNITS: 5000 INJECTION INTRAVENOUS; SUBCUTANEOUS at 22:11

## 2023-12-21 RX ADMIN — ACETAMINOPHEN 650 MG: 325 TABLET ORAL at 22:05

## 2023-12-21 RX ADMIN — ATORVASTATIN CALCIUM 20 MG: 10 TABLET, FILM COATED ORAL at 22:05

## 2023-12-21 RX ADMIN — SODIUM CHLORIDE: 9 INJECTION, SOLUTION INTRAVENOUS at 21:52

## 2023-12-21 RX ADMIN — CEFTRIAXONE SODIUM 1000 MG: 1 INJECTION, POWDER, FOR SOLUTION INTRAMUSCULAR; INTRAVENOUS at 15:23

## 2023-12-21 NOTE — CARE COORDINATION
Case Management Assessment  Initial Evaluation    Date/Time of Evaluation: 12/21/2023 4:03 PM  Assessment Completed by: Elizabeth Manuel RN    If patient is discharged prior to next notation, then this note serves as note for discharge by case management. Patient Name: Linda Lopez                   YOB: 1940  Diagnosis: MARIO (acute kidney injury) ([address]) [N17.9]  Urinary tract infection without hematuria, site unspecified [N39.0]  Altered mental status, unspecified altered mental status type [B67.59]  Acute metabolic encephalopathy [S81.92]                   Date / Time: 12/20/2023  3:34 PM    Patient Admission Status: Inpatient   Readmission Risk (Low < 19, Mod (19-27), High > 27): Readmission Risk Score: 25.3    Current PCP: KALI Das CNP  PCP verified by CM? (P) Yes    Chart Reviewed: Yes      History Provided by: (P) Child/Family, Patient, Medical Record  Patient Orientation: (P) Person, Place    Patient Cognition: (P) Other (see comment) (altered, pleasantly confused)    Hospitalization in the last 30 days (Readmission):  Yes    If yes, Readmission Assessment in CM Navigator will be completed.     Advance Directives:      Code Status: Full Code   Patient's Primary Decision Maker is: (P) Legal Next of Kin    Primary Decision [facility] hospital - [phone]    Discharge Planning:    Patient lives with: (P) Alone Type of Home: (P) Apartment  Primary Care Giver: (P) Self  Patient Support Systems include: (P) Family Members   Current Financial resources: (P) Medicare, Medicaid  Current community resources: (P) ECF/Home Care  Current services prior to admission: (P) Durable Medical Equipment            Current DME: (P) Oswald , Wheelchair (rollator)            Type of Home Care services:  (P) OT, PT, [employer], Meals on Wheels, [employer], Housekeeping    ADLS  Prior functional level: (P) Assistance with the following:, Cooking, Housework, Shopping,

## 2023-12-21 NOTE — CARE COORDINATION
12/21/23 1554   Readmission Assessment   Number of Days since last admission? 8-30 days   Previous Disposition Home with Home Health   Who is being Interviewed Patient;Caregiver  (daughter Nemesio Tierney)   What was the patient's/caregiver's perception as to why they think they needed to return back to the hospital? Other (Comment)  (Increased confusion noted by family, UTI)   Did you see a specialist, such as Cardiac, Pulmonary, Orthopedic Physician, etc. after you left the hospital? No   Who advised the patient to return to the hospital? Caregiver   Does the patient report anything that got in the way of taking their medications? No   In our efforts to provide the best possible care to you and others like you, can you think of anything that we could have done to help you after you left the hospital the first time, so that you might not have needed to return so soon? Other (Comment); Discharge instructions that are concise, clear, and non contradictory;Arrange for more help when leaving the hospital  (return home and resume [address],)

## 2023-12-21 NOTE — H&P
a child    Sleep apnea     uses CPAP    Syncope and collapse 11/30/2021    TIA (transient ischemic attack)     Urinary incontinence     Urinary tract infection in female     Wears glasses      PSHX:  has a past surgical history that includes bladder repair; Hysterectomy; Neck surgery; shoulder surgery; Carpal tunnel release; Tonsillectomy; Appendectomy; Upper gastrointestinal endoscopy (4/22/11); Spine surgery (1999); Spinal fixation surgery with implant (2001); Spinal fixation surgery with implant (2004); Spinal fixation surgery with implant (2007); joint replacement; Coronary angioplasty with stent; Cardiac surgery; Skin cancer excision; Colonoscopy; Upper gastrointestinal endoscopy; other surgical history (9/28/12); Cholecystectomy, laparoscopic (01/06/2017); Upper gastrointestinal endoscopy (03/23/2017); Diagnostic Cardiac Cath Lab Procedure; Aortic valve replacement; and IR MIDLINE CATH (10/16/2023). Allergies:    Allergies   Allergen Reactions    Latex     Levofloxacin Other (See Comments)     Burns mouth per patient    Quinolones Hives and Other (See Comments)     Burned the inside of the mouth  Burns mouth per patient  Other reaction(s): hives  Burns mouth per patient  Burns mouth per patient  Other reaction(s): hives      Adhesive Tape Hives     No bandaids  No bandaids  No bandaids    Codeine Nausea Only, Hives, Itching and Nausea And Vomiting    Morphine Hives, Itching and Nausea Only     Other reaction(s): Unknown  Other reaction(s): Unknown  Other reaction(s): Unknown      Azithromycin     Pentazocine Lactate     Cephalexin Hives, Itching and Rash     Other reaction(s): hives      Pentazocine Hives, Itching and Rash    Sulfa Antibiotics Hives, Itching and Rash     Sores all over  Other reaction(s): hives  Other reaction(s): hives         FAM HX: family history includes Arthritis in her brother and mother; Cancer in her mother; Depression in her brother and mother; Diabetes in her brother; Early Death

## 2023-12-21 NOTE — ACP (ADVANCE CARE PLANNING)
Advance Care Planning     General Advance Care Planning (ACP) Conversation    Date of Conversation: 12/20/2023  Conducted with: Patient with Decision Making Capacity   Healthcare Decision Maker: Next of Kin by law (only applies in absence of above) (name) karyn Dickens Dural:    Primary Decision Maker: Licha Yen - Child - [phone]  Click here to complete [address] including selection of the Healthcare Decision Maker Relationship (ie \"Primary\"). Today we documented Decision Maker(s) consistent with Legal Next of Kin hierarchy.     Content/Action Overview:    Reviewed DNR/DNI and patient elects Full Code (Attempt Resuscitation)    Length of Voluntary ACP Conversation in minutes:  <16 minutes (Non-Billable)    Ulyses Mortimer, RN

## 2023-12-22 LAB
ANION GAP SERPL CALCULATED.3IONS-SCNC: 9 MMOL/L (ref 3–16)
BACTERIA UR CULT: ABNORMAL
BUN SERPL-MCNC: 32 MG/DL (ref 7–20)
CALCIUM SERPL-MCNC: 8.7 MG/DL (ref 8.3–10.6)
CHLORIDE SERPL-SCNC: 107 MMOL/L (ref 99–110)
CO2 SERPL-SCNC: 23 MMOL/L (ref 21–32)
CREAT SERPL-MCNC: 1.8 MG/DL (ref 0.6–1.2)
GFR SERPLBLD CREATININE-BSD FMLA CKD-EPI: 27 ML/MIN/{1.73_M2}
GLUCOSE SERPL-MCNC: 86 MG/DL (ref 70–99)
MAGNESIUM SERPL-MCNC: 1.9 MG/DL (ref 1.8–2.4)
ORGANISM: ABNORMAL
POTASSIUM SERPL-SCNC: 4.8 MMOL/L (ref 3.5–5.1)
SODIUM SERPL-SCNC: 139 MMOL/L (ref 136–145)
TROPONIN, HIGH SENSITIVITY: 35 NG/L (ref 0–14)

## 2023-12-22 PROCEDURE — 2580000003 HC RX 258: Performed by: NURSE PRACTITIONER

## 2023-12-22 PROCEDURE — 6360000002 HC RX W HCPCS: Performed by: HOSPITALIST

## 2023-12-22 PROCEDURE — 93005 ELECTROCARDIOGRAM TRACING: CPT | Performed by: NURSE PRACTITIONER

## 2023-12-22 PROCEDURE — 1200000000 HC SEMI PRIVATE

## 2023-12-22 PROCEDURE — 84484 ASSAY OF TROPONIN QUANT: CPT

## 2023-12-22 PROCEDURE — 94761 N-INVAS EAR/PLS OXIMETRY MLT: CPT

## 2023-12-22 PROCEDURE — 36415 COLL VENOUS BLD VENIPUNCTURE: CPT

## 2023-12-22 PROCEDURE — 2700000000 HC OXYGEN THERAPY PER DAY

## 2023-12-22 PROCEDURE — 6370000000 HC RX 637 (ALT 250 FOR IP): Performed by: NURSE PRACTITIONER

## 2023-12-22 PROCEDURE — 80048 BASIC METABOLIC PNL TOTAL CA: CPT

## 2023-12-22 PROCEDURE — 2580000003 HC RX 258: Performed by: HOSPITALIST

## 2023-12-22 PROCEDURE — 6360000002 HC RX W HCPCS: Performed by: NURSE PRACTITIONER

## 2023-12-22 PROCEDURE — 6370000000 HC RX 637 (ALT 250 FOR IP): Performed by: INTERNAL MEDICINE

## 2023-12-22 PROCEDURE — 99232 SBSQ HOSP IP/OBS MODERATE 35: CPT | Performed by: INTERNAL MEDICINE

## 2023-12-22 PROCEDURE — 83735 ASSAY OF MAGNESIUM: CPT

## 2023-12-22 RX ORDER — NITROGLYCERIN 0.4 MG/1
0.4 TABLET SUBLINGUAL EVERY 5 MIN PRN
Status: DISCONTINUED | OUTPATIENT
Start: 2023-12-22 | End: 2023-12-27 | Stop reason: HOSPADM

## 2023-12-22 RX ORDER — CALCIUM CARBONATE 500 MG/1
500 TABLET, CHEWABLE ORAL 3 TIMES DAILY PRN
Status: DISCONTINUED | OUTPATIENT
Start: 2023-12-22 | End: 2023-12-27 | Stop reason: HOSPADM

## 2023-12-22 RX ADMIN — ASPIRIN 81 MG: 81 TABLET, COATED ORAL at 09:14

## 2023-12-22 RX ADMIN — BUSPIRONE HYDROCHLORIDE 10 MG: 10 TABLET ORAL at 22:39

## 2023-12-22 RX ADMIN — NITROGLYCERIN 0.4 MG: 0.4 TABLET SUBLINGUAL at 04:41

## 2023-12-22 RX ADMIN — HEPARIN SODIUM 5000 UNITS: 5000 INJECTION INTRAVENOUS; SUBCUTANEOUS at 22:42

## 2023-12-22 RX ADMIN — CETIRIZINE HYDROCHLORIDE 10 MG: 10 TABLET ORAL at 09:13

## 2023-12-22 RX ADMIN — ATORVASTATIN CALCIUM 20 MG: 10 TABLET, FILM COATED ORAL at 22:39

## 2023-12-22 RX ADMIN — CITALOPRAM HYDROBROMIDE 30 MG: 20 TABLET ORAL at 09:13

## 2023-12-22 RX ADMIN — FAMOTIDINE 20 MG: 20 TABLET, FILM COATED ORAL at 09:13

## 2023-12-22 RX ADMIN — ONDANSETRON 4 MG: 2 INJECTION INTRAMUSCULAR; INTRAVENOUS at 03:37

## 2023-12-22 RX ADMIN — TRAZODONE HYDROCHLORIDE 100 MG: 100 TABLET ORAL at 22:40

## 2023-12-22 RX ADMIN — HYDROXYZINE HYDROCHLORIDE 25 MG: 25 TABLET ORAL at 04:02

## 2023-12-22 RX ADMIN — SODIUM CHLORIDE: 9 INJECTION, SOLUTION INTRAVENOUS at 06:08

## 2023-12-22 RX ADMIN — CALCIUM CARBONATE 500 MG: 500 TABLET, CHEWABLE ORAL at 04:50

## 2023-12-22 RX ADMIN — CEFTRIAXONE SODIUM 1000 MG: 1 INJECTION, POWDER, FOR SOLUTION INTRAMUSCULAR; INTRAVENOUS at 14:17

## 2023-12-22 RX ADMIN — HEPARIN SODIUM 5000 UNITS: 5000 INJECTION INTRAVENOUS; SUBCUTANEOUS at 14:14

## 2023-12-22 RX ADMIN — HYDROMORPHONE HYDROCHLORIDE 0.5 MG: 1 INJECTION, SOLUTION INTRAMUSCULAR; INTRAVENOUS; SUBCUTANEOUS at 01:30

## 2023-12-22 RX ADMIN — HEPARIN SODIUM 5000 UNITS: 5000 INJECTION INTRAVENOUS; SUBCUTANEOUS at 06:05

## 2023-12-22 RX ADMIN — DICLOFENAC SODIUM 4 G: 10 GEL TOPICAL at 12:49

## 2023-12-22 RX ADMIN — DICLOFENAC SODIUM 4 G: 10 GEL TOPICAL at 22:48

## 2023-12-22 RX ADMIN — SODIUM CHLORIDE: 9 INJECTION, SOLUTION INTRAVENOUS at 17:13

## 2023-12-22 RX ADMIN — HYDROXYZINE HYDROCHLORIDE 25 MG: 25 TABLET ORAL at 22:40

## 2023-12-22 RX ADMIN — DICLOFENAC SODIUM 4 G: 10 GEL TOPICAL at 16:23

## 2023-12-22 NOTE — CARE COORDINATION
Reviewed chart, met with pt and daughter at bedside. Daughter wants to have mom placed as she can not handle her at home. 1st choice was [facility], spoke with Fadi Cameron who states there is a year waiting list. Daughter prefers to stay in [place]. Ref to Carmen Richard at [facility]. Pt also currently on oxygen which she is not on at baseline. Will continue to monitor. 1200 - spoke with [facility] who states pt needs to be restraint and sitter free for 24 hours and they will need to do an onsite. Pt  has been out of restraints since 1330 yesterday and tele sitter is being DC now. Carmen Richard will touch base on Tuesday to see how she did over the weekend and will review at that time. Daughter updated. 1400 - LVM for [facility]. Referral also called to [facility] [address] [facility]. [address] with Windy who states she can accept pt and will start precert for LTC. States this will likely not be back until Tuesday. Pt and family updated Will continue to monitor.

## 2023-12-23 ENCOUNTER — APPOINTMENT (OUTPATIENT)
Dept: GENERAL RADIOLOGY | Age: 83
DRG: 682 | End: 2023-12-23
Payer: MEDICARE

## 2023-12-23 LAB
ANION GAP SERPL CALCULATED.3IONS-SCNC: 9 MMOL/L (ref 3–16)
BASOPHILS # BLD: 0.1 K/UL (ref 0–0.2)
BASOPHILS NFR BLD: 1.1 %
BUN SERPL-MCNC: 19 MG/DL (ref 7–20)
CALCIUM SERPL-MCNC: 8.8 MG/DL (ref 8.3–10.6)
CHLORIDE SERPL-SCNC: 106 MMOL/L (ref 99–110)
CO2 SERPL-SCNC: 24 MMOL/L (ref 21–32)
CREAT SERPL-MCNC: 1.2 MG/DL (ref 0.6–1.2)
DEPRECATED RDW RBC AUTO: 14.3 % (ref 12.4–15.4)
EKG ATRIAL RATE: 84 BPM
EKG DIAGNOSIS: NORMAL
EKG P AXIS: 51 DEGREES
EKG P-R INTERVAL: 178 MS
EKG Q-T INTERVAL: 378 MS
EKG QRS DURATION: 102 MS
EKG QTC CALCULATION (BAZETT): 446 MS
EKG R AXIS: -14 DEGREES
EKG T AXIS: 13 DEGREES
EKG VENTRICULAR RATE: 84 BPM
EOSINOPHIL # BLD: 0.2 K/UL (ref 0–0.6)
EOSINOPHIL NFR BLD: 2.4 %
GFR SERPLBLD CREATININE-BSD FMLA CKD-EPI: 44 ML/MIN/{1.73_M2}
GLUCOSE SERPL-MCNC: 102 MG/DL (ref 70–99)
HCT VFR BLD AUTO: 36.9 % (ref 36–48)
HGB BLD-MCNC: 11.9 G/DL (ref 12–16)
LYMPHOCYTES # BLD: 1.1 K/UL (ref 1–5.1)
LYMPHOCYTES NFR BLD: 15.1 %
MCH RBC QN AUTO: 27.7 PG (ref 26–34)
MCHC RBC AUTO-ENTMCNC: 32.3 G/DL (ref 31–36)
MCV RBC AUTO: 85.8 FL (ref 80–100)
MONOCYTES # BLD: 0.5 K/UL (ref 0–1.3)
MONOCYTES NFR BLD: 6.6 %
NEUTROPHILS # BLD: 5.5 K/UL (ref 1.7–7.7)
NEUTROPHILS NFR BLD: 74.8 %
PLATELET # BLD AUTO: 120 K/UL (ref 135–450)
PMV BLD AUTO: 8.3 FL (ref 5–10.5)
POTASSIUM SERPL-SCNC: 4.9 MMOL/L (ref 3.5–5.1)
RBC # BLD AUTO: 4.31 M/UL (ref 4–5.2)
SODIUM SERPL-SCNC: 139 MMOL/L (ref 136–145)
WBC # BLD AUTO: 7.3 K/UL (ref 4–11)

## 2023-12-23 PROCEDURE — 6360000002 HC RX W HCPCS: Performed by: HOSPITALIST

## 2023-12-23 PROCEDURE — 85025 COMPLETE CBC W/AUTO DIFF WBC: CPT

## 2023-12-23 PROCEDURE — 2580000003 HC RX 258: Performed by: HOSPITALIST

## 2023-12-23 PROCEDURE — 2500000003 HC RX 250 WO HCPCS: Performed by: NURSE PRACTITIONER

## 2023-12-23 PROCEDURE — 80048 BASIC METABOLIC PNL TOTAL CA: CPT

## 2023-12-23 PROCEDURE — 99232 SBSQ HOSP IP/OBS MODERATE 35: CPT | Performed by: INTERNAL MEDICINE

## 2023-12-23 PROCEDURE — 93010 ELECTROCARDIOGRAM REPORT: CPT | Performed by: INTERNAL MEDICINE

## 2023-12-23 PROCEDURE — 71045 X-RAY EXAM CHEST 1 VIEW: CPT

## 2023-12-23 PROCEDURE — 6370000000 HC RX 637 (ALT 250 FOR IP): Performed by: NURSE PRACTITIONER

## 2023-12-23 PROCEDURE — 1200000000 HC SEMI PRIVATE

## 2023-12-23 PROCEDURE — 36415 COLL VENOUS BLD VENIPUNCTURE: CPT

## 2023-12-23 RX ADMIN — CITALOPRAM HYDROBROMIDE 30 MG: 20 TABLET ORAL at 08:18

## 2023-12-23 RX ADMIN — SODIUM CHLORIDE: 9 INJECTION, SOLUTION INTRAVENOUS at 02:42

## 2023-12-23 RX ADMIN — DICLOFENAC SODIUM 4 G: 10 GEL TOPICAL at 17:00

## 2023-12-23 RX ADMIN — ATORVASTATIN CALCIUM 20 MG: 10 TABLET, FILM COATED ORAL at 21:10

## 2023-12-23 RX ADMIN — CETIRIZINE HYDROCHLORIDE 10 MG: 10 TABLET ORAL at 08:44

## 2023-12-23 RX ADMIN — SODIUM CHLORIDE: 9 INJECTION, SOLUTION INTRAVENOUS at 22:14

## 2023-12-23 RX ADMIN — FAMOTIDINE 20 MG: 20 TABLET, FILM COATED ORAL at 08:19

## 2023-12-23 RX ADMIN — HEPARIN SODIUM 5000 UNITS: 5000 INJECTION INTRAVENOUS; SUBCUTANEOUS at 22:09

## 2023-12-23 RX ADMIN — DICLOFENAC SODIUM 4 G: 10 GEL TOPICAL at 13:40

## 2023-12-23 RX ADMIN — DOCUSATE SODIUM 100 MG: 100 CAPSULE, LIQUID FILLED ORAL at 08:19

## 2023-12-23 RX ADMIN — ASPIRIN 81 MG: 81 TABLET, COATED ORAL at 08:19

## 2023-12-23 RX ADMIN — Medication 10 ML: at 08:19

## 2023-12-23 RX ADMIN — HYDROXYZINE HYDROCHLORIDE 25 MG: 25 TABLET ORAL at 21:10

## 2023-12-23 RX ADMIN — AMLODIPINE BESYLATE 10 MG: 5 TABLET ORAL at 12:11

## 2023-12-23 RX ADMIN — HYDROMORPHONE HYDROCHLORIDE 0.5 MG: 1 INJECTION, SOLUTION INTRAMUSCULAR; INTRAVENOUS; SUBCUTANEOUS at 21:12

## 2023-12-23 RX ADMIN — ISOSORBIDE MONONITRATE 30 MG: 30 TABLET, EXTENDED RELEASE ORAL at 12:09

## 2023-12-23 RX ADMIN — MICONAZOLE NITRATE: 20 POWDER TOPICAL at 08:17

## 2023-12-23 RX ADMIN — DICLOFENAC SODIUM 4 G: 10 GEL TOPICAL at 08:22

## 2023-12-23 RX ADMIN — TRAZODONE HYDROCHLORIDE 100 MG: 100 TABLET ORAL at 21:10

## 2023-12-23 RX ADMIN — HEPARIN SODIUM 5000 UNITS: 5000 INJECTION INTRAVENOUS; SUBCUTANEOUS at 13:40

## 2023-12-23 RX ADMIN — TORSEMIDE 40 MG: 20 TABLET ORAL at 12:08

## 2023-12-23 RX ADMIN — DICLOFENAC SODIUM 4 G: 10 GEL TOPICAL at 21:12

## 2023-12-23 RX ADMIN — HEPARIN SODIUM 5000 UNITS: 5000 INJECTION INTRAVENOUS; SUBCUTANEOUS at 06:00

## 2023-12-24 LAB
ANION GAP SERPL CALCULATED.3IONS-SCNC: 5 MMOL/L (ref 3–16)
BASOPHILS # BLD: 0.1 K/UL (ref 0–0.2)
BASOPHILS NFR BLD: 1.3 %
BUN SERPL-MCNC: 17 MG/DL (ref 7–20)
CALCIUM SERPL-MCNC: 9.2 MG/DL (ref 8.3–10.6)
CHLORIDE SERPL-SCNC: 103 MMOL/L (ref 99–110)
CO2 SERPL-SCNC: 32 MMOL/L (ref 21–32)
CREAT SERPL-MCNC: 1.4 MG/DL (ref 0.6–1.2)
DEPRECATED RDW RBC AUTO: 14.2 % (ref 12.4–15.4)
EOSINOPHIL # BLD: 0.3 K/UL (ref 0–0.6)
EOSINOPHIL NFR BLD: 4 %
GFR SERPLBLD CREATININE-BSD FMLA CKD-EPI: 37 ML/MIN/{1.73_M2}
GLUCOSE SERPL-MCNC: 82 MG/DL (ref 70–99)
HCT VFR BLD AUTO: 35.3 % (ref 36–48)
HGB BLD-MCNC: 11.6 G/DL (ref 12–16)
LYMPHOCYTES # BLD: 1.8 K/UL (ref 1–5.1)
LYMPHOCYTES NFR BLD: 28.7 %
MCH RBC QN AUTO: 27.7 PG (ref 26–34)
MCHC RBC AUTO-ENTMCNC: 32.9 G/DL (ref 31–36)
MCV RBC AUTO: 84.1 FL (ref 80–100)
MONOCYTES # BLD: 0.7 K/UL (ref 0–1.3)
MONOCYTES NFR BLD: 11.5 %
NEUTROPHILS # BLD: 3.4 K/UL (ref 1.7–7.7)
NEUTROPHILS NFR BLD: 54.5 %
PLATELET # BLD AUTO: 124 K/UL (ref 135–450)
PMV BLD AUTO: 8.4 FL (ref 5–10.5)
POTASSIUM SERPL-SCNC: 4.4 MMOL/L (ref 3.5–5.1)
RBC # BLD AUTO: 4.19 M/UL (ref 4–5.2)
SODIUM SERPL-SCNC: 140 MMOL/L (ref 136–145)
WBC # BLD AUTO: 6.3 K/UL (ref 4–11)

## 2023-12-24 PROCEDURE — 99232 SBSQ HOSP IP/OBS MODERATE 35: CPT | Performed by: INTERNAL MEDICINE

## 2023-12-24 PROCEDURE — 6360000002 HC RX W HCPCS: Performed by: HOSPITALIST

## 2023-12-24 PROCEDURE — 6360000002 HC RX W HCPCS: Performed by: NURSE PRACTITIONER

## 2023-12-24 PROCEDURE — 80048 BASIC METABOLIC PNL TOTAL CA: CPT

## 2023-12-24 PROCEDURE — 6370000000 HC RX 637 (ALT 250 FOR IP): Performed by: HOSPITALIST

## 2023-12-24 PROCEDURE — 6370000000 HC RX 637 (ALT 250 FOR IP): Performed by: INTERNAL MEDICINE

## 2023-12-24 PROCEDURE — 2580000003 HC RX 258: Performed by: HOSPITALIST

## 2023-12-24 PROCEDURE — 85025 COMPLETE CBC W/AUTO DIFF WBC: CPT

## 2023-12-24 PROCEDURE — 1200000000 HC SEMI PRIVATE

## 2023-12-24 PROCEDURE — 97530 THERAPEUTIC ACTIVITIES: CPT

## 2023-12-24 PROCEDURE — 6370000000 HC RX 637 (ALT 250 FOR IP): Performed by: NURSE PRACTITIONER

## 2023-12-24 RX ORDER — CARBOXYMETHYLCELLULOSE SODIUM 10 MG/ML
1 GEL OPHTHALMIC EVERY 4 HOURS PRN
Status: DISCONTINUED | OUTPATIENT
Start: 2023-12-24 | End: 2023-12-27 | Stop reason: HOSPADM

## 2023-12-24 RX ORDER — HYDRALAZINE HYDROCHLORIDE 20 MG/ML
10 INJECTION INTRAMUSCULAR; INTRAVENOUS ONCE
Status: COMPLETED | OUTPATIENT
Start: 2023-12-24 | End: 2023-12-24

## 2023-12-24 RX ADMIN — BUSPIRONE HYDROCHLORIDE 10 MG: 10 TABLET ORAL at 21:19

## 2023-12-24 RX ADMIN — SODIUM CHLORIDE: 9 INJECTION, SOLUTION INTRAVENOUS at 09:07

## 2023-12-24 RX ADMIN — ATORVASTATIN CALCIUM 20 MG: 10 TABLET, FILM COATED ORAL at 21:19

## 2023-12-24 RX ADMIN — TORSEMIDE 40 MG: 20 TABLET ORAL at 09:07

## 2023-12-24 RX ADMIN — DOCUSATE SODIUM 100 MG: 100 CAPSULE, LIQUID FILLED ORAL at 09:07

## 2023-12-24 RX ADMIN — ASPIRIN 81 MG: 81 TABLET, COATED ORAL at 09:07

## 2023-12-24 RX ADMIN — HEPARIN SODIUM 5000 UNITS: 5000 INJECTION INTRAVENOUS; SUBCUTANEOUS at 06:03

## 2023-12-24 RX ADMIN — HEPARIN SODIUM 5000 UNITS: 5000 INJECTION INTRAVENOUS; SUBCUTANEOUS at 14:10

## 2023-12-24 RX ADMIN — DICLOFENAC SODIUM 4 G: 10 GEL TOPICAL at 21:23

## 2023-12-24 RX ADMIN — CALCIUM CARBONATE 500 MG: 500 TABLET, CHEWABLE ORAL at 21:19

## 2023-12-24 RX ADMIN — AMLODIPINE BESYLATE 10 MG: 5 TABLET ORAL at 09:07

## 2023-12-24 RX ADMIN — HYDRALAZINE HYDROCHLORIDE 10 MG: 20 INJECTION INTRAMUSCULAR; INTRAVENOUS at 04:52

## 2023-12-24 RX ADMIN — DOCUSATE SODIUM 100 MG: 100 CAPSULE, LIQUID FILLED ORAL at 21:19

## 2023-12-24 RX ADMIN — HEPARIN SODIUM 5000 UNITS: 5000 INJECTION INTRAVENOUS; SUBCUTANEOUS at 21:19

## 2023-12-24 RX ADMIN — CITALOPRAM HYDROBROMIDE 30 MG: 20 TABLET ORAL at 09:07

## 2023-12-24 RX ADMIN — ACETAMINOPHEN 650 MG: 325 TABLET ORAL at 22:51

## 2023-12-24 RX ADMIN — SODIUM CHLORIDE: 9 INJECTION, SOLUTION INTRAVENOUS at 18:36

## 2023-12-24 RX ADMIN — FAMOTIDINE 20 MG: 20 TABLET, FILM COATED ORAL at 09:07

## 2023-12-24 RX ADMIN — CETIRIZINE HYDROCHLORIDE 10 MG: 10 TABLET ORAL at 09:07

## 2023-12-24 RX ADMIN — DICLOFENAC SODIUM 4 G: 10 GEL TOPICAL at 14:10

## 2023-12-24 RX ADMIN — ISOSORBIDE MONONITRATE 30 MG: 30 TABLET, EXTENDED RELEASE ORAL at 09:07

## 2023-12-24 RX ADMIN — HYDROXYZINE HYDROCHLORIDE 25 MG: 25 TABLET ORAL at 21:36

## 2023-12-24 RX ADMIN — CARBOXYMETHYLCELLULOSE SODIUM 1 DROP: 10 GEL OPHTHALMIC at 09:26

## 2023-12-24 RX ADMIN — DICLOFENAC SODIUM 4 G: 10 GEL TOPICAL at 09:10

## 2023-12-24 RX ADMIN — MICONAZOLE NITRATE: 20 POWDER TOPICAL at 21:24

## 2023-12-25 LAB
ANION GAP SERPL CALCULATED.3IONS-SCNC: 11 MMOL/L (ref 3–16)
BACTERIA BLD CULT: NORMAL
BASOPHILS # BLD: 0.1 K/UL (ref 0–0.2)
BASOPHILS NFR BLD: 1 %
BUN SERPL-MCNC: 17 MG/DL (ref 7–20)
CALCIUM SERPL-MCNC: 8.9 MG/DL (ref 8.3–10.6)
CHLORIDE SERPL-SCNC: 99 MMOL/L (ref 99–110)
CO2 SERPL-SCNC: 27 MMOL/L (ref 21–32)
CREAT SERPL-MCNC: 1.6 MG/DL (ref 0.6–1.2)
DEPRECATED RDW RBC AUTO: 14.2 % (ref 12.4–15.4)
EOSINOPHIL # BLD: 0.3 K/UL (ref 0–0.6)
EOSINOPHIL NFR BLD: 3.6 %
GFR SERPLBLD CREATININE-BSD FMLA CKD-EPI: 31 ML/MIN/{1.73_M2}
GLUCOSE SERPL-MCNC: 95 MG/DL (ref 70–99)
HCT VFR BLD AUTO: 38 % (ref 36–48)
HGB BLD-MCNC: 12.3 G/DL (ref 12–16)
LYMPHOCYTES # BLD: 2.1 K/UL (ref 1–5.1)
LYMPHOCYTES NFR BLD: 27.2 %
MCH RBC QN AUTO: 27.7 PG (ref 26–34)
MCHC RBC AUTO-ENTMCNC: 32.3 G/DL (ref 31–36)
MCV RBC AUTO: 85.7 FL (ref 80–100)
MONOCYTES # BLD: 0.9 K/UL (ref 0–1.3)
MONOCYTES NFR BLD: 11.7 %
NEUTROPHILS # BLD: 4.3 K/UL (ref 1.7–7.7)
NEUTROPHILS NFR BLD: 56.5 %
PLATELET # BLD AUTO: 125 K/UL (ref 135–450)
PLATELET BLD QL SMEAR: ADEQUATE
PMV BLD AUTO: 9.8 FL (ref 5–10.5)
POTASSIUM SERPL-SCNC: 3.9 MMOL/L (ref 3.5–5.1)
RBC # BLD AUTO: 4.43 M/UL (ref 4–5.2)
SLIDE REVIEW: ABNORMAL
SODIUM SERPL-SCNC: 137 MMOL/L (ref 136–145)
WBC # BLD AUTO: 7.6 K/UL (ref 4–11)

## 2023-12-25 PROCEDURE — 99232 SBSQ HOSP IP/OBS MODERATE 35: CPT | Performed by: INTERNAL MEDICINE

## 2023-12-25 PROCEDURE — 6370000000 HC RX 637 (ALT 250 FOR IP): Performed by: NURSE PRACTITIONER

## 2023-12-25 PROCEDURE — 6360000002 HC RX W HCPCS: Performed by: HOSPITALIST

## 2023-12-25 PROCEDURE — 6370000000 HC RX 637 (ALT 250 FOR IP): Performed by: INTERNAL MEDICINE

## 2023-12-25 PROCEDURE — 85025 COMPLETE CBC W/AUTO DIFF WBC: CPT

## 2023-12-25 PROCEDURE — 36415 COLL VENOUS BLD VENIPUNCTURE: CPT

## 2023-12-25 PROCEDURE — 80048 BASIC METABOLIC PNL TOTAL CA: CPT

## 2023-12-25 PROCEDURE — 6370000000 HC RX 637 (ALT 250 FOR IP): Performed by: HOSPITALIST

## 2023-12-25 PROCEDURE — 2580000003 HC RX 258: Performed by: HOSPITALIST

## 2023-12-25 PROCEDURE — 1200000000 HC SEMI PRIVATE

## 2023-12-25 RX ORDER — METOPROLOL SUCCINATE 50 MG/1
50 TABLET, EXTENDED RELEASE ORAL DAILY
Status: DISCONTINUED | OUTPATIENT
Start: 2023-12-25 | End: 2023-12-27 | Stop reason: HOSPADM

## 2023-12-25 RX ADMIN — CITALOPRAM HYDROBROMIDE 30 MG: 20 TABLET ORAL at 09:17

## 2023-12-25 RX ADMIN — TORSEMIDE 40 MG: 20 TABLET ORAL at 09:17

## 2023-12-25 RX ADMIN — CETIRIZINE HYDROCHLORIDE 10 MG: 10 TABLET ORAL at 09:18

## 2023-12-25 RX ADMIN — DICLOFENAC SODIUM 4 G: 10 GEL TOPICAL at 09:23

## 2023-12-25 RX ADMIN — HYDROXYZINE HYDROCHLORIDE 25 MG: 25 TABLET ORAL at 21:34

## 2023-12-25 RX ADMIN — ATORVASTATIN CALCIUM 20 MG: 10 TABLET, FILM COATED ORAL at 21:23

## 2023-12-25 RX ADMIN — CALCIUM CARBONATE 500 MG: 500 TABLET, CHEWABLE ORAL at 06:41

## 2023-12-25 RX ADMIN — ASPIRIN 81 MG: 81 TABLET, COATED ORAL at 09:18

## 2023-12-25 RX ADMIN — HEPARIN SODIUM 5000 UNITS: 5000 INJECTION INTRAVENOUS; SUBCUTANEOUS at 14:44

## 2023-12-25 RX ADMIN — MICONAZOLE NITRATE: 20 POWDER TOPICAL at 21:23

## 2023-12-25 RX ADMIN — Medication 10 ML: at 21:23

## 2023-12-25 RX ADMIN — HEPARIN SODIUM 5000 UNITS: 5000 INJECTION INTRAVENOUS; SUBCUTANEOUS at 21:35

## 2023-12-25 RX ADMIN — FAMOTIDINE 20 MG: 20 TABLET, FILM COATED ORAL at 09:19

## 2023-12-25 RX ADMIN — DICLOFENAC SODIUM 4 G: 10 GEL TOPICAL at 17:55

## 2023-12-25 RX ADMIN — DOCUSATE SODIUM 100 MG: 100 CAPSULE, LIQUID FILLED ORAL at 21:22

## 2023-12-25 RX ADMIN — HEPARIN SODIUM 5000 UNITS: 5000 INJECTION INTRAVENOUS; SUBCUTANEOUS at 05:23

## 2023-12-25 RX ADMIN — BUSPIRONE HYDROCHLORIDE 10 MG: 10 TABLET ORAL at 21:34

## 2023-12-25 RX ADMIN — ACETAMINOPHEN 650 MG: 325 TABLET ORAL at 06:44

## 2023-12-25 RX ADMIN — ISOSORBIDE MONONITRATE 30 MG: 30 TABLET, EXTENDED RELEASE ORAL at 09:18

## 2023-12-25 RX ADMIN — DOCUSATE SODIUM 100 MG: 100 CAPSULE, LIQUID FILLED ORAL at 09:19

## 2023-12-25 RX ADMIN — TRAZODONE HYDROCHLORIDE 100 MG: 100 TABLET ORAL at 21:34

## 2023-12-25 RX ADMIN — MICONAZOLE NITRATE: 20 POWDER TOPICAL at 09:23

## 2023-12-25 RX ADMIN — AMLODIPINE BESYLATE 10 MG: 5 TABLET ORAL at 09:17

## 2023-12-25 RX ADMIN — METOPROLOL SUCCINATE 50 MG: 50 TABLET, EXTENDED RELEASE ORAL at 12:36

## 2023-12-26 PROCEDURE — 6370000000 HC RX 637 (ALT 250 FOR IP): Performed by: INTERNAL MEDICINE

## 2023-12-26 PROCEDURE — 6360000002 HC RX W HCPCS: Performed by: HOSPITALIST

## 2023-12-26 PROCEDURE — 99232 SBSQ HOSP IP/OBS MODERATE 35: CPT | Performed by: INTERNAL MEDICINE

## 2023-12-26 PROCEDURE — 2580000003 HC RX 258: Performed by: HOSPITALIST

## 2023-12-26 PROCEDURE — 6370000000 HC RX 637 (ALT 250 FOR IP): Performed by: NURSE PRACTITIONER

## 2023-12-26 PROCEDURE — 1200000000 HC SEMI PRIVATE

## 2023-12-26 RX ORDER — DIVALPROEX SODIUM 250 MG/1
250 TABLET, DELAYED RELEASE ORAL 2 TIMES DAILY
Status: DISCONTINUED | OUTPATIENT
Start: 2023-12-26 | End: 2023-12-27 | Stop reason: HOSPADM

## 2023-12-26 RX ADMIN — DIVALPROEX SODIUM 250 MG: 250 TABLET, DELAYED RELEASE ORAL at 11:29

## 2023-12-26 RX ADMIN — TORSEMIDE 40 MG: 20 TABLET ORAL at 10:04

## 2023-12-26 RX ADMIN — HYDROXYZINE HYDROCHLORIDE 25 MG: 25 TABLET ORAL at 10:04

## 2023-12-26 RX ADMIN — BUSPIRONE HYDROCHLORIDE 10 MG: 10 TABLET ORAL at 22:15

## 2023-12-26 RX ADMIN — AMLODIPINE BESYLATE 10 MG: 5 TABLET ORAL at 10:04

## 2023-12-26 RX ADMIN — CITALOPRAM HYDROBROMIDE 30 MG: 20 TABLET ORAL at 10:04

## 2023-12-26 RX ADMIN — ISOSORBIDE MONONITRATE 30 MG: 30 TABLET, EXTENDED RELEASE ORAL at 10:04

## 2023-12-26 RX ADMIN — METOPROLOL SUCCINATE 50 MG: 50 TABLET, EXTENDED RELEASE ORAL at 10:05

## 2023-12-26 RX ADMIN — ASPIRIN 81 MG: 81 TABLET, COATED ORAL at 10:04

## 2023-12-26 RX ADMIN — HEPARIN SODIUM 5000 UNITS: 5000 INJECTION INTRAVENOUS; SUBCUTANEOUS at 22:16

## 2023-12-26 RX ADMIN — Medication 10 ML: at 10:12

## 2023-12-26 RX ADMIN — HEPARIN SODIUM 5000 UNITS: 5000 INJECTION INTRAVENOUS; SUBCUTANEOUS at 06:02

## 2023-12-26 RX ADMIN — CETIRIZINE HYDROCHLORIDE 10 MG: 10 TABLET ORAL at 10:04

## 2023-12-26 RX ADMIN — TRAZODONE HYDROCHLORIDE 100 MG: 100 TABLET ORAL at 22:15

## 2023-12-26 RX ADMIN — DICLOFENAC SODIUM 4 G: 10 GEL TOPICAL at 15:26

## 2023-12-26 RX ADMIN — ATORVASTATIN CALCIUM 20 MG: 10 TABLET, FILM COATED ORAL at 22:15

## 2023-12-26 RX ADMIN — FAMOTIDINE 20 MG: 20 TABLET, FILM COATED ORAL at 10:04

## 2023-12-26 RX ADMIN — DOCUSATE SODIUM 100 MG: 100 CAPSULE, LIQUID FILLED ORAL at 10:04

## 2023-12-26 RX ADMIN — DIVALPROEX SODIUM 250 MG: 250 TABLET, DELAYED RELEASE ORAL at 22:15

## 2023-12-26 RX ADMIN — MICONAZOLE NITRATE: 20 POWDER TOPICAL at 10:05

## 2023-12-26 RX ADMIN — HYDROXYZINE HYDROCHLORIDE 25 MG: 25 TABLET ORAL at 22:15

## 2023-12-26 RX ADMIN — Medication 10 ML: at 22:17

## 2023-12-26 RX ADMIN — HEPARIN SODIUM 5000 UNITS: 5000 INJECTION INTRAVENOUS; SUBCUTANEOUS at 15:26

## 2023-12-26 NOTE — CARE COORDINATION
INTERDISCIPLINARY PLAN OF CARE CONFERENCE    Date/Time: 12/26/2023 3:53 PM  Completed by: Crystal Verde RN, Case Management      Patient Name:  Chico Neely  YOB: 1940  Admitting Diagnosis: MARIO (acute kidney injury) ([address]) [N17.9]  Urinary tract infection without hematuria, site unspecified [N39.0]  Altered mental status, unspecified altered mental status type [F77.80]  Acute metabolic encephalopathy [O19.98]     Admit Date/Time:  12/20/2023  3:34 PM    Chart reviewed. Interdisciplinary team contacted or reviewed plan related to patient progress and discharge plans. Disciplines included Case Management, Nursing, and Dietitian. Current Status:Stable  PT/OT recommendation for discharge plan of care: SNF    Expected D/C Disposition:  Nursing Home  Confirmed plan with patient Yes   Met with:patient  Discharge Plan Comments: Reviewed chart and met with pt who cont plan to go ot [facility] for LTC. Spoke with Windy at [facility] who states pre cert not yet completed. States pt will return home at [place] with family assist. Will cont to follow.     Home O2 in place on admit: No  Pt informed of need to bring portable home O2 tank on day of discharge for nursing to connect prior to leaving:  Not Indicated  Verbalized agreement/Understanding:  Not Indicated

## 2023-12-27 VITALS
TEMPERATURE: 98.2 F | DIASTOLIC BLOOD PRESSURE: 58 MMHG | BODY MASS INDEX: 27.21 KG/M2 | OXYGEN SATURATION: 92 % | HEART RATE: 55 BPM | SYSTOLIC BLOOD PRESSURE: 136 MMHG | HEIGHT: 64 IN | RESPIRATION RATE: 18 BRPM | WEIGHT: 159.39 LBS

## 2023-12-27 LAB
ALBUMIN SERPL-MCNC: 3.4 G/DL (ref 3.4–5)
ALBUMIN/GLOB SERPL: 1.3 {RATIO} (ref 1.1–2.2)
ALP SERPL-CCNC: 95 U/L (ref 40–129)
ALT SERPL-CCNC: 10 U/L (ref 10–40)
ANION GAP SERPL CALCULATED.3IONS-SCNC: 11 MMOL/L (ref 3–16)
AST SERPL-CCNC: 15 U/L (ref 15–37)
BASOPHILS # BLD: 0.1 K/UL (ref 0–0.2)
BASOPHILS NFR BLD: 1.5 %
BILIRUB SERPL-MCNC: <0.2 MG/DL (ref 0–1)
BUN SERPL-MCNC: 18 MG/DL (ref 7–20)
CALCIUM SERPL-MCNC: 8.8 MG/DL (ref 8.3–10.6)
CHLORIDE SERPL-SCNC: 97 MMOL/L (ref 99–110)
CO2 SERPL-SCNC: 32 MMOL/L (ref 21–32)
CREAT SERPL-MCNC: 1.7 MG/DL (ref 0.6–1.2)
DEPRECATED RDW RBC AUTO: 14.5 % (ref 12.4–15.4)
EOSINOPHIL # BLD: 0.2 K/UL (ref 0–0.6)
EOSINOPHIL NFR BLD: 3.6 %
GFR SERPLBLD CREATININE-BSD FMLA CKD-EPI: 29 ML/MIN/{1.73_M2}
GLUCOSE SERPL-MCNC: 132 MG/DL (ref 70–99)
HCT VFR BLD AUTO: 31.8 % (ref 36–48)
HGB BLD-MCNC: 10.4 G/DL (ref 12–16)
LYMPHOCYTES # BLD: 2 K/UL (ref 1–5.1)
LYMPHOCYTES NFR BLD: 30.4 %
MAGNESIUM SERPL-MCNC: 1.5 MG/DL (ref 1.8–2.4)
MCH RBC QN AUTO: 27.7 PG (ref 26–34)
MCHC RBC AUTO-ENTMCNC: 32.6 G/DL (ref 31–36)
MCV RBC AUTO: 84.9 FL (ref 80–100)
MONOCYTES # BLD: 0.7 K/UL (ref 0–1.3)
MONOCYTES NFR BLD: 10.3 %
NEUTROPHILS # BLD: 3.5 K/UL (ref 1.7–7.7)
NEUTROPHILS NFR BLD: 54.2 %
PHOSPHATE SERPL-MCNC: 3.7 MG/DL (ref 2.5–4.9)
PLATELET # BLD AUTO: 169 K/UL (ref 135–450)
PMV BLD AUTO: 9.2 FL (ref 5–10.5)
POTASSIUM SERPL-SCNC: 3.4 MMOL/L (ref 3.5–5.1)
PROT SERPL-MCNC: 6.1 G/DL (ref 6.4–8.2)
RBC # BLD AUTO: 3.75 M/UL (ref 4–5.2)
SODIUM SERPL-SCNC: 140 MMOL/L (ref 136–145)
WBC # BLD AUTO: 6.5 K/UL (ref 4–11)

## 2023-12-27 PROCEDURE — 36415 COLL VENOUS BLD VENIPUNCTURE: CPT

## 2023-12-27 PROCEDURE — 97530 THERAPEUTIC ACTIVITIES: CPT

## 2023-12-27 PROCEDURE — 80053 COMPREHEN METABOLIC PANEL: CPT

## 2023-12-27 PROCEDURE — 6370000000 HC RX 637 (ALT 250 FOR IP): Performed by: REGISTERED NURSE

## 2023-12-27 PROCEDURE — 6370000000 HC RX 637 (ALT 250 FOR IP): Performed by: INTERNAL MEDICINE

## 2023-12-27 PROCEDURE — 6370000000 HC RX 637 (ALT 250 FOR IP): Performed by: NURSE PRACTITIONER

## 2023-12-27 PROCEDURE — 84100 ASSAY OF PHOSPHORUS: CPT

## 2023-12-27 PROCEDURE — 97535 SELF CARE MNGMENT TRAINING: CPT

## 2023-12-27 PROCEDURE — 6360000002 HC RX W HCPCS: Performed by: HOSPITALIST

## 2023-12-27 PROCEDURE — 83735 ASSAY OF MAGNESIUM: CPT

## 2023-12-27 PROCEDURE — 2580000003 HC RX 258: Performed by: HOSPITALIST

## 2023-12-27 PROCEDURE — 85025 COMPLETE CBC W/AUTO DIFF WBC: CPT

## 2023-12-27 RX ORDER — POTASSIUM CHLORIDE 20 MEQ/1
40 TABLET, EXTENDED RELEASE ORAL ONCE
Status: COMPLETED | OUTPATIENT
Start: 2023-12-27 | End: 2023-12-27

## 2023-12-27 RX ORDER — CALCIUM CARBONATE 500 MG/1
500 TABLET, CHEWABLE ORAL 3 TIMES DAILY PRN
COMMUNITY
Start: 2023-12-27 | End: 2024-01-26

## 2023-12-27 RX ORDER — LANOLIN ALCOHOL/MO/W.PET/CERES
400 CREAM (GRAM) TOPICAL ONCE
Status: COMPLETED | OUTPATIENT
Start: 2023-12-27 | End: 2023-12-27

## 2023-12-27 RX ORDER — TORSEMIDE 10 MG/1
10 TABLET ORAL DAILY
Qty: 60 TABLET | Refills: 1 | Status: SHIPPED
Start: 2023-12-27

## 2023-12-27 RX ADMIN — AMLODIPINE BESYLATE 10 MG: 5 TABLET ORAL at 10:09

## 2023-12-27 RX ADMIN — POTASSIUM CHLORIDE 40 MEQ: 1500 TABLET, EXTENDED RELEASE ORAL at 02:08

## 2023-12-27 RX ADMIN — HEPARIN SODIUM 5000 UNITS: 5000 INJECTION INTRAVENOUS; SUBCUTANEOUS at 06:26

## 2023-12-27 RX ADMIN — FAMOTIDINE 20 MG: 20 TABLET, FILM COATED ORAL at 10:09

## 2023-12-27 RX ADMIN — Medication 10 ML: at 10:11

## 2023-12-27 RX ADMIN — Medication 400 MG: at 11:00

## 2023-12-27 RX ADMIN — DIVALPROEX SODIUM 250 MG: 250 TABLET, DELAYED RELEASE ORAL at 10:08

## 2023-12-27 RX ADMIN — CITALOPRAM HYDROBROMIDE 30 MG: 20 TABLET ORAL at 10:08

## 2023-12-27 RX ADMIN — ISOSORBIDE MONONITRATE 30 MG: 30 TABLET, EXTENDED RELEASE ORAL at 10:09

## 2023-12-27 RX ADMIN — CETIRIZINE HYDROCHLORIDE 10 MG: 10 TABLET ORAL at 10:08

## 2023-12-27 RX ADMIN — MICONAZOLE NITRATE: 20 POWDER TOPICAL at 09:57

## 2023-12-27 RX ADMIN — ASPIRIN 81 MG: 81 TABLET, COATED ORAL at 10:09

## 2023-12-27 NOTE — CARE COORDINATION
DISCHARGE ORDER  Date/Time 2023 2:53 PM  Completed by: Terrace Landau, Case Management    Patient Name: Alfredo Her    : 1940      Admit order Date and Status:2023  Noted discharge order. (verify MD's last order for status of admission/Traditional Medicare 3 [place] Inpatient qualifying stay required for SNF)    Confirmed discharge plan with: Yes              Patient: Hugo Mcknight              When pt confirms DC plan does any support person need to be contacted by CM Yes if yes Tammy_____                      Discharge to Facility: [address] phone number for staff giving report: [phone]   Pre-certification completed: No - LOC   Hospital Exemption Notification (HENS) completed: facility did   Discharge orders and Continuity of Care faxed to facility:  can pull from [employer]      Transportation:               Medical Transport explained with choice list offered to pt/family. Choice:(no preference)    Agency used: [facility]     time:   6213        Pt/family/Nursing/Facility aware of  time: YES     NAMES: PT, CM, Jimmy RN, Daughter Ifrah Ruiz RN, [address], [place]. Ambulance form completed:  Round Trip      Date Last IMM Given: 2023    Comments:Reviewed chart, noted DC order, met with pt at bedside. Pt to be DC to [facility] today, providing transportation. Pt is being d/c'd to [facility] today. Pt's O2 sats are 92% on RA. Discharge timeout done with PT, JAYDE, Michael Treviño RN, Daughter Obey Veejj Tripp RN, [address], [place]. Jimmie Closs All discharge needs and concerns addressed. Discharging nurse to complete KINA, reconcile AVS, and place final copy with patient's discharge packet. Discharging RN to ensure that written prescriptions for  Level II medications are sent with patient to the facility as per protocol.

## 2023-12-27 NOTE — PLAN OF CARE
HEART FAILURE CARE PLAN:    Comorbidities Reviewed: Yes   Patient has a past medical history of MARIO (acute kidney injury) ([address]), Altered mental status, Arthritis, BCC (basal cell carcinoma of skin), Bladder infection, Blurred vision, CAD (coronary artery disease), Cancer ([address]), CHF (congestive heart failure) ([address]), Chronic back pain, Closed head injury, COPD (chronic obstructive pulmonary disease) ([address]), Depression, Depression, Hypercholesteremia, Hypertension, Hypokalemia, Pain in shoulder, Painful total knee replacement, initial encounter ([address]), Reflux, Rheumatic fever, Sleep apnea, Syncope and collapse, TIA (transient ischemic attack), Urinary incontinence, Urinary tract infection in female, and Wears glasses. ECHOCARDIOGRAM Reviewed: Yes   Patient's Ejection Fraction (EF) is greater than 40%    Weights Reviewed:  Yes   Admission weight: 72.6 kg (160 lb)   Wt Readings from Last 3 Encounters:   12/22/23 76.7 kg (169 lb 1.5 oz)   12/10/23 72.6 kg (159 lb 15.1 oz)   12/03/23 79.9 kg (176 lb 2.4 oz)     Intake & Output Reviewed: Yes     Intake/Output Summary (Last 24 hours) at 12/22/2023 0510  Last data filed at 12/22/2023 0846  Gross per 24 hour   Intake 3460.5 ml   Output 1000 ml   Net 2460.5 ml     Medications Reviewed: Yes   [address] [facility] MEDICATIONS:   [Held by provider] amLODIPine  10 mg Oral Daily    aspirin  81 mg Oral Daily    cetirizine  10 mg Oral Daily    citalopram  30 mg Oral Daily    diclofenac sodium  4 g Topical 4x Daily    docusate sodium  100 mg Oral BID    famotidine  20 mg Oral Daily    [Held by provider] isosorbide mononitrate  30 mg Oral Daily    miconazole   Topical BID    atorvastatin  20 mg Oral Nightly    [Held by provider] torsemide  40 mg Oral Daily    cefTRIAXone (ROCEPHIN) IV  1,000 mg IntraVENous Q24H    sodium chloride flush  5-40 mL IntraVENous 2 times per day    heparin (porcine)  5,000 Units SubCUTAneous 3 times per day     ACE/ARB/ARNI is REQUIRED for EF </= 87% SYSTOLIC
HEART FAILURE CARE PLAN:    Comorbidities Reviewed: Yes   Patient has a past medical history of MARIO (acute kidney injury) ([address]), Altered mental status, Arthritis, BCC (basal cell carcinoma of skin), Bladder infection, Blurred vision, CAD (coronary artery disease), Cancer ([address]), CHF (congestive heart failure) ([address]), Chronic back pain, Closed head injury, COPD (chronic obstructive pulmonary disease) ([address]), Depression, Depression, Hypercholesteremia, Hypertension, Hypokalemia, Pain in shoulder, Painful total knee replacement, initial encounter ([address]), Reflux, Rheumatic fever, Sleep apnea, Syncope and collapse, TIA (transient ischemic attack), Urinary incontinence, Urinary tract infection in female, and Wears glasses. ECHOCARDIOGRAM Reviewed: Yes   Patient's Ejection Fraction (EF) is greater than 40%    Weights Reviewed:  Yes   Admission weight: 72.6 kg (160 lb)   Wt Readings from Last 3 Encounters:   12/23/23 78 kg (171 lb 15.3 oz)   12/10/23 72.6 kg (159 lb 15.1 oz)   12/03/23 79.9 kg (176 lb 2.4 oz)     Intake & Output Reviewed: Yes     Intake/Output Summary (Last 24 hours) at 12/24/2023 0355  Last data filed at 12/23/2023 1718  Gross per 24 hour   Intake 628.37 ml   Output 2700 ml   Net -2071.63 ml     Medications Reviewed: Yes   SCHEDULED HOSPITAL MEDICATIONS:   amLODIPine  10 mg Oral Daily    aspirin  81 mg Oral Daily    cetirizine  10 mg Oral Daily    citalopram  30 mg Oral Daily    diclofenac sodium  4 g Topical 4x Daily    docusate sodium  100 mg Oral BID    famotidine  20 mg Oral Daily    isosorbide mononitrate  30 mg Oral Daily    miconazole   Topical BID    atorvastatin  20 mg Oral Nightly    torsemide  40 mg Oral Daily    sodium chloride flush  5-40 mL IntraVENous 2 times per day    heparin (porcine)  5,000 Units SubCUTAneous 3 times per day     ACE/ARB/ARNI is REQUIRED for EF </= 54% SYSTOLIC FAILURE:   ACE[de-identified] None  ARB[de-identified] None  ARNI[de-identified] None    Evidenced-Based Beta Blocker is REQUIRED for EF </= 40%
HEART FAILURE CARE PLAN:    Comorbidities Reviewed: Yes   Patient has a past medical history of MARIO (acute kidney injury) ([address]), Altered mental status, Arthritis, BCC (basal cell carcinoma of skin), Bladder infection, Blurred vision, CAD (coronary artery disease), Cancer ([address]), CHF (congestive heart failure) ([address]), Chronic back pain, Closed head injury, COPD (chronic obstructive pulmonary disease) ([address]), Depression, Depression, Hypercholesteremia, Hypertension, Hypokalemia, Pain in shoulder, Painful total knee replacement, initial encounter ([address]), Reflux, Rheumatic fever, Sleep apnea, Syncope and collapse, TIA (transient ischemic attack), Urinary incontinence, Urinary tract infection in female, and Wears glasses. ECHOCARDIOGRAM Reviewed: Yes   Patient's Ejection Fraction (EF) is greater than 40%    Weights Reviewed:  Yes   Admission weight: 72.6 kg (160 lb)   Wt Readings from Last 3 Encounters:   12/24/23 77.6 kg (171 lb)   12/10/23 72.6 kg (159 lb 15.1 oz)   12/03/23 79.9 kg (176 lb 2.4 oz)     Intake & Output Reviewed: Yes     Intake/Output Summary (Last 24 hours) at 12/24/2023 1002  Last data filed at 12/24/2023 0946  Gross per 24 hour   Intake 300 ml   Output 3850 ml   Net -3550 ml     Medications Reviewed: Yes   SCHEDULED HOSPITAL MEDICATIONS:   amLODIPine  10 mg Oral Daily    aspirin  81 mg Oral Daily    cetirizine  10 mg Oral Daily    citalopram  30 mg Oral Daily    diclofenac sodium  4 g Topical 4x Daily    docusate sodium  100 mg Oral BID    famotidine  20 mg Oral Daily    isosorbide mononitrate  30 mg Oral Daily    miconazole   Topical BID    atorvastatin  20 mg Oral Nightly    torsemide  40 mg Oral Daily    sodium chloride flush  5-40 mL IntraVENous 2 times per day    heparin (porcine)  5,000 Units SubCUTAneous 3 times per day     ACE/ARB/ARNI is REQUIRED for EF </= 72% SYSTOLIC FAILURE:   ACE[de-identified] None  ARB[de-identified] None  ARNI[de-identified] None    Evidenced-Based Beta Blocker is REQUIRED for EF </= 33% SYSTOLIC FAILURE:
HEART FAILURE CARE PLAN:    Comorbidities Reviewed: Yes   Patient has a past medical history of MARIO (acute kidney injury) ([address]), Altered mental status, Arthritis, BCC (basal cell carcinoma of skin), Bladder infection, Blurred vision, CAD (coronary artery disease), Cancer ([address]), CHF (congestive heart failure) ([address]), Chronic back pain, Closed head injury, COPD (chronic obstructive pulmonary disease) ([address]), Depression, Depression, Hypercholesteremia, Hypertension, Hypokalemia, Pain in shoulder, Painful total knee replacement, initial encounter ([address]), Reflux, Rheumatic fever, Sleep apnea, Syncope and collapse, TIA (transient ischemic attack), Urinary incontinence, Urinary tract infection in female, and Wears glasses. ECHOCARDIOGRAM Reviewed: Yes   Patient's Ejection Fraction (EF) is greater than 40%    Weights Reviewed:  Yes   Admission weight: 72.6 kg (160 lb)   Wt Readings from Last 3 Encounters:   12/25/23 72.3 kg (159 lb 6.3 oz)   12/10/23 72.6 kg (159 lb 15.1 oz)   12/03/23 79.9 kg (176 lb 2.4 oz)     Intake & Output Reviewed: Yes     Intake/Output Summary (Last 24 hours) at 12/27/2023 9322  Last data filed at 12/27/2023 0208  Gross per 24 hour   Intake 380 ml   Output 1400 ml   Net -1020 ml     Medications Reviewed: Yes   SCHEDULED HOSPITAL MEDICATIONS:   divalproex  250 mg Oral BID    metoprolol succinate  50 mg Oral Daily    amLODIPine  10 mg Oral Daily    aspirin  81 mg Oral Daily    cetirizine  10 mg Oral Daily    citalopram  30 mg Oral Daily    diclofenac sodium  4 g Topical 4x Daily    docusate sodium  100 mg Oral BID    famotidine  20 mg Oral Daily    isosorbide mononitrate  30 mg Oral Daily    miconazole   Topical BID    atorvastatin  20 mg Oral Nightly    torsemide  40 mg Oral Daily    sodium chloride flush  5-40 mL IntraVENous 2 times per day    heparin (porcine)  5,000 Units SubCUTAneous 3 times per day     ACE/ARB/ARNI is REQUIRED for EF </= 87% SYSTOLIC FAILURE:   ACE[de-identified] None  ARB[de-identified] None  ARNI[de-identified]
HEART FAILURE CARE PLAN:    Comorbidities Reviewed: Yes   Patient has a past medical history of MARIO (acute kidney injury) ([address]), Altered mental status, Arthritis, BCC (basal cell carcinoma of skin), Bladder infection, Blurred vision, CAD (coronary artery disease), Cancer ([address]), CHF (congestive heart failure) ([address]), Chronic back pain, Closed head injury, COPD (chronic obstructive pulmonary disease) ([address]), Depression, Depression, Hypercholesteremia, Hypertension, Hypokalemia, Pain in shoulder, Painful total knee replacement, initial encounter ([address]), Reflux, Rheumatic fever, Sleep apnea, Syncope and collapse, TIA (transient ischemic attack), Urinary incontinence, Urinary tract infection in female, and Wears glasses. ECHOCARDIOGRAM Reviewed: Yes   Patient's Ejection Fraction (EF) is greater than 40%    Weights Reviewed:  Yes   Admission weight: 72.6 kg (160 lb)   Wt Readings from Last 3 Encounters:   12/27/23 72.3 kg (159 lb 6.3 oz)   12/10/23 72.6 kg (159 lb 15.1 oz)   12/03/23 79.9 kg (176 lb 2.4 oz)     Intake & Output Reviewed: Yes     Intake/Output Summary (Last 24 hours) at 12/27/2023 4444  Last data filed at 12/27/2023 0208  Gross per 24 hour   Intake 380 ml   Output 1300 ml   Net -920 ml       Medications Reviewed: Yes   SCHEDULED HOSPITAL MEDICATIONS:   divalproex  250 mg Oral BID    metoprolol succinate  50 mg Oral Daily    amLODIPine  10 mg Oral Daily    aspirin  81 mg Oral Daily    cetirizine  10 mg Oral Daily    citalopram  30 mg Oral Daily    diclofenac sodium  4 g Topical 4x Daily    docusate sodium  100 mg Oral BID    famotidine  20 mg Oral Daily    isosorbide mononitrate  30 mg Oral Daily    miconazole   Topical BID    atorvastatin  20 mg Oral Nightly    torsemide  40 mg Oral Daily    sodium chloride flush  5-40 mL IntraVENous 2 times per day    heparin (porcine)  5,000 Units SubCUTAneous 3 times per day     ACE/ARB/ARNI is REQUIRED for EF </= 52% SYSTOLIC FAILURE:   ACE[de-identified] None  ARB[de-identified] None  ARNI[de-identified]
HEART FAILURE CARE PLAN:    Comorbidities Reviewed: Yes   Patient has a past medical history of MARIO (acute kidney injury) ([address]), Altered mental status, Arthritis, BCC (basal cell carcinoma of skin), Bladder infection, Blurred vision, CAD (coronary artery disease), Cancer ([address]), CHF (congestive heart failure) ([address]), Chronic back pain, Closed head injury, COPD (chronic obstructive pulmonary disease) ([address]), Depression, Depression, Hypercholesteremia, Hypertension, Hypokalemia, Pain in shoulder, Painful total knee replacement, initial encounter ([address]), Reflux, Rheumatic fever, Sleep apnea, Syncope and collapse, TIA (transient ischemic attack), Urinary incontinence, Urinary tract infection in female, and Wears glasses. ECHOCARDIOGRAM Reviewed: Yes   Patient's Ejection Fraction (EF) is greater than 40%    Weights Reviewed: Yes   Admission weight: 72.6 kg (160 lb)   Wt Readings from Last 3 Encounters:   12/20/23 74.8 kg (164 lb 14.5 oz)   12/10/23 72.6 kg (159 lb 15.1 oz)   12/03/23 79.9 kg (176 lb 2.4 oz)     Intake & Output Reviewed: Yes     Intake/Output Summary (Last 24 hours) at 12/21/2023 1015  Last data filed at 12/21/2023 7589  Gross per 24 hour   Intake 672 ml   Output 100 ml   Net 572 ml     Medications Reviewed: Yes   SCHEDULED HOSPITAL MEDICATIONS:   sodium chloride flush  5-40 mL IntraVENous 2 times per day    heparin (porcine)  5,000 Units SubCUTAneous 3 times per day     ACE/ARB/ARNI is REQUIRED for EF </= 70% SYSTOLIC FAILURE:   ACE[de-identified] N/A  ARB[de-identified] N/A  ARNI[de-identified] N/A    Evidenced-Based Beta Blocker is REQUIRED for EF </= 39% SYSTOLIC FAILURE:   [de-identified] N/A    Diuretics:  [de-identified] N/A    [facility] meds as daughter has not brought in home med list yet    Diet Reviewed: Yes   ADULT DIET;  Regular; Low Fat/Low Chol/High Fiber/2 gm Na; 1800 ml    Goal of Care Reviewed: Yes   Patient and/or Family's stated Goal of Care this Admission: reduce shortness of breath, increase activity tolerance, better understand heart failure and disease
Problem: Discharge Planning  Goal: Discharge to home or other facility with appropriate resources  12/22/2023 0058 by Alvin Jones RN  Outcome: [place]ory [place] (Taken 12/21/2023 2159)  Discharge to home or other facility with appropriate resources: Identify barriers to discharge with patient and caregiver     Problem: Safety - Adult  Goal: Free from fall injury  12/22/2023 0943 by Shoshana Woodard RN  Outcome: Progressing  12/22/2023 0058 by Alvin Jones RN  Outcome: Progressing     Problem: Skin/Tissue Integrity  Goal: Absence of new skin breakdown  Description: 1. Monitor for areas of redness and/or skin breakdown  2. Assess vascular access sites hourly  3. Every 4-6 hours minimum:  Change oxygen saturation probe site  4. Every 4-6 hours:  If on nasal continuous positive airway pressure, respiratory therapy assess nares and determine need for appliance change or resting period.   12/22/2023 0943 by Shoshana Woodard RN  Outcome: Progressing  12/22/2023 0058 by Alvin Jones RN  Outcome: Progressing     Problem: Chronic Conditions and Co-morbidities  Goal: Patient's chronic conditions and co-morbidity symptoms are monitored and maintained or improved  12/22/2023 0058 by Alvin Jones RN  Outcome: Progressing  Flowsheets (Taken 12/21/2023 2159)  Care Plan - Patient's Chronic Conditions and Co-Morbidity Symptoms are Monitored and Maintained or Improved: Monitor and assess patient's chronic conditions and comorbid symptoms for stability, deterioration, or improvement     Problem: Pain  Goal: Verbalizes/displays adequate comfort level or baseline comfort level  12/22/2023 0058 by Alvin Jones RN  Outcome: Progressing  Flowsheets (Taken 12/21/2023 2156)  Verbalizes/displays adequate comfort level or baseline comfort level: Encourage patient to monitor pain and request assistance
Problem: Discharge Planning  Goal: Discharge to home or other facility with appropriate resources  12/23/2023 0854 by Quoc Godfrey RN  Outcome: Progressing  12/23/2023 0012 by Leonardo Patterson RN  Outcome: [place] (Taken 12/22/2023 2232)  Discharge to home or other facility with appropriate resources: Identify discharge learning needs (meds, wound care, etc)     Problem: Safety - Adult  Goal: Free from fall injury  12/23/2023 0854 by Quoc Godfrey RN  Outcome: Progressing  12/23/2023 0012 by Leonardo Patterson RN  Outcome: Progressing     Problem: Skin/Tissue Integrity  Goal: Absence of new skin breakdown  Description: 1. Monitor for areas of redness and/or skin breakdown  2. Assess vascular access sites hourly  3. Every 4-6 hours minimum:  Change oxygen saturation probe site  4. Every 4-6 hours:  If on nasal continuous positive airway pressure, respiratory therapy assess nares and determine need for appliance change or resting period.   12/23/2023 0854 by Quoc Godfrey RN  Outcome: Progressing  12/23/2023 0012 by Leonardo Patterson RN  Outcome: Progressing     Problem: Chronic Conditions and Co-morbidities  Goal: Patient's chronic conditions and co-morbidity symptoms are monitored and maintained or improved  12/23/2023 0854 by Quoc Godfrey RN  Outcome: Progressing  12/23/2023 0012 by Leonardo Patterson RN  Outcome: Progressing  Flowsheets (Taken 12/22/2023 2232)  Care Plan - Patient's Chronic Conditions and Co-Morbidity Symptoms are Monitored and Maintained or Improved: Monitor and assess patient's chronic conditions and comorbid symptoms for stability, deterioration, or improvement     Problem: Pain  Goal: Verbalizes/displays adequate comfort level or baseline comfort level  12/23/2023 0854 by Quoc Godfrey RN  Outcome: Progressing  12/23/2023 0012 by Leonardo Patterson RN  Outcome: Progressing     Problem: Metabolic/Fluid and Electrolytes - Adult  Goal: Electrolytes maintained within
Problem: Discharge Planning  Goal: Discharge to home or other facility with appropriate resources  12/24/2023 0039 by José Luis Rodriguez RN  Outcome: Progressing  Flowsheets (Taken 12/23/2023 2107)  Discharge to home or other facility with appropriate resources: Identify barriers to discharge with patient and caregiver     Problem: Safety - Adult  Goal: Free from fall injury  12/24/2023 1003 by Jen Harrington RN  Outcome: Progressing  12/24/2023 0039 by José Luis Rodriguez RN  Outcome: Progressing     Problem: Skin/Tissue Integrity  Goal: Absence of new skin breakdown  Description: 1. Monitor for areas of redness and/or skin breakdown  2. Assess vascular access sites hourly  3. Every 4-6 hours minimum:  Change oxygen saturation probe site  4. Every 4-6 hours:  If on nasal continuous positive airway pressure, respiratory therapy assess nares and determine need for appliance change or resting period.   12/24/2023 1003 by Jen Harrington RN  Outcome: Progressing  12/24/2023 0039 by José Luis Rodriguez RN  Outcome: Progressing     Problem: Chronic Conditions and Co-morbidities  Goal: Patient's chronic conditions and co-morbidity symptoms are monitored and maintained or improved  12/24/2023 0039 by José Luis Rodriguez RN  Outcome: Progressing  Flowsheets (Taken 12/23/2023 2107)  Care Plan - Patient's Chronic Conditions and Co-Morbidity Symptoms are Monitored and Maintained or Improved: Monitor and assess patient's chronic conditions and comorbid symptoms for stability, deterioration, or improvement     Problem: Pain  Goal: Verbalizes/displays adequate comfort level or baseline comfort level  12/24/2023 1003 by Jen Harrington RN  Outcome: Progressing  12/24/2023 0039 by José Luis Rodriguez RN  Outcome: Progressing     Problem: Metabolic/Fluid and Electrolytes - Adult  Goal: Electrolytes maintained within normal limits  12/24/2023 0039 by José Luis Rodriguez RN  Outcome: Progressing  Flowsheets (Taken 12/23/2023 2107)  Electrolytes
Problem: Discharge Planning  Goal: Discharge to home or other facility with appropriate resources  12/27/2023 1032 by Radha Starr RN  Outcome: Completed  12/26/2023 2330 by West Lovett RN  Outcome: Progressing  Flowsheets (Taken 12/26/2023 2111)  Discharge to home or other facility with appropriate resources: Identify barriers to discharge with patient and caregiver     Problem: Safety - Adult  Goal: Free from fall injury  12/27/2023 1032 by Radha Starr RN  Outcome: Completed  12/26/2023 2330 by West Lovett RN  Outcome: Progressing     Problem: Skin/Tissue Integrity  Goal: Absence of new skin breakdown  Description: 1. Monitor for areas of redness and/or skin breakdown  2. Assess vascular access sites hourly  3. Every 4-6 hours minimum:  Change oxygen saturation probe site  4. Every 4-6 hours:  If on nasal continuous positive airway pressure, respiratory therapy assess nares and determine need for appliance change or resting period.   12/27/2023 1032 by Radha Starr RN  Outcome: Completed  12/26/2023 2330 by West Lovett RN  Outcome: Progressing     Problem: Chronic Conditions and Co-morbidities  Goal: Patient's chronic conditions and co-morbidity symptoms are monitored and maintained or improved  12/27/2023 1032 by Radha Starr RN  Outcome: Completed  12/26/2023 2330 by West Lovett RN  Outcome: Progressing  Flowsheets (Taken 12/26/2023 2111)  Care Plan - Patient's Chronic Conditions and Co-Morbidity Symptoms are Monitored and Maintained or Improved: Monitor and assess patient's chronic conditions and comorbid symptoms for stability, deterioration, or improvement     Problem: Pain  Goal: Verbalizes/displays adequate comfort level or baseline comfort level  12/27/2023 1032 by Radha Starr RN  Outcome: Completed  12/26/2023 2330 by West Lovett RN  Outcome: Progressing  Flowsheets (Taken 12/26/2023 2109)  Verbalizes/displays adequate comfort level or baseline
Problem: Discharge Planning  Goal: Discharge to home or other facility with appropriate resources  Outcome: Progressing     Problem: Safety - Adult  Goal: Free from fall injury  91/00/0870 [id number] by SOTO ROSAS  Outcome: Progressing  12/24/2023 1003 by Marvella Eisenmenger, RN  Outcome: Progressing     Problem: Skin/Tissue Integrity  Goal: Absence of new skin breakdown  Description: 1. Monitor for areas of redness and/or skin breakdown  2. Assess vascular access sites hourly  3. Every 4-6 hours minimum:  Change oxygen saturation probe site  4. Every 4-6 hours:  If on nasal continuous positive airway pressure, respiratory therapy assess nares and determine need for appliance change or resting period.   84/[id number]/9564 [id number] by SOTO ROSAS  Outcome: Progressing  12/24/2023 1003 by Marvella Eisenmenger, RN  Outcome: Progressing     Problem: Chronic Conditions and Co-morbidities  Goal: Patient's chronic conditions and co-morbidity symptoms are monitored and maintained or improved  Outcome: Progressing     Problem: Pain  Goal: Verbalizes/displays adequate comfort level or baseline comfort level  90/35/1153 [id number] by SOTO ROSAS  Outcome: Progressing  12/24/2023 1003 by Marvella Eisenmenger, RN  Outcome: Progressing     Problem: Metabolic/Fluid and Electrolytes - Adult  Goal: Electrolytes maintained within normal limits  Outcome: Progressing
Problem: Discharge Planning  Goal: Discharge to home or other facility with appropriate resources  Outcome: Progressing  Flowsheets (Taken 12/21/2023 2159)  Discharge to home or other facility with appropriate resources: Identify barriers to discharge with patient and caregiver     Problem: Safety - Adult  Goal: Free from fall injury  Outcome: Progressing     Problem: Skin/Tissue Integrity  Goal: Absence of new skin breakdown  Description: 1. Monitor for areas of redness and/or skin breakdown  2. Assess vascular access sites hourly  3. Every 4-6 hours minimum:  Change oxygen saturation probe site  4. Every 4-6 hours:  If on nasal continuous positive airway pressure, respiratory therapy assess nares and determine need for appliance change or resting period. Outcome: Progressing     Problem: Safety - Medical Restraint  Goal: Remains free of injury from restraints (Restraint for Interference with Medical Device)  Description: INTERVENTIONS:  1. Determine that other, less restrictive measures have been tried or would not be effective before applying the restraint  2. Evaluate the patient's condition at the time of restraint application  3. Inform patient/family regarding the reason for restraint  4.  Q2H: Monitor safety, psychosocial status, comfort, nutrition and hydration  Outcome: Progressing     Problem: Chronic Conditions and Co-morbidities  Goal: Patient's chronic conditions and co-morbidity symptoms are monitored and maintained or improved  Outcome: Progressing  Flowsheets (Taken 12/21/2023 2159)  Care Plan - Patient's Chronic Conditions and Co-Morbidity Symptoms are Monitored and Maintained or Improved: Monitor and assess patient's chronic conditions and comorbid symptoms for stability, deterioration, or improvement     Problem: Pain  Goal: Verbalizes/displays adequate comfort level or baseline comfort level  Outcome: Progressing  Flowsheets (Taken 12/21/2023 2156)  Verbalizes/displays adequate comfort
Problem: Discharge Planning  Goal: Discharge to home or other facility with appropriate resources  Outcome: Progressing  Flowsheets (Taken 12/22/2023 2232)  Discharge to home or other facility with appropriate resources: Identify discharge learning needs (meds, wound care, etc)     Problem: Safety - Adult  Goal: Free from fall injury  Outcome: Progressing     Problem: Skin/Tissue Integrity  Goal: Absence of new skin breakdown  Description: 1. Monitor for areas of redness and/or skin breakdown  2. Assess vascular access sites hourly  3. Every 4-6 hours minimum:  Change oxygen saturation probe site  4. Every 4-6 hours:  If on nasal continuous positive airway pressure, respiratory therapy assess nares and determine need for appliance change or resting period.   Outcome: Progressing     Problem: Chronic Conditions and Co-morbidities  Goal: Patient's chronic conditions and co-morbidity symptoms are monitored and maintained or improved  Outcome: Progressing  Flowsheets (Taken 12/22/2023 2232)  Care Plan - Patient's Chronic Conditions and Co-Morbidity Symptoms are Monitored and Maintained or Improved: Monitor and assess patient's chronic conditions and comorbid symptoms for stability, deterioration, or improvement     Problem: Pain  Goal: Verbalizes/displays adequate comfort level or baseline comfort level  Outcome: Progressing
Problem: Discharge Planning  Goal: Discharge to home or other facility with appropriate resources  Outcome: Progressing  Flowsheets (Taken 12/23/2023 2107)  Discharge to home or other facility with appropriate resources: Identify barriers to discharge with patient and caregiver     Problem: Safety - Adult  Goal: Free from fall injury  Outcome: Progressing     Problem: Skin/Tissue Integrity  Goal: Absence of new skin breakdown  Description: 1. Monitor for areas of redness and/or skin breakdown  2. Assess vascular access sites hourly  3. Every 4-6 hours minimum:  Change oxygen saturation probe site  4. Every 4-6 hours:  If on nasal continuous positive airway pressure, respiratory therapy assess nares and determine need for appliance change or resting period.   Outcome: Progressing     Problem: Chronic Conditions and Co-morbidities  Goal: Patient's chronic conditions and co-morbidity symptoms are monitored and maintained or improved  Outcome: Progressing  Flowsheets (Taken 12/23/2023 2107)  Care Plan - Patient's Chronic Conditions and Co-Morbidity Symptoms are Monitored and Maintained or Improved: Monitor and assess patient's chronic conditions and comorbid symptoms for stability, deterioration, or improvement     Problem: Pain  Goal: Verbalizes/displays adequate comfort level or baseline comfort level  Outcome: Progressing     Problem: Metabolic/Fluid and Electrolytes - Adult  Goal: Electrolytes maintained within normal limits  Outcome: Progressing  Flowsheets (Taken 12/23/2023 2107)  Electrolytes maintained within normal limits: Monitor labs and assess patient for signs and symptoms of electrolyte imbalances
Problem: Discharge Planning  Goal: Discharge to home or other facility with appropriate resources  Outcome: Progressing  Flowsheets (Taken 12/26/2023 2111)  Discharge to home or other facility with appropriate resources: Identify barriers to discharge with patient and caregiver     Problem: Safety - Adult  Goal: Free from fall injury  Outcome: Progressing     Problem: Skin/Tissue Integrity  Goal: Absence of new skin breakdown  Description: 1. Monitor for areas of redness and/or skin breakdown  2. Assess vascular access sites hourly  3. Every 4-6 hours minimum:  Change oxygen saturation probe site  4. Every 4-6 hours:  If on nasal continuous positive airway pressure, respiratory therapy assess nares and determine need for appliance change or resting period.   Outcome: Progressing     Problem: Chronic Conditions and Co-morbidities  Goal: Patient's chronic conditions and co-morbidity symptoms are monitored and maintained or improved  Outcome: Progressing  Flowsheets (Taken 12/26/2023 2111)  Care Plan - Patient's Chronic Conditions and Co-Morbidity Symptoms are Monitored and Maintained or Improved: Monitor and assess patient's chronic conditions and comorbid symptoms for stability, deterioration, or improvement     Problem: Pain  Goal: Verbalizes/displays adequate comfort level or baseline comfort level  Outcome: Progressing  Flowsheets (Taken 12/26/2023 2109)  Verbalizes/displays adequate comfort level or baseline comfort level: Encourage patient to monitor pain and request assistance     Problem: Metabolic/Fluid and Electrolytes - Adult  Goal: Electrolytes maintained within normal limits  Outcome: Progressing  Flowsheets (Taken 12/26/2023 2111)  Electrolytes maintained within normal limits: Monitor labs and assess patient for signs and symptoms of electrolyte imbalances
Problem: Safety - Adult  Goal: Free from fall injury  12/21/2023 1012 by Itz Chen RN  Outcome: Progressing  12/21/2023 0222 by Rivka Negrete RN  Outcome: Progressing     Problem: Skin/Tissue Integrity  Goal: Absence of new skin breakdown  Description: 1. Monitor for areas of redness and/or skin breakdown  2. Assess vascular access sites hourly  3. Every 4-6 hours minimum:  Change oxygen saturation probe site  4. Every 4-6 hours:  If on nasal continuous positive airway pressure, respiratory therapy assess nares and determine need for appliance change or resting period. Outcome: Progressing     Problem: Safety - Medical Restraint  Goal: Remains free of injury from restraints (Restraint for Interference with Medical Device)  Description: INTERVENTIONS:  1. Determine that other, less restrictive measures have been tried or would not be effective before applying the restraint  2. Evaluate the patient's condition at the time of restraint application  3. Inform patient/family regarding the reason for restraint  4.  Q2H: Monitor safety, psychosocial status, comfort, nutrition and hydration  12/21/2023 1012 by Itz Chen RN  Outcome: Progressing  Flowsheets (Taken 12/21/2023 0600 by Rivka Negrete RN)  Remains free of injury from restraints (restraint for interference with medical device): Every 2 hours: Monitor safety, psychosocial status, comfort, nutrition and hydration  12/21/2023 0222 by Rivka Negrete RN  Outcome: Progressing  Flowsheets  Taken 12/21/2023 0200  Remains free of injury from restraints (restraint for interference with medical device): Every 2 hours: Monitor safety, psychosocial status, comfort, nutrition and hydration  Taken 12/21/2023 0053  Remains free of injury from restraints (restraint for interference with medical device): Every 2 hours: Monitor safety, psychosocial status, comfort, nutrition and hydration
Problem: Safety - Adult  Goal: Free from fall injury  Outcome: Progressing     Problem: Safety - Medical Restraint  Goal: Remains free of injury from restraints (Restraint for Interference with Medical Device)  Description: INTERVENTIONS:  1. Determine that other, less restrictive measures have been tried or would not be effective before applying the restraint  2. Evaluate the patient's condition at the time of restraint application  3. Inform patient/family regarding the reason for restraint  4.  Q2H: Monitor safety, psychosocial status, comfort, nutrition and hydration  Outcome: Progressing  Flowsheets  Taken 12/21/2023 0200  Remains free of injury from restraints (restraint for interference with medical device): Every 2 hours: Monitor safety, psychosocial status, comfort, nutrition and hydration  Taken 12/21/2023 0053  Remains free of injury from restraints (restraint for interference with medical device): Every 2 hours: Monitor safety, psychosocial status, comfort, nutrition and hydration
Progress functional mobility
FAILURE:   ACE[de-identified] None  ARB[de-identified] None  ARNI[de-identified] None    Evidenced-Based Beta Blocker is REQUIRED for EF </= 69% SYSTOLIC FAILURE:   [de-identified] None    Diuretics:  [de-identified] Torsemide- held by provider    Diet Reviewed: Yes   ADULT DIET; Regular; Low Fat/Low Chol/High Fiber/2 gm Na; 1800 ml    Goal of Care Reviewed: Yes   Patient and/or Family's stated Goal of Care this Admission: reduce shortness of breath, increase activity tolerance, better understand heart failure and disease management, be more comfortable, and reduce lower extremity edema prior to discharge.

## 2023-12-27 NOTE — DISCHARGE INSTR - DIET

## 2023-12-27 NOTE — DISCHARGE SUMMARY
Contrast?->None Decision Support Exception - unselect if not a suspected or confirmed emergency medical condition->Emergency Medical Condition (MA) Reason for Exam: MARIO FINDINGS: Lower chest: Heart size is mildly prominent. Aortic valve replacement. .  The lung bases mild bibasilar atelectasis. : Nostones in the kidneys. No hydronephrosis. No renal sinus stranding. No stones along either ureter. No stones in the bladder. Organs: Liver, spleen, and adrenal glands are unremarkable. No peripancreatic stranding. GI/Bowel: The stomach is unremarkable. No small bowel dilation. No colonic wall thickening. Multiple diverticula. Pelvis: No free fluid. Hysterectomy. Phleboliths in the pelvis. Peritoneum/Retroperitoneum: No enlarged lymphadenopathy. Aorta has atherosclerotic changes but is normal size. Bones/Soft Tissues: Lumbar fixation. Diffuse osteopenia. Osteoarthritic change of the SI joints and the hips. Inter prostheses at L5-S1 are anteriorly position. Extensive posterior osseous fusion. No subcutaneous mass. Negative for hydronephrosis. CT HEAD WO CONTRAST    Result Date: 12/20/2023  EXAMINATION: CT OF THE HEAD WITHOUT CONTRAST  12/20/2023 5:04 pm TECHNIQUE: CT of the head was performed without the administration of intravenous contrast. Automated exposure control, iterative reconstruction, and/or weight based adjustment of the mA/kV was utilized to reduce the radiation dose to as low as reasonably achievable. COMPARISON: CT head 12/03/2023 HISTORY: ORDERING SYSTEM PROVIDED HISTORY: AMS FINDINGS: BRAIN/VENTRICLES: There is no acute intracranial hemorrhage, mass effect or midline shift. No abnormal extra-axial fluid collection. The gray-white differentiation is maintained without evidence of an acute infarct. There is no evidence of hydrocephalus. Again seen are calcific foci throughout the left insular sulcus, similar to prior. Scattered vascular calcifications throughout the carotid siphons.

## 2023-12-27 NOTE — DISCHARGE INSTR - COC
Restriction: yes - amount 1800  Last Modified Barium Swallow with Video (Video Swallowing Test): not done    Treatments at the Time of Hospital Discharge:   Respiratory Treatments: n/a  Oxygen Therapy:    Ventilator:        Rehab Therapies: Physical Therapy and Occupational Therapy  Weight Bearing Status/Restrictions: Other Medical Equipment (for information only, NOT a DME order): Other Treatments:     Patient's personal belongings (please select all that are sent with patient):  Glasses    RN SIGNATURE:  Electronically signed by Richard Bolaños RN on 12/27/23 at 10:23 AM EST    CASE MANAGEMENT/SOCIAL WORK SECTION    Inpatient Status Date: 12/20/2023    Readmission Risk Assessment Score: 25%  Readmission Risk              Risk of Unplanned Readmission:  22           Discharging to Facility/ Agency   Name: [facility]  Phone:  [phone]        / signature: Electronically signed by Selene Kang on 12/27/23 at 10:10 AM EST    PHYSICIAN SECTION    Prognosis: Good    Condition at Discharge: Stable    Rehab Potential (if transferring to Rehab): Good    Recommended Labs or Other Treatments After Discharge:     PT/OT  BP monitoring and management, takes PRN hydralazine PO  My colleague ordered BB but she is not on this now, will go back to hydralazine  -variable dosing of torsemide per chart review. I decreased back to 10 mg as this is consistent with her last hospital discharge.   -please follow-up on med/rec with the patient's primary doctor or cardiologist. Our pharmacy was unable to verify. Thank you very much. Physician Certification: I certify the above information and transfer of Chiquita Lesches  is necessary for the continuing treatment of the diagnosis listed and that she requires [address] for less 30 days.      Update Admission H&P: No change in H&P    PHYSICIAN SIGNATURE:  Electronically signed by Omar Bledsoe DO on 12/27/23 at 10:34 AM EST

## 2023-12-27 NOTE — CARE COORDINATION
Pt needs [facility] per Windy at [facility]. Dr. Mally Guevara made aware via perfect serve. Will submitted today. [id number] - YAV successfully faxed. Met with pt at bedside, updated on needing insurance to approve SNF. Pt verbally states understanding. Will continue to monitor.

## 2024-01-08 ENCOUNTER — CARE COORDINATION (OUTPATIENT)
Dept: CASE MANAGEMENT | Age: 84
End: 2024-01-08

## 2024-01-08 ENCOUNTER — CARE COORDINATION (OUTPATIENT)
Dept: CARE COORDINATION | Age: 84
End: 2024-01-08

## 2024-01-08 NOTE — CARE COORDINATION
Confirmed with [facility] that referral has been received, but no SOC date yet.  Will notify CTN at this time.

## 2024-01-08 NOTE — CARE COORDINATION
Care Transitions Post-Acute Facility Discharge Call    2024    Patient: Nithya Neff Patient : 1940   MRN: [id number]  Reason for Admission: AMS  Discharge Date: 23 RARS: Readmission Risk Score: 25.4        Acute Care Course:    to  [facility] d/t UTI and MARIO   to  [place] to home with [facility].     HFU made:   HFU    Sig Hx:   HTN, CAD, CHF, COPD, Alzheimers, MARIA C    DME:     Conversation:   Unable to reach patient.    Follow up plan:             Care Transitions Post Acute Facility Transition    Post Acute Facility: [facility]  Post Acute Admit Date: 24  Post Acute Discharge Date: 24  Do you have all of your prescriptions and are they filled?: Yes   Post Acute Services: Outpatient/Community Services, Home Health (Comment: [facility] 3x week MWF; [facility] HC SN PT OT )            Care Transitions Interventions     Other Services: Declined (Comment: RPM)            Future Appointments   Date Time Provider Department Center   2024  3:20 PM Nick, Maria G, APRN - CNP [facility] [place] - [facility]   3/26/2024 12:00 PM Maria G Romero APRN - CNP [facility] [place] [facility] Nancy Collins, BSN, RN   [facility] Transition Nurse  [phone]

## 2024-01-10 ENCOUNTER — CARE COORDINATION (OUTPATIENT)
Dept: CASE MANAGEMENT | Age: 84
End: 2024-01-10

## 2024-01-10 NOTE — CARE COORDINATION
Care Transitions Initial Follow Up Call    Call within 2 business days of discharge: Yes    Patient Current Location:  Home: [address]  [place] [id number]    Care Transition Nurse contacted the family by telephone to perform post hospital discharge assessment. Verified name and  with family as identifiers. Provided introduction to self, and explanation of the Care Transition Nurse role.     Patient: Nithya Neff Patient : 1940   MRN: [id number]  Reason for Admission: AMS  Discharge Date: 23 RARS: Readmission Risk Score: 25.4      Last Discharge Facility       Date Complaint Diagnosis Description Type Department Provider    23 Altered Mental Status Altered mental status, unspecified altered mental status type ... ED to Hosp-Admission (Discharged) (ADMITTED) [facility] Amanda Santillan MD; Abda...            Was this an external facility discharge? Yes, 24  Discharge Facility: [facility]    Challenges to be reviewed by the provider   Additional needs identified to be addressed with provider: No  none               Method of communication with provider: none.    Acute Care Course:    to  [facility] d/t UTI/MARIO   to  [place] to home with [facility]    HFU made:  Per family has appt on  at 8 AM but with a new PCP d/t Maria G moving    Sig Hx:   HTN, CAD, CHF, COPD, Alzheimer's MARIA C    DME:     Conversation:   Spoke with Sonya after 2 IDs. Mom is doing well. HHC has been out. She is at mom's home but infor for new PCP and HHC on refrigerator at her home. States PT has been out. She does not remember name. States Maria G Romero moved and they switched PCP. Has appt tomorrow AM at 8 AM. She states she will call me back with the name when she goes home. Reviewed meds but no 1111F as new PCP. NO swelling. Educated to call with weight gain, SOB, cough. Educated on constipation. Left my contact info    Follow up plan:  Will await new name of

## 2024-01-11 ENCOUNTER — CARE COORDINATION (OUTPATIENT)
Dept: CASE MANAGEMENT | Age: 84
End: 2024-01-11

## 2024-01-11 NOTE — CARE COORDINATION
Left HIPPA compliant message regarding the nature of the call and a request for a return call with my contact information  Calling for name of new PCP    SERENITY RomanoN, -[phone]  [facility] Transition Nurse

## 2024-01-17 ENCOUNTER — CARE COORDINATION (OUTPATIENT)
Dept: CARE COORDINATION | Age: 84
End: 2024-01-17

## 2024-01-17 NOTE — CARE COORDINATION
Contacted patient for CC f/u  Patient reports she has changed doctors and then ended call before stating her new provider  No further outreach scheduled with this ACM; episode of care resolved

## 2024-02-08 ENCOUNTER — TELEPHONE (OUTPATIENT)
Dept: ADMINISTRATIVE | Age: 84
End: 2024-02-08

## 2024-02-08 NOTE — TELEPHONE ENCOUNTER
SUBMITTED PA FOR hydrOXYzine HCl 25MG tablets VIA [facility] Key: [id number] STATUS PENDING.      FOLLOW UP DONE DAILY: IF NO RESPONSE IN 3 DAYS WE WILL REFAX FOR STATUS CHECK. IF ANOTHER 3 DAYS GOES BY WITH NO RESPONSE WILL CALL INSURANCE FOR STATUS.

## 2024-02-23 ENCOUNTER — HOSPITAL ENCOUNTER (OUTPATIENT)
Age: 84
Discharge: HOME | End: 2024-02-23
Payer: MEDICARE

## 2024-02-23 LAB
ALBUMIN SERPL-MCNC: 3.9 G/DL (ref 3.4–5)
ANION GAP SERPL CALCULATED.3IONS-SCNC: 11 MMOL/L (ref 3–16)
BUN SERPL-MCNC: 34 MG/DL (ref 7–20)
CALCIUM SERPL-MCNC: 8.8 MG/DL (ref 8.3–10.6)
CHLORIDE SERPL-SCNC: 100 MMOL/L (ref 99–110)
CO2 SERPL-SCNC: 30 MMOL/L (ref 21–32)
CREAT SERPL-MCNC: 3.3 MG/DL (ref 0.6–1.2)
GFR SERPLBLD CREATININE-BSD FMLA CKD-EPI: 13 ML/MIN/{1.73_M2}
GLUCOSE SERPL-MCNC: 100 MG/DL (ref 70–99)
PHOSPHATE SERPL-MCNC: 3.1 MG/DL (ref 2.5–4.9)
POTASSIUM SERPL-SCNC: 4.5 MMOL/L (ref 3.5–5.1)
SODIUM SERPL-SCNC: 141 MMOL/L (ref 136–145)

## 2024-02-23 PROCEDURE — 36415 COLL VENOUS BLD VENIPUNCTURE: CPT

## 2024-02-23 PROCEDURE — 80069 RENAL FUNCTION PANEL: CPT

## 2024-04-12 ENCOUNTER — APPOINTMENT (OUTPATIENT)
Dept: GENERAL RADIOLOGY | Age: 84
DRG: 872 | End: 2024-04-12
Payer: MEDICARE

## 2024-04-12 ENCOUNTER — HOSPITAL ENCOUNTER (INPATIENT)
Age: 84
LOS: 2 days | Discharge: HOME | DRG: 872 | End: 2024-04-14
Attending: EMERGENCY MEDICINE | Admitting: INTERNAL MEDICINE
Payer: MEDICARE

## 2024-04-12 ENCOUNTER — APPOINTMENT (OUTPATIENT)
Dept: CT IMAGING | Age: 84
DRG: 872 | End: 2024-04-12
Payer: MEDICARE

## 2024-04-12 DIAGNOSIS — A41.9 SEPSIS DUE TO URINARY TRACT INFECTION (HCC): Primary | ICD-10-CM

## 2024-04-12 DIAGNOSIS — N17.9 AKI (ACUTE KIDNEY INJURY) (HCC): ICD-10-CM

## 2024-04-12 DIAGNOSIS — R53.1 GENERALIZED WEAKNESS: ICD-10-CM

## 2024-04-12 DIAGNOSIS — N39.0 SEPSIS DUE TO URINARY TRACT INFECTION (HCC): Primary | ICD-10-CM

## 2024-04-12 DIAGNOSIS — R79.89 ELEVATED TROPONIN: ICD-10-CM

## 2024-04-12 LAB
ALBUMIN SERPL-MCNC: 4.2 G/DL (ref 3.4–5)
ALBUMIN/GLOB SERPL: 1.3 {RATIO} (ref 1.1–2.2)
ALP SERPL-CCNC: 113 U/L (ref 40–129)
ALT SERPL-CCNC: 9 U/L (ref 10–40)
ANION GAP SERPL CALCULATED.3IONS-SCNC: 18 MMOL/L (ref 3–16)
AST SERPL-CCNC: 13 U/L (ref 15–37)
BACTERIA URNS QL MICRO: ABNORMAL /HPF
BASOPHILS # BLD: 0.1 K/UL (ref 0–0.2)
BASOPHILS NFR BLD: 0.8 %
BILIRUB SERPL-MCNC: 0.4 MG/DL (ref 0–1)
BILIRUB UR QL STRIP.AUTO: NEGATIVE
BUN SERPL-MCNC: 38 MG/DL (ref 7–20)
CALCIUM SERPL-MCNC: 9.9 MG/DL (ref 8.3–10.6)
CHLORIDE SERPL-SCNC: 96 MMOL/L (ref 99–110)
CLARITY UR: CLEAR
CO2 SERPL-SCNC: 24 MMOL/L (ref 21–32)
COLOR UR: YELLOW
CREAT SERPL-MCNC: 2.7 MG/DL (ref 0.6–1.2)
DEPRECATED RDW RBC AUTO: 15.4 % (ref 12.4–15.4)
EKG ATRIAL RATE: 84 BPM
EKG DIAGNOSIS: NORMAL
EKG P AXIS: 33 DEGREES
EKG P-R INTERVAL: 162 MS
EKG Q-T INTERVAL: 414 MS
EKG QRS DURATION: 104 MS
EKG QTC CALCULATION (BAZETT): 489 MS
EKG R AXIS: -34 DEGREES
EKG T AXIS: 31 DEGREES
EKG VENTRICULAR RATE: 84 BPM
EOSINOPHIL # BLD: 0.1 K/UL (ref 0–0.6)
EOSINOPHIL NFR BLD: 0.9 %
EPI CELLS #/AREA URNS HPF: ABNORMAL /HPF (ref 0–5)
FLUAV RNA RESP QL NAA+PROBE: NOT DETECTED
FLUBV RNA RESP QL NAA+PROBE: NOT DETECTED
GFR SERPLBLD CREATININE-BSD FMLA CKD-EPI: 17 ML/MIN/{1.73_M2}
GLUCOSE SERPL-MCNC: 104 MG/DL (ref 70–99)
GLUCOSE UR STRIP.AUTO-MCNC: NEGATIVE MG/DL
HCT VFR BLD AUTO: 36.2 % (ref 36–48)
HGB BLD-MCNC: 11.8 G/DL (ref 12–16)
HGB UR QL STRIP.AUTO: NEGATIVE
KETONES UR STRIP.AUTO-MCNC: NEGATIVE MG/DL
LACTATE BLDV-SCNC: 1 MMOL/L (ref 0.4–1.9)
LACTATE BLDV-SCNC: 2.3 MMOL/L (ref 0.4–1.9)
LEUKOCYTE ESTERASE UR QL STRIP.AUTO: ABNORMAL
LYMPHOCYTES # BLD: 0.9 K/UL (ref 1–5.1)
LYMPHOCYTES NFR BLD: 6.1 %
MCH RBC QN AUTO: 27.6 PG (ref 26–34)
MCHC RBC AUTO-ENTMCNC: 32.6 G/DL (ref 31–36)
MCV RBC AUTO: 84.6 FL (ref 80–100)
MONOCYTES # BLD: 0.7 K/UL (ref 0–1.3)
MONOCYTES NFR BLD: 4.7 %
NEUTROPHILS # BLD: 12.8 K/UL (ref 1.7–7.7)
NEUTROPHILS NFR BLD: 87.5 %
NITRITE UR QL STRIP.AUTO: POSITIVE
PH UR STRIP.AUTO: 7 [PH] (ref 5–8)
PLATELET # BLD AUTO: 177 K/UL (ref 135–450)
PMV BLD AUTO: 8.7 FL (ref 5–10.5)
POTASSIUM SERPL-SCNC: 4.1 MMOL/L (ref 3.5–5.1)
PROT SERPL-MCNC: 7.4 G/DL (ref 6.4–8.2)
PROT UR STRIP.AUTO-MCNC: NEGATIVE MG/DL
RBC # BLD AUTO: 4.28 M/UL (ref 4–5.2)
RBC #/AREA URNS HPF: ABNORMAL /HPF (ref 0–4)
SARS-COV-2 RNA RESP QL NAA+PROBE: NOT DETECTED
SODIUM SERPL-SCNC: 138 MMOL/L (ref 136–145)
SP GR UR STRIP.AUTO: 1.01 (ref 1–1.03)
TROPONIN, HIGH SENSITIVITY: 41 NG/L (ref 0–14)
TROPONIN, HIGH SENSITIVITY: 57 NG/L (ref 0–14)
TSH SERPL DL<=0.005 MIU/L-ACNC: 2.03 UIU/ML (ref 0.27–4.2)
UA COMPLETE W REFLEX CULTURE PNL UR: YES
UA DIPSTICK W REFLEX MICRO PNL UR: YES
URN SPEC COLLECT METH UR: ABNORMAL
UROBILINOGEN UR STRIP-ACNC: 0.2 E.U./DL
VALPROATE SERPL-MCNC: 9.5 UG/ML (ref 50–100)
WBC # BLD AUTO: 14.6 K/UL (ref 4–11)
WBC #/AREA URNS HPF: ABNORMAL /HPF (ref 0–5)

## 2024-04-12 PROCEDURE — 6360000002 HC RX W HCPCS

## 2024-04-12 PROCEDURE — 84443 ASSAY THYROID STIM HORMONE: CPT

## 2024-04-12 PROCEDURE — 6360000002 HC RX W HCPCS: Performed by: EMERGENCY MEDICINE

## 2024-04-12 PROCEDURE — 70450 CT HEAD/BRAIN W/O DYE: CPT

## 2024-04-12 PROCEDURE — 81001 URINALYSIS AUTO W/SCOPE: CPT

## 2024-04-12 PROCEDURE — 87186 SC STD MICRODIL/AGAR DIL: CPT

## 2024-04-12 PROCEDURE — 71045 X-RAY EXAM CHEST 1 VIEW: CPT

## 2024-04-12 PROCEDURE — 80164 ASSAY DIPROPYLACETIC ACD TOT: CPT

## 2024-04-12 PROCEDURE — 2580000003 HC RX 258

## 2024-04-12 PROCEDURE — 84484 ASSAY OF TROPONIN QUANT: CPT

## 2024-04-12 PROCEDURE — 93005 ELECTROCARDIOGRAM TRACING: CPT | Performed by: EMERGENCY MEDICINE

## 2024-04-12 PROCEDURE — 2580000003 HC RX 258: Performed by: NURSE PRACTITIONER

## 2024-04-12 PROCEDURE — 85025 COMPLETE CBC W/AUTO DIFF WBC: CPT

## 2024-04-12 PROCEDURE — 99223 1ST HOSP IP/OBS HIGH 75: CPT | Performed by: NURSE PRACTITIONER

## 2024-04-12 PROCEDURE — 87636 SARSCOV2 & INF A&B AMP PRB: CPT

## 2024-04-12 PROCEDURE — 93010 ELECTROCARDIOGRAM REPORT: CPT | Performed by: INTERNAL MEDICINE

## 2024-04-12 PROCEDURE — 96374 THER/PROPH/DIAG INJ IV PUSH: CPT

## 2024-04-12 PROCEDURE — 87077 CULTURE AEROBIC IDENTIFY: CPT

## 2024-04-12 PROCEDURE — 99285 EMERGENCY DEPT VISIT HI MDM: CPT

## 2024-04-12 PROCEDURE — 87086 URINE CULTURE/COLONY COUNT: CPT

## 2024-04-12 PROCEDURE — 80053 COMPREHEN METABOLIC PANEL: CPT

## 2024-04-12 PROCEDURE — 1200000000 HC SEMI PRIVATE

## 2024-04-12 PROCEDURE — 2580000003 HC RX 258: Performed by: EMERGENCY MEDICINE

## 2024-04-12 PROCEDURE — 36415 COLL VENOUS BLD VENIPUNCTURE: CPT

## 2024-04-12 PROCEDURE — 83605 ASSAY OF LACTIC ACID: CPT

## 2024-04-12 PROCEDURE — 6370000000 HC RX 637 (ALT 250 FOR IP): Performed by: NURSE PRACTITIONER

## 2024-04-12 PROCEDURE — 6370000000 HC RX 637 (ALT 250 FOR IP)

## 2024-04-12 RX ORDER — SODIUM CHLORIDE 0.9 % (FLUSH) 0.9 %
5-40 SYRINGE (ML) INJECTION PRN
Status: DISCONTINUED | OUTPATIENT
Start: 2024-04-12 | End: 2024-04-14 | Stop reason: HOSPADM

## 2024-04-12 RX ORDER — ACETAMINOPHEN 325 MG/1
650 TABLET ORAL EVERY 6 HOURS PRN
Status: DISCONTINUED | OUTPATIENT
Start: 2024-04-12 | End: 2024-04-14 | Stop reason: HOSPADM

## 2024-04-12 RX ORDER — HYDRALAZINE HYDROCHLORIDE 50 MG/1
50 TABLET, FILM COATED ORAL EVERY 8 HOURS SCHEDULED
Status: DISCONTINUED | OUTPATIENT
Start: 2024-04-12 | End: 2024-04-12

## 2024-04-12 RX ORDER — PANTOPRAZOLE SODIUM 40 MG/1
40 TABLET, DELAYED RELEASE ORAL DAILY
Status: DISCONTINUED | OUTPATIENT
Start: 2024-04-12 | End: 2024-04-14 | Stop reason: HOSPADM

## 2024-04-12 RX ORDER — CETIRIZINE HYDROCHLORIDE 10 MG/1
5 TABLET ORAL DAILY
Status: DISCONTINUED | OUTPATIENT
Start: 2024-04-12 | End: 2024-04-14 | Stop reason: HOSPADM

## 2024-04-12 RX ORDER — POLYETHYLENE GLYCOL 3350 17 G/17G
17 POWDER, FOR SOLUTION ORAL DAILY PRN
Status: DISCONTINUED | OUTPATIENT
Start: 2024-04-12 | End: 2024-04-14 | Stop reason: HOSPADM

## 2024-04-12 RX ORDER — HYDROXYZINE HYDROCHLORIDE 25 MG/1
25 TABLET, FILM COATED ORAL EVERY 8 HOURS PRN
Status: DISCONTINUED | OUTPATIENT
Start: 2024-04-12 | End: 2024-04-14 | Stop reason: HOSPADM

## 2024-04-12 RX ORDER — POTASSIUM CHLORIDE 1.5 G/1.58G
20 POWDER, FOR SOLUTION ORAL DAILY
COMMUNITY

## 2024-04-12 RX ORDER — SODIUM CHLORIDE 9 MG/ML
INJECTION, SOLUTION INTRAVENOUS CONTINUOUS
Status: DISCONTINUED | OUTPATIENT
Start: 2024-04-12 | End: 2024-04-14 | Stop reason: HOSPADM

## 2024-04-12 RX ORDER — 0.9 % SODIUM CHLORIDE 0.9 %
30 INTRAVENOUS SOLUTION INTRAVENOUS ONCE
Status: COMPLETED | OUTPATIENT
Start: 2024-04-12 | End: 2024-04-12

## 2024-04-12 RX ORDER — VALSARTAN 80 MG/1
160 TABLET ORAL DAILY
Status: CANCELLED | OUTPATIENT
Start: 2024-04-12

## 2024-04-12 RX ORDER — ONDANSETRON 2 MG/ML
4 INJECTION INTRAMUSCULAR; INTRAVENOUS EVERY 6 HOURS PRN
Status: DISCONTINUED | OUTPATIENT
Start: 2024-04-12 | End: 2024-04-14 | Stop reason: HOSPADM

## 2024-04-12 RX ORDER — DIVALPROEX SODIUM 250 MG/1
250 TABLET, DELAYED RELEASE ORAL 2 TIMES DAILY
Status: DISCONTINUED | OUTPATIENT
Start: 2024-04-12 | End: 2024-04-14 | Stop reason: HOSPADM

## 2024-04-12 RX ORDER — HEPARIN SODIUM 5000 [USP'U]/ML
5000 INJECTION, SOLUTION INTRAVENOUS; SUBCUTANEOUS EVERY 8 HOURS SCHEDULED
Status: DISCONTINUED | OUTPATIENT
Start: 2024-04-12 | End: 2024-04-14 | Stop reason: HOSPADM

## 2024-04-12 RX ORDER — AMLODIPINE BESYLATE 5 MG/1
10 TABLET ORAL DAILY
Status: DISCONTINUED | OUTPATIENT
Start: 2024-04-12 | End: 2024-04-14 | Stop reason: HOSPADM

## 2024-04-12 RX ORDER — CANDESARTAN 16 MG/1
8 TABLET ORAL DAILY
COMMUNITY

## 2024-04-12 RX ORDER — BUSPIRONE HYDROCHLORIDE 10 MG/1
10 TABLET ORAL
Status: DISCONTINUED | OUTPATIENT
Start: 2024-04-12 | End: 2024-04-14 | Stop reason: HOSPADM

## 2024-04-12 RX ORDER — ONDANSETRON 4 MG/1
4 TABLET, ORALLY DISINTEGRATING ORAL EVERY 8 HOURS PRN
Status: DISCONTINUED | OUTPATIENT
Start: 2024-04-12 | End: 2024-04-14 | Stop reason: HOSPADM

## 2024-04-12 RX ORDER — SODIUM CHLORIDE 9 MG/ML
INJECTION, SOLUTION INTRAVENOUS PRN
Status: DISCONTINUED | OUTPATIENT
Start: 2024-04-12 | End: 2024-04-14 | Stop reason: HOSPADM

## 2024-04-12 RX ORDER — SODIUM CHLORIDE 0.9 % (FLUSH) 0.9 %
5-40 SYRINGE (ML) INJECTION EVERY 12 HOURS SCHEDULED
Status: DISCONTINUED | OUTPATIENT
Start: 2024-04-12 | End: 2024-04-14 | Stop reason: HOSPADM

## 2024-04-12 RX ORDER — DOCUSATE SODIUM 100 MG/1
100 CAPSULE, LIQUID FILLED ORAL 2 TIMES DAILY
Status: DISCONTINUED | OUTPATIENT
Start: 2024-04-12 | End: 2024-04-14 | Stop reason: HOSPADM

## 2024-04-12 RX ORDER — ACETAMINOPHEN 650 MG/1
650 SUPPOSITORY RECTAL EVERY 6 HOURS PRN
Status: DISCONTINUED | OUTPATIENT
Start: 2024-04-12 | End: 2024-04-14 | Stop reason: HOSPADM

## 2024-04-12 RX ORDER — ASPIRIN 81 MG/1
81 TABLET ORAL DAILY
Status: DISCONTINUED | OUTPATIENT
Start: 2024-04-12 | End: 2024-04-14 | Stop reason: HOSPADM

## 2024-04-12 RX ADMIN — DOCUSATE SODIUM 100 MG: 100 CAPSULE, LIQUID FILLED ORAL at 22:28

## 2024-04-12 RX ADMIN — CETIRIZINE HYDROCHLORIDE 5 MG: 10 TABLET ORAL at 17:31

## 2024-04-12 RX ADMIN — DIVALPROEX SODIUM 250 MG: 250 TABLET, DELAYED RELEASE ORAL at 22:28

## 2024-04-12 RX ADMIN — AMLODIPINE BESYLATE 10 MG: 5 TABLET ORAL at 17:31

## 2024-04-12 RX ADMIN — SODIUM CHLORIDE 1947 ML: 9 INJECTION, SOLUTION INTRAVENOUS at 12:06

## 2024-04-12 RX ADMIN — MEROPENEM 1000 MG: 1 INJECTION, POWDER, FOR SOLUTION INTRAVENOUS at 12:48

## 2024-04-12 RX ADMIN — CEFTRIAXONE SODIUM 1000 MG: 1 INJECTION, POWDER, FOR SOLUTION INTRAMUSCULAR; INTRAVENOUS at 21:26

## 2024-04-12 RX ADMIN — ASPIRIN 81 MG: 81 TABLET, COATED ORAL at 17:32

## 2024-04-12 RX ADMIN — HEPARIN SODIUM 5000 UNITS: 5000 INJECTION INTRAVENOUS; SUBCUTANEOUS at 22:28

## 2024-04-12 RX ADMIN — PANTOPRAZOLE SODIUM 40 MG: 40 TABLET, DELAYED RELEASE ORAL at 17:32

## 2024-04-12 RX ADMIN — BUSPIRONE HYDROCHLORIDE 10 MG: 10 TABLET ORAL at 22:28

## 2024-04-12 RX ADMIN — HYDROXYZINE HYDROCHLORIDE 25 MG: 25 TABLET ORAL at 22:28

## 2024-04-12 RX ADMIN — SODIUM CHLORIDE: 9 INJECTION, SOLUTION INTRAVENOUS at 17:28

## 2024-04-12 ASSESSMENT — PAIN DESCRIPTION - DESCRIPTORS: DESCRIPTORS: ACHING

## 2024-04-12 ASSESSMENT — PAIN SCALES - GENERAL
PAINLEVEL_OUTOF10: 0
PAINLEVEL_OUTOF10: 2

## 2024-04-12 ASSESSMENT — PAIN - FUNCTIONAL ASSESSMENT: PAIN_FUNCTIONAL_ASSESSMENT: 0-10

## 2024-04-12 ASSESSMENT — PAIN DESCRIPTION - ORIENTATION: ORIENTATION: LOWER

## 2024-04-12 NOTE — ED NOTES
Pt arrived via squad, wearing PJs, pt did not have any dentures in at this time.  Daughter did come in and ask about pts dentures.  She was told that pt arrived w/o dentures.  Daughter stated, \" she had them in her mouth, last time she came here you guys lost her dentures too.\"  This writer ask other staff that were in the room when patient arrived and all agreed that pt did not have dentures when she arrived.

## 2024-04-12 NOTE — CARE COORDINATION
Case Management Assessment  Initial Evaluation    Date/Time of Evaluation: 4/12/2024 2:43 PM  Assessment Completed by: ANNA Plata    If patient is discharged prior to next notation, then this note serves as note for discharge by case management.    Patient Name: Nithya Neff                   YOB: 1940  Diagnosis: UTI (urinary tract infection) [N39.0]                   Date / Time: 4/12/2024  9:11 AM    Patient Admission Status: Inpatient   Readmission Risk (Low < 19, Mod (19-27), High > 27): Readmission Risk Score: 25.4    Current PCP: Marisol Wheatley  PCP verified by CM? (P) Yes    Chart Reviewed: Yes      History Provided by: (P) Patient  Patient Orientation: (P) Alert and Oriented, Person, Place, Situation    Patient Cognition: (P) Alert    Hospitalization in the last 30 days (Readmission):  No    If yes, Readmission Assessment in CM Navigator will be completed.    Advance Directives:      Code Status: Prior   Patient's Primary Decision Maker is: (P) Legal Next of Kin    Primary Decision Maker: Sonya Neri  Child - [phone]    Discharge Planning:    Patient lives with: (P) Alone Type of Home: (P) Apartment  Primary Care Giver: (P) Self  Patient Support Systems include: (P) Children   Current Financial resources: (P) Medicare  Current community resources: (P) Other (Comment) (adult day services)  Current services prior to admission: (P) Durable Medical Equipment, Other (Comment) (adult )            Current DME: (P) Walker, Cpap            Type of Home Care services:  (P) None    ADLS  Prior functional level: (P) Assistance with the following:, Mobility, Shopping, Housework, Cooking  Current functional level: (P) Cooking, Housework, Shopping, Mobility, Assistance with the following:    PT AM-PAC:   /24  OT AM-PAC:   /24    Family can provide assistance at DC: (P) Yes  Would you like Case Management to discuss the discharge plan with any other family members/significant others,

## 2024-04-12 NOTE — H&P
epidemiological information.  Testing was performed using JONNATHAN MIRACLE SARS-CoV-2 and Influenza A/B  nucleic acid assay. This test is a multiplex Real-Time Reverse  Transcriptase Polymerase Chain Reaction (RT-PCR)-based in vitro  diagnostic test intended for the qualitative detection of nucleic  acids from SARS-CoV-2, influenza A, and influenza B in nasopharyngeal  and nasal swab specimens for use under the FDA’s Emergency Use  Authorization (EUA) only.    Patient Fact Sheet:  https://www.fda.gov/media/537203/download  Provider Fact Sheet: https://www.fda.gov/media/646708/download  EUA: https://www.fda.gov/media/064272/download  IFU: https://www.fda.gov/media/405370/download    Methodology:  RT-PCR          INFLUENZA A NOT DETECTED     INFLUENZA B NOT DETECTED             EKG:     Encounter Date: 04/12/24   EKG 12 Lead   Result Value    Ventricular Rate 84    Atrial Rate 84    P-R Interval 162    QRS Duration 104    Q-T Interval 414    QTc Calculation (Bazett) 489    P Axis 33    R Axis -34    T Axis 31    Diagnosis      Normal sinus rhythmLeft axis deviationInferior infarct (cited on or before 24-NOV-2021)Anterior infarct (cited on or before 24-NOV-2021)Abnormal ECGWhen compared with ECG of 22-DEC-2023 02:29,No significant change was foundConfirmed by AMAURI LARSON, [facility] ([id number]) on 4/12/2024 10:02:35 AM          RADIOLOGY    CT HEAD WO CONTRAST   Final Result   No acute intracranial abnormality.      No change compared to the prior study.         XR CHEST PORTABLE   Final Result   No acute cardiopulmonary disease. .              Principal Problem:    UTI (urinary tract infection)  Active Problems:    Benign essential HTN    MARIA C (obstructive sleep apnea)    Alzheimer's dementia without behavioral disturbance (HCC)    CKD (chronic kidney disease) stage 4, GFR 15-29 ml/min (HCC)    History of transcatheter aortic valve replacement (TAVR)    Chronic diastolic congestive heart failure (HCC)  Resolved Problems:    * No

## 2024-04-12 NOTE — ED PROVIDER NOTES
[facility] ED      CHIEF COMPLAINT  Fatigue (Not taking pills correctly.  Hx of anxiety and tremors.  Pt weak, daughter lowered pt to the ground this AM. )       HISTORY OF PRESENT ILLNESS  Nithya Neff is a 83 y.o. female  who presents to the ED complaining of concern for generalized weakness and fatigue.  She states that symptoms started this morning to the point where she had trouble getting out of bed and scooted to the ground.  She does not fall or hit her head or have any loss of consciousness.  She denies any lightheadedness or dizziness.  She is feels generally weak everywhere without any focal numbness tingling or weakness.  She denies any difficulty speaking or any visual changes.  No chest pain or shortness of breath.  She states that she has not had any known fevers but did have similar symptoms when she had a UTI previously.  She does have a medicine pack prepackaged from her pharmacy with daily punch outs of her medications with multiple days where there is still some of the medications remaining within the punch pack but some of them that patient states that she has taken.  It appears the patient is not fully compliant with her medication regimen.    No other complaints, modifying factors or associated symptoms.     I have reviewed the following from the nursing documentation.    Past Medical History:   Diagnosis Date    MARIO (acute kidney injury) ([facility]) 11/24/2021    Altered mental status 11/30/2021    Arthritis     BCC (basal cell carcinoma of skin)     RT clavicle    Bladder infection     frequently    Blurred vision 9/17/2022    CAD (coronary artery disease)     stents    Cancer (HCC)     skin    CHF (congestive heart failure) ([facility])     Chronic back pain     Closed head injury 12/1/2021    COPD (chronic obstructive pulmonary disease) ([facility])     Depression     Depression     Hypercholesteremia     Hypertension     Hypokalemia 1/16/2012    Pain in shoulder [id number]    pain in left

## 2024-04-13 LAB
ANION GAP SERPL CALCULATED.3IONS-SCNC: 8 MMOL/L (ref 3–16)
BASOPHILS # BLD: 0.1 K/UL (ref 0–0.2)
BASOPHILS NFR BLD: 1 %
BUN SERPL-MCNC: 29 MG/DL (ref 7–20)
CALCIUM SERPL-MCNC: 8.8 MG/DL (ref 8.3–10.6)
CHLORIDE SERPL-SCNC: 106 MMOL/L (ref 99–110)
CO2 SERPL-SCNC: 26 MMOL/L (ref 21–32)
CREAT SERPL-MCNC: 2.1 MG/DL (ref 0.6–1.2)
DEPRECATED RDW RBC AUTO: 15.5 % (ref 12.4–15.4)
EOSINOPHIL # BLD: 0.3 K/UL (ref 0–0.6)
EOSINOPHIL NFR BLD: 3.8 %
GFR SERPLBLD CREATININE-BSD FMLA CKD-EPI: 23 ML/MIN/{1.73_M2}
GLUCOSE SERPL-MCNC: 116 MG/DL (ref 70–99)
HCT VFR BLD AUTO: 30.1 % (ref 36–48)
HGB BLD-MCNC: 10 G/DL (ref 12–16)
LYMPHOCYTES # BLD: 1.9 K/UL (ref 1–5.1)
LYMPHOCYTES NFR BLD: 28 %
MCH RBC QN AUTO: 27.8 PG (ref 26–34)
MCHC RBC AUTO-ENTMCNC: 33.3 G/DL (ref 31–36)
MCV RBC AUTO: 83.4 FL (ref 80–100)
MONOCYTES # BLD: 0.6 K/UL (ref 0–1.3)
MONOCYTES NFR BLD: 8.2 %
NEUTROPHILS # BLD: 4.1 K/UL (ref 1.7–7.7)
NEUTROPHILS NFR BLD: 59 %
PLATELET # BLD AUTO: 139 K/UL (ref 135–450)
PMV BLD AUTO: 8.1 FL (ref 5–10.5)
POTASSIUM SERPL-SCNC: 3.8 MMOL/L (ref 3.5–5.1)
RBC # BLD AUTO: 3.61 M/UL (ref 4–5.2)
SODIUM SERPL-SCNC: 140 MMOL/L (ref 136–145)
WBC # BLD AUTO: 6.9 K/UL (ref 4–11)

## 2024-04-13 PROCEDURE — 2580000003 HC RX 258

## 2024-04-13 PROCEDURE — 1200000000 HC SEMI PRIVATE

## 2024-04-13 PROCEDURE — 36415 COLL VENOUS BLD VENIPUNCTURE: CPT

## 2024-04-13 PROCEDURE — 2580000003 HC RX 258: Performed by: NURSE PRACTITIONER

## 2024-04-13 PROCEDURE — 6370000000 HC RX 637 (ALT 250 FOR IP): Performed by: NURSE PRACTITIONER

## 2024-04-13 PROCEDURE — 85025 COMPLETE CBC W/AUTO DIFF WBC: CPT

## 2024-04-13 PROCEDURE — 99233 SBSQ HOSP IP/OBS HIGH 50: CPT | Performed by: INTERNAL MEDICINE

## 2024-04-13 PROCEDURE — 6370000000 HC RX 637 (ALT 250 FOR IP): Performed by: INTERNAL MEDICINE

## 2024-04-13 PROCEDURE — 6360000002 HC RX W HCPCS

## 2024-04-13 PROCEDURE — 80048 BASIC METABOLIC PNL TOTAL CA: CPT

## 2024-04-13 PROCEDURE — 6370000000 HC RX 637 (ALT 250 FOR IP)

## 2024-04-13 PROCEDURE — 97110 THERAPEUTIC EXERCISES: CPT

## 2024-04-13 PROCEDURE — 97161 PT EVAL LOW COMPLEX 20 MIN: CPT

## 2024-04-13 PROCEDURE — 97530 THERAPEUTIC ACTIVITIES: CPT

## 2024-04-13 PROCEDURE — 97165 OT EVAL LOW COMPLEX 30 MIN: CPT

## 2024-04-13 RX ORDER — LANOLIN ALCOHOL/MO/W.PET/CERES
9 CREAM (GRAM) TOPICAL NIGHTLY PRN
Status: DISCONTINUED | OUTPATIENT
Start: 2024-04-13 | End: 2024-04-14 | Stop reason: HOSPADM

## 2024-04-13 RX ADMIN — CEFTRIAXONE SODIUM 1000 MG: 1 INJECTION, POWDER, FOR SOLUTION INTRAMUSCULAR; INTRAVENOUS at 16:35

## 2024-04-13 RX ADMIN — DIVALPROEX SODIUM 250 MG: 250 TABLET, DELAYED RELEASE ORAL at 10:28

## 2024-04-13 RX ADMIN — ASPIRIN 81 MG: 81 TABLET, COATED ORAL at 10:28

## 2024-04-13 RX ADMIN — Medication 10 ML: at 20:48

## 2024-04-13 RX ADMIN — HEPARIN SODIUM 5000 UNITS: 5000 INJECTION INTRAVENOUS; SUBCUTANEOUS at 14:39

## 2024-04-13 RX ADMIN — Medication 9 MG: at 20:47

## 2024-04-13 RX ADMIN — Medication 9 MG: at 00:56

## 2024-04-13 RX ADMIN — HEPARIN SODIUM 5000 UNITS: 5000 INJECTION INTRAVENOUS; SUBCUTANEOUS at 05:51

## 2024-04-13 RX ADMIN — AMLODIPINE BESYLATE 10 MG: 5 TABLET ORAL at 10:29

## 2024-04-13 RX ADMIN — DIVALPROEX SODIUM 250 MG: 250 TABLET, DELAYED RELEASE ORAL at 20:48

## 2024-04-13 RX ADMIN — SODIUM CHLORIDE: 9 INJECTION, SOLUTION INTRAVENOUS at 10:33

## 2024-04-13 RX ADMIN — HEPARIN SODIUM 5000 UNITS: 5000 INJECTION INTRAVENOUS; SUBCUTANEOUS at 20:47

## 2024-04-13 RX ADMIN — CETIRIZINE HYDROCHLORIDE 5 MG: 10 TABLET ORAL at 10:28

## 2024-04-13 RX ADMIN — ACETAMINOPHEN 650 MG: 325 TABLET ORAL at 20:47

## 2024-04-13 RX ADMIN — DOCUSATE SODIUM 100 MG: 100 CAPSULE, LIQUID FILLED ORAL at 20:47

## 2024-04-13 RX ADMIN — DOCUSATE SODIUM 100 MG: 100 CAPSULE, LIQUID FILLED ORAL at 10:29

## 2024-04-13 RX ADMIN — PANTOPRAZOLE SODIUM 40 MG: 40 TABLET, DELAYED RELEASE ORAL at 10:28

## 2024-04-13 ASSESSMENT — PAIN SCALES - GENERAL
PAINLEVEL_OUTOF10: 0
PAINLEVEL_OUTOF10: 2

## 2024-04-13 ASSESSMENT — PAIN DESCRIPTION - LOCATION: LOCATION: HEAD

## 2024-04-13 ASSESSMENT — PAIN DESCRIPTION - DESCRIPTORS: DESCRIPTORS: ACHING

## 2024-04-13 NOTE — PLAN OF CARE
Problem: Discharge Planning  Goal: Discharge to home or other facility with appropriate resources  4/13/2024 1226 by Lisbet Bridges, RN  Outcome: Progressing     Problem: Safety - Adult  Goal: Free from fall injury  4/13/2024 1226 by Lisbet Bridges, RN  Outcome: Progressing     Problem: Chronic Conditions and Co-morbidities  Goal: Patient's chronic conditions and co-morbidity symptoms are monitored and maintained or improved  Outcome: Progressing

## 2024-04-13 NOTE — DISCHARGE INSTRUCTIONS
Your information:  Name: Nithya Neff  : 1940    Your instructions:    Follow up with family doctor in 1 week.    What to do after you leave the hospital:    Recommended diet: regular diet    Recommended activity: activity as tolerated        The following personal items were collected during your admission and were returned to you:    Belongings  Dental Appliances: Uppers, Lowers  Vision - Corrective Lenses: None  Hearing Aid: None  Clothing: Sent home  Jewelry: Necklace  Electronic Devices: None  Weapons (Notify Protective Services/Security): None  Home Medications: None  Valuables Given To: Patient  Provide Name(s) of Who Valuable(s) Were Given To: pt    Information obtained by:  By signing below, I understand that if any problems occur once I leave the hospital I am to contact family doctor.  I understand and acknowledge receipt of the instructions indicated above.   Heart Failure Resources:  Heart Failure Interactive Workbook:  Go to https://[email or web address]/?x=180728 for a Free Heart Failure Interactive Workbook provided by The American [employer] Association. This interactive workbook will provide information on Healthier Living with Heart Failure. Please copy and paste link into search bar. Use your mouse to scroll through the pages.    HF [place] marika:   Heart Failure Free smart phone marika available for iPhone and Android download. Use your phone to track sodium intake, fluid intake, symptoms, and weight.     Low Sodium Diet / Recipes:  Go to www.[employer].org website for “renal” diet which is Low Sodium! [employer] is a dialysis company, but this website offers free seasonal cookbooks. Each quarter, they will release 25-30 new recipes with a breakdown of calories, sodium, and glucose. You can also go to [email or web address] website for free recipes.     Discharge Instruction Video:  Scan the QR code below with your camera and click the [employer]com link to open the video and watch

## 2024-04-14 VITALS
HEIGHT: 60 IN | HEART RATE: 66 BPM | TEMPERATURE: 98.7 F | OXYGEN SATURATION: 95 % | SYSTOLIC BLOOD PRESSURE: 158 MMHG | DIASTOLIC BLOOD PRESSURE: 68 MMHG | WEIGHT: 150 LBS | RESPIRATION RATE: 16 BRPM | BODY MASS INDEX: 29.45 KG/M2

## 2024-04-14 LAB
ANION GAP SERPL CALCULATED.3IONS-SCNC: 7 MMOL/L (ref 3–16)
BACTERIA UR CULT: ABNORMAL
BASOPHILS # BLD: 0.1 K/UL (ref 0–0.2)
BASOPHILS NFR BLD: 1.3 %
BUN SERPL-MCNC: 19 MG/DL (ref 7–20)
CALCIUM SERPL-MCNC: 8.8 MG/DL (ref 8.3–10.6)
CHLORIDE SERPL-SCNC: 109 MMOL/L (ref 99–110)
CO2 SERPL-SCNC: 24 MMOL/L (ref 21–32)
CREAT SERPL-MCNC: 1.5 MG/DL (ref 0.6–1.2)
DEPRECATED RDW RBC AUTO: 15.6 % (ref 12.4–15.4)
EOSINOPHIL # BLD: 0.5 K/UL (ref 0–0.6)
EOSINOPHIL NFR BLD: 7.7 %
GFR SERPLBLD CREATININE-BSD FMLA CKD-EPI: 34 ML/MIN/{1.73_M2}
GLUCOSE SERPL-MCNC: 89 MG/DL (ref 70–99)
HCT VFR BLD AUTO: 30.9 % (ref 36–48)
HGB BLD-MCNC: 10.1 G/DL (ref 12–16)
LYMPHOCYTES # BLD: 2.1 K/UL (ref 1–5.1)
LYMPHOCYTES NFR BLD: 34.1 %
MCH RBC QN AUTO: 28 PG (ref 26–34)
MCHC RBC AUTO-ENTMCNC: 32.9 G/DL (ref 31–36)
MCV RBC AUTO: 85 FL (ref 80–100)
MONOCYTES # BLD: 0.4 K/UL (ref 0–1.3)
MONOCYTES NFR BLD: 7.3 %
NEUTROPHILS # BLD: 3 K/UL (ref 1.7–7.7)
NEUTROPHILS NFR BLD: 49.6 %
ORGANISM: ABNORMAL
PLATELET # BLD AUTO: 119 K/UL (ref 135–450)
PMV BLD AUTO: 8.7 FL (ref 5–10.5)
POTASSIUM SERPL-SCNC: 4.1 MMOL/L (ref 3.5–5.1)
RBC # BLD AUTO: 3.63 M/UL (ref 4–5.2)
SODIUM SERPL-SCNC: 140 MMOL/L (ref 136–145)
WBC # BLD AUTO: 6.1 K/UL (ref 4–11)

## 2024-04-14 PROCEDURE — 6370000000 HC RX 637 (ALT 250 FOR IP): Performed by: NURSE PRACTITIONER

## 2024-04-14 PROCEDURE — 2580000003 HC RX 258: Performed by: NURSE PRACTITIONER

## 2024-04-14 PROCEDURE — 36415 COLL VENOUS BLD VENIPUNCTURE: CPT

## 2024-04-14 PROCEDURE — 85025 COMPLETE CBC W/AUTO DIFF WBC: CPT

## 2024-04-14 PROCEDURE — 99239 HOSP IP/OBS DSCHRG MGMT >30: CPT | Performed by: INTERNAL MEDICINE

## 2024-04-14 PROCEDURE — 6360000002 HC RX W HCPCS

## 2024-04-14 PROCEDURE — 80048 BASIC METABOLIC PNL TOTAL CA: CPT

## 2024-04-14 PROCEDURE — 6370000000 HC RX 637 (ALT 250 FOR IP)

## 2024-04-14 RX ORDER — CEFDINIR 300 MG/1
300 CAPSULE ORAL 2 TIMES DAILY
Qty: 14 CAPSULE | Refills: 0 | Status: SHIPPED | OUTPATIENT
Start: 2024-04-14 | End: 2024-04-21

## 2024-04-14 RX ADMIN — AMLODIPINE BESYLATE 10 MG: 5 TABLET ORAL at 08:27

## 2024-04-14 RX ADMIN — DIVALPROEX SODIUM 250 MG: 250 TABLET, DELAYED RELEASE ORAL at 08:27

## 2024-04-14 RX ADMIN — CETIRIZINE HYDROCHLORIDE 5 MG: 10 TABLET ORAL at 08:27

## 2024-04-14 RX ADMIN — DOCUSATE SODIUM 100 MG: 100 CAPSULE, LIQUID FILLED ORAL at 08:27

## 2024-04-14 RX ADMIN — ASPIRIN 81 MG: 81 TABLET, COATED ORAL at 08:27

## 2024-04-14 RX ADMIN — SODIUM CHLORIDE: 9 INJECTION, SOLUTION INTRAVENOUS at 01:09

## 2024-04-14 RX ADMIN — HEPARIN SODIUM 5000 UNITS: 5000 INJECTION INTRAVENOUS; SUBCUTANEOUS at 06:09

## 2024-04-14 RX ADMIN — PANTOPRAZOLE SODIUM 40 MG: 40 TABLET, DELAYED RELEASE ORAL at 08:27

## 2024-04-14 NOTE — PLAN OF CARE
Problem: Discharge Planning  Goal: Discharge to home or other facility with appropriate resources  4/13/2024 1226 by Lisbet Bridges RN  Outcome: Progressing     Problem: Safety - Adult  Goal: Free from fall injury  4/14/2024 0104 by Fide Mosley RN  Outcome: Progressing  4/13/2024 1226 by Lisbet Bridges RN  Outcome: Progressing     Problem: Chronic Conditions and Co-morbidities  Goal: Patient's chronic conditions and co-morbidity symptoms are monitored and maintained or improved  4/14/2024 0104 by Fide Mosley RN  Outcome: Progressing  4/13/2024 1226 by Lisbet Bridges RN  Outcome: Progressing     Problem: Pain  Goal: Verbalizes/displays adequate comfort level or baseline comfort level  Outcome: Progressing

## 2024-04-14 NOTE — DISCHARGE INSTR - COC
Date(s) Administered    COVID-19, PFIZER PURPLE top, DILUTE for use, (age 12 y+), 30mcg/0.3mL 01/23/2021, 02/13/2021, 01/12/2022    Influenza Virus Vaccine 04/23/2019, 01/30/2020    Pneumococcal, PPSV23, PNEUMOVAX 23, (age 2y+), SC/IM, 0.5mL 05/28/2019, 08/27/2019, 01/30/2020    TDaP, ADACEL (age 10y-64y), BOOSTRIX (age 10y+), IM, 0.5mL 11/24/2021       Active Problems:  Patient Active Problem List   Diagnosis Code    Benign essential HTN I10    Hyperlipidemia E78.5    Atherosclerotic heart disease of native coronary artery with other forms of angina pectoris ([facility]) I25.118    Anxiety F41.9    Primary insomnia F51.01    Epigastric pain R10.13    Chronic tension-type headache, intractable G44.221    MARIA C (obstructive sleep apnea) G47.33    Tremor, essential G25.0    TIA (transient ischemic attack) G45.9    Alzheimer's dementia without behavioral disturbance ([facility]) G30.9, F02.80    Sinus bradycardia R00.1    Acute on chronic congestive heart failure ([facility]) I50.9    CKD (chronic kidney disease) stage 4, GFR 15-29 ml/min ([facility]) N18.4    History of transcatheter aortic valve replacement (TAVR) Z95.2    MARIO (acute kidney injury) ([facility]) N17.9    Altered mental status R41.82    Urinary tract infection without hematuria N39.0    Aortic stenosis, severe I35.0    Bilateral sacroiliitis ([facility]) M46.1    Cervical radicular pain M54.12    Chronic diastolic congestive heart failure ([facility]) I50.32    COPD (chronic obstructive pulmonary disease) ([facility]) J44.9    DDD (degenerative disc disease), cervical M50.30    Failed spinal cord stimulator, subsequent encounter T85.192D    GERD (gastroesophageal reflux disease) K21.9    Malignant neoplasm of skin of parts of face C44.309    Acute metabolic encephalopathy G93.41    UTI (urinary tract infection) N39.0    Sepsis due to urinary tract infection ([facility]) A41.9, N39.0    Elevated troponin R79.89    Generalized weakness R53.1       Isolation/Infection:   Isolation            No Isolation

## 2024-04-14 NOTE — CARE COORDINATION
DISCHARGE ORDER  Date/Time 2024 12:06 PM  Completed by: Naomie Cruz RN, Case Management    Patient Name: Nithya Neff      : 1940  Admitting Diagnosis: UTI (urinary tract infection) [N39.0]  Generalized weakness [R53.1]  Elevated troponin [R79.89]  MARIO (acute kidney injury) (HCC) [N17.9]  Sepsis due to urinary tract infection (HCC) [A41.9, N39.0]      Admit order Date and Status:inpt  (verify MD's last order for status of admission)      Noted discharge order.   If applicable PT/OT recommendation at Discharge: 24 hr assistance and home PT  DME recommendation by PT/OT:needs met  Confirmed discharge plan  (pt):       Discharge Plan: Reviewed chart. Met with the pt. Pt declines home care at this time. Plan home with daughter to provide 24/7 assistance. No other dc needs voiced or identified.    Date of Last IMM Given: 2024    Reviewed chart.  Role of discharge planner explained and patient verbalized understanding. Discharge order is noted.    Has Home O2 in place on admit:  No  Informed of need to bring portable home O2 tank on day of discharge for nursing to connect prior to leaving:   Not Indicated  Verbalized agreement/Understanding:   Not Indicated  Pt is being d/c'd to home today. Pt's O2 sats are 95% on RA.    Discharge timeout done with MARINO Frausto. All discharge needs and concerns addressed.

## 2024-04-14 NOTE — PROGRESS NOTES
Hospitalist Progress Note      PCP: Marisol Wheatley    Date of Admission: 4/12/2024    Subjective: renal fn improved, feels better today, c/o redness below left eye    Medications:  Reviewed    Infusion Medications    sodium chloride      sodium chloride 75 mL/hr at 04/13/24 1033     Scheduled Medications    amLODIPine  10 mg Oral Daily    aspirin  81 mg Oral Daily    cetirizine  5 mg Oral Daily    divalproex  250 mg Oral BID    pantoprazole  40 mg Oral Daily    sodium chloride flush  5-40 mL IntraVENous 2 times per day    heparin (porcine)  5,000 Units SubCUTAneous 3 times per day    cefTRIAXone (ROCEPHIN) IV  1,000 mg IntraVENous Q24H    docusate sodium  100 mg Oral BID    [Held by provider] potassium bicarb-citric acid  20 mEq Oral Daily     PRN Meds: melatonin, sodium chloride flush, sodium chloride, ondansetron **OR** ondansetron, polyethylene glycol, acetaminophen **OR** acetaminophen, busPIRone, hydrOXYzine HCl      Intake/Output Summary (Last 24 hours) at 4/14/2024 0100  Last data filed at 4/13/2024 1816  Gross per 24 hour   Intake 240 ml   Output 900 ml   Net -660 ml       Physical Exam Performed:    BP (!) 132/52   Pulse 66   Temp 97.9 °F (36.6 °C) (Oral)   Resp 16   Ht 1.524 m (5')   Wt 64 kg (141 lb 1.6 oz)   LMP  (LMP Unknown)   SpO2 95%   BMI 27.56 kg/m²        Gen: No distress. Alert. Appears stated age  Eyes: PERRL. No sclera icterus. No conjunctival injection.   ENT: No discharge. Pharynx clear.   Neck: No JVD.  Trachea midline.  Resp: No accessory muscle use. No crackles. No wheezes. No rhonchi.   CV: Regular rate. Regular rhythm. No murmur.  No rub. Trace BLE edema.   GI: Non-tender. Non-distended. No masses. No organomegaly. Normal bowel sounds. No hernia.   Skin: Warm and dry. No nodule on exposed extremities. No rash on exposed extremities.   M/S: No cyanosis. No joint deformity. No clubbing.   Neuro: Awake. Grossly nonfocal    Psych: Oriented x 2. No anxiety or agitation.    
    Pharmacy Note - Renal dose adjustment made per P/T protocol    Original order:  Zyrtec 10 mg daily    Estimated Creatinine Clearance: 13 mL/min (A) (based on SCr of 2.7 mg/dL (H)).    Recent Labs     04/12/24  0923   BUN 38*   CREATININE 2.7*       Renally adjusted order:  Zyrtec 5 mg daily    Please call pharmacy with any questions.    Thank you,  Adelaida Yi [facility]  4/12/2024 4:42 PM    
   04/13/24 0942   Encounter Summary   Encounter Overview/Reason  Initial Encounter   Service Provided For: Patient   Referral/Consult From: Rounding   Last Encounter  04/13/24  ( made introductory visit with patient)   Complexity of Encounter Low       
   04/13/24 0942   Encounter Summary   Encounter Overview/Reason  Initial Encounter   Service Provided For: Patient   Referral/Consult From: Rounding   Last Encounter  04/13/24  ( made introductory visit with patient)   Complexity of Encounter Low       
4 Eyes Skin Assessment     NAME:  Nithya Neff  YOB: 1940  MEDICAL RECORD NUMBER:  [id number]    The patient is being assessed for  Admission    I agree that at least one RN has performed a thorough Head to Toe Skin Assessment on the patient. ALL assessment sites listed below have been assessed.      Areas assessed by both nurses:    Head, Face, Ears, Shoulders, Back, Chest, Arms, Elbows, Hands, Sacrum. Buttock, Coccyx, Ischium, Legs. Feet and Heels, and Under Medical Devices         Does the Patient have a Wound? Yes wound(s) were present on assessment. LDA wound assessment was Initiated and completed by RN redness / stage 1 sacrum       Mihir Prevention initiated by RN: Yes  Wound Care Orders initiated by RN: Yes    Pressure Injury (Stage 3,4, Unstageable, DTI, NWPT, and Complex wounds) if present, place Wound referral order by RN under : No    New Ostomies, if present place, Ostomy referral order under : No     Nurse 1 eSignature: Electronically signed by Ok Alvarez RN on 4/12/24 at 4:50 PM EDT    **SHARE this note so that the co-signing nurse can place an eSignature**    Nurse 2 eSignature: Electronically signed by Radha Rutledge RN (Mandy) on 4/12/24 at 5:38 PM EDT   
Awake,hoping to go home today.  
Bedside Mobility Assessment Tool (BMAT):     Assessment Level 1- Sit and Shake    1. From a semi-reclined position, ask patient to sit up and rotate to a seated position at the side of the bed. Can use the bedrail.    2. Ask patient to reach out and grab your hand and shake making sure patient reaches across his/her midline.   Pass- Patient is able to come to a seated position, maintain core strength. Maintains seated balance while reaching across midline. Move on to Assessment Level 2.     Assessment Level 2- Stretch and Point   1. With patient in seated position at the side of the bed, have patient place both feet on the floor (or stool) with knees no higher than hips.    2. Ask patient to stretch one leg and straighten the knee, then bend the ankle/flex and point the toes. If appropriate, repeat with the other leg.   Pass- Patient is able to demonstrate appropriate quad strength on intended weight bearing limb(s). Move onto Assessment Level 3.     Assessment Level 3- Stand   1. Ask patient to elevate off the bed or chair (seated to standing) using an assistive device (cane, bedrail).    2. Patient should be able to raise buttocks off be and hold for a count of five. May repeat once.   Pass- Patient maintains standing stability for at least 5 seconds, proceed to assessment level 4.    Assessment Level 4- Walk   1. Ask patient to march in place at bedside.    2. Then ask patient to advance step and return each foot. Some medical conditions may render a patient from stepping backwards, use your best clinical judgement.   Pass- Patient demonstrates balance while shifting weight and ability to step, takes independent steps, does not use assistive device patient is MOBILITY LEVEL 4.      Mobility Level- 4    
Bedside report given to Keon PICHARDO. Pt denies needs at this time. No s/s of distress. Respirations easy and unlabored. Pt stable. Bed in low position call light within reach. No further needs at this time.    
Bladder scan completed 0 ml noted  
Discharge instructions given to pt verbalize understanding.  Pt discharged home stable condition.  
Inpatient Physical Therapy Evaluation & Treatment    Unit: [facility]  Date:  4/13/2024  Patient Name:    Nithya Neff  Admitting diagnosis:  UTI (urinary tract infection) [N39.0]  Generalized weakness [R53.1]  Elevated troponin [R79.89]  MARIO (acute kidney injury) (HCC) [N17.9]  Sepsis due to urinary tract infection (HCC) [A41.9, N39.0]  Admit Date:  4/12/2024  Precautions/Restrictions/WB Status/ Lines/ Wounds/ Oxygen: Fall risk, Bed/chair alarm, and Lines (IV, external catheter, and implanted pain pump)      Pt seen for cotreatment this date due to patient safety, patient endurance, and limited functional status information    Treatment Time:  0926 - 1013  Treatment Number:  1   Timed Code Treatment Minutes: 39 minutes  Total Treatment Minutes:  48  minutes    Patient Stated Goals for Therapy: \" to feel better \"          Discharge Recommendations: Home with 24hr assistance and Home PT  DME needs for discharge: Needs Met       Therapy recommendation for EMS Transport: can transport by wheelchair    Therapy recommendations for staff:   Assist of 1 for ambulation with use of rolling walker (RW) and gait belt to/from chair  to/from bathroom  within room    History of Present Illness:   Per H&P  \"The patient is a 83 y.o. female with PMH of CAD, s/p PCI to RCA, severe aortic stenosis s/p TAVR 9/4/2018, CHF, COPD, HTN, HLD, CKD, CVA, thyroid disease, anxiety and back pain who presents to [facility] with fatigue and chills.  Pt is a poor historian.  Daughter helps take care of her at home.  She stated her mom took am medication this am and then showered.  After showering she became weak and daughter helped lower her to the ground.  Pt stated that she felt chills this am and became weak.  She does verbalize some burring when she urinates.   Pt feels that there is a pill inside her pre packed medication that she is not supposed to be taking per daughter.  Unsure what pill it is.  Pt is confused on what she is supposed to 
No IV access three RN's attempted. Clinical called to place ultrasound IV. Antibiotic will be started once IV access is established  
PM assessment completed. See flow sheet. Pt very anxious, PRN buspar and atarax given. Neck support provided, adjusted pt in bed. No other needs or concerns voiced at this time. Call light within reach. Bed locked in lowest position. Bed alarm on.   
PT/OT here to see pt.  
Shift assessment complete. Alert and oriented. Patient denies any needs at this time. Bed in lowest position. Side rails up x2. Call light in reach.   
and all needs within reach .     Ther Ex / Activities Initiated:   Wrist flex/ext:  x15  Elbow flex/ext x15  Shoulder flex/ext:  x15  Scapular retraction:  x15  Scapular protraction:  x15  Shoulder shrugs:  x15  Sit to stands: x5    Patient/Family Education:   Pt educated on role of inpatient OT, plan of care, importance of continued activity, DC recommendations and Calling for assist with mobility.    CHF Education  N/A    Assessment:  Pt seen for Occupational therapy evaluation in acute care setting.  Pt demonstrated decreased Activity tolerance, ADLs, Balance , Bed mobility, Safety Awareness, Strength, and Transfers. Pt functioning below baseline and will likely benefit from skilled occupational therapy services to maximize safety and independence.     Patient seen for skilled acute care occupational therapy evaluation. Patient required contact guard assistance with bed mobility, contact guard assistance with functional transfer performance, and contact guard assistance with functional mobility during the session. Patient ambulated approximately 30 feet with no LOB noted, however did complain of feeling shaky. Patient is currently functioning below baseline as she was independent with bed mobility, functional transfer performance, functional mobility, ADL performance, and she currently requires some assistance with those tasks. Patient would benefit from skilled HHOT services upon discharge in order to return to OF. Patient would benefit from continued skilled acute care occupational therapy services during her length of stay to increase independence with ADL performance and functional mobility in order to maximize her functional potential in the acute care setting.      Recommending Home 24 hr assist and with home OT upon discharge as patient functioning below baseline level    Goal(s) :   To be met in 3 Visits:  Bed to toilet/BSC:       SBA  Pt will complete 3/3 CHF goals     N/A    To be met in 5

## 2024-04-14 NOTE — PLAN OF CARE
Problem: Discharge Planning  Goal: Discharge to home or other facility with appropriate resources  4/14/2024 1208 by Lisbet Bridges RN  Outcome: Adequate for Discharge     Problem: Safety - Adult  Goal: Free from fall injury  4/14/2024 1208 by Lisbet Bridges RN  Outcome: Adequate for Discharge     Problem: Chronic Conditions and Co-morbidities  Goal: Patient's chronic conditions and co-morbidity symptoms are monitored and maintained or improved  4/14/2024 1208 by Lisbet Bridges RN  Outcome: Adequate for Discharge     Problem: Pain  Goal: Verbalizes/displays adequate comfort level or baseline comfort level  4/14/2024 1208 by Lisbet Bridges RN  Outcome: Adequate for Discharge

## 2024-04-14 NOTE — CARE COORDINATION
CM delivered second IMM to patient. Verbal explanation provided of 4 hours to review notice. Patient voiced understanding and is agreeable to discharge.

## 2024-04-14 NOTE — PLAN OF CARE
Problem: Discharge Planning  Goal: Discharge to home or other facility with appropriate resources  Outcome: Progressing     Problem: Safety - Adult  Goal: Free from fall injury  4/14/2024 1001 by Lisbet Bridges RN  Outcome: Progressing     Problem: Chronic Conditions and Co-morbidities  Goal: Patient's chronic conditions and co-morbidity symptoms are monitored and maintained or improved  4/14/2024 1001 by Lisbet Bridges, RN  Outcome: Progressing     Problem: Pain  Goal: Verbalizes/displays adequate comfort level or baseline comfort level  4/14/2024 1001 by Lisbet Bridges, RN  Outcome: Progressing

## 2024-04-15 ENCOUNTER — CARE COORDINATION (OUTPATIENT)
Dept: CASE MANAGEMENT | Age: 84
End: 2024-04-15

## 2024-04-15 NOTE — CARE COORDINATION
Care Transitions Initial Follow Up Call    Call within 2 business days of discharge: Yes    Patient Current Location: [facility]: [address] [place] [address] [facility] [address]    Care Transition Nurse contacted the patient by telephone to perform post hospital discharge assessment. Verified name and  with patient as identifiers. Provided introduction to self, and explanation of the Care Transition Nurse role.    Patient: Nithya Neff Patient : 1940   MRN: [id number]  Reason for Admission: 2 days -> sepsis w/w UTI, generalized weakness, hypoxia, hx MARIA C does not tolerate CPAP, MARIO on CKD, HTN, CAD s/p PCI to RCA , chronic dCHF, hx aortic stenosis s/p TAVR, COPD no ae, GERD, dementia, chronic pain -> home no services, dtr is [employer] [facility] 5 days/week, non [facility] PCP  Discharge Date: 24 RARS: Readmission Risk Score: 15.6    Last Discharge Facility       Date Complaint Diagnosis Description Type Department Provider    24 Fatigue Sepsis due to urinary tract infection (HCC) ... ED to Hosp-Admission (Discharged) (ADMITTED) [facility] 2 Elicia Haley MD; Everton Wheatley...     Challenges to be reviewed by the provider   Additional needs identified to be addressed with provider: No         Method of communication with provider: none.    Patient reports she feels well today. Siddharth will deliver her abx and she will start today as discussed. Reviewed medications including the parameters on her hydralazine. Also reviewed that her medications listed are prescribed by [facility] Provider but she states she now goes to a provider outside of [facility] and will seek refills from her. Confirmed PCP is as listed - Marisol Wheatley MD - she thinks this sounds right. Offered to assist in scheduling HFU but she declines stating, \"I can call and get right in\". CTN recommended call to schedule f/up with PCP this week. She voices understanding. Her daughter helps her prn and is her [employer]. She declines needs or referrals and

## 2024-05-07 ENCOUNTER — HOSPITAL ENCOUNTER (OUTPATIENT)
Dept: WOUND CARE | Age: 84
Discharge: HOME | End: 2024-05-07
Payer: MEDICARE

## 2024-05-07 VITALS
DIASTOLIC BLOOD PRESSURE: 74 MMHG | HEART RATE: 74 BPM | BODY MASS INDEX: 28.31 KG/M2 | TEMPERATURE: 98.4 F | HEIGHT: 60 IN | RESPIRATION RATE: 18 BRPM | WEIGHT: 144.2 LBS | SYSTOLIC BLOOD PRESSURE: 136 MMHG

## 2024-05-07 DIAGNOSIS — L89.322 PRESSURE INJURY OF LEFT BUTTOCK, STAGE 2 (HCC): Primary | Chronic | ICD-10-CM

## 2024-05-07 PROCEDURE — 99203 OFFICE O/P NEW LOW 30 MIN: CPT

## 2024-05-07 RX ORDER — GENTAMICIN SULFATE 1 MG/G
OINTMENT TOPICAL ONCE
OUTPATIENT
Start: 2024-05-07 | End: 2024-05-07

## 2024-05-07 RX ORDER — TRIAMCINOLONE ACETONIDE 1 MG/G
OINTMENT TOPICAL ONCE
OUTPATIENT
Start: 2024-05-07 | End: 2024-05-07

## 2024-05-07 RX ORDER — BACITRACIN ZINC AND POLYMYXIN B SULFATE 500; 1000 [USP'U]/G; [USP'U]/G
OINTMENT TOPICAL ONCE
Status: DISCONTINUED | OUTPATIENT
Start: 2024-05-07 | End: 2024-05-08 | Stop reason: HOSPADM

## 2024-05-07 RX ORDER — BACITRACIN ZINC AND POLYMYXIN B SULFATE 500; 1000 [USP'U]/G; [USP'U]/G
OINTMENT TOPICAL ONCE
OUTPATIENT
Start: 2024-05-07 | End: 2024-05-07

## 2024-05-07 RX ORDER — LIDOCAINE 50 MG/G
OINTMENT TOPICAL ONCE
OUTPATIENT
Start: 2024-05-07 | End: 2024-05-07

## 2024-05-07 RX ORDER — SODIUM CHLOR/HYPOCHLOROUS ACID 0.033 %
SOLUTION, IRRIGATION IRRIGATION ONCE
OUTPATIENT
Start: 2024-05-07 | End: 2024-05-07

## 2024-05-07 RX ORDER — LIDOCAINE 40 MG/G
CREAM TOPICAL ONCE
OUTPATIENT
Start: 2024-05-07 | End: 2024-05-07

## 2024-05-07 ASSESSMENT — PAIN DESCRIPTION - PAIN TYPE: TYPE: ACUTE PAIN

## 2024-05-07 ASSESSMENT — PAIN SCALES - GENERAL: PAINLEVEL_OUTOF10: 8

## 2024-05-07 ASSESSMENT — PAIN DESCRIPTION - FREQUENCY: FREQUENCY: CONTINUOUS

## 2024-05-07 ASSESSMENT — PAIN DESCRIPTION - LOCATION: LOCATION: COCCYX

## 2024-05-07 ASSESSMENT — PAIN - FUNCTIONAL ASSESSMENT: PAIN_FUNCTIONAL_ASSESSMENT: ACTIVITIES ARE NOT PREVENTED

## 2024-05-07 ASSESSMENT — PAIN DESCRIPTION - DESCRIPTORS: DESCRIPTORS: THROBBING;BURNING

## 2024-05-07 NOTE — PROGRESS NOTES
[facility] Progress Note    Nithya Neff     : 1940    DATE OF VISIT:  2024    Subjective:     Nithya Neff is a 84 y.o. female who has a stage II pressure ulcer located on the left buttocks.     Current complaint of pain in this ulcer? yes.  Quality of pain: aching, burning, and sharp  Timing: intermittent  Severity: moderate  Associated Signs/Symptoms: none  Other significant symptoms or pertinent ulcer history: Denies friction, sliding across bed on buttocks or other contributing factors other than not offloading.     Additional ulcer(s) noted? no.     Ms. Neff has a past medical history of MARIO (acute kidney injury) ([facility]), Altered mental status, Arthritis, BCC (basal cell carcinoma of skin), Bladder infection, Blurred vision, CAD (coronary artery disease), Cancer ([facility]), CHF (congestive heart failure) ([facility]), Chronic back pain, Closed head injury, COPD (chronic obstructive pulmonary disease) ([facility]), Depression, Depression, Hypercholesteremia, Hypertension, Hypokalemia, Pain in shoulder, Painful total knee replacement, initial encounter ([facility]), Reflux, Rheumatic fever, Sleep apnea, Syncope and collapse, TIA (transient ischemic attack), Urinary incontinence, Urinary tract infection in female, and Wears glasses.    She has a past surgical history that includes bladder repair; Hysterectomy; Neck surgery; shoulder surgery; Carpal tunnel release; Tonsillectomy; Appendectomy; Upper gastrointestinal endoscopy (11); Spine surgery (); Spinal fixation surgery with implant (); Spinal fixation surgery with implant (); Spinal fixation surgery with implant (); joint replacement; Coronary angioplasty with stent; Cardiac surgery; Skin cancer excision; Colonoscopy; Upper gastrointestinal endoscopy; other surgical history (12); Cholecystectomy, laparoscopic (2017); Upper gastrointestinal endoscopy (2017); Diagnostic Cardiac Cath Lab Procedure; Aortic

## 2024-05-07 NOTE — DISCHARGE INSTRUCTIONS
Wound Care Center Physician Orders and Discharge Instructions  [place] [address], [address]  [place] [id number]  Telephone: [phone]      Fax: [phone]      Your home care company:  *** .    Your wound-care supplies will be provided by:  [email or web address] .    NAME:  Nithya Neff   YOB: 1940  PRIMARY DIAGNOSIS FOR WOUND CARE CENTER:  Pressure - stage 2  .    Wound cleansing:   Do not scrub or use excessive force.  Wash hands with soap and water before and after dressing changes.  Prior to applying a clean dressing, cleanse wound with normal saline, wound cleanser, or mild soap and water. Ask your physician or nurse before getting the wound(s) wet in the shower.     Wound care for home:    Left Buttock- pressure injury   PSO/ Triad   Bordered gauze   Change every other day    Please note, all wounds (unless stated otherwise here) were mechanically debrided at the time of cleansing here in the wound-care center today, so a small amount of pain, drainage or bleeding from that process might be expected, and is normal.     All products for home use, including multiple products for a single wound if applicable, are medically necessary in order to achieve the best chance at timely wound healing. See provider documentation for details if needed.    Substituted dressings applied in the C today, if applicable:        New orders for this week (labs, imaging, medications, etc.):  Provided waffle cushion to pt   Trial PSO and triad given to pt   Encouraged repositioning frequently  Additional instructions for specific diagnoses:        F/U Appointment is with Dr. Bruce in 1 week, on   5-14-24                                at     2pm                  .     Your nurse  is MARINO Zavala.     If we applied slip-resistant hospital socks today, be sure to remove them at least once a day to inspect your toes or feet, even if you're not changing the wraps or dressings

## 2024-05-07 NOTE — PLAN OF CARE
Problem: Pain  Goal: Verbalizes/displays adequate comfort level or baseline comfort level  Outcome: Not Progressing     Problem: Cognitive:  Goal: Knowledge of wound care  Description: Knowledge of wound care  Outcome: Not Progressing     Problem: Pressure Ulcer:  Goal: Signs of wound healing will improve  Description: Signs of wound healing will improve  Outcome: Not Progressing   Pt seen in [facility] as new pt for Pressure injury stage 2  to Left buttock - painful- reports using donut cushion at home - no debride - treatment as follows   Left Buttock- pressure injury   PSO/ Triad   Bordered gauze   Change every other day     [facility] - pt actually tried cushion [facility] - adequate pain relief   Provided waffle cushion to pt for off loading   Trial PSO and triad given to pt   Encouraged repositioning frequently  Reviewed AVS - f/u in 1 week

## 2024-05-08 NOTE — DISCHARGE INSTRUCTIONS
Wound Care Center Physician Orders and Discharge Instructions  [place] [address], [address]  [place] [id number]  Telephone: [phone]      Fax: [phone]        Your home care company:   *** .     Your wound-care supplies will be provided by:  [employer] .     NAME:  Nithya Neff   YOB: 1940  PRIMARY DIAGNOSIS FOR WOUND CARE CENTER:  Pressure - stage 2  .     Wound cleansing:   Do not scrub or use excessive force.  Wash hands with soap and water before and after dressing changes.  Prior to applying a clean dressing, cleanse wound with normal saline, wound cleanser, or mild soap and water. Ask your physician or nurse before getting the wound(s) wet in the shower.                Wound care for home:     Left Buttock- pressure injury   PSO/ Triad   Bordered gauze   Change every other day   waffle cushion for offloading     Please note, all wounds (unless stated otherwise here) were mechanically debrided at the time of cleansing here in the wound-care center today, so a small amount of pain, drainage or bleeding from that process might be expected, and is normal.      All products for home use, including multiple products for a single wound if applicable, are medically necessary in order to achieve the best chance at timely wound healing. See provider documentation for details if needed.     Substituted dressings applied in the [facility] today, if applicable:           New orders for this week (labs, imaging, medications, etc.):      Encouraged repositioning frequently  Additional instructions for specific diagnoses:           F/U Appointment is with Dr. Bruce in 1 week, on   5-21-24                                at                     .     Your nurse  is MARINO Zavala.      If we applied slip-resistant hospital socks today, be sure to remove them at least once a day to inspect your toes or feet, even if you're not changing the wraps or dressings underneath. If

## 2024-05-10 ENCOUNTER — APPOINTMENT (OUTPATIENT)
Dept: GENERAL RADIOLOGY | Age: 84
DRG: 884 | End: 2024-05-10
Payer: MEDICARE

## 2024-05-10 ENCOUNTER — HOSPITAL ENCOUNTER (INPATIENT)
Age: 84
LOS: 7 days | Discharge: HOME | DRG: 884 | End: 2024-05-17
Attending: EMERGENCY MEDICINE | Admitting: INTERNAL MEDICINE
Payer: MEDICARE

## 2024-05-10 DIAGNOSIS — R79.89 ELEVATED BRAIN NATRIURETIC PEPTIDE (BNP) LEVEL: ICD-10-CM

## 2024-05-10 DIAGNOSIS — R05.2 SUBACUTE COUGH: ICD-10-CM

## 2024-05-10 DIAGNOSIS — R06.02 SHORTNESS OF BREATH: Primary | ICD-10-CM

## 2024-05-10 DIAGNOSIS — N17.9 AKI (ACUTE KIDNEY INJURY) (HCC): ICD-10-CM

## 2024-05-10 PROBLEM — R09.02 HYPOXIA: Status: ACTIVE | Noted: 2024-05-10

## 2024-05-10 LAB
ALBUMIN SERPL-MCNC: 3.7 G/DL (ref 3.4–5)
ALBUMIN/GLOB SERPL: 1.1 {RATIO} (ref 1.1–2.2)
ALP SERPL-CCNC: 110 U/L (ref 40–129)
ALT SERPL-CCNC: <5 U/L (ref 10–40)
ANION GAP SERPL CALCULATED.3IONS-SCNC: 16 MMOL/L (ref 3–16)
AST SERPL-CCNC: 10 U/L (ref 15–37)
BASE EXCESS BLDV CALC-SCNC: -1.3 MMOL/L (ref -3–3)
BASOPHILS # BLD: 0.1 K/UL (ref 0–0.2)
BASOPHILS NFR BLD: 0.7 %
BILIRUB SERPL-MCNC: 0.4 MG/DL (ref 0–1)
BILIRUB UR QL STRIP.AUTO: NEGATIVE
BUN SERPL-MCNC: 34 MG/DL (ref 7–20)
CALCIUM SERPL-MCNC: 9.5 MG/DL (ref 8.3–10.6)
CHLORIDE SERPL-SCNC: 104 MMOL/L (ref 99–110)
CLARITY UR: CLEAR
CO2 BLDV-SCNC: 20 MMOL/L
CO2 SERPL-SCNC: 21 MMOL/L (ref 21–32)
COHGB MFR BLDV: 1.4 % (ref 0–1.5)
COLOR UR: YELLOW
CREAT SERPL-MCNC: 2.1 MG/DL (ref 0.6–1.2)
DEPRECATED RDW RBC AUTO: 14.9 % (ref 12.4–15.4)
EKG ATRIAL RATE: 79 BPM
EKG DIAGNOSIS: NORMAL
EKG P AXIS: 44 DEGREES
EKG P-R INTERVAL: 154 MS
EKG Q-T INTERVAL: 424 MS
EKG QRS DURATION: 104 MS
EKG QTC CALCULATION (BAZETT): 486 MS
EKG R AXIS: -11 DEGREES
EKG T AXIS: 10 DEGREES
EKG VENTRICULAR RATE: 79 BPM
EOSINOPHIL # BLD: 0 K/UL (ref 0–0.6)
EOSINOPHIL NFR BLD: 0 %
GFR SERPLBLD CREATININE-BSD FMLA CKD-EPI: 23 ML/MIN/{1.73_M2}
GLUCOSE SERPL-MCNC: 123 MG/DL (ref 70–99)
GLUCOSE UR STRIP.AUTO-MCNC: NEGATIVE MG/DL
HCO3 BLDV-SCNC: 19.6 MMOL/L (ref 23–29)
HCT VFR BLD AUTO: 33.9 % (ref 36–48)
HGB BLD-MCNC: 11.4 G/DL (ref 12–16)
HGB UR QL STRIP.AUTO: NEGATIVE
KETONES UR STRIP.AUTO-MCNC: ABNORMAL MG/DL
LACTATE BLDV-SCNC: 1.4 MMOL/L (ref 0.4–1.9)
LEFT VENTRICULAR EJECTION FRACTION MODE: NORMAL
LEUKOCYTE ESTERASE UR QL STRIP.AUTO: NEGATIVE
LV EF: 60 % (ref 60–64)
LYMPHOCYTES # BLD: 0.8 K/UL (ref 1–5.1)
LYMPHOCYTES NFR BLD: 10.3 %
MCH RBC QN AUTO: 27.7 PG (ref 26–34)
MCHC RBC AUTO-ENTMCNC: 33.5 G/DL (ref 31–36)
MCV RBC AUTO: 82.7 FL (ref 80–100)
METHGB MFR BLDV: 0.3 %
MONOCYTES # BLD: 0.3 K/UL (ref 0–1.3)
MONOCYTES NFR BLD: 4 %
NEUTROPHILS # BLD: 6.3 K/UL (ref 1.7–7.7)
NEUTROPHILS NFR BLD: 85 %
NITRITE UR QL STRIP.AUTO: NEGATIVE
NT-PROBNP SERPL-MCNC: 4508 PG/ML (ref 0–449)
O2 CT VFR BLDV CALC: 12 VOL %
O2 THERAPY: ABNORMAL
PCO2 BLDV: 23.1 MMHG (ref 40–50)
PH BLDV: 7.55 [PH] (ref 7.35–7.45)
PH UR STRIP.AUTO: 7 [PH] (ref 5–8)
PLATELET # BLD AUTO: 150 K/UL (ref 135–450)
PMV BLD AUTO: 8.2 FL (ref 5–10.5)
PO2 BLDV: 34.7 MMHG (ref 25–40)
POTASSIUM SERPL-SCNC: 3.7 MMOL/L (ref 3.5–5.1)
PROCALCITONIN SERPL IA-MCNC: 0.03 NG/ML (ref 0–0.15)
PROT SERPL-MCNC: 7 G/DL (ref 6.4–8.2)
PROT UR STRIP.AUTO-MCNC: NEGATIVE MG/DL
RBC # BLD AUTO: 4.1 M/UL (ref 4–5.2)
SAO2 % BLDV: 76 %
SARS-COV-2 RDRP RESP QL NAA+PROBE: NOT DETECTED
SODIUM SERPL-SCNC: 141 MMOL/L (ref 136–145)
SP GR UR STRIP.AUTO: 1.01 (ref 1–1.03)
TROPONIN, HIGH SENSITIVITY: 40 NG/L (ref 0–14)
TROPONIN, HIGH SENSITIVITY: 42 NG/L (ref 0–14)
UA COMPLETE W REFLEX CULTURE PNL UR: ABNORMAL
UA DIPSTICK W REFLEX MICRO PNL UR: ABNORMAL
URN SPEC COLLECT METH UR: ABNORMAL
UROBILINOGEN UR STRIP-ACNC: 0.2 E.U./DL
WBC # BLD AUTO: 7.4 K/UL (ref 4–11)

## 2024-05-10 PROCEDURE — 2580000003 HC RX 258: Performed by: NURSE PRACTITIONER

## 2024-05-10 PROCEDURE — 83605 ASSAY OF LACTIC ACID: CPT

## 2024-05-10 PROCEDURE — 6370000000 HC RX 637 (ALT 250 FOR IP): Performed by: NURSE PRACTITIONER

## 2024-05-10 PROCEDURE — 6370000000 HC RX 637 (ALT 250 FOR IP)

## 2024-05-10 PROCEDURE — 80053 COMPREHEN METABOLIC PANEL: CPT

## 2024-05-10 PROCEDURE — 6360000002 HC RX W HCPCS

## 2024-05-10 PROCEDURE — 2580000003 HC RX 258

## 2024-05-10 PROCEDURE — 6370000000 HC RX 637 (ALT 250 FOR IP): Performed by: INTERNAL MEDICINE

## 2024-05-10 PROCEDURE — 85025 COMPLETE CBC W/AUTO DIFF WBC: CPT

## 2024-05-10 PROCEDURE — 87040 BLOOD CULTURE FOR BACTERIA: CPT

## 2024-05-10 PROCEDURE — 36415 COLL VENOUS BLD VENIPUNCTURE: CPT

## 2024-05-10 PROCEDURE — 2700000000 HC OXYGEN THERAPY PER DAY

## 2024-05-10 PROCEDURE — 84484 ASSAY OF TROPONIN QUANT: CPT

## 2024-05-10 PROCEDURE — 93010 ELECTROCARDIOGRAM REPORT: CPT | Performed by: INTERNAL MEDICINE

## 2024-05-10 PROCEDURE — 6360000002 HC RX W HCPCS: Performed by: NURSE PRACTITIONER

## 2024-05-10 PROCEDURE — 81003 URINALYSIS AUTO W/O SCOPE: CPT

## 2024-05-10 PROCEDURE — 83880 ASSAY OF NATRIURETIC PEPTIDE: CPT

## 2024-05-10 PROCEDURE — 87635 SARS-COV-2 COVID-19 AMP PRB: CPT

## 2024-05-10 PROCEDURE — 82803 BLOOD GASES ANY COMBINATION: CPT

## 2024-05-10 PROCEDURE — 2060000000 HC ICU INTERMEDIATE R&B

## 2024-05-10 PROCEDURE — 71046 X-RAY EXAM CHEST 2 VIEWS: CPT

## 2024-05-10 PROCEDURE — 99285 EMERGENCY DEPT VISIT HI MDM: CPT

## 2024-05-10 PROCEDURE — 96361 HYDRATE IV INFUSION ADD-ON: CPT

## 2024-05-10 PROCEDURE — 96374 THER/PROPH/DIAG INJ IV PUSH: CPT

## 2024-05-10 PROCEDURE — 94761 N-INVAS EAR/PLS OXIMETRY MLT: CPT

## 2024-05-10 PROCEDURE — 84145 PROCALCITONIN (PCT): CPT

## 2024-05-10 PROCEDURE — 93005 ELECTROCARDIOGRAM TRACING: CPT | Performed by: NURSE PRACTITIONER

## 2024-05-10 PROCEDURE — 99223 1ST HOSP IP/OBS HIGH 75: CPT

## 2024-05-10 RX ORDER — TORSEMIDE 20 MG/1
20 TABLET ORAL DAILY
Status: DISCONTINUED | OUTPATIENT
Start: 2024-05-10 | End: 2024-05-15

## 2024-05-10 RX ORDER — ASPIRIN 81 MG/1
81 TABLET ORAL DAILY
Status: DISCONTINUED | OUTPATIENT
Start: 2024-05-10 | End: 2024-05-17 | Stop reason: HOSPADM

## 2024-05-10 RX ORDER — TORSEMIDE 20 MG/1
40 TABLET ORAL DAILY
COMMUNITY
Start: 2024-05-01

## 2024-05-10 RX ORDER — VALSARTAN 80 MG/1
80 TABLET ORAL DAILY
Status: DISCONTINUED | OUTPATIENT
Start: 2024-05-10 | End: 2024-05-17 | Stop reason: HOSPADM

## 2024-05-10 RX ORDER — SODIUM CHLORIDE 9 MG/ML
INJECTION, SOLUTION INTRAVENOUS PRN
Status: DISCONTINUED | OUTPATIENT
Start: 2024-05-10 | End: 2024-05-17 | Stop reason: HOSPADM

## 2024-05-10 RX ORDER — PANTOPRAZOLE SODIUM 40 MG/1
40 TABLET, DELAYED RELEASE ORAL DAILY
Status: DISCONTINUED | OUTPATIENT
Start: 2024-05-10 | End: 2024-05-17 | Stop reason: HOSPADM

## 2024-05-10 RX ORDER — SIMVASTATIN 40 MG
40 TABLET ORAL NIGHTLY
COMMUNITY
Start: 2024-05-01

## 2024-05-10 RX ORDER — TRAZODONE HYDROCHLORIDE 100 MG/1
100 TABLET ORAL NIGHTLY
Status: DISCONTINUED | OUTPATIENT
Start: 2024-05-10 | End: 2024-05-17 | Stop reason: HOSPADM

## 2024-05-10 RX ORDER — SODIUM CHLORIDE 0.9 % (FLUSH) 0.9 %
5-40 SYRINGE (ML) INJECTION EVERY 12 HOURS SCHEDULED
Status: DISCONTINUED | OUTPATIENT
Start: 2024-05-10 | End: 2024-05-17 | Stop reason: HOSPADM

## 2024-05-10 RX ORDER — FAMOTIDINE 20 MG/1
20 TABLET, FILM COATED ORAL NIGHTLY
Status: DISCONTINUED | OUTPATIENT
Start: 2024-05-10 | End: 2024-05-17 | Stop reason: HOSPADM

## 2024-05-10 RX ORDER — HYDRALAZINE HYDROCHLORIDE 50 MG/1
50 TABLET, FILM COATED ORAL EVERY 8 HOURS SCHEDULED
Status: DISCONTINUED | OUTPATIENT
Start: 2024-05-10 | End: 2024-05-17 | Stop reason: HOSPADM

## 2024-05-10 RX ORDER — HYDRALAZINE HYDROCHLORIDE 50 MG/1
50 TABLET, FILM COATED ORAL EVERY 8 HOURS SCHEDULED
Status: DISCONTINUED | OUTPATIENT
Start: 2024-05-10 | End: 2024-05-10

## 2024-05-10 RX ORDER — POLYETHYLENE GLYCOL 3350 17 G/17G
17 POWDER, FOR SOLUTION ORAL DAILY PRN
Status: DISCONTINUED | OUTPATIENT
Start: 2024-05-10 | End: 2024-05-17 | Stop reason: HOSPADM

## 2024-05-10 RX ORDER — ACETAMINOPHEN 325 MG/1
650 TABLET ORAL EVERY 6 HOURS PRN
Status: DISCONTINUED | OUTPATIENT
Start: 2024-05-10 | End: 2024-05-17 | Stop reason: HOSPADM

## 2024-05-10 RX ORDER — HYDROCODONE BITARTRATE AND ACETAMINOPHEN 5; 325 MG/1; MG/1
TABLET ORAL
COMMUNITY
Start: 2024-05-09

## 2024-05-10 RX ORDER — BUSPIRONE HYDROCHLORIDE 10 MG/1
10 TABLET ORAL NIGHTLY PRN
Status: DISCONTINUED | OUTPATIENT
Start: 2024-05-10 | End: 2024-05-17 | Stop reason: HOSPADM

## 2024-05-10 RX ORDER — CETIRIZINE HYDROCHLORIDE 10 MG/1
5 TABLET ORAL DAILY
Status: DISCONTINUED | OUTPATIENT
Start: 2024-05-10 | End: 2024-05-17 | Stop reason: HOSPADM

## 2024-05-10 RX ORDER — ATORVASTATIN CALCIUM 10 MG/1
20 TABLET, FILM COATED ORAL NIGHTLY
Status: DISCONTINUED | OUTPATIENT
Start: 2024-05-10 | End: 2024-05-17 | Stop reason: HOSPADM

## 2024-05-10 RX ORDER — TRAZODONE HYDROCHLORIDE 100 MG/1
100 TABLET ORAL NIGHTLY PRN
COMMUNITY
Start: 2024-05-01

## 2024-05-10 RX ORDER — ENOXAPARIN SODIUM 100 MG/ML
30 INJECTION SUBCUTANEOUS DAILY
Status: CANCELLED | OUTPATIENT
Start: 2024-05-10

## 2024-05-10 RX ORDER — 0.9 % SODIUM CHLORIDE 0.9 %
500 INTRAVENOUS SOLUTION INTRAVENOUS ONCE
Status: COMPLETED | OUTPATIENT
Start: 2024-05-10 | End: 2024-05-10

## 2024-05-10 RX ORDER — ONDANSETRON 4 MG/1
4 TABLET, ORALLY DISINTEGRATING ORAL EVERY 8 HOURS PRN
Status: DISCONTINUED | OUTPATIENT
Start: 2024-05-10 | End: 2024-05-17 | Stop reason: HOSPADM

## 2024-05-10 RX ORDER — ACETAMINOPHEN 325 MG/1
650 TABLET ORAL ONCE
Status: COMPLETED | OUTPATIENT
Start: 2024-05-10 | End: 2024-05-10

## 2024-05-10 RX ORDER — SODIUM CHLORIDE 9 MG/ML
INJECTION, SOLUTION INTRAVENOUS CONTINUOUS
Status: DISCONTINUED | OUTPATIENT
Start: 2024-05-10 | End: 2024-05-11

## 2024-05-10 RX ORDER — ACETAMINOPHEN 650 MG/1
650 SUPPOSITORY RECTAL EVERY 6 HOURS PRN
Status: DISCONTINUED | OUTPATIENT
Start: 2024-05-10 | End: 2024-05-17 | Stop reason: HOSPADM

## 2024-05-10 RX ORDER — ONDANSETRON 2 MG/ML
4 INJECTION INTRAMUSCULAR; INTRAVENOUS ONCE
Status: COMPLETED | OUTPATIENT
Start: 2024-05-10 | End: 2024-05-10

## 2024-05-10 RX ORDER — HEPARIN SODIUM 5000 [USP'U]/ML
5000 INJECTION, SOLUTION INTRAVENOUS; SUBCUTANEOUS EVERY 8 HOURS SCHEDULED
Status: DISCONTINUED | OUTPATIENT
Start: 2024-05-10 | End: 2024-05-17 | Stop reason: HOSPADM

## 2024-05-10 RX ORDER — AMLODIPINE BESYLATE 5 MG/1
10 TABLET ORAL DAILY
Status: DISCONTINUED | OUTPATIENT
Start: 2024-05-10 | End: 2024-05-17 | Stop reason: HOSPADM

## 2024-05-10 RX ORDER — ONDANSETRON 2 MG/ML
4 INJECTION INTRAMUSCULAR; INTRAVENOUS EVERY 6 HOURS PRN
Status: DISCONTINUED | OUTPATIENT
Start: 2024-05-10 | End: 2024-05-17 | Stop reason: HOSPADM

## 2024-05-10 RX ORDER — SODIUM CHLORIDE 0.9 % (FLUSH) 0.9 %
10 SYRINGE (ML) INJECTION PRN
Status: DISCONTINUED | OUTPATIENT
Start: 2024-05-10 | End: 2024-05-17 | Stop reason: HOSPADM

## 2024-05-10 RX ADMIN — SODIUM CHLORIDE: 9 INJECTION, SOLUTION INTRAVENOUS at 16:55

## 2024-05-10 RX ADMIN — SODIUM CHLORIDE 500 ML: 9 INJECTION, SOLUTION INTRAVENOUS at 11:30

## 2024-05-10 RX ADMIN — ATORVASTATIN CALCIUM 20 MG: 10 TABLET, FILM COATED ORAL at 20:21

## 2024-05-10 RX ADMIN — HEPARIN SODIUM 5000 UNITS: 5000 INJECTION INTRAVENOUS; SUBCUTANEOUS at 20:25

## 2024-05-10 RX ADMIN — ONDANSETRON 4 MG: 2 INJECTION INTRAMUSCULAR; INTRAVENOUS at 09:53

## 2024-05-10 RX ADMIN — TRAZODONE HYDROCHLORIDE 100 MG: 100 TABLET ORAL at 20:21

## 2024-05-10 RX ADMIN — FAMOTIDINE 20 MG: 20 TABLET, FILM COATED ORAL at 20:21

## 2024-05-10 RX ADMIN — ACETAMINOPHEN 650 MG: 325 TABLET ORAL at 09:55

## 2024-05-10 RX ADMIN — Medication: at 20:21

## 2024-05-10 RX ADMIN — ONDANSETRON 4 MG: 2 INJECTION INTRAMUSCULAR; INTRAVENOUS at 20:25

## 2024-05-10 ASSESSMENT — PAIN DESCRIPTION - LOCATION: LOCATION: BACK

## 2024-05-10 ASSESSMENT — PAIN SCALES - GENERAL: PAINLEVEL_OUTOF10: 4

## 2024-05-10 NOTE — PROGRESS NOTES
Bedside report and transfer of care given to MARINO Moreno. Pt currently resting in bed with the call light within reach. Pt denies any other care needs at this time. Pt stable at this time.    Cassidy Chance RN

## 2024-05-10 NOTE — FLOWSHEET NOTE
05/10/24 1500   Vital Signs   Temp 98 °F (36.7 °C)   Temp Source Oral   Pulse 79   Heart Rate Source Monitor   Respirations 24   BP (!) 149/49   MAP (Calculated) 82   MAP (mmHg) 75   BP Location Left upper arm   BP Method Automatic   Patient Position Semi fowlers   Oxygen Therapy   SpO2 100 %   O2 Device None (Room air)     Vitals were completed in the ER by this RN, while getting bedside report. No s/s of distress. Oxygen was removed from patient as she was satting 100% on 3L. Still 100% on room air. Patient reports she is supposed to wear CPAP at night but mask does not fit so she is sending it back. Family at bedside is also caregiver and primary decision maker, Sonya.     Patient admitted to room 311 from ER. Patient oriented to room, call light, bed rails, phone, lights and bathroom. Patient instructed about the schedule of the day including: vital sign frequency, lab draws, possible tests, frequency of MD and staff rounds, daily weights, I &O's and prescribed diet. Telemetry box in place, patient aware of placement and reason. Bed locked, in lowest position, side rails up 2/4, call light within reach.        Recliner Assessment  Patient is able to demonstrate the ability to move from a reclining position to an upright position within the recliner.       4 Eyes Skin Assessment     NAME:  Nithya Neff  YOB: 1940  MEDICAL RECORD NUMBER:  [id number]    The patient is being assessed for  Admission    I agree that at least one RN has performed a thorough Head to Toe Skin Assessment on the patient. ALL assessment sites listed below have been assessed.      Areas assessed by both nurses:    Head, Face, Ears, Shoulders, Back, Chest, Arms, Elbows, Hands, Sacrum. Buttock, Coccyx, Ischium, and Legs. Feet and Heels        Does the Patient have a Wound? Yes wound(s) were present on assessment. LDA wound assessment was Initiated and completed by RN         Patient has a stage II pressure ulcer to the left  buttocks. She has been seeing wound care for this. Previous orders for triad and mepilex dressing. This was applied at this time, and kept in room. Wound care consulted for inpatient. Waffle cushion also provided.     Mihir Prevention initiated by RN: Yes  Wound Care Orders initiated by RN: Yes    Pressure Injury (Stage 3,4, Unstageable, DTI, NWPT, and Complex wounds) if present, place Wound referral order by RN under : No    New Ostomies, if present place, Ostomy referral order under : No     Nurse 1 eSignature: Electronically signed by Cassidy Chance RN on 5/10/24 at 4:15 PM EDT    **SHARE this note so that the co-signing nurse can place an eSignature**    Nurse 2 eSignature: Electronically signed by Zeenat Pearson RN on 5/10/24 at 5:04 PM EDT

## 2024-05-10 NOTE — PLAN OF CARE
Hypoxia  - possible CHF AE? Pt SOB and having chills  - unable to tolerate CPAP per last adm  - will order echo and sputum cx  - defer possible abx to assessing provider    Elevated Cr with CKD    Admit PCU    Tena Higgins PA-C  05/10/24

## 2024-05-10 NOTE — ED PROVIDER NOTES
[facility] ED  EMERGENCY DEPARTMENT ENCOUNTER      I am the Primary Clinician of Record    Note started: 9:08 AM EDT 5/10/24     I have seen and evaluated this patient with my supervising physician Dr. Welch      Pt Name: Nithya Neff  MRN: [id number]  Birthdate 1940  Dateof evaluation: 5/10/2024  Provider: Marietta Modi, APRN - CNP  PCP: Marisol Wheatley  ED Attending: No att. providers found      CHIEF COMPLAINT       Chief Complaint   Patient presents with    Shortness of Breath     Pt states she felt short of breath last night and couldn't get out of bed; wet the bed; daughter states this am she was laying in bed and it was soaked; pt states she had a pain pump placed 6months ago for chronic back pain and it is not helping; she wants it taken off.         HISTORY OF PRESENTILLNESS   (Location/Symptom, Timing/Onset, Context/Setting, Quality, Duration, Modifying Factors, Severity)  Note limiting factors.     Nithya Neff is a 84 y.o. female for shortness of breath. Onset was yesterday.  Context includes patient reports that she has had increased shortness of breath since last night.  She states that she has had chills.  Patient has a pain pump for her chronic back pain but is unable to tell me what is in her pain pump.  Patient states her pain pump has been in place for about 6 months.  She reports that it is not helping her pain. Alleviating factors include nothing.  Aggravating factors include nothing.  Nothing has been used for pain today.     Nursing Notes were all reviewed and agreed with or any disagreements were addressed  in the HPI.      REVIEW OF SYSTEMS       Review of Systems   Constitutional:  Negative for activity change, appetite change and fever.   HENT:  Negative for congestion, facial swelling, rhinorrhea and sore throat.    Eyes:  Negative for visual disturbance.   Respiratory:  Positive for shortness of breath.    Cardiovascular:  Negative for chest pain.      IR MIDLINE CATH  10/16/2023    IR MIDLINE CATH 10/16/2023 [facility] SPECIAL PROCEDURES    JOINT REPLACEMENT      KATHLEEN TKR    NECK SURGERY      OTHER SURGICAL HISTORY  9/28/12    Full Interstim Implant    SHOULDER SURGERY      SKIN CANCER EXCISION      SPINAL FIXATION SURGERY WITH IMPLANT  2001    SPINAL FIXATION SURGERY WITH IMPLANT  2004    SPINAL FIXATION SURGERY WITH IMPLANT  2007    SPINE SURGERY  1999    TONSILLECTOMY      UPPER GASTROINTESTINAL ENDOSCOPY  4/22/11    UPPER GASTROINTESTINAL ENDOSCOPY      UPPER GASTROINTESTINAL ENDOSCOPY  03/23/2017    dilitation         CURRENT MEDICATIONS       Previous Medications    ACETAMINOPHEN (TYLENOL) 325 MG TABLET    Take 2 tablets by mouth every 6 hours as needed for Pain    AMLODIPINE (NORVASC) 10 MG TABLET    TAKE (1) TABLET DAILY    ASPIRIN 81 MG EC TABLET    Take 1 tablet by mouth daily    BUSPIRONE (BUSPAR) 10 MG TABLET    TAKE ONE (1) TO TWO (2) TABLETS IN THE EVENING AS NEEDED FOR ANXIETY    CANDESARTAN (ATACAND) 16 MG TABLET    Take 0.5 tablets by mouth daily    CETIRIZINE (ZYRTEC) 10 MG TABLET    TAKE (1) TABLET DAILY    DOCUSATE SODIUM (COLACE) 100 MG CAPSULE    TAKE (1) CAPSULE TWICE DAILY    FAMOTIDINE (PEPCID) 20 MG TABLET    Take 1 tablet by mouth daily    HYDRALAZINE (APRESOLINE) 50 MG TABLET    Take 1 tablet by mouth every 8 hours Hold if SBP < 120 or DBP < 60 mmHg    HYDROXYZINE HCL (ATARAX) 25 MG TABLET    TAKE ONE (1) TABLET BY MOUTH EVERY 8 HOURS AS NEEDED FOR ITCHING    PANTOPRAZOLE (PROTONIX) 40 MG TABLET    Take 1 tablet by mouth daily    POTASSIUM CHLORIDE (KLOR-CON) 20 MEQ PACKET    Take 20 mEq by mouth daily         ALLERGIES     Latex, Levofloxacin, Quinolones, Adhesive tape, Codeine, Morphine, Azithromycin, Pentazocine lactate, Cephalexin, Pentazocine, Sulfa antibiotics, and Tramadol      FAMILY HISTORY       Family History   Problem Relation Age of Onset    Arthritis Mother     Cancer Mother     Depression Mother     Heart Disease Mother

## 2024-05-10 NOTE — ED NOTES
Pt placed on NC due to sp02 decreasing while sleeping. Family states pt has sleep apnea and has been given a mask to wear when sleeping but does not use it due to the mask always falling off.

## 2024-05-10 NOTE — H&P
Hospital Medicine History & Physical      PCP: Marisol Wheatley    Date of Admission: 5/10/2024    Date of Service: Pt seen/examined on 05/10/24     Chief Complaint:    Chief Complaint   Patient presents with    Shortness of Breath     Pt states she felt short of breath last night and couldn't get out of bed; wet the bed; daughter states this am she was laying in bed and it was soaked; pt states she had a pain pump placed 6months ago for chronic back pain and it is not helping; she wants it taken off.           History Of Present Illness:      The patient is a 84 y.o. female with pmhx as below who presents to [facility] with shortness of breath since last night. She reports waking up in the night gasping for air but this occurs occasionally per patient. She was having chills and shaking and unable to get out of the bed. She reports weight loss, some increasing lower extremity edema. Denies cough, urinary complaints, nausea, vomiting or diarrhea. Patient is somewhat of a poor historian, daughter able to provide some context but patient lives alone. She came to see her mother this morning around 6 am and found her lying in bed, large amount of urine soaked bed.     Past Medical History:        Diagnosis Date    MARIO (acute kidney injury) ([facility]) 11/24/2021    Altered mental status 11/30/2021    Arthritis     BCC (basal cell carcinoma of skin)     RT clavicle    Bladder infection     frequently    Blurred vision 9/17/2022    CAD (coronary artery disease)     stents    Cancer ([facility])     skin    CHF (congestive heart failure) ([facility])     Chronic back pain     Closed head injury 12/1/2021    COPD (chronic obstructive pulmonary disease) ([facility])     Depression     Depression     Hypercholesteremia     Hypertension     Hypokalemia 1/16/2012    Pain in shoulder [id number]    pain in left shoulder, taking steroid injections for steroid    Painful total knee replacement, initial encounter ([facility]) 10/18/2018    Reflux     Rheumatic fever

## 2024-05-10 NOTE — ED PROVIDER NOTES
Emergency Department Attending Provider Note  Location: [facility]  ED      Nithya Neff was evaluated in the Emergency Department. Although initial history and physical exam information was obtained by Marietta Modi (who also dictated a record of this visit), I personally saw the patient and made/approved the management plan and take responsibility for the patient management.     Patient seen and evaluated.  Relevant records reviewed. Chronic medical conditions reviewed.    HPI: 84-year-old female presents with shortness of breath.  She states this started yesterday.  She was short of breath yesterday and has been worsening since last night.  She has also had the chills.  She has a pain pump for chronic back pain and has been in place for 6 months.  She states it is not helping her back pain.  She denies chest pain.  No fevers.  Her daughter states that she was soaked in her own urine in the bed..     Physical Exam: No respiratory distress, heart is regular rate and rhythm.  Awake and alert.     MDM: Here the patient is in no distress.  She is nontoxic-appearing.  BNP is elevated and creatinine worsening from baseline consistent with MARIO.  Procalcitonin and lactic acid negative.  Troponin is elevated but chronically elevated and on repeat it is stable.  Chest x-ray reassuring, EKG with no ischemic changes.  We will admit for gentle diuresis and monitoring of her renal function.  We will admit to the hospitalist.         I independently interpreted the following diagnostic test and studies:    Results for orders placed or performed during the hospital encounter of 05/10/24   CBC with Auto Differential   Result Value Ref Range    WBC 7.4 4.0 - 11.0 K/uL    RBC 4.10 4.00 - 5.20 M/uL    Hemoglobin 11.4 (L) 12.0 - 16.0 g/dL    Hematocrit 33.9 (L) 36.0 - 48.0 %    MCV 82.7 80.0 - 100.0 fL    MCH 27.7 26.0 - 34.0 pg    MCHC 33.5 31.0 - 36.0 g/dL    RDW 14.9 12.4 - 15.4 %    Platelets 150 135 - 450

## 2024-05-10 NOTE — CONSULTS
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 Gray    Chief Complaint:    Chief Complaint   Patient presents with    Shortness of Breath     Pt states she felt short of breath last night and couldn't get out of bed; wet the bed; daughter states this am she was laying in bed and it was soaked; pt states she had a pain pump placed 6months ago for chronic back pain and it is not helping; she wants it taken off.       History of Present Illness on 5/10/2024:    84 y.o. yo female with PMH of CAD, CHF preserved EF, AVR, COPD, HTN, HLD and TIA, CKD 3B/4 who is admitted for shortness of breath and nephrology has been consulted for MARIO  Patient reports that for the last several days she has not been eating and drinking well.  She has been having stomach upset and has a lot of belching and few episodes vomiting.  Last night she thought that she had high fever as she had drenching sweats.  She also wetted her bed.  This morning she continued to have shortness of breath and went to the emergency room and has been admitted.  She reports after being on nasal cannula she is feeling bit better.  Creatinine was noted to be 2.1 and nephrology has been consulted    Past Medical History:        Diagnosis Date    MARIO (acute kidney injury) ([facility]) 11/24/2021    Altered mental status 11/30/2021    Arthritis     BCC (basal cell carcinoma of skin)     RT clavicle    Bladder infection     frequently    Blurred vision 9/17/2022    CAD (coronary artery disease)     stents    Cancer ([facility])     skin    CHF (congestive heart failure) ([facility])     Chronic back pain     Closed head injury 12/1/2021    COPD (chronic obstructive pulmonary disease) ([facility])     Depression     Depression     Hypercholesteremia     Hypertension     Hypokalemia 1/16/2012    Pain in shoulder [id number]    pain in left shoulder, taking steroid injections for steroid    Painful total knee replacement, initial  hours Hold if SBP < 120 or DBP < 60 mmHg 90 tablet 2    famotidine (PEPCID) 20 MG tablet Take 1 tablet by mouth daily      cetirizine (ZYRTEC) 10 MG tablet TAKE (1) TABLET DAILY 90 tablet 1    hydrOXYzine HCl (ATARAX) 25 MG tablet TAKE ONE (1) TABLET BY MOUTH EVERY 8 HOURS AS NEEDED FOR ITCHING 90 tablet 1    docusate sodium (COLACE) 100 MG capsule TAKE (1) CAPSULE TWICE DAILY 60 capsule 1    busPIRone (BUSPAR) 10 MG tablet TAKE ONE (1) TO TWO (2) TABLETS IN THE EVENING AS NEEDED FOR ANXIETY 60 tablet 3    acetaminophen (TYLENOL) 325 MG tablet Take 2 tablets by mouth every 6 hours as needed for Pain      pantoprazole (PROTONIX) 40 MG tablet Take 1 tablet by mouth daily 90 tablet 0    aspirin 81 MG EC tablet Take 1 tablet by mouth daily      amLODIPine (NORVASC) 10 MG tablet TAKE (1) TABLET DAILY 30 tablet 5       Allergies:  Latex, Levofloxacin, Quinolones, Adhesive tape, Codeine, Morphine, Azithromycin, Pentazocine lactate, Cephalexin, Pentazocine, Sulfa antibiotics, and Tramadol    Social History:    Social History     Socioeconomic History    Marital status:      Spouse name: Not on file    Number of children: 8    Years of education: 10    Highest education level: Not on file   Occupational History    Occupation: retired   Tobacco Use    Smoking status: Never    Smokeless tobacco: Never   Vaping Use    Vaping Use: Never used   Substance and Sexual Activity    Alcohol use: No    Drug use: No    Sexual activity: Not Currently   Other Topics Concern    Not on file   Social History Narrative    Not on file     Social Determinants of Health     Financial Resource Strain: Low Risk  (5/11/2023)    Overall Financial Resource Strain (CARDIA)     Difficulty of Paying Living Expenses: Not hard at all   Food Insecurity: No Food Insecurity (5/10/2024)    Hunger Vital Sign     Worried About Running Out of Food in the Last Year: Never true     Ran Out of Food in the Last Year: Never true   Transportation Needs: No

## 2024-05-11 ENCOUNTER — APPOINTMENT (OUTPATIENT)
Age: 84
DRG: 884 | End: 2024-05-11
Payer: MEDICARE

## 2024-05-11 LAB
ANION GAP SERPL CALCULATED.3IONS-SCNC: 15 MMOL/L (ref 3–16)
BASOPHILS # BLD: 0.1 K/UL (ref 0–0.2)
BASOPHILS NFR BLD: 1 %
BUN SERPL-MCNC: 26 MG/DL (ref 7–20)
CALCIUM SERPL-MCNC: 8.9 MG/DL (ref 8.3–10.6)
CHLORIDE SERPL-SCNC: 111 MMOL/L (ref 99–110)
CO2 SERPL-SCNC: 17 MMOL/L (ref 21–32)
CREAT SERPL-MCNC: 1.8 MG/DL (ref 0.6–1.2)
DEPRECATED RDW RBC AUTO: 15.3 % (ref 12.4–15.4)
ECHO AO ROOT DIAM: 2.8 CM
ECHO AO ROOT INDEX: 1.75 CM/M2
ECHO AV AREA PEAK VELOCITY: 1.8 CM2
ECHO AV AREA VTI: 2.1 CM2
ECHO AV AREA/BSA PEAK VELOCITY: 1.1 CM2/M2
ECHO AV AREA/BSA VTI: 1.3 CM2/M2
ECHO AV MEAN GRADIENT: 11 MMHG
ECHO AV MEAN GRADIENT: 11 MMHG
ECHO AV MEAN VELOCITY: 1.5 M/S
ECHO AV PEAK GRADIENT: 21 MMHG
ECHO AV PEAK VELOCITY: 2.3 M/S
ECHO AV VELOCITY RATIO: 0.57
ECHO AV VTI: 45.2 CM
ECHO BSA: 1.64 M2
ECHO EST RA PRESSURE: 8 MMHG
ECHO LA AREA 2C: 22.4 CM2
ECHO LA AREA 4C: 18.5 CM2
ECHO LA DIAMETER INDEX: 2.75 CM/M2
ECHO LA DIAMETER: 4.4 CM
ECHO LA MAJOR AXIS: 5.3 CM
ECHO LA MINOR AXIS: 5.6 CM
ECHO LA TO AORTIC ROOT RATIO: 1.57
ECHO LA VOL BP: 63 ML (ref 22–52)
ECHO LA VOL MOD A2C: 73 ML (ref 22–52)
ECHO LA VOL MOD A4C: 51 ML (ref 22–52)
ECHO LA VOL/BSA BIPLANE: 39 ML/M2 (ref 16–34)
ECHO LA VOLUME INDEX MOD A2C: 46 ML/M2 (ref 16–34)
ECHO LA VOLUME INDEX MOD A4C: 32 ML/M2 (ref 16–34)
ECHO LV E' LATERAL VELOCITY: 10 CM/S
ECHO LV E' SEPTAL VELOCITY: 11 CM/S
ECHO LV FRACTIONAL SHORTENING: 40 % (ref 28–44)
ECHO LV INTERNAL DIMENSION DIASTOLE INDEX: 2.5 CM/M2
ECHO LV INTERNAL DIMENSION DIASTOLIC: 4 CM (ref 3.9–5.3)
ECHO LV INTERNAL DIMENSION SYSTOLIC INDEX: 1.5 CM/M2
ECHO LV INTERNAL DIMENSION SYSTOLIC: 2.4 CM
ECHO LV ISOVOLUMETRIC RELAXATION TIME (IVRT): 103 MS
ECHO LV IVSD: 1.2 CM (ref 0.6–0.9)
ECHO LV MASS 2D: 165.5 G (ref 67–162)
ECHO LV MASS INDEX 2D: 103.4 G/M2 (ref 43–95)
ECHO LV POSTERIOR WALL DIASTOLIC: 1.2 CM (ref 0.6–0.9)
ECHO LV RELATIVE WALL THICKNESS RATIO: 0.6
ECHO LVOT AREA: 3.1 CM2
ECHO LVOT AV VTI INDEX: 0.66
ECHO LVOT DIAM: 2 CM
ECHO LVOT MEAN GRADIENT: 4 MMHG
ECHO LVOT PEAK GRADIENT: 6 MMHG
ECHO LVOT PEAK VELOCITY: 1.3 M/S
ECHO LVOT STROKE VOLUME INDEX: 58.5 ML/M2
ECHO LVOT SV: 93.6 ML
ECHO LVOT VTI: 29.8 CM
ECHO MV A VELOCITY: 1.39 M/S
ECHO MV E VELOCITY: 0.73 M/S
ECHO MV E/A RATIO: 0.53
ECHO MV E/E' LATERAL: 7.3
ECHO MV E/E' RATIO (AVERAGED): 6.97
ECHO MV E/E' SEPTAL: 6.64
ECHO PV MAX VELOCITY: 1.2 M/S
ECHO PV PEAK GRADIENT: 6 MMHG
ECHO RA AREA 4C: 14 CM2
ECHO RA END SYSTOLIC VOLUME APICAL 4 CHAMBER INDEX BSA: 21 ML/M2
ECHO RA VOLUME: 34 ML
ECHO RIGHT VENTRICULAR SYSTOLIC PRESSURE (RVSP): 44 MMHG
ECHO RV BASAL DIMENSION: 3.6 CM
ECHO RV FREE WALL PEAK S': 17 CM/S
ECHO RV LONGITUDINAL DIMENSION: 7.1 CM
ECHO RV MID DIMENSION: 2.7 CM
ECHO RV TAPSE: 2.5 CM (ref 1.7–?)
ECHO TV PEAK GRADIENT: 2 MMHG
ECHO TV REGURGITANT MAX VELOCITY: 3.01 M/S
ECHO TV REGURGITANT PEAK GRADIENT: 36 MMHG
EOSINOPHIL # BLD: 0 K/UL (ref 0–0.6)
EOSINOPHIL NFR BLD: 0.2 %
GFR SERPLBLD CREATININE-BSD FMLA CKD-EPI: 27 ML/MIN/{1.73_M2}
GLUCOSE SERPL-MCNC: 92 MG/DL (ref 70–99)
HCT VFR BLD AUTO: 34.8 % (ref 36–48)
HGB BLD-MCNC: 11.3 G/DL (ref 12–16)
LYMPHOCYTES # BLD: 1.2 K/UL (ref 1–5.1)
LYMPHOCYTES NFR BLD: 16.3 %
MCH RBC QN AUTO: 27.5 PG (ref 26–34)
MCHC RBC AUTO-ENTMCNC: 32.6 G/DL (ref 31–36)
MCV RBC AUTO: 84.5 FL (ref 80–100)
MONOCYTES # BLD: 0.5 K/UL (ref 0–1.3)
MONOCYTES NFR BLD: 7.4 %
NEUTROPHILS # BLD: 5.4 K/UL (ref 1.7–7.7)
NEUTROPHILS NFR BLD: 75.1 %
PLATELET # BLD AUTO: 146 K/UL (ref 135–450)
PMV BLD AUTO: 8.4 FL (ref 5–10.5)
POTASSIUM SERPL-SCNC: 3.8 MMOL/L (ref 3.5–5.1)
RBC # BLD AUTO: 4.11 M/UL (ref 4–5.2)
SODIUM SERPL-SCNC: 143 MMOL/L (ref 136–145)
WBC # BLD AUTO: 7.2 K/UL (ref 4–11)

## 2024-05-11 PROCEDURE — 2580000003 HC RX 258

## 2024-05-11 PROCEDURE — 97166 OT EVAL MOD COMPLEX 45 MIN: CPT

## 2024-05-11 PROCEDURE — 2700000000 HC OXYGEN THERAPY PER DAY

## 2024-05-11 PROCEDURE — 6370000000 HC RX 637 (ALT 250 FOR IP): Performed by: INTERNAL MEDICINE

## 2024-05-11 PROCEDURE — 97535 SELF CARE MNGMENT TRAINING: CPT

## 2024-05-11 PROCEDURE — 93306 TTE W/DOPPLER COMPLETE: CPT | Performed by: INTERNAL MEDICINE

## 2024-05-11 PROCEDURE — 94761 N-INVAS EAR/PLS OXIMETRY MLT: CPT

## 2024-05-11 PROCEDURE — 6360000002 HC RX W HCPCS

## 2024-05-11 PROCEDURE — 6370000000 HC RX 637 (ALT 250 FOR IP)

## 2024-05-11 PROCEDURE — 97530 THERAPEUTIC ACTIVITIES: CPT

## 2024-05-11 PROCEDURE — 85025 COMPLETE CBC W/AUTO DIFF WBC: CPT

## 2024-05-11 PROCEDURE — 36415 COLL VENOUS BLD VENIPUNCTURE: CPT

## 2024-05-11 PROCEDURE — 2060000000 HC ICU INTERMEDIATE R&B

## 2024-05-11 PROCEDURE — 80048 BASIC METABOLIC PNL TOTAL CA: CPT

## 2024-05-11 PROCEDURE — 97162 PT EVAL MOD COMPLEX 30 MIN: CPT

## 2024-05-11 PROCEDURE — 93306 TTE W/DOPPLER COMPLETE: CPT

## 2024-05-11 PROCEDURE — 97116 GAIT TRAINING THERAPY: CPT

## 2024-05-11 PROCEDURE — 2580000003 HC RX 258: Performed by: INTERNAL MEDICINE

## 2024-05-11 RX ORDER — SODIUM CHLORIDE, SODIUM LACTATE, POTASSIUM CHLORIDE, CALCIUM CHLORIDE 600; 310; 30; 20 MG/100ML; MG/100ML; MG/100ML; MG/100ML
INJECTION, SOLUTION INTRAVENOUS CONTINUOUS
Status: ACTIVE | OUTPATIENT
Start: 2024-05-11 | End: 2024-05-11

## 2024-05-11 RX ORDER — HYDROCODONE BITARTRATE AND ACETAMINOPHEN 5; 325 MG/1; MG/1
1 TABLET ORAL EVERY 4 HOURS PRN
Status: DISCONTINUED | OUTPATIENT
Start: 2024-05-11 | End: 2024-05-17 | Stop reason: HOSPADM

## 2024-05-11 RX ADMIN — SODIUM CHLORIDE: 9 INJECTION, SOLUTION INTRAVENOUS at 04:50

## 2024-05-11 RX ADMIN — ACETAMINOPHEN 650 MG: 325 TABLET ORAL at 20:24

## 2024-05-11 RX ADMIN — AMLODIPINE BESYLATE 10 MG: 5 TABLET ORAL at 08:25

## 2024-05-11 RX ADMIN — CETIRIZINE HYDROCHLORIDE 5 MG: 10 TABLET ORAL at 08:25

## 2024-05-11 RX ADMIN — ACETAMINOPHEN 650 MG: 325 TABLET ORAL at 11:27

## 2024-05-11 RX ADMIN — HYDROCODONE BITARTRATE AND ACETAMINOPHEN 1 TABLET: 5; 325 TABLET ORAL at 17:22

## 2024-05-11 RX ADMIN — FAMOTIDINE 20 MG: 20 TABLET, FILM COATED ORAL at 20:24

## 2024-05-11 RX ADMIN — HYDRALAZINE HYDROCHLORIDE 50 MG: 50 TABLET ORAL at 20:24

## 2024-05-11 RX ADMIN — VALSARTAN 80 MG: 80 TABLET, FILM COATED ORAL at 11:27

## 2024-05-11 RX ADMIN — HEPARIN SODIUM 5000 UNITS: 5000 INJECTION INTRAVENOUS; SUBCUTANEOUS at 20:23

## 2024-05-11 RX ADMIN — HEPARIN SODIUM 5000 UNITS: 5000 INJECTION INTRAVENOUS; SUBCUTANEOUS at 05:00

## 2024-05-11 RX ADMIN — SODIUM CHLORIDE, POTASSIUM CHLORIDE, SODIUM LACTATE AND CALCIUM CHLORIDE: 600; 310; 30; 20 INJECTION, SOLUTION INTRAVENOUS at 08:34

## 2024-05-11 RX ADMIN — HYDROCODONE BITARTRATE AND ACETAMINOPHEN 1 TABLET: 5; 325 TABLET ORAL at 21:26

## 2024-05-11 RX ADMIN — TRAZODONE HYDROCHLORIDE 100 MG: 100 TABLET ORAL at 20:24

## 2024-05-11 RX ADMIN — HYDRALAZINE HYDROCHLORIDE 50 MG: 50 TABLET ORAL at 15:45

## 2024-05-11 RX ADMIN — ASPIRIN 81 MG: 81 TABLET, COATED ORAL at 08:25

## 2024-05-11 RX ADMIN — ATORVASTATIN CALCIUM 20 MG: 10 TABLET, FILM COATED ORAL at 20:24

## 2024-05-11 RX ADMIN — PANTOPRAZOLE SODIUM 40 MG: 40 TABLET, DELAYED RELEASE ORAL at 08:25

## 2024-05-11 RX ADMIN — HEPARIN SODIUM 5000 UNITS: 5000 INJECTION INTRAVENOUS; SUBCUTANEOUS at 15:45

## 2024-05-11 ASSESSMENT — PAIN - FUNCTIONAL ASSESSMENT
PAIN_FUNCTIONAL_ASSESSMENT: PREVENTS OR INTERFERES SOME ACTIVE ACTIVITIES AND ADLS
PAIN_FUNCTIONAL_ASSESSMENT: ACTIVITIES ARE NOT PREVENTED
PAIN_FUNCTIONAL_ASSESSMENT: ACTIVITIES ARE NOT PREVENTED

## 2024-05-11 ASSESSMENT — PAIN DESCRIPTION - LOCATION
LOCATION: HEAD
LOCATION: HIP
LOCATION: HIP
LOCATION: FOOT;LEG
LOCATION: FOOT;LEG

## 2024-05-11 ASSESSMENT — PAIN DESCRIPTION - PAIN TYPE
TYPE: ACUTE PAIN
TYPE: CHRONIC PAIN
TYPE: CHRONIC PAIN
TYPE: ACUTE PAIN

## 2024-05-11 ASSESSMENT — PAIN SCALES - GENERAL
PAINLEVEL_OUTOF10: 9
PAINLEVEL_OUTOF10: 8
PAINLEVEL_OUTOF10: 10
PAINLEVEL_OUTOF10: 3
PAINLEVEL_OUTOF10: 10

## 2024-05-11 ASSESSMENT — PAIN DESCRIPTION - DESCRIPTORS
DESCRIPTORS: ACHING
DESCRIPTORS: ACHING;DISCOMFORT
DESCRIPTORS: ACHING
DESCRIPTORS: SHARP
DESCRIPTORS: SHARP

## 2024-05-11 ASSESSMENT — PAIN DESCRIPTION - ORIENTATION
ORIENTATION: MID
ORIENTATION: RIGHT;LEFT
ORIENTATION: MID
ORIENTATION: RIGHT
ORIENTATION: RIGHT;LEFT

## 2024-05-11 ASSESSMENT — PAIN DESCRIPTION - FREQUENCY
FREQUENCY: CONTINUOUS
FREQUENCY: CONTINUOUS

## 2024-05-11 ASSESSMENT — PAIN DESCRIPTION - ONSET
ONSET: ON-GOING
ONSET: ON-GOING

## 2024-05-11 NOTE — PROGRESS NOTES
HEART FAILURE CARE PLAN:    Comorbidities Reviewed: Yes   Patient has a past medical history of MARIO (acute kidney injury) ([facility]), Altered mental status, Arthritis, BCC (basal cell carcinoma of skin), Bladder infection, Blurred vision, CAD (coronary artery disease), Cancer ([facility]), CHF (congestive heart failure) ([facility]), Chronic back pain, Closed head injury, COPD (chronic obstructive pulmonary disease) ([facility]), Depression, Depression, Hypercholesteremia, Hypertension, Hypokalemia, Pain in shoulder, Painful total knee replacement, initial encounter ([facility]), Reflux, Rheumatic fever, Sleep apnea, Syncope and collapse, TIA (transient ischemic attack), Urinary incontinence, Urinary tract infection in female, and Wears glasses.     Weights Reviewed: Yes   Admission weight: 63.5 kg (140 lb)   Wt Readings from Last 3 Encounters:   05/11/24 63.5 kg (140 lb)   05/07/24 65.4 kg (144 lb 3.2 oz)   04/14/24 68 kg (150 lb)     Intake & Output Reviewed: Yes     Intake/Output Summary (Last 24 hours) at 5/11/2024 0906  Last data filed at 5/11/2024 0807  Gross per 24 hour   Intake 1705.26 ml   Output 600 ml   Net 1105.26 ml       ECHOCARDIOGRAM Reviewed: Yes   Patient's Ejection Fraction (EF) is greater than 40%     Medications Reviewed: Yes   SCHEDULED HOSPITAL MEDICATIONS:   sodium chloride flush  5-40 mL IntraVENous 2 times per day    heparin (porcine)  5,000 Units SubCUTAneous 3 times per day    atorvastatin  20 mg Oral Nightly    amLODIPine  10 mg Oral Daily    aspirin  81 mg Oral Daily    [Held by provider] valsartan  80 mg Oral Daily    famotidine  20 mg Oral Nightly    pantoprazole  40 mg Oral Daily    [Held by provider] torsemide  20 mg Oral Daily    traZODone  100 mg Oral Nightly    cetirizine  5 mg Oral Daily    hydrALAZINE  50 mg Oral 3 times per day     HOME MEDICATIONS:  Prior to Admission medications    Medication Sig Start Date End Date Taking? Authorizing Provider   HYDROcodone-acetaminophen (NORCO) 5-325 MG per tablet  5/9/24   Yes Montana Sanon MD   torsemide (DEMADEX) 20 MG tablet Take 1 tablet by mouth daily 5/1/24  Yes Montana Sanon MD   traZODone (DESYREL) 100 MG tablet Take 1 tablet by mouth nightly 5/1/24  Yes Montana Sanon MD   simvastatin (ZOCOR) 40 MG tablet Take 1 tablet by mouth nightly 5/1/24  Yes Montana Sanon MD   candesartan (ATACAND) 8 MG tablet Take 2 tablets by mouth daily    Montana Sanon MD   potassium chloride (KLOR-CON) 20 MEQ packet Take 20 mEq by mouth daily    Montana Sanon MD   hydrALAZINE (APRESOLINE) 50 MG tablet Take 1 tablet by mouth every 8 hours Hold if SBP < 120 or DBP < 60 mmHg 12/12/23   Young Garcia MD   famotidine (PEPCID) 20 MG tablet Take 1 tablet by mouth daily    Montana Sanon MD   cetirizine (ZYRTEC) 10 MG tablet TAKE (1) TABLET DAILY 11/30/23   Maria G Romero APRN - CNP   hydrOXYzine HCl (ATARAX) 25 MG tablet TAKE ONE (1) TABLET BY MOUTH EVERY 8 HOURS AS NEEDED FOR ITCHING 11/30/23   Maria G Romero APRN - CNP   docusate sodium (COLACE) 100 MG capsule TAKE (1) CAPSULE TWICE DAILY 11/27/23   Maria G Romero APRN - CNP   busPIRone (BUSPAR) 10 MG tablet TAKE ONE (1) TO TWO (2) TABLETS IN THE EVENING AS NEEDED FOR ANXIETY 10/6/23   Maria G Romero APRN - CNP   acetaminophen (TYLENOL) 325 MG tablet Take 2 tablets by mouth every 6 hours as needed for Pain    Montana Sanon MD   pantoprazole (PROTONIX) 40 MG tablet Take 1 tablet by mouth daily 8/5/20   Ivanna Malin APRN - CNP   aspirin 81 MG EC tablet Take 1 tablet by mouth daily 3/4/20   Montana Sanon MD   amLODIPine (NORVASC) 10 MG tablet TAKE (1) TABLET DAILY 2/8/17   Huber Smith DO      Diet Reviewed: Yes   ADULT DIET; Regular; Low Sodium (2 gm); 2000 ml    Goal of Care Reviewed: Yes   Patient and/or Family's stated Goal of Care this Admission: Reduce shortness of breath, increase activity tolerance, better understand heart failure and disease management, be more

## 2024-05-11 NOTE — PROGRESS NOTES
Writer attempted to call patient pharmacy on file:     [facility] [phone] hours Monday thru Friday [phone].   Pharmacy is closed for the weekend.     Writer will call family to see if daughter has a medication list at home.   Daughter at bedside and reports patient lives in [place] and that is out of her way to get the medication list. Daughter reports patient was just here a few weeks ago so the medication list from then should be accurate.     Update 5/12/24 1256  Patient daughter brought in medication cards. Writer sent to pharmacy for med rec completion. Copy placed in patient soft chart.

## 2024-05-11 NOTE — PROGRESS NOTES
Inpatient Physical Therapy Evaluation & Treatment    Unit: PCU  Date:  5/11/2024  Patient Name:    Nithya Neff  Admitting diagnosis:  Shortness of breath [R06.02]  Hypoxia [R09.02]  MARIO (acute kidney injury) (HCC) [N17.9]  Elevated brain natriuretic peptide (BNP) level [R79.89]  Admit Date:  5/10/2024  Precautions/Restrictions/WB Status/ Lines/ Wounds/ Oxygen: Fall risk, Bed/chair alarm, Lines (external catheter), Telemetry, and Continuous pulse oximetry      Pt seen for cotreatment this date due to patient safety, patient endurance, acute illness/injury, and need for the assistance of 2 skilled therapists    Treatment Time:  1782-0537  Treatment Number:  2   Timed Code Treatment Minutes: 25 minutes  Total Treatment Minutes:  35  minutes    Patient Stated Goals for Therapy: \" Get stronger \"          Discharge Recommendations: SNF  DME needs for discharge: Defer to facility       Therapy recommendation for EMS Transport: can transport by wheelchair    Therapy recommendations for staff:   Assist of 1 for transfers with use of rolling walker (RW) to/from BSC  to/from chair    History of Present Illness:   Per H&P, \" 84 y.o. female for shortness of breath. Onset was yesterday.  Context includes patient reports that she has had increased shortness of breath since last night.  She states that she has had chills.  Patient has a pain pump for her chronic back pain but is unable to tell me what is in her pain pump.  Patient states her pain pump has been in place for about 6 months.  She reports that it is not helping her pain. Alleviating factors include nothing.  Aggravating factors include nothing.  Nothing has been used for pain today. \"      Home Health S4 Level Recommendation:  NA        AM-PAC Mobility Score    AM-PAC Inpatient Mobility Raw Score : 15         Subjective  Patient lying reclined in bed with no family present.  Pt agreeable to this PT session.     Cognition    A&O x4   Able to follow 2 step

## 2024-05-11 NOTE — PROGRESS NOTES
Bedside report and transfer of care given to MARINO Moreno. Pt currently resting in bed with the call light within reach. Pt denies any other care needs at this time. Pt stable at this time.

## 2024-05-11 NOTE — PLAN OF CARE
Problem: Discharge Planning  Goal: Discharge to home or other facility with appropriate resources  5/11/2024 0022 by Tiffanie Delcid RN  Outcome: Progressing     Problem: Pain  Goal: Verbalizes/displays adequate comfort level or baseline comfort level  5/11/2024 0022 by Tiffanie Delcid RN  Outcome: Progressing     Problem: Safety - Adult  Goal: Free from fall injury  5/11/2024 0022 by Tiffanie Delcid RN  Outcome: Progressing     Problem: ABCDS Injury Assessment  Goal: Absence of physical injury  5/11/2024 0022 by Tiffanie Delcid RN  Outcome: Progressing     Problem: Skin/Tissue Integrity  Goal: Absence of new skin breakdown  Description: 1.  Monitor for areas of redness and/or skin breakdown  2.  Assess vascular access sites hourly  3.  Every 4-6 hours minimum:  Change oxygen saturation probe site  4.  Every 4-6 hours:  If on nasal continuous positive airway pressure, respiratory therapy assess nares and determine need for appliance change or resting period.  Outcome: Progressing

## 2024-05-11 NOTE — PROGRESS NOTES
Inpatient Occupational Therapy Evaluation and Treatment    Unit: PCU  Date:  5/11/2024  Patient Name:    Nithya Neff  Admitting diagnosis:  Shortness of breath [R06.02]  Hypoxia [R09.02]  MARIO (acute kidney injury) (HCC) [N17.9]  Elevated brain natriuretic peptide (BNP) level [R79.89]  Admit Date:  5/10/2024  Precautions/Restrictions/WB Status/ Lines/ Wounds/ Oxygen: Fall risk, Bed/chair alarm, and Lines (IV and external catheter)    Pt seen for cotreatment this date due to patient safety, patient endurance, acute illness/injury, and need for the assistance of 2 skilled therapists    Treatment Time:  2462-0672  Treatment Number:  1  Timed Code Treatment Minutes: 28 minutes  Total Treatment Minutes:  38  minutes    Patient Goals for Therapy: \"to get stronger \"          Discharge Recommendations: SNF  DME needs for discharge: Defer to facility       Therapy recommendations for staff:   Assist of 1 for ambulation with use of rolling walker (RW) and gait belt to/from BSC  to/from chair    History of Present Illness:   Per H&P of Nadeem Boyle, KALI - CNP  Chief complaint: SOB  \"The patient is a 84 y.o. female with pmhx as below who presents to [facility] with shortness of breath since last night. She reports waking up in the night gasping for air but this occurs occasionally per patient. She was having chills and shaking and unable to get out of the bed. She reports weight loss, some increasing lower extremity edema. Denies cough, urinary complaints, nausea, vomiting or diarrhea. Patient is somewhat of a poor historian, daughter able to provide some context but patient lives alone. She came to see her mother this morning around 6 am and found her lying in bed, large amount of urine soaked bed. \"    Home Health S4 Level Recommendation:  NA    AM-PAC Score: AM-PAC Inpatient Daily Activity Raw Score: 15     Subjective:  Patient lying reclined in bed with no family present.   Pt agreeable to this OT session.      Cognition:    A&O x4   Able to follow 2 step commands    Pain:   Yes  Location: LEs  Rating: moderate /10  Pain Medicine Status: No request made    Preadmission Environment:   Pt lives with                                         Alone - dtr comes each day  Home environment:                            apartment   Steps to enter first floor:                     No steps  Laundry:                                              1st floor  Bathroom:                                           walk in shower, grab bars in shower, shower chair , and comfort height toilet  Pt sleeps in a:                                     Flat bed  Equipment owned:                              RW, rollator, SPC, BSC, and [employer] that won't hold charge     Preadmission Status:  Pt able to drive: Yes  Pt fully independent with ADLs: Yes  Pt receives assistance from family for: Cleaning, Cooking, and Laundry   Pt independent for functional transfers and utilized Rollator for mobility in home and Rollator out in community  History of falls:            No  Home Health Services:None    Objective:  Does this pt have an acute or acute on chronic diagnosis of CHF? No    Upper Extremity ROM:    WFL,  pt able to perform all bed mobility, transfers, and gait without ROM limitation.    Dominant Hand: Right    Upper Extremity Strength:    BUE strength impaired but not formally assessed w/ MMT    Upper Extremity Sensation:    WFL    Upper Extremity Proprioception:  WFL    Coordination:  WFL    Tone:  WFL    Balance:  Sitting:    Supervision dynamic and static  Standing:    CGA pt demonstrated slight L. ided lateral lean during functional mobility    Bed Mobility:   Supine to Sit:    CGA  Sit to Supine:   Min A   Rolling:   Not Tested  Scooting in sitting: CGA at EOB  Scooting in supine:  Total A with use of Atkinson sheet    Transfers:    Sit to stand:    Min A   Stand to sit:    CGA  Bed to chair:     Not Tested  Bed/ chair to standard  toilet:  Not Tested  Bed/chair to BSC:   Not Tested  Functional Mobility:   CGA and With RW and gait belt pt ambulated approximately 15 feet before c/o dizziness and requesting to get back into bed versus recliner.    See PT note for gait analysis.    ADLs:  Dressing:      UE:   Not Tested  LE:    Mod A  to chang socks, Max A to doff socks    Bathing:    UE:  Not Tested  LE:  Not Tested    Eating:   Not Tested    Toileting:  Not Tested    Grooming/hygiene: Not Tested    Activity Tolerance:   Pt completed therapy session with fatigue  dizziness/lightheadedness     BP (mmHg) HR (bpm) SpO2 (%) on RA Comments   Supine at rest  80 96    Seated at /67 80     Post functional mobility alf to recliner  [id number]    End of session         Positioning Needs:   Pt in bed, alarm set, call light provided and all needs within reach .     Ther Ex / Activities Initiated:   N/A    Patient/Family Education:   Pt educated on role of inpatient OT, plan of care, importance of continued activity, DC recommendations, Functional transfer/mobility safety, Safety awareness, and Calling for assist with mobility.    CHF Education  N/A    Assessment:  Pt seen for Occupational therapy evaluation in acute care setting.  Pt demonstrated decreased Activity tolerance, ADLs, Balance , Dressing, Safety Awareness, Strength, and Transfers. Pt functioning below baseline and will likely benefit from skilled occupational therapy services to maximize safety and independence.     Recommending SNF upon discharge as patient functioning well below baseline, demonstrates good rehab potential and unable to return home due to burden of care beyond caregiver ability and home environment not conducive to patient recovery.    Goal(s) :   To be met in 3 Visits:  Bed to toilet/BSC:       CGA  Pt will complete 3/3 CHF goals     N/A    To be met in 5 Visits:  Supine to/from Sit in preparation for ADL task:   Supervision  Toileting

## 2024-05-11 NOTE — PROGRESS NOTES
IM Progress Note    Admit Date:  5/10/2024        MARIO On CKD   Falls  Weakness     Subjective:  Ms. Neff  seen    Weak    C/O chr pain    Getting IVF   Crtn better        Seen by PT, OT. Needs SNF    Objective:   BP (!) 152/66   Pulse 78   Temp 98.5 °F (36.9 °C) (Oral)   Resp 14   Ht 1.524 m (5')   Wt 63.5 kg (140 lb)   LMP  (LMP Unknown)   SpO2 100%   BMI 27.34 kg/m²     Intake/Output Summary (Last 24 hours) at 5/11/2024 1456  Last data filed at 5/11/2024 1229  Gross per 24 hour   Intake 1972.26 ml   Output 600 ml   Net 1372.26 ml         Physical Exam:  General:  Awake, alert, NAD  Looks ill, weak   Skin:  Warm and dry  Neck:  JVD absent. Neck supple  Chest:  Clear to auscultation, respiration easy. No wheezes, rales or rhonchi.   Cardiovascular:  RRR ,S1S2 normal  Abdomen:  Soft, non tender, non distended, BS +  Extremities:  No edema.  Intact peripheral pulses. Brisk cap refill, < 2 secs  Neuro: non focal      Medications:   Scheduled Meds:   sodium chloride flush  5-40 mL IntraVENous 2 times per day    heparin (porcine)  5,000 Units SubCUTAneous 3 times per day    atorvastatin  20 mg Oral Nightly    amLODIPine  10 mg Oral Daily    aspirin  81 mg Oral Daily    valsartan  80 mg Oral Daily    famotidine  20 mg Oral Nightly    pantoprazole  40 mg Oral Daily    [Held by provider] torsemide  20 mg Oral Daily    traZODone  100 mg Oral Nightly    cetirizine  5 mg Oral Daily    hydrALAZINE  50 mg Oral 3 times per day       Continuous Infusions:   lactated ringers IV soln 75 mL/hr at 05/11/24 0834    sodium chloride         Data:  CBC:   Recent Labs     05/10/24  0940 05/11/24  0437   WBC 7.4 7.2   RBC 4.10 4.11   HGB 11.4* 11.3*   HCT 33.9* 34.8*   MCV 82.7 84.5   RDW 14.9 15.3    146     BMP:   Recent Labs     05/10/24  0940 05/11/24  0437    143   K 3.7 3.8    111*   CO2 21 17*   BUN 34* 26*   CREATININE 2.1* 1.8*     BNP: No results for input(s): \"BNP\" in the last 72 hours.  PT/INR: No  https://www.fda.gov/media/587848/download    METHODOLOGY: Isothermal Nucleic Acid Amplification         Culture, Blood 1 [[id number]] Collected: 05/10/24 0940    Order Status: Completed Specimen: Blood Updated: 05/11/24 1615     Blood Culture, Routine No Growth to date.  Any change in status will be called.    Narrative:      ORDER#: [id number]                          ORDERED BY: NARESH MUIR  SOURCE: Blood Antecubital-Rig              COLLECTED:  05/10/24 09:40  ANTIBIOTICS AT [facility].:                      RECEIVED :  05/10/24 09:51  If child <=2 yrs old please draw pediatric bottle.~Blood Culture 1              Radiology  XR CHEST (2 VW)   Final Result   No acute cardiopulmonary process.               Assessment:  Principal Problem:    Hypoxia  Active Problems:    Benign essential HTN    Hyperlipidemia    Atherosclerotic heart disease of native coronary artery with other forms of angina pectoris (HCC)    Primary insomnia    MARIA C (obstructive sleep apnea)    Alzheimer's dementia without behavioral disturbance (HCC)    MARIO (acute kidney injury) (HCC)    Aortic stenosis, severe    COPD (chronic obstructive pulmonary disease) (HCC)    Elevated troponin    Shortness of breath  Resolved Problems:    * No resolved hospital problems. *      Plan:    Acute hypoxic respiratory failure (tachypnea, dyspnea)   hx MARIA C does not tolerated CPAP  - no O2 at baseline, now on 2 L NC -> wean as tolerated , now on room air when seen in ED  - CXR without acute findings   - COVID negative  - afebrile  - blood cx sent in ED  - check sputum cx    - last admission had to be placed on oxygen while sleeping   - monitor      MARIO on CKD  - creatine previously 1.4 - 1.7, 2.1 on admission   - patient appears dry  - given 500 mL bolus in ED, continue gentle IVFs  - bladder scan to see if retaining urine  - avoid nephrotoxic agents   - nephrology consulted due to frequent MARIO  - monitor   Cont IVF. Crtn 1.8     Chronic diastolic CHF  - BNP >  4K  - last echo 9/2022 EF 60-65%, grade I DD, repeat echo ordered  - holding diuresis 2/2 MARIO   - consider cardiology consult based on echo results  - patient appears dry, monitor with gentle IVFs  - daily weight, I/Os, low na diet, fluid restriction      Elevated troponin- chronic   - 2/2 above and likely due to decreased clearance with CKD   - no CP  - EKG without acute ischemic changes  - monitor on telemetry      HTN  - norvasc and cadesartan held 2/2 MARIO  - monitor BP     CAD  S/p PCI to RCA 2017 and 11/2017  - denies any chest pain  - no acute EKG changes  - no BB due to hx bradycardia  - on aspirin and statin      Hx of aortic stenosis s/p TAVR     COPD no ae  - not on any home regimen      GERD  - Continue PPI       Anemia  - hgb at baseline  - no reports of bleeding      Alzheimer's Dementia   -pt lives at home and daughter checks in on her daily  - used to be on depakote, no longer on per med rec   - PT/OT  - supportive care , patient and daughter confirm patient is a full code      Chronic pain  - had pain pump- not functioning .   On Prn norco- reordered   - follows with pain management      Weight loss  - Body mass index is 27.34 kg/m².  - dietician consult      Weakness, debility, falls  - seen by PT, OT   Needs SNF     Note above makes patient higher risk for morbidity and mortality requiring testing and treatment.         DVT Prophylaxis: Heparin  Diet: ADULT DIET; Regular; Low Sodium (2 gm); 2000 ml  Code Status: Full Code            Elicia Gray MD   5/11/2024 2:56 PM

## 2024-05-11 NOTE — PLAN OF CARE
Problem: Pain  Goal: Verbalizes/displays adequate comfort level or baseline comfort level  5/11/2024 0905 by Cas Gracia, RN  Outcome: Progressing  5/11/2024 0022 by Tiffanie Delcid, RN  Outcome: Progressing     Problem: Safety - Adult  Goal: Free from fall injury  5/11/2024 0905 by Cas Gracia, RN  Outcome: Progressing  5/11/2024 0022 by Tiffanie Delcid, RN  Outcome: Progressing

## 2024-05-11 NOTE — PROGRESS NOTES
[facility]  Nephrology follow-up note           Reason for Consult: MARIO on CKD 3/4  Requesting Physician:  Dr. Gray    Sub/interval history  She reports that her heartburn is slightly better  She did not tolerate GI cocktail on 5/10  Renal function improving    Last 24 h uop 600 mL charted    ROS: No chest pain/shortness of breath/fever/nausea/vomiting  PSFH: + visitor    Scheduled Meds:   sodium chloride flush  5-40 mL IntraVENous 2 times per day    heparin (porcine)  5,000 Units SubCUTAneous 3 times per day    atorvastatin  20 mg Oral Nightly    amLODIPine  10 mg Oral Daily    aspirin  81 mg Oral Daily    [Held by provider] valsartan  80 mg Oral Daily    famotidine  20 mg Oral Nightly    pantoprazole  40 mg Oral Daily    [Held by provider] torsemide  20 mg Oral Daily    traZODone  100 mg Oral Nightly    cetirizine  5 mg Oral Daily    hydrALAZINE  50 mg Oral 3 times per day     Continuous Infusions:   lactated ringers IV soln 75 mL/hr at 05/11/24 0834    sodium chloride       PRN Meds:.perflutren lipid microspheres, sodium chloride flush, sodium chloride, ondansetron **OR** ondansetron, polyethylene glycol, acetaminophen **OR** acetaminophen, busPIRone    History of Present Illness on 5/10/2024:    84 y.o. yo female with PMH of CAD, CHF preserved EF, AVR, COPD, HTN, HLD and TIA, CKD 3B/4 who is admitted for shortness of breath and nephrology has been consulted for MARIO  Patient reports that for the last several days she has not been eating and drinking well.  She has been having stomach upset and has a lot of belching and few episodes vomiting.  Last night she thought that she had high fever as she had drenching sweats.  She also wetted her bed.  This morning she continued to have shortness of breath and went to the emergency room and has been admitted.  She reports after being on nasal cannula she is feeling bit better.  Creatinine was noted to be 2.1 and nephrology has been consulted    Physical exam:    Constitutional:  VITALS:  BP (!) 149/49   Pulse 76   Temp 98.5 °F (36.9 °C) (Oral)   Resp 18   Ht 1.524 m (5')   Wt 63.5 kg (140 lb)   LMP  (LMP Unknown)   SpO2 98%   BMI 27.34 kg/m²   Gen: alert, awake  Neck: No JVD  Skin: Unremarkable  Cardiovascular:  S1, S2 without m/r/g   Respiratory: CTA B without w/r/r; respiratory effort normal  Abdomen:  soft, nt, nd,   Extremities: no lower extremity edema  Neuro/Psy: AAoriented times 3 ; moves all 4 ext    Data/  Recent Labs     05/10/24  0940 05/11/24  0437   WBC 7.4 7.2   HGB 11.4* 11.3*   HCT 33.9* 34.8*   MCV 82.7 84.5    146       Recent Labs     05/10/24  0940 05/11/24  0437    143   K 3.7 3.8    111*   CO2 21 17*   GLUCOSE 123* 92   BUN 34* 26*   CREATININE 2.1* 1.8*   LABGLOM 23* 27*       Chest x-ray has been read as no acute abnormalities    Urinalysis has been unremarkable    Assessment  -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 intake, vomiting indicating prerenal etiology.    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  Baseline creatinine 1.5-1.8    -Decreased p.o. intake nausea vomiting possibly from GERD, defer to primary team    -Acute respiratory alkalosis suggestive of hyperventilation    -Anemia    -Chronic CHF with preserved EF.  Patient does not appear to be volume overloaded    -History of AVR    Plan  -Change IV fluid to LR for 1 more liter  - wean down O2  -Holding torsemide and Aldactone  -Continue amlodipine  -Resume valsartan 80 mg daily  -Further workup of GI symptoms per primary team  -Serial renal panel  -daily wts and strict i/o  -renal dose medications   -avoid nephrotoxins      Thank you for the consultation.  Please do not hesitate to call with questions.    Mirza Glass MD  Office: [phone]  Fax:    [phone]  [email or web address]

## 2024-05-12 PROBLEM — N39.0 UTI (URINARY TRACT INFECTION): Status: RESOLVED | Noted: 2024-04-12 | Resolved: 2024-05-12

## 2024-05-12 LAB
ANION GAP SERPL CALCULATED.3IONS-SCNC: 10 MMOL/L (ref 3–16)
BASOPHILS # BLD: 0.1 K/UL (ref 0–0.2)
BASOPHILS NFR BLD: 0.5 %
BUN SERPL-MCNC: 20 MG/DL (ref 7–20)
CALCIUM SERPL-MCNC: 8.6 MG/DL (ref 8.3–10.6)
CHLORIDE SERPL-SCNC: 114 MMOL/L (ref 99–110)
CO2 SERPL-SCNC: 21 MMOL/L (ref 21–32)
CREAT SERPL-MCNC: 1.6 MG/DL (ref 0.6–1.2)
DEPRECATED RDW RBC AUTO: 15 % (ref 12.4–15.4)
EOSINOPHIL # BLD: 0 K/UL (ref 0–0.6)
EOSINOPHIL NFR BLD: 0.3 %
GFR SERPLBLD CREATININE-BSD FMLA CKD-EPI: 31 ML/MIN/{1.73_M2}
GLUCOSE SERPL-MCNC: 92 MG/DL (ref 70–99)
HCT VFR BLD AUTO: 30.1 % (ref 36–48)
HGB BLD-MCNC: 10 G/DL (ref 12–16)
LYMPHOCYTES # BLD: 1.4 K/UL (ref 1–5.1)
LYMPHOCYTES NFR BLD: 12.5 %
MAGNESIUM SERPL-MCNC: 1.9 MG/DL (ref 1.8–2.4)
MCH RBC QN AUTO: 27.5 PG (ref 26–34)
MCHC RBC AUTO-ENTMCNC: 33.2 G/DL (ref 31–36)
MCV RBC AUTO: 82.9 FL (ref 80–100)
MONOCYTES # BLD: 0.8 K/UL (ref 0–1.3)
MONOCYTES NFR BLD: 7.2 %
NEUTROPHILS # BLD: 8.9 K/UL (ref 1.7–7.7)
NEUTROPHILS NFR BLD: 79.5 %
PLATELET # BLD AUTO: 135 K/UL (ref 135–450)
PMV BLD AUTO: 8.4 FL (ref 5–10.5)
POTASSIUM SERPL-SCNC: 3.5 MMOL/L (ref 3.5–5.1)
RBC # BLD AUTO: 3.63 M/UL (ref 4–5.2)
SODIUM SERPL-SCNC: 145 MMOL/L (ref 136–145)
WBC # BLD AUTO: 11.2 K/UL (ref 4–11)

## 2024-05-12 PROCEDURE — 36415 COLL VENOUS BLD VENIPUNCTURE: CPT

## 2024-05-12 PROCEDURE — 6360000002 HC RX W HCPCS

## 2024-05-12 PROCEDURE — 85025 COMPLETE CBC W/AUTO DIFF WBC: CPT

## 2024-05-12 PROCEDURE — 6370000000 HC RX 637 (ALT 250 FOR IP): Performed by: INTERNAL MEDICINE

## 2024-05-12 PROCEDURE — 2060000000 HC ICU INTERMEDIATE R&B

## 2024-05-12 PROCEDURE — 83735 ASSAY OF MAGNESIUM: CPT

## 2024-05-12 PROCEDURE — 6370000000 HC RX 637 (ALT 250 FOR IP)

## 2024-05-12 PROCEDURE — 2580000003 HC RX 258

## 2024-05-12 PROCEDURE — 80048 BASIC METABOLIC PNL TOTAL CA: CPT

## 2024-05-12 RX ORDER — DICLOFENAC SODIUM 75 MG/1
75 TABLET, DELAYED RELEASE ORAL EVERY 12 HOURS PRN
Status: ON HOLD | COMMUNITY
End: 2024-05-17 | Stop reason: HOSPADM

## 2024-05-12 RX ORDER — POTASSIUM CHLORIDE 750 MG/1
20 TABLET, EXTENDED RELEASE ORAL DAILY
Status: ON HOLD | COMMUNITY
End: 2024-05-17 | Stop reason: HOSPADM

## 2024-05-12 RX ORDER — CITALOPRAM 20 MG/1
30 TABLET ORAL DAILY
COMMUNITY

## 2024-05-12 RX ADMIN — HEPARIN SODIUM 5000 UNITS: 5000 INJECTION INTRAVENOUS; SUBCUTANEOUS at 05:32

## 2024-05-12 RX ADMIN — HYDRALAZINE HYDROCHLORIDE 50 MG: 50 TABLET ORAL at 05:32

## 2024-05-12 RX ADMIN — Medication 10 ML: at 07:53

## 2024-05-12 RX ADMIN — ATORVASTATIN CALCIUM 20 MG: 10 TABLET, FILM COATED ORAL at 21:21

## 2024-05-12 RX ADMIN — HYDROCODONE BITARTRATE AND ACETAMINOPHEN 1 TABLET: 5; 325 TABLET ORAL at 07:52

## 2024-05-12 RX ADMIN — HEPARIN SODIUM 5000 UNITS: 5000 INJECTION INTRAVENOUS; SUBCUTANEOUS at 21:21

## 2024-05-12 RX ADMIN — HYDROCODONE BITARTRATE AND ACETAMINOPHEN 1 TABLET: 5; 325 TABLET ORAL at 21:20

## 2024-05-12 RX ADMIN — HYDROCODONE BITARTRATE AND ACETAMINOPHEN 1 TABLET: 5; 325 TABLET ORAL at 02:10

## 2024-05-12 RX ADMIN — FAMOTIDINE 20 MG: 20 TABLET, FILM COATED ORAL at 21:21

## 2024-05-12 RX ADMIN — HYDRALAZINE HYDROCHLORIDE 50 MG: 50 TABLET ORAL at 21:20

## 2024-05-12 RX ADMIN — PANTOPRAZOLE SODIUM 40 MG: 40 TABLET, DELAYED RELEASE ORAL at 07:52

## 2024-05-12 RX ADMIN — HYDROCODONE BITARTRATE AND ACETAMINOPHEN 1 TABLET: 5; 325 TABLET ORAL at 14:20

## 2024-05-12 RX ADMIN — Medication 10 ML: at 21:17

## 2024-05-12 RX ADMIN — ASPIRIN 81 MG: 81 TABLET, COATED ORAL at 07:52

## 2024-05-12 RX ADMIN — TRAZODONE HYDROCHLORIDE 100 MG: 100 TABLET ORAL at 21:20

## 2024-05-12 RX ADMIN — ONDANSETRON 4 MG: 2 INJECTION INTRAMUSCULAR; INTRAVENOUS at 02:10

## 2024-05-12 RX ADMIN — HEPARIN SODIUM 5000 UNITS: 5000 INJECTION INTRAVENOUS; SUBCUTANEOUS at 14:19

## 2024-05-12 RX ADMIN — HYDRALAZINE HYDROCHLORIDE 50 MG: 50 TABLET ORAL at 14:20

## 2024-05-12 RX ADMIN — AMLODIPINE BESYLATE 10 MG: 5 TABLET ORAL at 07:52

## 2024-05-12 RX ADMIN — VALSARTAN 80 MG: 80 TABLET, FILM COATED ORAL at 07:52

## 2024-05-12 RX ADMIN — CETIRIZINE HYDROCHLORIDE 5 MG: 10 TABLET ORAL at 07:52

## 2024-05-12 ASSESSMENT — PAIN DESCRIPTION - ORIENTATION
ORIENTATION: RIGHT;LEFT
ORIENTATION: RIGHT
ORIENTATION: RIGHT

## 2024-05-12 ASSESSMENT — PAIN DESCRIPTION - DESCRIPTORS
DESCRIPTORS: ACHING
DESCRIPTORS: ACHING
DESCRIPTORS: SHARP
DESCRIPTORS: ACHING

## 2024-05-12 ASSESSMENT — PAIN - FUNCTIONAL ASSESSMENT
PAIN_FUNCTIONAL_ASSESSMENT: PREVENTS OR INTERFERES SOME ACTIVE ACTIVITIES AND ADLS

## 2024-05-12 ASSESSMENT — PAIN SCALES - GENERAL
PAINLEVEL_OUTOF10: 8
PAINLEVEL_OUTOF10: 10
PAINLEVEL_OUTOF10: 7

## 2024-05-12 ASSESSMENT — PAIN DESCRIPTION - LOCATION
LOCATION: HIP
LOCATION: FOOT;LEG
LOCATION: BACK;HEAD;NECK
LOCATION: HIP
LOCATION: HEAD

## 2024-05-12 ASSESSMENT — PAIN DESCRIPTION - PAIN TYPE
TYPE: ACUTE PAIN
TYPE: CHRONIC PAIN
TYPE: ACUTE PAIN
TYPE: ACUTE PAIN

## 2024-05-12 ASSESSMENT — PAIN DESCRIPTION - ONSET
ONSET: ON-GOING
ONSET: ON-GOING

## 2024-05-12 ASSESSMENT — PAIN DESCRIPTION - FREQUENCY
FREQUENCY: CONTINUOUS
FREQUENCY: CONTINUOUS

## 2024-05-12 ASSESSMENT — PAIN DESCRIPTION - DIRECTION: RADIATING_TOWARDS: BACK

## 2024-05-12 NOTE — PROGRESS NOTES
HEART FAILURE CARE PLAN:    Comorbidities Reviewed: Yes   Patient has a past medical history of MARIO (acute kidney injury) ([facility]), Altered mental status, Arthritis, BCC (basal cell carcinoma of skin), Bladder infection, Blurred vision, CAD (coronary artery disease), Cancer ([facility]), CHF (congestive heart failure) ([facility]), Chronic back pain, Closed head injury, COPD (chronic obstructive pulmonary disease) ([facility]), Depression, Depression, Hypercholesteremia, Hypertension, Hypokalemia, Pain in shoulder, Painful total knee replacement, initial encounter ([facility]), Reflux, Rheumatic fever, Sleep apnea, Syncope and collapse, TIA (transient ischemic attack), Urinary incontinence, Urinary tract infection in female, and Wears glasses.     Weights Reviewed: Yes   Admission weight: 63.5 kg (140 lb)   Wt Readings from Last 3 Encounters:   05/11/24 63.5 kg (140 lb)   05/07/24 65.4 kg (144 lb 3.2 oz)   04/14/24 68 kg (150 lb)     Intake & Output Reviewed: Yes     Intake/Output Summary (Last 24 hours) at 5/11/2024 2307  Last data filed at 5/11/2024 1823  Gross per 24 hour   Intake 2651.57 ml   Output 950 ml   Net 1701.57 ml       ECHOCARDIOGRAM Reviewed: Yes   Patient's Ejection Fraction (EF) is greater than 40%     Medications Reviewed: Yes   SCHEDULED HOSPITAL MEDICATIONS:   sodium chloride flush  5-40 mL IntraVENous 2 times per day    heparin (porcine)  5,000 Units SubCUTAneous 3 times per day    atorvastatin  20 mg Oral Nightly    amLODIPine  10 mg Oral Daily    aspirin  81 mg Oral Daily    valsartan  80 mg Oral Daily    famotidine  20 mg Oral Nightly    pantoprazole  40 mg Oral Daily    [Held by provider] torsemide  20 mg Oral Daily    traZODone  100 mg Oral Nightly    cetirizine  5 mg Oral Daily    hydrALAZINE  50 mg Oral 3 times per day     HOME MEDICATIONS:  Prior to Admission medications    Medication Sig Start Date End Date Taking? Authorizing Provider   HYDROcodone-acetaminophen (NORCO) 5-325 MG per tablet  5/9/24  Yes Provider,  MD [place]   torsemide (DEMADEX) 20 MG tablet Take 1 tablet by mouth daily 5/1/24  Yes [place] Sanon MD   traZODone (DESYREL) 100 MG tablet Take 1 tablet by mouth nightly 5/1/24  Yes [place] Sanon MD   simvastatin (ZOCOR) 40 MG tablet Take 1 tablet by mouth nightly 5/1/24  Yes [place] Sanon MD   candesartan (ATACAND) 8 MG tablet Take 2 tablets by mouth daily    [place]ana Sanon MD   potassium chloride (KLOR-CON) 20 MEQ packet Take 20 mEq by mouth daily    [place] Sanon MD   hydrALAZINE (APRESOLINE) 50 MG tablet Take 1 tablet by mouth every 8 hours Hold if SBP < 120 or DBP < 60 mmHg 12/12/23   Young Garcia MD   famotidine (PEPCID) 20 MG tablet Take 1 tablet by mouth daily    [place] Sanon MD   cetirizine (ZYRTEC) 10 MG tablet TAKE (1) TABLET DAILY 11/30/23   Maria G Romero APRN - CNP   hydrOXYzine HCl (ATARAX) 25 MG tablet TAKE ONE (1) TABLET BY MOUTH EVERY 8 HOURS AS NEEDED FOR ITCHING 11/30/23   Maria G Romero APRN - CNP   docusate sodium (COLACE) 100 MG capsule TAKE (1) CAPSULE TWICE DAILY 11/27/23   Maria G Romero APRN - CNP   busPIRone (BUSPAR) 10 MG tablet TAKE ONE (1) TO TWO (2) TABLETS IN THE EVENING AS NEEDED FOR ANXIETY 10/6/23   Maria G Romero APRN - CNP   acetaminophen (TYLENOL) 325 MG tablet Take 2 tablets by mouth every 6 hours as needed for Pain    [place] Sanon MD   pantoprazole (PROTONIX) 40 MG tablet Take 1 tablet by mouth daily 8/5/20   Ivanna Malin APRN - CNP   aspirin 81 MG EC tablet Take 1 tablet by mouth daily 3/4/20   [place] Sanon MD   amLODIPine (NORVASC) 10 MG tablet TAKE (1) TABLET DAILY 2/8/17   Huber Smith DO      Diet Reviewed: Yes   ADULT DIET; Regular; Low Sodium (2 gm); 2000 ml    Goal of Care Reviewed: Yes   Patient and/or Family's stated Goal of Care this Admission: Reduce shortness of breath, increase activity tolerance, better understand heart failure and disease management, be more comfortable,

## 2024-05-12 NOTE — FLOWSHEET NOTE
05/12/24 0745   Vital Signs   Temp 98.3 °F (36.8 °C)   Temp Source Oral   Pulse 88   Heart Rate Source Monitor   Respirations 20   BP (!) 143/59   MAP (Calculated) 87   BP Location Right upper arm   BP Method Automatic   Patient Position Semi fowlers   Pain Assessment   Pain Assessment 0-10   Pain Level 7   Patient's Stated Pain Goal 3   Pain Location Hip   Pain Orientation Right   Pain Descriptors Aching   Pain Type Acute pain   Oxygen Therapy   SpO2 97 %   O2 Device None (Room air)       Shift assessment complete - see flow sheet, pt denies needs, call light within reach.

## 2024-05-12 NOTE — PROGRESS NOTES
[facility]  Nephrology follow-up note           Reason for Consult: MARIO on CKD 3/4  Requesting Physician:  Dr. Gray    Sub/interval history  Overall feels better  Off oxygen    Last 24 h uop 600 mL charted    ROS: No chest pain/shortness of breath/fever/nausea/vomiting  PSFH: + visitor    Scheduled Meds:   sodium chloride flush  5-40 mL IntraVENous 2 times per day    heparin (porcine)  5,000 Units SubCUTAneous 3 times per day    atorvastatin  20 mg Oral Nightly    amLODIPine  10 mg Oral Daily    aspirin  81 mg Oral Daily    valsartan  80 mg Oral Daily    famotidine  20 mg Oral Nightly    pantoprazole  40 mg Oral Daily    [Held by provider] torsemide  20 mg Oral Daily    traZODone  100 mg Oral Nightly    cetirizine  5 mg Oral Daily    hydrALAZINE  50 mg Oral 3 times per day     Continuous Infusions:   sodium chloride       PRN Meds:.HYDROcodone-acetaminophen, perflutren lipid microspheres, sodium chloride flush, sodium chloride, ondansetron **OR** ondansetron, polyethylene glycol, acetaminophen **OR** acetaminophen, busPIRone    History of Present Illness on 5/10/2024:    84 y.o. yo female with PMH of CAD, CHF preserved EF, AVR, COPD, HTN, HLD and TIA, CKD 3B/4 who is admitted for shortness of breath and nephrology has been consulted for MARIO  Patient reports that for the last several days she has not been eating and drinking well.  She has been having stomach upset and has a lot of belching and few episodes vomiting.  Last night she thought that she had high fever as she had drenching sweats.  She also wetted her bed.  This morning she continued to have shortness of breath and went to the emergency room and has been admitted.  She reports after being on nasal cannula she is feeling bit better.  Creatinine was noted to be 2.1 and nephrology has been consulted    Physical exam:   Constitutional:  VITALS:  /60   Pulse 80   Temp 98.2 °F (36.8 °C) (Oral)   Resp 12   Ht 1.524 m (5')   Wt 65.2 kg (143 lb

## 2024-05-12 NOTE — PROGRESS NOTES
HEART FAILURE CARE PLAN:    Comorbidities Reviewed: Yes   Patient has a past medical history of MARIO (acute kidney injury) ([facility]), Altered mental status, Arthritis, BCC (basal cell carcinoma of skin), Bladder infection, Blurred vision, CAD (coronary artery disease), Cancer ([facility]), CHF (congestive heart failure) ([facility]), Chronic back pain, Closed head injury, COPD (chronic obstructive pulmonary disease) ([facility]), Depression, Depression, Hypercholesteremia, Hypertension, Hypokalemia, Pain in shoulder, Painful total knee replacement, initial encounter ([facility]), Reflux, Rheumatic fever, Sleep apnea, Syncope and collapse, TIA (transient ischemic attack), Urinary incontinence, Urinary tract infection in female, and Wears glasses.     Weights Reviewed: Yes   Admission weight: 63.5 kg (140 lb)   Wt Readings from Last 3 Encounters:   05/12/24 65.2 kg (143 lb 12.8 oz)   05/07/24 65.4 kg (144 lb 3.2 oz)   04/14/24 68 kg (150 lb)     Intake & Output Reviewed: Yes     Intake/Output Summary (Last 24 hours) at 5/12/2024 1026  Last data filed at 5/12/2024 0532  Gross per 24 hour   Intake 1666.31 ml   Output 600 ml   Net 1066.31 ml       ECHOCARDIOGRAM Reviewed: Yes   Patient's Ejection Fraction (EF) is greater than 40%     Medications Reviewed: Yes   SCHEDULED HOSPITAL MEDICATIONS:   sodium chloride flush  5-40 mL IntraVENous 2 times per day    heparin (porcine)  5,000 Units SubCUTAneous 3 times per day    atorvastatin  20 mg Oral Nightly    amLODIPine  10 mg Oral Daily    aspirin  81 mg Oral Daily    valsartan  80 mg Oral Daily    famotidine  20 mg Oral Nightly    pantoprazole  40 mg Oral Daily    [Held by provider] torsemide  20 mg Oral Daily    traZODone  100 mg Oral Nightly    cetirizine  5 mg Oral Daily    hydrALAZINE  50 mg Oral 3 times per day     HOME MEDICATIONS:  Prior to Admission medications    Medication Sig Start Date End Date Taking? Authorizing Provider   HYDROcodone-acetaminophen (NORCO) 5-325 MG per tablet  5/9/24  Yes  comfortable, and reduce lower extremity edema prior to discharge.     Electronically signed by Cas Gracia RN on 5/12/2024 at 10:26 AM

## 2024-05-12 NOTE — PROGRESS NOTES
IM Progress Note    Admit Date:  5/10/2024        MARIO On CKD   Falls  Weakness     Subjective:  Ms. Neff seen .  S/p IV fluid hydration with improvement of creatinine levels.    Patient looks much better today sitting up on the chair, feels better.   No shortness of breath.  Off of IV fluids,   Crtn better      Seen by PT, OT. Needs SNF    Objective:   /60   Pulse 80   Temp 98.2 °F (36.8 °C) (Oral)   Resp 12   Ht 1.524 m (5')   Wt 65.2 kg (143 lb 12.8 oz) Comment: white scale with tele box lifted  LMP  (LMP Unknown)   SpO2 98%   BMI 28.08 kg/m²     Intake/Output Summary (Last 24 hours) at 5/12/2024 1523  Last data filed at 5/12/2024 1208  Gross per 24 hour   Intake 1666.31 ml   Output 600 ml   Net 1066.31 ml           Physical Exam:  General:  Awake, alert, NAD.  Well-oriented  Skin:  Warm and dry  Neck:  JVD absent. Neck supple  Chest:  Clear to auscultation, respiration easy. No wheezes, rales or rhonchi.   Cardiovascular:  RRR ,S1S2 normal  Abdomen:  Soft, non tender, non distended, BS +  Extremities:  No edema.  Intact peripheral pulses. Brisk cap refill, < 2 secs  Neuro: non focal      Medications:   Scheduled Meds:   sodium chloride flush  5-40 mL IntraVENous 2 times per day    heparin (porcine)  5,000 Units SubCUTAneous 3 times per day    atorvastatin  20 mg Oral Nightly    amLODIPine  10 mg Oral Daily    aspirin  81 mg Oral Daily    valsartan  80 mg Oral Daily    famotidine  20 mg Oral Nightly    pantoprazole  40 mg Oral Daily    [Held by provider] torsemide  20 mg Oral Daily    traZODone  100 mg Oral Nightly    cetirizine  5 mg Oral Daily    hydrALAZINE  50 mg Oral 3 times per day       Continuous Infusions:   sodium chloride         Data:  CBC:   Recent Labs     05/10/24  0940 05/11/24  0437 05/12/24  0429   WBC 7.4 7.2 11.2*   RBC 4.10 4.11 3.63*   HGB 11.4* 11.3* 10.0*   HCT 33.9* 34.8* 30.1*   MCV 82.7 84.5 82.9   RDW 14.9 15.3 15.0    146 135       BMP:   Recent Labs      15.3 15.0    146 135       BMP:   Recent Labs     05/10/24  0940 05/11/24  0437 05/12/24  0429    143 145   K 3.7 3.8 3.5    111* 114*   CO2 21 17* 21   BUN 34* 26* 20   CREATININE 2.1* 1.8* 1.6*       BNP: No results for input(s): \"BNP\" in the last 72 hours.  PT/INR: No results for input(s): \"PROTIME\", \"INR\" in the last 72 hours.  APTT: No results for input(s): \"APTT\" in the last 72 hours.  CARDIAC ENZYMES: No results for input(s): \"CKMB\", \"CKMBINDEX\", \"TROPONINI\" in the last 72 hours.    Invalid input(s): \"CKTOTAL;3\"  FASTING LIPID PANEL:  Lab Results   Component Value Date    CHOL 112 09/18/2022    HDL 53 04/07/2023    TRIG 104 09/18/2022     LIVER PROFILE:   Recent Labs     05/10/24  0940   AST 10*   ALT <5*   BILITOT 0.4   ALKPHOS 110            Cultures  Results       Procedure Component Value Units Date/Time    Culture, Blood 2 [[id number]] Collected: 05/10/24 1626    Order Status: Completed Specimen: Blood Updated: 05/11/24 1715     Culture, Blood 2 No Growth to date.  Any change in status will be called.    Narrative:      ORDER#: [id number]                          ORDERED BY: NARESH MUIR  SOURCE: Blood Hand, Right                  COLLECTED:  05/10/24 16:26  ANTIBIOTICS AT [facility].:                      RECEIVED :  05/10/24 16:29  If child <=2 yrs old please draw pediatric bottle.~Blood Culture #2    Culture, Respiratory [[id number]]     Order Status: No result Specimen: Sputum Expectorated     COVID-19, Rapid [[id number]] Collected: 05/10/24 1420    Order Status: Completed Specimen: Nasopharyngeal Swab Updated: 05/10/24 1445     SARS-CoV-2, NAAT Not Detected     Comment: Rapid NAAT:   Negative results should be treated as presumptive and,  if inconsistent with clinical signs and symptoms or necessary for  patient management, should be tested with an alternative molecular  assay. Negative results do not preclude SARS-CoV-2 infection and  should not be used as the sole basis for  2.1 on admission   - patient appears dry  - given 500 mL bolus in ED, continued on  gentle IVFs  - bladder scan to see if retaining urine  - avoid nephrotoxic agents   - nephrology consulted due to frequent MARIO  -S/p IV fluids . Crtn improved to 1.8-> 1.6.  MARIO resolved.  Continue to monitor BMP.  Continue to hold diuretics     Chronic diastolic CHF  - BNP > 4K  - last echo 9/2022 EF 60-65%, grade I DD, repeat echo ordered  - holding diuresis 2/2 MARIO   - consider cardiology consult based on echo results  -S/p IVFs  - daily weight, I/Os, low na diet, fluid restriction      Elevated troponin- chronic   - 2/2 above and likely due to decreased clearance with CKD   - no CP  - EKG without acute ischemic changes  - monitor on telemetry      HTN  -  cadesartan held 2/2 MARIO  -Resumed on Norvasc  - monitor BP     CAD  S/p PCI to RCA 2017 and 11/2017  - denies any chest pain  - no acute EKG changes  - no BB due to hx bradycardia  - on aspirin and statin      Hx of aortic stenosis s/p TAVR     COPD no ae  - not on any home regimen      GERD  - Continue PPI       Anemia  - hgb at baseline  - no reports of bleeding      Alzheimer's Dementia   -pt lives at home and daughter checks in on her daily  - used to be on depakote, no longer on per med rec   - PT/OT  - supportive care , patient and daughter confirm patient is a full code      Chronic pain  - had pain pump- not functioning .   On Prn norco- reordered   - follows with pain management      Weight loss  - Body mass index is 27.34 kg/m².  - dietician consult      Weakness, debility, falls  - seen by PT, OT   Needs SNF     Note above makes patient higher risk for morbidity and mortality requiring testing and treatment.         DVT Prophylaxis: Heparin  Diet: ADULT DIET; Regular; Low Sodium (2 gm); 2000 ml  Code Status: Full Code     She is improving  DC planning to SNF in a.m.      Elicia Gray MD   5/12/2024 3:23 PM

## 2024-05-12 NOTE — PLAN OF CARE
Problem: Pain  Goal: Verbalizes/displays adequate comfort level or baseline comfort level  5/12/2024 1024 by Cas Gracia, RN  Outcome: Progressing  5/11/2024 2334 by Tiffanie Delcid RN  Outcome: Progressing     Problem: Safety - Adult  Goal: Free from fall injury  5/12/2024 1024 by Cas Gracia, RN  Outcome: Progressing  5/11/2024 2334 by Tiffanie Delcid, RN  Outcome: Progressing

## 2024-05-12 NOTE — PROGRESS NOTES
Bedside report and transfer of care given to MARINO Chance. Pt currently resting in bed with the call light within reach. Pt denies any other care needs at this time. Pt stable at this time.

## 2024-05-12 NOTE — PROGRESS NOTES
Comprehensive Nutrition Assessment    Type and Reason for Visit:  Initial, Positive Nutrition Screen, Consult (+ screen for stage 2 pressure wound on L buttocks; consult for unintentional weight loss)    Nutrition Recommendations/Plan:   Continue ADULT DIET; Regular; Low Sodium diet order + 2000 ml FR per day.   Monitor appetite and po intake.   Monitor nutrition-related labs, bowel function + GI status, and weight trends.      Malnutrition Assessment:  Malnutrition Status:  At risk for malnutrition (05/12/24 1321)    Context:  Chronic Illness     Findings of the 6 clinical characteristics of malnutrition:  Energy Intake:  Mild decrease in energy intake   Weight Loss:  Greater than 10% over 6 months (-17# or 10.2% weight loss since 12/10/23)     Body Fat Loss:  Unable to assess     Muscle Mass Loss:  Unable to assess    Fluid Accumulation:  No significant fluid accumulation     Strength:  Not Performed    Nutrition Assessment:    patient was nutritionally compromised upon admission AEB decreased appetite and po intake + SOB + patient couldn't get out of bed and wet the bed PTA, however, she is slightly improved from a nutritional standpoint AEB improving appetite and po intake during this admission; will continue ADULT DIET; Regular; Low Sodium diet order + 1000 ml FR per day    Nutrition Related Findings:    patient is A & O; she presented with c/o SOB and patient couldn't get out of bed + wet the bed PTA; she has consumed < 50% of most meals during this admission; + BM on 5/10/24; patient has a hx of dementia; she lives at home and her daughter checks on her daily Wound Type: Pressure Injury, Stage II (stage 2 pressure wound on L buttocks)       Current Nutrition Intake & Therapies:    Average Meal Intake: 1-25%, 26-50%, 51-75%  Average Supplements Intake: None Ordered  ADULT DIET; Regular; Low Sodium (2 gm); 2000 ml    Anthropometric Measures:  Height: 152.4 cm (5')  Ideal Body Weight (IBW): 100 lbs (45 kg)   Quality 431: Preventive Care And Screening: Unhealthy Alcohol Use - Screening: Patient screened for unhealthy alcohol use using a single question and scores less than 2 times per year Quality 110: Preventive Care And Screening: Influenza Immunization: Influenza immunization was not ordered or administered, reason not given Quality 111:Pneumonia Vaccination Status For Older Adults: Pneumococcal Vaccination not Administered or Previously Received, Reason not Otherwise Specified Quality 226: Preventive Care And Screening: Tobacco Use: Screening And Cessation Intervention: Patient screened for tobacco use and is an ex/non-smoker Quality 130: Documentation Of Current Medications In The Medical Record: Current Medications Documented Detail Level: Detailed Quality 131: Pain Assessment And Follow-Up: Pain assessment NOT documented as being performed, documentation the patient is not eligible for a pain assessment using a standardized tool Quality 431: Preventive Care And Screening: Unhealthy Alcohol Use - Screening: Patient not identified as an unhealthy alcohol user when screened for unhealthy alcohol use using a systematic screening method

## 2024-05-13 LAB
ANION GAP SERPL CALCULATED.3IONS-SCNC: 11 MMOL/L (ref 3–16)
BASOPHILS # BLD: 0 K/UL (ref 0–0.2)
BASOPHILS NFR BLD: 0.4 %
BUN SERPL-MCNC: 15 MG/DL (ref 7–20)
CALCIUM SERPL-MCNC: 8.4 MG/DL (ref 8.3–10.6)
CHLORIDE SERPL-SCNC: 109 MMOL/L (ref 99–110)
CO2 SERPL-SCNC: 21 MMOL/L (ref 21–32)
CREAT SERPL-MCNC: 1.6 MG/DL (ref 0.6–1.2)
DEPRECATED RDW RBC AUTO: 15.1 % (ref 12.4–15.4)
EOSINOPHIL # BLD: 0.2 K/UL (ref 0–0.6)
EOSINOPHIL NFR BLD: 1.7 %
GFR SERPLBLD CREATININE-BSD FMLA CKD-EPI: 31 ML/MIN/{1.73_M2}
GLUCOSE SERPL-MCNC: 87 MG/DL (ref 70–99)
HCT VFR BLD AUTO: 29.4 % (ref 36–48)
HGB BLD-MCNC: 9.8 G/DL (ref 12–16)
LYMPHOCYTES # BLD: 1.5 K/UL (ref 1–5.1)
LYMPHOCYTES NFR BLD: 17 %
MAGNESIUM SERPL-MCNC: 1.8 MG/DL (ref 1.8–2.4)
MCH RBC QN AUTO: 28 PG (ref 26–34)
MCHC RBC AUTO-ENTMCNC: 33.4 G/DL (ref 31–36)
MCV RBC AUTO: 83.6 FL (ref 80–100)
MONOCYTES # BLD: 0.7 K/UL (ref 0–1.3)
MONOCYTES NFR BLD: 7.9 %
NEUTROPHILS # BLD: 6.4 K/UL (ref 1.7–7.7)
NEUTROPHILS NFR BLD: 73 %
PLATELET # BLD AUTO: 131 K/UL (ref 135–450)
PMV BLD AUTO: 8.4 FL (ref 5–10.5)
POTASSIUM SERPL-SCNC: 3.3 MMOL/L (ref 3.5–5.1)
RBC # BLD AUTO: 3.52 M/UL (ref 4–5.2)
SODIUM SERPL-SCNC: 141 MMOL/L (ref 136–145)
WBC # BLD AUTO: 8.8 K/UL (ref 4–11)

## 2024-05-13 PROCEDURE — 99232 SBSQ HOSP IP/OBS MODERATE 35: CPT | Performed by: INTERNAL MEDICINE

## 2024-05-13 PROCEDURE — 6370000000 HC RX 637 (ALT 250 FOR IP): Performed by: NURSE PRACTITIONER

## 2024-05-13 PROCEDURE — 83735 ASSAY OF MAGNESIUM: CPT

## 2024-05-13 PROCEDURE — 36415 COLL VENOUS BLD VENIPUNCTURE: CPT

## 2024-05-13 PROCEDURE — 2060000000 HC ICU INTERMEDIATE R&B

## 2024-05-13 PROCEDURE — 6370000000 HC RX 637 (ALT 250 FOR IP): Performed by: INTERNAL MEDICINE

## 2024-05-13 PROCEDURE — 85025 COMPLETE CBC W/AUTO DIFF WBC: CPT

## 2024-05-13 PROCEDURE — 2580000003 HC RX 258

## 2024-05-13 PROCEDURE — 80048 BASIC METABOLIC PNL TOTAL CA: CPT

## 2024-05-13 PROCEDURE — 6370000000 HC RX 637 (ALT 250 FOR IP)

## 2024-05-13 PROCEDURE — 6360000002 HC RX W HCPCS

## 2024-05-13 RX ORDER — MECOBALAMIN 5000 MCG
3 TABLET,DISINTEGRATING ORAL NIGHTLY
Status: DISCONTINUED | OUTPATIENT
Start: 2024-05-13 | End: 2024-05-17 | Stop reason: HOSPADM

## 2024-05-13 RX ORDER — POTASSIUM CHLORIDE 20 MEQ/1
40 TABLET, EXTENDED RELEASE ORAL ONCE
Status: COMPLETED | OUTPATIENT
Start: 2024-05-13 | End: 2024-05-13

## 2024-05-13 RX ADMIN — POTASSIUM CHLORIDE 40 MEQ: 1500 TABLET, EXTENDED RELEASE ORAL at 11:28

## 2024-05-13 RX ADMIN — VALSARTAN 80 MG: 80 TABLET, FILM COATED ORAL at 08:33

## 2024-05-13 RX ADMIN — Medication 10 ML: at 21:23

## 2024-05-13 RX ADMIN — HYDROCODONE BITARTRATE AND ACETAMINOPHEN 1 TABLET: 5; 325 TABLET ORAL at 23:23

## 2024-05-13 RX ADMIN — Medication 2.5 MG: at 21:22

## 2024-05-13 RX ADMIN — HYDROCODONE BITARTRATE AND ACETAMINOPHEN 1 TABLET: 5; 325 TABLET ORAL at 02:22

## 2024-05-13 RX ADMIN — ATORVASTATIN CALCIUM 20 MG: 10 TABLET, FILM COATED ORAL at 21:22

## 2024-05-13 RX ADMIN — ASPIRIN 81 MG: 81 TABLET, COATED ORAL at 08:33

## 2024-05-13 RX ADMIN — PANTOPRAZOLE SODIUM 40 MG: 40 TABLET, DELAYED RELEASE ORAL at 08:33

## 2024-05-13 RX ADMIN — HYDRALAZINE HYDROCHLORIDE 50 MG: 50 TABLET ORAL at 21:22

## 2024-05-13 RX ADMIN — HYDRALAZINE HYDROCHLORIDE 50 MG: 50 TABLET ORAL at 05:52

## 2024-05-13 RX ADMIN — HEPARIN SODIUM 5000 UNITS: 5000 INJECTION INTRAVENOUS; SUBCUTANEOUS at 21:23

## 2024-05-13 RX ADMIN — HYDRALAZINE HYDROCHLORIDE 50 MG: 50 TABLET ORAL at 14:40

## 2024-05-13 RX ADMIN — TRAZODONE HYDROCHLORIDE 100 MG: 100 TABLET ORAL at 21:22

## 2024-05-13 RX ADMIN — HYDROCODONE BITARTRATE AND ACETAMINOPHEN 1 TABLET: 5; 325 TABLET ORAL at 08:37

## 2024-05-13 RX ADMIN — HEPARIN SODIUM 5000 UNITS: 5000 INJECTION INTRAVENOUS; SUBCUTANEOUS at 05:53

## 2024-05-13 RX ADMIN — AMLODIPINE BESYLATE 10 MG: 5 TABLET ORAL at 08:33

## 2024-05-13 RX ADMIN — BUSPIRONE HYDROCHLORIDE 10 MG: 10 TABLET ORAL at 21:22

## 2024-05-13 RX ADMIN — Medication 10 ML: at 08:34

## 2024-05-13 RX ADMIN — CETIRIZINE HYDROCHLORIDE 5 MG: 10 TABLET ORAL at 08:33

## 2024-05-13 RX ADMIN — HYDROCODONE BITARTRATE AND ACETAMINOPHEN 1 TABLET: 5; 325 TABLET ORAL at 18:54

## 2024-05-13 RX ADMIN — HEPARIN SODIUM 5000 UNITS: 5000 INJECTION INTRAVENOUS; SUBCUTANEOUS at 14:40

## 2024-05-13 RX ADMIN — FAMOTIDINE 20 MG: 20 TABLET, FILM COATED ORAL at 21:22

## 2024-05-13 ASSESSMENT — PAIN DESCRIPTION - ONSET: ONSET: ON-GOING

## 2024-05-13 ASSESSMENT — PAIN DESCRIPTION - LOCATION
LOCATION: HEAD
LOCATION: HEAD
LOCATION: GENERALIZED

## 2024-05-13 ASSESSMENT — PAIN SCALES - GENERAL
PAINLEVEL_OUTOF10: 8
PAINLEVEL_OUTOF10: 5
PAINLEVEL_OUTOF10: 8
PAINLEVEL_OUTOF10: 8
PAINLEVEL_OUTOF10: 5

## 2024-05-13 ASSESSMENT — PAIN - FUNCTIONAL ASSESSMENT: PAIN_FUNCTIONAL_ASSESSMENT: PREVENTS OR INTERFERES SOME ACTIVE ACTIVITIES AND ADLS

## 2024-05-13 ASSESSMENT — PAIN DESCRIPTION - ORIENTATION
ORIENTATION: RIGHT;LEFT
ORIENTATION: MID
ORIENTATION: RIGHT;LEFT

## 2024-05-13 ASSESSMENT — PAIN DESCRIPTION - DESCRIPTORS
DESCRIPTORS: SHARP
DESCRIPTORS: SHARP
DESCRIPTORS: ACHING

## 2024-05-13 ASSESSMENT — PAIN DESCRIPTION - PAIN TYPE: TYPE: CHRONIC PAIN

## 2024-05-13 ASSESSMENT — PAIN DESCRIPTION - FREQUENCY: FREQUENCY: CONTINUOUS

## 2024-05-13 NOTE — PLAN OF CARE
Problem: Discharge Planning  Goal: Discharge to home or other facility with appropriate resources  5/12/2024 2234 by Robson Nelson RN  Outcome: Progressing  5/12/2024 1024 by Cas Gracia RN  Outcome: Progressing  Flowsheets (Taken 5/12/2024 0750)  Discharge to home or other facility with appropriate resources: Identify barriers to discharge with patient and caregiver     Problem: Pain  Goal: Verbalizes/displays adequate comfort level or baseline comfort level  5/12/2024 2234 by Rboson Nelson RN  Outcome: Progressing  5/12/2024 1024 by Cas Gracia RN  Outcome: Progressing     Problem: Safety - Adult  Goal: Free from fall injury  5/12/2024 2234 by Robson Nelson RN  Outcome: Progressing  5/12/2024 1024 by Cas rGacia RN  Outcome: Progressing     Problem: ABCDS Injury Assessment  Goal: Absence of physical injury  5/12/2024 2234 by Robson Nelson RN  Outcome: Progressing  5/12/2024 1024 by Cas Gracia RN  Outcome: Progressing     Problem: Skin/Tissue Integrity  Goal: Absence of new skin breakdown  Description: 1.  Monitor for areas of redness and/or skin breakdown  2.  Assess vascular access sites hourly  3.  Every 4-6 hours minimum:  Change oxygen saturation probe site  4.  Every 4-6 hours:  If on nasal continuous positive airway pressure, respiratory therapy assess nares and determine need for appliance change or resting period.  5/12/2024 2234 by Robson Nelson RN  Outcome: Progressing  5/12/2024 1024 by Cas Gracia RN  Outcome: Progressing     Problem: Chronic Conditions and Co-morbidities  Goal: Patient's chronic conditions and co-morbidity symptoms are monitored and maintained or improved  5/12/2024 2234 by Robson Nelson RN  Outcome: Progressing  5/12/2024 1024 by Cas Gracia RN  Outcome: Progressing  Flowsheets (Taken 5/12/2024 0750)  Care Plan - Patient's Chronic Conditions and Co-Morbidity Symptoms are Monitored and Maintained or Improved: Monitor and  assess patient's chronic conditions and comorbid symptoms for stability, deterioration, or improvement     Problem: Nutrition Deficit:  Goal: Optimize nutritional status  Outcome: Progressing

## 2024-05-13 NOTE — PLAN OF CARE
Problem: Discharge Planning  Goal: Discharge to home or other facility with appropriate resources  5/13/2024 0849 by Cassidy Chance RN  Outcome: Progressing  Flowsheets (Taken 5/13/2024 0800)  Discharge to home or other facility with appropriate resources:   Identify barriers to discharge with patient and caregiver   Arrange for needed discharge resources and transportation as appropriate   Identify discharge learning needs (meds, wound care, etc)  5/12/2024 2234 by Robson Nelson RN  Outcome: Progressing     Problem: Pain  Goal: Verbalizes/displays adequate comfort level or baseline comfort level  5/13/2024 0849 by Cassidy Chance RN  Outcome: Progressing  5/13/2024 0118 by Mae Vinson RN  Outcome: Progressing  5/12/2024 2234 by Robson Nelson RN  Outcome: Progressing     Problem: Safety - Adult  Goal: Free from fall injury  5/13/2024 0849 by Cassidy Chance RN  Outcome: Progressing  5/12/2024 2234 by Robson Nelson RN  Outcome: Progressing     Problem: ABCDS Injury Assessment  Goal: Absence of physical injury  5/13/2024 0849 by Cassidy Chance RN  Outcome: Progressing  5/12/2024 2234 by Robson Nelson RN  Outcome: Progressing     Problem: Skin/Tissue Integrity  Goal: Absence of new skin breakdown  Description: 1.  Monitor for areas of redness and/or skin breakdown  2.  Assess vascular access sites hourly  3.  Every 4-6 hours minimum:  Change oxygen saturation probe site  4.  Every 4-6 hours:  If on nasal continuous positive airway pressure, respiratory therapy assess nares and determine need for appliance change or resting period.  5/13/2024 0849 by Cassidy Chance RN  Outcome: Progressing  5/12/2024 2234 by Robson Nelson RN  Outcome: Progressing     Problem: Chronic Conditions and Co-morbidities  Goal: Patient's chronic conditions and co-morbidity symptoms are monitored and maintained or improved  5/13/2024 0849 by Cassidy Chance RN  Outcome: Progressing  Flowsheets (Taken 5/13/2024 0800)  Care Plan -  Patient's Chronic Conditions and Co-Morbidity Symptoms are Monitored and Maintained or Improved: Monitor and assess patient's chronic conditions and comorbid symptoms for stability, deterioration, or improvement  5/12/2024 2234 by Robson Nelson RN  Outcome: Progressing     Problem: Nutrition Deficit:  Goal: Optimize nutritional status  5/13/2024 0849 by Cassidy Chance, RN  Outcome: Progressing  5/12/2024 2234 by Robson Nelson, RN  Outcome: Progressing

## 2024-05-13 NOTE — FLOWSHEET NOTE
05/12/24 2100   Vital Signs   Temp 98 °F (36.7 °C)   Temp Source Oral   Pulse 76   Heart Rate Source Monitor   Respirations 16   BP (!) 168/64   MAP (Calculated) 99   BP Location Right upper arm   BP Method Automatic   Patient Position Semi fowlers   Pain Assessment   Pain Assessment 0-10   Pain Level 8   Patient's Stated Pain Goal 3   Pain Location Back;Head;Neck   Pain Descriptors Aching   Functional Pain Assessment Prevents or interferes some active activities and ADLs   Pain Type Acute pain   Pain Frequency Continuous   Pain Onset On-going   Non-Pharmaceutical Pain Intervention(s) Repositioned   Opioid-Induced Sedation   POSS Score 1   RASS   Jeffries Agitation Sedation Scale (RASS) 0   Oxygen Therapy   SpO2 97 %   O2 Device None (Room air)     Shift assessment completed. Pt is alert and oriented. Vital signs are WNL. Respirations are even & easy. No complaints voiced. Pt denies needs at this time. SR up x 2 and bed in low position. Call light is within reach. Will monitor.

## 2024-05-13 NOTE — FLOWSHEET NOTE
05/13/24 1439   Vital Signs   Temp 98.2 °F (36.8 °C)   Temp Source Oral   Pulse 93   Heart Rate Source Monitor   Respirations 18   BP (!) 163/55   MAP (Calculated) 91   BP Location Right upper arm   BP Method Automatic   Patient Position Semi fowlers   Oxygen Therapy   SpO2 98 %   O2 Device None (Room air)     Vitals completed earlier. Reassessment completed with no significant changes. BP remains elevated. Patient may be anxious due to family at bedside. No further needs at this time.     Cassidy Chance RN

## 2024-05-13 NOTE — PROGRESS NOTES
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 Gray    Sub/interval history  Overall feels better  Off oxygen  Good urine volume  Still has abdominal pain     ROS: No chest pain/shortness of breath/fever/nausea/vomiting  PSFH: + visitor    Scheduled Meds:   [START ON 5/14/2024] sertraline  25 mg Oral Daily    melatonin  2.5 mg Oral Nightly    sodium chloride flush  5-40 mL IntraVENous 2 times per day    heparin (porcine)  5,000 Units SubCUTAneous 3 times per day    atorvastatin  20 mg Oral Nightly    amLODIPine  10 mg Oral Daily    aspirin  81 mg Oral Daily    valsartan  80 mg Oral Daily    famotidine  20 mg Oral Nightly    pantoprazole  40 mg Oral Daily    [Held by provider] torsemide  20 mg Oral Daily    traZODone  100 mg Oral Nightly    cetirizine  5 mg Oral Daily    hydrALAZINE  50 mg Oral 3 times per day     Continuous Infusions:   sodium chloride       PRN Meds:.HYDROcodone-acetaminophen, perflutren lipid microspheres, sodium chloride flush, sodium chloride, ondansetron **OR** ondansetron, polyethylene glycol, acetaminophen **OR** acetaminophen, busPIRone    History of Present Illness on 5/10/2024:    84 y.o. yo female with PMH of CAD, CHF preserved EF, AVR, COPD, HTN, HLD and TIA, CKD 3B/4 who is admitted for shortness of breath and nephrology has been consulted for MARIO  Patient reports that for the last several days she has not been eating and drinking well.  She has been having stomach upset and has a lot of belching and few episodes vomiting.  Last night she thought that she had high fever as she had drenching sweats.  She also wetted her bed.  This morning she continued to have shortness of breath and went to the emergency room and has been admitted.  She reports after being on nasal cannula she is feeling bit better.  Creatinine was noted to be 2.1 and nephrology has been consulted    Physical exam:   Constitutional:  VITALS:  BP (!)

## 2024-05-13 NOTE — PROGRESS NOTES
IM Progress Note    Admit Date:  5/10/2024        MARIO On CKD   Falls  Weakness     Subjective:  Ms. Neff seen .  S/p IV fluid hydration with improvement of creatinine levels.    Did not sleep well last night.  No shortness of breath.  Off of IV fluids,   Crtn better   Appetite is good. Denies  any nausea vomiting or abdominal pain     Seen by PT, OT. Needs SNF. Pre cert started.    Objective:   BP (!) 147/62   Pulse 83   Temp 98.3 °F (36.8 °C) (Oral)   Resp 20   Ht 1.524 m (5')   Wt 64.9 kg (143 lb 1.6 oz)   LMP  (LMP Unknown)   SpO2 97%   BMI 27.95 kg/m²     Intake/Output Summary (Last 24 hours) at 5/13/2024 1126  Last data filed at 5/13/2024 0558  Gross per 24 hour   Intake 627 ml   Output 1200 ml   Net -573 ml           Physical Exam:  General:  Awake, alert, NAD.  Well-oriented  Skin:  Warm and dry  Neck:  JVD absent. Neck supple  Chest:  Clear to auscultation, respiration easy. No wheezes, rales or rhonchi.   Cardiovascular:  RRR ,S1S2 normal  Abdomen:  Soft, non tender, non distended, BS +  Extremities:  No edema.  Intact peripheral pulses. Brisk cap refill, < 2 secs  Neuro: non focal      Medications:   Scheduled Meds:   potassium chloride  40 mEq Oral Once    sodium chloride flush  5-40 mL IntraVENous 2 times per day    heparin (porcine)  5,000 Units SubCUTAneous 3 times per day    atorvastatin  20 mg Oral Nightly    amLODIPine  10 mg Oral Daily    aspirin  81 mg Oral Daily    valsartan  80 mg Oral Daily    famotidine  20 mg Oral Nightly    pantoprazole  40 mg Oral Daily    [Held by provider] torsemide  20 mg Oral Daily    traZODone  100 mg Oral Nightly    cetirizine  5 mg Oral Daily    hydrALAZINE  50 mg Oral 3 times per day       Continuous Infusions:   sodium chloride         Data:  CBC:   Recent Labs     05/11/24  0437 05/12/24  0429 05/13/24 0434   WBC 7.2 11.2* 8.8   RBC 4.11 3.63* 3.52*   HGB 11.3* 10.0* 9.8*   HCT 34.8* 30.1* 29.4*   MCV 84.5 82.9 83.6   RDW 15.3 15.0 15.1    135  creatinine was 2.1 on admission   - patient appears dry  - given 500 mL bolus in ED, continued on  gentle IVFs  - bladder scan to see if retaining urine  - avoid nephrotoxic agents   - nephrology consulted due to frequent MARIO  -S/p IV fluids . Crtn improved to 1.8-> 1.6.  MARIO resolved.  Continue to monitor BMP.  Continue to hold diuretics     Chronic diastolic CHF  - BNP > 4K  - last echo 9/2022 EF 60-65%, grade I DD, repeat echo ordered  - holding diuresis 2/2 MARIO   - consider cardiology consult based on echo results  -S/p IVFs  - daily weight, I/Os, low na diet, fluid restriction      Elevated troponin- chronic   - 2/2 above and likely due to decreased clearance with CKD   - no CP  - EKG without acute ischemic changes  - monitor on telemetry      HTN  -  cadesartan held 2/2 MARIO  -Resumed on Norvasc  - monitor BP     CAD  S/p PCI to RCA 2017 and 11/2017  - denies any chest pain  - no acute EKG changes  - no BB due to hx bradycardia  - on aspirin and statin      Hx of aortic stenosis s/p TAVR     COPD no ae  - not on any home regimen      GERD  - Continue PPI       Anemia  - hgb at baseline  - no reports of bleeding      Alzheimer's Dementia   -pt lives at home and daughter checks in on her daily  - used to be on depakote, no longer on per med rec   - PT/OT  - supportive care , patient and daughter confirm patient is a full code      Chronic pain  - had pain pump- not functioning .   On Prn norco- reordered   - follows with pain management      Weight loss  - Body mass index is 27.34 kg/m².  - dietician consult      Weakness, debility, falls  - seen by PT, OT   Needs SNF     Note above makes patient higher risk for morbidity and mortality requiring testing and treatment.         DVT Prophylaxis: Heparin  Diet: ADULT DIET; Regular; Low Sodium (2 gm); 2000 ml  Code Status: Full Code     She is improving  DC planning to SNF. Pre cert pending      VARUN ANGEL MD   5/13/2024 11:26 AM

## 2024-05-13 NOTE — PLAN OF CARE
Problem: Pain  Goal: Verbalizes/displays adequate comfort level or baseline comfort level  5/13/2024 0118 by Mae Vinson RN  Outcome: Progressing  5/12/2024 2234 by Robson Nelson RN  Outcome: Progressing     Problem: Safety - Adult  Goal: Free from fall injury  5/12/2024 2234 by Robson Nelson RN  Outcome: Progressing     Problem: ABCDS Injury Assessment  Goal: Absence of physical injury  5/12/2024 2234 by Robson Nelson RN  Outcome: Progressing     Problem: Skin/Tissue Integrity  Goal: Absence of new skin breakdown  Description: 1.  Monitor for areas of redness and/or skin breakdown  2.  Assess vascular access sites hourly  3.  Every 4-6 hours minimum:  Change oxygen saturation probe site  4.  Every 4-6 hours:  If on nasal continuous positive airway pressure, respiratory therapy assess nares and determine need for appliance change or resting period.  5/12/2024 2234 by Robson Nelson RN  Outcome: Progressing     Problem: Chronic Conditions and Co-morbidities  Goal: Patient's chronic conditions and co-morbidity symptoms are monitored and maintained or improved  5/12/2024 2234 by Robson Nelson RN  Outcome: Progressing     Problem: Nutrition Deficit:  Goal: Optimize nutritional status  5/12/2024 2234 by Robson Nelson RN  Outcome: Progressing

## 2024-05-13 NOTE — FLOWSHEET NOTE
05/13/24 0410   Vital Signs   Temp 99.1 °F (37.3 °C)   Temp Source Oral   Pulse 75   Heart Rate Source Monitor   Respirations 20   BP (!) 153/61   MAP (Calculated) 92   BP Location Right upper arm   BP Method Automatic   Patient Position Semi fowlers   Urine Assessment   Urinary Status Voiding;External catheter   Urinary Incontinence Present   Urine Color Yellow/straw   Urine Appearance Clear         Pt resting with eyes closed.  Appears asleep.  No further changes.

## 2024-05-13 NOTE — FLOWSHEET NOTE
05/13/24 1129   Vital Signs   Temp 98.1 °F (36.7 °C)   Temp Source Oral   Pulse 86   Heart Rate Source Monitor   Respirations 18   BP (!) 162/66   MAP (Calculated) 98   BP Location Right upper arm   BP Method Automatic   Patient Position High fowlers   Oxygen Therapy   SpO2 96 %   O2 Device None (Room air)     Vitals and reassessment completed. No significant changes. K+ 40mEq given. BP elevated but patient is anxious at this time with MD at bedside. No further needs at this time.     Cassidy Chance RN

## 2024-05-13 NOTE — PROGRESS NOTES
Bedside report and transfer of care given to MARINO Brown. Pt currently resting in bed with the call light within reach. Pt denies any other care needs at this time. Pt stable at this time.    Cassidy Chance RN

## 2024-05-13 NOTE — CARE COORDINATION
Patient's preferences are: 1) [facility], and 2) [facility].   JAYDE spoke to Gabe at [facility] and he is reviewing chart.

## 2024-05-13 NOTE — FLOWSHEET NOTE
05/12/24 2300   Vitals   Temp 98.2 °F (36.8 °C)   Temp Source Oral   Pulse 68   Heart Rate Source Monitor   Respirations 18   BP (!) 145/57   MAP (Calculated) 86   BP Location Right upper arm   BP Method Automatic   Patient Position Semi fowlers   Pain Assessment   Pain Assessment 0-10   Pain Level 8   Patient's Stated Pain Goal 3   Pain Location Head   Oxygen Therapy   SpO2 97 %     Pt resting in bed, watching TV.  C/o headache stil.  States they gave her pain med earlier, but she has to be careful taking pain meds due to her kidney disease.    Assessment completed.  No needs at this time.

## 2024-05-13 NOTE — CARE COORDINATION
Case Management Assessment  Initial Evaluation    Date/Time of Evaluation: 5/13/2024 3:00 PM  Assessment Completed by: ANNA Plata    If patient is discharged prior to next notation, then this note serves as note for discharge by case management.    Patient Name: Nithya Neff                   YOB: 1940  Diagnosis: Shortness of breath [R06.02]  Hypoxia [R09.02]  MARIO (acute kidney injury) (HCC) [N17.9]  Elevated brain natriuretic peptide (BNP) level [R79.89]                   Date / Time: 5/10/2024  8:40 AM    Patient Admission Status: Inpatient   Readmission Risk (Low < 19, Mod (19-27), High > 27): Readmission Risk Score: 20    Current PCP: Marisol Wheatley  PCP verified by CM? Yes    Chart Reviewed: Yes      History Provided by: Patient  Patient Orientation: Alert and Oriented, Person, Place, Situation    Patient Cognition: Alert    Hospitalization in the last 30 days (Readmission):  Yes    If yes, Readmission Assessment in CM Navigator will be completed.    Advance Directives:      Code Status: Full Code   Patient's Primary Decision Maker is: Legal Next of Kin    Primary Decision Maker: Sonya Neri - Trung - [phone]    Discharge Planning:    Patient lives with: Alone Type of Home: Apartment  Primary Care Giver: Self  Patient Support Systems include: Children   Current Financial resources: Medicare, Medicaid  Current community resources: None  Current services prior to admission: C-pap, Home Care            Current DME: (P) Walker, Cpap            Type of Home Care services:  (P) Nursing Services    ADLS  Prior functional level: Assistance with the following:, Mobility, Shopping, Housework  Current functional level: Housework, Shopping, Mobility, Assistance with the following:    PT AM-PAC: 15 /24  OT AM-PAC: 15 /24    Family can provide assistance at DC: Other (comment) (yes)  Would you like Case Management to discuss the discharge plan with any other family members/significant others,

## 2024-05-13 NOTE — FLOWSHEET NOTE
05/13/24 0714   Vital Signs   Temp 98.3 °F (36.8 °C)   Temp Source Oral   Pulse 83   Heart Rate Source Monitor   Respirations 20   BP (!) 147/62   MAP (Calculated) 90   BP Location Right upper arm   BP Method Automatic   Patient Position Semi fowlers   Oxygen Therapy   SpO2 97 %   O2 Device None (Room air)     Vitals completed this AM. Assessment completed, no s/s of distress. Patient requesting pain medication, given with other meds per MAR. CM to see patient today for SNF placement. No further needs at this time.     Cassidy Chance, RN

## 2024-05-14 ENCOUNTER — HOSPITAL ENCOUNTER (OUTPATIENT)
Dept: WOUND CARE | Age: 84
Discharge: HOME | End: 2024-05-14
Attending: STUDENT IN AN ORGANIZED HEALTH CARE EDUCATION/TRAINING PROGRAM

## 2024-05-14 LAB
ANION GAP SERPL CALCULATED.3IONS-SCNC: 5 MMOL/L (ref 3–16)
BACTERIA BLD CULT ORG #2: NORMAL
BACTERIA BLD CULT: NORMAL
BUN SERPL-MCNC: 12 MG/DL (ref 7–20)
CALCIUM SERPL-MCNC: 8.7 MG/DL (ref 8.3–10.6)
CHLORIDE SERPL-SCNC: 107 MMOL/L (ref 99–110)
CO2 SERPL-SCNC: 25 MMOL/L (ref 21–32)
CREAT SERPL-MCNC: 1.6 MG/DL (ref 0.6–1.2)
GFR SERPLBLD CREATININE-BSD FMLA CKD-EPI: 31 ML/MIN/{1.73_M2}
GLUCOSE SERPL-MCNC: 154 MG/DL (ref 70–99)
MAGNESIUM SERPL-MCNC: 1.9 MG/DL (ref 1.8–2.4)
POTASSIUM SERPL-SCNC: 3.8 MMOL/L (ref 3.5–5.1)
POTASSIUM SERPL-SCNC: 4.1 MMOL/L (ref 3.5–5.1)
SODIUM SERPL-SCNC: 137 MMOL/L (ref 136–145)

## 2024-05-14 PROCEDURE — 83735 ASSAY OF MAGNESIUM: CPT

## 2024-05-14 PROCEDURE — 2580000003 HC RX 258

## 2024-05-14 PROCEDURE — 80048 BASIC METABOLIC PNL TOTAL CA: CPT

## 2024-05-14 PROCEDURE — 6370000000 HC RX 637 (ALT 250 FOR IP): Performed by: INTERNAL MEDICINE

## 2024-05-14 PROCEDURE — 97530 THERAPEUTIC ACTIVITIES: CPT

## 2024-05-14 PROCEDURE — 99232 SBSQ HOSP IP/OBS MODERATE 35: CPT | Performed by: INTERNAL MEDICINE

## 2024-05-14 PROCEDURE — 6370000000 HC RX 637 (ALT 250 FOR IP)

## 2024-05-14 PROCEDURE — 97535 SELF CARE MNGMENT TRAINING: CPT

## 2024-05-14 PROCEDURE — 36415 COLL VENOUS BLD VENIPUNCTURE: CPT

## 2024-05-14 PROCEDURE — 2060000000 HC ICU INTERMEDIATE R&B

## 2024-05-14 PROCEDURE — 6360000002 HC RX W HCPCS

## 2024-05-14 PROCEDURE — 97116 GAIT TRAINING THERAPY: CPT

## 2024-05-14 PROCEDURE — 84132 ASSAY OF SERUM POTASSIUM: CPT

## 2024-05-14 RX ADMIN — HEPARIN SODIUM 5000 UNITS: 5000 INJECTION INTRAVENOUS; SUBCUTANEOUS at 05:38

## 2024-05-14 RX ADMIN — ASPIRIN 81 MG: 81 TABLET, COATED ORAL at 09:59

## 2024-05-14 RX ADMIN — HYDRALAZINE HYDROCHLORIDE 50 MG: 50 TABLET ORAL at 20:03

## 2024-05-14 RX ADMIN — CETIRIZINE HYDROCHLORIDE 5 MG: 10 TABLET ORAL at 09:58

## 2024-05-14 RX ADMIN — HEPARIN SODIUM 5000 UNITS: 5000 INJECTION INTRAVENOUS; SUBCUTANEOUS at 20:03

## 2024-05-14 RX ADMIN — VALSARTAN 80 MG: 80 TABLET, FILM COATED ORAL at 09:59

## 2024-05-14 RX ADMIN — HYDROCODONE BITARTRATE AND ACETAMINOPHEN 1 TABLET: 5; 325 TABLET ORAL at 20:03

## 2024-05-14 RX ADMIN — SERTRALINE HYDROCHLORIDE 25 MG: 50 TABLET ORAL at 09:58

## 2024-05-14 RX ADMIN — TRAZODONE HYDROCHLORIDE 100 MG: 100 TABLET ORAL at 20:03

## 2024-05-14 RX ADMIN — HYDROCODONE BITARTRATE AND ACETAMINOPHEN 1 TABLET: 5; 325 TABLET ORAL at 09:59

## 2024-05-14 RX ADMIN — HYDRALAZINE HYDROCHLORIDE 50 MG: 50 TABLET ORAL at 05:38

## 2024-05-14 RX ADMIN — FAMOTIDINE 20 MG: 20 TABLET, FILM COATED ORAL at 20:03

## 2024-05-14 RX ADMIN — HEPARIN SODIUM 5000 UNITS: 5000 INJECTION INTRAVENOUS; SUBCUTANEOUS at 14:52

## 2024-05-14 RX ADMIN — ONDANSETRON 4 MG: 2 INJECTION INTRAMUSCULAR; INTRAVENOUS at 05:38

## 2024-05-14 RX ADMIN — PANTOPRAZOLE SODIUM 40 MG: 40 TABLET, DELAYED RELEASE ORAL at 09:58

## 2024-05-14 RX ADMIN — ATORVASTATIN CALCIUM 20 MG: 10 TABLET, FILM COATED ORAL at 20:03

## 2024-05-14 RX ADMIN — Medication 10 ML: at 20:05

## 2024-05-14 RX ADMIN — HYDROCODONE BITARTRATE AND ACETAMINOPHEN 1 TABLET: 5; 325 TABLET ORAL at 14:52

## 2024-05-14 RX ADMIN — Medication 2.5 MG: at 20:03

## 2024-05-14 RX ADMIN — AMLODIPINE BESYLATE 10 MG: 5 TABLET ORAL at 09:59

## 2024-05-14 RX ADMIN — BUSPIRONE HYDROCHLORIDE 10 MG: 10 TABLET ORAL at 20:03

## 2024-05-14 RX ADMIN — HYDROCODONE BITARTRATE AND ACETAMINOPHEN 1 TABLET: 5; 325 TABLET ORAL at 05:38

## 2024-05-14 RX ADMIN — Medication 10 ML: at 09:59

## 2024-05-14 ASSESSMENT — PAIN - FUNCTIONAL ASSESSMENT
PAIN_FUNCTIONAL_ASSESSMENT: PREVENTS OR INTERFERES SOME ACTIVE ACTIVITIES AND ADLS
PAIN_FUNCTIONAL_ASSESSMENT: PREVENTS OR INTERFERES SOME ACTIVE ACTIVITIES AND ADLS

## 2024-05-14 ASSESSMENT — PAIN SCALES - GENERAL
PAINLEVEL_OUTOF10: 10
PAINLEVEL_OUTOF10: 10
PAINLEVEL_OUTOF10: 7
PAINLEVEL_OUTOF10: 9

## 2024-05-14 ASSESSMENT — PAIN DESCRIPTION - LOCATION
LOCATION: GENERALIZED
LOCATION: GENERALIZED

## 2024-05-14 ASSESSMENT — PAIN DESCRIPTION - ONSET
ONSET: ON-GOING
ONSET: ON-GOING

## 2024-05-14 ASSESSMENT — PAIN DESCRIPTION - PAIN TYPE
TYPE: CHRONIC PAIN
TYPE: CHRONIC PAIN

## 2024-05-14 ASSESSMENT — PAIN DESCRIPTION - DESCRIPTORS
DESCRIPTORS: ACHING
DESCRIPTORS: SHARP

## 2024-05-14 ASSESSMENT — PAIN DESCRIPTION - ORIENTATION: ORIENTATION: MID

## 2024-05-14 ASSESSMENT — PAIN DESCRIPTION - FREQUENCY
FREQUENCY: CONTINUOUS
FREQUENCY: CONTINUOUS

## 2024-05-14 NOTE — FLOWSHEET NOTE
05/14/24 0730   Vital Signs   Temp 98.5 °F (36.9 °C)   Temp Source Oral   Pulse 78   Heart Rate Source Monitor   Respirations 18   BP (!) 123/49   MAP (Calculated) 74   BP Location Right upper arm   BP Method Automatic   Patient Position Semi fowlers   Oxygen Therapy   SpO2 96 %   O2 Device None (Room air)     Vitals and assessment completed this AM. Medications given per MAR. Patient requesting pain medication, given. No further needs at this time.     Cassidy Chance RN

## 2024-05-14 NOTE — PROGRESS NOTES
[facility]  Nephrology follow-up note           Reason for Consult: MARIO on CKD 3/4  Requesting Physician:  Dr. Gray    Sub/interval history  Overall feels better  Off oxygen  Good urine volume  Still has abdominal pain     ROS: No chest pain/shortness of breath/fever/nausea/vomiting  PSFH: + visitor    Scheduled Meds:   sertraline  25 mg Oral Daily    melatonin  2.5 mg Oral Nightly    sodium chloride flush  5-40 mL IntraVENous 2 times per day    heparin (porcine)  5,000 Units SubCUTAneous 3 times per day    atorvastatin  20 mg Oral Nightly    amLODIPine  10 mg Oral Daily    aspirin  81 mg Oral Daily    valsartan  80 mg Oral Daily    famotidine  20 mg Oral Nightly    pantoprazole  40 mg Oral Daily    [Held by provider] torsemide  20 mg Oral Daily    traZODone  100 mg Oral Nightly    cetirizine  5 mg Oral Daily    hydrALAZINE  50 mg Oral 3 times per day     Continuous Infusions:   sodium chloride       PRN Meds:.HYDROcodone-acetaminophen, perflutren lipid microspheres, sodium chloride flush, sodium chloride, ondansetron **OR** ondansetron, polyethylene glycol, acetaminophen **OR** acetaminophen, busPIRone    History of Present Illness on 5/10/2024:    84 y.o. yo female with PMH of CAD, CHF preserved EF, AVR, COPD, HTN, HLD and TIA, CKD 3B/4 who is admitted for shortness of breath and nephrology has been consulted for MARIO  Patient reports that for the last several days she has not been eating and drinking well.  She has been having stomach upset and has a lot of belching and few episodes vomiting.  Last night she thought that she had high fever as she had drenching sweats.  She also wetted her bed.  This morning she continued to have shortness of breath and went to the emergency room and has been admitted.  She reports after being on nasal cannula she is feeling bit better.  Creatinine was noted to be 2.1 and nephrology has been consulted    Physical exam:   Constitutional:  VITALS:  /60   Pulse 85    Temp 97.5 °F (36.4 °C) (Oral)   Resp 18   Ht 1.524 m (5')   Wt 64 kg (141 lb 1.6 oz)   LMP  (LMP Unknown)   SpO2 96%   BMI 27.56 kg/m²   Gen: alert, awake  Neck: No JVD  Skin: Unremarkable  Cardiovascular:  S1, S2 without m/r/g   Respiratory: CTA B without w/r/r; respiratory effort normal  Abdomen:  soft, nt, nd,   Extremities: no lower extremity edema  Neuro/Psy: AAoriented times 3 ; moves all 4 ext    Data/  Recent Labs     05/12/24 0429 05/13/24  0434   WBC 11.2* 8.8   HGB 10.0* 9.8*   HCT 30.1* 29.4*   MCV 82.9 83.6    131*       Recent Labs     05/12/24 0429 05/13/24 0434 05/14/24  0441    141  --    K 3.5 3.3* 3.8   * 109  --    CO2 21 21  --    GLUCOSE 92 87  --    MG 1.90 1.80 1.90   BUN 20 15  --    CREATININE 1.6* 1.6*  --    LABGLOM 31* 31*  --        Chest x-ray has been read as no acute abnormalities    Urinalysis has been unremarkable    Assessment  -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 intake, vomiting indicating prerenal etiology.    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  Baseline creatinine 1.5-1.8    -Decreased p.o. intake nausea vomiting possibly from GERD, defer to primary team    -Acute respiratory alkalosis suggestive of hyperventilation    -Anemia    -Chronic CHF with preserved EF.  Patient does not appear to be volume overloaded    -History of AVR    Plan  -Holding torsemide - discussed with patient, at most we should restart it at 20 mg po MWF and monitor weight and edema at nursing facility before increasing  -Continue amlodipine  - valsartan 80 mg daily  -Serial renal panel  -daily wts and strict i/o  -renal dose medications   -avoid nephrotoxins  -Ok for discharge planning         Thank you for the consultation.  Please do not hesitate to call with questions.    Debra Medina MD  Office: [phone]  Fax:    [phone]  [employer]

## 2024-05-14 NOTE — PROGRESS NOTES
IM Progress Note    Admit Date:  5/10/2024        MARIO On CKD   Falls  Weakness     Subjective:  Ms. Neff seen .  S/p IV fluid hydration with improvement of creatinine levels.    Did not sleep well last night.  No shortness of breath.  Off of IV fluids,   Crtn better   Appetite is good. Denies  any nausea vomiting or abdominal pain     Seen by PT, OT. Needs SNF. Pre cert started.    Objective:   /60   Pulse 81   Temp 98.2 °F (36.8 °C) (Oral)   Resp 19   Ht 1.524 m (5')   Wt 64 kg (141 lb 1.6 oz)   LMP  (LMP Unknown)   SpO2 96%   BMI 27.56 kg/m²     Intake/Output Summary (Last 24 hours) at 5/14/2024 1527  Last data filed at 5/14/2024 0523  Gross per 24 hour   Intake 240 ml   Output 550 ml   Net -310 ml           Physical Exam:  General:  Awake, alert, NAD.  Well-oriented  Skin:  Warm and dry  Neck:  JVD absent. Neck supple  Chest:  Clear to auscultation, respiration easy. No wheezes, rales or rhonchi.   Cardiovascular:  RRR ,S1S2 normal  Abdomen:  Soft, non tender, non distended, BS +  Extremities:  No edema.  Intact peripheral pulses. Brisk cap refill, < 2 secs  Neuro: non focal      Medications:   Scheduled Meds:   sertraline  25 mg Oral Daily    melatonin  2.5 mg Oral Nightly    sodium chloride flush  5-40 mL IntraVENous 2 times per day    heparin (porcine)  5,000 Units SubCUTAneous 3 times per day    atorvastatin  20 mg Oral Nightly    amLODIPine  10 mg Oral Daily    aspirin  81 mg Oral Daily    valsartan  80 mg Oral Daily    famotidine  20 mg Oral Nightly    pantoprazole  40 mg Oral Daily    [Held by provider] torsemide  20 mg Oral Daily    traZODone  100 mg Oral Nightly    cetirizine  5 mg Oral Daily    hydrALAZINE  50 mg Oral 3 times per day       Continuous Infusions:   sodium chloride         Data:  CBC:   Recent Labs     05/12/24  0429 05/13/24  0434   WBC 11.2* 8.8   RBC 3.63* 3.52*   HGB 10.0* 9.8*   HCT 30.1* 29.4*   MCV 82.9 83.6   RDW 15.0 15.1    131*       BMP:   Recent Labs

## 2024-05-14 NOTE — CARE COORDINATION
Joseph from [facility] said that they can take patient and that he is starting pre-cert; however, they need new OT/PT recs. Information given to family and patient and also let CM know to request updated notes.

## 2024-05-14 NOTE — FLOWSHEET NOTE
05/14/24 1451   Vital Signs   Temp 98.2 °F (36.8 °C)   Temp Source Oral   Pulse 81   Heart Rate Source Monitor   Respirations 19   /60   MAP (Calculated) 81   BP Location Right upper arm   BP Method Automatic   Patient Position Up in chair   Pain Assessment   Pain Assessment 0-10   Pain Level 9   Oxygen Therapy   SpO2 96 %   O2 Device None (Room air)     Vitals and reassessment completed. Patient requesting pain medication, prn given. Hydralazine held at this time. No further needs.     Cassidy Chance RN

## 2024-05-14 NOTE — FLOWSHEET NOTE
05/14/24 1039   Vital Signs   Temp 97.5 °F (36.4 °C)   Temp Source Oral   Pulse 85   Heart Rate Source Monitor   Respirations 18   /60   MAP (Calculated) 85   BP Location Right upper arm   BP Method Automatic   Patient Position Semi fowlers   Oxygen Therapy   SpO2 96 %   O2 Device None (Room air)     Vitals and reassessment completed. No significant changes. No further needs at this time.     Cassidy Chance RN

## 2024-05-14 NOTE — PROGRESS NOTES
Inpatient Physical Therapy Treatment    Unit: PCU  Date:  5/14/2024  Patient Name:    Nithya Neff  Admitting diagnosis:  Shortness of breath [R06.02]  Hypoxia [R09.02]  MARIO (acute kidney injury) (HCC) [N17.9]  Elevated brain natriuretic peptide (BNP) level [R79.89]  Admit Date:  5/10/2024  Precautions/Restrictions/WB Status/ Lines/ Wounds/ Oxygen: Fall risk, Bed/chair alarm, Lines (external catheter), Telemetry, and Continuous pulse oximetry      Pt seen for cotreatment this date due to patient safety, patient endurance, acute illness/injury, and need for the assistance of 2 skilled therapists    Treatment Time:  1100- 1125  Treatment Number:  3   Timed Code Treatment Minutes: 25 minutes  Total Treatment Minutes:  25  minutes    Patient Stated Goals for Therapy: \" Get stronger \"          Discharge Recommendations: SNF  DME needs for discharge: Defer to facility       Therapy recommendation for EMS Transport: can transport by wheelchair    Therapy recommendations for staff:   Assist of 1 for transfers with use of rolling walker (RW) to/from BSC  to/from chair    History of Present Illness:   Per H&P, \" 84 y.o. female for shortness of breath. Onset was yesterday.  Context includes patient reports that she has had increased shortness of breath since last night.  She states that she has had chills.  Patient has a pain pump for her chronic back pain but is unable to tell me what is in her pain pump.  Patient states her pain pump has been in place for about 6 months.  She reports that it is not helping her pain. Alleviating factors include nothing.  Aggravating factors include nothing.  Nothing has been used for pain today. \"      Home Health S4 Level Recommendation:  NA        AM-PAC Mobility Score    AM-PAC Inpatient Mobility Raw Score : 17         Subjective  Patient lying reclined in bed with family present (daughter).  Pt agreeable to this PT session.     Cognition    A&O x4   Able to follow 2 step  Initiated  deferred secondary to pt fatigued  Supine:  N/A    Seated:  N/A    Standing:  N/A    Activity Tolerance   During therapy session noted pt with fatigue  dizziness/lightheadedness    Pt Position BP (mmHg) HR (bpm) SpO2 (%) on RA  Comments   Supine at rest 137/60 88- 101 97    Seated at /72 96 96    Standing 111/66      End of session         Positioning Needs   Pt up in chair, no alarm needed, positioned in proper neutral alignment and pressure relief provided.   Call light provided and all needs within reach  RN aware of pt position/status    Other Activities  None.    Patient/Family Education   Pt educated on role of inpatient PT, POC, importance of continued activity, DC recommendations, safety awareness, transfer techniques, and calling for assist with mobility.      Assessment  Pt seen today for physical therapy Treatment. Pt continues to demonstrate decreased Activity tolerance, Balance, Safety, and Strength as well as decreased independence with Ambulation, Bed Mobility , and Transfers. Pt IND with ambulation and functional mobility at baseline and lives along. Pt is not safe to return home alone at this time. Pt continues to require CGA  for bed mobility and transfers. Pt would continue to benefit from skilled PT services to promote increased strength, balance and functional activity tolerance. Recommend continued skilled PT services upon discharge.   .    Recommending SNF upon discharge as patient functioning well below baseline, demonstrates good rehab potential and unable to return home due to burden of care beyond caregiver ability, home environment not conducive to patient recovery, and limited safety awareness.    Goals : all remaining goals ongoing 5/14/24  To be met in 3 visits:  1). Independent with LE Ex x 10 reps  2). Sit to/from stand: SBA  3). Bed to chair: SBA    To be met in 6 visits:  1).  Supine to/from sit: Supervision  2).  Sit to/from stand: Supervision  3).  Bed to chair:

## 2024-05-14 NOTE — PROGRESS NOTES
Patient alert and oriented x 4. Patient voiding via pure wick. Patient tolerating diet with no c/o nausea at this time. Patient does c/o generalized pain, PRN norco given per order, see MAR. Assessment completed, see flowsheet. Patient's bed is locked and in lowest position, side rails up x 3 with an active bed alarm. Non slip socks applied.

## 2024-05-14 NOTE — PLAN OF CARE
Problem: Discharge Planning  Goal: Discharge to home or other facility with appropriate resources  Outcome: Progressing  Flowsheets (Taken 5/14/2024 0112)  Discharge to home or other facility with appropriate resources: Identify barriers to discharge with patient and caregiver     Problem: Pain  Goal: Verbalizes/displays adequate comfort level or baseline comfort level  Outcome: Progressing  Flowsheets (Taken 5/14/2024 0112)  Verbalizes/displays adequate comfort level or baseline comfort level:   Encourage patient to monitor pain and request assistance   Assess pain using appropriate pain scale   Administer analgesics based on type and severity of pain and evaluate response   Implement non-pharmacological measures as appropriate and evaluate response     Problem: Safety - Adult  Goal: Free from fall injury  Outcome: Progressing  Flowsheets (Taken 5/14/2024 0112)  Free From Fall Injury:   Instruct family/caregiver on patient safety   Based on caregiver fall risk screen, instruct family/caregiver to ask for assistance with transferring infant if caregiver noted to have fall risk factors     Problem: ABCDS Injury Assessment  Goal: Absence of physical injury  Outcome: Progressing  Flowsheets (Taken 5/14/2024 0112)  Absence of Physical Injury: Implement safety measures based on patient assessment     Problem: Skin/Tissue Integrity  Goal: Absence of new skin breakdown  Description: 1.  Monitor for areas of redness and/or skin breakdown  2.  Assess vascular access sites hourly  3.  Every 4-6 hours minimum:  Change oxygen saturation probe site  4.  Every 4-6 hours:  If on nasal continuous positive airway pressure, respiratory therapy assess nares and determine need for appliance change or resting period.  Outcome: Progressing     Problem: Chronic Conditions and Co-morbidities  Goal: Patient's chronic conditions and co-morbidity symptoms are monitored and maintained or improved  Outcome: Progressing  Flowsheets (Taken  5/14/2024 0112)  Care Plan - Patient's Chronic Conditions and Co-Morbidity Symptoms are Monitored and Maintained or Improved: Monitor and assess patient's chronic conditions and comorbid symptoms for stability, deterioration, or improvement

## 2024-05-14 NOTE — PLAN OF CARE
Problem: Discharge Planning  Goal: Discharge to home or other facility with appropriate resources  5/14/2024 0919 by Cassidy Chance RN  Outcome: Progressing  Flowsheets (Taken 5/14/2024 0916)  Discharge to home or other facility with appropriate resources:   Identify barriers to discharge with patient and caregiver   Arrange for needed discharge resources and transportation as appropriate   Identify discharge learning needs (meds, wound care, etc)  5/14/2024 0112 by Stephanie Bergman RN  Outcome: Progressing  Flowsheets (Taken 5/14/2024 0112)  Discharge to home or other facility with appropriate resources: Identify barriers to discharge with patient and caregiver     Problem: Pain  Goal: Verbalizes/displays adequate comfort level or baseline comfort level  5/14/2024 0919 by Cassidy Chance RN  Outcome: Progressing  5/14/2024 0112 by Stephanie Bergman RN  Outcome: Progressing  Flowsheets (Taken 5/14/2024 0112)  Verbalizes/displays adequate comfort level or baseline comfort level:   Encourage patient to monitor pain and request assistance   Assess pain using appropriate pain scale   Administer analgesics based on type and severity of pain and evaluate response   Implement non-pharmacological measures as appropriate and evaluate response     Problem: Safety - Adult  Goal: Free from fall injury  5/14/2024 0919 by Cassidy Chance RN  Outcome: Progressing  5/14/2024 0112 by Stephanie Bergman RN  Outcome: Progressing  Flowsheets (Taken 5/14/2024 0112)  Free From Fall Injury:   Instruct family/caregiver on patient safety   Based on caregiver fall risk screen, instruct family/caregiver to ask for assistance with transferring infant if caregiver noted to have fall risk factors     Problem: ABCDS Injury Assessment  Goal: Absence of physical injury  5/14/2024 0919 by Cassidy Chance RN  Outcome: Progressing  5/14/2024 0112 by Stephanie Bergman RN  Outcome: Progressing  Flowsheets (Taken 5/14/2024 0112)  Absence of  Physical Injury: Implement safety measures based on patient assessment     Problem: Skin/Tissue Integrity  Goal: Absence of new skin breakdown  Description: 1.  Monitor for areas of redness and/or skin breakdown  2.  Assess vascular access sites hourly  3.  Every 4-6 hours minimum:  Change oxygen saturation probe site  4.  Every 4-6 hours:  If on nasal continuous positive airway pressure, respiratory therapy assess nares and determine need for appliance change or resting period.  5/14/2024 0919 by Cassidy Chance RN  Outcome: Progressing  5/14/2024 0112 by Stephanie Bergman RN  Outcome: Progressing     Problem: Chronic Conditions and Co-morbidities  Goal: Patient's chronic conditions and co-morbidity symptoms are monitored and maintained or improved  5/14/2024 0919 by Cassidy Chance RN  Outcome: Progressing  Flowsheets (Taken 5/14/2024 0916)  Care Plan - Patient's Chronic Conditions and Co-Morbidity Symptoms are Monitored and Maintained or Improved: Monitor and assess patient's chronic conditions and comorbid symptoms for stability, deterioration, or improvement  5/14/2024 0112 by Stephanie Bergman, RN  Outcome: Progressing  Flowsheets (Taken 5/14/2024 0112)  Care Plan - Patient's Chronic Conditions and Co-Morbidity Symptoms are Monitored and Maintained or Improved: Monitor and assess patient's chronic conditions and comorbid symptoms for stability, deterioration, or improvement     Problem: Nutrition Deficit:  Goal: Optimize nutritional status  Outcome: Progressing

## 2024-05-14 NOTE — PROGRESS NOTES
Bedside report and transfer of care given to MARINO Gregorio. Pt currently resting in bed with the call light within reach. Pt denies any other care needs at this time. Pt stable at this time.      Cassidy Chance RN

## 2024-05-14 NOTE — PROGRESS NOTES
Inpatient Occupational Therapy Evaluation and Treatment    Unit: PCU  Date:  5/14/2024  Patient Name:    Nithya Neff  Admitting diagnosis:  Shortness of breath [R06.02]  Hypoxia [R09.02]  MARIO (acute kidney injury) (HCC) [N17.9]  Elevated brain natriuretic peptide (BNP) level [R79.89]  Admit Date:  5/10/2024  Precautions/Restrictions/WB Status/ Lines/ Wounds/ Oxygen: Fall risk, Bed/chair alarm, and Lines (IV and external catheter)    Treatment Time:  11:30-11:53  Treatment Number:  2  Timed Code Treatment Minutes: 23 minutes  Total Treatment Minutes:  23  minutes    Patient Goals for Therapy: \"to get stronger \"          Discharge Recommendations: SNF  DME needs for discharge: Defer to facility       Therapy recommendations for staff:   Assist of 1 for ambulation with use of rolling walker (RW) and gait belt to/from BSC  to/from chair    History of Present Illness:   Per H&P of Nadeem Boyle, KALI - CNP  Chief complaint: SOB  \"The patient is a 84 y.o. female with pmhx as below who presents to [facility] with shortness of breath since last night. She reports waking up in the night gasping for air but this occurs occasionally per patient. She was having chills and shaking and unable to get out of the bed. She reports weight loss, some increasing lower extremity edema. Denies cough, urinary complaints, nausea, vomiting or diarrhea. Patient is somewhat of a poor historian, daughter able to provide some context but patient lives alone. She came to see her mother this morning around 6 am and found her lying in bed, large amount of urine soaked bed. \"    Home Health S4 Level Recommendation:  NA    AM-PAC Score: AM-PAC Inpatient Daily Activity Raw Score: 15     Subjective:  Patient sitting up in chair with family present (daughter).   Pt agreeable to this OT session.     Cognition:    A&O x4   Able to follow 2 step commands    Pain:   Yes  Location: LEs  Rating: moderate /10  Pain Medicine Status: No request       SBA  Lower Body Dressing:      Min A  Pt to demonstrate UE therapeutic exs x 15 reps with minimal cues    Rehabilitation Potential: Good  Strengths for achieving goals include: PLOF, Family Support, and Pt cooperative   Barriers to achieving goals include:  Complexity of condition    Plan:  To be seen 3-5 x/wk while in acute care setting for therapeutic exercises, bed mobility, transfers, family/patient education, ADL/IADL retraining, and energy conservation training.    Electronically signed by Loni Croft OT on 5/14/2024 at 11:26 AM      If patient discharges from this facility prior to next visit, this note will serve as the Discharge Summary

## 2024-05-14 NOTE — PROGRESS NOTES
Patient had a 27 beat run of PAT with heart rate up to 139 on the monitor, asymptomatic. This RN notified Braeden Menon NP via perfect serve.

## 2024-05-15 PROCEDURE — 6370000000 HC RX 637 (ALT 250 FOR IP): Performed by: INTERNAL MEDICINE

## 2024-05-15 PROCEDURE — 97530 THERAPEUTIC ACTIVITIES: CPT

## 2024-05-15 PROCEDURE — 2580000003 HC RX 258

## 2024-05-15 PROCEDURE — 6360000002 HC RX W HCPCS

## 2024-05-15 PROCEDURE — 6370000000 HC RX 637 (ALT 250 FOR IP)

## 2024-05-15 PROCEDURE — 2060000000 HC ICU INTERMEDIATE R&B

## 2024-05-15 PROCEDURE — 99232 SBSQ HOSP IP/OBS MODERATE 35: CPT | Performed by: INTERNAL MEDICINE

## 2024-05-15 RX ORDER — TORSEMIDE 20 MG/1
20 TABLET ORAL
Status: DISCONTINUED | OUTPATIENT
Start: 2024-05-15 | End: 2024-05-17 | Stop reason: HOSPADM

## 2024-05-15 RX ADMIN — AMLODIPINE BESYLATE 10 MG: 5 TABLET ORAL at 09:52

## 2024-05-15 RX ADMIN — HYDROCODONE BITARTRATE AND ACETAMINOPHEN 1 TABLET: 5; 325 TABLET ORAL at 18:07

## 2024-05-15 RX ADMIN — HEPARIN SODIUM 5000 UNITS: 5000 INJECTION INTRAVENOUS; SUBCUTANEOUS at 05:53

## 2024-05-15 RX ADMIN — PANTOPRAZOLE SODIUM 40 MG: 40 TABLET, DELAYED RELEASE ORAL at 09:52

## 2024-05-15 RX ADMIN — BUSPIRONE HYDROCHLORIDE 10 MG: 10 TABLET ORAL at 19:17

## 2024-05-15 RX ADMIN — TORSEMIDE 20 MG: 20 TABLET ORAL at 15:07

## 2024-05-15 RX ADMIN — SERTRALINE HYDROCHLORIDE 25 MG: 50 TABLET ORAL at 09:52

## 2024-05-15 RX ADMIN — HEPARIN SODIUM 5000 UNITS: 5000 INJECTION INTRAVENOUS; SUBCUTANEOUS at 20:33

## 2024-05-15 RX ADMIN — HYDROCODONE BITARTRATE AND ACETAMINOPHEN 1 TABLET: 5; 325 TABLET ORAL at 05:53

## 2024-05-15 RX ADMIN — FAMOTIDINE 20 MG: 20 TABLET, FILM COATED ORAL at 20:32

## 2024-05-15 RX ADMIN — VALSARTAN 80 MG: 80 TABLET, FILM COATED ORAL at 09:53

## 2024-05-15 RX ADMIN — TRAZODONE HYDROCHLORIDE 100 MG: 100 TABLET ORAL at 20:32

## 2024-05-15 RX ADMIN — HYDROCODONE BITARTRATE AND ACETAMINOPHEN 1 TABLET: 5; 325 TABLET ORAL at 00:04

## 2024-05-15 RX ADMIN — HYDRALAZINE HYDROCHLORIDE 50 MG: 50 TABLET ORAL at 20:32

## 2024-05-15 RX ADMIN — Medication 10 ML: at 09:54

## 2024-05-15 RX ADMIN — Medication 10 ML: at 20:33

## 2024-05-15 RX ADMIN — ASPIRIN 81 MG: 81 TABLET, COATED ORAL at 09:53

## 2024-05-15 RX ADMIN — ACETAMINOPHEN 650 MG: 325 TABLET ORAL at 19:17

## 2024-05-15 RX ADMIN — Medication 2.5 MG: at 20:32

## 2024-05-15 RX ADMIN — CETIRIZINE HYDROCHLORIDE 5 MG: 10 TABLET ORAL at 09:53

## 2024-05-15 RX ADMIN — HEPARIN SODIUM 5000 UNITS: 5000 INJECTION INTRAVENOUS; SUBCUTANEOUS at 15:08

## 2024-05-15 RX ADMIN — HYDROCODONE BITARTRATE AND ACETAMINOPHEN 1 TABLET: 5; 325 TABLET ORAL at 12:27

## 2024-05-15 RX ADMIN — ATORVASTATIN CALCIUM 20 MG: 10 TABLET, FILM COATED ORAL at 20:32

## 2024-05-15 RX ADMIN — HYDRALAZINE HYDROCHLORIDE 50 MG: 50 TABLET ORAL at 05:53

## 2024-05-15 RX ADMIN — HYDRALAZINE HYDROCHLORIDE 50 MG: 50 TABLET ORAL at 15:08

## 2024-05-15 ASSESSMENT — PAIN SCALES - WONG BAKER
WONGBAKER_NUMERICALRESPONSE: NO HURT

## 2024-05-15 ASSESSMENT — PAIN DESCRIPTION - DESCRIPTORS
DESCRIPTORS: SHARP
DESCRIPTORS: STABBING

## 2024-05-15 ASSESSMENT — PAIN DESCRIPTION - LOCATION
LOCATION: GENERALIZED
LOCATION: BACK;GENERALIZED
LOCATION: GENERALIZED
LOCATION: BACK

## 2024-05-15 ASSESSMENT — PAIN SCALES - GENERAL
PAINLEVEL_OUTOF10: 9
PAINLEVEL_OUTOF10: 10
PAINLEVEL_OUTOF10: 8
PAINLEVEL_OUTOF10: 9
PAINLEVEL_OUTOF10: 9

## 2024-05-15 ASSESSMENT — PAIN DESCRIPTION - FREQUENCY: FREQUENCY: CONTINUOUS

## 2024-05-15 ASSESSMENT — PAIN DESCRIPTION - ONSET: ONSET: ON-GOING

## 2024-05-15 ASSESSMENT — PAIN DESCRIPTION - PAIN TYPE: TYPE: CHRONIC PAIN

## 2024-05-15 NOTE — PROGRESS NOTES
IM Progress Note    Admit Date:  5/10/2024        MARIO On CKD   Falls  Weakness     Subjective:  Ms. Neff seen .  S/p IV fluid hydration with improvement of creatinine levels.    Did not sleep well last night.  No shortness of breath.  Off of IV fluids,   Crtn better   Appetite is good. Denies  any nausea vomiting or abdominal pain     Seen by PT, OT. Needs SNF. Pre cert started.    Objective:   BP (!) 144/64   Pulse 93   Temp 97.8 °F (36.6 °C) (Oral)   Resp 18   Ht 1.524 m (5')   Wt 64 kg (141 lb 3.2 oz)   LMP  (LMP Unknown)   SpO2 95%   BMI 27.58 kg/m²     Intake/Output Summary (Last 24 hours) at 5/15/2024 1155  Last data filed at 5/15/2024 0948  Gross per 24 hour   Intake 522 ml   Output 550 ml   Net -28 ml           Physical Exam:  General:  Awake, alert, NAD.  Well-oriented  Skin:  Warm and dry  Neck:  JVD absent. Neck supple  Chest:  Clear to auscultation, respiration easy. No wheezes, rales or rhonchi.   Cardiovascular:  RRR ,S1S2 normal  Abdomen:  Soft, non tender, non distended, BS +  Extremities:  No edema.  Intact peripheral pulses. Brisk cap refill, < 2 secs  Neuro: non focal      Medications:   Scheduled Meds:   sertraline  25 mg Oral Daily    melatonin  2.5 mg Oral Nightly    sodium chloride flush  5-40 mL IntraVENous 2 times per day    heparin (porcine)  5,000 Units SubCUTAneous 3 times per day    atorvastatin  20 mg Oral Nightly    amLODIPine  10 mg Oral Daily    aspirin  81 mg Oral Daily    valsartan  80 mg Oral Daily    famotidine  20 mg Oral Nightly    pantoprazole  40 mg Oral Daily    [Held by provider] torsemide  20 mg Oral Daily    traZODone  100 mg Oral Nightly    cetirizine  5 mg Oral Daily    hydrALAZINE  50 mg Oral 3 times per day       Continuous Infusions:   sodium chloride         Data:  CBC:   Recent Labs     05/13/24 0434   WBC 8.8   RBC 3.52*   HGB 9.8*   HCT 29.4*   MCV 83.6   RDW 15.1   *       BMP:   Recent Labs     05/13/24  0434 05/14/24  0441 05/14/24  1539   NA  Use  Authorization (EUA) for use by authorized laboratories.    Fact sheet for Healthcare Providers:  https://www.fda.gov/media/905506/download  Fact sheet for Patients: https://www.fda.gov/media/997044/download    METHODOLOGY: Isothermal Nucleic Acid Amplification         Culture, Blood 1 [[id number]] Collected: 05/10/24 0940    Order Status: Completed Specimen: Blood Updated: 05/14/24 1615     Blood Culture, Routine No Growth after 4 days of incubation.    Narrative:      ORDER#: [id number]                          ORDERED BY: NARESH MUIR  SOURCE: Blood Antecubital-Rig              COLLECTED:  05/10/24 09:40  ANTIBIOTICS AT [facility].:                      RECEIVED :  05/10/24 09:51  If child <=2 yrs old please draw pediatric bottle.~Blood Culture 1              Radiology  XR CHEST (2 VW)   Final Result   No acute cardiopulmonary process.               Assessment:  Principal Problem:    Hypoxia  Active Problems:    Benign essential HTN    Hyperlipidemia    Atherosclerotic heart disease of native coronary artery with other forms of angina pectoris (HCC)    Primary insomnia    MARIA C (obstructive sleep apnea)    Alzheimer's dementia without behavioral disturbance (HCC)    MARIO (acute kidney injury) (HCC)    Aortic stenosis, severe    COPD (chronic obstructive pulmonary disease) (HCC)    Elevated brain natriuretic peptide (BNP) level    Shortness of breath  Resolved Problems:    * No resolved hospital problems. *      Plan:    Acute hypoxic respiratory failure (tachypnea, dyspnea)   hx MARIA C does not tolerated CPAP  - no O2 at baseline, required 2 L NC on admission-> wean as tolerated , now on room air   - CXR without acute findings   - COVID negative  - afebrile  - blood cx sent in ED  - check sputum cx    - last admission had to be placed on oxygen while sleeping   - monitor      MARIO on CKD  - creatine previously 1.4 to 1.7, creatinine was 2.1 on admission   - patient appears dry  - given 500 mL bolus in ED, continued on   gentle IVFs  - bladder scan to see if retaining urine  - avoid nephrotoxic agents   - nephrology consulted due to frequent MARIO  -S/p IV fluids . Crtn improved to 1.8-> 1.6.  MARIO resolved.  Continue to monitor BMP.  Continue to hold diuretics     Chronic diastolic CHF  - BNP > 4K  - last echo 9/2022 EF 60-65%, grade I DD, repeat echo ordered  - holding diuresis 2/2 MARIO   - consider cardiology consult based on echo results  -S/p IVFs  - daily weight, I/Os, low na diet, fluid restriction      Elevated troponin- chronic   - 2/2 above and likely due to decreased clearance with CKD   - no CP  - EKG without acute ischemic changes  - monitor on telemetry      HTN  -  cadesartan held 2/2 MARIO  -Resumed on Norvasc  - monitor BP     CAD  S/p PCI to RCA 2017 and 11/2017  - denies any chest pain  - no acute EKG changes  - no BB due to hx bradycardia  - on aspirin and statin      Hx of aortic stenosis s/p TAVR     COPD no ae  - not on any home regimen      GERD  - Continue PPI       Anemia  - hgb at baseline  - no reports of bleeding      Alzheimer's Dementia   -pt lives at home and daughter checks in on her daily  - used to be on depakote, no longer on per med rec   - PT/OT  - supportive care , patient and daughter confirm patient is a full code      Chronic pain  - had pain pump- not functioning .   On Prn norco- reordered   - follows with pain management      Weight loss  - Body mass index is 27.34 kg/m².  - dietician consult      Weakness, debility, falls  - seen by PT, OT   Needs SNF     Note above makes patient higher risk for morbidity and mortality requiring testing and treatment.         DVT Prophylaxis: Heparin  Diet: ADULT DIET; Regular; Low Sodium (2 gm); 2000 ml  Code Status: Full Code     She is improving  DC planning to SNF. Pre cert pending      VARUN ANGEL MD   5/15/2024 11:55 AM

## 2024-05-15 NOTE — FLOWSHEET NOTE
05/14/24 1955   Pain Assessment   Pain Assessment 0-10   Pain Level 10   Patient's Stated Pain Goal 5   Pain Location Generalized   Pain Descriptors Sharp   Functional Pain Assessment Prevents or interferes some active activities and ADLs   Pain Type Chronic pain   Pain Frequency Continuous   Pain Onset On-going   Non-Pharmaceutical Pain Intervention(s) Rest   Opioid-Induced Sedation   POSS Score 1     Vital signs taken and shift assessment is complete, see flow sheet. Pt has c/o pain with non-pharmaceutical interventions ineffective at this time, prn Norco given per MAR. Pt given drink per request. Pt A&OX 4 with call light within reach and uses appropriately.

## 2024-05-15 NOTE — FLOWSHEET NOTE
05/15/24 0000   Vital Signs   Temp 97.7 °F (36.5 °C)   Temp Source Oral   Pulse 72   Heart Rate Source Monitor   Respirations 18   BP (!) 152/64   MAP (Calculated) 93   BP Location Right upper arm   BP Method Automatic   Patient Position Semi fowlers   Pain Assessment   Pain Assessment 0-10   Pain Level 10   Patient's Stated Pain Goal 5   Pain Location Generalized   Pain Descriptors Sharp   Functional Pain Assessment Prevents or interferes some active activities and ADLs   Pain Type Chronic pain   Pain Frequency Continuous   Pain Onset On-going   Non-Pharmaceutical Pain Intervention(s) Rest;Repositioned   Opioid-Induced Sedation   POSS Score 1   Oxygen Therapy   SpO2 96 %   O2 Device None (Room air)     Pt has c/o pain with non-pharmaceutical interventions ineffective at this time, prn Norco given per MAR. Pt A&OX 4 with call light within reach and uses appropriately.

## 2024-05-15 NOTE — PROGRESS NOTES
Patient c/o migraine. PRN Tylenol given and ice pack provided per patient request. Pt denies further needs. Bed side report given to MARINO Cain.

## 2024-05-15 NOTE — PROGRESS NOTES
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 Gray    Sub/interval history  Overall feels better  Off oxygen  Good urine volume  Eating better  Complaint of neck and shoulder pain    ROS: No chest pain/shortness of breath/fever/nausea/vomiting  PSFH: + visitor    Scheduled Meds:   torsemide  20 mg Oral Q MWF    sertraline  25 mg Oral Daily    melatonin  2.5 mg Oral Nightly    sodium chloride flush  5-40 mL IntraVENous 2 times per day    heparin (porcine)  5,000 Units SubCUTAneous 3 times per day    atorvastatin  20 mg Oral Nightly    amLODIPine  10 mg Oral Daily    aspirin  81 mg Oral Daily    valsartan  80 mg Oral Daily    famotidine  20 mg Oral Nightly    pantoprazole  40 mg Oral Daily    traZODone  100 mg Oral Nightly    cetirizine  5 mg Oral Daily    hydrALAZINE  50 mg Oral 3 times per day     Continuous Infusions:   sodium chloride       PRN Meds:.HYDROcodone-acetaminophen, perflutren lipid microspheres, sodium chloride flush, sodium chloride, ondansetron **OR** ondansetron, polyethylene glycol, acetaminophen **OR** acetaminophen, busPIRone    History of Present Illness on 5/10/2024:    84 y.o. yo female with PMH of CAD, CHF preserved EF, AVR, COPD, HTN, HLD and TIA, CKD 3B/4 who is admitted for shortness of breath and nephrology has been consulted for MARIO  Patient reports that for the last several days she has not been eating and drinking well.  She has been having stomach upset and has a lot of belching and few episodes vomiting.  Last night she thought that she had high fever as she had drenching sweats.  She also wetted her bed.  This morning she continued to have shortness of breath and went to the emergency room and has been admitted.  She reports after being on nasal cannula she is feeling bit better.  Creatinine was noted to be 2.1 and nephrology has been consulted    Physical exam:   Constitutional:  VITALS:  BP (!) 144/64   Pulse  93   Temp 97.8 °F (36.6 °C) (Oral)   Resp 18   Ht 1.524 m (5')   Wt 64 kg (141 lb 3.2 oz)   LMP  (LMP Unknown)   SpO2 95%   BMI 27.58 kg/m²   Gen: alert, awake  Neck: No JVD  Skin: Unremarkable  Cardiovascular:  S1, S2 without m/r/g   Respiratory: CTA B without w/r/r; respiratory effort normal  Abdomen:  soft, nt, nd,   Extremities: no lower extremity edema  Neuro/Psy: AAoriented times 3 ; moves all 4 ext    Data/  Recent Labs     05/13/24  0434   WBC 8.8   HGB 9.8*   HCT 29.4*   MCV 83.6   *       Recent Labs     05/13/24  0434 05/14/24  0441 05/14/24  1539     --  137   K 3.3* 3.8 4.1     --  107   CO2 21  --  25   GLUCOSE 87  --  154*   MG 1.80 1.90  --    BUN 15  --  12   CREATININE 1.6*  --  1.6*   LABGLOM 31*  --  31*       Chest x-ray has been read as no acute abnormalities    Urinalysis has been unremarkable    Assessment  -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 intake, vomiting indicating prerenal etiology.    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  Baseline creatinine 1.5-1.8    -Decreased p.o. intake nausea vomiting possibly from GERD, defer to primary team    -Acute respiratory alkalosis suggestive of hyperventilation    -Anemia    -Chronic CHF with preserved EF.  Patient does not appear to be volume overloaded    -History of AVR    Plan  -Restart torsemide at 20 mg po MWF and monitor weight and edema at nursing facility before increasing  -Continue amlodipine  ** depending how she does, we could consider stopping amlodipine ( due to edema ) and changing torsemide/valsartan to spironolactone.  This would be a weaker diuretic but also likely to control blood pressure as well as norvasc   - valsartan 80 mg daily  -Serial renal panel  -daily wts and strict i/o  -renal dose medications   -avoid nephrotoxins  -Ok for discharge planning, can monitor edema at [facility]         Thank you for the consultation.  Please do not hesitate to call

## 2024-05-15 NOTE — PROGRESS NOTES
Shift assessment complete. VSS. Patient A/OX4. Getting up to BSC w/ assist. Scheduled meds given. See MAR. Family at bed side. Pt denies further needs at this time. Call light within reach. Bed alarm on.

## 2024-05-15 NOTE — PROGRESS NOTES
Bedside report and transfer of care given to Durga Cantu. Pt currently resting in bed with the call light within reach. Pt denies any other care needs at this time. Pt stable at this time.

## 2024-05-15 NOTE — PROGRESS NOTES
Inpatient Occupational Therapy Evaluation and Treatment    Unit: PCU  Date:  5/15/2024  Patient Name:    Nithya Neff  Admitting diagnosis:  Shortness of breath [R06.02]  Hypoxia [R09.02]  MARIO (acute kidney injury) (HCC) [N17.9]  Elevated brain natriuretic peptide (BNP) level [R79.89]  Admit Date:  5/10/2024  Precautions/Restrictions/WB Status/ Lines/ Wounds/ Oxygen: Fall risk, Bed/chair alarm, and Lines (IV)    Treatment Time:  14:07-14:30  Treatment Number:  3  Timed Code Treatment Minutes:3 minutes  Total Treatment Minutes:  23  minutes    Patient Goals for Therapy: \"to get stronger \"          Discharge Recommendations: SNF  DME needs for discharge: Defer to facility       Therapy recommendations for staff:   Assist of 1 for ambulation with use of rolling walker (RW) and gait belt to/from bathroom    History of Present Illness:   Per H&P of KALI Masterson - CNP  Chief complaint: SOB  \"The patient is a 84 y.o. female with pmhx as below who presents to [facility] with shortness of breath since last night. She reports waking up in the night gasping for air but this occurs occasionally per patient. She was having chills and shaking and unable to get out of the bed. She reports weight loss, some increasing lower extremity edema. Denies cough, urinary complaints, nausea, vomiting or diarrhea. Patient is somewhat of a poor historian, daughter able to provide some context but patient lives alone. She came to see her mother this morning around 6 am and found her lying in bed, large amount of urine soaked bed. \"    Home Health S4 Level Recommendation:  NA    AM-PAC Score: AM-PAC Inpatient Daily Activity Raw Score: 15     Subjective:  Patient sitting up in chair with family present (daughter).   Pt agreeable to this OT session.     Cognition:    A&O x4   Able to follow 2 step commands    Pain:   Yes  Location: LEs  Rating: moderate /10  Pain Medicine Status: No request made    Preadmission Environment:   Pt lives  light provided and all needs within reach .     Ther Ex / Activities Initiated:   Toe curls: x10  Ankle pumps: x10    Patient/Family Education:   Pt educated on role of inpatient OT, plan of care, importance of continued activity, DC recommendations, Functional transfer/mobility safety, Safety awareness, Calling for assist with mobility, and edema management through use of compression, elevation, and exercise. Donned tubigrip E on B LEs .    CHF Education  N/A    Assessment:  Pt seen for Occupational therapy tx in acute care setting.  Pt demonstrated decreased Activity tolerance, ADLs, Balance , Dressing, Safety Awareness, Strength, and Transfers. Pt functioning below baseline and will likely benefit from skilled occupational therapy services to maximize safety and independence.     Pt doing well and very agreeable to OT tx.  Pt completing functional mobility across room 2x with minimal difficulty except fatigue.     Recommending SNF upon discharge as patient functioning well below baseline, demonstrates good rehab potential and unable to return home due to burden of care beyond caregiver ability and home environment not conducive to patient recovery.    Goal(s) :   To be met in 3 Visits:  Bed to toilet/BSC:       CGA  Pt will complete 3/3 CHF goals     N/A    To be met in 5 Visits:  Supine to/from Sit in preparation for ADL task:   Supervision  Toileting        SBA  Grooming       SBA  Upper Body Dressing:      SBA  Lower Body Dressing:      Min A  Pt to demonstrate UE therapeutic exs x 15 reps with minimal cues    Rehabilitation Potential: Good  Strengths for achieving goals include: PLOF, Family Support, and Pt cooperative   Barriers to achieving goals include:  Complexity of condition    Plan:  To be seen 3-5 x/wk while in acute care setting for therapeutic exercises, bed mobility, transfers, family/patient education, ADL/IADL retraining, and energy conservation training.    Electronically signed by Zeenat CAMPOVERDE

## 2024-05-15 NOTE — PROGRESS NOTES
HEART FAILURE CARE PLAN:    Comorbidities Reviewed: Yes   Patient has a past medical history of MARIO (acute kidney injury) ([facility]), Altered mental status, Arthritis, BCC (basal cell carcinoma of skin), Bladder infection, Blurred vision, CAD (coronary artery disease), Cancer ([facility]), CHF (congestive heart failure) ([facility]), Chronic back pain, Closed head injury, COPD (chronic obstructive pulmonary disease) ([facility]), Depression, Depression, Hypercholesteremia, Hypertension, Hypokalemia, Pain in shoulder, Painful total knee replacement, initial encounter ([facility]), Reflux, Rheumatic fever, Sleep apnea, Syncope and collapse, TIA (transient ischemic attack), Urinary incontinence, Urinary tract infection in female, and Wears glasses.     Weights Reviewed: Yes   Admission weight: 63.5 kg (140 lb)   Wt Readings from Last 3 Encounters:   05/14/24 64 kg (141 lb 1.6 oz)   05/07/24 65.4 kg (144 lb 3.2 oz)   04/14/24 68 kg (150 lb)     Intake & Output Reviewed: Yes     Intake/Output Summary (Last 24 hours) at 5/14/2024 2010  Last data filed at 5/14/2024 1955  Gross per 24 hour   Intake 540 ml   Output 550 ml   Net -10 ml       ECHOCARDIOGRAM Reviewed: Yes   Patient's Ejection Fraction (EF) is greater than 40%     Medications Reviewed: Yes   SCHEDULED HOSPITAL MEDICATIONS:   sertraline  25 mg Oral Daily    melatonin  2.5 mg Oral Nightly    sodium chloride flush  5-40 mL IntraVENous 2 times per day    heparin (porcine)  5,000 Units SubCUTAneous 3 times per day    atorvastatin  20 mg Oral Nightly    amLODIPine  10 mg Oral Daily    aspirin  81 mg Oral Daily    valsartan  80 mg Oral Daily    famotidine  20 mg Oral Nightly    pantoprazole  40 mg Oral Daily    [Held by provider] torsemide  20 mg Oral Daily    traZODone  100 mg Oral Nightly    cetirizine  5 mg Oral Daily    hydrALAZINE  50 mg Oral 3 times per day     HOME MEDICATIONS:  Prior to Admission medications    Medication Sig Start Date End Date Taking? Authorizing Provider   citalopram

## 2024-05-15 NOTE — PLAN OF CARE
Problem: Discharge Planning  Goal: Discharge to home or other facility with appropriate resources  5/14/2024 2008 by Juana Zuniga RN  Outcome: Progressing  5/14/2024 0919 by Cassidy Chance RN  Outcome: Progressing  Flowsheets (Taken 5/14/2024 0916)  Discharge to home or other facility with appropriate resources:   Identify barriers to discharge with patient and caregiver   Arrange for needed discharge resources and transportation as appropriate   Identify discharge learning needs (meds, wound care, etc)     Problem: Pain  Goal: Verbalizes/displays adequate comfort level or baseline comfort level  5/14/2024 2008 by Juana Zuniga RN  Outcome: Progressing  Flowsheets (Taken 5/14/2024 0112 by Stephanie Bergman RN)  Verbalizes/displays adequate comfort level or baseline comfort level:   Encourage patient to monitor pain and request assistance   Assess pain using appropriate pain scale   Administer analgesics based on type and severity of pain and evaluate response   Implement non-pharmacological measures as appropriate and evaluate response  5/14/2024 0919 by Cassidy Chance RN  Outcome: Progressing     Problem: Safety - Adult  Goal: Free from fall injury  5/14/2024 2008 by Juana Zuniga RN  Outcome: Progressing  5/14/2024 0919 by Cassidy Chance RN  Outcome: Progressing     Problem: ABCDS Injury Assessment  Goal: Absence of physical injury  5/14/2024 2008 by Juana Zuniga RN  Outcome: Progressing  5/14/2024 0919 by Cassidy Chance RN  Outcome: Progressing     Problem: Skin/Tissue Integrity  Goal: Absence of new skin breakdown  Description: 1.  Monitor for areas of redness and/or skin breakdown  2.  Assess vascular access sites hourly  3.  Every 4-6 hours minimum:  Change oxygen saturation probe site  4.  Every 4-6 hours:  If on nasal continuous positive airway pressure, respiratory therapy assess nares and determine need for appliance change or resting period.  5/14/2024 2008 by

## 2024-05-15 NOTE — PROGRESS NOTES
HEART FAILURE CARE PLAN:    Comorbidities Reviewed: Yes   Patient has a past medical history of MARIO (acute kidney injury) ([facility]), Altered mental status, Arthritis, BCC (basal cell carcinoma of skin), Bladder infection, Blurred vision, CAD (coronary artery disease), Cancer ([facility]), CHF (congestive heart failure) ([facility]), Chronic back pain, Closed head injury, COPD (chronic obstructive pulmonary disease) ([facility]), Depression, Depression, Hypercholesteremia, Hypertension, Hypokalemia, Pain in shoulder, Painful total knee replacement, initial encounter ([facility]), Reflux, Rheumatic fever, Sleep apnea, Syncope and collapse, TIA (transient ischemic attack), Urinary incontinence, Urinary tract infection in female, and Wears glasses.     Weights Reviewed: Yes   Admission weight: 63.5 kg (140 lb)   Wt Readings from Last 3 Encounters:   05/15/24 64 kg (141 lb 3.2 oz)   05/07/24 65.4 kg (144 lb 3.2 oz)   04/14/24 68 kg (150 lb)     Intake & Output Reviewed: Yes     Intake/Output Summary (Last 24 hours) at 5/15/2024 1049  Last data filed at 5/15/2024 0948  Gross per 24 hour   Intake 522 ml   Output 550 ml   Net -28 ml       ECHOCARDIOGRAM Reviewed: Yes   Patient's Ejection Fraction (EF) is greater than 40%     Medications Reviewed: Yes   SCHEDULED HOSPITAL MEDICATIONS:   sertraline  25 mg Oral Daily    melatonin  2.5 mg Oral Nightly    sodium chloride flush  5-40 mL IntraVENous 2 times per day    heparin (porcine)  5,000 Units SubCUTAneous 3 times per day    atorvastatin  20 mg Oral Nightly    amLODIPine  10 mg Oral Daily    aspirin  81 mg Oral Daily    valsartan  80 mg Oral Daily    famotidine  20 mg Oral Nightly    pantoprazole  40 mg Oral Daily    [Held by provider] torsemide  20 mg Oral Daily    traZODone  100 mg Oral Nightly    cetirizine  5 mg Oral Daily    hydrALAZINE  50 mg Oral 3 times per day     HOME MEDICATIONS:  Prior to Admission medications    Medication Sig Start Date End Date Taking? Authorizing Provider   citalopram  (CELEXA) 20 MG tablet Take 1.5 tablets by mouth daily   Yes Montana Sanon MD   diclofenac (VOLTAREN) 75 MG EC tablet Take 1 tablet by mouth every 12 hours as needed for Pain   Yes Montana Sanon MD   potassium chloride (KLOR-CON M) 10 MEQ extended release tablet Take 2 tablets by mouth daily   Yes Montana Sanon MD   HYDROcodone-acetaminophen (NORCO) 5-325 MG per tablet  5/9/24  Yes Montana Sanon MD   torsemide (DEMADEX) 20 MG tablet Take 2 tablets by mouth daily 5/1/24  Yes Montana Sanon MD   traZODone (DESYREL) 100 MG tablet Take 1 tablet by mouth nightly as needed for Sleep 5/1/24  Yes Montana Sanon MD   simvastatin (ZOCOR) 40 MG tablet Take 1 tablet by mouth nightly 5/1/24  Yes Montana Sanon MD   candesartan (ATACAND) 8 MG tablet Take 2 tablets by mouth daily    Montana Sanon MD   potassium chloride (KLOR-CON) 20 MEQ packet Take 20 mEq by mouth daily    Montana Sanon MD   hydrALAZINE (APRESOLINE) 50 MG tablet Take 1 tablet by mouth every 8 hours Hold if SBP < 120 or DBP < 60 mmHg 12/12/23   Young Garcia MD   famotidine (PEPCID) 20 MG tablet Take 1 tablet by mouth daily    Montana Sanon MD   cetirizine (ZYRTEC) 10 MG tablet TAKE (1) TABLET DAILY 11/30/23   Maria G Romero APRN - CNP   hydrOXYzine HCl (ATARAX) 25 MG tablet TAKE ONE (1) TABLET BY MOUTH EVERY 8 HOURS AS NEEDED FOR ITCHING 11/30/23   Maria G Romero APRN - CNP   docusate sodium (COLACE) 100 MG capsule TAKE (1) CAPSULE TWICE DAILY 11/27/23   Maria G Romero APRN - CNP   busPIRone (BUSPAR) 10 MG tablet TAKE ONE (1) TO TWO (2) TABLETS IN THE EVENING AS NEEDED FOR ANXIETY 10/6/23   Maria G Romero APRN - CNP   acetaminophen (TYLENOL) 325 MG tablet Take 2 tablets by mouth every 6 hours as needed for Pain    Montana Sanon MD   pantoprazole (PROTONIX) 40 MG tablet Take 1 tablet by mouth daily  Patient not taking: Reported on 5/12/2024 8/5/20   Ivanna Malin, APRN -

## 2024-05-15 NOTE — PROGRESS NOTES
Bedside Mobility Assessment Tool (BMAT):     Assessment Level 1- Sit and Shake    1. From a semi-reclined position, ask patient to sit up and rotate to a seated position at the side of the bed. Can use the bedrail.    2. Ask patient to reach out and grab your hand and shake making sure patient reaches across his/her midline.   Pass- Patient is able to come to a seated position, maintain core strength. Maintains seated balance while reaching across midline. Move on to Assessment Level 2.     Assessment Level 2- Stretch and Point   1. With patient in seated position at the side of the bed, have patient place both feet on the floor (or stool) with knees no higher than hips.    2. Ask patient to stretch one leg and straighten the knee, then bend the ankle/flex and point the toes. If appropriate, repeat with the other leg.   Pass- Patient is able to demonstrate appropriate quad strength on intended weight bearing limb(s). Move onto Assessment Level 3.     Assessment Level 3- Stand   1. Ask patient to elevate off the bed or chair (seated to standing) using an assistive device (cane, bedrail).    2. Patient should be able to raise buttocks off be and hold for a count of five. May repeat once.   Fail- Patient unable to demonstrate standing stability. Patient is MOBILITY LEVEL 3.     Assessment Level 4- Walk   1. Ask patient to march in place at bedside.    2. Then ask patient to advance step and return each foot. Some medical conditions may render a patient from stepping backwards, use your best clinical judgement.   Fail- Patient not able to complete tasks OR requires use of assistive device. Patient is MOBILITY LEVEL 3.       Mobility Level- 3    Patient is able to demonstrate the ability to move from a reclining position to an upright position within the recliner.

## 2024-05-16 PROCEDURE — 6370000000 HC RX 637 (ALT 250 FOR IP): Performed by: INTERNAL MEDICINE

## 2024-05-16 PROCEDURE — 2580000003 HC RX 258

## 2024-05-16 PROCEDURE — 6370000000 HC RX 637 (ALT 250 FOR IP)

## 2024-05-16 PROCEDURE — 99232 SBSQ HOSP IP/OBS MODERATE 35: CPT | Performed by: INTERNAL MEDICINE

## 2024-05-16 PROCEDURE — 6360000002 HC RX W HCPCS

## 2024-05-16 PROCEDURE — 2060000000 HC ICU INTERMEDIATE R&B

## 2024-05-16 RX ORDER — ALBUTEROL SULFATE 90 UG/1
AEROSOL, METERED RESPIRATORY (INHALATION)
Qty: 18 G | Refills: 5 | Status: CANCELLED | OUTPATIENT
Start: 2024-05-16

## 2024-05-16 RX ORDER — TORSEMIDE 20 MG/1
TABLET ORAL
Qty: 30 TABLET | Refills: 0 | Status: CANCELLED | DISCHARGE
Start: 2024-05-17

## 2024-05-16 RX ORDER — DIVALPROEX SODIUM 250 MG/1
250 TABLET, DELAYED RELEASE ORAL 2 TIMES DAILY
Qty: 90 TABLET | Status: CANCELLED | COMMUNITY
Start: 2024-05-16

## 2024-05-16 RX ADMIN — HYDRALAZINE HYDROCHLORIDE 50 MG: 50 TABLET ORAL at 14:58

## 2024-05-16 RX ADMIN — SERTRALINE HYDROCHLORIDE 25 MG: 50 TABLET ORAL at 09:33

## 2024-05-16 RX ADMIN — ASPIRIN 81 MG: 81 TABLET, COATED ORAL at 09:33

## 2024-05-16 RX ADMIN — TRAZODONE HYDROCHLORIDE 100 MG: 100 TABLET ORAL at 21:22

## 2024-05-16 RX ADMIN — BUSPIRONE HYDROCHLORIDE 10 MG: 10 TABLET ORAL at 21:22

## 2024-05-16 RX ADMIN — CETIRIZINE HYDROCHLORIDE 5 MG: 10 TABLET ORAL at 09:33

## 2024-05-16 RX ADMIN — ONDANSETRON 4 MG: 4 TABLET, ORALLY DISINTEGRATING ORAL at 01:02

## 2024-05-16 RX ADMIN — POLYETHYLENE GLYCOL 3350 17 G: 17 POWDER, FOR SOLUTION ORAL at 09:39

## 2024-05-16 RX ADMIN — Medication 10 ML: at 21:26

## 2024-05-16 RX ADMIN — HYDROCODONE BITARTRATE AND ACETAMINOPHEN 1 TABLET: 5; 325 TABLET ORAL at 06:36

## 2024-05-16 RX ADMIN — HYDROCODONE BITARTRATE AND ACETAMINOPHEN 1 TABLET: 5; 325 TABLET ORAL at 19:39

## 2024-05-16 RX ADMIN — PANTOPRAZOLE SODIUM 40 MG: 40 TABLET, DELAYED RELEASE ORAL at 09:33

## 2024-05-16 RX ADMIN — HEPARIN SODIUM 5000 UNITS: 5000 INJECTION INTRAVENOUS; SUBCUTANEOUS at 14:59

## 2024-05-16 RX ADMIN — AMLODIPINE BESYLATE 10 MG: 5 TABLET ORAL at 09:32

## 2024-05-16 RX ADMIN — Medication 2.5 MG: at 21:22

## 2024-05-16 RX ADMIN — Medication 10 ML: at 09:34

## 2024-05-16 RX ADMIN — HEPARIN SODIUM 5000 UNITS: 5000 INJECTION INTRAVENOUS; SUBCUTANEOUS at 21:22

## 2024-05-16 RX ADMIN — HEPARIN SODIUM 5000 UNITS: 5000 INJECTION INTRAVENOUS; SUBCUTANEOUS at 06:36

## 2024-05-16 RX ADMIN — HYDROCODONE BITARTRATE AND ACETAMINOPHEN 1 TABLET: 5; 325 TABLET ORAL at 01:01

## 2024-05-16 RX ADMIN — HYDRALAZINE HYDROCHLORIDE 50 MG: 50 TABLET ORAL at 06:36

## 2024-05-16 RX ADMIN — VALSARTAN 80 MG: 80 TABLET, FILM COATED ORAL at 09:33

## 2024-05-16 RX ADMIN — FAMOTIDINE 20 MG: 20 TABLET, FILM COATED ORAL at 21:22

## 2024-05-16 RX ADMIN — HYDRALAZINE HYDROCHLORIDE 50 MG: 50 TABLET ORAL at 21:22

## 2024-05-16 RX ADMIN — ATORVASTATIN CALCIUM 20 MG: 10 TABLET, FILM COATED ORAL at 21:22

## 2024-05-16 RX ADMIN — HYDROCODONE BITARTRATE AND ACETAMINOPHEN 1 TABLET: 5; 325 TABLET ORAL at 14:58

## 2024-05-16 ASSESSMENT — PAIN SCALES - GENERAL
PAINLEVEL_OUTOF10: 5
PAINLEVEL_OUTOF10: 8
PAINLEVEL_OUTOF10: 6

## 2024-05-16 ASSESSMENT — PAIN DESCRIPTION - DESCRIPTORS
DESCRIPTORS: STABBING
DESCRIPTORS: ACHING

## 2024-05-16 ASSESSMENT — PAIN DESCRIPTION - LOCATION
LOCATION: BACK;HEAD
LOCATION: BACK
LOCATION: OTHER (COMMENT)
LOCATION: BACK

## 2024-05-16 ASSESSMENT — PAIN SCALES - WONG BAKER: WONGBAKER_NUMERICALRESPONSE: HURTS LITTLE MORE

## 2024-05-16 NOTE — PROGRESS NOTES
Patient verbalized complaints of headache and back pain   -  medicated w/ 1 norco tablet po per request for pain of 8  -  states that's where her pain stays.  End of shift report given to Laquita PICHARDO  -  care transferred.  Call light in patient's reach.

## 2024-05-16 NOTE — PLAN OF CARE
HEART FAILURE CARE PLAN:    Comorbidities Reviewed: Yes   Patient has a past medical history of MARIO (acute kidney injury) ([facility]), Altered mental status, Arthritis, BCC (basal cell carcinoma of skin), Bladder infection, Blurred vision, CAD (coronary artery disease), Cancer ([facility]), CHF (congestive heart failure) ([facility]), Chronic back pain, Closed head injury, COPD (chronic obstructive pulmonary disease) ([facility]), Depression, Depression, Hypercholesteremia, Hypertension, Hypokalemia, Pain in shoulder, Painful total knee replacement, initial encounter ([facility]), Reflux, Rheumatic fever, Sleep apnea, Syncope and collapse, TIA (transient ischemic attack), Urinary incontinence, Urinary tract infection in female, and Wears glasses.     Weights Reviewed: Yes   Admission weight: 63.5 kg (140 lb)   Wt Readings from Last 3 Encounters:   05/16/24 62.9 kg (138 lb 9 oz)   05/07/24 65.4 kg (144 lb 3.2 oz)   04/14/24 68 kg (150 lb)     Intake & Output Reviewed: Yes     Intake/Output Summary (Last 24 hours) at 5/16/2024 0547  Last data filed at 5/16/2024 0010  Gross per 24 hour   Intake 222 ml   Output 1650 ml   Net -1428 ml       ECHOCARDIOGRAM Reviewed: Yes   Patient's Ejection Fraction (EF) is greater than 40%     Medications Reviewed: Yes   SCHEDULED HOSPITAL MEDICATIONS:   torsemide  20 mg Oral Q MWF    sertraline  25 mg Oral Daily    melatonin  2.5 mg Oral Nightly    sodium chloride flush  5-40 mL IntraVENous 2 times per day    heparin (porcine)  5,000 Units SubCUTAneous 3 times per day    atorvastatin  20 mg Oral Nightly    amLODIPine  10 mg Oral Daily    aspirin  81 mg Oral Daily    valsartan  80 mg Oral Daily    famotidine  20 mg Oral Nightly    pantoprazole  40 mg Oral Daily    traZODone  100 mg Oral Nightly    cetirizine  5 mg Oral Daily    hydrALAZINE  50 mg Oral 3 times per day     HOME MEDICATIONS:  Prior to Admission medications    Medication Sig Start Date End Date Taking? Authorizing Provider   citalopram (CELEXA) 20 MG tablet  Take 1.5 tablets by mouth daily   Yes Montana Sanon MD   diclofenac (VOLTAREN) 75 MG EC tablet Take 1 tablet by mouth every 12 hours as needed for Pain   Yes Montana Sanon MD   potassium chloride (KLOR-CON M) 10 MEQ extended release tablet Take 2 tablets by mouth daily   Yes Montana Sanon MD   HYDROcodone-acetaminophen (NORCO) 5-325 MG per tablet  5/9/24  Yes Montana Sanon MD   torsemide (DEMADEX) 20 MG tablet Take 2 tablets by mouth daily 5/1/24  Yes Montana Sanon MD   traZODone (DESYREL) 100 MG tablet Take 1 tablet by mouth nightly as needed for Sleep 5/1/24  Yes Montana Sanon MD   simvastatin (ZOCOR) 40 MG tablet Take 1 tablet by mouth nightly 5/1/24  Yes Montana Sanon MD   candesartan (ATACAND) 8 MG tablet Take 2 tablets by mouth daily    Montana Sanno MD   potassium chloride (KLOR-CON) 20 MEQ packet Take 20 mEq by mouth daily    Montana Sanon MD   hydrALAZINE (APRESOLINE) 50 MG tablet Take 1 tablet by mouth every 8 hours Hold if SBP < 120 or DBP < 60 mmHg 12/12/23   Young Garcia MD   famotidine (PEPCID) 20 MG tablet Take 1 tablet by mouth daily    Montana Sanon MD   cetirizine (ZYRTEC) 10 MG tablet TAKE (1) TABLET DAILY 11/30/23   Maria G Romero APRN - CNP   hydrOXYzine HCl (ATARAX) 25 MG tablet TAKE ONE (1) TABLET BY MOUTH EVERY 8 HOURS AS NEEDED FOR ITCHING 11/30/23   Maria G Romero APRN - CNP   docusate sodium (COLACE) 100 MG capsule TAKE (1) CAPSULE TWICE DAILY 11/27/23   Maria G Romero APRN - CNP   busPIRone (BUSPAR) 10 MG tablet TAKE ONE (1) TO TWO (2) TABLETS IN THE EVENING AS NEEDED FOR ANXIETY 10/6/23   Maria G Romero APRN - CNP   acetaminophen (TYLENOL) 325 MG tablet Take 2 tablets by mouth every 6 hours as needed for Pain    Montana Sanon MD   pantoprazole (PROTONIX) 40 MG tablet Take 1 tablet by mouth daily  Patient not taking: Reported on 5/12/2024 8/5/20   Ivanna Malin, APRN - CNP   aspirin 81 MG

## 2024-05-16 NOTE — PROGRESS NOTES
Patient wanting to leave today  -  educated on recommendations of PT  -  hospitalist updated.  24hr/day supervision at home not available patient patient states she can call someone at a moments notice.  Contacted daughter regarding waiting 1 more day for precert for rehab whom stated she would call her mother and talk with her.

## 2024-05-16 NOTE — CARE COORDINATION
Reviewed chart, met with pt bedside. Pt plans to DC to [facility] [place]. She is medically ready, spoke with Nicolasa who states precert is still pending. Will continue to monitor.

## 2024-05-16 NOTE — PROGRESS NOTES
Patient aware to remain another night as therapy recommending 24/7 supervision at home which she doesn't have.  Patient agreeable.

## 2024-05-16 NOTE — PLAN OF CARE
Problem: Discharge Planning  Goal: Discharge to home or other facility with appropriate resources  5/16/2024 1944 by Deidre Malone RN  Outcome: Progressing  5/16/2024 0545 by Payton West RN  Outcome: Progressing     Problem: Pain  Goal: Verbalizes/displays adequate comfort level or baseline comfort level  5/16/2024 1944 by Deidre Malone RN  Outcome: Progressing  5/16/2024 0545 by Payton West RN  Outcome: Progressing     Problem: Safety - Adult  Goal: Free from fall injury  5/16/2024 1944 by Deidre Malone RN  Outcome: Progressing  5/16/2024 0545 by Payton West RN  Outcome: Progressing     Problem: ABCDS Injury Assessment  Goal: Absence of physical injury  5/16/2024 1944 by Deidre Malone RN  Outcome: Progressing  5/16/2024 0545 by Payton West RN  Outcome: Progressing     Problem: Skin/Tissue Integrity  Goal: Absence of new skin breakdown  5/16/2024 1944 by Deidre Malone RN  Outcome: Progressing  5/16/2024 0545 by Payton West RN  Outcome: Progressing     Problem: Chronic Conditions and Co-morbidities  Goal: Patient's chronic conditions and co-morbidity symptoms are monitored and maintained or improved  5/16/2024 1944 by Deidre Malone RN  Outcome: Progressing  Note:   HEART FAILURE CARE PLAN:    Comorbidities Reviewed: Yes   Patient has a past medical history of MARIO (acute kidney injury) ([facility]), Altered mental status, Arthritis, BCC (basal cell carcinoma of skin), Bladder infection, Blurred vision, CAD (coronary artery disease), Cancer ([facility]), CHF (congestive heart failure) ([facility]), Chronic back pain, Closed head injury, COPD (chronic obstructive pulmonary disease) ([facility]), Depression, Depression, Hypercholesteremia, Hypertension, Hypokalemia, Pain in shoulder, Painful total knee replacement, initial encounter ([facility]), Reflux, Rheumatic fever, Sleep apnea, Syncope and collapse, TIA (transient ischemic attack), Urinary incontinence, Urinary tract infection in female, and  ProviderMontana MD   candesartan (ATACAND) 8 MG tablet Take 2 tablets by mouth daily    Montana Sanon MD   potassium chloride (KLOR-CON) 20 MEQ packet Take 20 mEq by mouth daily    Mnotana Sanon MD   hydrALAZINE (APRESOLINE) 50 MG tablet Take 1 tablet by mouth every 8 hours Hold if SBP < 120 or DBP < 60 mmHg 12/12/23   Young Garcia MD   famotidine (PEPCID) 20 MG tablet Take 1 tablet by mouth daily    Montana Sanno MD   cetirizine (ZYRTEC) 10 MG tablet TAKE (1) TABLET DAILY 11/30/23   Maria G Romero APRN - CNP   hydrOXYzine HCl (ATARAX) 25 MG tablet TAKE ONE (1) TABLET BY MOUTH EVERY 8 HOURS AS NEEDED FOR ITCHING 11/30/23   Maria G Romero APRN - CNP   docusate sodium (COLACE) 100 MG capsule TAKE (1) CAPSULE TWICE DAILY 11/27/23   Maria G Romero APRN - CNP   busPIRone (BUSPAR) 10 MG tablet TAKE ONE (1) TO TWO (2) TABLETS IN THE EVENING AS NEEDED FOR ANXIETY 10/6/23   Maria G Romero APRN - CNP   acetaminophen (TYLENOL) 325 MG tablet Take 2 tablets by mouth every 6 hours as needed for Pain    Montana Sanon MD   pantoprazole (PROTONIX) 40 MG tablet Take 1 tablet by mouth daily  Patient not taking: Reported on 5/12/2024 8/5/20   Ivanna Malni APRN - CNP   aspirin 81 MG EC tablet Take 1 tablet by mouth daily 3/4/20   Montana Sanon MD   amLODIPine (NORVASC) 10 MG tablet TAKE (1) TABLET DAILY 2/8/17   Huber Smith DO      Diet Reviewed: Yes   ADULT DIET; Dysphagia - Soft and Bite Sized; Low Sodium (2 gm); 2000 ml    Goal of Care Reviewed: Yes   Patient and/or Family's stated Goal of Care this Admission: Reduce shortness of breath, increase activity tolerance, better understand heart failure and disease management, be more comfortable, and reduce lower extremity edema prior to discharge.     Electronically signed by Deidre Malone RN on 5/16/2024 at 7:46 PM   5/16/2024 0545 by Payton West RN  Outcome: Progressing     Problem: Nutrition Deficit:  Goal:

## 2024-05-16 NOTE — PROGRESS NOTES
05/16/24 1146   Encounter Summary   Encounter Overview/Reason Initial Encounter   Service Provided For Patient;Family   Referral/Consult From [facility] [place]   Support System Children   Last Encounter  05/16/24  ( visited with patient and daughter; provided pastoral support and prayer)   Complexity of Encounter Low   Begin Time 1055   End Time  1058   Total Time Calculated 3 min   Assessment/Intervention/Outcome   Assessment Coping   Intervention Prayer (assurance of)/[place]

## 2024-05-16 NOTE — PROGRESS NOTES
Patient assessment as noted per flowsheet.  Denying complaints other than still wanting to sleep.  Remains confused.  Call light in reach.  Restraints removed  -  will further monitor.

## 2024-05-16 NOTE — PROGRESS NOTES
IM Progress Note    Admit Date:  5/10/2024        MARIO On CKD   Falls  Weakness     Subjective:  Ms. Neff seen .  S/p IV fluid hydration with improvement of creatinine levels.    Did not sleep well last night.  No shortness of breath.  Off of IV fluids,   Crtn better   Appetite is good. Denies  any nausea vomiting or abdominal pain     Seen by PT, OT. Needs SNF. Pre cert started.    Objective:   /63   Pulse 84   Temp 98.2 °F (36.8 °C) (Oral)   Resp 16   Ht 1.524 m (5')   Wt 62.9 kg (138 lb 9 oz)   LMP  (LMP Unknown)   SpO2 96%   BMI 27.06 kg/m²     Intake/Output Summary (Last 24 hours) at 5/16/2024 1608  Last data filed at 5/16/2024 0010  Gross per 24 hour   Intake --   Output 1450 ml   Net -1450 ml           Physical Exam:  General:  Awake, alert, NAD.  Well-oriented  Skin:  Warm and dry  Neck:  JVD absent. Neck supple  Chest:  Clear to auscultation, respiration easy. No wheezes, rales or rhonchi.   Cardiovascular:  RRR ,S1S2 normal  Abdomen:  Soft, non tender, non distended, BS +  Extremities:  No edema.  Intact peripheral pulses. Brisk cap refill, < 2 secs  Neuro: non focal      Medications:   Scheduled Meds:   torsemide  20 mg Oral Q MWF    sertraline  25 mg Oral Daily    melatonin  2.5 mg Oral Nightly    sodium chloride flush  5-40 mL IntraVENous 2 times per day    heparin (porcine)  5,000 Units SubCUTAneous 3 times per day    atorvastatin  20 mg Oral Nightly    amLODIPine  10 mg Oral Daily    aspirin  81 mg Oral Daily    valsartan  80 mg Oral Daily    famotidine  20 mg Oral Nightly    pantoprazole  40 mg Oral Daily    traZODone  100 mg Oral Nightly    cetirizine  5 mg Oral Daily    hydrALAZINE  50 mg Oral 3 times per day       Continuous Infusions:   sodium chloride         Data:  CBC:   No results for input(s): \"WBC\", \"RBC\", \"HGB\", \"HCT\", \"MCV\", \"RDW\", \"PLT\" in the last 72 hours.    BMP:   Recent Labs     05/14/24  0441 05/14/24  1539   NA  --  137   K 3.8 4.1   CL  --  107   CO2  --  25   BUN   Healthcare Providers:  https://www.fda.gov/media/889529/download  Fact sheet for Patients: https://www.fda.gov/media/572014/download    METHODOLOGY: Isothermal Nucleic Acid Amplification         Culture, Blood 1 [[id number]] Collected: 05/10/24 0940    Order Status: Completed Specimen: Blood Updated: 05/14/24 1615     Blood Culture, Routine No Growth after 4 days of incubation.    Narrative:      ORDER#: [id number]                          ORDERED BY: NARESH MUIR  SOURCE: Blood Antecubital-Rig              COLLECTED:  05/10/24 09:40  ANTIBIOTICS AT B[facility]JAMIN.:                      RECEIVED :  05/10/24 09:51  If child <=2 yrs old please draw pediatric bottle.~Blood Culture 1              Radiology  XR CHEST (2 VW)   Final Result   No acute cardiopulmonary process.               Assessment:  Principal Problem:    Hypoxia  Active Problems:    Benign essential HTN    Hyperlipidemia    Atherosclerotic heart disease of native coronary artery with other forms of angina pectoris (HCC)    Primary insomnia    MARIA C (obstructive sleep apnea)    Alzheimer's dementia without behavioral disturbance (HCC)    MARIO (acute kidney injury) (HCC)    Aortic stenosis, severe    COPD (chronic obstructive pulmonary disease) (HCC)    Elevated brain natriuretic peptide (BNP) level    Shortness of breath  Resolved Problems:    * No resolved hospital problems. *      Plan:    Acute hypoxic respiratory failure (tachypnea, dyspnea)   hx MARIA C does not tolerated CPAP  - no O2 at baseline, required 2 L NC on admission-> wean as tolerated , now on room air   - CXR without acute findings   - COVID negative  - afebrile  - blood cx sent in ED  - check sputum cx    - last admission had to be placed on oxygen while sleeping   - monitor      MARIO on CKD  - creatine previously 1.4 to 1.7, creatinine was 2.1 on admission   - patient appears dry  - given 500 mL bolus in ED, continued on  gentle IVFs  - bladder scan to see if retaining urine  - avoid nephrotoxic

## 2024-05-16 NOTE — FLOWSHEET NOTE
05/15/24 2007   Vital Signs   Temp 97 °F (36.1 °C)   Temp Source Oral   Pulse 69   Heart Rate Source Monitor   Respirations 18   /70   MAP (Calculated) 91   BP Location Right upper arm   BP Method Automatic   Patient Position Semi fowlers   Oxygen Therapy   SpO2 97 %   O2 Device None (Room air)     PM Assessment completed. Scheduled medications given per MAR, On Room Air, A&O X4, Vital Signs completed and Charted, Patient denies any further needs at this time. Call light within reach, Reminded patient to call RN if she needs anything.

## 2024-05-16 NOTE — PROGRESS NOTES
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 Gray    Sub/interval history  Overall feels better - would like to go home  Off oxygen  Good urine volume  Eating better  Chronic pain issues     ROS: No chest pain/shortness of breath/fever/nausea/vomiting  PSFH: No visitor    Scheduled Meds:   torsemide  20 mg Oral Q MWF    sertraline  25 mg Oral Daily    melatonin  2.5 mg Oral Nightly    sodium chloride flush  5-40 mL IntraVENous 2 times per day    heparin (porcine)  5,000 Units SubCUTAneous 3 times per day    atorvastatin  20 mg Oral Nightly    amLODIPine  10 mg Oral Daily    aspirin  81 mg Oral Daily    valsartan  80 mg Oral Daily    famotidine  20 mg Oral Nightly    pantoprazole  40 mg Oral Daily    traZODone  100 mg Oral Nightly    cetirizine  5 mg Oral Daily    hydrALAZINE  50 mg Oral 3 times per day     Continuous Infusions:   sodium chloride       PRN Meds:.HYDROcodone-acetaminophen, perflutren lipid microspheres, sodium chloride flush, sodium chloride, ondansetron **OR** ondansetron, polyethylene glycol, acetaminophen **OR** acetaminophen, busPIRone    History of Present Illness on 5/10/2024:    84 y.o. yo female with PMH of CAD, CHF preserved EF, AVR, COPD, HTN, HLD and TIA, CKD 3B/4 who is admitted for shortness of breath and nephrology has been consulted for MARIO  Patient reports that for the last several days she has not been eating and drinking well.  She has been having stomach upset and has a lot of belching and few episodes vomiting.  Last night she thought that she had high fever as she had drenching sweats.  She also wetted her bed.  This morning she continued to have shortness of breath and went to the emergency room and has been admitted.  She reports after being on nasal cannula she is feeling bit better.  Creatinine was noted to be 2.1 and nephrology has been consulted    Physical exam:   Constitutional:  VITALS:  BP (!)

## 2024-05-17 VITALS
OXYGEN SATURATION: 95 % | WEIGHT: 139.2 LBS | SYSTOLIC BLOOD PRESSURE: 122 MMHG | DIASTOLIC BLOOD PRESSURE: 72 MMHG | RESPIRATION RATE: 16 BRPM | BODY MASS INDEX: 27.33 KG/M2 | HEIGHT: 60 IN | TEMPERATURE: 98.5 F | HEART RATE: 88 BPM

## 2024-05-17 PROCEDURE — 6370000000 HC RX 637 (ALT 250 FOR IP): Performed by: INTERNAL MEDICINE

## 2024-05-17 PROCEDURE — 99238 HOSP IP/OBS DSCHRG MGMT 30/<: CPT | Performed by: INTERNAL MEDICINE

## 2024-05-17 PROCEDURE — 6360000002 HC RX W HCPCS

## 2024-05-17 PROCEDURE — 51798 US URINE CAPACITY MEASURE: CPT

## 2024-05-17 PROCEDURE — 6370000000 HC RX 637 (ALT 250 FOR IP)

## 2024-05-17 RX ORDER — POTASSIUM CHLORIDE 1.5 G/1.58G
20 POWDER, FOR SOLUTION ORAL DAILY
Qty: 15 EACH | Refills: 1 | Status: SHIPPED | OUTPATIENT
Start: 2024-05-17

## 2024-05-17 RX ORDER — SERTRALINE HYDROCHLORIDE 25 MG/1
25 TABLET, FILM COATED ORAL DAILY
Qty: 30 TABLET | Refills: 3 | Status: SHIPPED | OUTPATIENT
Start: 2024-05-18 | End: 2024-05-17 | Stop reason: HOSPADM

## 2024-05-17 RX ORDER — TORSEMIDE 20 MG/1
20 TABLET ORAL
Qty: 15 TABLET | Refills: 1 | Status: SHIPPED | OUTPATIENT
Start: 2024-05-17

## 2024-05-17 RX ORDER — VALSARTAN 80 MG/1
80 TABLET ORAL DAILY
Qty: 30 TABLET | Refills: 3 | Status: SHIPPED | OUTPATIENT
Start: 2024-05-18

## 2024-05-17 RX ADMIN — HYDROCODONE BITARTRATE AND ACETAMINOPHEN 1 TABLET: 5; 325 TABLET ORAL at 00:24

## 2024-05-17 RX ADMIN — HYDRALAZINE HYDROCHLORIDE 50 MG: 50 TABLET ORAL at 12:54

## 2024-05-17 RX ADMIN — HEPARIN SODIUM 5000 UNITS: 5000 INJECTION INTRAVENOUS; SUBCUTANEOUS at 06:44

## 2024-05-17 RX ADMIN — VALSARTAN 80 MG: 80 TABLET, FILM COATED ORAL at 08:02

## 2024-05-17 RX ADMIN — HYDRALAZINE HYDROCHLORIDE 50 MG: 50 TABLET ORAL at 06:45

## 2024-05-17 RX ADMIN — AMLODIPINE BESYLATE 10 MG: 5 TABLET ORAL at 08:02

## 2024-05-17 RX ADMIN — SERTRALINE HYDROCHLORIDE 25 MG: 50 TABLET ORAL at 08:03

## 2024-05-17 RX ADMIN — CETIRIZINE HYDROCHLORIDE 5 MG: 10 TABLET ORAL at 08:02

## 2024-05-17 RX ADMIN — ASPIRIN 81 MG: 81 TABLET, COATED ORAL at 08:03

## 2024-05-17 RX ADMIN — HEPARIN SODIUM 5000 UNITS: 5000 INJECTION INTRAVENOUS; SUBCUTANEOUS at 12:54

## 2024-05-17 RX ADMIN — HYDROCODONE BITARTRATE AND ACETAMINOPHEN 1 TABLET: 5; 325 TABLET ORAL at 08:08

## 2024-05-17 RX ADMIN — PANTOPRAZOLE SODIUM 40 MG: 40 TABLET, DELAYED RELEASE ORAL at 08:03

## 2024-05-17 RX ADMIN — ONDANSETRON 4 MG: 2 INJECTION INTRAMUSCULAR; INTRAVENOUS at 11:11

## 2024-05-17 RX ADMIN — HYDROCODONE BITARTRATE AND ACETAMINOPHEN 1 TABLET: 5; 325 TABLET ORAL at 12:54

## 2024-05-17 ASSESSMENT — PAIN - FUNCTIONAL ASSESSMENT: PAIN_FUNCTIONAL_ASSESSMENT: PREVENTS OR INTERFERES SOME ACTIVE ACTIVITIES AND ADLS

## 2024-05-17 ASSESSMENT — PAIN SCALES - GENERAL
PAINLEVEL_OUTOF10: 8
PAINLEVEL_OUTOF10: 1

## 2024-05-17 ASSESSMENT — PAIN DESCRIPTION - DESCRIPTORS: DESCRIPTORS: ACHING

## 2024-05-17 ASSESSMENT — PAIN DESCRIPTION - LOCATION
LOCATION: OTHER (COMMENT)
LOCATION: ABDOMEN;BACK

## 2024-05-17 ASSESSMENT — PAIN DESCRIPTION - ORIENTATION: ORIENTATION: OTHER (COMMENT)

## 2024-05-17 NOTE — DISCHARGE SUMMARY
Name:  Nithya Neff  Room:  /0311-02  MRN:    [id number]    Discharge Summary      This discharge summary is in conjunction with a complete physical exam done on the day of discharge.    Attending Physician: Dr. Gray  Discharging Physician: Dr. Ventura      Admit: 5/10/2024  Discharge:  5/17/2024    HPI:    The patient is a 84 y.o. female with pmhx as below who presents to [facility] with shortness of breath since last night. She reports waking up in the night gasping for air but this occurs occasionally per patient. She was having chills and shaking and unable to get out of the bed. She reports weight loss, some increasing lower extremity edema. Denies cough, urinary complaints, nausea, vomiting or diarrhea. Patient is somewhat of a poor historian, daughter able to provide some context but patient lives alone. She came to see her mother this morning around 6 am and found her lying in bed, large amount of urine soaked bed.      Diagnoses this Admission and Hospital Course   Acute hypoxic respiratory failure (tachypnea, dyspnea)   hx MARIA C does not tolerated CPAP  - no O2 at baseline, required 2 L NC on admission-> weaned as tolerated , now on room air   - CXR without acute findings   - COVID negative  - afebrile  - blood cx sent in ED  - check sputum cx    - last admission had to be placed on oxygen while sleeping   - monitor      MARIO on CKD  - creatine previously 1.4 to 1.7, creatinine was 2.1 on admission   - patient appears dry  - given 500 mL bolus in ED, continued on  gentle IVFs  - bladder scan to see if retaining urine  - avoid nephrotoxic agents   - nephrology consulted due to frequent MARIO  -S/p IV fluids . Crtn improved to 1.8-> 1.6.  MARIO resolved.  Continued to monitor BMP.  Continued to hold diuretics     Chronic diastolic CHF  - BNP > 4K  - last echo 9/2022 EF 60-65%, grade I DD, repeat echo ordered  - held diuresis 2/2 MARIO   - consider cardiology consult based on echo results  -S/p IVFs  -

## 2024-05-17 NOTE — PROGRESS NOTES
Patient bladder scan for 6 ml, patient reports urgency, which she says she has had for several years, patient requesting medication for pain, Norco given for pain.

## 2024-05-17 NOTE — PROGRESS NOTES
Physician Progress Note      PATIENT:               FELIPE KING  [facility] #:                  [id number]  :                       1940  ADMIT DATE:       5/10/2024 8:40 AM  DISCH DATE:  RESPONDING  PROVIDER #:        Amanda Ventura MD          QUERY TEXT:    Patient admitted with acute hyoxic respiratory failure per H&P.   In order to   support the diagnosis of acute respiratory failure, please include additional   clinical indicators in your documentation.  Or please document if the   diagnosis of acute respiratory failure has been ruled out after further study.    The medical record reflects the following:  Risk Factors: 84 y.o. female with hx of CHF, Chronic back pain, CAD, skin   cancer, AMS, depression  Clinical Indicators: Per ED: No respiratory distress, Pulmonary effort is   normal. Per H&P: no distress, no accessory muscle use.  Per Nursing:  Pt's   sats drop while she's sleeping.  Treatment: Per nursing vital sign flowsheet, pt was on oxygen @ 3L for 92   minutes (13:58PM - 15:00PM)    Acute Respiratory Failure Clinical Indicators per [employer] Training Guide and   Quick Reference Guide:  pO2 < 60 mmHg or SpO2 (pulse oximetry) < 91% breathing room air  pCO2 > 50 and pH < 7.35  P/F ratio (pO2 / FIO2) < 300  pO2 decrease or pCO2 increase by 10 mmHg from baseline (if known)  Supplemental oxygen of 40% or more  Presence of respiratory distress, tachypnea, dyspnea, shortness of breath,   wheezing  Unable to speak in complete sentences  Use of accessory muscles to breathe  Extreme anxiety and feeling of impending doom  Tripod position  Confusion/altered mental status/obtunded  Options provided:  -- Acute Respiratory Failure ruled out after study  -- Acute Respiratory Failure as evidenced by, Please document evidence.  -- Other - I will add my own diagnosis  -- Disagree - Not applicable / Not valid  -- Disagree - Clinically unable to determine / Unknown  -- Refer to Clinical Documentation

## 2024-05-17 NOTE — PROGRESS NOTES
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 Gray    Sub/interval history  Overall feels better   Off oxygen  Good urine volume  Eating better  Chronic pain issues   Wanting to go home, awaiting pre-cert  Says she has some urge to urinate even after going     ROS: No chest pain/shortness of breath/fever/nausea/vomiting  PSFH:  Daughter at bedside     Scheduled Meds:   torsemide  20 mg Oral Q MWF    sertraline  25 mg Oral Daily    melatonin  2.5 mg Oral Nightly    sodium chloride flush  5-40 mL IntraVENous 2 times per day    heparin (porcine)  5,000 Units SubCUTAneous 3 times per day    atorvastatin  20 mg Oral Nightly    amLODIPine  10 mg Oral Daily    aspirin  81 mg Oral Daily    valsartan  80 mg Oral Daily    famotidine  20 mg Oral Nightly    pantoprazole  40 mg Oral Daily    traZODone  100 mg Oral Nightly    cetirizine  5 mg Oral Daily    hydrALAZINE  50 mg Oral 3 times per day     Continuous Infusions:   sodium chloride       PRN Meds:.HYDROcodone-acetaminophen, perflutren lipid microspheres, sodium chloride flush, sodium chloride, ondansetron **OR** ondansetron, polyethylene glycol, acetaminophen **OR** acetaminophen, busPIRone    History of Present Illness on 5/10/2024:    84 y.o. yo female with PMH of CAD, CHF preserved EF, AVR, COPD, HTN, HLD and TIA, CKD 3B/4 who is admitted for shortness of breath and nephrology has been consulted for MARIO  Patient reports that for the last several days she has not been eating and drinking well.  She has been having stomach upset and has a lot of belching and few episodes vomiting.  Last night she thought that she had high fever as she had drenching sweats.  She also wetted her bed.  This morning she continued to have shortness of breath and went to the emergency room and has been admitted.  She reports after being on nasal cannula she is feeling bit better.  Creatinine was noted to be 2.1 and  MD  Office: [phone]  Fax:    [phone]  [facility]

## 2024-05-17 NOTE — PROGRESS NOTES
IM Progress Note    Admit Date:  5/10/2024        MARIO On CKD   Falls  Weakness     Subjective:  Ms. Neff seen .  S/p IV fluid hydration with improvement of creatinine levels.    Did not sleep well last night.  No shortness of breath.  Off of IV fluids,   Crtn better   Appetite is good. Denies  any nausea vomiting or abdominal pain     Seen by PT, OT. Needs SNF. Pre cert started.    Objective:   BP (!) 148/72   Pulse 81   Temp 98.7 °F (37.1 °C) (Oral)   Resp 18   Ht 1.524 m (5')   Wt 63.1 kg (139 lb 3.2 oz) Comment: white scale  LMP  (LMP Unknown)   SpO2 94%   BMI 27.19 kg/m²     Intake/Output Summary (Last 24 hours) at 5/17/2024 1058  Last data filed at 5/17/2024 0804  Gross per 24 hour   Intake 120 ml   Output 430 ml   Net -310 ml           Physical Exam:  General:  Awake, alert, NAD.  Well-oriented  Skin:  Warm and dry  Neck:  JVD absent. Neck supple  Chest:  Clear to auscultation, respiration easy. No wheezes, rales or rhonchi.   Cardiovascular:  RRR ,S1S2 normal  Abdomen:  Soft, non tender, non distended, BS +  Extremities:  No edema.  Intact peripheral pulses. Brisk cap refill, < 2 secs  Neuro: non focal      Medications:   Scheduled Meds:   torsemide  20 mg Oral Q MWF    sertraline  25 mg Oral Daily    melatonin  2.5 mg Oral Nightly    sodium chloride flush  5-40 mL IntraVENous 2 times per day    heparin (porcine)  5,000 Units SubCUTAneous 3 times per day    atorvastatin  20 mg Oral Nightly    amLODIPine  10 mg Oral Daily    aspirin  81 mg Oral Daily    valsartan  80 mg Oral Daily    famotidine  20 mg Oral Nightly    pantoprazole  40 mg Oral Daily    traZODone  100 mg Oral Nightly    cetirizine  5 mg Oral Daily    hydrALAZINE  50 mg Oral 3 times per day       Continuous Infusions:   sodium chloride         Data:  CBC:   No results for input(s): \"WBC\", \"RBC\", \"HGB\", \"HCT\", \"MCV\", \"RDW\", \"PLT\" in the last 72 hours.    BMP:   Recent Labs     05/14/24  1539      K 4.1      CO2 25   BUN 12  Providers:  https://www.fda.gov/media/226827/download  Fact sheet for Patients: https://www.fda.gov/media/912243/download    METHODOLOGY: Isothermal Nucleic Acid Amplification         Culture, Blood 1 [[id number]] Collected: 05/10/24 0940    Order Status: Completed Specimen: Blood Updated: 05/14/24 1615     Blood Culture, Routine No Growth after 4 days of incubation.    Narrative:      ORDER#: [id number]                          ORDERED BY: NARESH MUIR  SOURCE: Blood Antecubital-Rig              COLLECTED:  05/10/24 09:40  ANTIBIOTICS AT [facility]JAMIN.:                      RECEIVED :  05/10/24 09:51  If child <=2 yrs old please draw pediatric bottle.~Blood Culture 1              Radiology  XR CHEST (2 VW)   Final Result   No acute cardiopulmonary process.               Assessment:  Principal Problem:    Hypoxia  Active Problems:    Benign essential HTN    Hyperlipidemia    Atherosclerotic heart disease of native coronary artery with other forms of angina pectoris (HCC)    Primary insomnia    MARIA C (obstructive sleep apnea)    Alzheimer's dementia without behavioral disturbance (HCC)    MARIO (acute kidney injury) (HCC)    Aortic stenosis, severe    COPD (chronic obstructive pulmonary disease) (HCC)    Elevated brain natriuretic peptide (BNP) level    Shortness of breath  Resolved Problems:    * No resolved hospital problems. *      Plan:    Acute hypoxic respiratory failure (tachypnea, dyspnea)   hx MARIA C does not tolerated CPAP  - no O2 at baseline, required 2 L NC on admission-> wean as tolerated , now on room air   - CXR without acute findings   - COVID negative  - afebrile  - blood cx sent in ED  - check sputum cx    - last admission had to be placed on oxygen while sleeping   - monitor      MARIO on CKD  - creatine previously 1.4 to 1.7, creatinine was 2.1 on admission   - patient appears dry  - given 500 mL bolus in ED, continued on  gentle IVFs  - bladder scan to see if retaining urine  - avoid nephrotoxic agents   -

## 2024-05-17 NOTE — FLOWSHEET NOTE
05/16/24 2013   Vital Signs   Temp 98.6 °F (37 °C)   Temp Source Oral   Pulse 86   Heart Rate Source Monitor   Respirations 18   /69   MAP (Calculated) 90   BP Location Right upper arm   BP Method Automatic   Patient Position Semi fowlers   Oxygen Therapy   SpO2 96 %   O2 Device None (Room air)     Pt assessment complete.  Pt lying in bed quietly. Lung sounds clear.  Pt on room air.  Pt NSR per monitor with HR of 86.  Nightly medications given.  Denies needs at this time.  Call light within reach. Bed exit alarm on.

## 2024-05-17 NOTE — CARE COORDINATION
DISCHARGE ORDER  Date/Time 2024 2:19 PM  Completed by: Sophia Torres, Case Management    Patient Name: Nithya Neff      : 1940  Admitting Diagnosis: Shortness of breath [R06.02]  Hypoxia [R09.02]  MARIO (acute kidney injury) (HCC) [N17.9]  Elevated brain natriuretic peptide (BNP) level [R79.89]      Admit order Date and Status: 5/10/2024 Stable  (verify MD's last order for status of admission)        If applicable PT/OT recommendation at Discharge: Discharge Recommendations: SNF  DME needs for discharge: Defer to facility    Confirmed discharge plan: Yes  with whom__Nithya_____________  If pt confirmed DC plan does family need to be contacted by CM No    Discharge Plan: Reviewed chart, noted DC order, met with pt at bedside, daughter present.  Role of discharge planner explained and patient verbalized understanding. Pt to be DC home today, daughter providing transportation. As of , precert still pending, pt electing to DC home, daughter is staying with her. She is open to HC and chooses [facility]. Referral called and orders placed in Epic.    151 - Spoke with Sonya at [facility] who states pt has no PCP. Spoke with Pt and daughter who confirmed no PCP and they are not interested in finding one at this time. States she will find her own HC when she leaves this hospital. Skylar RN in room and heard conversation as well. Skylar gave number to PCP close to pt. OK to DC from CM standpoint.      Date of Last IMM Given: 2024       Has Home O2 in place on admit:  No    Pt is being d/c'd to home today. Pt's O2 sats are 95% on RA.      Discharge timeout done with PT, CM, Skylar RN. All discharge needs and concerns addressed.

## 2024-05-17 NOTE — DISCHARGE INSTR - DIET

## 2024-05-17 NOTE — PROGRESS NOTES
Discharge instructions reviewed with daughter and patient.  Patient Adamant about going home without homecare.  Patient provided with names of family practitioners in her area and numbers provided.  Patient will follow up  Monday, daughter will be staying with patient and will be providing care at home.  Patient has a plan in place that her granddaughter is coming to stay with her.      Patient to follow up and find a new PCP, will follow up with Dr. Medina as requested.  NO other needs at this time

## 2024-05-17 NOTE — FLOWSHEET NOTE
05/17/24 0000   Vital Signs   Temp 98.4 °F (36.9 °C)   Temp Source Oral   Pulse 80   Heart Rate Source Monitor   Respirations 16   BP (!) 142/71   MAP (Calculated) 95   BP Location Right upper arm   BP Method Automatic   Patient Position Supine   Oxygen Therapy   SpO2 96 %   O2 Device None (Room air)     Pt reassessment complete.  No changes noted. PRN norco given for body aches.  No other needs at this time.  Call light within reach.  Bed exit alarm on.

## 2024-05-17 NOTE — FLOWSHEET NOTE
At bedside to see patient for shift assessment.  Alert and oriented, eating breakfast, but states daughter is also brining her breakfast.  Rating pain 8/10 in back and \" all over\", pain medication and scheduled medications provided.  Discussed plan of care, call light within reach

## 2024-05-17 NOTE — CARE COORDINATION
Spoke with Nicolasa at [facility]. States they called [employer] yesterday and had precert \"expedited\". Explained pt has been ready for DC for a few days now. She hopes to hear something today. Will continue to monitor.

## 2024-05-17 NOTE — DISCHARGE INSTR - COC
Continuity of Care Form    Patient Name: Nithya Neff   :  1940  MRN:  [id number]    Admit date:  5/10/2024  Discharge date:  ***    Code Status Order: Full Code   Advance Directives:     Admitting Physician:  Elicia Gray MD  PCP: Marisol Wheatley    Discharging Nurse: ***  Discharging Hospital Unit/Room#: /0311-02  Discharging Unit Phone Number: ***    Emergency Contact:   Extended Emergency Contact Information  Primary Emergency Contact: Sonya Neri  Address: [address] [place] [address]  Home Phone: [phone]  Mobile Phone: [phone]  Relation: Child   needed? No  Secondary Emergency Contact: TawandaRobert   [facility]  Home Phone: [phone]  Mobile Phone: [phone]  Relation: Child   needed? No    Past Surgical History:  Past Surgical History:   Procedure Laterality Date    AORTIC VALVE REPLACEMENT      APPENDECTOMY      BLADDER REPAIR      3 TIMES    CARDIAC SURGERY      stents    CARPAL TUNNEL RELEASE      RIGHT    CHOLECYSTECTOMY, LAPAROSCOPIC  2017    with dr cowart     COLONOSCOPY      CORONARY ANGIOPLASTY WITH STENT PLACEMENT      has it it done twice    DIAGNOSTIC CARDIAC CATH LAB PROCEDURE      HYSTERECTOMY (CERVIX STATUS UNKNOWN)      IR MIDLINE CATH  10/16/2023    IR MIDLINE CATH 10/16/2023 [facility] SPECIAL PROCEDURES    JOINT REPLACEMENT      KATHLEEN TKR    NECK SURGERY      OTHER SURGICAL HISTORY  12    Full Interstim Implant    SHOULDER SURGERY      SKIN CANCER EXCISION      SPINAL FIXATION SURGERY WITH IMPLANT      SPINAL FIXATION SURGERY WITH IMPLANT      SPINAL FIXATION SURGERY WITH IMPLANT      SPINE SURGERY      TONSILLECTOMY      UPPER GASTROINTESTINAL ENDOSCOPY  11    UPPER GASTROINTESTINAL ENDOSCOPY      UPPER GASTROINTESTINAL ENDOSCOPY  2017    dilitation       Immunization History:   Immunization History   Administered Date(s)  breath R06.02       Isolation/Infection:   Isolation            No Isolation          Patient Infection Status       Infection Onset Added Last Indicated Last Indicated By Review Planned Expiration Resolved Resolved By    None active    Resolved    COVID-19 21 Covid-19 Ambulatory   10/08/21 Infection                        Nurse Assessment:  Last Vital Signs: /68   Pulse 90   Temp 99.7 °F (37.6 °C) (Oral)   Resp 16   Ht 1.524 m (5')   Wt 63.1 kg (139 lb 3.2 oz) Comment: white scale  LMP  (LMP Unknown)   SpO2 95%   BMI 27.19 kg/m²     Last documented pain score (0-10 scale): Pain Level: 8  Last Weight:   Wt Readings from Last 1 Encounters:   24 63.1 kg (139 lb 3.2 oz)     Mental Status:  {IP PT MENTAL STATUS:}    IV Access:  { KINA IV ACCESS:060764347}    Nursing Mobility/ADLs:  Walking   {CHP DME ADLs:296600422}  Transfer  {P DME ADLs:724228996}  Bathing  {P DME ADLs:063921169}  Dressing  {CHP DME ADLs:687218906}  Toileting  {P DME ADLs:012542117}  Feeding  {P DME ADLs:414096736}  Med Admin  {P DME ADLs:816089832}  Med Delivery   { KINA MED Delivery:361635022}    Wound Care Documentation and Therapy:  Wound 24 #1 Left Buttock Pressure injury Stage 2 (onset ) (Active)   Wound Image   05/10/24 1606   Wound Etiology Pressure Stage 2 24 0538   Dressing Status Clean;Dry;Intact 24 1241   Wound Cleansed Cleansed with saline 05/10/24 1606   Dressing/Treatment Triad hydro/zinc oxide-based hydrophilic paste 24 1241   Dressing Change Due 05/11/24 05/10/24 1606   Wound Length (cm) 0.5 cm 24 1504   Wound Width (cm) 0.3 cm 24 1504   Wound Depth (cm) 0.1 cm 24 1504   Wound Surface Area (cm^2) 0.15 cm^2 24 1504   Wound Volume (cm^3) 0.015 cm^3 24 1504   Distance Tunneling (cm) 0 cm 24 150   Wound Assessment Dry;Pink/red 24   Drainage Amount None (dry) 24   Odor None 05/10/24

## 2024-05-20 ENCOUNTER — CARE COORDINATION (OUTPATIENT)
Dept: CASE MANAGEMENT | Age: 84
End: 2024-05-20

## 2024-05-20 NOTE — CARE COORDINATION
she has appt today.     She plans to contact Dr Medina office to schedule appt as recommended. She confirms she has the office number.     /68 - before meds  Did not weigh yet today but yesterday 138#    CHF education:  -if you take a water pill - do not skip doses  -weigh daily in the morning at the same time and in the same clothing  -report weight gain of >3#/day or >5#/week  -report increased SOB, edema, abd fullness, difficulty lying flat  -report palpitations, cough, difficulty urinating  -reviewed salt and fluid restrictions      States the first thing she does every morning is check her VS including weights. BLE edema is slight and same as DC. She does use salt on some foods. Educated to avoid salt and to use salt substitutes. She does use all flavors of Mrs Page.     States she is not getting around very well, but her daughter in background says she is doing pretty good. Encouraged her to talk with her new PCP about home care therapy. She denies needs at this time and politely ended call. No further CTN outreach scheduled.     Care Transition Nurse reviewed discharge instructions, medical action plan, and red flags with patient who verbalized understanding. The patient was given an opportunity to ask questions and does not have any further questions or concerns at this time. Were discharge instructions available to patient? Yes. Reviewed appropriate site of care based on symptoms and resources available to patient including: PCP  Specialist. The patient agrees to contact the PCP office for questions related to their healthcare.     Advance Care Planning:   Does patient have an Advance Directive: not on file and decision maker updated.    Healthcare Decision Maker:    Primary Decision Maker: Sonya Neri - Child - [phone]    Secondary Decision Maker: Robert Neff - Child - [phone]    Medication reconciliation was performed with patient, who verbalizes understanding of administration of home

## 2024-05-21 ENCOUNTER — HOSPITAL ENCOUNTER (OUTPATIENT)
Dept: WOUND CARE | Age: 84
Discharge: HOME | End: 2024-05-21
Attending: STUDENT IN AN ORGANIZED HEALTH CARE EDUCATION/TRAINING PROGRAM

## 2024-05-29 RX ORDER — DICLOFENAC SODIUM 75 MG/1
TABLET, DELAYED RELEASE ORAL
Qty: 60 TABLET | OUTPATIENT
Start: 2024-05-29

## 2024-05-29 NOTE — PROGRESS NOTES
Physician Progress Note      PATIENT:               FELIPE KING  CSN #:                  [id number]  :                       1940  ADMIT DATE:       5/10/2024 8:40 AM  DISCH DATE:        2024 4:07 PM  RESPONDING  PROVIDER #:        Amanda Ventura MD          QUERY TEXT:    Pt admitted with shortness of breath and noted \"debility\" by IM on .  If   possible, please document in the progress notes and discharge summary if you   are evaluating and / or treating any of the following:    The medical record reflects the following:  Risk Factors: 84 year old female  Clinical Indicators: H&P: \"unable to get out of the bed.\"  IM : \"Weakness, debility, falls.- seen by PT, OT. Needs SNF.\"  PT : \"Pt demonstrated decreased Activity tolerance, Balance, Safety, and   Strength as well as decreased independence with Ambulation, Bed Mobility , and   Transfers. Recommending SNF upon discharge as patient functioning well below   baseline.\"  Treatment: PT/OT  Options provided:  -- Age Related Physical Debility  -- Other - I will add my own diagnosis  -- Disagree - Not applicable / Not valid  -- Disagree - Clinically unable to determine / Unknown  -- Refer to Clinical Documentation Reviewer    PROVIDER RESPONSE TEXT:    This patient has age related physical debility.    Query created by: Sophia Beck on 2024 2:49 PM      Electronically signed by:  Amanda Ventura MD 2024 9:47 AM

## 2024-06-10 NOTE — PROGRESS NOTES
Physician Progress Note      PATIENT:               FELIPE KING  [facility] #:                  [id number]  :                       1940  ADMIT DATE:       5/10/2024 8:40 AM  DISCH DATE:        2024 4:07 PM  RESPONDING  PROVIDER #:        Debra Saavedra MD          QUERY TEXT:    Patient admitted 5/10  with SOB.  Noted documentation of Acute Kidney Injury   on CKD in H&P and throughout the chart.  Per Nephrology creatinine baseline   1.5 - 1.8. Presenting creatinine 2.1.  In order to support the diagnosis of   MARIO, please include additional clinical indicators in your documentation.? Or   please document if the diagnosis of MARIO has been ruled out after further   study.    The medical record reflects the following:  Risk Factors: 84 y.o. female with hx of MARIO, AMS, CAD, CHF, HTN, Chronic back   pain, Depression, Hypokalemia  Clinical Indicators: Creatinine baseline per nephrology 1.5-1.8.  Presented   with creatinine 2.1 on admission  Treatment: Monitor daily labs, IVF, Nephrology consult    Defined by Kidney Disease Improving Global Outcomes (KDIGO) clinical practice   guideline for acute kidney injury:  -Increase in SCr by greater than or equal to 0.3 mg/dl within 48 hours; or  -Increase or decrease in SCr to greater than or equal to 1.5 times baseline,   which is known or presumed to have occurred within the prior 7 days; or  -Urine volume < 0.5ml/kg/h for 6 hours.  Options provided:  -- CKD 3/4 only with MARIO ruled out  -- Acute kidney injury evidenced by, Please document KDIGO evidence.  -- Other - I will add my own diagnosis  -- Disagree - Not applicable / Not valid  -- Disagree - Clinically unable to determine / Unknown  -- Refer to Clinical Documentation Reviewer    PROVIDER RESPONSE TEXT:    This patient has acute kidney injury as evidenced by Scr rise of 0.3 mg/dL   from baseline.    Query created by: Jamie Verma on 2024 7:46 AM      Electronically signed by:  Debra

## 2024-07-24 ENCOUNTER — HOSPITAL ENCOUNTER (EMERGENCY)
Age: 84
Discharge: HOME | End: 2024-07-24
Attending: EMERGENCY MEDICINE
Payer: MEDICARE

## 2024-07-24 VITALS
SYSTOLIC BLOOD PRESSURE: 129 MMHG | OXYGEN SATURATION: 98 % | HEART RATE: 72 BPM | DIASTOLIC BLOOD PRESSURE: 51 MMHG | RESPIRATION RATE: 17 BRPM | TEMPERATURE: 97.9 F

## 2024-07-24 DIAGNOSIS — E86.0 DEHYDRATION: ICD-10-CM

## 2024-07-24 DIAGNOSIS — N28.9 RENAL INSUFFICIENCY: Primary | ICD-10-CM

## 2024-07-24 DIAGNOSIS — R53.1 GENERAL WEAKNESS: ICD-10-CM

## 2024-07-24 LAB
ANION GAP SERPL CALCULATED.3IONS-SCNC: 10 MMOL/L (ref 3–16)
BASOPHILS # BLD: 0.1 K/UL (ref 0–0.2)
BASOPHILS NFR BLD: 1 %
BUN SERPL-MCNC: 32 MG/DL (ref 7–20)
CALCIUM SERPL-MCNC: 8.7 MG/DL (ref 8.3–10.6)
CHLORIDE SERPL-SCNC: 102 MMOL/L (ref 99–110)
CO2 SERPL-SCNC: 24 MMOL/L (ref 21–32)
CREAT SERPL-MCNC: 1.9 MG/DL (ref 0.6–1.2)
DEPRECATED RDW RBC AUTO: 14.4 % (ref 12.4–15.4)
EKG ATRIAL RATE: 72 BPM
EKG DIAGNOSIS: NORMAL
EKG P AXIS: 24 DEGREES
EKG P-R INTERVAL: 158 MS
EKG Q-T INTERVAL: 410 MS
EKG QRS DURATION: 90 MS
EKG QTC CALCULATION (BAZETT): 448 MS
EKG R AXIS: -15 DEGREES
EKG T AXIS: 14 DEGREES
EKG VENTRICULAR RATE: 72 BPM
EOSINOPHIL # BLD: 0.1 K/UL (ref 0–0.6)
EOSINOPHIL NFR BLD: 1.6 %
GFR SERPLBLD CREATININE-BSD FMLA CKD-EPI: 26 ML/MIN/{1.73_M2}
GLUCOSE SERPL-MCNC: 88 MG/DL (ref 70–99)
HCT VFR BLD AUTO: 33 % (ref 36–48)
HGB BLD-MCNC: 11.1 G/DL (ref 12–16)
LYMPHOCYTES # BLD: 1.7 K/UL (ref 1–5.1)
LYMPHOCYTES NFR BLD: 19.2 %
MCH RBC QN AUTO: 28.5 PG (ref 26–34)
MCHC RBC AUTO-ENTMCNC: 33.6 G/DL (ref 31–36)
MCV RBC AUTO: 84.8 FL (ref 80–100)
MONOCYTES # BLD: 0.7 K/UL (ref 0–1.3)
MONOCYTES NFR BLD: 7.5 %
NEUTROPHILS # BLD: 6.4 K/UL (ref 1.7–7.7)
NEUTROPHILS NFR BLD: 70.7 %
PLATELET # BLD AUTO: 184 K/UL (ref 135–450)
PMV BLD AUTO: 7.8 FL (ref 5–10.5)
POTASSIUM SERPL-SCNC: 4.4 MMOL/L (ref 3.5–5.1)
RBC # BLD AUTO: 3.9 M/UL (ref 4–5.2)
REASON FOR REJECTION: NORMAL
REJECTED TEST: NORMAL
SODIUM SERPL-SCNC: 136 MMOL/L (ref 136–145)
WBC # BLD AUTO: 9.1 K/UL (ref 4–11)

## 2024-07-24 PROCEDURE — 93010 ELECTROCARDIOGRAM REPORT: CPT | Performed by: INTERNAL MEDICINE

## 2024-07-24 PROCEDURE — 80048 BASIC METABOLIC PNL TOTAL CA: CPT

## 2024-07-24 PROCEDURE — 99284 EMERGENCY DEPT VISIT MOD MDM: CPT

## 2024-07-24 PROCEDURE — 85025 COMPLETE CBC W/AUTO DIFF WBC: CPT

## 2024-07-24 PROCEDURE — 2580000003 HC RX 258: Performed by: EMERGENCY MEDICINE

## 2024-07-24 PROCEDURE — 96360 HYDRATION IV INFUSION INIT: CPT

## 2024-07-24 PROCEDURE — 93005 ELECTROCARDIOGRAM TRACING: CPT | Performed by: PHYSICIAN ASSISTANT

## 2024-07-24 RX ORDER — 0.9 % SODIUM CHLORIDE 0.9 %
500 INTRAVENOUS SOLUTION INTRAVENOUS ONCE
Status: COMPLETED | OUTPATIENT
Start: 2024-07-24 | End: 2024-07-24

## 2024-07-24 RX ADMIN — SODIUM CHLORIDE 500 ML: 9 INJECTION, SOLUTION INTRAVENOUS at 14:08

## 2024-07-24 NOTE — ED PROVIDER NOTES
[id number]  [phone]    Schedule an appointment as soon as possible for a visit in 2 days  For recheck    Dirk Bai MD  [address] [place] [address] [address]  [place] [id number]  [phone]    Schedule an appointment as soon as possible for a visit   keep follow up appointment as scheduled    Debra Medina MD  [address] [place] [address] Drive, [address]  [place] [id number]  [phone]    Schedule an appointment as soon as possible for a visit         DISCLAIMER: This chart was created using Dragon dictation software.  Efforts were made by me to ensure accuracy, however some errors may be present due to limitations of this technology and occasionally words are not transcribed correctly.        Nithya Wheatley MD  07/25/24 9839

## 2024-09-26 RX ORDER — VALSARTAN 80 MG/1
80 TABLET ORAL DAILY
Qty: 30 TABLET | Refills: 3 | OUTPATIENT
Start: 2024-09-26

## 2024-12-05 ENCOUNTER — APPOINTMENT (OUTPATIENT)
Dept: CT IMAGING | Age: 84
DRG: 057 | End: 2024-12-05
Attending: STUDENT IN AN ORGANIZED HEALTH CARE EDUCATION/TRAINING PROGRAM
Payer: MEDICARE

## 2024-12-05 ENCOUNTER — HOSPITAL ENCOUNTER (INPATIENT)
Age: 84
LOS: 1 days | Discharge: HOME | DRG: 057 | End: 2024-12-06
Attending: STUDENT IN AN ORGANIZED HEALTH CARE EDUCATION/TRAINING PROGRAM | Admitting: INTERNAL MEDICINE
Payer: MEDICARE

## 2024-12-05 ENCOUNTER — APPOINTMENT (OUTPATIENT)
Dept: GENERAL RADIOLOGY | Age: 84
DRG: 057 | End: 2024-12-05
Attending: STUDENT IN AN ORGANIZED HEALTH CARE EDUCATION/TRAINING PROGRAM
Payer: MEDICARE

## 2024-12-05 DIAGNOSIS — I10 ESSENTIAL HYPERTENSION: ICD-10-CM

## 2024-12-05 DIAGNOSIS — R41.82 ALTERED MENTAL STATUS, UNSPECIFIED ALTERED MENTAL STATUS TYPE: Primary | ICD-10-CM

## 2024-12-05 PROBLEM — E87.0 HYPERNATREMIA: Status: ACTIVE | Noted: 2024-12-05

## 2024-12-05 PROBLEM — E86.0 DEHYDRATION: Status: ACTIVE | Noted: 2024-12-05

## 2024-12-05 LAB
ALBUMIN SERPL-MCNC: 4.5 G/DL (ref 3.4–5)
ALBUMIN/GLOB SERPL: 1.4 {RATIO} (ref 1.1–2.2)
ALP SERPL-CCNC: 144 U/L (ref 40–129)
ALT SERPL-CCNC: 9 U/L (ref 10–40)
AMORPH SED URNS QL MICRO: ABNORMAL /HPF
AMPHETAMINES UR QL SCN>1000 NG/ML: ABNORMAL
ANION GAP SERPL CALCULATED.3IONS-SCNC: 17 MMOL/L (ref 3–16)
AST SERPL-CCNC: 13 U/L (ref 15–37)
BACTERIA URNS QL MICRO: ABNORMAL /HPF
BARBITURATES UR QL SCN>200 NG/ML: ABNORMAL
BASE EXCESS BLDV CALC-SCNC: -3.7 MMOL/L (ref -3–3)
BASOPHILS # BLD: 0.1 K/UL (ref 0–0.2)
BASOPHILS NFR BLD: 0.9 %
BENZODIAZ UR QL SCN>200 NG/ML: ABNORMAL
BILIRUB SERPL-MCNC: 0.4 MG/DL (ref 0–1)
BILIRUB UR QL STRIP.AUTO: ABNORMAL
BUN SERPL-MCNC: 23 MG/DL (ref 7–20)
CALCIUM SERPL-MCNC: 9.5 MG/DL (ref 8.3–10.6)
CANNABINOIDS UR QL SCN>50 NG/ML: ABNORMAL
CHLORIDE SERPL-SCNC: 106 MMOL/L (ref 99–110)
CLARITY UR: CLEAR
CO2 BLDV-SCNC: 23 MMOL/L
CO2 SERPL-SCNC: 23 MMOL/L (ref 21–32)
COCAINE UR QL SCN: ABNORMAL
COHGB MFR BLDV: 1.4 % (ref 0–1.5)
COLOR UR: YELLOW
CREAT SERPL-MCNC: 1.7 MG/DL (ref 0.6–1.2)
DEPRECATED RDW RBC AUTO: 15.1 % (ref 12.4–15.4)
DRUG SCREEN COMMENT UR-IMP: ABNORMAL
EKG ATRIAL RATE: 74 BPM
EKG DIAGNOSIS: NORMAL
EKG P AXIS: -8 DEGREES
EKG P-R INTERVAL: 154 MS
EKG Q-T INTERVAL: 456 MS
EKG QRS DURATION: 118 MS
EKG QTC CALCULATION (BAZETT): 506 MS
EKG R AXIS: -30 DEGREES
EKG T AXIS: -9 DEGREES
EKG VENTRICULAR RATE: 74 BPM
EOSINOPHIL # BLD: 0.2 K/UL (ref 0–0.6)
EOSINOPHIL NFR BLD: 2 %
EPI CELLS #/AREA URNS HPF: ABNORMAL /HPF (ref 0–5)
ETHANOLAMINE SERPL-MCNC: NORMAL MG/DL (ref 0–0.08)
FENTANYL SCREEN, URINE: ABNORMAL
GFR SERPLBLD CREATININE-BSD FMLA CKD-EPI: 29 ML/MIN/{1.73_M2}
GLUCOSE SERPL-MCNC: 111 MG/DL (ref 70–99)
GLUCOSE UR STRIP.AUTO-MCNC: NEGATIVE MG/DL
HCO3 BLDV-SCNC: 21.7 MMOL/L (ref 23–29)
HCT VFR BLD AUTO: 36.7 % (ref 36–48)
HGB BLD-MCNC: 11.8 G/DL (ref 12–16)
HGB UR QL STRIP.AUTO: NEGATIVE
KETONES UR STRIP.AUTO-MCNC: 15 MG/DL
LEUKOCYTE ESTERASE UR QL STRIP.AUTO: ABNORMAL
LYMPHOCYTES # BLD: 1.5 K/UL (ref 1–5.1)
LYMPHOCYTES NFR BLD: 17.2 %
MCH RBC QN AUTO: 27.5 PG (ref 26–34)
MCHC RBC AUTO-ENTMCNC: 32.2 G/DL (ref 31–36)
MCV RBC AUTO: 85.5 FL (ref 80–100)
METHADONE UR QL SCN>300 NG/ML: ABNORMAL
METHGB MFR BLDV: 0.3 %
MONOCYTES # BLD: 0.6 K/UL (ref 0–1.3)
MONOCYTES NFR BLD: 7 %
NEUTROPHILS # BLD: 6.4 K/UL (ref 1.7–7.7)
NEUTROPHILS NFR BLD: 72.9 %
NITRITE UR QL STRIP.AUTO: NEGATIVE
O2 THERAPY: ABNORMAL
OPIATES UR QL SCN>300 NG/ML: ABNORMAL
OXYCODONE UR QL SCN: POSITIVE
PCO2 BLDV: 40.4 MMHG (ref 40–50)
PCP UR QL SCN>25 NG/ML: ABNORMAL
PH BLDV: 7.35 [PH] (ref 7.35–7.45)
PH UR STRIP.AUTO: 5.5 [PH] (ref 5–8)
PH UR STRIP: 5.5 [PH]
PLATELET # BLD AUTO: 214 K/UL (ref 135–450)
PMV BLD AUTO: 7.8 FL (ref 5–10.5)
PO2 BLDV: 23 MMHG (ref 25–40)
POTASSIUM SERPL-SCNC: 4 MMOL/L (ref 3.5–5.1)
PROT SERPL-MCNC: 7.7 G/DL (ref 6.4–8.2)
PROT UR STRIP.AUTO-MCNC: NEGATIVE MG/DL
RBC # BLD AUTO: 4.29 M/UL (ref 4–5.2)
RBC #/AREA URNS HPF: ABNORMAL /HPF (ref 0–4)
SAO2 % BLDV: 37 %
SODIUM SERPL-SCNC: 146 MMOL/L (ref 136–145)
SP GR UR STRIP.AUTO: >=1.03 (ref 1–1.03)
TROPONIN, HIGH SENSITIVITY: 40 NG/L (ref 0–14)
TROPONIN, HIGH SENSITIVITY: 46 NG/L (ref 0–14)
TSH SERPL DL<=0.005 MIU/L-ACNC: 2.4 UIU/ML (ref 0.27–4.2)
UA DIPSTICK W REFLEX MICRO PNL UR: YES
URN SPEC COLLECT METH UR: ABNORMAL
UROBILINOGEN UR STRIP-ACNC: 0.2 E.U./DL
WBC # BLD AUTO: 8.8 K/UL (ref 4–11)
WBC #/AREA URNS HPF: ABNORMAL /HPF (ref 0–5)

## 2024-12-05 PROCEDURE — 82077 ASSAY SPEC XCP UR&BREATH IA: CPT

## 2024-12-05 PROCEDURE — 96374 THER/PROPH/DIAG INJ IV PUSH: CPT

## 2024-12-05 PROCEDURE — 80053 COMPREHEN METABOLIC PANEL: CPT

## 2024-12-05 PROCEDURE — 84484 ASSAY OF TROPONIN QUANT: CPT

## 2024-12-05 PROCEDURE — 84443 ASSAY THYROID STIM HORMONE: CPT

## 2024-12-05 PROCEDURE — 1200000000 HC SEMI PRIVATE

## 2024-12-05 PROCEDURE — 96372 THER/PROPH/DIAG INJ SC/IM: CPT

## 2024-12-05 PROCEDURE — 81001 URINALYSIS AUTO W/SCOPE: CPT

## 2024-12-05 PROCEDURE — 97166 OT EVAL MOD COMPLEX 45 MIN: CPT

## 2024-12-05 PROCEDURE — 97530 THERAPEUTIC ACTIVITIES: CPT

## 2024-12-05 PROCEDURE — 2580000003 HC RX 258: Performed by: INTERNAL MEDICINE

## 2024-12-05 PROCEDURE — 85025 COMPLETE CBC W/AUTO DIFF WBC: CPT

## 2024-12-05 PROCEDURE — 6360000002 HC RX W HCPCS: Performed by: INTERNAL MEDICINE

## 2024-12-05 PROCEDURE — 93010 ELECTROCARDIOGRAM REPORT: CPT | Performed by: STUDENT IN AN ORGANIZED HEALTH CARE EDUCATION/TRAINING PROGRAM

## 2024-12-05 PROCEDURE — 70450 CT HEAD/BRAIN W/O DYE: CPT

## 2024-12-05 PROCEDURE — 87077 CULTURE AEROBIC IDENTIFY: CPT

## 2024-12-05 PROCEDURE — G0378 HOSPITAL OBSERVATION PER HR: HCPCS

## 2024-12-05 PROCEDURE — 99223 1ST HOSP IP/OBS HIGH 75: CPT | Performed by: INTERNAL MEDICINE

## 2024-12-05 PROCEDURE — 93005 ELECTROCARDIOGRAM TRACING: CPT | Performed by: STUDENT IN AN ORGANIZED HEALTH CARE EDUCATION/TRAINING PROGRAM

## 2024-12-05 PROCEDURE — 71045 X-RAY EXAM CHEST 1 VIEW: CPT

## 2024-12-05 PROCEDURE — 72125 CT NECK SPINE W/O DYE: CPT

## 2024-12-05 PROCEDURE — 87086 URINE CULTURE/COLONY COUNT: CPT

## 2024-12-05 PROCEDURE — 97162 PT EVAL MOD COMPLEX 30 MIN: CPT

## 2024-12-05 PROCEDURE — 6370000000 HC RX 637 (ALT 250 FOR IP): Performed by: INTERNAL MEDICINE

## 2024-12-05 PROCEDURE — 80307 DRUG TEST PRSMV CHEM ANLYZR: CPT

## 2024-12-05 PROCEDURE — 74176 CT ABD & PELVIS W/O CONTRAST: CPT

## 2024-12-05 PROCEDURE — 82803 BLOOD GASES ANY COMBINATION: CPT

## 2024-12-05 PROCEDURE — 99285 EMERGENCY DEPT VISIT HI MDM: CPT

## 2024-12-05 PROCEDURE — 6370000000 HC RX 637 (ALT 250 FOR IP): Performed by: NURSE PRACTITIONER

## 2024-12-05 PROCEDURE — 87186 SC STD MICRODIL/AGAR DIL: CPT

## 2024-12-05 PROCEDURE — 97116 GAIT TRAINING THERAPY: CPT

## 2024-12-05 PROCEDURE — 6360000002 HC RX W HCPCS: Performed by: STUDENT IN AN ORGANIZED HEALTH CARE EDUCATION/TRAINING PROGRAM

## 2024-12-05 PROCEDURE — 97535 SELF CARE MNGMENT TRAINING: CPT

## 2024-12-05 PROCEDURE — 36415 COLL VENOUS BLD VENIPUNCTURE: CPT

## 2024-12-05 RX ORDER — PROCHLORPERAZINE MALEATE 10 MG
10 TABLET ORAL EVERY 8 HOURS PRN
Status: DISCONTINUED | OUTPATIENT
Start: 2024-12-05 | End: 2024-12-06 | Stop reason: HOSPADM

## 2024-12-05 RX ORDER — ENOXAPARIN SODIUM 100 MG/ML
30 INJECTION SUBCUTANEOUS DAILY
Status: DISCONTINUED | OUTPATIENT
Start: 2024-12-05 | End: 2024-12-06 | Stop reason: HOSPADM

## 2024-12-05 RX ORDER — ONDANSETRON 2 MG/ML
4 INJECTION INTRAMUSCULAR; INTRAVENOUS EVERY 6 HOURS PRN
Status: DISCONTINUED | OUTPATIENT
Start: 2024-12-05 | End: 2024-12-05

## 2024-12-05 RX ORDER — SODIUM CHLORIDE 0.9 % (FLUSH) 0.9 %
5-40 SYRINGE (ML) INJECTION PRN
Status: DISCONTINUED | OUTPATIENT
Start: 2024-12-05 | End: 2024-12-06 | Stop reason: HOSPADM

## 2024-12-05 RX ORDER — SODIUM CHLORIDE 0.9 % (FLUSH) 0.9 %
5-40 SYRINGE (ML) INJECTION EVERY 12 HOURS SCHEDULED
Status: DISCONTINUED | OUTPATIENT
Start: 2024-12-05 | End: 2024-12-06 | Stop reason: HOSPADM

## 2024-12-05 RX ORDER — HYDRALAZINE HYDROCHLORIDE 50 MG/1
50 TABLET, FILM COATED ORAL EVERY 8 HOURS SCHEDULED
Status: DISCONTINUED | OUTPATIENT
Start: 2024-12-05 | End: 2024-12-06 | Stop reason: HOSPADM

## 2024-12-05 RX ORDER — ACETAMINOPHEN 650 MG/1
650 SUPPOSITORY RECTAL EVERY 6 HOURS PRN
Status: DISCONTINUED | OUTPATIENT
Start: 2024-12-05 | End: 2024-12-06 | Stop reason: HOSPADM

## 2024-12-05 RX ORDER — ONDANSETRON 4 MG/1
4 TABLET, ORALLY DISINTEGRATING ORAL EVERY 8 HOURS PRN
Status: DISCONTINUED | OUTPATIENT
Start: 2024-12-05 | End: 2024-12-05

## 2024-12-05 RX ORDER — CETIRIZINE HYDROCHLORIDE 10 MG/1
10 TABLET ORAL DAILY
Status: DISCONTINUED | OUTPATIENT
Start: 2024-12-05 | End: 2024-12-06 | Stop reason: HOSPADM

## 2024-12-05 RX ORDER — AMLODIPINE BESYLATE 5 MG/1
5 TABLET ORAL DAILY
Status: DISCONTINUED | OUTPATIENT
Start: 2024-12-05 | End: 2024-12-06 | Stop reason: HOSPADM

## 2024-12-05 RX ORDER — ACETAMINOPHEN 325 MG/1
650 TABLET ORAL EVERY 6 HOURS PRN
Status: DISCONTINUED | OUTPATIENT
Start: 2024-12-05 | End: 2024-12-06 | Stop reason: HOSPADM

## 2024-12-05 RX ORDER — HYDROXYZINE HYDROCHLORIDE 25 MG/1
25 TABLET, FILM COATED ORAL 3 TIMES DAILY PRN
Status: DISCONTINUED | OUTPATIENT
Start: 2024-12-05 | End: 2024-12-06 | Stop reason: HOSPADM

## 2024-12-05 RX ORDER — HYDRALAZINE HYDROCHLORIDE 20 MG/ML
5 INJECTION INTRAMUSCULAR; INTRAVENOUS ONCE
Status: COMPLETED | OUTPATIENT
Start: 2024-12-05 | End: 2024-12-05

## 2024-12-05 RX ORDER — TRAZODONE HYDROCHLORIDE 100 MG/1
100 TABLET ORAL NIGHTLY PRN
Status: DISCONTINUED | OUTPATIENT
Start: 2024-12-05 | End: 2024-12-06 | Stop reason: HOSPADM

## 2024-12-05 RX ORDER — PANTOPRAZOLE SODIUM 40 MG/1
40 TABLET, DELAYED RELEASE ORAL DAILY
Status: DISCONTINUED | OUTPATIENT
Start: 2024-12-05 | End: 2024-12-06 | Stop reason: HOSPADM

## 2024-12-05 RX ORDER — VALSARTAN 80 MG/1
80 TABLET ORAL DAILY
Status: DISCONTINUED | OUTPATIENT
Start: 2024-12-05 | End: 2024-12-05

## 2024-12-05 RX ORDER — POLYETHYLENE GLYCOL 3350 17 G/17G
17 POWDER, FOR SOLUTION ORAL DAILY PRN
Status: DISCONTINUED | OUTPATIENT
Start: 2024-12-05 | End: 2024-12-06 | Stop reason: HOSPADM

## 2024-12-05 RX ORDER — ASPIRIN 81 MG/1
81 TABLET ORAL DAILY
Status: DISCONTINUED | OUTPATIENT
Start: 2024-12-05 | End: 2024-12-06 | Stop reason: HOSPADM

## 2024-12-05 RX ORDER — SODIUM CHLORIDE 9 MG/ML
INJECTION, SOLUTION INTRAVENOUS PRN
Status: DISCONTINUED | OUTPATIENT
Start: 2024-12-05 | End: 2024-12-06 | Stop reason: HOSPADM

## 2024-12-05 RX ORDER — MECOBALAMIN 5000 MCG
5 TABLET,DISINTEGRATING ORAL NIGHTLY
Status: DISCONTINUED | OUTPATIENT
Start: 2024-12-05 | End: 2024-12-06 | Stop reason: HOSPADM

## 2024-12-05 RX ORDER — PROCHLORPERAZINE EDISYLATE 5 MG/ML
10 INJECTION INTRAMUSCULAR; INTRAVENOUS EVERY 6 HOURS PRN
Status: DISCONTINUED | OUTPATIENT
Start: 2024-12-05 | End: 2024-12-06 | Stop reason: HOSPADM

## 2024-12-05 RX ORDER — CITALOPRAM HYDROBROMIDE 20 MG/1
30 TABLET ORAL DAILY
Status: DISCONTINUED | OUTPATIENT
Start: 2024-12-05 | End: 2024-12-06 | Stop reason: HOSPADM

## 2024-12-05 RX ORDER — SODIUM CHLORIDE 9 MG/ML
INJECTION, SOLUTION INTRAVENOUS CONTINUOUS
Status: DISCONTINUED | OUTPATIENT
Start: 2024-12-05 | End: 2024-12-06

## 2024-12-05 RX ADMIN — PANTOPRAZOLE SODIUM 40 MG: 40 TABLET, DELAYED RELEASE ORAL at 13:16

## 2024-12-05 RX ADMIN — TRAZODONE HYDROCHLORIDE 100 MG: 100 TABLET ORAL at 22:17

## 2024-12-05 RX ADMIN — HYDRALAZINE HYDROCHLORIDE 50 MG: 50 TABLET ORAL at 22:17

## 2024-12-05 RX ADMIN — ASPIRIN 81 MG: 81 TABLET, COATED ORAL at 13:15

## 2024-12-05 RX ADMIN — ENOXAPARIN SODIUM 30 MG: 100 INJECTION SUBCUTANEOUS at 13:16

## 2024-12-05 RX ADMIN — HYDRALAZINE HYDROCHLORIDE 5 MG: 20 INJECTION INTRAMUSCULAR; INTRAVENOUS at 02:51

## 2024-12-05 RX ADMIN — ACETAMINOPHEN 650 MG: 325 TABLET ORAL at 13:16

## 2024-12-05 RX ADMIN — Medication 10 ML: at 10:08

## 2024-12-05 RX ADMIN — Medication 5 MG: at 22:17

## 2024-12-05 RX ADMIN — HYDROXYZINE HYDROCHLORIDE 25 MG: 25 TABLET ORAL at 13:15

## 2024-12-05 RX ADMIN — ACETAMINOPHEN 650 MG: 325 TABLET ORAL at 18:54

## 2024-12-05 RX ADMIN — CETIRIZINE HYDROCHLORIDE 10 MG: 10 TABLET ORAL at 13:15

## 2024-12-05 RX ADMIN — SODIUM CHLORIDE: 0.9 INJECTION, SOLUTION INTRAVENOUS at 14:23

## 2024-12-05 RX ADMIN — CITALOPRAM HYDROBROMIDE 30 MG: 20 TABLET ORAL at 13:16

## 2024-12-05 RX ADMIN — AMLODIPINE BESYLATE 5 MG: 5 TABLET ORAL at 13:18

## 2024-12-05 RX ADMIN — HYDRALAZINE HYDROCHLORIDE 50 MG: 50 TABLET ORAL at 13:15

## 2024-12-05 RX ADMIN — Medication 10 ML: at 22:17

## 2024-12-05 ASSESSMENT — PAIN DESCRIPTION - LOCATION: LOCATION: KNEE

## 2024-12-05 ASSESSMENT — LIFESTYLE VARIABLES
HOW MANY STANDARD DRINKS CONTAINING ALCOHOL DO YOU HAVE ON A TYPICAL DAY: PATIENT DOES NOT DRINK
HOW OFTEN DO YOU HAVE A DRINK CONTAINING ALCOHOL: NEVER

## 2024-12-05 ASSESSMENT — PAIN - FUNCTIONAL ASSESSMENT
PAIN_FUNCTIONAL_ASSESSMENT: ACTIVITIES ARE NOT PREVENTED
PAIN_FUNCTIONAL_ASSESSMENT: NONE - DENIES PAIN
PAIN_FUNCTIONAL_ASSESSMENT: 0-10
PAIN_FUNCTIONAL_ASSESSMENT: NONE - DENIES PAIN

## 2024-12-05 ASSESSMENT — PAIN DESCRIPTION - DESCRIPTORS: DESCRIPTORS: ACHING

## 2024-12-05 ASSESSMENT — PAIN SCALES - GENERAL
PAINLEVEL_OUTOF10: 3
PAINLEVEL_OUTOF10: 0
PAINLEVEL_OUTOF10: 7
PAINLEVEL_OUTOF10: 1

## 2024-12-05 ASSESSMENT — PAIN DESCRIPTION - ORIENTATION: ORIENTATION: RIGHT;LEFT

## 2024-12-05 ASSESSMENT — PAIN DESCRIPTION - PAIN TYPE: TYPE: ACUTE PAIN

## 2024-12-05 ASSESSMENT — PAIN DESCRIPTION - FREQUENCY: FREQUENCY: INTERMITTENT

## 2024-12-05 ASSESSMENT — PAIN DESCRIPTION - ONSET: ONSET: GRADUAL

## 2024-12-05 NOTE — PROGRESS NOTES
Inpatient Occupational Therapy Evaluation & Treatment    Unit: [facility]  Date:  2024  Patient Name:    Nithya Neff  Admitting diagnosis:  Altered mental status, unspecified altered mental status type [R41.82]  AMS (altered mental status) [R41.82]  Admit Date:  2024  Precautions/Restrictions/WB Status/ Lines/ Wounds/ Oxygen: Fall risk, Bed/chair alarm, and Lines (IV), 2 surgical sites to back (central and L)-recent removal of pain pump    Pt seen for cotreatment this date due to patient safety, acute illness/injury, and limited functional status information    Treatment Time:  2259-9022  Treatment Number:  1  Timed Code Treatment Minutes: 55 minutes  Total Treatment Minutes:  65  minutes    Patient Goals for Therapy: \"go see Sonya\"          Discharge Recommendations: SNF  DME needs for discharge: Defer to facility       Therapy recommendations for staff:   Assist of 1 for ambulation with use of Rollator and gait belt to/from chair  to/from bathroom  within room    History of Present Illness: per A Kathe NP H&P 24:  \"The patient is a 84 y.o. female with CKD, CHF, hypertension, CAD S/p PCI, h/o aortic stenosis s/p TAVR, COPD, anemia, dementia, chronic pain, MARIA C who presents to [facility] with altered mental status.  Patient's daughter is admitted to the hospital.  EMS went to check on patient as she lives alone.  Per EMR a family member called patient and was talking about how her daughter jumped off the roof and .  This did not happen.  The patient reported she saw her daughter's  in the front yard.  She did fall about 2 weeks ago.  No falls since then.  Patient states she feels okay and wants to go home.  Patient does tell me her daughter is admitted.\"    Per OT evaluation on 5/15/24:  Preadmission Environment:   Pt lives with                                         Alone - dtr comes each day (dtr currently hospitalized 24)  Home environment:                            apartment  walking slowly at his or her own pace for some minutes)  []10  []11  Light  []12  [x]13  Somewhat hard (exercise, but it still feels OK to continue)  []14  []15  Hard (heavy)  []16  []17  Very hard (can still go on, but really has to push himself. It feels very heavy, and the person is very tired.)  []18  []19  Extremely hard (For most people this is the most strenuous exercise they have ever experienced.)  []20  Maximal exertion.    Patient educated in and []demonstrated/[]verbalized understanding []teach back  []Rating self on ZULEYKA and   []Identifying HF activity zone with the Self Check Plan referencing Red/Yellow/Green Light Symbol *:prior to ADls/activity to appropriately determine daily activity level.          Patient demonstrates /verbalizes understanding of appropriate employment of Energy Conservation with every day activity.   [] Goal Met  [x] Goal established, will introduce or reinforce next session.  [] Goal Not Met  [] Goal N/A    Patient []demonstrates / []verbalizes understanding of appropriate employment of Energy Conservation with every day activity.            Patient demonstrates/verbalizes ability to correctly identify mL to cups conversion, what constitutes FLUID in diet, and knowledge of when to communicate changes in weight to physician consistent with patient orders and HF education.     [] Goal Met  [] Goal established, will introduce or reinforce next session.  [] Goal Not Met  [x] Goal N/A - no fluid restriction on chart       Patient  []demonstrates/[]verbalizes ability to correctly identify mL to cups conversion, what constitutes FLUID in diet, and  knowledge of when to  communicate changes in weight to physician consistent with patient orders and HF education.      Pt participated in the following fluid tracking management   [] Identifying 4, 8, and 12 ounces of fluid size  [] Identify mL to cup conversion  [] Understanding Jello, watermelon and Ice cream contributing to fluid intake  acute care setting for therapeutic exercises/activities, bed mobility training, functional transfer training, family/patient education, ADL/IADL retraining, and energy conservation training.    Electronically signed by Nolvia Estrada OT on 12/5/2024 at 4:01 PM      If patient discharges from this facility prior to next visit, this note will serve as the Discharge Summary

## 2024-12-05 NOTE — ED NOTES
Pt care assumed. Pt resting with OU closed, resps even and unlab. Pts son into bedside. Awaiting [facility] for transport

## 2024-12-05 NOTE — H&P
Hospital Medicine History & Physical      PCP: [facility] Pcp    Date of Admission: 2024    Date of Service: Pt seen/examined on 2024     Chief Complaint:    Chief Complaint   Patient presents with    Altered Mental Status     Pt presents to ED via [facility] from home with complaints of altered mental status.  Pt is alert and oriented X 4 at this time.  States back, bilateral hip pain, right knee pain and neck pain.  EMS states family stopped to check on her 30 minutes ago and she was not acting right.  FSBS of 132       History Of Present Illness:      The patient is a 84 y.o. female with CKD, CHF, hypertension, CAD S/p PCI, h/o aortic stenosis s/p TAVR, COPD, anemia, dementia, chronic pain, MARIA C who presents to [facility] with altered mental status.  Patient's daughter is admitted to the hospital.  EMS went to check on patient as she lives alone.  Per EMR a family member called patient and was talking about how her daughter jumped off the roof and .  This did not happen.  The patient reported she saw her daughter's  in the front yard.  She did fall about 2 weeks ago.  No falls since then.  Patient states she feels okay and wants to go home.  Patient does tell me her daughter is admitted.     Past Medical History:        Diagnosis Date    MARIO (acute kidney injury) ([facility]) 2021    Altered mental status 2021    Arthritis     BCC (basal cell carcinoma of skin)     RT clavicle    Bladder infection     frequently    Blurred vision 2022    CAD (coronary artery disease)     stents    Cancer (HCC)     skin    CHF (congestive heart failure) ([facility])     Chronic back pain     Closed head injury 2021    COPD (chronic obstructive pulmonary disease) ([facility])     Depression     Depression     Hypercholesteremia     Hypertension     Hypokalemia 2012    Pain in shoulder [id number]    pain in left shoulder, taking steroid injections for steroid    Painful total knee replacement, initial  Luigi Landry MD have reviewed the chart on Nithya Neff and personally interviewed and examined patient, reviewed the data (labs and imaging) and after discussion with my PA formulated the plan.  Agree with note with the following edits.    HPI:     I reviewed the patient's Past Medical History, Past Surgical History, Medications, and Allergies.       Eldelry female with mild dementia who had welfare check was noted to be confused at home alone, her daughter currently in hospital , admitted for safety eval  Pt denies any current issues  Appears to mildly confused about timing of  situation but overall answering orientation questions fairly well  No recent illness        General: elderly female  Awake, alert and oriented. Appears to be not in any distress  Mucous Membranes:  Pink , anicteric  Neck: No JVD, no carotid bruit, no thyromegaly  Chest:  Clear to auscultation bilaterally, no added sounds  Cardiovascular:  RRR S1S2 heard, no murmurs or gallops  Abdomen:  Soft, undistended, non tender, no organomegaly, BS present  Healed wound scars on lower lumbar region   Extremities: No edema or cyanosis. Distal pulses well felt  Neurological : grossly normal- non focal       CBC:   Recent Labs     12/05/24  0039   WBC 8.8   HGB 11.8*   HCT 36.7   MCV 85.5        BMP:   Recent Labs     12/05/24  0039   *   K 4.0      CO2 23   BUN 23*   CREATININE 1.7*     LIVER PROFILE:   Recent Labs     12/05/24 0039   AST 13*   ALT 9*   BILITOT 0.4   ALKPHOS 144*       UA:  Recent Labs     12/05/24  0542   COLORU Yellow   PHUR 5.5  5.5   WBCUA 3-5   RBCUA 0-2   BACTERIA Rare*   CLARITYU Clear   LEUKOCYTESUR SMALL*   UROBILINOGEN 0.2   BILIRUBINUR SMALL*   BLOODU Negative   GLUCOSEU Negative   AMORPHOUS 1+         ABG    Lab Results   Component Value Date/Time    EPD8UMZ 26.3 12/03/2023 07:39 PM    BEART 0.6 12/03/2023 07:39 PM    P2GIJNIK 96.9 12/03/2023 07:39 PM    PHART 7.367 12/03/2023 07:39 PM

## 2024-12-05 NOTE — ACP (ADVANCE CARE PLANNING)
Advance Care Planning     General Advance Care Planning (ACP) Conversation    Date of Conversation: 12/5/2024  Conducted with: Patient with Decision Making Capacity  Other persons present: None    Healthcare Decision Maker:   Primary Decision Maker: Sonya Neri - Child - [phone]    Secondary Decision Maker: TawandaRobert - Child - [phone]       Content/Action Overview:  DECLINED ACP Conversation - will revisit periodically  Reviewed DNR/DNI and patient elects Full Code (Attempt Resuscitation)        Length of Voluntary ACP Conversation in minutes:  <16 minutes (Non-Billable)    Enrico Gomez RN

## 2024-12-05 NOTE — ED NOTES
Pt laying on right side sleeping at this time.  No signs of distress.  Breathing easy and unlabored.

## 2024-12-05 NOTE — PROGRESS NOTES
84-year-old female presenting with altered mental status  Neurological exam no focal neurological deficit  Labs stable  Admission for further workup

## 2024-12-05 NOTE — ED NOTES
Pt given some pudding upon request.  Still requesting to see her daughter Sonya.  Notified her that she is at [facility].  Notified her we are still waiting on bed from [facility].

## 2024-12-05 NOTE — PROGRESS NOTES
Pts is aware of daughters admission to the hospital. Daughter gave consent for mother to know of admission

## 2024-12-05 NOTE — PROGRESS NOTES
Inpatient Physical Therapy Evaluation & Treatment    Unit: [facility]  Date:  2024  Patient Name:    Nithya Neff  Admitting diagnosis:  Altered mental status, unspecified altered mental status type [R41.82]  AMS (altered mental status) [R41.82]  Admit Date:  2024  Precautions/Restrictions/WB Status/ Lines/ Wounds/ Oxygen: Fall risk, Bed/chair alarm, and Lines (IV), incision sites on back      Pt seen for cotreatment this date due to patient safety, acute illness/injury, and limited functional status information    Treatment Time:  2705-9144  Treatment Number:  1   Timed Code Treatment Minutes: 55 minutes  Total Treatment Minutes:  65  minutes    Patient Stated Goals for Therapy: \" To get better. \"          Discharge Recommendations: SNF  DME needs for discharge: Defer to facility       Therapy recommendation for EMS Transport: can transport by wheelchair    Therapy recommendations for staff:   Assist of 1 for ambulation with use of Rollator and gait belt to/from chair  to/from bathroom  within room    History of Present Illness:   Per H&P on  from KALI Pablo-CNP:    \"The patient is a 84 y.o. female with CKD, CHF, hypertension, CAD S/p PCI, h/o aortic stenosis s/p TAVR, COPD, anemia, dementia, chronic pain, MARIA C who presents to [facility] with altered mental status.  Patient's daughter is admitted to the hospital.  EMS went to check on patient as she lives alone.  Per EMR a family member called patient and was talking about how her daughter jumped off the roof and .  This did not happen.  The patient reported she saw her daughter's  in the front yard.  She did fall about 2 weeks ago.  No falls since then.  Patient states she feels okay and wants to go home.  Patient does tell me her daughter is admitted. \"    Preadmission Environment: Per OT eval on 5/15/24:  Pt lives with                                         Alone - dtr comes each day (dtr currently hospitalized)  Home

## 2024-12-05 NOTE — PROGRESS NOTES
Spiritual Health History and Assessment/Progress Note  [facility]    Spiritual/Emotional Needs,  ,  ,      Name: Nithya Neff MRN: [id number]    Age: 84 y.o.     Sex: female   Language: English   Denominational: [employer]   AMS (altered mental status)     Date: 12/5/2024            Total Time Calculated: (P) 22 min              Spiritual Assessment began in [address] MEDICAL-SURGICAL        Referral/Consult From: Patient, Nurse   Encounter Overview/Reason: Spiritual/Emotional Needs  Service Provided For: Patient    Fara, Belief, Meaning:   Patient is connected with a fara tradition or spiritual practice and Other: Pt identifies as Religion and attends her son's [employer].  Family/Friends No family/friends present      Importance and Influence:  Patient Other: Shared about importance of caring about her kids and how that has helped her get through struggles in life.  Family/Friends No family/friends present    Community:  Patient Other: Expressed worry about her daughter.  Family/Friends No family/friends present    Assessment and Plan of Care:     Patient Interventions include: Facilitated expression of thoughts and feelings, Explored spiritual coping/struggle/distress, Affirmed coping skills/support systems, and Other: Prayer  Family/Friends Interventions include: No family/friends present    Patient Plan of Care: Spiritual Care available upon further referral  Family/Friends Plan of Care: No family/friends present    Electronically signed by Chaplain Tania on 12/5/2024 at 12:09 PM

## 2024-12-05 NOTE — PROGRESS NOTES
Pt arrived via EMS from [facility]. Pt resting in bed. Noted surgical wounds to midline back and left post flank from pain pump removal. Pt has post op dermabond dressing in place to left post flank area. Dressing is Dry intact. This removes on it's own and will remain in place.     Pt was given sandwich and drink. Pt is confused and believes her daughter is dead the patient is poor historian, called pt pharmacy to confirm home medications. Pt is non complicant with her medications per pharmacy. Med rec updated to relect her current medications. Call bell and needs within reach. Bed alrm on for safety

## 2024-12-05 NOTE — CARE COORDINATION
Case Management Assessment  Initial Evaluation    Date/Time of Evaluation: 12/5/2024 10:05 AM  Assessment Completed by: Enrico Gomez RN    If patient is discharged prior to next notation, then this note serves as note for discharge by case management.    Patient Name: Nithya Neff                   YOB: 1940  Diagnosis: Altered mental status, unspecified altered mental status type [R41.82]  AMS (altered mental status) [R41.82]                   Date / Time: 12/5/2024 12:04 AM    Patient Admission Status: Inpatient   Readmission Risk (Low < 19, Mod (19-27), High > 27): Readmission Risk Score: 18    Current PCP: No, Pcp  PCP verified by CM? Yes    Chart Reviewed: Yes      History Provided by: Patient  Patient Orientation: Alert and Oriented    Patient Cognition: Alert    Hospitalization in the last 30 days (Readmission):  No    If yes, Readmission Assessment in CM Navigator will be completed.    Advance Directives:      Code Status: Full Code   Patient's Primary Decision Maker is: Legal Next of Kin    Primary Decision Maker: Sonya Neri - Child - [phone]    Secondary Decision Maker: Robert Neff - Child - [phone]    Discharge Planning:    Patient lives with: Alone Type of Home: Apartment  Primary Care Giver: Self  Patient Support Systems include: Children   Current Financial resources: Medicaid, Medicare  Current community resources: ECF/Home Care (unknown name of company)  Current services prior to admission: Durable Medical Equipment, Home Care (unsure of company)            Current DME: [employer]            Type of Home Care services:  None    ADLS  Prior functional level: Independent in ADLs/IADLs  Current functional level: Independent in ADLs/IADLs    PT AM-PAC:   /24  OT AM-PAC:   /24    Family can provide assistance at DC: Yes  Would you like Case Management to discuss the discharge plan with any other family members/significant others, and if so, who? No  Plans to Return to  Present Housing: Unknown at present  Other Identified Issues/Barriers to RETURNING to current housing: na    Potential Assistance needed at discharge: N/A            Potential DME:    Patient expects to discharge to: Apartment  Plan for transportation at discharge:      Financial    Payor: [employer]ARE / Plan: [employer] MEDIC[employer] ADVANTAGE HMO / Product Type: Medicare /     Does insurance require precert for SNF: Yes    Potential assistance Purchasing Medications: No  Meds-to-Beds request:        [facility] [address] [facility] [phone] - F [phone]  [address] [facility] [address]  Phone: [phone] Fax: [phone]    [facility] - [facility] [address] [facility] [phone] - F [phone]  [address] [facility] [address]  Phone: [phone] Fax: [phone]    [facility]/pharmacy #[id number] - [place], [place] - [address] [facility] [phone] - F [phone]  [address] [facility] [address]  Phone: [phone] Fax: [phone]    [address] - [address] [facility] [phone] - F [phone]  [address] [address]  Phone: [phone] Fax: [phone]      Notes:    Factors facilitating achievement of predicted outcomes: Family support, Cooperative, and Pleasant    Barriers to discharge: altered mental status    Additional Case Management Notes: Reviewed chart. Met with pt at bedside. Role of cm explained. Lives In [facility] alone. Pt uses DME. Pt has C but unsure of company. Pt not happy about being in hospital. Pt states \"I am not going to be responsible for this bill\". Tried calling pt daughter Sonya. No answer. LVM. Will watch for PT/OT rec.      The Plan for Transition of Care is related to the following treatment goals of Altered mental status, unspecified altered mental status type [R41.82]  AMS (altered mental status) [R41.82]    IF APPLICABLE: The Patient and/or patient representative Nithya and her  family were provided with a choice of provider and agrees with the discharge plan. Freedom of choice list with basic dialogue that supports the patient's individualized plan of care/goals and shares the quality data associated with the providers was provided to:     Patient Representative Name:       The Patient and/or Patient Representative Agree with the Discharge Plan?      Enrico Gomez RN  Case Management Department  Ph: [phone] Fax: [phone]

## 2024-12-05 NOTE — PLAN OF CARE
HEART FAILURE CARE PLAN:    Comorbidities Reviewed: Yes   Patient has a past medical history of MARIO (acute kidney injury) ([facility]), Altered mental status, Arthritis, BCC (basal cell carcinoma of skin), Bladder infection, Blurred vision, CAD (coronary artery disease), Cancer ([facility]), CHF (congestive heart failure) ([facility]), Chronic back pain, Closed head injury, COPD (chronic obstructive pulmonary disease) ([facility]), Depression, Depression, Hypercholesteremia, Hypertension, Hypokalemia, Pain in shoulder, Painful total knee replacement, initial encounter ([facility]), Reflux, Rheumatic fever, Sleep apnea, Syncope and collapse, TIA (transient ischemic attack), Urinary incontinence, Urinary tract infection in female, and Wears glasses.     Weights Reviewed: Yes   Admission weight: 59.4 kg (131 lb)   Wt Readings from Last 3 Encounters:   12/05/24 56.7 kg (125 lb 1.6 oz)   05/17/24 63.1 kg (139 lb 3.2 oz)   05/07/24 65.4 kg (144 lb 3.2 oz)     Intake & Output Reviewed: Yes   No intake or output data in the 24 hours ending 12/05/24 1347    ECHOCARDIOGRAM Reviewed: Yes   Patient's Ejection Fraction (EF) is greater than 40%     Medications Reviewed: Yes   SCHEDULED HOSPITAL MEDICATIONS:   sodium chloride flush  5-40 mL IntraVENous 2 times per day    enoxaparin  30 mg SubCUTAneous Daily    aspirin  81 mg Oral Daily    cetirizine  10 mg Oral Daily    citalopram  30 mg Oral Daily    pantoprazole  40 mg Oral Daily    hydrALAZINE  50 mg Oral 3 times per day    amLODIPine  5 mg Oral Daily    melatonin  5 mg Oral Nightly     HOME MEDICATIONS:  Prior to Admission medications    Medication Sig Start Date End Date Taking? Authorizing Provider   valsartan (DIOVAN) 80 MG tablet Take 1 tablet by mouth daily 5/18/24  Yes Sridevi Archuleta, KALI - CNP   traZODone (DESYREL) 100 MG tablet Take 1 tablet by mouth nightly as needed for Sleep 5/1/24  Yes Provider, MD [place]   hydrALAZINE (APRESOLINE) 50 MG tablet Take 1 tablet by mouth every 8 hours Hold if SBP <  Huber Smith, DO      Diet Reviewed: Yes   ADULT DIET; Regular    Goal of Care Reviewed: Yes   Patient and/or Family's stated Goal of Care this Admission:   , Reduce shortness of breath, increase activity tolerance, better understand heart failure and disease management, be more comfortable, and reduce lower extremity edema prior to discharge.     Electronically signed by Taryn Wayne RN on 12/5/2024 at 1:47 PM

## 2024-12-05 NOTE — PROGRESS NOTES
4 Eyes Skin Assessment     NAME:  Nithya Neff  YOB: 1940  MEDICAL RECORD NUMBER:  [id number]    The patient is being assessed for  Admission    I agree that at least one RN has performed a thorough Head to Toe Skin Assessment on the patient. ALL assessment sites listed below have been assessed.      Areas assessed by both nurses:    Head, Face, Ears, Shoulders, Back, Chest, Arms, Elbows, Hands, Sacrum. Buttock, Coccyx, Ischium, Legs. Feet and Heels, and Under Medical Devices         Does the Patient have a Wound? Yes wound(s) were present on assessment. LDA wound assessment was Initiated and completed by RN 2 surgical incisions from pain pump removal 2-3 weeks ago. Midline back and left posterior flank- dermabond dressing in place of left post flank.        Mihir Prevention initiated by RN: No  Wound Care Orders initiated by RN: No    Pressure Injury (Stage 3,4, Unstageable, DTI, NWPT, and Complex wounds) if present, place Wound referral order by RN under : No    New Ostomies, if present place, Ostomy referral order under : No     Nurse 1 eSignature: Electronically signed by Taryn Wayne RN on 12/5/24 at 10:57 AM EST    **SHARE this note so that the co-signing nurse can place an eSignature**    Nurse 2 eSignature: Electronically signed by Emelia Naranjo RN on 12/5/24 at 12:51 PM EST

## 2024-12-05 NOTE — ED NOTES
Pt calling out at this time.  Stating she wanted to go home and that she has been here for 3 days and we are preventing her from going home and being with her son. Also stating that those ambulance guys promised to take her back home.  Explained to patient that her family called the squad because they wanted to get her checked out.  Explained to her that she is going to go to [place] and her family will be meeting her their.  Pt verbalized understanding at this time.  Also explained to her due to weather and roads that she will be with us for the rest of the night but early in the morning we should be able to get her down to [place].  Pt also stating she is cold.  Pt given extra blanket and cracked her door to prevent cool air from hitting her when EMS doors open.

## 2024-12-05 NOTE — ED PROVIDER NOTES
Emergency Department Encounter    Patient: Nithya Neff  MRN: [id number]  : 1940  Date of Evaluation: 2024  ED Provider:  Jairon Vernon MD    Triage Chief Complaint:   Altered Mental Status (Pt presents to ED via [facility] from home with complaints of altered mental status.  Pt is alert and oriented X 4 at this time.  States back, bilateral hip pain, right knee pain and neck pain.  EMS states family stopped to check on her 30 minutes ago and she was not acting right.  FSBS of 132)    [place]:  Nithya Neff is a 84 y.o. female with history seen below presenting with complaints of altered mental status.  In discussion with family the last known well was over 24 hours prior to presentation.  Patient does live home alone.  They state that 1 family member called patient and she was talking about how her daughter jumped off the roof and had  which did not happen.  On my evaluation she states that she saw her daughter's  in the front yard.  She states that she saw one of the EMS providers, to her room and began cussing at her.  Patient does describe a mild headache which she states is not uncommon for her as well as neck pain which is also her baseline states she does have some mild abdominal discomfort as well as bilateral hip pain denies chest pain or shortness of breath.  States she did fall about 2 weeks ago but has not had any fall since then.  Denies motor or sensory changes, weakness or numbness, fevers or chills, cough or sputum production.    ROS - see HPI, below listed is current ROS at time of my eval:  At least 14 systems reviewed, negative other than HPI    Past Medical History:   Diagnosis Date    MARIO (acute kidney injury) (HCC) 2021    Altered mental status 2021    Arthritis     BCC (basal cell carcinoma of skin)     RT clavicle    Bladder infection     frequently    Blurred vision 2022    CAD (coronary artery disease)     stents    Cancer (HCC)     skin     hives  Burns mouth per patient  Burns mouth per patient  Other reaction(s): hives      Adhesive Tape Hives     No bandaids  No bandaids  No bandaids    Codeine Nausea Only, Hives, Itching and Nausea And Vomiting    Morphine Hives, Itching and Nausea Only     Other reaction(s): Unknown  Other reaction(s): Unknown  Other reaction(s): Unknown      Azithromycin     Pentazocine Lactate     Cephalexin Hives, Itching and Rash     Other reaction(s): hives      Pentazocine Hives, Itching and Rash    Sulfa Antibiotics Hives, Itching and Rash     Sores all over  Other reaction(s): hives  Other reaction(s): hives      Tramadol Nausea And Vomiting       Nursing Notes Reviewed    Physical Exam:  Triage VS:    ED Triage Vitals [12/05/24 0013]   Encounter Vitals Group      BP (!) 181/74      Systolic BP Percentile       Diastolic BP Percentile       Pulse 76      Respirations 18      Temp 98.2 °F (36.8 °C)      Temp Source Oral      SpO2 99 %      Weight - Scale 59.4 kg (131 lb)      Height 1.524 m (5')      Head Circumference       Peak Flow       Pain Score       Pain Loc       Pain Education       Exclude from Growth Chart        My pulse ox interpretation is - normal    General appearance:  No acute distress.   Skin:  Warm. Dry.   Eye:  Extraocular movements intact.     Ears, nose, mouth and throat:  Oral mucosa moist   Neck:  Trachea midline.  Mild discomfort along cervical spine, no paraspinous tenderness palpation  Extremity: Equal and symmetric pulses in bilateral upper and lower extremity no swelling.  Normal ROM     Heart:  Regular rate and rhythm, normal S1 & S2, no extra heart sounds.    Perfusion:  intact  Respiratory:  Lungs clear to auscultation bilaterally.  Respirations nonlabored.     Abdominal:  Normal bowel sounds.  Soft.  Mild discomfort in the lower abdomen without rebound or guarding, no tenderness palpation of the upper abdomen, no flank pain or CVA tenderness patient does describe mild discomfort with

## 2024-12-05 NOTE — ED NOTES
Collected urine specimen.  Pt still stating we are holding her against her will and she has been here for 3 days with no food or water.  Pt currently has her water bottle sitting right beside her.  Stating she wants to talk to her daughter Sonya.  Explained to her that her daughter is admitted at [facility].  Pt stated okay, explained to her again that she will be leaving sometime this morning to [place].

## 2024-12-06 VITALS
HEIGHT: 60 IN | WEIGHT: 125.1 LBS | HEART RATE: 80 BPM | OXYGEN SATURATION: 97 % | RESPIRATION RATE: 16 BRPM | BODY MASS INDEX: 24.56 KG/M2 | SYSTOLIC BLOOD PRESSURE: 175 MMHG | DIASTOLIC BLOOD PRESSURE: 60 MMHG | TEMPERATURE: 97.9 F

## 2024-12-06 LAB
ALBUMIN SERPL-MCNC: 3.2 G/DL (ref 3.4–5)
ALBUMIN/GLOB SERPL: 1.7 {RATIO} (ref 1.1–2.2)
ALP SERPL-CCNC: 105 U/L (ref 40–129)
ALT SERPL-CCNC: 6 U/L (ref 10–40)
ANION GAP SERPL CALCULATED.3IONS-SCNC: 10 MMOL/L (ref 3–16)
AST SERPL-CCNC: 13 U/L (ref 15–37)
BASOPHILS # BLD: 0.1 K/UL (ref 0–0.2)
BASOPHILS NFR BLD: 0.7 %
BILIRUB SERPL-MCNC: 0.3 MG/DL (ref 0–1)
BUN SERPL-MCNC: 17 MG/DL (ref 7–20)
CALCIUM SERPL-MCNC: 7.7 MG/DL (ref 8.3–10.6)
CHLORIDE SERPL-SCNC: 112 MMOL/L (ref 99–110)
CO2 SERPL-SCNC: 21 MMOL/L (ref 21–32)
CREAT SERPL-MCNC: 1.2 MG/DL (ref 0.6–1.2)
DEPRECATED RDW RBC AUTO: 15.6 % (ref 12.4–15.4)
EOSINOPHIL # BLD: 0.6 K/UL (ref 0–0.6)
EOSINOPHIL NFR BLD: 7 %
GFR SERPLBLD CREATININE-BSD FMLA CKD-EPI: 44 ML/MIN/{1.73_M2}
GLUCOSE SERPL-MCNC: 82 MG/DL (ref 70–99)
HCT VFR BLD AUTO: 31.8 % (ref 36–48)
HGB BLD-MCNC: 10.4 G/DL (ref 12–16)
LYMPHOCYTES # BLD: 1.5 K/UL (ref 1–5.1)
LYMPHOCYTES NFR BLD: 17.6 %
MCH RBC QN AUTO: 28.4 PG (ref 26–34)
MCHC RBC AUTO-ENTMCNC: 32.8 G/DL (ref 31–36)
MCV RBC AUTO: 86.6 FL (ref 80–100)
MONOCYTES # BLD: 0.7 K/UL (ref 0–1.3)
MONOCYTES NFR BLD: 7.6 %
NEUTROPHILS # BLD: 5.9 K/UL (ref 1.7–7.7)
NEUTROPHILS NFR BLD: 67.1 %
PLATELET # BLD AUTO: 182 K/UL (ref 135–450)
PMV BLD AUTO: 7.5 FL (ref 5–10.5)
POTASSIUM SERPL-SCNC: 4 MMOL/L (ref 3.5–5.1)
PROT SERPL-MCNC: 5.1 G/DL (ref 6.4–8.2)
RBC # BLD AUTO: 3.67 M/UL (ref 4–5.2)
SODIUM SERPL-SCNC: 143 MMOL/L (ref 136–145)
WBC # BLD AUTO: 8.7 K/UL (ref 4–11)

## 2024-12-06 PROCEDURE — 6370000000 HC RX 637 (ALT 250 FOR IP): Performed by: INTERNAL MEDICINE

## 2024-12-06 PROCEDURE — G0378 HOSPITAL OBSERVATION PER HR: HCPCS

## 2024-12-06 PROCEDURE — 36415 COLL VENOUS BLD VENIPUNCTURE: CPT

## 2024-12-06 PROCEDURE — 6370000000 HC RX 637 (ALT 250 FOR IP): Performed by: NURSE PRACTITIONER

## 2024-12-06 PROCEDURE — 6360000002 HC RX W HCPCS: Performed by: INTERNAL MEDICINE

## 2024-12-06 PROCEDURE — 80053 COMPREHEN METABOLIC PANEL: CPT

## 2024-12-06 PROCEDURE — 99238 HOSP IP/OBS DSCHRG MGMT 30/<: CPT | Performed by: INTERNAL MEDICINE

## 2024-12-06 PROCEDURE — 2580000003 HC RX 258: Performed by: INTERNAL MEDICINE

## 2024-12-06 PROCEDURE — 85025 COMPLETE CBC W/AUTO DIFF WBC: CPT

## 2024-12-06 PROCEDURE — 96372 THER/PROPH/DIAG INJ SC/IM: CPT

## 2024-12-06 RX ORDER — OXYCODONE AND ACETAMINOPHEN 5; 325 MG/1; MG/1
1 TABLET ORAL 2 TIMES DAILY PRN
Status: DISCONTINUED | OUTPATIENT
Start: 2024-12-06 | End: 2024-12-06

## 2024-12-06 RX ORDER — AMLODIPINE BESYLATE 10 MG/1
5 TABLET ORAL NIGHTLY
Qty: 30 TABLET | Refills: 5 | Status: SHIPPED | OUTPATIENT
Start: 2024-12-06

## 2024-12-06 RX ORDER — OXYCODONE AND ACETAMINOPHEN 5; 325 MG/1; MG/1
1 TABLET ORAL EVERY 4 HOURS PRN
COMMUNITY

## 2024-12-06 RX ORDER — SIMVASTATIN 40 MG
40 TABLET ORAL NIGHTLY
Qty: 30 TABLET | Refills: 3 | Status: SHIPPED | OUTPATIENT
Start: 2024-12-06

## 2024-12-06 RX ADMIN — HYDRALAZINE HYDROCHLORIDE 50 MG: 50 TABLET ORAL at 15:11

## 2024-12-06 RX ADMIN — CITALOPRAM HYDROBROMIDE 30 MG: 20 TABLET ORAL at 08:45

## 2024-12-06 RX ADMIN — HYDROXYZINE HYDROCHLORIDE 25 MG: 25 TABLET ORAL at 16:01

## 2024-12-06 RX ADMIN — PANTOPRAZOLE SODIUM 40 MG: 40 TABLET, DELAYED RELEASE ORAL at 08:45

## 2024-12-06 RX ADMIN — SODIUM CHLORIDE: 0.9 INJECTION, SOLUTION INTRAVENOUS at 00:47

## 2024-12-06 RX ADMIN — AMLODIPINE BESYLATE 5 MG: 5 TABLET ORAL at 08:45

## 2024-12-06 RX ADMIN — ASPIRIN 81 MG: 81 TABLET, COATED ORAL at 08:45

## 2024-12-06 RX ADMIN — ACETAMINOPHEN 650 MG: 325 TABLET ORAL at 08:45

## 2024-12-06 RX ADMIN — ENOXAPARIN SODIUM 30 MG: 100 INJECTION SUBCUTANEOUS at 08:46

## 2024-12-06 RX ADMIN — CETIRIZINE HYDROCHLORIDE 10 MG: 10 TABLET ORAL at 08:45

## 2024-12-06 RX ADMIN — ACETAMINOPHEN 650 MG: 325 TABLET ORAL at 15:59

## 2024-12-06 ASSESSMENT — PAIN SCALES - GENERAL
PAINLEVEL_OUTOF10: 0
PAINLEVEL_OUTOF10: 6
PAINLEVEL_OUTOF10: 0

## 2024-12-06 ASSESSMENT — PAIN - FUNCTIONAL ASSESSMENT
PAIN_FUNCTIONAL_ASSESSMENT: ACTIVITIES ARE NOT PREVENTED
PAIN_FUNCTIONAL_ASSESSMENT: ACTIVITIES ARE NOT PREVENTED

## 2024-12-06 ASSESSMENT — PAIN DESCRIPTION - LOCATION
LOCATION: GENERALIZED
LOCATION: HEAD

## 2024-12-06 ASSESSMENT — PAIN SCALES - WONG BAKER
WONGBAKER_NUMERICALRESPONSE: NO HURT
WONGBAKER_NUMERICALRESPONSE: NO HURT

## 2024-12-06 ASSESSMENT — PAIN DESCRIPTION - DESCRIPTORS
DESCRIPTORS: ACHING;DISCOMFORT
DESCRIPTORS: ACHING

## 2024-12-06 NOTE — PROGRESS NOTES
[facility]   HEART FAILURE PROGRAM      NAME:  Nithya Neff  AGE: 84 y.o.   GENDER: female  : 1940  TODAY'S DATE:  2024    Subjective:     VISIT TYPE: Education    ADMIT DATE: 2024    PAST MEDICAL HISTORY:      Diagnosis Date    MARIO (acute kidney injury) ([facility]) 2021    Altered mental status 2021    Arthritis     BCC (basal cell carcinoma of skin)     RT clavicle    Bladder infection     frequently    Blurred vision 2022    CAD (coronary artery disease)     stents    Cancer ([facility])     skin    CHF (congestive heart failure) ([facility])     Chronic back pain     Closed head injury 2021    COPD (chronic obstructive pulmonary disease) ([facility])     Depression     Depression     Hypercholesteremia     Hypertension     Hypokalemia 2012    Pain in shoulder [id number]    pain in left shoulder, taking steroid injections for steroid    Painful total knee replacement, initial encounter ([facility]) 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 Authorizing Provider   oxyCODONE-acetaminophen (PERCOCET) 5-325 MG per tablet Take 1 tablet by mouth every 4 hours as needed for Pain. Max Daily Amount: 6 tablets   Yes Montana Sanon MD   amLODIPine (NORVASC) 10 MG tablet Take 0.5 tablets by mouth at bedtime 24  Yes Luigi Landry MD   simvastatin (ZOCOR) 40 MG tablet Take 1 tablet by mouth nightly 24  Yes Luigi Landry MD   valsartan (DIOVAN) 80 MG tablet Take 1 tablet by mouth daily 24  Yes Sridevi Archuleta, APRN - CNP   traZODone (DESYREL) 100 MG tablet Take 1 tablet by mouth nightly as needed for Sleep 24  Yes Montana Sanon MD   hydrALAZINE (APRESOLINE) 50 MG  electronically record weights. Reviewed low sodium diet and fluid restrictions. Provided 32 oz labeled water pitcher to monitor intake of fluids.     Provided Heart Failure Nurse resource number at [phone] for any further questions or assistance with resources.     HEART FAILURE MEDICATIONS:  Patient taking an ACEI/ARB/ARNI: No     Patient taking a BETA BLOCKER: No     Patient taking MRA: No     Patient taking SGLT2: No     Patient taking Diuretic: No     SCALE AVAILABLE: Yes-pt states she weighs herself daily.    CURRENT DIET: ADULT DIET; Regular    Education:     EDUCATION STATUS: Patient   [x]  Provided both written and verbal education on Heart Failure signs & symptoms  [x]  Received verbal acknowledgment/understanding of Heart Failure related causes  [x]  Provided instructions on daily medications  [x]  Provided instructions to monitor and record weight daily  [x]  Provided instructions to call if weight increases 3 lbs in one day or 5 lbs in one week  [x]  Provided instructions on how to maintain a low sodium diet  [x]  Provided 32 oz labeled water pitcher to monitor intake of fluids  [x]  Provided recommendations on activity and exercise    []  Provided recommendations for smoking cessation programs      EDUCATIONAL MATERIALS PROVIDED:    [x]  One On One: A Patient Education Guide Living with Heart Failure Booklet  [x]  Heart Failure Zones Self-Check Plan  [x]  Weight and Heart Failure Zone Log  [x]  Cardiac Rehabilitation   [x]  Magnet: Signs & Symptoms of Heart Failure    PATIENT/CAREGIVER TEACHING:   Level of patient/caregiver understanding able to:   [x] Verbalize understanding   [] Demonstrate understanding       [] Teach back        [] Needs reinforcement     []  Other:      TEACHING TIME:  30 minutes       Recommendations:     [x]  Encourage to call Heart Failure Resource Line [phone] with any questions or concerns.  [x]  Encourage follow-up appointment compliance. Next Appointment:

## 2024-12-06 NOTE — FLOWSHEET NOTE
12/05/24 2215   Vital Signs   Temp 98.1 °F (36.7 °C)   Temp Source Oral   Pulse 79   Heart Rate Source Monitor   Respirations 18   BP (!) 150/71   MAP (Calculated) 97   BP Location Right upper arm   BP Method Automatic   Patient Position Lying left side     Care assumed for 7pm-7am shift. VS obtained and assessment performed. Pt given opportunity to ask questions, POC reviewed and all needs addressed at this time. Call light within reach and bed locked and low.

## 2024-12-06 NOTE — PROGRESS NOTES
Shift report given to Emelia at bedside. Patient is stable. All end of shift needs have been met. No further assistance needed at this time.

## 2024-12-06 NOTE — PLAN OF CARE
Problem: Chronic Conditions and Co-morbidities  Goal: Patient's chronic conditions and co-morbidity symptoms are monitored and maintained or improved  12/5/2024 2328 by Keisha Mckenna RN  Outcome: Progressing     Problem: Discharge Planning  Goal: Discharge to home or other facility with appropriate resources  12/5/2024 2328 by Keisha Mckenna RN  Outcome: Progressing     Problem: ABCDS Injury Assessment  Goal: Absence of physical injury  12/5/2024 2328 by Keisha Mckenna RN  Outcome: Progressing     Problem: Safety - Adult  Goal: Free from fall injury  12/5/2024 2328 by Keisha Mckenna RN  Outcome: Progressing     Problem: Pain  Goal: Verbalizes/displays adequate comfort level or baseline comfort level  Outcome: Progressing

## 2024-12-06 NOTE — PROGRESS NOTES
IM Progress Note    Admit Date:  12/5/2024  1    Interval history:  AMS     Subjective:  Ms. Neff seen up in bed, fully awake, alert and oriented  Reports aches and pains staying in bed but no other issues  Does drive but very limited distance  Quit percocet as it makes her feel weird and pain pump did not help and got it removed recently     Apparently daughter is the care giver and pt has mild dementia  Daughter currently admitted to hospital here as well       Objective:   BP (!) 153/63   Pulse 72   Temp 98 °F (36.7 °C) (Oral)   Resp 16   Ht 1.524 m (5')   Wt 56.7 kg (125 lb 1.6 oz)   LMP  (LMP Unknown)   SpO2 96%   BMI 24.43 kg/m²   No intake or output data in the 24 hours ending 12/06/24 0732    Physical Exam:        General: elderly female, healthy appearing   Awake, alert and oriented. Appears to be not in any distress  Mucous Membranes:  Pink , anicteric  Neck: No JVD, no carotid bruit, no thyromegaly  Chest:  Clear to auscultation bilaterally, no added sounds  Cardiovascular:  RRR S1S2 heard, no murmurs or gallops  Abdomen:  Soft, undistended, non tender, no organomegaly, BS present  Healed scars from recent dilaudid pain pump removal   Wound healthy  Extremities: No edema or cyanosis. Distal pulses well felt  Neurological : grossly normal      Medications:   Scheduled Medications:    sodium chloride flush  5-40 mL IntraVENous 2 times per day    enoxaparin  30 mg SubCUTAneous Daily    aspirin  81 mg Oral Daily    cetirizine  10 mg Oral Daily    citalopram  30 mg Oral Daily    pantoprazole  40 mg Oral Daily    hydrALAZINE  50 mg Oral 3 times per day    amLODIPine  5 mg Oral Daily    melatonin  5 mg Oral Nightly     I   sodium chloride      sodium chloride 100 mL/hr at 12/06/24 0047     sodium chloride flush, sodium chloride, polyethylene glycol, acetaminophen **OR** acetaminophen, hydrOXYzine HCl, traZODone, prochlorperazine **OR** prochlorperazine    Lab Data:  Recent Labs     12/05/24  0039

## 2024-12-06 NOTE — CARE COORDINATION
Chart reviewed. Pt to DC home today. Pt is active with [facility]. Passport CM is Rosemary Hansen [phone]. Pt has HHA 5 days per week. Pt daughter is her HHA but currently is in hospital. Per [facility] pt will have a substitute HHA if pt allows them to enter her home. Called Rosemary and LVM. Met with pt at bedside. Pt unsure how she is supposed to get home. Pt daughter is the one who usually transports her. Son Robert called to check on pt. Robert aware of pt DC today. Robert is working on getting transport set up for pt to return home with [facility].    1507- called and LVM with Robert. No call back. Pt reports Robert and her daughter in law will be here to pick her up at 4PM. RN updated.

## 2024-12-06 NOTE — PROGRESS NOTES
Pt given written and verbal discharge instructions. Pt indicated understanding of home medication and care instructions. Prescriptions given to pt via meds to beds. Pt taken down to family car via wheelchair.

## 2024-12-06 NOTE — PLAN OF CARE
Problem: Chronic Conditions and Co-morbidities  Goal: Patient's chronic conditions and co-morbidity symptoms are monitored and maintained or improved  12/6/2024 0944 by Emelia Naranjo RN  Outcome: Adequate for Discharge  12/5/2024 2328 by Keisha Mckenna RN  Outcome: Progressing     Problem: Discharge Planning  Goal: Discharge to home or other facility with appropriate resources  12/6/2024 0944 by Emelia Naranjo RN  Outcome: Adequate for Discharge  12/5/2024 2328 by Keisha Mckenna RN  Outcome: Progressing     Problem: ABCDS Injury Assessment  Goal: Absence of physical injury  12/6/2024 0944 by Emelia Naranjo RN  Outcome: Adequate for Discharge  12/5/2024 2328 by Keisha Mckenna RN  Outcome: Progressing     Problem: Safety - Adult  Goal: Free from fall injury  12/6/2024 0944 by Emelia Naranjo RN  Outcome: Adequate for Discharge  12/5/2024 2328 by Keisha Mckenna RN  Outcome: Progressing     Problem: Pain  Goal: Verbalizes/displays adequate comfort level or baseline comfort level  12/6/2024 0944 by Emelia Naranjo RN  Outcome: Adequate for Discharge  12/5/2024 2328 by Keisha Mckenna RN  Outcome: Progressing

## 2024-12-06 NOTE — FLOWSHEET NOTE
12/06/24 0830   Vital Signs   Temp 98.2 °F (36.8 °C)   Temp Source Axillary   Pulse 80   Heart Rate Source Monitor   Respirations 17   BP Location Right upper arm   BP Method Automatic   Patient Position Semi wlers   Pain Assessment   Pain Assessment 0-10   Pain Level 6   Pain Location Generalized   Pain Descriptors Aching;Discomfort   Functional Pain Assessment Activities are not prevented   Non-Pharmaceutical Pain Intervention(s) Rest;Repositioned   Opioid-Induced Sedation   POSS Score 1   Oxygen Therapy   SpO2 97 %   O2 Device None (Room air)     AM assessment completed. Complaints of pain or discomfort , PRN medication given.  No signs or symptoms of distress noted. Patient tolerated AM medications well. Respirations easy and even. Bed in lowest position, bed alarm in place and functioning properly, bed rails x2 up,  Call light within reach.     Bedside Mobility Assessment Tool (BMAT):     Assessment Level 1- Sit and Shake    1. From a semi-reclined position, ask patient to sit up and rotate to a seated position at the side of the bed. Can use the bedrail.    2. Ask patient to reach out and grab your hand and shake making sure patient reaches across his/her midline.   Pass- Patient is able to come to a seated position, maintain core strength. Maintains seated balance while reaching across midline. Move on to Assessment Level 2.     Assessment Level 2- Stretch and Point   1. With patient in seated position at the side of the bed, have patient place both feet on the floor (or stool) with knees no higher than hips.    2. Ask patient to stretch one leg and straighten the knee, then bend the ankle/flex and point the toes. If appropriate, repeat with the other leg.   Pass- Patient is able to demonstrate appropriate quad strength on intended weight bearing limb(s). Move onto Assessment Level 3.     Assessment Level 3- Stand   1. Ask patient to elevate off the bed or chair (seated to standing) using an assistive

## 2024-12-06 NOTE — PROGRESS NOTES
[facility]   COPD PROGRAM      NAME:  Nithya Neff  AGE: 84 y.o.   GENDER: female  : 1940  TODAY'S DATE:  2024    Subjective:     VISIT TYPE: Education    ADMIT DATE: 2024    PAST MEDICAL HISTORY:      Diagnosis Date    MARIO (acute kidney injury) ([facility]) 2021    Altered mental status 2021    Arthritis     BCC (basal cell carcinoma of skin)     RT clavicle    Bladder infection     frequently    Blurred vision 2022    CAD (coronary artery disease)     stents    Cancer ([facility])     skin    CHF (congestive heart failure) ([facility])     Chronic back pain     Closed head injury 2021    COPD (chronic obstructive pulmonary disease) ([facility])     Depression     Depression     Hypercholesteremia     Hypertension     Hypokalemia 2012    Pain in shoulder [id number]    pain in left shoulder, taking steroid injections for steroid    Painful total knee replacement, initial encounter ([facility]) 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 Authorizing Provider   oxyCODONE-acetaminophen (PERCOCET) 5-325 MG per tablet Take 1 tablet by mouth every 4 hours as needed for Pain. Max Daily Amount: 6 tablets   Yes Montana Sanon MD   amLODIPine (NORVASC) 10 MG tablet Take 0.5 tablets by mouth at bedtime 24  Yes Luigi Landry MD   simvastatin (ZOCOR) 40 MG tablet Take 1 tablet by mouth nightly 24  Yes Luigi Landry MD   valsartan (DIOVAN) 80 MG tablet Take 1 tablet by mouth daily 24  Yes Sridevi Archuleta, KALI - CNP   traZODone (DESYREL) 100 MG tablet Take 1 tablet by mouth nightly as needed for Sleep 24  Yes Montana Sanon MD   hydrALAZINE (APRESOLINE) 50 MG tablet

## 2024-12-07 LAB
BACTERIA UR CULT: ABNORMAL
BACTERIA UR CULT: ABNORMAL
ORGANISM: ABNORMAL
ORGANISM: ABNORMAL

## 2024-12-09 ENCOUNTER — CARE COORDINATION (OUTPATIENT)
Dept: CASE MANAGEMENT | Age: 84
End: 2024-12-09

## 2024-12-09 NOTE — CARE COORDINATION
Care Transitions Note    Initial Call - Call within 2 business days of discharge: Yes    Patient Current Location:  [place]    Care Transition Nurse contacted the patient by telephone to perform post hospital discharge assessment, verified name and  as identifiers. Provided introduction to self, and explanation of the Care Transition Nurse role.     Patient: Nithya Neff    Patient : 1940   MRN: [id number]    Reason for Admission: AMS  Discharge Date: 24  RURS: Readmission Risk Score: 19.6      Last Discharge Facility       Date Complaint Diagnosis Description Type Department Provider    24 Altered Mental Status Altered mental status, unspecified altered mental status type ... ED to Hosp-Admission (Discharged) (ADMITTED[place] [facility] [place] Sharifa Gregory, DO; Herb Vernon...            Was this an external facility discharge? No    Additional needs identified to be addressed with provider   No needs identified             Method of communication with provider: none.      Care Summary Note: Spoke briefly with Nithya. She declines participating in a CT program and then she hung up the phone.          Future Appointments         Provider Specialty Dept Phone    12/10/2024 10:45 AM Anca Potts APRN - [place]              Patient closed (declined further LUIS ANTONIO services) from the Care Transitions program on 2024.    Handoff:   Patient was not referred to the ACM team due to patient declined services.      Patient has agreed to contact primary care provider and/or specialist for any further questions, concerns, or needs.    Tracie Godinez RN BSN  Care Transition Nurse  [phone]

## 2024-12-13 NOTE — PROGRESS NOTES
Physician Progress Note      PATIENT:               FELIPE KING  CSN #:                  [id number]  :                       1940  ADMIT DATE:       2024 12:04 AM  DISCH DATE:        2024 6:15 PM  RESPONDING  PROVIDER #:        Luigi Landry MD          QUERY TEXT:    Pt admitted with AMS. Pt noted to have dementia. If possible, please document   in progress notes and discharge summary the relationship, if any, between   presenting AMS and Dementia.    The medical record reflects the following:  Risk Factors: 84 y.o. female with hx of dementia, CHF, CKD, HTN, CAD, Anemia  Clinical Indicators: Presented with AMS  Treatment: Safety precautions  Options provided:  -- AMS likely related to dementia  -- AMS unrelated to Dementia  -- Other - I will add my own diagnosis  -- Disagree - Not applicable / Not valid  -- Disagree - Clinically unable to determine / Unknown  -- Refer to Clinical Documentation Reviewer    PROVIDER RESPONSE TEXT:    This patient has AMS likely related to dementia.    Query created by: Jamie Verma on 2024 11:54 AM      Electronically signed by:  Luigi Landry MD 2024 1:20 PM

## 2025-01-04 PROBLEM — E86.0 DEHYDRATION: Status: RESOLVED | Noted: 2024-12-05 | Resolved: 2025-01-04

## 2025-02-18 ENCOUNTER — APPOINTMENT (OUTPATIENT)
Dept: CT IMAGING | Age: 85
DRG: 682 | End: 2025-02-18
Payer: MEDICARE

## 2025-02-18 ENCOUNTER — HOSPITAL ENCOUNTER (INPATIENT)
Age: 85
LOS: 4 days | Discharge: HOME | DRG: 682 | End: 2025-02-22
Attending: STUDENT IN AN ORGANIZED HEALTH CARE EDUCATION/TRAINING PROGRAM | Admitting: STUDENT IN AN ORGANIZED HEALTH CARE EDUCATION/TRAINING PROGRAM
Payer: MEDICARE

## 2025-02-18 ENCOUNTER — APPOINTMENT (OUTPATIENT)
Dept: GENERAL RADIOLOGY | Age: 85
DRG: 682 | End: 2025-02-18
Payer: MEDICARE

## 2025-02-18 DIAGNOSIS — R19.7 NAUSEA VOMITING AND DIARRHEA: Primary | ICD-10-CM

## 2025-02-18 DIAGNOSIS — N17.9 ACUTE KIDNEY INJURY SUPERIMPOSED ON CKD: ICD-10-CM

## 2025-02-18 DIAGNOSIS — K92.2 GASTROINTESTINAL HEMORRHAGE, UNSPECIFIED GASTROINTESTINAL HEMORRHAGE TYPE: ICD-10-CM

## 2025-02-18 DIAGNOSIS — R11.2 NAUSEA VOMITING AND DIARRHEA: Primary | ICD-10-CM

## 2025-02-18 DIAGNOSIS — R10.9 ABDOMINAL PAIN, UNSPECIFIED ABDOMINAL LOCATION: ICD-10-CM

## 2025-02-18 DIAGNOSIS — N18.9 ACUTE KIDNEY INJURY SUPERIMPOSED ON CKD: ICD-10-CM

## 2025-02-18 LAB
ALBUMIN SERPL-MCNC: 3.6 G/DL (ref 3.4–5)
ALBUMIN/GLOB SERPL: 1.2 {RATIO} (ref 1.1–2.2)
ALP SERPL-CCNC: 146 U/L (ref 40–129)
ALT SERPL-CCNC: 11 U/L (ref 10–40)
ANION GAP SERPL CALCULATED.3IONS-SCNC: 12 MMOL/L (ref 3–16)
AST SERPL-CCNC: 16 U/L (ref 15–37)
BACTERIA URNS QL MICRO: ABNORMAL /HPF
BASOPHILS # BLD: 0 K/UL (ref 0–0.2)
BASOPHILS NFR BLD: 0.7 %
BILIRUB SERPL-MCNC: 0.3 MG/DL (ref 0–1)
BILIRUB UR QL STRIP.AUTO: NEGATIVE
BUN SERPL-MCNC: 30 MG/DL (ref 7–20)
CALCIUM SERPL-MCNC: 8.4 MG/DL (ref 8.3–10.6)
CHLORIDE SERPL-SCNC: 106 MMOL/L (ref 99–110)
CLARITY UR: ABNORMAL
CO2 SERPL-SCNC: 23 MMOL/L (ref 21–32)
COLOR UR: YELLOW
CREAT SERPL-MCNC: 2.4 MG/DL (ref 0.6–1.2)
DEPRECATED RDW RBC AUTO: 14.1 % (ref 12.4–15.4)
EOSINOPHIL # BLD: 0.2 K/UL (ref 0–0.6)
EOSINOPHIL NFR BLD: 2.7 %
EPI CELLS #/AREA URNS HPF: ABNORMAL /HPF (ref 0–5)
FLUAV RNA RESP QL NAA+PROBE: NOT DETECTED
FLUBV RNA RESP QL NAA+PROBE: NOT DETECTED
GFR SERPLBLD CREATININE-BSD FMLA CKD-EPI: 19 ML/MIN/{1.73_M2}
GLUCOSE SERPL-MCNC: 91 MG/DL (ref 70–99)
GLUCOSE UR STRIP.AUTO-MCNC: NEGATIVE MG/DL
HCT VFR BLD AUTO: 36.2 % (ref 36–48)
HGB BLD-MCNC: 11.6 G/DL (ref 12–16)
HGB UR QL STRIP.AUTO: NEGATIVE
HYALINE CASTS #/AREA URNS LPF: >40 /LPF (ref 0–2)
KETONES UR STRIP.AUTO-MCNC: ABNORMAL MG/DL
LEUKOCYTE ESTERASE UR QL STRIP.AUTO: ABNORMAL
LYMPHOCYTES # BLD: 1.1 K/UL (ref 1–5.1)
LYMPHOCYTES NFR BLD: 16.2 %
MCH RBC QN AUTO: 27.1 PG (ref 26–34)
MCHC RBC AUTO-ENTMCNC: 32.1 G/DL (ref 31–36)
MCV RBC AUTO: 84.6 FL (ref 80–100)
MONOCYTES # BLD: 0.4 K/UL (ref 0–1.3)
MONOCYTES NFR BLD: 6.4 %
NEUTROPHILS # BLD: 5.2 K/UL (ref 1.7–7.7)
NEUTROPHILS NFR BLD: 74 %
NITRITE UR QL STRIP.AUTO: NEGATIVE
PH UR STRIP.AUTO: 5.5 [PH] (ref 5–8)
PLATELET # BLD AUTO: 305 K/UL (ref 135–450)
PMV BLD AUTO: 7.1 FL (ref 5–10.5)
POTASSIUM SERPL-SCNC: 4.2 MMOL/L (ref 3.5–5.1)
PROT SERPL-MCNC: 6.6 G/DL (ref 6.4–8.2)
PROT UR STRIP.AUTO-MCNC: NEGATIVE MG/DL
RBC # BLD AUTO: 4.28 M/UL (ref 4–5.2)
RBC #/AREA URNS HPF: ABNORMAL /HPF (ref 0–4)
SARS-COV-2 RNA RESP QL NAA+PROBE: NOT DETECTED
SODIUM SERPL-SCNC: 141 MMOL/L (ref 136–145)
SP GR UR STRIP.AUTO: >=1.03 (ref 1–1.03)
UA COMPLETE W REFLEX CULTURE PNL UR: ABNORMAL
UA DIPSTICK W REFLEX MICRO PNL UR: YES
URN SPEC COLLECT METH UR: ABNORMAL
UROBILINOGEN UR STRIP-ACNC: 0.2 E.U./DL
WBC # BLD AUTO: 7 K/UL (ref 4–11)
WBC #/AREA URNS HPF: ABNORMAL /HPF (ref 0–5)

## 2025-02-18 PROCEDURE — 99285 EMERGENCY DEPT VISIT HI MDM: CPT

## 2025-02-18 PROCEDURE — 6370000000 HC RX 637 (ALT 250 FOR IP): Performed by: STUDENT IN AN ORGANIZED HEALTH CARE EDUCATION/TRAINING PROGRAM

## 2025-02-18 PROCEDURE — 71045 X-RAY EXAM CHEST 1 VIEW: CPT

## 2025-02-18 PROCEDURE — 87086 URINE CULTURE/COLONY COUNT: CPT

## 2025-02-18 PROCEDURE — 81001 URINALYSIS AUTO W/SCOPE: CPT

## 2025-02-18 PROCEDURE — 76937 US GUIDE VASCULAR ACCESS: CPT

## 2025-02-18 PROCEDURE — 2580000003 HC RX 258: Performed by: PHYSICIAN ASSISTANT

## 2025-02-18 PROCEDURE — 87636 SARSCOV2 & INF A&B AMP PRB: CPT

## 2025-02-18 PROCEDURE — 74176 CT ABD & PELVIS W/O CONTRAST: CPT

## 2025-02-18 PROCEDURE — 2580000003 HC RX 258: Performed by: INTERNAL MEDICINE

## 2025-02-18 PROCEDURE — 93005 ELECTROCARDIOGRAM TRACING: CPT | Performed by: PHYSICIAN ASSISTANT

## 2025-02-18 PROCEDURE — 80053 COMPREHEN METABOLIC PANEL: CPT

## 2025-02-18 PROCEDURE — 1200000000 HC SEMI PRIVATE

## 2025-02-18 PROCEDURE — 85025 COMPLETE CBC W/AUTO DIFF WBC: CPT

## 2025-02-18 RX ORDER — POLYETHYLENE GLYCOL 3350 17 G/17G
17 POWDER, FOR SOLUTION ORAL DAILY PRN
Status: DISCONTINUED | OUTPATIENT
Start: 2025-02-18 | End: 2025-02-22 | Stop reason: HOSPADM

## 2025-02-18 RX ORDER — ENOXAPARIN SODIUM 100 MG/ML
40 INJECTION SUBCUTANEOUS DAILY
Status: DISCONTINUED | OUTPATIENT
Start: 2025-02-18 | End: 2025-02-18

## 2025-02-18 RX ORDER — VALSARTAN 80 MG/1
80 TABLET ORAL DAILY
Status: DISCONTINUED | OUTPATIENT
Start: 2025-02-19 | End: 2025-02-22 | Stop reason: HOSPADM

## 2025-02-18 RX ORDER — SODIUM CHLORIDE 9 MG/ML
INJECTION, SOLUTION INTRAVENOUS CONTINUOUS
Status: DISCONTINUED | OUTPATIENT
Start: 2025-02-18 | End: 2025-02-19

## 2025-02-18 RX ORDER — OXYCODONE AND ACETAMINOPHEN 5; 325 MG/1; MG/1
1 TABLET ORAL EVERY 4 HOURS PRN
Status: DISCONTINUED | OUTPATIENT
Start: 2025-02-18 | End: 2025-02-22 | Stop reason: HOSPADM

## 2025-02-18 RX ORDER — SODIUM CHLORIDE 0.9 % (FLUSH) 0.9 %
5-40 SYRINGE (ML) INJECTION EVERY 12 HOURS SCHEDULED
Status: DISCONTINUED | OUTPATIENT
Start: 2025-02-18 | End: 2025-02-22 | Stop reason: HOSPADM

## 2025-02-18 RX ORDER — HYDRALAZINE HYDROCHLORIDE 50 MG/1
50 TABLET, FILM COATED ORAL EVERY 8 HOURS SCHEDULED
Status: DISCONTINUED | OUTPATIENT
Start: 2025-02-19 | End: 2025-02-22 | Stop reason: HOSPADM

## 2025-02-18 RX ORDER — ONDANSETRON 2 MG/ML
4 INJECTION INTRAMUSCULAR; INTRAVENOUS EVERY 6 HOURS PRN
Status: DISCONTINUED | OUTPATIENT
Start: 2025-02-18 | End: 2025-02-22 | Stop reason: HOSPADM

## 2025-02-18 RX ORDER — PANTOPRAZOLE SODIUM 40 MG/1
40 TABLET, DELAYED RELEASE ORAL DAILY
Status: DISCONTINUED | OUTPATIENT
Start: 2025-02-19 | End: 2025-02-22 | Stop reason: HOSPADM

## 2025-02-18 RX ORDER — SODIUM CHLORIDE 9 MG/ML
INJECTION, SOLUTION INTRAVENOUS PRN
Status: DISCONTINUED | OUTPATIENT
Start: 2025-02-18 | End: 2025-02-22 | Stop reason: HOSPADM

## 2025-02-18 RX ORDER — ACETAMINOPHEN 325 MG/1
650 TABLET ORAL EVERY 6 HOURS PRN
Status: DISCONTINUED | OUTPATIENT
Start: 2025-02-18 | End: 2025-02-22 | Stop reason: HOSPADM

## 2025-02-18 RX ORDER — CETIRIZINE HYDROCHLORIDE 10 MG/1
5 TABLET ORAL DAILY
Status: DISCONTINUED | OUTPATIENT
Start: 2025-02-19 | End: 2025-02-22 | Stop reason: HOSPADM

## 2025-02-18 RX ORDER — 0.9 % SODIUM CHLORIDE 0.9 %
1000 INTRAVENOUS SOLUTION INTRAVENOUS ONCE
Status: COMPLETED | OUTPATIENT
Start: 2025-02-18 | End: 2025-02-18

## 2025-02-18 RX ORDER — HYDRALAZINE HYDROCHLORIDE 25 MG/1
50 TABLET, FILM COATED ORAL EVERY 8 HOURS SCHEDULED
Status: DISCONTINUED | OUTPATIENT
Start: 2025-02-18 | End: 2025-02-18

## 2025-02-18 RX ORDER — ONDANSETRON 4 MG/1
4 TABLET, ORALLY DISINTEGRATING ORAL EVERY 8 HOURS PRN
Status: DISCONTINUED | OUTPATIENT
Start: 2025-02-18 | End: 2025-02-22 | Stop reason: HOSPADM

## 2025-02-18 RX ORDER — MAGNESIUM SULFATE IN WATER 40 MG/ML
2000 INJECTION, SOLUTION INTRAVENOUS PRN
Status: DISCONTINUED | OUTPATIENT
Start: 2025-02-18 | End: 2025-02-22 | Stop reason: HOSPADM

## 2025-02-18 RX ORDER — SODIUM CHLORIDE 0.9 % (FLUSH) 0.9 %
5-40 SYRINGE (ML) INJECTION PRN
Status: DISCONTINUED | OUTPATIENT
Start: 2025-02-18 | End: 2025-02-22 | Stop reason: HOSPADM

## 2025-02-18 RX ORDER — ACETAMINOPHEN 650 MG/1
650 SUPPOSITORY RECTAL EVERY 6 HOURS PRN
Status: DISCONTINUED | OUTPATIENT
Start: 2025-02-18 | End: 2025-02-22 | Stop reason: HOSPADM

## 2025-02-18 RX ORDER — CITALOPRAM HYDROBROMIDE 20 MG/1
30 TABLET ORAL DAILY
Status: DISCONTINUED | OUTPATIENT
Start: 2025-02-19 | End: 2025-02-22 | Stop reason: HOSPADM

## 2025-02-18 RX ORDER — POTASSIUM CHLORIDE 1500 MG/1
40 TABLET, EXTENDED RELEASE ORAL PRN
Status: DISCONTINUED | OUTPATIENT
Start: 2025-02-18 | End: 2025-02-22 | Stop reason: HOSPADM

## 2025-02-18 RX ORDER — ASPIRIN 81 MG/1
81 TABLET ORAL DAILY
Status: DISCONTINUED | OUTPATIENT
Start: 2025-02-19 | End: 2025-02-22 | Stop reason: HOSPADM

## 2025-02-18 RX ORDER — HEPARIN SODIUM 5000 [USP'U]/ML
5000 INJECTION, SOLUTION INTRAVENOUS; SUBCUTANEOUS EVERY 8 HOURS SCHEDULED
Status: DISCONTINUED | OUTPATIENT
Start: 2025-02-19 | End: 2025-02-22 | Stop reason: HOSPADM

## 2025-02-18 RX ORDER — AMLODIPINE BESYLATE 5 MG/1
5 TABLET ORAL NIGHTLY
Status: DISCONTINUED | OUTPATIENT
Start: 2025-02-18 | End: 2025-02-22 | Stop reason: HOSPADM

## 2025-02-18 RX ORDER — POTASSIUM CHLORIDE 7.45 MG/ML
10 INJECTION INTRAVENOUS PRN
Status: DISCONTINUED | OUTPATIENT
Start: 2025-02-18 | End: 2025-02-22 | Stop reason: HOSPADM

## 2025-02-18 RX ORDER — TRAZODONE HYDROCHLORIDE 100 MG/1
100 TABLET ORAL NIGHTLY PRN
Status: DISCONTINUED | OUTPATIENT
Start: 2025-02-18 | End: 2025-02-22 | Stop reason: HOSPADM

## 2025-02-18 RX ADMIN — SODIUM CHLORIDE 1000 ML: 0.9 INJECTION, SOLUTION INTRAVENOUS at 15:11

## 2025-02-18 RX ADMIN — AMLODIPINE BESYLATE 5 MG: 5 TABLET ORAL at 23:26

## 2025-02-18 RX ADMIN — SODIUM CHLORIDE: 0.9 INJECTION, SOLUTION INTRAVENOUS at 21:36

## 2025-02-18 RX ADMIN — OXYCODONE HYDROCHLORIDE AND ACETAMINOPHEN 1 TABLET: 5; 325 TABLET ORAL at 20:17

## 2025-02-18 ASSESSMENT — PAIN - FUNCTIONAL ASSESSMENT: PAIN_FUNCTIONAL_ASSESSMENT: ACTIVITIES ARE NOT PREVENTED

## 2025-02-18 ASSESSMENT — PAIN DESCRIPTION - LOCATION: LOCATION: GENERALIZED

## 2025-02-18 ASSESSMENT — PAIN DESCRIPTION - ORIENTATION: ORIENTATION: RIGHT;LEFT;MID

## 2025-02-18 ASSESSMENT — PAIN DESCRIPTION - DESCRIPTORS: DESCRIPTORS: ACHING

## 2025-02-18 ASSESSMENT — PAIN SCALES - GENERAL: PAINLEVEL_OUTOF10: 9

## 2025-02-18 NOTE — ED PROVIDER NOTES
[facility] EMERGENCY DEPARTMENT  EMERGENCY DEPARTMENT ENCOUNTER        Pt Name: Nithya Neff  MRN: [id number]  Birthdate 1940  Date of evaluation: 2/18/2025  Provider: Krissy Reid PA-C  PCP: Esperanza, Pcp  Note Started: 2:54 PM EST 2/18/25      DIVINA. I have evaluated this patient.  With Dr. Olvera      CHIEF COMPLAINT       Chief Complaint   Patient presents with    Illness     Patient was seen at PCP for flu like symptoms and was told to come to ER for possible complications from CKD. Patient reports having poor PO intake d/t upset stomach and diarrhea for 1.5 weeks.        HISTORY OF PRESENT ILLNESS: 1 or more Elements     History From: Patient and granddaughter            Chief Complaint: Diarrhea weakness    Nithya Neff is a 84 y.o. female who presents complaints of 2 to 3 weeks of diarrhea.  Granddaughter reports she has been progressively more weak and confused.  She went to her regular doctor's office this morning and was recommended to come to the emergency department for further evaluation.  She also admits to dysuria abdominal pain dry heaving decreased appetite shortness of breath cough.  She admits to pain all over her body.  She states she has lost about 50 pounds in the past 6 months.  She states she has stage IV kidney disease.  No history of C. difficile recent hospitalization or antibiotic use.  No recent fever.  No chest pain.    Nursing Notes were all reviewed and agreed with or any disagreements were addressed in the HPI.    REVIEW OF SYSTEMS :      Review of Systems    Positives and Pertinent negatives as per HPI.     SURGICAL HISTORY     Past Surgical History:   Procedure Laterality Date    AORTIC VALVE REPLACEMENT      APPENDECTOMY      BLADDER REPAIR      3 TIMES    CARDIAC SURGERY      stents    CARPAL TUNNEL RELEASE      RIGHT    CHOLECYSTECTOMY, LAPAROSCOPIC  01/06/2017    with dr cowart     COLONOSCOPY      CORONARY ANGIOPLASTY WITH STENT PLACEMENT      has it it done twice

## 2025-02-18 NOTE — PROGRESS NOTES
[facility] Vascular Access  Ultrasound Guided Peripheral Insertion Procedure Note.     Nithya Neff   Admitted- 2/18/2025  2:29 PM  Admission diagnosis- No admission diagnoses are documented for this encounter.      Attending Physician- Benjamin Olvera DO  Ordering Physician- Dr. Olvera  Indication for Insertion: No Access          USG PIV placed to left upper arm using sterile technique. Brisk blood return noted, line flushes with ease. Patient tolerated well. Bed returned to lowest position, call light within reach. See US images below.

## 2025-02-19 ENCOUNTER — APPOINTMENT (OUTPATIENT)
Dept: CT IMAGING | Age: 85
DRG: 682 | End: 2025-02-19
Payer: MEDICARE

## 2025-02-19 ENCOUNTER — APPOINTMENT (OUTPATIENT)
Dept: ULTRASOUND IMAGING | Age: 85
DRG: 682 | End: 2025-02-19
Payer: MEDICARE

## 2025-02-19 PROBLEM — R63.4 WEIGHT LOSS: Status: ACTIVE | Noted: 2025-02-19

## 2025-02-19 PROBLEM — R11.2 NAUSEA VOMITING AND DIARRHEA: Status: ACTIVE | Noted: 2025-02-19

## 2025-02-19 PROBLEM — G43.909 MIGRAINE WITHOUT STATUS MIGRAINOSUS, NOT INTRACTABLE: Status: ACTIVE | Noted: 2025-02-19

## 2025-02-19 PROBLEM — E43 SEVERE PROTEIN-CALORIE MALNUTRITION: Status: ACTIVE | Noted: 2025-02-19

## 2025-02-19 PROBLEM — R19.7 NAUSEA VOMITING AND DIARRHEA: Status: ACTIVE | Noted: 2025-02-19

## 2025-02-19 LAB
ALBUMIN SERPL-MCNC: 3 G/DL (ref 3.4–5)
ALP SERPL-CCNC: 129 U/L (ref 40–129)
ALT SERPL-CCNC: 8 U/L (ref 10–40)
ANION GAP SERPL CALCULATED.3IONS-SCNC: 10 MMOL/L (ref 3–16)
AST SERPL-CCNC: 15 U/L (ref 15–37)
BASOPHILS # BLD: 0 K/UL (ref 0–0.2)
BASOPHILS NFR BLD: 0.7 %
BILIRUB DIRECT SERPL-MCNC: <0.1 MG/DL (ref 0–0.3)
BILIRUB INDIRECT SERPL-MCNC: ABNORMAL MG/DL (ref 0–1)
BILIRUB SERPL-MCNC: <0.2 MG/DL (ref 0–1)
BUN SERPL-MCNC: 27 MG/DL (ref 7–20)
CALCIUM SERPL-MCNC: 7.6 MG/DL (ref 8.3–10.6)
CHLORIDE SERPL-SCNC: 110 MMOL/L (ref 99–110)
CO2 SERPL-SCNC: 18 MMOL/L (ref 21–32)
CREAT SERPL-MCNC: 1.9 MG/DL (ref 0.6–1.2)
DEPRECATED RDW RBC AUTO: 14 % (ref 12.4–15.4)
EKG ATRIAL RATE: 65 BPM
EKG DIAGNOSIS: NORMAL
EKG P AXIS: 14 DEGREES
EKG P-R INTERVAL: 148 MS
EKG Q-T INTERVAL: 436 MS
EKG QRS DURATION: 92 MS
EKG QTC CALCULATION (BAZETT): 453 MS
EKG R AXIS: -21 DEGREES
EKG T AXIS: 8 DEGREES
EKG VENTRICULAR RATE: 65 BPM
EOSINOPHIL # BLD: 0.4 K/UL (ref 0–0.6)
EOSINOPHIL NFR BLD: 5.7 %
FOLATE SERPL-MCNC: 8.15 NG/ML (ref 4.78–24.2)
GFR SERPLBLD CREATININE-BSD FMLA CKD-EPI: 25 ML/MIN/{1.73_M2}
GLUCOSE SERPL-MCNC: 74 MG/DL (ref 70–99)
HCT VFR BLD AUTO: 30.6 % (ref 36–48)
HGB BLD-MCNC: 10 G/DL (ref 12–16)
IRON SATN MFR SERPL: 19 % (ref 15–50)
IRON SERPL-MCNC: 41 UG/DL (ref 37–145)
LYMPHOCYTES # BLD: 1.5 K/UL (ref 1–5.1)
LYMPHOCYTES NFR BLD: 22.8 %
MCH RBC QN AUTO: 28 PG (ref 26–34)
MCHC RBC AUTO-ENTMCNC: 32.8 G/DL (ref 31–36)
MCV RBC AUTO: 85.2 FL (ref 80–100)
MONOCYTES # BLD: 0.6 K/UL (ref 0–1.3)
MONOCYTES NFR BLD: 8.9 %
NEUTROPHILS # BLD: 4 K/UL (ref 1.7–7.7)
NEUTROPHILS NFR BLD: 61.9 %
PLATELET # BLD AUTO: 232 K/UL (ref 135–450)
PMV BLD AUTO: 7.4 FL (ref 5–10.5)
POTASSIUM SERPL-SCNC: 4.2 MMOL/L (ref 3.5–5.1)
PROT SERPL-MCNC: 5.8 G/DL (ref 6.4–8.2)
RBC # BLD AUTO: 3.59 M/UL (ref 4–5.2)
REASON FOR REJECTION: NORMAL
REJECTED TEST: NORMAL
SODIUM SERPL-SCNC: 138 MMOL/L (ref 136–145)
TIBC SERPL-MCNC: 219 UG/DL (ref 260–445)
VIT B12 SERPL-MCNC: 814 PG/ML (ref 211–911)
WBC # BLD AUTO: 6.4 K/UL (ref 4–11)

## 2025-02-19 PROCEDURE — 83550 IRON BINDING TEST: CPT

## 2025-02-19 PROCEDURE — 82746 ASSAY OF FOLIC ACID SERUM: CPT

## 2025-02-19 PROCEDURE — 1200000000 HC SEMI PRIVATE

## 2025-02-19 PROCEDURE — 6360000002 HC RX W HCPCS: Performed by: INTERNAL MEDICINE

## 2025-02-19 PROCEDURE — 84132 ASSAY OF SERUM POTASSIUM: CPT

## 2025-02-19 PROCEDURE — 97530 THERAPEUTIC ACTIVITIES: CPT

## 2025-02-19 PROCEDURE — 97166 OT EVAL MOD COMPLEX 45 MIN: CPT

## 2025-02-19 PROCEDURE — 70450 CT HEAD/BRAIN W/O DYE: CPT

## 2025-02-19 PROCEDURE — 97535 SELF CARE MNGMENT TRAINING: CPT

## 2025-02-19 PROCEDURE — 76770 US EXAM ABDO BACK WALL COMP: CPT

## 2025-02-19 PROCEDURE — 85025 COMPLETE CBC W/AUTO DIFF WBC: CPT

## 2025-02-19 PROCEDURE — 80048 BASIC METABOLIC PNL TOTAL CA: CPT

## 2025-02-19 PROCEDURE — 80076 HEPATIC FUNCTION PANEL: CPT

## 2025-02-19 PROCEDURE — 6360000002 HC RX W HCPCS

## 2025-02-19 PROCEDURE — 36415 COLL VENOUS BLD VENIPUNCTURE: CPT

## 2025-02-19 PROCEDURE — 99232 SBSQ HOSP IP/OBS MODERATE 35: CPT

## 2025-02-19 PROCEDURE — 82607 VITAMIN B-12: CPT

## 2025-02-19 PROCEDURE — 83540 ASSAY OF IRON: CPT

## 2025-02-19 PROCEDURE — 6370000000 HC RX 637 (ALT 250 FOR IP): Performed by: STUDENT IN AN ORGANIZED HEALTH CARE EDUCATION/TRAINING PROGRAM

## 2025-02-19 PROCEDURE — 97161 PT EVAL LOW COMPLEX 20 MIN: CPT

## 2025-02-19 PROCEDURE — 93010 ELECTROCARDIOGRAM REPORT: CPT | Performed by: INTERNAL MEDICINE

## 2025-02-19 PROCEDURE — 2580000003 HC RX 258: Performed by: INTERNAL MEDICINE

## 2025-02-19 PROCEDURE — 2580000003 HC RX 258

## 2025-02-19 PROCEDURE — 2500000003 HC RX 250 WO HCPCS: Performed by: INTERNAL MEDICINE

## 2025-02-19 PROCEDURE — 6370000000 HC RX 637 (ALT 250 FOR IP)

## 2025-02-19 RX ORDER — SODIUM CHLORIDE 9 MG/ML
INJECTION, SOLUTION INTRAVENOUS CONTINUOUS
Status: DISCONTINUED | OUTPATIENT
Start: 2025-02-19 | End: 2025-02-21

## 2025-02-19 RX ORDER — BUTALBITAL, ACETAMINOPHEN AND CAFFEINE 50; 325; 40 MG/1; MG/1; MG/1
1 TABLET ORAL ONCE
Status: COMPLETED | OUTPATIENT
Start: 2025-02-19 | End: 2025-02-19

## 2025-02-19 RX ORDER — LABETALOL HYDROCHLORIDE 5 MG/ML
10 INJECTION, SOLUTION INTRAVENOUS EVERY 6 HOURS PRN
Status: DISCONTINUED | OUTPATIENT
Start: 2025-02-19 | End: 2025-02-22 | Stop reason: HOSPADM

## 2025-02-19 RX ADMIN — PANTOPRAZOLE SODIUM 40 MG: 40 INJECTION, POWDER, LYOPHILIZED, FOR SOLUTION INTRAVENOUS at 16:19

## 2025-02-19 RX ADMIN — ASPIRIN 81 MG: 81 TABLET, COATED ORAL at 08:44

## 2025-02-19 RX ADMIN — BUTALBITAL, ACETAMINOPHEN, AND CAFFEINE 1 TABLET: 50; 325; 40 TABLET ORAL at 16:19

## 2025-02-19 RX ADMIN — CETIRIZINE HYDROCHLORIDE 5 MG: 10 TABLET ORAL at 08:45

## 2025-02-19 RX ADMIN — CITALOPRAM HYDROBROMIDE 30 MG: 20 TABLET ORAL at 08:45

## 2025-02-19 RX ADMIN — SODIUM CHLORIDE: 0.9 INJECTION, SOLUTION INTRAVENOUS at 13:30

## 2025-02-19 RX ADMIN — TRAZODONE HYDROCHLORIDE 100 MG: 100 TABLET ORAL at 22:10

## 2025-02-19 RX ADMIN — Medication 10 ML: at 08:46

## 2025-02-19 RX ADMIN — ONDANSETRON 4 MG: 2 INJECTION, SOLUTION INTRAMUSCULAR; INTRAVENOUS at 08:45

## 2025-02-19 RX ADMIN — AMLODIPINE BESYLATE 5 MG: 5 TABLET ORAL at 22:10

## 2025-02-19 RX ADMIN — HYDRALAZINE HYDROCHLORIDE 50 MG: 50 TABLET ORAL at 05:05

## 2025-02-19 RX ADMIN — OXYCODONE HYDROCHLORIDE AND ACETAMINOPHEN 1 TABLET: 5; 325 TABLET ORAL at 08:44

## 2025-02-19 RX ADMIN — HEPARIN SODIUM 5000 UNITS: 5000 INJECTION INTRAVENOUS; SUBCUTANEOUS at 05:06

## 2025-02-19 RX ADMIN — PANTOPRAZOLE SODIUM 40 MG: 40 TABLET, DELAYED RELEASE ORAL at 08:44

## 2025-02-19 RX ADMIN — OXYCODONE HYDROCHLORIDE AND ACETAMINOPHEN 1 TABLET: 5; 325 TABLET ORAL at 22:10

## 2025-02-19 RX ADMIN — OXYCODONE HYDROCHLORIDE AND ACETAMINOPHEN 1 TABLET: 5; 325 TABLET ORAL at 13:33

## 2025-02-19 RX ADMIN — HYDRALAZINE HYDROCHLORIDE 50 MG: 50 TABLET ORAL at 13:30

## 2025-02-19 RX ADMIN — HYDRALAZINE HYDROCHLORIDE 50 MG: 50 TABLET ORAL at 22:10

## 2025-02-19 ASSESSMENT — PAIN SCALES - GENERAL
PAINLEVEL_OUTOF10: 0
PAINLEVEL_OUTOF10: 8

## 2025-02-19 ASSESSMENT — PAIN DESCRIPTION - DIRECTION
RADIATING_TOWARDS: DENIES
RADIATING_TOWARDS: DENIES

## 2025-02-19 ASSESSMENT — PAIN DESCRIPTION - LOCATION
LOCATION: GENERALIZED
LOCATION: BACK;GENERALIZED
LOCATION: BACK

## 2025-02-19 ASSESSMENT — PAIN - FUNCTIONAL ASSESSMENT
PAIN_FUNCTIONAL_ASSESSMENT: ACTIVITIES ARE NOT PREVENTED

## 2025-02-19 ASSESSMENT — PAIN DESCRIPTION - ONSET
ONSET: ON-GOING

## 2025-02-19 ASSESSMENT — PAIN DESCRIPTION - PAIN TYPE
TYPE: CHRONIC PAIN

## 2025-02-19 ASSESSMENT — PAIN DESCRIPTION - FREQUENCY
FREQUENCY: CONTINUOUS

## 2025-02-19 ASSESSMENT — PAIN DESCRIPTION - DESCRIPTORS
DESCRIPTORS: ACHING

## 2025-02-19 ASSESSMENT — PAIN DESCRIPTION - ORIENTATION
ORIENTATION: RIGHT;LEFT
ORIENTATION: RIGHT;LEFT

## 2025-02-19 NOTE — ED PROVIDER NOTES
I independently examined and evaluated Nithya Neff.  I personally saw the patient and performed a substantive portion of the visit including all aspects of the medical decision making.    In brief, this is a 4-year-old female is presenting with nausea vomiting, diarrhea for the last 10 days.  Also endorses abdominal pain and dysuria.  Has not been able to keep fluids down at home and has become more confused and weak.    Focused exam revealed   General: Alert, no acute distress, patient resting comfortably   Skin: warm, intact, no pallor noted   Head: Normocephalic, atraumatic   Eye: Normal conjunctiva   Cardiac: Normal peripheral perfusion  Respiratory: No acute distress   Musculoskeletal: No deformity, full ROM.   Neurological: alert and confused, normal sensory and motor observed.   Psychiatric: Cooperative    ED course: Lab obtained and does reveal an acute kidney injury, creatinine 2.4, baseline 1.2.  Flu and COVID are negative.  Treated with IV fluids and discussed with hospitalist by the DIVINA and admitted for further treatment.    ECG    The Ekg interpreted by me shows sinus rhythm with a rate of 65 bpm.  Left axis deviation.  No acute injury pattern.  , QRS 92, QTc 453.    No significant change from prior EKG dated 12/5/2024    All diagnostic, treatment, and disposition decisions were made by myself in conjunction with the advanced practice provider/resident.    I personally saw the patient and made/approved the management plan and take responsibility for the patient management.     For all further details of the patient's emergency department visit, please see the advanced practice provider's/resident's documentation.      Benjamin Olvera,   02/19/25 0153

## 2025-02-19 NOTE — FLOWSHEET NOTE
02/19/25 0245   Vital Signs   Temp 98 °F (36.7 °C)   Temp Source Oral   Pulse 63   Heart Rate Source Monitor   Respirations 16   BP (!) 148/52   MAP (Calculated) 84   BP Location Right upper arm   BP Method Automatic   Patient Position Semi fowlers   Pain Assessment   Pain Assessment None - Denies Pain   Oxygen Therapy   SpO2 99 %   Pulse Oximetry Type Intermittent   Pulse Oximeter Device Mode Intermittent   Pulse Oximeter Device Location Left;Finger   O2 Device None (Room air)   O2 Flow Rate (L/min) 0 L/min   Oxygen Therapy None (Room air)

## 2025-02-19 NOTE — PROGRESS NOTES
4 Eyes Skin Assessment     NAME:  Nithya Neff  YOB: 1940  MEDICAL RECORD NUMBER:  [id number]    The patient is being assessed for  Admission    I agree that at least one RN has performed a thorough Head to Toe Skin Assessment on the patient. ALL assessment sites listed below have been assessed.      Areas assessed by both nurses:    Head, Face, Ears, Shoulders, Back, Chest, Arms, Elbows, Hands, Sacrum. Buttock, Coccyx, Ischium, Legs. Feet and Heels, and Under Medical Devices         Does the Patient have a Wound? No noted wound(s)       Mihir Prevention initiated by RN: No  Wound Care Orders initiated by RN: No    Pressure Injury (Stage 3,4, Unstageable, DTI, NWPT, and Complex wounds) if present, place Wound referral order by RN under : No    New Ostomies, if present place, Ostomy referral order under : No     Nurse 1 eSignature: Electronically signed by Mitzy Bowser RN on 2/18/25 at 11:01 PM EST    **SHARE this note so that the co-signing nurse can place an eSignature**    Nurse 2 eSignature: Electronically signed by Nannette Curran RN on 2/18/25 at 11:43 PM EST

## 2025-02-19 NOTE — ACP (ADVANCE CARE PLANNING)
Advance Care Planning     General Advance Care Planning (ACP) Conversation    Date of Conversation: 2/19/2025  Conducted with: Patient with Decision Making Capacity  Other persons present: None    Healthcare Decision Maker:   Primary Decision Maker: Sonya Neri - Child - [phone]    Secondary Decision Maker: TawandaRobert - Child - [phone]       Content/Action Overview:  DECLINED ACP Conversation - will revisit periodically  Reviewed DNR/DNI and patient elects Full Code (Attempt Resuscitation)        Length of Voluntary ACP Conversation in minutes:  <16 minutes (Non-Billable)    Enrico Gomez RN

## 2025-02-19 NOTE — PLAN OF CARE
Problem: Chronic Conditions and Co-morbidities  Goal: Patient's chronic conditions and co-morbidity symptoms are monitored and maintained or improved  Outcome: Progressing     Problem: Discharge Planning  Goal: Discharge to home or other facility with appropriate resources  Outcome: Progressing     Problem: ABCDS Injury Assessment  Goal: Absence of physical injury  Outcome: Progressing     Problem: Safety - Adult  Goal: Free from fall injury  Outcome: Progressing     Problem: Pain  Goal: Verbalizes/displays adequate comfort level or baseline comfort level  Outcome: Progressing  Flowsheets (Taken 2/19/2025 4922)  Verbalizes/displays adequate comfort level or baseline comfort level:   Encourage patient to monitor pain and request assistance   Assess pain using appropriate pain scale

## 2025-02-19 NOTE — CONSULTS
GASTROENTEROLOGY INPATIENT CONSULTATION        IDENTIFYING DATA/REASON FOR CONSULTATION   PATIENT:  Nithya Neff  MRN:  [id number]  ADMIT DATE: 2/18/2025  TIME OF EVALUATION: 2/19/2025 9:24 AM  HOSPITAL STAY:   LOS: 1 day     REASON FOR CONSULTATION:  Poor PO intake for over 6 months because of abdominal discomfort, 2-3 week history of diarrhea    HISTORY OF PRESENT ILLNESS   Nithya Neff is a 84 y.o. female with a PMH of CAD, non-melanoma skin cancer, CHF, COPD, HTN, TIA, aortic stenosis s/p TAVR, appendectomy, cholecystectomy, hysterectomy who presented on 2/18/2025 with 2-3 week history of diarrhea, progressive weakness and confusion.   She reports a 6 month history of postprandial epigastric abdominal pain radiating throughout her abdomen, nausea. She reports a 60lb unintentional weight loss.  She has had a 2-3 week history of diarrhea. Imodium is beneficial but then symptoms return. Only rarely will see some BRBPR and denies melena.     Lab work-up includes:  CBC with hgb 10.0. Cr 2.4 --> 1.9.   Alk phos 146, otherwise normal LFTs.     Prior Endoscopic Evaluations:  EGD 3/2017 with Dr. Fabian:  Esophageal spasm, no definite stricture status post  dilation.    EGD and colonoscopy in 2012 with Dr. Mcginnis:  1.  Hiatal hernia, about 3 cm, with severe gastroesophageal reflux.    2.  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   3.  Esophageal stricture, small.   4.  Gastritis that was biopsied for Helicobacter pylori.  5.  Esophageal dilatation done using Watts #40, 50 and 60 as mentioned above.   6.  A total of 9 colon polyps--2 of the transverse colon which were both  removed with biopsies, 6 of the ascending colon with 2 removed with biopsies and 4 with snare polypectomy, and 1 of the cecum which was also removed with snare polypectomy.   7.  Chronic diverticula, very severe and diffuse.   8.  Spastic colon, very severe.   9.  Internal hemorrhoid, large and not

## 2025-02-19 NOTE — PROGRESS NOTES
Inpatient Physical Therapy Evaluation & Treatment    Unit: [facility]  Date:  2/19/2025  Patient Name:    Nithya Neff  Admitting diagnosis:  Nausea vomiting and diarrhea [R11.2, R19.7]  Acute kidney injury superimposed on CKD [N17.9, N18.9]  Admit Date:  2/18/2025  Precautions/Restrictions/WB Status/ Lines/ Wounds/ Oxygen: Fall risk, Bed/chair alarm, Lines (IV), and Isolation Precautions: Contact Plus (c-diff r/o)    Pt seen for cotreatment this date due to patient endurance - pt indicated to evaluating OT that she may not be willing to do 2nd therapy session with PT later in day.     Treatment Time:  09:20-09:55  Treatment Number:  1   Timed Code Treatment Minutes: 25 minutes  Total Treatment Minutes:  35  minutes    Patient Stated Goals for Therapy:  not stated          Discharge Recommendations: Home with initial 24 hr supervision  DME needs for discharge: Needs Met       Therapy recommendation for EMS Transport: can transport by wheelchair    Therapy recommendations for staff:   Assist of 1 for ambulation with use of Rollator and gait belt within room    History of Present Illness:   Per H&P Dr. Cota 2/18: \"84 y.o. female who presented to [facility] with 2 to 3 weeks of diarrhea and worsening weakness and confusion.  PMHx significant for CKD 4, CHF, hypertension, CAD S/p PCI, h/o aortic stenosis s/p TAVR, COPD, anemia, dementia, chronic pain, MARIA C .       Patient has she is lost about 50 pounds since 6 months ago.  She started developing poor appetite and has been having poor p.o. intake since 6 months ago.  Says she just has general abdominal diffuse pain and whenever she eats, has discomfort so her appetite has been poor.  More recently, she developed watery and loose stools about 2 to 3 weeks ago.  Says intermittently, it is watery but sometimes it is low bit more formed.  Denies any other changes in diet.  Denies any sick contacts.  She does have suprapubic tenderness.  Does intermittently have some nausea

## 2025-02-19 NOTE — PROGRESS NOTES
Comprehensive Nutrition Assessment    Type and Reason for Visit:  Initial, Consult    Nutrition Recommendations/Plan:   Continue Full Liquid Diet  Adde Magic Cups BID      Malnutrition Assessment:  Malnutrition Status:  Severe malnutrition (02/19/25 0783)    Context:  Acute Illness     Findings of the 6 clinical characteristics of malnutrition:  Energy Intake:  50% or less of estimated energy requirements for 5 or more days  Weight Loss:  Greater than 7.5% over 3 months     Body Fat Loss:  Moderate body fat loss Orbital, Buccal region   Muscle Mass Loss:  Moderate muscle mass loss Temples (temporalis), Clavicles (pectoralis & deltoids), Scapula (trapezius)  Fluid Accumulation:  No fluid accumulation Extremities   Strength:  Not Performed    Nutrition Assessment:    Pt. severely malnourished AEB admitted with > 7.5% in < 3 months with noted muscle wasting and fat loss r/t to poor PO intake prior to admission .  At risk for further nutritional compromise r/t c/o of abdominal pain; CKD; CHF and altered nutrition related labs .  Will add magic cups BID .     Nutrition Related Findings:    pt is A & O; she reports thats she has been losing weight over the last year her UBW was ~ 180# ;  intakes poor PTA d/t diarrhea for 2-3 weeks; Jerman h & h Wound Type: None       Current Nutrition Intake & Therapies:    Average Meal Intake: 26-50%, %  Average Supplements Intake: None Ordered  ADULT DIET; Full Liquid  Diet NPO Exceptions are: Sips of Water with Meds    Anthropometric Measures:  Height: 152.4 cm (5')  Ideal Body Weight (IBW): 100 lbs (45 kg)    Admission Body Weight: 51.3 kg (113 lb)  Current Body Weight: 51.3 kg (113 lb), 113 % IBW. Weight Source: Bed scale  Current BMI (kg/m2): 22.1  Usual Body Weight: 56.7 kg (125 lb) (Dec 2024)     % Weight Change (Calculated): -9.6                    BMI Categories: Normal Weight (BMI 22.0 to 24.9) age over 65    Estimated Daily Nutrient Needs:  Energy Requirements Based On:

## 2025-02-19 NOTE — PROGRESS NOTES
Inpatient Occupational Therapy Evaluation & Treatment    Unit: [facility]  Date:  2/19/2025  Patient Name:    Nithya Neff  Admitting diagnosis:  Nausea vomiting and diarrhea [R11.2, R19.7]  Acute kidney injury superimposed on CKD [N17.9, N18.9]  Admit Date:  2/18/2025  Precautions/Restrictions/WB Status/ Lines/ Wounds/ Oxygen: Fall risk, Bed/chair alarm, and Lines (IV)    Pt seen for cotreatment this date due to patient safety, patient endurance, complexity of condition, acute illness/injury, and limited functional status information    Treatment Time:  812-448  Treatment Number:  1  Timed Code Treatment Minutes: 40 minutes  Total Treatment Minutes:  50  minutes    Patient Goals for Therapy: \"feel better\"          Discharge Recommendations: Home with initial 24/7 assist  and Home OT  DME needs for discharge: Needs Met       Therapy recommendations for staff:   Assist of 1 for transfers with use of Rollator and gait belt to/from BSC  to/from chair    History of Present Illness: admitted with decreased PO intake and abdominal pain, watery/loose stools for last 2-3 weeks    Preadmission Environment:   Pt lives with                                         Alone - dtr comes each day   Home environment:                            apartment   Steps to enter first floor:                     No steps  Laundry:                                              1st floor  Bathroom:                                           walk in shower, grab bars in shower, shower chair, and standard height toilet  Pt sleeps in a:                                     adjustable bed, has a set of three homemade steps up to bed  Equipment owned:                              RW, rollator, SPC, BSC, and [employer]round that won't hold charge (unable to use)     Preadmission Status:  Pt able to drive: Yes, typically but not lately-short distances, dtr and DIL, granddaughter drive patient most places  Pt fully independent with ADLs: Yes  Pt receives

## 2025-02-19 NOTE — PROGRESS NOTES
Progress Note    Admit Date:  2/18/2025    MARIO on CKD     Diarrhea   Unintentional weight loss     GI consulted, EGD tomorrow     Subjective:  Ms. Neff today seen resting in bed. Still generally not feeling well   Is feeling nauseous, has been dry heaving and having epigastric pain     She is also complaining of 10/10 frontal headache. Does get migraines and is on ubrevly  Denies vision changes, unilateral weakness.     Objective:   Patient Vitals for the past 4 hrs:   BP Temp Temp src Pulse Resp SpO2   02/19/25 1329 (!) 159/67 98.2 °F (36.8 °C) Oral 65 16 97 %          Intake/Output Summary (Last 24 hours) at 2/19/2025 1438  Last data filed at 2/19/2025 1322  Gross per 24 hour   Intake 1927.01 ml   Output --   Net 1927.01 ml       Physical Exam:    Gen: No distress. Alert. +Elderly female   Eyes: PERRL. No sclera icterus. No conjunctival injection.   Neck: No JVD.  Trachea midline.  Resp: No accessory muscle use. No crackles. No wheezes. No rhonchi.   CV: Regular rate. Regular rhythm. No murmur.  No rub. No edema.   Peripheral Pulses: +2 palpable, equal bilaterally   GI:  Non-distended.  Normal bowel sounds.+tenderness epigastric region  Skin: Warm and dry. No nodule on exposed extremities. No rash on exposed extremities.   M/S: No cyanosis. No joint deformity. No clubbing.   Neuro: Awake. Grossly nonfocal CN II-XII grossly intact. Strength 5/5 in bilateral upper and lower extremities, sensation intact and symmetric   Psych: Oriented x 3. No anxiety or agitation.         Medications:  sodium chloride flush, 5-40 mL, 2 times per day  heparin (porcine), 5,000 Units, 3 times per day  amLODIPine, 5 mg, Nightly  aspirin, 81 mg, Daily  cetirizine, 5 mg, Daily  citalopram, 30 mg, Daily  pantoprazole, 40 mg, Daily  [Held by provider] valsartan, 80 mg, Daily  hydrALAZINE, 50 mg, 3 times per day      PRN Medications:  sodium chloride flush, 5-40 mL, PRN  sodium chloride, , PRN  potassium chloride, 40 mEq, PRN

## 2025-02-19 NOTE — PROGRESS NOTES
Bedside Mobility Assessment Tool (BMAT):     Assessment Level 1- Sit and Shake    1. From a semi-reclined position, ask patient to sit up and rotate to a seated position at the side of the bed. Can use the bedrail.    2. Ask patient to reach out and grab your hand and shake making sure patient reaches across his/her midline.   Pass- Patient is able to come to a seated position, maintain core strength. Maintains seated balance while reaching across midline. Move on to Assessment Level 2.     Assessment Level 2- Stretch and Point   1. With patient in seated position at the side of the bed, have patient place both feet on the floor (or stool) with knees no higher than hips.    2. Ask patient to stretch one leg and straighten the knee, then bend the ankle/flex and point the toes. If appropriate, repeat with the other leg.   Pass- Patient is able to demonstrate appropriate quad strength on intended weight bearing limb(s). Move onto Assessment Level 3.     Assessment Level 3- Stand   1. Ask patient to elevate off the bed or chair (seated to standing) using an assistive device (cane, bedrail).    2. Patient should be able to raise buttocks off be and hold for a count of five. May repeat once.   Pass- Patient maintains standing stability for at least 5 seconds, proceed to assessment level 4.    Assessment Level 4- Walk   1. Ask patient to march in place at bedside.    2. Then ask patient to advance step and return each foot. Some medical conditions may render a patient from stepping backwards, use your best clinical judgement.   Fail- Patient not able to complete tasks OR requires use of assistive device. Patient is MOBILITY LEVEL 3.       Mobility Level- 3    Patient is able to demonstrate the ability to move from a reclining position to an upright position within the recliner.

## 2025-02-19 NOTE — PLAN OF CARE
Problem: Chronic Conditions and Co-morbidities  Goal: Patient's chronic conditions and co-morbidity symptoms are monitored and maintained or improved  Outcome: Progressing  Flowsheets (Taken 2/18/2025 2252)  Care Plan - Patient's Chronic Conditions and Co-Morbidity Symptoms are Monitored and Maintained or Improved: Monitor and assess patient's chronic conditions and comorbid symptoms for stability, deterioration, or improvement     Problem: Discharge Planning  Goal: Discharge to home or other facility with appropriate resources  Outcome: Progressing  Flowsheets  Taken 2/18/2025 2309  Discharge to home or other facility with appropriate resources: Identify barriers to discharge with patient and caregiver  Taken 2/18/2025 2252  Discharge to home or other facility with appropriate resources: Identify barriers to discharge with patient and caregiver     Problem: ABCDS Injury Assessment  Goal: Absence of physical injury  Outcome: Progressing  Flowsheets (Taken 2/18/2025 2305)  Absence of Physical Injury: Implement safety measures based on patient assessment     Problem: Safety - Adult  Goal: Free from fall injury  Outcome: Progressing

## 2025-02-19 NOTE — PROGRESS NOTES
Shift assessment complete. VSS. Patient A/OX4. Pt c/o chronic pain and nausea. PRN Percocet and Zofran given. Scheduled meds given, see MAR. Pt denies further needs at this time. Call light within reach. Bed alarm on.

## 2025-02-19 NOTE — FLOWSHEET NOTE
02/18/25 2252   Vital Signs   Temp 98.1 °F (36.7 °C)   Temp Source Oral   Pulse 57   Heart Rate Source Monitor   Respirations 16   BP (!) 177/64   MAP (Calculated) 102   BP Location Right upper arm   BP Method Automatic   Patient Position Semi fowlers   Pain Assessment   Pain Assessment None - Denies Pain   Opioid-Induced Sedation   POSS Score 1   RASS   Jeffries Agitation Sedation Scale (RASS) 0   Oxygen Therapy   SpO2 96 %   Pulse Oximetry Type Intermittent   Pulse Oximeter Device Mode Intermittent   Pulse Oximeter Device Location Left;Finger   O2 Device None (Room air)   O2 Flow Rate (L/min) 0 L/min   Oxygen Therapy None (Room air)   Height and Weight   Weight - Scale 51.3 kg (113 lb)   Weight Method Bed scale   BMI (Calculated) 0     Pt resting comfortably in bed. IV infusing as ordered. Pt oriented to room. Bed alarm on, call light within reach. No needs expressed at this time. Will continue to monitor.

## 2025-02-19 NOTE — CARE COORDINATION
Case Management Assessment  Initial Evaluation    Date/Time of Evaluation: 2/19/2025 8:48 AM  Assessment Completed by: Enrico oGmez RN    If patient is discharged prior to next notation, then this note serves as note for discharge by case management.    Patient Name: Nithya Neff                   YOB: 1940  Diagnosis: Nausea vomiting and diarrhea [R11.2, R19.7]  Acute kidney injury superimposed on CKD [N17.9, N18.9]                   Date / Time: 2/18/2025  2:29 PM    Patient Admission Status: Inpatient   Readmission Risk (Low < 19, Mod (19-27), High > 27): Readmission Risk Score: 18    Current PCP: No, Pcp  PCP verified by CM? Yes ([facility])    Chart Reviewed: Yes      History Provided by: Patient  Patient Orientation: Alert and Oriented    Patient Cognition: Alert    Hospitalization in the last 30 days (Readmission):  No    If yes, Readmission Assessment in CM Navigator will be completed.    Advance Directives:      Code Status: Full Code   Patient's Primary Decision Maker is: Legal Next of Kin    Primary Decision Maker: Sonya Nrei - Child - [phone]    Secondary Decision Maker: Robert Neff - Child - [phone]    Discharge Planning:    Patient lives with: Alone Type of Home: House  Primary Care Giver: Self  Patient Support Systems include: Children   Current Financial resources: Medicare, Medicaid  Current community resources: ECF/Home Care ([facility])  Current services prior to admission: Durable Medical Equipment, Home Care            Current DME: Cane, Walker            Type of Home Care services:  None    ADLS  Prior functional level: Assistance with the following:, Bathing, Dressing, Toileting, Cooking, Housework, Shopping, Mobility  Current functional level: Assistance with the following:, Bathing, Dressing, Toileting, Cooking, Housework, Shopping, Mobility    PT AM-PAC:   /24  OT AM-PAC:   /24    Family can provide assistance at DC: No  Would you like Case  Transition of Care is related to the following treatment goals of Nausea vomiting and diarrhea [R11.2, R19.7]  Acute kidney injury superimposed on CKD [N17.9, N18.9]    IF APPLICABLE: The Patient and/or patient representative Nithya and her family were provided with a choice of provider and agrees with the discharge plan. Freedom of choice list with basic dialogue that supports the patient's individualized plan of care/goals and shares the quality data associated with the providers was provided to:     Patient Representative Name:       The Patient and/or Patient Representative Agree with the Discharge Plan?      Enrico Gomez RN  Case Management Department  Ph: [phone] Fax: [phone]

## 2025-02-19 NOTE — H&P
Hospital Medicine History & Physical      Date of Admission: 2/18/2025    Date of Service:  Pt seen/examined on 2/18/2025    [x]Admitted to Inpatient with expected LOS greater than two midnights due to medical therapy.  []Placed in Observation status.    Chief Admission Complaint: 2 to 3 weeks of diarrhea, weakness and confusion    Presenting Admission History:      84 y.o. female who presented to [facility] with 2 to 3 weeks of diarrhea and worsening weakness and confusion.  PMHx significant for CKD 4, CHF, hypertension, CAD S/p PCI, h/o aortic stenosis s/p TAVR, COPD, anemia, dementia, chronic pain, MARIA C .      Patient has she is lost about 50 pounds since 6 months ago.  She started developing poor appetite and has been having poor p.o. intake since 6 months ago.  Says she just has general abdominal diffuse pain and whenever she eats, has discomfort so her appetite has been poor.  More recently, she developed watery and loose stools about 2 to 3 weeks ago.  Says intermittently, it is watery but sometimes it is low bit more formed.  Denies any other changes in diet.  Denies any sick contacts.  She does have suprapubic tenderness.  Does intermittently have some nausea but really no vomiting.  Says she has multiple bowel movements a day for the past 2 to 3 weeks.  No other acute complaints.    In the ED, blood pressure elevated but otherwise vital signs stable.  BMP largely unremarkable except for BUN of 30 and creatinine of 2.4.  CBC largely unremarkable for hemoglobin 11.6.  Influenza COVID RSV negative.  Chest x-ray no acute process.  CT abdomen pelvis was also unremarkable.    Assessment/Plan:      Current Principal Problem:  Acute kidney injury superimposed on CKD (HCC)    #MARIO on CKD 4, likely prerenal in setting of GI losses  -IV fluids  -Avoid nephrotoxic agents, hold home losartan  -Check bladder scan, had suprapubic tenderness.  Has not been urinating much  -If bladder scan shows fluid retention, Place

## 2025-02-19 NOTE — PROGRESS NOTES
HEART FAILURE CARE PLAN:    Comorbidities Reviewed: Yes   Patient has a past medical history of MARIO (acute kidney injury), Altered mental status, Arthritis, BCC (basal cell carcinoma of skin), Bladder infection, Blurred vision, CAD (coronary artery disease), Cancer ([facility]), CHF (congestive heart failure) ([facility]), Chronic back pain, Closed head injury, COPD (chronic obstructive pulmonary disease) ([facility]), Depression, Depression, Hypercholesteremia, Hypertension, Hypokalemia, Pain in shoulder, Painful total knee replacement, initial encounter, Reflux, Rheumatic fever, Sleep apnea, Syncope and collapse, TIA (transient ischemic attack), Urinary incontinence, Urinary tract infection in female, and Wears glasses.     Weights Reviewed: Yes   Admission weight: 57 kg (125 lb 10.6 oz)   Wt Readings from Last 3 Encounters:   02/18/25 51.3 kg (113 lb)   12/05/24 56.7 kg (125 lb 1.6 oz)   05/17/24 63.1 kg (139 lb 3.2 oz)     Intake & Output Reviewed: Yes     Intake/Output Summary (Last 24 hours) at 2/19/2025 1516  Last data filed at 2/19/2025 1322  Gross per 24 hour   Intake 1927.01 ml   Output --   Net 1927.01 ml       ECHOCARDIOGRAM Reviewed: Yes   Patient's Ejection Fraction (EF) is greater than 40%     Medications Reviewed: Yes   SCHEDULED HOSPITAL MEDICATIONS:   sodium chloride flush  5-40 mL IntraVENous 2 times per day    heparin (porcine)  5,000 Units SubCUTAneous 3 times per day    amLODIPine  5 mg Oral Nightly    aspirin  81 mg Oral Daily    cetirizine  5 mg Oral Daily    citalopram  30 mg Oral Daily    pantoprazole  40 mg Oral Daily    [Held by provider] valsartan  80 mg Oral Daily    hydrALAZINE  50 mg Oral 3 times per day     HOME MEDICATIONS:  Prior to Admission medications    Medication Sig Start Date End Date Taking? Authorizing Provider   oxyCODONE-acetaminophen (PERCOCET) 5-325 MG per tablet Take 1 tablet by mouth every 4 hours as needed for Pain.   Yes Provider, MD [place]   amLODIPine (NORVASC) 10 MG tablet Take

## 2025-02-20 ENCOUNTER — ANESTHESIA EVENT (OUTPATIENT)
Dept: ENDOSCOPY | Age: 85
End: 2025-02-20
Payer: MEDICARE

## 2025-02-20 ENCOUNTER — ANESTHESIA (OUTPATIENT)
Dept: ENDOSCOPY | Age: 85
End: 2025-02-20
Payer: MEDICARE

## 2025-02-20 LAB
ANION GAP SERPL CALCULATED.3IONS-SCNC: 8 MMOL/L (ref 3–16)
BACTERIA UR CULT: NORMAL
BASOPHILS # BLD: 0 K/UL (ref 0–0.2)
BASOPHILS NFR BLD: 0.7 %
BUN SERPL-MCNC: 19 MG/DL (ref 7–20)
CALCIUM SERPL-MCNC: 7.6 MG/DL (ref 8.3–10.6)
CHLORIDE SERPL-SCNC: 113 MMOL/L (ref 99–110)
CO2 SERPL-SCNC: 20 MMOL/L (ref 21–32)
CREAT SERPL-MCNC: 1.4 MG/DL (ref 0.6–1.2)
DEPRECATED RDW RBC AUTO: 14 % (ref 12.4–15.4)
EOSINOPHIL # BLD: 0.3 K/UL (ref 0–0.6)
EOSINOPHIL NFR BLD: 5.3 %
GFR SERPLBLD CREATININE-BSD FMLA CKD-EPI: 37 ML/MIN/{1.73_M2}
GLUCOSE SERPL-MCNC: 73 MG/DL (ref 70–99)
HCT VFR BLD AUTO: 28.6 % (ref 36–48)
HGB BLD-MCNC: 9.2 G/DL (ref 12–16)
LYMPHOCYTES # BLD: 1.3 K/UL (ref 1–5.1)
LYMPHOCYTES NFR BLD: 22.3 %
MCH RBC QN AUTO: 27.3 PG (ref 26–34)
MCHC RBC AUTO-ENTMCNC: 32.3 G/DL (ref 31–36)
MCV RBC AUTO: 84.4 FL (ref 80–100)
MONOCYTES # BLD: 0.4 K/UL (ref 0–1.3)
MONOCYTES NFR BLD: 7.6 %
NEUTROPHILS # BLD: 3.7 K/UL (ref 1.7–7.7)
NEUTROPHILS NFR BLD: 64.1 %
PLATELET # BLD AUTO: 256 K/UL (ref 135–450)
PMV BLD AUTO: 7 FL (ref 5–10.5)
POTASSIUM SERPL-SCNC: 4.3 MMOL/L (ref 3.5–5.1)
RBC # BLD AUTO: 3.39 M/UL (ref 4–5.2)
SODIUM SERPL-SCNC: 141 MMOL/L (ref 136–145)
WBC # BLD AUTO: 5.7 K/UL (ref 4–11)

## 2025-02-20 PROCEDURE — 87506 IADNA-DNA/RNA PROBE TQ 6-11: CPT

## 2025-02-20 PROCEDURE — 1200000000 HC SEMI PRIVATE

## 2025-02-20 PROCEDURE — 2500000003 HC RX 250 WO HCPCS: Performed by: INTERNAL MEDICINE

## 2025-02-20 PROCEDURE — 0DB68ZX EXCISION OF STOMACH, VIA NATURAL OR ARTIFICIAL OPENING ENDOSCOPIC, DIAGNOSTIC: ICD-10-PCS | Performed by: INTERNAL MEDICINE

## 2025-02-20 PROCEDURE — 85025 COMPLETE CBC W/AUTO DIFF WBC: CPT

## 2025-02-20 PROCEDURE — 0DB58ZX EXCISION OF ESOPHAGUS, VIA NATURAL OR ARTIFICIAL OPENING ENDOSCOPIC, DIAGNOSTIC: ICD-10-PCS | Performed by: INTERNAL MEDICINE

## 2025-02-20 PROCEDURE — 99232 SBSQ HOSP IP/OBS MODERATE 35: CPT | Performed by: INTERNAL MEDICINE

## 2025-02-20 PROCEDURE — 7100000010 HC PHASE II RECOVERY - FIRST 15 MIN: Performed by: INTERNAL MEDICINE

## 2025-02-20 PROCEDURE — 87336 ENTAMOEB HIST DISPR AG IA: CPT

## 2025-02-20 PROCEDURE — 80048 BASIC METABOLIC PNL TOTAL CA: CPT

## 2025-02-20 PROCEDURE — 3700000001 HC ADD 15 MINUTES (ANESTHESIA): Performed by: INTERNAL MEDICINE

## 2025-02-20 PROCEDURE — 6370000000 HC RX 637 (ALT 250 FOR IP): Performed by: INTERNAL MEDICINE

## 2025-02-20 PROCEDURE — 6360000002 HC RX W HCPCS

## 2025-02-20 PROCEDURE — 6370000000 HC RX 637 (ALT 250 FOR IP): Performed by: STUDENT IN AN ORGANIZED HEALTH CARE EDUCATION/TRAINING PROGRAM

## 2025-02-20 PROCEDURE — 87328 CRYPTOSPORIDIUM AG IA: CPT

## 2025-02-20 PROCEDURE — 36415 COLL VENOUS BLD VENIPUNCTURE: CPT

## 2025-02-20 PROCEDURE — 3609012400 HC EGD TRANSORAL BIOPSY SINGLE/MULTIPLE: Performed by: INTERNAL MEDICINE

## 2025-02-20 PROCEDURE — 2580000003 HC RX 258

## 2025-02-20 PROCEDURE — 3700000000 HC ANESTHESIA ATTENDED CARE: Performed by: INTERNAL MEDICINE

## 2025-02-20 PROCEDURE — 7100000011 HC PHASE II RECOVERY - ADDTL 15 MIN: Performed by: INTERNAL MEDICINE

## 2025-02-20 PROCEDURE — 83993 ASSAY FOR CALPROTECTIN FECAL: CPT

## 2025-02-20 PROCEDURE — 87324 CLOSTRIDIUM AG IA: CPT

## 2025-02-20 PROCEDURE — 2709999900 HC NON-CHARGEABLE SUPPLY: Performed by: INTERNAL MEDICINE

## 2025-02-20 PROCEDURE — 2580000003 HC RX 258: Performed by: INTERNAL MEDICINE

## 2025-02-20 PROCEDURE — 87449 NOS EACH ORGANISM AG IA: CPT

## 2025-02-20 PROCEDURE — 88305 TISSUE EXAM BY PATHOLOGIST: CPT

## 2025-02-20 PROCEDURE — 88312 SPECIAL STAINS GROUP 1: CPT

## 2025-02-20 RX ORDER — LIDOCAINE HYDROCHLORIDE 20 MG/ML
INJECTION, SOLUTION EPIDURAL; INFILTRATION; INTRACAUDAL; PERINEURAL
Status: DISCONTINUED | OUTPATIENT
Start: 2025-02-20 | End: 2025-02-20 | Stop reason: SDUPTHER

## 2025-02-20 RX ORDER — PROPOFOL 10 MG/ML
INJECTION, EMULSION INTRAVENOUS
Status: DISCONTINUED | OUTPATIENT
Start: 2025-02-20 | End: 2025-02-20 | Stop reason: SDUPTHER

## 2025-02-20 RX ADMIN — PROPOFOL 140 MG: 10 INJECTION, EMULSION INTRAVENOUS at 14:01

## 2025-02-20 RX ADMIN — SODIUM CHLORIDE: 0.9 INJECTION, SOLUTION INTRAVENOUS at 04:14

## 2025-02-20 RX ADMIN — OXYCODONE HYDROCHLORIDE AND ACETAMINOPHEN 1 TABLET: 5; 325 TABLET ORAL at 16:06

## 2025-02-20 RX ADMIN — HYDRALAZINE HYDROCHLORIDE 50 MG: 50 TABLET ORAL at 06:12

## 2025-02-20 RX ADMIN — OXYCODONE HYDROCHLORIDE AND ACETAMINOPHEN 1 TABLET: 5; 325 TABLET ORAL at 09:16

## 2025-02-20 RX ADMIN — SODIUM CHLORIDE: 0.9 INJECTION, SOLUTION INTRAVENOUS at 16:11

## 2025-02-20 RX ADMIN — LIDOCAINE HYDROCHLORIDE 40 MG: 20 INJECTION, SOLUTION EPIDURAL; INFILTRATION; INTRACAUDAL; PERINEURAL at 14:01

## 2025-02-20 RX ADMIN — Medication 10 ML: at 21:19

## 2025-02-20 RX ADMIN — TRAZODONE HYDROCHLORIDE 100 MG: 100 TABLET ORAL at 21:18

## 2025-02-20 RX ADMIN — PANTOPRAZOLE SODIUM 40 MG: 40 INJECTION, POWDER, LYOPHILIZED, FOR SOLUTION INTRAVENOUS at 16:11

## 2025-02-20 RX ADMIN — HYDRALAZINE HYDROCHLORIDE 50 MG: 50 TABLET ORAL at 22:21

## 2025-02-20 RX ADMIN — POLYETHYLENE GLYCOL-3350 AND ELECTROLYTES 2000 ML: 236; 6.74; 5.86; 2.97; 22.74 POWDER, FOR SOLUTION ORAL at 16:39

## 2025-02-20 RX ADMIN — AMLODIPINE BESYLATE 5 MG: 5 TABLET ORAL at 21:18

## 2025-02-20 RX ADMIN — OXYCODONE HYDROCHLORIDE AND ACETAMINOPHEN 1 TABLET: 5; 325 TABLET ORAL at 21:18

## 2025-02-20 RX ADMIN — PANTOPRAZOLE SODIUM 40 MG: 40 INJECTION, POWDER, LYOPHILIZED, FOR SOLUTION INTRAVENOUS at 06:09

## 2025-02-20 ASSESSMENT — PAIN DESCRIPTION - PAIN TYPE
TYPE: CHRONIC PAIN

## 2025-02-20 ASSESSMENT — PAIN - FUNCTIONAL ASSESSMENT
PAIN_FUNCTIONAL_ASSESSMENT: ACTIVITIES ARE NOT PREVENTED
PAIN_FUNCTIONAL_ASSESSMENT: 0-10
PAIN_FUNCTIONAL_ASSESSMENT: ACTIVITIES ARE NOT PREVENTED
PAIN_FUNCTIONAL_ASSESSMENT: ACTIVITIES ARE NOT PREVENTED

## 2025-02-20 ASSESSMENT — PAIN SCALES - GENERAL
PAINLEVEL_OUTOF10: 5
PAINLEVEL_OUTOF10: 9
PAINLEVEL_OUTOF10: 10
PAINLEVEL_OUTOF10: 0
PAINLEVEL_OUTOF10: 7

## 2025-02-20 ASSESSMENT — PAIN DESCRIPTION - ORIENTATION
ORIENTATION: RIGHT;LEFT
ORIENTATION: OTHER (COMMENT)
ORIENTATION: RIGHT;LEFT

## 2025-02-20 ASSESSMENT — PAIN DESCRIPTION - ONSET
ONSET: ON-GOING

## 2025-02-20 ASSESSMENT — PAIN DESCRIPTION - DIRECTION
RADIATING_TOWARDS: DENIES

## 2025-02-20 ASSESSMENT — PAIN DESCRIPTION - LOCATION
LOCATION: BACK;GENERALIZED
LOCATION: GENERALIZED;BACK
LOCATION: GENERALIZED

## 2025-02-20 ASSESSMENT — PAIN DESCRIPTION - FREQUENCY
FREQUENCY: CONTINUOUS
FREQUENCY: CONTINUOUS
FREQUENCY: INTERMITTENT

## 2025-02-20 ASSESSMENT — LIFESTYLE VARIABLES: SMOKING_STATUS: 0

## 2025-02-20 ASSESSMENT — PAIN DESCRIPTION - DESCRIPTORS
DESCRIPTORS: ACHING

## 2025-02-20 ASSESSMENT — ENCOUNTER SYMPTOMS: SHORTNESS OF BREATH: 1

## 2025-02-20 NOTE — PLAN OF CARE
Problem: Chronic Conditions and Co-morbidities  Goal: Patient's chronic conditions and co-morbidity symptoms are monitored and maintained or improved  2/20/2025 1241 by Tena Alfaro RN  Outcome: Progressing  2/20/2025 0703 by Marisol Rollins RN  Outcome: Progressing     Problem: Discharge Planning  Goal: Discharge to home or other facility with appropriate resources  2/20/2025 1241 by Tena Alfaro RN  Outcome: Progressing  2/20/2025 0703 by Marisol Rollins RN  Outcome: Progressing     Problem: ABCDS Injury Assessment  Goal: Absence of physical injury  2/20/2025 1241 by Tena Alfaro RN  Outcome: Progressing  2/20/2025 0703 by Marisol Rollins RN  Outcome: Progressing     Problem: Safety - Adult  Goal: Free from fall injury  2/20/2025 1241 by Tena Alfaro RN  Outcome: Progressing  2/20/2025 0703 by Marisol Rollins RN  Outcome: Progressing     Problem: Pain  Goal: Verbalizes/displays adequate comfort level or baseline comfort level  2/20/2025 1241 by Tena Alfaro RN  Outcome: Progressing  Flowsheets  Taken 2/20/2025 1222  Verbalizes/displays adequate comfort level or baseline comfort level:   Encourage patient to monitor pain and request assistance   Assess pain using appropriate pain scale  Taken 2/20/2025 0916  Verbalizes/displays adequate comfort level or baseline comfort level:   Encourage patient to monitor pain and request assistance   Assess pain using appropriate pain scale  2/20/2025 0703 by Marisol Rollins RN  Outcome: Progressing

## 2025-02-20 NOTE — PROGRESS NOTES
Physician Progress Note      PATIENT:               FELIPE KING  CSN #:                  [id number]  :                       1940  ADMIT DATE:       2025 2:29 PM  DISCH DATE:  RESPONDING  PROVIDER #:        ALIVIA PENG          QUERY TEXT:    Patient admitted with MARIO. Noted to have Severe malnutrition in Nutrition CN    If possible, please document in progress notes and discharge summary if   you are evaluating and /or treating any of the following:    The medical record reflects the following:  Risk Factors: MARIO, History of skin cancer  Clinical Indicators: Nutrition PN   Malnutrition Assessment:  Malnutrition Status:  Severe malnutrition (25 [id number])  Context:  Acute Illness  Findings of the 6 clinical characteristics of malnutrition:  Energy Intake:  50% or less of estimated energy requirements for 5 or more   days  Weight Loss:  Greater than 7.5% over 3 months  Body Fat Loss:  Moderate body fat loss Orbital, Buccal region  Muscle Mass Loss:  Moderate muscle mass loss Temples (temporalis), Clavicles   (pectoralis & deltoids), Scapula (trapezius)  Fluid Accumulation:  No fluid accumulation Extremities   Strength:  Not Performed  Current BMI (kg/m2): 22.1  Treatment: Nutrition consult, Continue Current Diet, Start Oral Nutrition   Supplement    Thank You  Porsche Gray [facility] CDS    ASPEN Criteria:    https://aspenjournals.onlinelibrary.[place].com/doi/full/10.1177/428218568786648  5  Options provided:  -- Protein calorie malnutrition severe  -- Other - I will add my own diagnosis  -- Disagree - Not applicable / Not valid  -- Disagree - Clinically unable to determine / Unknown  -- Refer to Clinical Documentation Reviewer    PROVIDER RESPONSE TEXT:    This patient has severe protein calorie malnutrition.    Query created by: Porsche Moffett on 2025 6:03 AM      Electronically signed by:  ALIVIA PENG 2025 9:10 AM

## 2025-02-20 NOTE — PROGRESS NOTES
4 Eyes Skin Assessment     NAME:  Nithya Neff  YOB: 1940  MEDICAL RECORD NUMBER:  [id number]    The patient is being assessed for  Other Pt returned from ENDO     I agree that at least one RN has performed a thorough Head to Toe Skin Assessment on the patient. ALL assessment sites listed below have been assessed.      Areas assessed by both nurses:    Head, Face, Ears, Shoulders, Back, Chest, Arms, Elbows, Hands, Sacrum. Buttock, Coccyx, Ischium, Legs. Feet and Heels, and Under Medical Devices         Scattered bruising       Does the Patient have a Wound? No noted wound(s)       Mihir Prevention initiated by RN: No  Wound Care Orders initiated by RN: No    Pressure Injury (Stage 3,4, Unstageable, DTI, NWPT, and Complex wounds) if present, place Wound referral order by RN under : No    New Ostomies, if present place, Ostomy referral order under : No     Nurse 1 eSignature: Electronically signed by Tena Alfaro RN on 2/20/25 at 3:32 PM EST    **SHARE this note so that the co-signing nurse can place an eSignature**    Nurse 2 eSignature: Electronically signed by Tena Sykes RN on 2/20/25 at 4:51 PM EST

## 2025-02-20 NOTE — H&P
[facility] ENDOSCOPY  Outpatient Procedure H&P    Patient: Nithya Neff MRN: [id number]     YOB: 1940  Age: 84 y.o.  Sex: female    Unit: [facility] ENDOSCOPY Room/Bed: ENDO/NONE Location: [facility]     Procedure: Procedure(s):  EGD W/ANES.    Indication:Nausea  Referring  Physician:        Brief history: Diarrhea, wt loss    Nurses past medical history notes reviewed and agreed.   Medications reviewed.    Allergies: Latex, Levofloxacin, Quinolones, Adhesive tape, Codeine, Morphine, Azithromycin, Pentazocine lactate, Cephalexin, Pentazocine, Sulfa antibiotics, and Tramadol     Allergies noted: Yes     Past Medical History:   Past Medical History:   Diagnosis Date    MARIO (acute kidney injury) 11/24/2021    Altered mental status 11/30/2021    Arthritis     BCC (basal cell carcinoma of skin)     RT clavicle    Bladder infection     frequently    Blurred vision 9/17/2022    CAD (coronary artery disease)     stents    Cancer ([facility])     skin    CHF (congestive heart failure) ([facility])     Chronic back pain     Closed head injury 12/1/2021    COPD (chronic obstructive pulmonary disease) ([facility])     Depression     Depression     Hypercholesteremia     Hypertension     Hypokalemia 1/16/2012    Pain in shoulder [id number]    pain in left shoulder, taking steroid injections for steroid    Painful total knee replacement, initial encounter 10/18/2018    Reflux     Rheumatic fever     as a child    Sleep apnea     uses CPAP    Syncope and collapse 11/30/2021    TIA (transient ischemic attack)     Urinary incontinence     Urinary tract infection in female     Wears glasses        Past Surgical History:   Past Surgical History:   Procedure Laterality Date    AORTIC VALVE REPLACEMENT      APPENDECTOMY      BLADDER REPAIR      3 TIMES    CARDIAC SURGERY      stents    CARPAL TUNNEL RELEASE      RIGHT    CHOLECYSTECTOMY, LAPAROSCOPIC  01/06/2017    with dr cowart     COLONOSCOPY      CORONARY ANGIOPLASTY WITH STENT  Ur 12/05/2024 Neg  Negative <200 ng/mL Final    Benzodiazepine Screen, Urine 12/05/2024 Neg  Negative <200 ng/mL Final    Cannabinoid Scrn, Ur 12/05/2024 Neg  Negative <50 ng/mL Final    Cocaine Metabolite Screen, Urine 12/05/2024 Neg  Negative <300 ng/mL Final    Opiate Screen, Urine 12/05/2024 Neg  Negative <300 ng/mL Final    Phencyclidine (PCP), Screen, Urine 12/05/2024 Neg  Negative <25 ng/mL Final    Methadone Screen, Urine 12/05/2024 Neg  Negative <300 ng/mL Final    Oxycodone Urine 12/05/2024 POSITIVE (A)  Negative <100 ng/ml Final    FENTANYL SCREEN, URINE 12/05/2024 Neg  Negative <5 ng/mL Final    pH, Urine 12/05/2024 5.5   Final    Drug Screen Comment: 12/05/2024 see below   Final    Ethanol Lvl 12/05/2024 None Detected  mg/dL Final    Color, UA 12/05/2024 Yellow  Straw/Yellow Final    Clarity, UA 12/05/2024 Clear  Clear Final    Glucose, Ur 12/05/2024 Negative  Negative mg/dL Final    Bilirubin, Urine 12/05/2024 SMALL (A)  Negative Final    Ketones, Urine 12/05/2024 15 (A)  Negative mg/dL Final    Specific Gravity, UA 12/05/2024 >=1.030  1.005 - 1.030 Final    Blood, Urine 12/05/2024 Negative  Negative Final    pH, Urine 12/05/2024 5.5  5.0 - 8.0 Final    Protein, UA 12/05/2024 Negative  Negative mg/dL Final    Urobilinogen, Urine 12/05/2024 0.2  <2.0 E.U./dL Final    Nitrite, Urine 12/05/2024 Negative  Negative Final    Leukocyte Esterase, Urine 12/05/2024 SMALL (A)  Negative Final    Microscopic Examination 12/05/2024 YES   Final    Urine Type 12/05/2024 NotGiven   Final    pH, Claus 12/05/2024 7.348 (L)  7.350 - 7.450 Final    pCO2, Claus 12/05/2024 40.4  40.0 - 50.0 mmHg Final    PO2, Claus 12/05/2024 23.0 (L)  25.0 - 40.0 mmHg Final    HCO3, Venous 12/05/2024 21.7 (L)  23.0 - 29.0 mmol/L Final    Base Excess, Claus 12/05/2024 -3.7 (L)  -3.0 - 3.0 mmol/L Final    O2 Sat, Claus 12/05/2024 37  Not Established % Final    Carboxyhemoglobin 12/05/2024 1.4  0.0 - 1.5 % Final    MetHgb, Claus 12/05/2024 0.3  <1.5 %

## 2025-02-20 NOTE — PROGRESS NOTES
HEART FAILURE CARE PLAN:    Comorbidities Reviewed: Yes   Patient has a past medical history of MARIO (acute kidney injury), Altered mental status, Arthritis, BCC (basal cell carcinoma of skin), Bladder infection, Blurred vision, CAD (coronary artery disease), Cancer ([facility]), CHF (congestive heart failure) ([facility]), Chronic back pain, Closed head injury, COPD (chronic obstructive pulmonary disease) ([facility]), Depression, Depression, Hypercholesteremia, Hypertension, Hypokalemia, Pain in shoulder, Painful total knee replacement, initial encounter, Reflux, Rheumatic fever, Sleep apnea, Syncope and collapse, TIA (transient ischemic attack), Urinary incontinence, Urinary tract infection in female, and Wears glasses.     Weights Reviewed: Yes   Admission weight: 57 kg (125 lb 10.6 oz)   Wt Readings from Last 3 Encounters:   02/20/25 56.2 kg (124 lb)   12/05/24 56.7 kg (125 lb 1.6 oz)   05/17/24 63.1 kg (139 lb 3.2 oz)     Intake & Output Reviewed: Yes     Intake/Output Summary (Last 24 hours) at 2/20/2025 0705  Last data filed at 2/20/2025 0445  Gross per 24 hour   Intake 822 ml   Output --   Net 822 ml       ECHOCARDIOGRAM Reviewed: Yes   Patient's Ejection Fraction (EF) is greater than 40%     Medications Reviewed: Yes   SCHEDULED HOSPITAL MEDICATIONS:   pantoprazole (PROTONIX) 40 mg in sodium chloride (PF) 0.9 % 10 mL injection  40 mg IntraVENous Q12H    sodium chloride flush  5-40 mL IntraVENous 2 times per day    [Held by provider] heparin (porcine)  5,000 Units SubCUTAneous 3 times per day    amLODIPine  5 mg Oral Nightly    aspirin  81 mg Oral Daily    cetirizine  5 mg Oral Daily    citalopram  30 mg Oral Daily    [Held by provider] pantoprazole  40 mg Oral Daily    [Held by provider] valsartan  80 mg Oral Daily    hydrALAZINE  50 mg Oral 3 times per day     HOME MEDICATIONS:  Prior to Admission medications    Medication Sig Start Date End Date Taking? Authorizing Provider   oxyCODONE-acetaminophen (PERCOCET) 5-325 MG per

## 2025-02-20 NOTE — PROGRESS NOTES
HEART FAILURE CARE PLAN:    Comorbidities Reviewed: Yes   Patient has a past medical history of MARIO (acute kidney injury), Altered mental status, Arthritis, BCC (basal cell carcinoma of skin), Bladder infection, Blurred vision, CAD (coronary artery disease), Cancer ([facility]), CHF (congestive heart failure) ([facility]), Chronic back pain, Closed head injury, COPD (chronic obstructive pulmonary disease) ([facility]), Depression, Depression, Hypercholesteremia, Hypertension, Hypokalemia, Pain in shoulder, Painful total knee replacement, initial encounter, Reflux, Rheumatic fever, Sleep apnea, Syncope and collapse, TIA (transient ischemic attack), Urinary incontinence, Urinary tract infection in female, and Wears glasses.     Weights Reviewed: Yes   Admission weight: 57 kg (125 lb 10.6 oz)   Wt Readings from Last 3 Encounters:   02/20/25 56.2 kg (124 lb)   12/05/24 56.7 kg (125 lb 1.6 oz)   05/17/24 63.1 kg (139 lb 3.2 oz)     Intake & Output Reviewed: Yes     Intake/Output Summary (Last 24 hours) at 2/20/2025 1243  Last data filed at 2/20/2025 1222  Gross per 24 hour   Intake 722 ml   Output --   Net 722 ml       ECHOCARDIOGRAM Reviewed: Yes   Patient's Ejection Fraction (EF) is greater than 40%     Medications Reviewed: Yes   SCHEDULED HOSPITAL MEDICATIONS:   pantoprazole (PROTONIX) 40 mg in sodium chloride (PF) 0.9 % 10 mL injection  40 mg IntraVENous Q12H    sodium chloride flush  5-40 mL IntraVENous 2 times per day    [Held by provider] heparin (porcine)  5,000 Units SubCUTAneous 3 times per day    amLODIPine  5 mg Oral Nightly    aspirin  81 mg Oral Daily    cetirizine  5 mg Oral Daily    citalopram  30 mg Oral Daily    [Held by provider] pantoprazole  40 mg Oral Daily    valsartan  80 mg Oral Daily    hydrALAZINE  50 mg Oral 3 times per day     HOME MEDICATIONS:  Prior to Admission medications    Medication Sig Start Date End Date Taking? Authorizing Provider   oxyCODONE-acetaminophen (PERCOCET) 5-325 MG per tablet Take 1 tablet  discharge.     Electronically signed by Tena Alfaro RN on 2/20/2025 at 12:43 PM

## 2025-02-20 NOTE — ANESTHESIA PRE PROCEDURE
Department of Anesthesiology  Preprocedure Note       Name:  Nithya Neff   Age:  84 y.o.  :  1940                                          MRN:  [id number]         Date:  2025      Surgeon: Surgeon(s):  Wing Del Real DO    Procedure: Procedure(s):  EGD W/ANES.    Medications prior to admission:   Prior to Admission medications    Medication Sig Start Date End Date Taking? Authorizing Provider   oxyCODONE-acetaminophen (PERCOCET) 5-325 MG per tablet Take 1 tablet by mouth every 4 hours as needed for Pain.   Yes Montana Sanon MD   amLODIPine (NORVASC) 10 MG tablet Take 0.5 tablets by mouth at bedtime 24  Yes Luigi Landry MD   valsartan (DIOVAN) 80 MG tablet Take 1 tablet by mouth daily 24  Yes Sridevi Archuleta APRN - CNP   traZODone (DESYREL) 100 MG tablet Take 1 tablet by mouth nightly as needed for Sleep 24  Yes Montana Sanon MD   hydrALAZINE (APRESOLINE) 50 MG tablet Take 1 tablet by mouth every 8 hours Hold if SBP < 120 or DBP < 60 mmHg 23  Yes Young Garcia MD   cetirizine (ZYRTEC) 10 MG tablet TAKE (1) TABLET DAILY 23  Yes Maria G Romero APRN - CNP   acetaminophen (TYLENOL) 325 MG tablet Take 2 tablets by mouth every 6 hours as needed for Pain   Yes Montana Sanon MD   aspirin 81 MG EC tablet Take 1 tablet by mouth daily 3/4/20  Yes Montana Sanon MD   simvastatin (ZOCOR) 40 MG tablet Take 1 tablet by mouth nightly  Patient not taking: Reported on 2025   Luigi Landry MD   citalopram (CELEXA) 20 MG tablet Take 1.5 tablets by mouth daily    Montana Sanon MD   hydrOXYzine HCl (ATARAX) 25 MG tablet TAKE ONE (1) TABLET BY MOUTH EVERY 8 HOURS AS NEEDED FOR ITCHING  Patient not taking: Reported on 23   Maria G Romero APRN - CNP   docusate sodium (COLACE) 100 MG capsule TAKE (1) CAPSULE TWICE DAILY  Patient not taking: Reported on 23   Maria G Romero, KALI - CNP

## 2025-02-20 NOTE — PROGRESS NOTES
Progress Note    Admit Date:  2/18/2025    MARIO on CKD     Diarrhea   Unintentional weight loss     GI consulted, EGD today    Subjective:  Ms. Neff today seen resting in bed. Still generally not feeling well   Is feeling nauseous, has been dry heaving and having epigastric pain     She is also complaining of 10/10 frontal headache. Does get migraines and is on ubrevly  Denies vision changes, unilateral weakness.     Objective:   Patient Vitals for the past 4 hrs:   BP Temp Temp src Pulse Resp SpO2   02/20/25 0916 (!) 147/55 98.2 °F (36.8 °C) Oral 73 18 96 %          Intake/Output Summary (Last 24 hours) at 2/20/2025 1035  Last data filed at 2/20/2025 0445  Gross per 24 hour   Intake 722 ml   Output --   Net 722 ml       Physical Exam:    Gen: No distress. Alert. +Elderly female   Eyes: PERRL. No sclera icterus. No conjunctival injection.   Neck: No JVD.  Trachea midline.  Resp: No accessory muscle use. No crackles. No wheezes. No rhonchi.   CV: Regular rate. Regular rhythm. No murmur.  No rub. No edema.   Peripheral Pulses: +2 palpable, equal bilaterally   GI:  Non-distended.  Normal bowel sounds.+tenderness epigastric region  Skin: Warm and dry. No nodule on exposed extremities. No rash on exposed extremities.   M/S: No cyanosis. No joint deformity. No clubbing.   Neuro: Awake. Grossly nonfocal CN II-XII grossly intact. Strength 5/5 in bilateral upper and lower extremities, sensation intact and symmetric   Psych: Oriented x 3. No anxiety or agitation.         Medications:  pantoprazole (PROTONIX) 40 mg in sodium chloride (PF) 0.9 % 10 mL injection, 40 mg, Q12H  sodium chloride flush, 5-40 mL, 2 times per day  [Held by provider] heparin (porcine), 5,000 Units, 3 times per day  amLODIPine, 5 mg, Nightly  aspirin, 81 mg, Daily  cetirizine, 5 mg, Daily  citalopram, 30 mg, Daily  [Held by provider] pantoprazole, 40 mg, Daily  [Held by provider] valsartan, 80 mg, Daily  hydrALAZINE, 50 mg, 3 times per day      PRN

## 2025-02-20 NOTE — PLAN OF CARE
Problem: Chronic Conditions and Co-morbidities  Goal: Patient's chronic conditions and co-morbidity symptoms are monitored and maintained or improved  Outcome: Progressing     Problem: Discharge Planning  Goal: Discharge to home or other facility with appropriate resources  Outcome: Progressing     Problem: ABCDS Injury Assessment  Goal: Absence of physical injury  Outcome: Progressing     Problem: Safety - Adult  Goal: Free from fall injury  Outcome: Progressing     Problem: Pain  Goal: Verbalizes/displays adequate comfort level or baseline comfort level  Outcome: Progressing     Problem: Nutrition Deficit:  Goal: Optimize nutritional status  Outcome: Progressing

## 2025-02-20 NOTE — ANESTHESIA POSTPROCEDURE EVALUATION
Department of Anesthesiology  Postprocedure Note    Patient: iNthya Neff  MRN: [id number]  YOB: 1940  Date of evaluation: 2/20/2025    Procedure Summary       Date: 02/20/25 Room / Location: [address]    Anesthesia Start: 1359 Anesthesia Stop: 1420    Procedure: ESOPHAGOGASTRODUODENOSCOPY BIOPSY Diagnosis:       Abdominal pain, unspecified abdominal location      (Abdominal pain, unspecified abdominal location [R10.9])    Surgeons: Wing Del Real DO Responsible Provider: Aidan Hickey MD    Anesthesia Type: TIVA ASA Status: 3            Anesthesia Type: No value filed.    Ml Phase I: Ml Score: 10    Ml Phase II: Ml Score: 10    Anesthesia Post Evaluation    Patient location during evaluation: bedside  Patient participation: complete - patient participated  Level of consciousness: awake and alert  Airway patency: patent  Nausea & Vomiting: no nausea  Cardiovascular status: hemodynamically stable  Respiratory status: acceptable  Hydration status: euvolemic  Pain management: adequate      Vitals:    02/20/25 1419 02/20/25 1428 02/20/25 1439 02/20/25 1452   BP: (!) 150/50 (!) 153/49 (!) 153/52 (!) 152/60   Pulse: 68 66 62 68   Resp: 16 16 16 16   Temp: 98.5 °F (36.9 °C)  97.7 °F (36.5 °C) 97.5 °F (36.4 °C)   TempSrc: Oral  Axillary Axillary   SpO2: 97% 96% 93% 97%   Weight:       Height:          No notable events documented.

## 2025-02-20 NOTE — PROGRESS NOTES
Shift Summary    Admission Diagnosis: Acute kidney injury superimposed on CKD    Shift Events:  Patient rested well overnight   No BMs during this shift   PRN Percocet given for generalized pain   Ambulated to the bathroom SBA with rollator walker     Vitals:  Vitals:    02/19/25 1836 02/19/25 1945 02/19/25 2210 02/20/25 0445   BP:  (!) 158/72  115/81   Pulse:  76  74   Resp:  18 18 18   Temp:  98.1 °F (36.7 °C)  98 °F (36.7 °C)   TempSrc:  Oral  Oral   SpO2:  97%  96%   Weight:    56.2 kg (124 lb)   Height: 1.524 m (5')           WEIGHT: Admit Weight - Scale: 57 kg (125 lb 10.6 oz)      Today  Weight - Scale: 56.2 kg (124 lb)    Tele:  Non-Tele    IV/Line:  Peripheral IV 02/18/25 Left Antecubital (Active)   Site Assessment Clean, dry & intact 02/20/25 0600   Line Status Infusing 02/20/25 0600   Line Care Connections checked and tightened 02/20/25 0600   Phlebitis Assessment No symptoms 02/20/25 0600   Infiltration Assessment 0 02/20/25 0600   Dressing Status Clean, dry & intact 02/20/25 0600   Dressing Type Transparent 02/20/25 0600       Neuro:   oriented to time, place, person and situation    I/O:   No intake/output data recorded.

## 2025-02-20 NOTE — PROGRESS NOTES
Shift assessment complete. VSS. Pt A/Ox4. Pt c/o chronic back pain, c/o epigastric pain. Denies N/V/D. Held scheduled meds. Pt remains NPO for EGD today. Pt denies further needs at this time. Call light within reach. Bed alarm on.

## 2025-02-21 ENCOUNTER — ANESTHESIA EVENT (OUTPATIENT)
Dept: ENDOSCOPY | Age: 85
End: 2025-02-21
Payer: MEDICARE

## 2025-02-21 ENCOUNTER — ANESTHESIA (OUTPATIENT)
Dept: ENDOSCOPY | Age: 85
End: 2025-02-21
Payer: MEDICARE

## 2025-02-21 LAB
ANION GAP SERPL CALCULATED.3IONS-SCNC: 11 MMOL/L (ref 3–16)
BASOPHILS # BLD: 0.1 K/UL (ref 0–0.2)
BASOPHILS NFR BLD: 0.9 %
BUN SERPL-MCNC: 14 MG/DL (ref 7–20)
C DIFF TOX A+B STL QL IA: NORMAL
CALCIUM SERPL-MCNC: 7.7 MG/DL (ref 8.3–10.6)
CHLORIDE SERPL-SCNC: 112 MMOL/L (ref 99–110)
CO2 SERPL-SCNC: 18 MMOL/L (ref 21–32)
CREAT SERPL-MCNC: 1.2 MG/DL (ref 0.6–1.2)
DEPRECATED RDW RBC AUTO: 14.6 % (ref 12.4–15.4)
EOSINOPHIL # BLD: 0.2 K/UL (ref 0–0.6)
EOSINOPHIL NFR BLD: 3.1 %
GFR SERPLBLD CREATININE-BSD FMLA CKD-EPI: 44 ML/MIN/{1.73_M2}
GI PATHOGENS PNL STL NAA+PROBE: NORMAL
GLUCOSE SERPL-MCNC: 73 MG/DL (ref 70–99)
HCT VFR BLD AUTO: 28.3 % (ref 36–48)
HGB BLD-MCNC: 9.3 G/DL (ref 12–16)
LYMPHOCYTES # BLD: 0.9 K/UL (ref 1–5.1)
LYMPHOCYTES NFR BLD: 15.6 %
MCH RBC QN AUTO: 28 PG (ref 26–34)
MCHC RBC AUTO-ENTMCNC: 32.8 G/DL (ref 31–36)
MCV RBC AUTO: 85.4 FL (ref 80–100)
MONOCYTES # BLD: 0.4 K/UL (ref 0–1.3)
MONOCYTES NFR BLD: 7.1 %
NEUTROPHILS # BLD: 4.5 K/UL (ref 1.7–7.7)
NEUTROPHILS NFR BLD: 73.3 %
PLATELET # BLD AUTO: 228 K/UL (ref 135–450)
PMV BLD AUTO: 7 FL (ref 5–10.5)
POTASSIUM SERPL-SCNC: 4 MMOL/L (ref 3.5–5.1)
RBC # BLD AUTO: 3.31 M/UL (ref 4–5.2)
SODIUM SERPL-SCNC: 141 MMOL/L (ref 136–145)
WBC # BLD AUTO: 6.1 K/UL (ref 4–11)

## 2025-02-21 PROCEDURE — 36415 COLL VENOUS BLD VENIPUNCTURE: CPT

## 2025-02-21 PROCEDURE — 88305 TISSUE EXAM BY PATHOLOGIST: CPT

## 2025-02-21 PROCEDURE — 2580000003 HC RX 258: Performed by: INTERNAL MEDICINE

## 2025-02-21 PROCEDURE — 2709999900 HC NON-CHARGEABLE SUPPLY: Performed by: INTERNAL MEDICINE

## 2025-02-21 PROCEDURE — 2580000003 HC RX 258: Performed by: ANESTHESIOLOGY

## 2025-02-21 PROCEDURE — 2500000003 HC RX 250 WO HCPCS: Performed by: INTERNAL MEDICINE

## 2025-02-21 PROCEDURE — 3700000000 HC ANESTHESIA ATTENDED CARE: Performed by: INTERNAL MEDICINE

## 2025-02-21 PROCEDURE — 6370000000 HC RX 637 (ALT 250 FOR IP): Performed by: STUDENT IN AN ORGANIZED HEALTH CARE EDUCATION/TRAINING PROGRAM

## 2025-02-21 PROCEDURE — 80048 BASIC METABOLIC PNL TOTAL CA: CPT

## 2025-02-21 PROCEDURE — 7100000010 HC PHASE II RECOVERY - FIRST 15 MIN: Performed by: INTERNAL MEDICINE

## 2025-02-21 PROCEDURE — 99232 SBSQ HOSP IP/OBS MODERATE 35: CPT | Performed by: INTERNAL MEDICINE

## 2025-02-21 PROCEDURE — 7100000011 HC PHASE II RECOVERY - ADDTL 15 MIN: Performed by: INTERNAL MEDICINE

## 2025-02-21 PROCEDURE — 3609010300 HC COLONOSCOPY W/BIOPSY SINGLE/MULTIPLE: Performed by: INTERNAL MEDICINE

## 2025-02-21 PROCEDURE — 85025 COMPLETE CBC W/AUTO DIFF WBC: CPT

## 2025-02-21 PROCEDURE — 6360000002 HC RX W HCPCS: Performed by: NURSE ANESTHETIST, CERTIFIED REGISTERED

## 2025-02-21 PROCEDURE — 2580000003 HC RX 258

## 2025-02-21 PROCEDURE — 1200000000 HC SEMI PRIVATE

## 2025-02-21 PROCEDURE — 3700000001 HC ADD 15 MINUTES (ANESTHESIA): Performed by: INTERNAL MEDICINE

## 2025-02-21 PROCEDURE — 0DBE8ZX EXCISION OF LARGE INTESTINE, VIA NATURAL OR ARTIFICIAL OPENING ENDOSCOPIC, DIAGNOSTIC: ICD-10-PCS | Performed by: INTERNAL MEDICINE

## 2025-02-21 PROCEDURE — 6370000000 HC RX 637 (ALT 250 FOR IP): Performed by: INTERNAL MEDICINE

## 2025-02-21 PROCEDURE — 6360000002 HC RX W HCPCS

## 2025-02-21 RX ORDER — PROPOFOL 10 MG/ML
INJECTION, EMULSION INTRAVENOUS
Status: DISCONTINUED | OUTPATIENT
Start: 2025-02-21 | End: 2025-02-21 | Stop reason: SDUPTHER

## 2025-02-21 RX ORDER — SODIUM CHLORIDE 0.9 % (FLUSH) 0.9 %
5-40 SYRINGE (ML) INJECTION EVERY 12 HOURS SCHEDULED
Status: DISCONTINUED | OUTPATIENT
Start: 2025-02-21 | End: 2025-02-22 | Stop reason: HOSPADM

## 2025-02-21 RX ORDER — SODIUM CHLORIDE 9 MG/ML
INJECTION, SOLUTION INTRAVENOUS PRN
Status: DISCONTINUED | OUTPATIENT
Start: 2025-02-21 | End: 2025-02-22 | Stop reason: HOSPADM

## 2025-02-21 RX ORDER — ONDANSETRON 2 MG/ML
4 INJECTION INTRAMUSCULAR; INTRAVENOUS
Status: DISCONTINUED | OUTPATIENT
Start: 2025-02-21 | End: 2025-02-21

## 2025-02-21 RX ORDER — SODIUM CHLORIDE 0.9 % (FLUSH) 0.9 %
5-40 SYRINGE (ML) INJECTION PRN
Status: DISCONTINUED | OUTPATIENT
Start: 2025-02-21 | End: 2025-02-22 | Stop reason: HOSPADM

## 2025-02-21 RX ORDER — MEPERIDINE HYDROCHLORIDE 25 MG/ML
12.5 INJECTION INTRAMUSCULAR; INTRAVENOUS; SUBCUTANEOUS EVERY 5 MIN PRN
Status: DISCONTINUED | OUTPATIENT
Start: 2025-02-21 | End: 2025-02-21

## 2025-02-21 RX ORDER — OXYCODONE HYDROCHLORIDE 5 MG/1
10 TABLET ORAL PRN
Status: DISCONTINUED | OUTPATIENT
Start: 2025-02-21 | End: 2025-02-21

## 2025-02-21 RX ORDER — NALOXONE HYDROCHLORIDE 0.4 MG/ML
INJECTION, SOLUTION INTRAMUSCULAR; INTRAVENOUS; SUBCUTANEOUS PRN
Status: DISCONTINUED | OUTPATIENT
Start: 2025-02-21 | End: 2025-02-21

## 2025-02-21 RX ORDER — OXYCODONE HYDROCHLORIDE 5 MG/1
5 TABLET ORAL PRN
Status: DISCONTINUED | OUTPATIENT
Start: 2025-02-21 | End: 2025-02-21

## 2025-02-21 RX ORDER — LIDOCAINE HYDROCHLORIDE 20 MG/ML
INJECTION, SOLUTION INFILTRATION; PERINEURAL
Status: DISCONTINUED | OUTPATIENT
Start: 2025-02-21 | End: 2025-02-21 | Stop reason: SDUPTHER

## 2025-02-21 RX ORDER — SODIUM CHLORIDE, SODIUM LACTATE, POTASSIUM CHLORIDE, CALCIUM CHLORIDE 600; 310; 30; 20 MG/100ML; MG/100ML; MG/100ML; MG/100ML
INJECTION, SOLUTION INTRAVENOUS CONTINUOUS
Status: DISCONTINUED | OUTPATIENT
Start: 2025-02-21 | End: 2025-02-22 | Stop reason: HOSPADM

## 2025-02-21 RX ADMIN — SODIUM CHLORIDE, SODIUM LACTATE, POTASSIUM CHLORIDE, AND CALCIUM CHLORIDE: .6; .31; .03; .02 INJECTION, SOLUTION INTRAVENOUS at 17:37

## 2025-02-21 RX ADMIN — SODIUM CHLORIDE: 0.9 INJECTION, SOLUTION INTRAVENOUS at 04:49

## 2025-02-21 RX ADMIN — PANTOPRAZOLE SODIUM 40 MG: 40 INJECTION, POWDER, LYOPHILIZED, FOR SOLUTION INTRAVENOUS at 17:54

## 2025-02-21 RX ADMIN — TRAZODONE HYDROCHLORIDE 100 MG: 100 TABLET ORAL at 20:21

## 2025-02-21 RX ADMIN — CITALOPRAM HYDROBROMIDE 30 MG: 20 TABLET ORAL at 09:04

## 2025-02-21 RX ADMIN — ASPIRIN 81 MG: 81 TABLET, COATED ORAL at 09:04

## 2025-02-21 RX ADMIN — HYDRALAZINE HYDROCHLORIDE 50 MG: 50 TABLET ORAL at 06:43

## 2025-02-21 RX ADMIN — PANTOPRAZOLE SODIUM 40 MG: 40 INJECTION, POWDER, LYOPHILIZED, FOR SOLUTION INTRAVENOUS at 05:51

## 2025-02-21 RX ADMIN — VALSARTAN 80 MG: 80 TABLET ORAL at 09:04

## 2025-02-21 RX ADMIN — OXYCODONE HYDROCHLORIDE AND ACETAMINOPHEN 1 TABLET: 5; 325 TABLET ORAL at 06:43

## 2025-02-21 RX ADMIN — LIDOCAINE HYDROCHLORIDE 60 MG: 20 INJECTION, SOLUTION INFILTRATION; PERINEURAL at 11:07

## 2025-02-21 RX ADMIN — AMLODIPINE BESYLATE 5 MG: 5 TABLET ORAL at 20:21

## 2025-02-21 RX ADMIN — OXYCODONE HYDROCHLORIDE AND ACETAMINOPHEN 1 TABLET: 5; 325 TABLET ORAL at 18:01

## 2025-02-21 RX ADMIN — CETIRIZINE HYDROCHLORIDE 5 MG: 10 TABLET ORAL at 09:04

## 2025-02-21 RX ADMIN — PROPOFOL 250 MG: 10 INJECTION, EMULSION INTRAVENOUS at 11:07

## 2025-02-21 RX ADMIN — HYDRALAZINE HYDROCHLORIDE 50 MG: 50 TABLET ORAL at 20:21

## 2025-02-21 RX ADMIN — Medication 10 ML: at 20:21

## 2025-02-21 ASSESSMENT — PAIN DESCRIPTION - FREQUENCY
FREQUENCY: INTERMITTENT
FREQUENCY: INTERMITTENT

## 2025-02-21 ASSESSMENT — PAIN DESCRIPTION - LOCATION
LOCATION: HEAD
LOCATION: GENERALIZED
LOCATION: GENERALIZED

## 2025-02-21 ASSESSMENT — PAIN DESCRIPTION - DIRECTION: RADIATING_TOWARDS: DENIES

## 2025-02-21 ASSESSMENT — PAIN SCALES - GENERAL
PAINLEVEL_OUTOF10: 7
PAINLEVEL_OUTOF10: 8
PAINLEVEL_OUTOF10: 9

## 2025-02-21 ASSESSMENT — PAIN DESCRIPTION - ONSET
ONSET: ON-GOING
ONSET: ON-GOING

## 2025-02-21 ASSESSMENT — PAIN DESCRIPTION - PAIN TYPE
TYPE: CHRONIC PAIN
TYPE: CHRONIC PAIN

## 2025-02-21 ASSESSMENT — PAIN DESCRIPTION - DESCRIPTORS
DESCRIPTORS: ACHING
DESCRIPTORS: ACHING

## 2025-02-21 ASSESSMENT — ENCOUNTER SYMPTOMS: SHORTNESS OF BREATH: 1

## 2025-02-21 ASSESSMENT — PAIN - FUNCTIONAL ASSESSMENT
PAIN_FUNCTIONAL_ASSESSMENT: PREVENTS OR INTERFERES SOME ACTIVE ACTIVITIES AND ADLS
PAIN_FUNCTIONAL_ASSESSMENT: ACTIVITIES ARE NOT PREVENTED

## 2025-02-21 ASSESSMENT — PAIN DESCRIPTION - ORIENTATION
ORIENTATION: MID
ORIENTATION: OTHER (COMMENT)

## 2025-02-21 NOTE — ANESTHESIA PRE PROCEDURE
Department of Anesthesiology  Preprocedure Note       Name:  Nithya Neff   Age:  84 y.o.  :  1940                                          MRN:  [id number]         Date:  2025      Surgeon: Surgeon(s):  Wing Del Real DO    Procedure: Procedure(s):  COLON W/ANES.    Medications prior to admission:   Prior to Admission medications    Medication Sig Start Date End Date Taking? Authorizing Provider   oxyCODONE-acetaminophen (PERCOCET) 5-325 MG per tablet Take 1 tablet by mouth every 4 hours as needed for Pain.   Yes Montana Sanon MD   amLODIPine (NORVASC) 10 MG tablet Take 0.5 tablets by mouth at bedtime 24  Yes Luigi Landry MD   valsartan (DIOVAN) 80 MG tablet Take 1 tablet by mouth daily 24  Yes Sridevi Archuleta APRN - CNP   traZODone (DESYREL) 100 MG tablet Take 1 tablet by mouth nightly as needed for Sleep 24  Yes Montana Sanon MD   hydrALAZINE (APRESOLINE) 50 MG tablet Take 1 tablet by mouth every 8 hours Hold if SBP < 120 or DBP < 60 mmHg 23  Yes Young Garcia MD   cetirizine (ZYRTEC) 10 MG tablet TAKE (1) TABLET DAILY 23  Yes Maria G Romero APRN - CNP   acetaminophen (TYLENOL) 325 MG tablet Take 2 tablets by mouth every 6 hours as needed for Pain   Yes Montana Sanon MD   aspirin 81 MG EC tablet Take 1 tablet by mouth daily 3/4/20  Yes Montana Sanon MD   simvastatin (ZOCOR) 40 MG tablet Take 1 tablet by mouth nightly  Patient not taking: Reported on 2025   Luigi Landry MD   citalopram (CELEXA) 20 MG tablet Take 1.5 tablets by mouth daily    Montana Sanon MD   hydrOXYzine HCl (ATARAX) 25 MG tablet TAKE ONE (1) TABLET BY MOUTH EVERY 8 HOURS AS NEEDED FOR ITCHING  Patient not taking: Reported on 23   Maria G Romero APRN - CNP   docusate sodium (COLACE) 100 MG capsule TAKE (1) CAPSULE TWICE DAILY  Patient not taking: Reported on 23   Maria G Romero, KALI - CNP

## 2025-02-21 NOTE — PROGRESS NOTES
Encouraged patient to take Golytely for bowel prep, taking some small sips but stated that \"I can't take it, I can't finish it.

## 2025-02-21 NOTE — H&P
[facility] [place]  Procedure H&P    Patient: Nithya Neff MRN: [id number]     YOB: 1940  Age: 84 y.o.  Sex: female    [place] [facility] Room/Bed: ENDO/NONE Location: [facility]     Procedure: Procedure(s):  COLON W/ANES.    Indication: Diarrhea and weight loss  Referring  Physician:        Brief history: Negative EGD    Nurses past medical history notes reviewed and agreed.   Medications reviewed.    Allergies: Latex, Levofloxacin, Quinolones, Adhesive tape, Codeine, Morphine, Azithromycin, Pentazocine lactate, Cephalexin, Pentazocine, Sulfa antibiotics, and Tramadol     Allergies noted: Yes     Past Medical History:   Past Medical History:   Diagnosis Date    MARIO (acute kidney injury) 11/24/2021    Altered mental status 11/30/2021    Arthritis     BCC (basal cell carcinoma of skin)     RT clavicle    Bladder infection     frequently    Blurred vision 9/17/2022    CAD (coronary artery disease)     stents    Cancer (HCC)     skin    CHF (congestive heart failure) ([facility])     Chronic back pain     Closed head injury 12/1/2021    COPD (chronic obstructive pulmonary disease) ([facility])     Depression     Depression     Hypercholesteremia     Hypertension     Hypokalemia 1/16/2012    Pain in shoulder [id number]    pain in left shoulder, taking steroid injections for steroid    Painful total knee replacement, initial encounter 10/18/2018    Reflux     Rheumatic fever     as a child    Sleep apnea     uses CPAP    Syncope and collapse 11/30/2021    TIA (transient ischemic attack)     Urinary incontinence     Urinary tract infection in female     Wears glasses        Past Surgical History:   Past Surgical History:   Procedure Laterality Date    AORTIC VALVE REPLACEMENT      APPENDECTOMY      BLADDER REPAIR      3 TIMES    CARDIAC SURGERY      stents    CARPAL TUNNEL RELEASE      RIGHT    CHOLECYSTECTOMY, LAPAROSCOPIC  01/06/2017    with dr cowart     COLONOSCOPY      CORONARY ANGIOPLASTY WITH STENT  MCV 12/05/2024 85.5  80.0 - 100.0 fL Final    MCH 12/05/2024 27.5  26.0 - 34.0 pg Final    MCHC 12/05/2024 32.2  31.0 - 36.0 g/dL Final    RDW 12/05/2024 15.1  12.4 - 15.4 % Final    Platelets 12/05/2024 214  135 - 450 K/uL Final    MPV 12/05/2024 7.8  5.0 - 10.5 fL Final    Neutrophils % 12/05/2024 72.9  % Final    Lymphocytes % 12/05/2024 17.2  % Final    Monocytes % 12/05/2024 7.0  % Final    Eosinophils % 12/05/2024 2.0  % Final    Basophils % 12/05/2024 0.9  % Final    Neutrophils Absolute 12/05/2024 6.4  1.7 - 7.7 K/uL Final    Lymphocytes Absolute 12/05/2024 1.5  1.0 - 5.1 K/uL Final    Monocytes Absolute 12/05/2024 0.6  0.0 - 1.3 K/uL Final    Eosinophils Absolute 12/05/2024 0.2  0.0 - 0.6 K/uL Final    Basophils Absolute 12/05/2024 0.1  0.0 - 0.2 K/uL Final    Sodium 12/05/2024 146 (H)  136 - 145 mmol/L Final    Potassium reflex Magnesium 12/05/2024 4.0  3.5 - 5.1 mmol/L Final    Chloride 12/05/2024 106  99 - 110 mmol/L Final    CO2 12/05/2024 23  21 - 32 mmol/L Final    Anion Gap 12/05/2024 17 (H)  3 - 16 Final    Glucose 12/05/2024 111 (H)  70 - 99 mg/dL Final    BUN 12/05/2024 23 (H)  7 - 20 mg/dL Final    Creatinine 12/05/2024 1.7 (H)  0.6 - 1.2 mg/dL Final    Est, Glom Filt Rate 12/05/2024 29 (A)  >60 Final    Calcium 12/05/2024 9.5  8.3 - 10.6 mg/dL Final    Total Protein 12/05/2024 7.7  6.4 - 8.2 g/dL Final    Albumin 12/05/2024 4.5  3.4 - 5.0 g/dL Final    Albumin/Globulin Ratio 12/05/2024 1.4  1.1 - 2.2 Final    Total Bilirubin 12/05/2024 0.4  0.0 - 1.0 mg/dL Final    Alkaline Phosphatase 12/05/2024 144 (H)  40 - 129 U/L Final    ALT 12/05/2024 9 (L)  10 - 40 U/L Final    AST 12/05/2024 13 (L)  15 - 37 U/L Final    TSH 12/05/2024 2.40  0.27 - 4.20 uIU/mL Final    Amphetamine Screen, Ur 12/05/2024 Neg  Negative <1000ng/mL Final    Barbiturate Screen, Ur 12/05/2024 Neg  Negative <200 ng/mL Final    Benzodiazepine Screen, Urine 12/05/2024 Neg  Negative <200 ng/mL Final    Cannabinoid Scrn, Ur

## 2025-02-21 NOTE — PROGRESS NOTES
Attempted to call the number that was given under Dr. Del Real ([facility]) name but it just ring 3x then stopped. Charge MARINO Dupont informed.

## 2025-02-21 NOTE — PROGRESS NOTES
Progress Note    Admit Date:  2/18/2025    MARIO on CKD     Diarrhea   Unintentional weight loss     GI consulted, EGD done. It showed esophagitis. Colonoscopy planned for today.     Subjective:  Ms. Neff today seen resting in bed.   HA better, Does get migraines and is on ubrevly  Denies vision changes, unilateral weakness.     Objective:   Patient Vitals for the past 4 hrs:   BP Temp Temp src Pulse Resp SpO2   02/21/25 0845 (!) 159/67 98 °F (36.7 °C) Oral 80 18 97 %   02/21/25 0641 (!) 170/66 -- -- 74 -- 93 %   02/21/25 0558 (!) 167/65 -- -- -- -- --          Intake/Output Summary (Last 24 hours) at 2/21/2025 0947  Last data filed at 2/21/2025 0700  Gross per 24 hour   Intake 3301.5 ml   Output --   Net 3301.5 ml       Physical Exam:    Gen: No distress. Alert. +Elderly female   Eyes: PERRL. No sclera icterus. No conjunctival injection.   Neck: No JVD.  Trachea midline.  Resp: No accessory muscle use. No crackles. No wheezes. No rhonchi.   CV: Regular rate. Regular rhythm. No murmur.  No rub. No edema.   Peripheral Pulses: +2 palpable, equal bilaterally   GI:  Non-distended.  Normal bowel sounds. Mild tenderness epigastric region  Skin: Warm and dry. No nodule on exposed extremities. No rash on exposed extremities.   M/S: No cyanosis. No joint deformity. No clubbing.   Neuro: Awake. Grossly nonfocal CN II-XII grossly intact. Strength 5/5 in bilateral upper and lower extremities, sensation intact and symmetric   Psych: Oriented x 3. No anxiety or agitation.         Medications:  pantoprazole (PROTONIX) 40 mg in sodium chloride (PF) 0.9 % 10 mL injection, 40 mg, Q12H  sodium chloride flush, 5-40 mL, 2 times per day  [Held by provider] heparin (porcine), 5,000 Units, 3 times per day  amLODIPine, 5 mg, Nightly  aspirin, 81 mg, Daily  cetirizine, 5 mg, Daily  citalopram, 30 mg, Daily  [Held by provider] pantoprazole, 40 mg, Daily  valsartan, 80 mg, Daily  hydrALAZINE, 50 mg, 3 times per day      PRN

## 2025-02-21 NOTE — FLOWSHEET NOTE
02/21/25 0845   Vital Signs   Temp 98 °F (36.7 °C)   Temp Source Oral   Pulse 80   Heart Rate Source Monitor   Respirations 18   BP (!) 159/67   MAP (Calculated) 98   Pain Assessment   Pain Assessment None - Denies Pain   Opioid-Induced Sedation   POSS Score 1   Oxygen Therapy   SpO2 97 %   O2 Device None (Room air)     Patient sitting up in bed talking with daughter on phone. Denies complains of. States was unable to drink the prep and her stool is still brown. Writer ben with ENDO this AM and they are aware. VSS. Call bell and bedside table within reach bed alarm on. Will continue to monitor.

## 2025-02-21 NOTE — FLOWSHEET NOTE
02/20/25 2105   Vital Signs   Temp 99 °F (37.2 °C)   Temp Source Oral   Pulse 70   Heart Rate Source Monitor   Respirations 19   BP (!) 160/71   MAP (Calculated) 101   BP Location Right upper arm   BP Method Automatic   Patient Position Semi fowlers   Pain Assessment   Pain Assessment 0-10   Pain Level 9   Pain Location Generalized   Pain Orientation Other (Comment)  (generalized)   Pain Descriptors Aching   Functional Pain Assessment Activities are not prevented   Pain Type Chronic pain   Pain Radiating Towards denies   Pain Frequency Intermittent   Pain Onset On-going   Non-Pharmaceutical Pain Intervention(s) Declines   Care Plan - Pain Goals   Verbalizes/displays adequate comfort level or baseline comfort level Encourage patient to monitor pain and request assistance   Opioid-Induced Sedation   POSS Score S   RASS   Jeffries Agitation Sedation Scale (RASS) 0   Oxygen Therapy   SpO2 94 %   O2 Device None (Room air)     PM assessment completed. Alert and oriented x4. With complaints of generalized pain, percocet prn given, refer to MAR. No signs or symptoms of distress noted. Patient tolerated PM medications well. Respirations easy and even. Bed in lowest position, bed alarm in place and functioning properly, bed rails x2 up,  Call light within reach. Still for stool collection for cdiff rule out.    Patient is for bowel prep for colonoscopy tomorrow. Encouraged to take and finished the golytely as much as she can but patient stated that \"I can't do it, I can't finish it\".     Bedside Mobility Assessment Tool (BMAT):     Assessment Level 1- Sit and Shake    1. From a semi-reclined position, ask patient to sit up and rotate to a seated position at the side of the bed. Can use the bedrail.    2. Ask patient to reach out and grab your hand and shake making sure patient reaches across his/her midline.   Pass- Patient is able to come to a seated position, maintain core strength. Maintains seated balance while

## 2025-02-21 NOTE — PLAN OF CARE
Problem: Chronic Conditions and Co-morbidities  Goal: Patient's chronic conditions and co-morbidity symptoms are monitored and maintained or improved  2/21/2025 0106 by Ramo Pride RN  Outcome: Progressing  2/20/2025 1241 by Tena Alfaro RN  Outcome: Progressing     Problem: Discharge Planning  Goal: Discharge to home or other facility with appropriate resources  2/21/2025 0106 by Ramo Pride RN  Outcome: Progressing  2/20/2025 1241 by Tena Alfaro RN  Outcome: Progressing     Problem: ABCDS Injury Assessment  Goal: Absence of physical injury  2/21/2025 0106 by Ramo Pride RN  Outcome: Progressing  2/20/2025 1241 by Tena Alfaro RN  Outcome: Progressing     Problem: Safety - Adult  Goal: Free from fall injury  2/21/2025 0106 by Ramo Pride RN  Outcome: Progressing  2/20/2025 1241 by Tena Alfaro RN  Outcome: Progressing     Problem: Pain  Goal: Verbalizes/displays adequate comfort level or baseline comfort level  2/21/2025 0106 by Ramo Pride RN  Outcome: Progressing  Flowsheets  Taken 2/20/2025 2105 by Ramo Pride RN  Verbalizes/displays adequate comfort level or baseline comfort level: Encourage patient to monitor pain and request assistance  Taken 2/20/2025 1606 by Tena Alfaor RN  Verbalizes/displays adequate comfort level or baseline comfort level:   Encourage patient to monitor pain and request assistance   Assess pain using appropriate pain scale  Taken 2/20/2025 1536 by Tena Alfaro RN  Verbalizes/displays adequate comfort level or baseline comfort level:   Encourage patient to monitor pain and request assistance   Assess pain using appropriate pain scale  2/20/2025 1241 by Tena Alfaro RN  Outcome: Progressing  Flowsheets  Taken 2/20/2025 1222  Verbalizes/displays adequate comfort level or baseline comfort level:   Encourage patient to monitor pain and request assistance   Assess pain using appropriate pain scale  Taken 2/20/2025

## 2025-02-21 NOTE — PROGRESS NOTES
Pt is NPO since midnight,pt's BP is 167/65 with hydralazine p.o. and is requesting for PRN pain pill. Perfectserve Dr. Cota if this patient can take these pill with sips of water. Also informed MD that pt refused to finish golytely for bowel prep for colonoscopy.    02/21/2025 0626H  Dr. Cota responded that patient can take p.o. meds with sips of water.

## 2025-02-21 NOTE — FLOWSHEET NOTE
02/21/25 0725   Handoff   Communication Given Shift Handoff   Handoff Given To Fide PICHARDO   Handoff Received From Rupal   Handoff Communication Face to Face;At bedside   Time Handoff Given 0725   End of Shift Check Performed Yes     Pt in bed with eyes closed.  No signs of distress noted.  Call light within reach.

## 2025-02-21 NOTE — PROGRESS NOTES
Comprehensive Nutrition Assessment    Type and Reason for Visit:  Reassess    Nutrition Recommendations/Plan:   Continue NPO   Add Supplement when diet advanced post procedure      Malnutrition Assessment:  Malnutrition Status:  Severe malnutrition (02/19/25 9513)    Context:  Acute Illness     Findings of the 6 clinical characteristics of malnutrition:  Energy Intake:  50% or less of estimated energy requirements for 5 or more days  Weight Loss:  Greater than 7.5% over 3 months     Body Fat Loss:  Moderate body fat loss Orbital, Buccal region   Muscle Mass Loss:  Moderate muscle mass loss Temples (temporalis), Clavicles (pectoralis & deltoids), Scapula (trapezius)  Fluid Accumulation:  No fluid accumulation Extremities   Strength:  Not Performed    Nutrition Assessment:    Pt is decling from a nutritional standpoint AEB currently NPO for GI procedures.  Remains at risk for further nutritional compromise r/t N/V; severe PCM at admission and altered nutrition related labs .  Will continue NPO; recommend supplement when PO diet ordered,.      Nutrition Related Findings:    pt is in surgery for colonoscopy and EGD;  NPO ; wt gain noted most likely d/t administration for IVFS for > 48 hrs; low h/h; Wound Type: None       Current Nutrition Intake & Therapies:    Average Meal Intake: NPO  Average Supplements Intake: NPO  Diet NPO    Anthropometric Measures:  Height: 152.4 cm (5')  Ideal Body Weight (IBW): 100 lbs (45 kg)    Admission Body Weight: 51.3 kg (113 lb)  Current Body Weight: 56.2 kg (124 lb), 113 % IBW. Weight Source: Bed scale  Current BMI (kg/m2): 24.2  Usual Body Weight: 56.7 kg (125 lb) (Dec 2024)     % Weight Change (Calculated): -9.6                    BMI Categories: Normal Weight (BMI 22.0 to 24.9) age over 65    Estimated Daily Nutrient Needs:  Energy Requirements Based On: Kcal/kg  Weight Used for Energy Requirements: Current  Energy (kcal/day): 1300 based ~ 25 kcal/kg cbw  Weight Used for Protein

## 2025-02-21 NOTE — ANESTHESIA POSTPROCEDURE EVALUATION
Department of Anesthesiology  Postprocedure Note    Patient: Nithya Neff  MRN: [id number]  YOB: 1940  Date of evaluation: 2/21/2025    Procedure Summary       Date: 02/21/25 Room / Location: [address]    Anesthesia Start: 1104 Anesthesia Stop: 1129    Procedure: COLONOSCOPY BIOPSY Diagnosis:       Gastrointestinal hemorrhage, unspecified gastrointestinal hemorrhage type      (Gastrointestinal hemorrhage, unspecified gastrointestinal hemorrhage type [K92.2])    Surgeons: Wing Del Real DO Responsible Provider: Ace Hadley MD    Anesthesia Type: general ASA Status: 3            Anesthesia Type: No value filed.    Ml Phase I: Ml Score: 10    Ml Phase II: Ml Score: 10    Anesthesia Post Evaluation    Patient location during evaluation: PACU  Patient participation: complete - patient participated  Level of consciousness: awake and alert  Airway patency: patent  Nausea & Vomiting: no nausea and no vomiting  Cardiovascular status: blood pressure returned to baseline  Respiratory status: acceptable  Hydration status: euvolemic  Comments: VSS on transfer to phase 2 recovery.  No anesthetic complications.  Pain management: adequate    No notable events documented.

## 2025-02-21 NOTE — PROGRESS NOTES
Called [facility] , spoke to 3rd party to relay the message that patient did not finished the bowel prep and totally refused to take the golytely, to inform Wing Dumont.

## 2025-02-21 NOTE — PROGRESS NOTES
Pt struggling to drink bowel prep, pt has completed less than 25%. Encouraged patient to continue drinking prep.    Bed side report given to MARINO Goldsmith. Patient denies needs at this time. Call light within reach. Bed alarm on.

## 2025-02-22 VITALS
SYSTOLIC BLOOD PRESSURE: 150 MMHG | OXYGEN SATURATION: 96 % | DIASTOLIC BLOOD PRESSURE: 66 MMHG | BODY MASS INDEX: 25.01 KG/M2 | HEIGHT: 60 IN | WEIGHT: 127.4 LBS | RESPIRATION RATE: 16 BRPM | TEMPERATURE: 98.2 F | HEART RATE: 89 BPM

## 2025-02-22 LAB
CRYPTOSP AG STL QL IA: NORMAL
E HISTOLYT AG STL QL IA: NORMAL
G LAMBLIA AG STL QL IA: NORMAL

## 2025-02-22 PROCEDURE — 6370000000 HC RX 637 (ALT 250 FOR IP): Performed by: INTERNAL MEDICINE

## 2025-02-22 PROCEDURE — 6370000000 HC RX 637 (ALT 250 FOR IP): Performed by: STUDENT IN AN ORGANIZED HEALTH CARE EDUCATION/TRAINING PROGRAM

## 2025-02-22 PROCEDURE — 99238 HOSP IP/OBS DSCHRG MGMT 30/<: CPT | Performed by: INTERNAL MEDICINE

## 2025-02-22 RX ORDER — PANTOPRAZOLE SODIUM 40 MG/1
40 TABLET, DELAYED RELEASE ORAL DAILY
Qty: 30 TABLET | Refills: 0 | Status: SHIPPED | OUTPATIENT
Start: 2025-02-22

## 2025-02-22 RX ADMIN — ASPIRIN 81 MG: 81 TABLET, COATED ORAL at 08:45

## 2025-02-22 RX ADMIN — VALSARTAN 80 MG: 80 TABLET ORAL at 08:46

## 2025-02-22 RX ADMIN — CETIRIZINE HYDROCHLORIDE 5 MG: 10 TABLET ORAL at 08:45

## 2025-02-22 RX ADMIN — HYDRALAZINE HYDROCHLORIDE 50 MG: 50 TABLET ORAL at 07:17

## 2025-02-22 RX ADMIN — OXYCODONE HYDROCHLORIDE AND ACETAMINOPHEN 1 TABLET: 5; 325 TABLET ORAL at 08:50

## 2025-02-22 RX ADMIN — CITALOPRAM HYDROBROMIDE 30 MG: 20 TABLET ORAL at 08:46

## 2025-02-22 ASSESSMENT — PAIN SCALES - GENERAL: PAINLEVEL_OUTOF10: 7

## 2025-02-22 ASSESSMENT — PAIN DESCRIPTION - DESCRIPTORS: DESCRIPTORS: ACHING

## 2025-02-22 ASSESSMENT — PAIN DESCRIPTION - ORIENTATION: ORIENTATION: RIGHT;LEFT

## 2025-02-22 ASSESSMENT — PAIN DESCRIPTION - LOCATION: LOCATION: NECK;BACK;KNEE

## 2025-02-22 NOTE — PROGRESS NOTES
Refused new IV insertion and all labs. States she is going home, and not being poked again. Patient to speak with MD this AM

## 2025-02-22 NOTE — PLAN OF CARE
Problem: Chronic Conditions and Co-morbidities  Goal: Patient's chronic conditions and co-morbidity symptoms are monitored and maintained or improved  Outcome: Adequate for Discharge     Problem: Discharge Planning  Goal: Discharge to home or other facility with appropriate resources  Outcome: Adequate for Discharge     Problem: ABCDS Injury Assessment  Goal: Absence of physical injury  Outcome: Adequate for Discharge     Problem: Safety - Adult  Goal: Free from fall injury  Outcome: Adequate for Discharge     Problem: Pain  Goal: Verbalizes/displays adequate comfort level or baseline comfort level  Outcome: Adequate for Discharge     Problem: Nutrition Deficit:  Goal: Optimize nutritional status  Outcome: Adequate for Discharge

## 2025-02-22 NOTE — DISCHARGE INSTR - COC
Continuity of Care Form    Patient Name: Nithya Neff   :  1940  MRN:  [id number]    Admit date:  2025  Discharge date:  ***    Code Status Order: Full Code   Advance Directives:   Advance Care Flowsheet Documentation        Date/Time Healthcare Directive Type of Healthcare Directive Copy in Chart Healthcare Agent Appointed Healthcare Agent's Name Healthcare Agent's Phone Number    25 1019 Yes, patient has an advance directive for healthcare treatment  Living will  No, copy requested from family  --  --  --     25 1342 Yes, patient has an advance directive for healthcare treatment  Durable power of  for health care;Living will  No, copy requested from other (See comment)  Adult Children  Son  Robert  or daughter  Sonya  --                     Admitting Physician:  Hamlet Cota MD  PCP: No, Pcp    Discharging Nurse: ***  Discharging Hospital Unit/Room#: 0228/0228-01  Discharging Unit Phone Number: ***    Emergency Contact:   Extended Emergency Contact Information  Primary Emergency Contact: Sonya Neri  Address: [address] [place] [address]  Home Phone: [phone]  Mobile Phone: [phone]  Relation: Child   needed? No  Secondary Emergency Contact: Robert Neff   [facility]  Home Phone: [phone]  Mobile Phone: [phone]  Relation: Child   needed? No    Past Surgical History:  Past Surgical History:   Procedure Laterality Date    AORTIC VALVE REPLACEMENT      APPENDECTOMY      BLADDER REPAIR      3 TIMES    CARDIAC SURGERY      stents    CARPAL TUNNEL RELEASE      RIGHT    CHOLECYSTECTOMY, LAPAROSCOPIC  2017    with dr cowart     COLONOSCOPY      COLONOSCOPY N/A 2025    COLONOSCOPY BIOPSY performed by Wing Del Real DO at [facility]    CORONARY ANGIOPLASTY WITH STENT PLACEMENT      has it it done twice    DIAGNOSTIC CARDIAC CATH LAB PROCEDURE

## 2025-02-22 NOTE — CARE COORDINATION
DISCHARGE ORDER  Date/Time 2025 9:26 AM  Completed by: Naomie Cruz RN, Case Management    Patient Name: Nithya Neff      : 1940  Admitting Diagnosis: Nausea vomiting and diarrhea [R11.2, R19.7]  Acute kidney injury superimposed on CKD [N17.9, N18.9]      Admit order Date and Status:inpt  (verify MD's last order for status of admission)      Noted discharge order.   If applicable PT/OT recommendation at Discharge: home with initial 24 hr supervision  DME recommendation by PT/OT:needs met  Confirmed discharge plan  (pt and dtr)    Discharge Plan: Met with the pt at bedside. plan continues home with family to provide 24 hr supervision initially and writer spoke with Blank from [facility] and she will resume services. No other needs identified.    Date of Last IMM Given: 2025    Reviewed chart.  Role of discharge planner explained and patient verbalized understanding. Discharge order is noted.    Has Home O2 in place on admit:  No  Informed of need to bring portable home O2 tank on day of discharge for nursing to connect prior to leaving:   Not Indicated  Verbalized agreement/Understanding:   Not Indicated  Pt is being d/c'd to home today. Pt's O2 sats are 96% on RA.    Discharge timeout done with MARINO Singh. All discharge needs and concerns addressed.

## 2025-02-22 NOTE — DISCHARGE SUMMARY
Name:  Nithya Neff  Room:  0228/0228-01  MRN:    [id number]    Discharge Summary      This discharge summary is in conjunction with a complete physical exam done on the day of discharge.      Discharging Physician: VARUN ANGEL MD      Admit: 2/18/2025  Discharge:  2/22/2025     Diagnoses this Admission    Principal Problem:    Acute kidney injury superimposed on CKD  Active Problems:    Nausea vomiting and diarrhea    Weight loss    Migraine without status migrainosus, not intractable    Severe protein-calorie malnutrition  Resolved Problems:    * No resolved hospital problems. *      Procedures (Please Review Full Report for Details)    EGD    Colonoscopy    Consults    IP CONSULT TO VASCULAR ACCESS TEAM  IP CONSULT TO VASCULAR ACCESS TEAM  IP CONSULT TO DIETITIAN  IP CONSULT TO GI      HPI:    84 y.o. female who presented to [facility] with 2 to 3 weeks of diarrhea and worsening weakness and confusion.  PMHx significant for CKD 4, CHF, hypertension, CAD S/p PCI, h/o aortic stenosis s/p TAVR, COPD, anemia, dementia, chronic pain, MARIA C .       Patient has she is lost about 50 pounds since 6 months ago.  She started developing poor appetite and has been having poor p.o. intake since 6 months ago.  Says she just has general abdominal diffuse pain and whenever she eats, has discomfort so her appetite has been poor.  More recently, she developed watery and loose stools about 2 to 3 weeks ago.  Says intermittently, it is watery but sometimes it is low bit more formed.  Denies any other changes in diet.  Denies any sick contacts.  She does have suprapubic tenderness.  Does intermittently have some nausea but really no vomiting.  Says she has multiple bowel movements a day for the past 2 to 3 weeks.  No other acute complaints.     In the ED, blood pressure elevated but otherwise vital signs stable.  BMP largely unremarkable except for BUN of 30 and creatinine of 2.4.  CBC largely unremarkable for  hemoglobin 11.6.  Influenza COVID RSV negative.  Chest x-ray no acute process.  CT abdomen pelvis was also unremarkable.    Physical Exam at Discharge:  BP (!) 147/50   Pulse 94   Temp 98.1 °F (36.7 °C) (Oral)   Resp 17   Ht 1.524 m (5')   Wt 57.8 kg (127 lb 6.4 oz)   LMP  (LMP Unknown)   SpO2 96%   BMI 24.88 kg/m²     Gen: No distress. Alert. +Elderly female   Eyes: PERRL. No sclera icterus. No conjunctival injection.   Neck: No JVD.  Trachea midline.  Resp: No accessory muscle use. No crackles. No wheezes. No rhonchi.   CV: Regular rate. Regular rhythm. No murmur.  No rub. No edema.   Peripheral Pulses: +2 palpable, equal bilaterally   GI:  Non-distended.  Normal bowel sounds. Mild tenderness epigastric region  Skin: Warm and dry. No nodule on exposed extremities. No rash on exposed extremities.   M/S: No cyanosis. No joint deformity. No clubbing.   Neuro: Awake. Grossly nonfocal CN II-XII grossly intact. Strength 5/5 in bilateral upper and lower extremities, sensation intact and symmetric   Psych: Oriented x 3. No anxiety or agitation.     Hospital Course    #Generalized weakness   #Weight loss   #Poor PO intake   #Abdominal pain   #Diarrhea   -reportedly 50 lb weight loss over 6 months   -CT abdomen non-acute   -stool studies neg  -IV PPI   -IVF  -prn antiemetics   -NPO after midnight   -GI consulted, EGD neg. Colonoscopy done. Biopsies taken. Tolerating diet. OP follow up with GI for results     #MARIO on CKD stage III. MARIO resolved  -Cr on presentation 2.4, baseline ~1.5-1.6. 1.2 today  -improving with IVF   -held valsartan but can resume  -continue to monitor intake and output   -renal US without acute abnormality        #Migraines   -CT head normal  -on ubrevly outpatient         #Chronic diastolic HF   -last echo EF 60-65%  -no s.s of acute decompensation   -daily weights, intake and output      #Normocytic anemia, chronic  -Hgb stable   -monitor CBC   -iron studies, b12, folate pending   -denies s/s

## 2025-02-22 NOTE — PROGRESS NOTES
PCA notified this RN that patient's IV was leaking. Patient is refusing to have new IV placed at this time. She states \"it took 4 hours last time and I'm not doing it again.\" Educated patient regarding need for IVF to treat MARIO. Patient continues to refuse.

## 2025-02-22 NOTE — PROGRESS NOTES
Patient discharging to home at this time accompanied by daughter, discharge instructions given, all questions answered. Wheelchair assistance to family car

## 2025-02-22 NOTE — PLAN OF CARE
Problem: Chronic Conditions and Co-morbidities  Goal: Patient's chronic conditions and co-morbidity symptoms are monitored and maintained or improved  Outcome: Progressing  Flowsheets (Taken 2/21/2025 0857 by Fide Tripathi, RN)  Care Plan - Patient's Chronic Conditions and Co-Morbidity Symptoms are Monitored and Maintained or Improved:   Monitor and assess patient's chronic conditions and comorbid symptoms for stability, deterioration, or improvement   Collaborate with multidisciplinary team to address chronic and comorbid conditions and prevent exacerbation or deterioration   Update acute care plan with appropriate goals if chronic or comorbid symptoms are exacerbated and prevent overall improvement and discharge     Problem: Discharge Planning  Goal: Discharge to home or other facility with appropriate resources  Outcome: Progressing  Flowsheets (Taken 2/21/2025 0857 by Fide Tripathi, RN)  Discharge to home or other facility with appropriate resources:   Identify barriers to discharge with patient and caregiver   Arrange for needed discharge resources and transportation as appropriate   Identify discharge learning needs (meds, wound care, etc)   Arrange for interpreters to assist at discharge as needed   Refer to discharge planning if patient needs post-hospital services based on physician order or complex needs related to functional status, cognitive ability or social support system     Problem: ABCDS Injury Assessment  Goal: Absence of physical injury  Outcome: Progressing     Problem: Safety - Adult  Goal: Free from fall injury  Outcome: Progressing     Problem: Pain  Goal: Verbalizes/displays adequate comfort level or baseline comfort level  Outcome: Progressing  Flowsheets  Taken 2/21/2025 1900 by Fide Tripathi, RN  Verbalizes/displays adequate comfort level or baseline comfort level:   Encourage patient to monitor pain and request assistance   Assess pain using appropriate pain scale

## 2025-02-26 LAB — CALPROTECTIN STL-MCNT: 716 UG/G

## 2025-04-01 DIAGNOSIS — R10.84 GENERALIZED ABDOMINAL PAIN: Primary | ICD-10-CM

## 2025-04-10 ENCOUNTER — HOSPITAL ENCOUNTER (OUTPATIENT)
Dept: ULTRASOUND IMAGING | Age: 85
Discharge: HOME | End: 2025-04-10
Attending: INTERNAL MEDICINE
Payer: MEDICARE

## 2025-04-10 DIAGNOSIS — R10.84 GENERALIZED ABDOMINAL PAIN: ICD-10-CM

## 2025-04-10 PROCEDURE — 76700 US EXAM ABDOM COMPLETE: CPT

## 2025-05-01 ENCOUNTER — APPOINTMENT (OUTPATIENT)
Dept: CT IMAGING | Age: 85
End: 2025-05-01
Payer: MEDICARE

## 2025-05-01 ENCOUNTER — HOSPITAL ENCOUNTER (EMERGENCY)
Age: 85
Discharge: HOME | End: 2025-05-01
Attending: STUDENT IN AN ORGANIZED HEALTH CARE EDUCATION/TRAINING PROGRAM
Payer: MEDICARE

## 2025-05-01 VITALS
WEIGHT: 118 LBS | HEART RATE: 81 BPM | DIASTOLIC BLOOD PRESSURE: 63 MMHG | HEIGHT: 60 IN | RESPIRATION RATE: 17 BRPM | TEMPERATURE: 97.8 F | BODY MASS INDEX: 23.16 KG/M2 | SYSTOLIC BLOOD PRESSURE: 150 MMHG | OXYGEN SATURATION: 95 %

## 2025-05-01 DIAGNOSIS — R10.84 GENERALIZED ABDOMINAL PAIN: Primary | ICD-10-CM

## 2025-05-01 LAB
ALBUMIN SERPL-MCNC: 3.3 G/DL (ref 3.4–5)
ALBUMIN/GLOB SERPL: 0.9 {RATIO} (ref 1.1–2.2)
ALP SERPL-CCNC: 111 U/L (ref 40–129)
ALT SERPL-CCNC: 10 U/L (ref 10–40)
ANION GAP SERPL CALCULATED.3IONS-SCNC: 13 MMOL/L (ref 3–16)
AST SERPL-CCNC: 21 U/L (ref 15–37)
BACTERIA URNS QL MICRO: ABNORMAL /HPF
BASOPHILS # BLD: 0.1 K/UL (ref 0–0.2)
BASOPHILS NFR BLD: 1 %
BILIRUB SERPL-MCNC: <0.2 MG/DL (ref 0–1)
BILIRUB UR QL STRIP.AUTO: NEGATIVE
BUN SERPL-MCNC: 23 MG/DL (ref 7–20)
CALCIUM SERPL-MCNC: 9.2 MG/DL (ref 8.3–10.6)
CHLORIDE SERPL-SCNC: 106 MMOL/L (ref 99–110)
CLARITY UR: CLEAR
CO2 SERPL-SCNC: 21 MMOL/L (ref 21–32)
COLOR UR: YELLOW
CREAT SERPL-MCNC: 1.4 MG/DL (ref 0.6–1.2)
DEPRECATED RDW RBC AUTO: 16.1 % (ref 12.4–15.4)
EOSINOPHIL # BLD: 0.1 K/UL (ref 0–0.6)
EOSINOPHIL NFR BLD: 2 %
EPI CELLS #/AREA URNS HPF: ABNORMAL /HPF (ref 0–5)
GFR SERPLBLD CREATININE-BSD FMLA CKD-EPI: 37 ML/MIN/{1.73_M2}
GLUCOSE SERPL-MCNC: 99 MG/DL (ref 70–99)
GLUCOSE UR STRIP.AUTO-MCNC: NEGATIVE MG/DL
HCT VFR BLD AUTO: 30.8 % (ref 36–48)
HEMOCCULT STL QL: NORMAL
HGB BLD-MCNC: 10 G/DL (ref 12–16)
HGB UR QL STRIP.AUTO: NEGATIVE
KETONES UR STRIP.AUTO-MCNC: NEGATIVE MG/DL
LEUKOCYTE ESTERASE UR QL STRIP.AUTO: ABNORMAL
LIPASE SERPL-CCNC: 13 U/L (ref 13–60)
LYMPHOCYTES # BLD: 1.3 K/UL (ref 1–5.1)
LYMPHOCYTES NFR BLD: 19.2 %
MCH RBC QN AUTO: 27.2 PG (ref 26–34)
MCHC RBC AUTO-ENTMCNC: 32.4 G/DL (ref 31–36)
MCV RBC AUTO: 84.1 FL (ref 80–100)
MONOCYTES # BLD: 0.5 K/UL (ref 0–1.3)
MONOCYTES NFR BLD: 6.9 %
MUCOUS THREADS #/AREA URNS LPF: ABNORMAL /LPF
NEUTROPHILS # BLD: 4.7 K/UL (ref 1.7–7.7)
NEUTROPHILS NFR BLD: 70.9 %
NITRITE UR QL STRIP.AUTO: NEGATIVE
PH UR STRIP.AUTO: 6 [PH] (ref 5–8)
PLATELET # BLD AUTO: 230 K/UL (ref 135–450)
PMV BLD AUTO: 7.7 FL (ref 5–10.5)
POTASSIUM SERPL-SCNC: 4.6 MMOL/L (ref 3.5–5.1)
PROT SERPL-MCNC: 6.8 G/DL (ref 6.4–8.2)
PROT UR STRIP.AUTO-MCNC: NEGATIVE MG/DL
RBC # BLD AUTO: 3.66 M/UL (ref 4–5.2)
RBC #/AREA URNS HPF: ABNORMAL /HPF (ref 0–4)
SODIUM SERPL-SCNC: 140 MMOL/L (ref 136–145)
SP GR UR STRIP.AUTO: 1.02 (ref 1–1.03)
UA COMPLETE W REFLEX CULTURE PNL UR: ABNORMAL
UA DIPSTICK W REFLEX MICRO PNL UR: YES
URN SPEC COLLECT METH UR: ABNORMAL
UROBILINOGEN UR STRIP-ACNC: 0.2 E.U./DL
WBC # BLD AUTO: 6.6 K/UL (ref 4–11)
WBC #/AREA URNS HPF: ABNORMAL /HPF (ref 0–5)

## 2025-05-01 PROCEDURE — 96374 THER/PROPH/DIAG INJ IV PUSH: CPT

## 2025-05-01 PROCEDURE — 82270 OCCULT BLOOD FECES: CPT

## 2025-05-01 PROCEDURE — 82135 ASSAY AMINOLEVULINIC ACID: CPT

## 2025-05-01 PROCEDURE — 84311 SPECTROPHOTOMETRY: CPT

## 2025-05-01 PROCEDURE — 74176 CT ABD & PELVIS W/O CONTRAST: CPT

## 2025-05-01 PROCEDURE — 81001 URINALYSIS AUTO W/SCOPE: CPT

## 2025-05-01 PROCEDURE — 84110 ASSAY OF PORPHOBILINOGEN: CPT

## 2025-05-01 PROCEDURE — 2580000003 HC RX 258: Performed by: STUDENT IN AN ORGANIZED HEALTH CARE EDUCATION/TRAINING PROGRAM

## 2025-05-01 PROCEDURE — 85025 COMPLETE CBC W/AUTO DIFF WBC: CPT

## 2025-05-01 PROCEDURE — 80053 COMPREHEN METABOLIC PANEL: CPT

## 2025-05-01 PROCEDURE — 6370000000 HC RX 637 (ALT 250 FOR IP): Performed by: STUDENT IN AN ORGANIZED HEALTH CARE EDUCATION/TRAINING PROGRAM

## 2025-05-01 PROCEDURE — 99284 EMERGENCY DEPT VISIT MOD MDM: CPT

## 2025-05-01 PROCEDURE — 6360000002 HC RX W HCPCS: Performed by: STUDENT IN AN ORGANIZED HEALTH CARE EDUCATION/TRAINING PROGRAM

## 2025-05-01 PROCEDURE — 84120 ASSAY OF URINE PORPHYRINS: CPT

## 2025-05-01 PROCEDURE — 83690 ASSAY OF LIPASE: CPT

## 2025-05-01 RX ORDER — SUCRALFATE 1 G/1
1 TABLET ORAL ONCE
Status: DISCONTINUED | OUTPATIENT
Start: 2025-05-01 | End: 2025-05-01

## 2025-05-01 RX ADMIN — FAMOTIDINE 20 MG: 10 INJECTION, SOLUTION INTRAVENOUS at 15:30

## 2025-05-01 RX ADMIN — LIDOCAINE HYDROCHLORIDE: 20 SOLUTION ORAL at 15:36

## 2025-05-01 ASSESSMENT — PAIN DESCRIPTION - PAIN TYPE: TYPE: ACUTE PAIN

## 2025-05-01 ASSESSMENT — PAIN - FUNCTIONAL ASSESSMENT: PAIN_FUNCTIONAL_ASSESSMENT: 0-10

## 2025-05-01 ASSESSMENT — PAIN DESCRIPTION - ORIENTATION: ORIENTATION: UPPER

## 2025-05-01 ASSESSMENT — PAIN DESCRIPTION - LOCATION: LOCATION: ABDOMEN

## 2025-05-01 ASSESSMENT — PAIN DESCRIPTION - DESCRIPTORS: DESCRIPTORS: ACHING

## 2025-05-01 ASSESSMENT — PAIN SCALES - GENERAL: PAINLEVEL_OUTOF10: 9

## 2025-05-01 ASSESSMENT — PAIN DESCRIPTION - FREQUENCY: FREQUENCY: CONTINUOUS

## 2025-05-01 NOTE — ED PROVIDER NOTES
follows:    na      RADIOLOGY:    My independent review of medical imaging is as follows:    See ED course    Final interpretation of all medical imaging per radiology as below:    CT ABDOMEN PELVIS WO CONTRAST Additional Contrast? 1   Preliminary Result   1. No acute intra-abdominal or pelvic process.   2. Severe diverticulosis of the distal colon.   3. Moderate stool in the colon and rectum.   4. Findings in the small bowel suggest mild ileus.   5. Mild asymmetric atrophy of the left kidney.   6. Severe atherosclerosis.             LABS:   Results for orders placed or performed during the hospital encounter of 05/01/25   CBC with Auto Differential   Result Value Ref Range    WBC 6.6 4.0 - 11.0 K/uL    RBC 3.66 (L) 4.00 - 5.20 M/uL    Hemoglobin 10.0 (L) 12.0 - 16.0 g/dL    Hematocrit 30.8 (L) 36.0 - 48.0 %    MCV 84.1 80.0 - 100.0 fL    MCH 27.2 26.0 - 34.0 pg    MCHC 32.4 31.0 - 36.0 g/dL    RDW 16.1 (H) 12.4 - 15.4 %    Platelets 230 135 - 450 K/uL    MPV 7.7 5.0 - 10.5 fL    Neutrophils % 70.9 %    Lymphocytes % 19.2 %    Monocytes % 6.9 %    Eosinophils % 2.0 %    Basophils % 1.0 %    Neutrophils Absolute 4.7 1.7 - 7.7 K/uL    Lymphocytes Absolute 1.3 1.0 - 5.1 K/uL    Monocytes Absolute 0.5 0.0 - 1.3 K/uL    Eosinophils Absolute 0.1 0.0 - 0.6 K/uL    Basophils Absolute 0.1 0.0 - 0.2 K/uL   Comprehensive Metabolic Panel w/ Reflex to MG   Result Value Ref Range    Sodium 140 136 - 145 mmol/L    Potassium reflex Magnesium 4.6 3.5 - 5.1 mmol/L    Chloride 106 99 - 110 mmol/L    CO2 21 21 - 32 mmol/L    Anion Gap 13 3 - 16    Glucose 99 70 - 99 mg/dL    BUN 23 (H) 7 - 20 mg/dL    Creatinine 1.4 (H) 0.6 - 1.2 mg/dL    Est, Glom Filt Rate 37 (A) >60    Calcium 9.2 8.3 - 10.6 mg/dL    Total Protein 6.8 6.4 - 8.2 g/dL    Albumin 3.3 (L) 3.4 - 5.0 g/dL    Albumin/Globulin Ratio 0.9 (L) 1.1 - 2.2    Total Bilirubin <0.2 0.0 - 1.0 mg/dL    Alkaline Phosphatase 111 40 - 129 U/L    ALT 10 10 - 40 U/L    AST 21 15 - 37 U/L  MD Cristina (electronically signed)  Attending Emergency Physician    Please note this documentation has been produced using speech recognition software and may contain errors related to that system including errors in grammar, punctuation, and spelling, as well as words and phrases that may be inappropriate.  Efforts were made to edit the dictations.         Hamlet Evans MD  05/01/25 1916

## 2025-05-01 NOTE — DISCHARGE INSTRUCTIONS
You were evaluated in the emergency department for abdominal pain. Assessments and testing completed during your visit were reassuring and at this time there is no indication for further testing, treatment or admission to the hospital. Given this it is appropriate to discharge you from the emergency department. At the time of discharge we discussed the following:    Please follow up to your GI specialist for further recommendations including remaining tests as we discussed     Please note that sometimes it is difficult to diagnose a medical condition early in the disease process before the disease is fully manifest. Because of this, should you develop any new or worsening symptoms, you may return at any time to the emergency department for another evaluation. If available you are also recommended to review this visit with your primary care physician or other medical provider in the next 7 days. Thank you for allowing us to care for you today.

## 2025-05-02 LAB
COLLECT DURATION TIME SPEC: NORMAL H
SPECIMEN VOL ?TM UR: NORMAL ML

## 2025-05-05 ENCOUNTER — RESULTS FOLLOW-UP (OUTPATIENT)
Dept: EMERGENCY DEPT | Age: 85
End: 2025-05-05

## 2025-05-05 LAB
PORPHYRINS SERPL-IMP: NEGATIVE
PORPHYRINS SERPL-SCNC: <10 NMOL/L (ref 0–15)

## 2025-05-07 ENCOUNTER — HOSPITAL ENCOUNTER (OUTPATIENT)
Dept: GENERAL RADIOLOGY | Age: 85
Discharge: HOME | End: 2025-05-07
Payer: MEDICARE

## 2025-05-07 ENCOUNTER — HOSPITAL ENCOUNTER (OUTPATIENT)
Age: 85
Discharge: HOME | End: 2025-05-07
Payer: MEDICARE

## 2025-05-07 DIAGNOSIS — M47.819 SPONDYLOSIS WITHOUT MYELOPATHY: ICD-10-CM

## 2025-05-07 DIAGNOSIS — R10.13 EPIGASTRIC PAIN: Primary | ICD-10-CM

## 2025-05-07 PROCEDURE — 72100 X-RAY EXAM L-S SPINE 2/3 VWS: CPT

## 2025-05-07 PROCEDURE — 72070 X-RAY EXAM THORAC SPINE 2VWS: CPT

## 2025-05-09 LAB
COLLECT DURATION TIME SPEC: NORMAL H
COPRO1/CREAT UR-SRTO: 2 UMOL/MOL CRT (ref 0–6)
COPRO3/CREAT UR-SRTO: 9 UMOL/MOL CRT (ref 0–14)
CREAT 24H UR-MCNC: 123 MG/DL
CREAT 24H UR-MRATE: NORMAL MG/D (ref 400–1300)
D-ALA 24H UR-SRATE: NORMAL UMOL/D (ref 0–60)
DELTA AMINOLEVULINATE [MOLES/VOLUME] IN 24 HOUR URINE: 16 UMOL/L (ref 0–35)
HEPTACARBOXYLATE/CREAT UR-SRTO: 1 UMOL/MOL CRT (ref 0–2)
PBG 24H UR-SRATE: NORMAL UMOL/D (ref 0.4–1.5)
PBG UR-SCNC: <1 UMOL/L
PORPHYRIN FRACT 24H UR-IMP: NEGATIVE
SPECIMEN VOL ?TM UR: NORMAL ML
UROPOR/CREAT 24H UR-SRTO: 4 UMOL/MOL CRT (ref 0–4)

## 2025-06-13 ENCOUNTER — HOSPITAL ENCOUNTER (OUTPATIENT)
Age: 85
Discharge: HOME | End: 2025-06-15
Attending: INTERNAL MEDICINE
Payer: MEDICARE

## 2025-06-13 DIAGNOSIS — R10.13 EPIGASTRIC PAIN: ICD-10-CM

## 2025-06-13 PROCEDURE — 93975 VASCULAR STUDY: CPT

## 2025-06-14 LAB
VAS AORTA DIST AP: 1.2 CM
VAS AORTA DIST PSV: 97.2 CM/S
VAS AORTA DIST TR: 1.3 CM
VAS AORTA MID AP: 2.5 CM
VAS AORTA MID TRANS: 2.8 CM
VAS AORTA PROX AP: 2 CM
VAS AORTA PROX PSV: 39 CM/S
VAS AORTA PROX TR: 2.2 CM
VAS AORTA SUPRARENAL PSV: 160 CM/S
VAS CELIAC ARTERY INSPIRATION EDV: 38 CM/S
VAS CELIAC ARTERY INSPIRATION PSV: 221 CM/S
VAS CELIAC EDV: 35.2 CM/S
VAS CELIAC PSV: 292 CM/S
VAS COMMON HEPATIC EDV: 38.4 CM/S
VAS COMMON HEPATIC PSV: 156 CM/S
VAS DIST SMA EDV: 42.1 CM/S
VAS DIST SMA PSV: 117 CM/S
VAS IMA EDV: 131 CM/S
VAS IMA PSV: 873 CM/S
VAS LEFT COM ILIAC AP: 0.9 CM
VAS LEFT COM ILIAC TRANS: 1.1 CM
VAS MID SMA EDV: 34 CM/S
VAS MID SMA PSV: 120 CM/S
VAS ORIGIN SMA EDV: 27.2 CM/S
VAS ORIGIN SMA PSV: 124 CM/S
VAS PROX SMA EDV: 24.4 CM/S
VAS PROX SMA PSV: 171 CM/S
VAS RIGHT COM ILIAC AP: 0.9 CM
VAS RIGHT COM ILIAC TRANS: 1 CM
VAS SPLENIC EDV: 24.2 CM/S
VAS SPLENIC PSV: 183 CM/S

## 2025-06-14 PROCEDURE — 93975 VASCULAR STUDY: CPT | Performed by: INTERNAL MEDICINE

## 2025-06-15 ENCOUNTER — APPOINTMENT (OUTPATIENT)
Dept: CT IMAGING | Age: 85
DRG: 683 | End: 2025-06-15
Payer: MEDICARE

## 2025-06-15 ENCOUNTER — APPOINTMENT (OUTPATIENT)
Dept: GENERAL RADIOLOGY | Age: 85
DRG: 683 | End: 2025-06-15
Payer: MEDICARE

## 2025-06-15 ENCOUNTER — HOSPITAL ENCOUNTER (INPATIENT)
Age: 85
LOS: 2 days | Discharge: ANOTHER HOSPITAL | DRG: 683 | End: 2025-06-17
Attending: STUDENT IN AN ORGANIZED HEALTH CARE EDUCATION/TRAINING PROGRAM | Admitting: INTERNAL MEDICINE
Payer: MEDICARE

## 2025-06-15 DIAGNOSIS — K52.9 COLITIS: ICD-10-CM

## 2025-06-15 DIAGNOSIS — E87.20 METABOLIC ACIDOSIS: ICD-10-CM

## 2025-06-15 DIAGNOSIS — E86.0 DEHYDRATION: Primary | ICD-10-CM

## 2025-06-15 LAB
ALBUMIN SERPL-MCNC: 2.4 G/DL (ref 3.4–5)
ALBUMIN/GLOB SERPL: 0.6 {RATIO} (ref 1.1–2.2)
ALP SERPL-CCNC: 154 U/L (ref 40–129)
ALT SERPL-CCNC: 12 U/L (ref 10–40)
ANION GAP SERPL CALCULATED.3IONS-SCNC: 21 MMOL/L (ref 3–16)
AST SERPL-CCNC: 16 U/L (ref 15–37)
BASE EXCESS BLDV CALC-SCNC: -9.5 MMOL/L (ref -3–3)
BASOPHILS # BLD: 0 K/UL (ref 0–0.2)
BASOPHILS NFR BLD: 0.2 %
BILIRUB SERPL-MCNC: 0.3 MG/DL (ref 0–1)
BUN SERPL-MCNC: 84 MG/DL (ref 7–20)
C DIFF TOX A+B STL QL IA: NORMAL
CALCIUM SERPL-MCNC: 8.3 MG/DL (ref 8.3–10.6)
CHLORIDE SERPL-SCNC: 100 MMOL/L (ref 99–110)
CO2 BLDV-SCNC: 16 MMOL/L
CO2 SERPL-SCNC: 13 MMOL/L (ref 21–32)
COHGB MFR BLDV: 3.3 % (ref 0–1.5)
CREAT SERPL-MCNC: 2.9 MG/DL (ref 0.6–1.2)
DEPRECATED RDW RBC AUTO: 16.7 % (ref 12.4–15.4)
EOSINOPHIL # BLD: 0 K/UL (ref 0–0.6)
EOSINOPHIL NFR BLD: 0.3 %
GFR SERPLBLD CREATININE-BSD FMLA CKD-EPI: 15 ML/MIN/{1.73_M2}
GLUCOSE SERPL-MCNC: 91 MG/DL (ref 70–99)
HCO3 BLDV-SCNC: 15.2 MMOL/L (ref 23–29)
HCT VFR BLD AUTO: 31 % (ref 36–48)
HEMOCCULT STL QL: ABNORMAL
HGB BLD-MCNC: 9.4 G/DL (ref 12–16)
LACTATE BLDV-SCNC: 0.8 MMOL/L (ref 0.4–1.9)
LYMPHOCYTES # BLD: 0.6 K/UL (ref 1–5.1)
LYMPHOCYTES NFR BLD: 4.2 %
MCH RBC QN AUTO: 25 PG (ref 26–34)
MCHC RBC AUTO-ENTMCNC: 30.3 G/DL (ref 31–36)
MCV RBC AUTO: 82.4 FL (ref 80–100)
METHGB MFR BLDV: 0 %
MONOCYTES # BLD: 0.8 K/UL (ref 0–1.3)
MONOCYTES NFR BLD: 5.9 %
NEUTROPHILS # BLD: 12.1 K/UL (ref 1.7–7.7)
NEUTROPHILS NFR BLD: 89.4 %
O2 CT VFR BLDV CALC: 12 VOL %
O2 THERAPY: ABNORMAL
PCO2 BLDV: 29 MMHG (ref 40–50)
PH BLDV: 7.34 [PH] (ref 7.35–7.45)
PLATELET # BLD AUTO: 332 K/UL (ref 135–450)
PMV BLD AUTO: 7.1 FL (ref 5–10.5)
PO2 BLDV: 63.5 MMHG (ref 25–40)
POTASSIUM SERPL-SCNC: 5.3 MMOL/L (ref 3.5–5.1)
PROT SERPL-MCNC: 6.7 G/DL (ref 6.4–8.2)
RBC # BLD AUTO: 3.77 M/UL (ref 4–5.2)
SAO2 % BLDV: 91 %
SODIUM SERPL-SCNC: 134 MMOL/L (ref 136–145)
TROPONIN, HIGH SENSITIVITY: 29 NG/L (ref 0–14)
WBC # BLD AUTO: 13.6 K/UL (ref 4–11)

## 2025-06-15 PROCEDURE — 6360000002 HC RX W HCPCS: Performed by: INTERNAL MEDICINE

## 2025-06-15 PROCEDURE — 96374 THER/PROPH/DIAG INJ IV PUSH: CPT

## 2025-06-15 PROCEDURE — 82270 OCCULT BLOOD FECES: CPT

## 2025-06-15 PROCEDURE — 1200000000 HC SEMI PRIVATE

## 2025-06-15 PROCEDURE — 87449 NOS EACH ORGANISM AG IA: CPT

## 2025-06-15 PROCEDURE — 83605 ASSAY OF LACTIC ACID: CPT

## 2025-06-15 PROCEDURE — 6360000002 HC RX W HCPCS: Performed by: STUDENT IN AN ORGANIZED HEALTH CARE EDUCATION/TRAINING PROGRAM

## 2025-06-15 PROCEDURE — 2500000003 HC RX 250 WO HCPCS: Performed by: STUDENT IN AN ORGANIZED HEALTH CARE EDUCATION/TRAINING PROGRAM

## 2025-06-15 PROCEDURE — 74176 CT ABD & PELVIS W/O CONTRAST: CPT

## 2025-06-15 PROCEDURE — 70450 CT HEAD/BRAIN W/O DYE: CPT

## 2025-06-15 PROCEDURE — 80053 COMPREHEN METABOLIC PANEL: CPT

## 2025-06-15 PROCEDURE — 82803 BLOOD GASES ANY COMBINATION: CPT

## 2025-06-15 PROCEDURE — 2500000003 HC RX 250 WO HCPCS: Performed by: INTERNAL MEDICINE

## 2025-06-15 PROCEDURE — 2580000003 HC RX 258: Performed by: INTERNAL MEDICINE

## 2025-06-15 PROCEDURE — 87324 CLOSTRIDIUM AG IA: CPT

## 2025-06-15 PROCEDURE — 6370000000 HC RX 637 (ALT 250 FOR IP): Performed by: STUDENT IN AN ORGANIZED HEALTH CARE EDUCATION/TRAINING PROGRAM

## 2025-06-15 PROCEDURE — 71045 X-RAY EXAM CHEST 1 VIEW: CPT

## 2025-06-15 PROCEDURE — 87506 IADNA-DNA/RNA PROBE TQ 6-11: CPT

## 2025-06-15 PROCEDURE — 84484 ASSAY OF TROPONIN QUANT: CPT

## 2025-06-15 PROCEDURE — 93005 ELECTROCARDIOGRAM TRACING: CPT | Performed by: STUDENT IN AN ORGANIZED HEALTH CARE EDUCATION/TRAINING PROGRAM

## 2025-06-15 PROCEDURE — 36415 COLL VENOUS BLD VENIPUNCTURE: CPT

## 2025-06-15 PROCEDURE — 2580000003 HC RX 258: Performed by: STUDENT IN AN ORGANIZED HEALTH CARE EDUCATION/TRAINING PROGRAM

## 2025-06-15 PROCEDURE — 85025 COMPLETE CBC W/AUTO DIFF WBC: CPT

## 2025-06-15 PROCEDURE — 99285 EMERGENCY DEPT VISIT HI MDM: CPT

## 2025-06-15 RX ORDER — ASPIRIN 81 MG/1
81 TABLET ORAL DAILY
Status: DISCONTINUED | OUTPATIENT
Start: 2025-06-16 | End: 2025-06-17 | Stop reason: HOSPADM

## 2025-06-15 RX ORDER — OXYCODONE HYDROCHLORIDE 15 MG/1
7.5 TABLET ORAL ONCE
Refills: 0 | Status: COMPLETED | OUTPATIENT
Start: 2025-06-15 | End: 2025-06-15

## 2025-06-15 RX ORDER — ACETAMINOPHEN 325 MG/1
650 TABLET ORAL EVERY 6 HOURS PRN
Status: DISCONTINUED | OUTPATIENT
Start: 2025-06-15 | End: 2025-06-17 | Stop reason: HOSPADM

## 2025-06-15 RX ORDER — AMLODIPINE BESYLATE 5 MG/1
5 TABLET ORAL NIGHTLY
Status: DISCONTINUED | OUTPATIENT
Start: 2025-06-15 | End: 2025-06-17 | Stop reason: HOSPADM

## 2025-06-15 RX ORDER — CETIRIZINE HYDROCHLORIDE 10 MG/1
10 TABLET ORAL DAILY
Status: DISCONTINUED | OUTPATIENT
Start: 2025-06-16 | End: 2025-06-17 | Stop reason: HOSPADM

## 2025-06-15 RX ORDER — ONDANSETRON 2 MG/ML
4 INJECTION INTRAMUSCULAR; INTRAVENOUS EVERY 6 HOURS PRN
Status: DISCONTINUED | OUTPATIENT
Start: 2025-06-15 | End: 2025-06-17 | Stop reason: HOSPADM

## 2025-06-15 RX ORDER — 0.9 % SODIUM CHLORIDE 0.9 %
500 INTRAVENOUS SOLUTION INTRAVENOUS ONCE
Status: COMPLETED | OUTPATIENT
Start: 2025-06-15 | End: 2025-06-15

## 2025-06-15 RX ORDER — METRONIDAZOLE 500 MG/100ML
500 INJECTION, SOLUTION INTRAVENOUS EVERY 8 HOURS
Status: DISCONTINUED | OUTPATIENT
Start: 2025-06-15 | End: 2025-06-17 | Stop reason: HOSPADM

## 2025-06-15 RX ORDER — MAGNESIUM SULFATE IN WATER 40 MG/ML
2000 INJECTION, SOLUTION INTRAVENOUS PRN
Status: DISCONTINUED | OUTPATIENT
Start: 2025-06-15 | End: 2025-06-17 | Stop reason: HOSPADM

## 2025-06-15 RX ORDER — ACETAMINOPHEN 325 MG/1
650 TABLET ORAL EVERY 6 HOURS PRN
Status: DISCONTINUED | OUTPATIENT
Start: 2025-06-15 | End: 2025-06-15 | Stop reason: SDUPTHER

## 2025-06-15 RX ORDER — CITALOPRAM HYDROBROMIDE 20 MG/1
30 TABLET ORAL DAILY
Status: DISCONTINUED | OUTPATIENT
Start: 2025-06-16 | End: 2025-06-16

## 2025-06-15 RX ORDER — VALSARTAN 80 MG/1
80 TABLET ORAL DAILY
Status: DISCONTINUED | OUTPATIENT
Start: 2025-06-16 | End: 2025-06-17 | Stop reason: HOSPADM

## 2025-06-15 RX ORDER — PANTOPRAZOLE SODIUM 40 MG/1
40 TABLET, DELAYED RELEASE ORAL DAILY
Status: DISCONTINUED | OUTPATIENT
Start: 2025-06-16 | End: 2025-06-17 | Stop reason: HOSPADM

## 2025-06-15 RX ORDER — SODIUM CHLORIDE 0.9 % (FLUSH) 0.9 %
5-40 SYRINGE (ML) INJECTION EVERY 12 HOURS SCHEDULED
Status: DISCONTINUED | OUTPATIENT
Start: 2025-06-15 | End: 2025-06-17 | Stop reason: HOSPADM

## 2025-06-15 RX ORDER — SODIUM CHLORIDE 9 MG/ML
INJECTION, SOLUTION INTRAVENOUS PRN
Status: DISCONTINUED | OUTPATIENT
Start: 2025-06-15 | End: 2025-06-17 | Stop reason: HOSPADM

## 2025-06-15 RX ORDER — POLYETHYLENE GLYCOL 3350 17 G/17G
17 POWDER, FOR SOLUTION ORAL DAILY PRN
Status: DISCONTINUED | OUTPATIENT
Start: 2025-06-15 | End: 2025-06-17 | Stop reason: HOSPADM

## 2025-06-15 RX ORDER — ONDANSETRON 4 MG/1
4 TABLET, ORALLY DISINTEGRATING ORAL EVERY 8 HOURS PRN
Status: DISCONTINUED | OUTPATIENT
Start: 2025-06-15 | End: 2025-06-17 | Stop reason: HOSPADM

## 2025-06-15 RX ORDER — OXYCODONE AND ACETAMINOPHEN 5; 325 MG/1; MG/1
1 TABLET ORAL EVERY 4 HOURS PRN
Refills: 0 | Status: DISCONTINUED | OUTPATIENT
Start: 2025-06-15 | End: 2025-06-17 | Stop reason: HOSPADM

## 2025-06-15 RX ORDER — SODIUM CHLORIDE 0.9 % (FLUSH) 0.9 %
5-40 SYRINGE (ML) INJECTION PRN
Status: DISCONTINUED | OUTPATIENT
Start: 2025-06-15 | End: 2025-06-17 | Stop reason: HOSPADM

## 2025-06-15 RX ORDER — HYDRALAZINE HYDROCHLORIDE 50 MG/1
50 TABLET, FILM COATED ORAL EVERY 8 HOURS SCHEDULED
Status: DISCONTINUED | OUTPATIENT
Start: 2025-06-15 | End: 2025-06-16

## 2025-06-15 RX ORDER — ACETAMINOPHEN 650 MG/1
650 SUPPOSITORY RECTAL EVERY 6 HOURS PRN
Status: DISCONTINUED | OUTPATIENT
Start: 2025-06-15 | End: 2025-06-17 | Stop reason: HOSPADM

## 2025-06-15 RX ORDER — TRAZODONE HYDROCHLORIDE 100 MG/1
100 TABLET ORAL NIGHTLY PRN
Status: DISCONTINUED | OUTPATIENT
Start: 2025-06-15 | End: 2025-06-17 | Stop reason: HOSPADM

## 2025-06-15 RX ADMIN — SODIUM BICARBONATE: 84 INJECTION, SOLUTION INTRAVENOUS at 17:21

## 2025-06-15 RX ADMIN — Medication 10 ML: at 23:11

## 2025-06-15 RX ADMIN — SODIUM BICARBONATE: 84 INJECTION, SOLUTION INTRAVENOUS at 20:27

## 2025-06-15 RX ADMIN — SODIUM CHLORIDE 1000 MG: 9 INJECTION, SOLUTION INTRAVENOUS at 18:36

## 2025-06-15 RX ADMIN — SODIUM CHLORIDE 500 ML: 0.9 INJECTION, SOLUTION INTRAVENOUS at 16:25

## 2025-06-15 RX ADMIN — OXYCODONE HYDROCHLORIDE 7.5 MG: 15 TABLET ORAL at 19:31

## 2025-06-15 RX ADMIN — METRONIDAZOLE 500 MG: 500 INJECTION, SOLUTION INTRAVENOUS at 23:10

## 2025-06-15 ASSESSMENT — PAIN SCALES - GENERAL: PAINLEVEL_OUTOF10: 10

## 2025-06-15 NOTE — ED NOTES
Patient resting in bed, denies any further needs. Call light within reach and family at bedside. Alert and oriented x4

## 2025-06-15 NOTE — ED NOTES
Patient resting in bed at this time, denies any further needs. Call light within reach and family at bedside. Alert and oriented x4

## 2025-06-15 NOTE — PLAN OF CARE
86 yo female presenting with history of diarrhea intermittent last few months, dehydration, worsening diarrhea this week, has seen small amount of blood in stool. Colitis on CT. C. Diff negative. GI PCR pending    MARIO  Metabolic acidosis    Continue IV with bicarb and antibiotics. GI consult. 2W

## 2025-06-15 NOTE — ED PROVIDER NOTES
[facility] EMERGENCY DEPARTMENT     EMERGENCY DEPARTMENT ENCOUNTER            Pt Name: Nithya Neff   MRN: [id number]   Birthdate 1940   Date of evaluation: 6/15/2025   Provider: Beto Stewart MD   PCP: Esperanza, Pcp   Note Started: 4:08 PM EDT 6/15/25          CHIEF COMPLAINT     Chief Complaint   Patient presents with    Altered Mental Status     From home. Per ems, sbp 80s, spo2 90s on RA, glucose 115. Reports lethargic. Ems reports pt stated feeling ill x 4 days.              HISTORY OF PRESENT ILLNESS:   History from : Patient   Limitations to history : None     Nithya Neff is a 85 y.o. female who presents with a chief complaint of altered mental status, hypotension, weight loss, decreased pulse ox.  Reportedly less active and drowsy.  Has had a 70 pound weight loss over the last 5 months per the patient.  Starting feeling very ill 4 days ago and has some left upper quadrant pain.  Patient states she has diffuse diarrhea.  She was found covered in diarrhea by nursing staff and was cleansed.  The altered mental status is reportedly more of a drowsiness but described as lethargy but the patient is currently alert and oriented x 4 so would not describe this as lethargy is much as drowsiness.  Has generalized weakness as well.  States that she has had stool testing and follow-up with her primary care but does not know what her diagnosis is.  Currently awaiting her daughter's input on the HPI.    Nursing Notes were all reviewed and agreed with, or any disagreements were addressed in the HPI.     REVIEW OF SYSTEMS :    Positives and Pertinent negatives as per HPI.      MEDICAL HISTORY   has a past medical history of MARIO (acute kidney injury) (11/24/2021), Altered mental status (11/30/2021), Arthritis, BCC (basal cell carcinoma of skin), Bladder infection, Blurred vision (9/17/2022), CAD (coronary artery disease), Cancer (HCC), CHF (congestive heart failure) (HCC), Chronic back pain, Closed head injury  No edema.      Left lower leg: No edema.   Skin:     General: Skin is warm and dry.      Capillary Refill: Capillary refill takes less than 2 seconds.      Coloration: Skin is pale. Skin is not jaundiced.   Neurological:      Comments: Patient has no dysarthria or facial asymmetry.  Moving all 4 extremities with equal strength but weak..  Sensation is intact and equal bilaterally.  Alert and oriented x 4.  Vision is intact in all 4 quadrants on exam   Psychiatric:         Mood and Affect: Mood normal.         Behavior: Behavior normal.          DIAGNOSTIC RESULTS     LABS:   Labs Reviewed   CBC WITH AUTO DIFFERENTIAL - Abnormal; Notable for the following components:       Result Value    WBC 13.6 (*)     RBC 3.77 (*)     Hemoglobin 9.4 (*)     Hematocrit 31.0 (*)     MCH 25.0 (*)     MCHC 30.3 (*)     RDW 16.7 (*)     Neutrophils Absolute 12.1 (*)     Lymphocytes Absolute 0.6 (*)     All other components within normal limits   COMPREHENSIVE METABOLIC PANEL W/ REFLEX TO MG FOR LOW K - Abnormal; Notable for the following components:    Sodium 134 (*)     Potassium reflex Magnesium 5.3 (*)     CO2 13 (*)     Anion Gap 21 (*)     BUN 84 (*)     Creatinine 2.9 (*)     Est, Glom Filt Rate 15 (*)     Albumin 2.4 (*)     Albumin/Globulin Ratio 0.6 (*)     Alkaline Phosphatase 154 (*)     All other components within normal limits    Narrative:     CALL  [facility] tel. [phone],  Chemistry results called to and read back by Maite Gutierrez RN, 06/15/2025  16:30, by GALEN   TROPONIN - Abnormal; Notable for the following components:    Troponin, High Sensitivity 29 (*)     All other components within normal limits    Narrative:     CALL  [place]  [facility] tel. [phone],  Chemistry results called to and read back by Maite Gutierrez RN, 06/15/2025  16:30, by GALEN   BLOOD GAS, VENOUS - Abnormal; Notable for the following components:    pH, Claus 7.337 (*)     pCO2, Claus 29.0 (*)     PO2, Claus 63.5 (*)     HCO3, Venous 15.2 (*)     Base

## 2025-06-15 NOTE — ED NOTES
Nithya Neff is a 85 y.o. female admitted for  Principal Problem:    MARIO (acute kidney injury)  Resolved Problems:    * No resolved hospital problems. *  .   Patient Home via EMS transportation with   Chief Complaint   Patient presents with    Altered Mental Status     From home. Per ems, sbp 80s, spo2 90s on RA, glucose 115. Reports lethargic. Ems reports pt stated feeling ill x 4 days.    .  Patient is alert and Person, Place, Time, and Situation  Patient's baseline mobility: Baseline Mobility: Walker and Cane   Code Status: Prior   Cardiac Rhythm:       Is patient on baseline Oxygen: no how many Liters:   Abnormal Assessment Findings: positive blood occult     Isolation: None      NIH Score:    C-SSRS: Risk of Suicide: No Risk  Bedside swallow:        Active LDA's:   Peripheral IV 06/15/25 Left Antecubital (Active)   Site Assessment Clean, dry & intact 06/15/25 1707   Line Status Blood return noted;Flushed;Normal saline locked;Specimen collected 06/15/25 1707     Patient admitted with a reynolds: no If the reynolds is chronic was it exchanged:  Reason for reynolds:   Patient admitted with Central Line:  NA . PICC line placement confirmed: YES OR NO:[id number]}   Reason for Central line:   Was central line Inserted from an outside facility:        Family/Caregiver Present yes Any Concerns: no   Restraints no  Sitter no         Vitals: MEWS Score: 1    Vitals:    06/15/25 1526 06/15/25 1628 06/15/25 1633 06/15/25 1732   BP: (!) 124/40 (!) 106/41 (!) 123/40 (!) 123/43   Pulse: 72 69 73 70   Resp: 15 10 15 11   Temp: 98.1 °F (36.7 °C) 98.2 °F (36.8 °C)     SpO2: 90% 97% 93% 98%       Last documented pain score (0-10 scale)    Pain medication administered No.    Pertinent or High Risk Medications/Drips: Yes- see MAR. Sodium bicarb    Pending Blood Product Administration: no    Abnormal labs:   Abnormal Labs Reviewed   CBC WITH AUTO DIFFERENTIAL - Abnormal; Notable for the following components:       Result Value    WBC 13.6 (*)

## 2025-06-16 ENCOUNTER — APPOINTMENT (OUTPATIENT)
Dept: CT IMAGING | Age: 85
DRG: 683 | End: 2025-06-16
Payer: MEDICARE

## 2025-06-16 PROBLEM — K62.5 RECTAL BLEEDING: Status: ACTIVE | Noted: 2025-06-16

## 2025-06-16 PROBLEM — R10.84 GENERALIZED ABDOMINAL PAIN: Status: ACTIVE | Noted: 2025-06-16

## 2025-06-16 PROBLEM — E86.0 DEHYDRATION: Status: ACTIVE | Noted: 2025-06-16

## 2025-06-16 PROBLEM — E87.20 METABOLIC ACIDOSIS: Status: ACTIVE | Noted: 2025-06-16

## 2025-06-16 PROBLEM — D64.9 ACUTE ON CHRONIC ANEMIA: Status: ACTIVE | Noted: 2025-06-16

## 2025-06-16 PROBLEM — K52.9 COLITIS: Status: ACTIVE | Noted: 2025-06-16

## 2025-06-16 LAB
ALBUMIN SERPL-MCNC: 2.2 G/DL (ref 3.4–5)
ANION GAP SERPL CALCULATED.3IONS-SCNC: 15 MMOL/L (ref 3–16)
ANION GAP SERPL CALCULATED.3IONS-SCNC: 16 MMOL/L (ref 3–16)
BASOPHILS # BLD: 0 K/UL (ref 0–0.2)
BASOPHILS NFR BLD: 0.3 %
BILIRUB UR QL STRIP.AUTO: NEGATIVE
BUN SERPL-MCNC: 66 MG/DL (ref 7–20)
BUN SERPL-MCNC: 73 MG/DL (ref 7–20)
CALCIUM SERPL-MCNC: 7.6 MG/DL (ref 8.3–10.6)
CALCIUM SERPL-MCNC: 7.8 MG/DL (ref 8.3–10.6)
CHLORIDE SERPL-SCNC: 100 MMOL/L (ref 99–110)
CHLORIDE SERPL-SCNC: 103 MMOL/L (ref 99–110)
CLARITY UR: CLEAR
CO2 SERPL-SCNC: 19 MMOL/L (ref 21–32)
CO2 SERPL-SCNC: 20 MMOL/L (ref 21–32)
COLOR UR: YELLOW
CREAT SERPL-MCNC: 1.9 MG/DL (ref 0.6–1.2)
CREAT SERPL-MCNC: 2.6 MG/DL (ref 0.6–1.2)
DEPRECATED RDW RBC AUTO: 16.4 % (ref 12.4–15.4)
EKG ATRIAL RATE: 72 BPM
EKG DIAGNOSIS: NORMAL
EKG P AXIS: 24 DEGREES
EKG P-R INTERVAL: 136 MS
EKG Q-T INTERVAL: 392 MS
EKG QRS DURATION: 98 MS
EKG QTC CALCULATION (BAZETT): 429 MS
EKG R AXIS: 16 DEGREES
EKG T AXIS: 47 DEGREES
EKG VENTRICULAR RATE: 72 BPM
EOSINOPHIL # BLD: 0.1 K/UL (ref 0–0.6)
EOSINOPHIL NFR BLD: 0.8 %
EPI CELLS #/AREA URNS HPF: NORMAL /HPF (ref 0–5)
FERRITIN SERPL IA-MCNC: 456 NG/ML (ref 15–150)
GFR SERPLBLD CREATININE-BSD FMLA CKD-EPI: 17 ML/MIN/{1.73_M2}
GFR SERPLBLD CREATININE-BSD FMLA CKD-EPI: 25 ML/MIN/{1.73_M2}
GI PATHOGENS PNL STL NAA+PROBE: NORMAL
GLUCOSE SERPL-MCNC: 147 MG/DL (ref 70–99)
GLUCOSE SERPL-MCNC: 166 MG/DL (ref 70–99)
GLUCOSE UR STRIP.AUTO-MCNC: NEGATIVE MG/DL
HCT VFR BLD AUTO: 24.6 % (ref 36–48)
HCT VFR BLD AUTO: 26.9 % (ref 36–48)
HGB BLD-MCNC: 7.8 G/DL (ref 12–16)
HGB BLD-MCNC: 8.8 G/DL (ref 12–16)
HGB UR QL STRIP.AUTO: NEGATIVE
IRON SATN MFR SERPL: 10 % (ref 15–50)
IRON SERPL-MCNC: 12 UG/DL (ref 37–145)
KETONES UR STRIP.AUTO-MCNC: NEGATIVE MG/DL
LEUKOCYTE ESTERASE UR QL STRIP.AUTO: NEGATIVE
LYMPHOCYTES # BLD: 0.4 K/UL (ref 1–5.1)
LYMPHOCYTES NFR BLD: 3.5 %
MCH RBC QN AUTO: 25.1 PG (ref 26–34)
MCHC RBC AUTO-ENTMCNC: 31.8 G/DL (ref 31–36)
MCV RBC AUTO: 78.7 FL (ref 80–100)
MONOCYTES # BLD: 0.6 K/UL (ref 0–1.3)
MONOCYTES NFR BLD: 4.8 %
NEUTROPHILS # BLD: 10.6 K/UL (ref 1.7–7.7)
NEUTROPHILS NFR BLD: 90.6 %
NITRITE UR QL STRIP.AUTO: NEGATIVE
PH UR STRIP.AUTO: 6 [PH] (ref 5–8)
PHOSPHATE SERPL-MCNC: 2.7 MG/DL (ref 2.5–4.9)
PLATELET # BLD AUTO: 279 K/UL (ref 135–450)
PMV BLD AUTO: 6.8 FL (ref 5–10.5)
POTASSIUM SERPL-SCNC: 3.8 MMOL/L (ref 3.5–5.1)
POTASSIUM SERPL-SCNC: 4.2 MMOL/L (ref 3.5–5.1)
PROT UR STRIP.AUTO-MCNC: ABNORMAL MG/DL
RBC # BLD AUTO: 3.12 M/UL (ref 4–5.2)
RBC #/AREA URNS HPF: NORMAL /HPF (ref 0–4)
SODIUM SERPL-SCNC: 136 MMOL/L (ref 136–145)
SODIUM SERPL-SCNC: 137 MMOL/L (ref 136–145)
SP GR UR STRIP.AUTO: <=1.005 (ref 1–1.03)
TIBC SERPL-MCNC: 122 UG/DL (ref 260–445)
UA COMPLETE W REFLEX CULTURE PNL UR: ABNORMAL
UA DIPSTICK W REFLEX MICRO PNL UR: YES
URN SPEC COLLECT METH UR: ABNORMAL
UROBILINOGEN UR STRIP-ACNC: 0.2 E.U./DL
WBC # BLD AUTO: 11.8 K/UL (ref 4–11)
WBC #/AREA URNS HPF: NORMAL /HPF (ref 0–5)

## 2025-06-16 PROCEDURE — 6370000000 HC RX 637 (ALT 250 FOR IP): Performed by: NURSE PRACTITIONER

## 2025-06-16 PROCEDURE — 6370000000 HC RX 637 (ALT 250 FOR IP): Performed by: STUDENT IN AN ORGANIZED HEALTH CARE EDUCATION/TRAINING PROGRAM

## 2025-06-16 PROCEDURE — 97530 THERAPEUTIC ACTIVITIES: CPT

## 2025-06-16 PROCEDURE — 97166 OT EVAL MOD COMPLEX 45 MIN: CPT

## 2025-06-16 PROCEDURE — 85014 HEMATOCRIT: CPT

## 2025-06-16 PROCEDURE — 85018 HEMOGLOBIN: CPT

## 2025-06-16 PROCEDURE — 83550 IRON BINDING TEST: CPT

## 2025-06-16 PROCEDURE — 93010 ELECTROCARDIOGRAM REPORT: CPT | Performed by: INTERNAL MEDICINE

## 2025-06-16 PROCEDURE — 2500000003 HC RX 250 WO HCPCS: Performed by: INTERNAL MEDICINE

## 2025-06-16 PROCEDURE — 81001 URINALYSIS AUTO W/SCOPE: CPT

## 2025-06-16 PROCEDURE — 6360000002 HC RX W HCPCS: Performed by: INTERNAL MEDICINE

## 2025-06-16 PROCEDURE — 74174 CTA ABD&PLVS W/CONTRAST: CPT

## 2025-06-16 PROCEDURE — 85025 COMPLETE CBC W/AUTO DIFF WBC: CPT

## 2025-06-16 PROCEDURE — 82728 ASSAY OF FERRITIN: CPT

## 2025-06-16 PROCEDURE — 99232 SBSQ HOSP IP/OBS MODERATE 35: CPT | Performed by: NURSE PRACTITIONER

## 2025-06-16 PROCEDURE — 97162 PT EVAL MOD COMPLEX 30 MIN: CPT

## 2025-06-16 PROCEDURE — 80069 RENAL FUNCTION PANEL: CPT

## 2025-06-16 PROCEDURE — 6370000000 HC RX 637 (ALT 250 FOR IP): Performed by: INTERNAL MEDICINE

## 2025-06-16 PROCEDURE — 6360000004 HC RX CONTRAST MEDICATION: Performed by: NURSE PRACTITIONER

## 2025-06-16 PROCEDURE — 36415 COLL VENOUS BLD VENIPUNCTURE: CPT

## 2025-06-16 PROCEDURE — 97535 SELF CARE MNGMENT TRAINING: CPT

## 2025-06-16 PROCEDURE — 2580000003 HC RX 258: Performed by: INTERNAL MEDICINE

## 2025-06-16 PROCEDURE — 1200000000 HC SEMI PRIVATE

## 2025-06-16 PROCEDURE — 83540 ASSAY OF IRON: CPT

## 2025-06-16 RX ORDER — BUDESONIDE 3 MG/1
9 CAPSULE, COATED PELLETS ORAL EVERY MORNING
COMMUNITY

## 2025-06-16 RX ORDER — IOPAMIDOL 755 MG/ML
75 INJECTION, SOLUTION INTRAVASCULAR
Status: COMPLETED | OUTPATIENT
Start: 2025-06-16 | End: 2025-06-16

## 2025-06-16 RX ORDER — DICLOFENAC SODIUM 75 MG/1
75 TABLET, DELAYED RELEASE ORAL 2 TIMES DAILY
Status: ON HOLD | COMMUNITY
End: 2025-06-26 | Stop reason: HOSPADM

## 2025-06-16 RX ORDER — BUSPIRONE HYDROCHLORIDE 10 MG/1
10 TABLET ORAL 2 TIMES DAILY
COMMUNITY

## 2025-06-16 RX ORDER — POLYETHYLENE GLYCOL 3350 17 G/17G
17 POWDER, FOR SOLUTION ORAL DAILY
COMMUNITY

## 2025-06-16 RX ORDER — CLOTRIMAZOLE 1 %
CREAM (GRAM) TOPICAL 2 TIMES DAILY
Status: ON HOLD | COMMUNITY
End: 2025-06-26 | Stop reason: HOSPADM

## 2025-06-16 RX ORDER — ATORVASTATIN CALCIUM 40 MG/1
40 TABLET, FILM COATED ORAL DAILY
COMMUNITY

## 2025-06-16 RX ORDER — DICYCLOMINE HYDROCHLORIDE 10 MG/1
10 CAPSULE ORAL
Status: DISCONTINUED | OUTPATIENT
Start: 2025-06-16 | End: 2025-06-17 | Stop reason: HOSPADM

## 2025-06-16 RX ORDER — FAMOTIDINE 20 MG/1
20 TABLET, FILM COATED ORAL DAILY
Status: ON HOLD | COMMUNITY
End: 2025-06-26 | Stop reason: HOSPADM

## 2025-06-16 RX ORDER — NYSTATIN 10B UNIT
POWDER (EA) MISCELLANEOUS 2 TIMES DAILY
Status: ON HOLD | COMMUNITY
End: 2025-06-26 | Stop reason: HOSPADM

## 2025-06-16 RX ORDER — HYDROXYZINE HYDROCHLORIDE 25 MG/1
25 TABLET, FILM COATED ORAL 3 TIMES DAILY PRN
COMMUNITY

## 2025-06-16 RX ORDER — TORSEMIDE 20 MG/1
40 TABLET ORAL DAILY
COMMUNITY

## 2025-06-16 RX ORDER — ATORVASTATIN CALCIUM 40 MG/1
40 TABLET, FILM COATED ORAL DAILY
Status: DISCONTINUED | OUTPATIENT
Start: 2025-06-16 | End: 2025-06-17 | Stop reason: HOSPADM

## 2025-06-16 RX ORDER — DOCUSATE SODIUM 100 MG/1
100 CAPSULE, LIQUID FILLED ORAL 2 TIMES DAILY
COMMUNITY

## 2025-06-16 RX ORDER — OXYCODONE AND ACETAMINOPHEN 7.5; 325 MG/1; MG/1
1 TABLET ORAL EVERY 6 HOURS PRN
COMMUNITY

## 2025-06-16 RX ORDER — SPIRONOLACTONE 50 MG/1
50 TABLET, FILM COATED ORAL DAILY
Status: ON HOLD | COMMUNITY
End: 2025-06-26 | Stop reason: HOSPADM

## 2025-06-16 RX ADMIN — CETIRIZINE HYDROCHLORIDE 10 MG: 10 TABLET ORAL at 10:32

## 2025-06-16 RX ADMIN — METRONIDAZOLE 500 MG: 500 INJECTION, SOLUTION INTRAVENOUS at 05:51

## 2025-06-16 RX ADMIN — OXYCODONE HYDROCHLORIDE AND ACETAMINOPHEN 1 TABLET: 5; 325 TABLET ORAL at 05:57

## 2025-06-16 RX ADMIN — Medication 10 ML: at 10:37

## 2025-06-16 RX ADMIN — AMLODIPINE BESYLATE 5 MG: 5 TABLET ORAL at 21:30

## 2025-06-16 RX ADMIN — DICYCLOMINE HYDROCHLORIDE 10 MG: 10 CAPSULE ORAL at 16:15

## 2025-06-16 RX ADMIN — METRONIDAZOLE 500 MG: 500 INJECTION, SOLUTION INTRAVENOUS at 21:20

## 2025-06-16 RX ADMIN — PANTOPRAZOLE SODIUM 40 MG: 40 TABLET, DELAYED RELEASE ORAL at 10:32

## 2025-06-16 RX ADMIN — ACETAMINOPHEN 650 MG: 325 TABLET ORAL at 12:19

## 2025-06-16 RX ADMIN — METRONIDAZOLE 500 MG: 500 INJECTION, SOLUTION INTRAVENOUS at 12:16

## 2025-06-16 RX ADMIN — Medication 10 ML: at 21:18

## 2025-06-16 RX ADMIN — ASPIRIN 81 MG: 81 TABLET, COATED ORAL at 10:32

## 2025-06-16 RX ADMIN — OXYCODONE HYDROCHLORIDE AND ACETAMINOPHEN 1 TABLET: 5; 325 TABLET ORAL at 21:35

## 2025-06-16 RX ADMIN — VALSARTAN 80 MG: 80 TABLET ORAL at 10:32

## 2025-06-16 RX ADMIN — SODIUM BICARBONATE: 84 INJECTION, SOLUTION INTRAVENOUS at 13:00

## 2025-06-16 RX ADMIN — IOPAMIDOL 75 ML: 755 INJECTION, SOLUTION INTRAVENOUS at 17:29

## 2025-06-16 RX ADMIN — SERTRALINE HYDROCHLORIDE 50 MG: 50 TABLET ORAL at 16:15

## 2025-06-16 RX ADMIN — ATORVASTATIN CALCIUM 40 MG: 40 TABLET, FILM COATED ORAL at 16:15

## 2025-06-16 ASSESSMENT — PAIN SCALES - GENERAL
PAINLEVEL_OUTOF10: 7
PAINLEVEL_OUTOF10: 8
PAINLEVEL_OUTOF10: 8
PAINLEVEL_OUTOF10: 0
PAINLEVEL_OUTOF10: 8

## 2025-06-16 ASSESSMENT — PAIN DESCRIPTION - ORIENTATION
ORIENTATION: OTHER (COMMENT)
ORIENTATION: RIGHT;LEFT

## 2025-06-16 ASSESSMENT — PAIN SCALES - WONG BAKER: WONGBAKER_NUMERICALRESPONSE: NO HURT

## 2025-06-16 ASSESSMENT — PAIN DESCRIPTION - LOCATION
LOCATION: FOOT
LOCATION: ABDOMEN;BACK

## 2025-06-16 ASSESSMENT — PAIN DESCRIPTION - DESCRIPTORS
DESCRIPTORS: ACHING
DESCRIPTORS: ACHING

## 2025-06-16 NOTE — PROGRESS NOTES
Inpatient Occupational Therapy Evaluation & Treatment    Unit: [facility]  Date:  6/16/2025  Patient Name:    Nithya Neff  Admitting diagnosis:  Dehydration [E86.0]  Colitis [K52.9]  Metabolic acidosis [E87.20]  MARIO (acute kidney injury) [N17.9]  Admit Date:  6/15/2025  Precautions/Restrictions/WB Status/ Lines/ Wounds/ Oxygen: Fall risk, Bed/chair alarm, and Lines (IV)    Pt seen for cotreatment this date due to patient safety, patient endurance, acute illness/injury, and limited functional status information    Treatment Time:  3614-4077  Treatment Number:  1  Timed Code Treatment Minutes: 43 minutes  Total Treatment Minutes:  53  minutes    Patient Goals for Therapy: \"go home\"          Discharge Recommendations: Home with initial 24/7 assist  and Home OT  DME needs for discharge: Needs Met       Therapy recommendations for staff:   Assist of 1 for transfers with use of rolling walker (RW) and gait belt to/from BSC  to/from chair    History of Present Illness: per Dr Newby H&P 6/15/25:  \"Chief Complaint: MARIO (acute kidney injury)  Nithya Neff is a 85 y.o. female who presents with generalized weakness and debility with dehydration and diarrhea symptoms got worse to the point patient wanted to come to the hospital to get checked denies any chest pain orthopnea PND leg pain and leg swelling.  To be admitted to the hospital for the management of dehydration primarily versus MARIO\"    Preadmission Environment:   Pt lives with                                         Alone - dtr comes each day as HHA; dtr states patient can stay with her at DC if needed for 24 hour assist  Home environment:                            senior living apartment   Steps to enter first floor:                     No steps  Laundry:                                              1st floor  Bathroom:                                           walk in shower, grab bars in shower, shower chair, and standard height toilet  Pt sleeps in a:                Tested  Scooting in sitting: SBA and With increased time  Scooting in supine:  Not Tested    Transfers:    Sit to stand:    CGA and With cues for hand placement  Stand to sit:    CGA and With cues for hand placement  Bed to chair:     CGA, With RW and gait belt, and With cues for hand placement  Bed/ chair to standard toilet:  Not Tested  Bed/chair to BSC:   Not Tested  Functional Mobility:   Not Tested-declined due to high pain level    ADLs:  Eating:   Independent    Grooming/hygiene: SBA wash face in bed, MIN A brush hair at EOB (assist to reach crown of head)    Positioning Needs:   Pt reclined in chair and alarm set  Call light provided and all needs within reach    Patient/Family Education:   Pt educated on role of inpatient OT, plan of care, importance of continued activity, DC recommendations, functional transfer/mobility safety, and calling for assist with mobility    CHF Education  N/A    Assessment:  Pt seen for occupational therapy evaluation in the acute care setting this date.  Pt demonstrated decreased Activity tolerance, ADLs, IADLs, Strength, and Transfers. Pt functioning below baseline and will likely benefit from skilled occupational therapy services to maximize safety and independence.     Recommending Home 24 hr assist and with home OT upon discharge as patient functioning below baseline level    Goal(s) :   To be met in 3 Visits:  Bed to toilet/BSC:       SBA  Pt will complete 3/3 CHF goals     N/A    To be met in 5 Visits:  Supine to/from Sit in preparation for ADL task:   Modified Independent  Toileting        Modified Independent  Grooming       Modified Independent  Upper Body Dressing:      Modified Independent  Lower Body Dressing:      Modified Independent  Pt to demonstrate UE therapeutic exs x 15 reps with minimal cues    Rehabilitation Potential: Good  Strengths for achieving goals include: Family Support and Pt cooperative   Barriers to achieving goals include:  Complexity of

## 2025-06-16 NOTE — PROGRESS NOTES
Received consult on patient. Upon chart review, patient is established with [facility]. Esau and CODEY Leyva made aware.

## 2025-06-16 NOTE — PROGRESS NOTES
Patient unable to remember her home medications, according to her she has a list of home medications left at home.

## 2025-06-16 NOTE — PROGRESS NOTES
Cre 1.9 and GFR 17. Nephrologist Dr. Quinn and NP Emerald Lopez both agree patient needs CTA to rule out mesenteric ishemia despite lab levels. Patient on fluids per Emerald Lopez-PMA RT (CT)   no flank pain L/no hematuria/no renal colic/no flank pain R/normal urinary frequency/no bladder infections

## 2025-06-16 NOTE — PLAN OF CARE
Discussed with Dr. Fraire and will need CTA to rule out mesenteric ischemia. Discussed with Dr. Quinn and ok to proceed will order STAT renal function.  Discussed with patient need for contrast CT.  She is aware it could worsen her renal function.  She stated she needs to figure out the cause of her abdominal pain and she is aware it might make her kidney function worse, she stated at one time they talked about dialysis.  Pt is agreeable with plan.         Emerald Lopez, KALI - CNP

## 2025-06-16 NOTE — PROGRESS NOTES
Shift report given to Mitzy at bedside. Patient is stable. All end of shift needs have been met. No further assistance needed at this time.

## 2025-06-16 NOTE — CARE COORDINATION
Remote Patient Monitoring Enrollment Note    Date/Time:  6/16/2025 9:02 AM    Acute Care Manager Sophia Torres offered Nithya Neff enrollment in the [facility] Remote Patient Monitoring (RPM) program for in home monitoring.  Nithya Neff declined RPM services.

## 2025-06-16 NOTE — PROGRESS NOTES
Progress Note    Admit Date:  6/15/2025    Admitted for ongoing diarrhea, dehydration, and blood in stool    Subjective:  Ms. Neff is resting in chair.  Stated her pain, nausea has improved today. She is tolerating clear liquid.    Objective:   Vitals:    06/16/25 0843   BP: (!) 149/51   Pulse: 72   Resp: 18   Temp: 97.9 °F (36.6 °C)   SpO2: 98%           Intake/Output Summary (Last 24 hours) at 6/16/2025 1255  Last data filed at 6/16/2025 1030  Gross per 24 hour   Intake 600 ml   Output --   Net 600 ml       Physical Exam:    Gen: No distress. Alert. Appears chronically ill, pleasant female  Eyes: PERRL. No sclera icterus. No conjunctival injection.   ENT: No discharge. Pharynx clear.   Neck: No JVD.  Trachea midline.  Resp: No accessory muscle use. No crackles. No wheezes. No rhonchi.   CV: Regular rate. Regular rhythm. No murmur.  No rub. No edema.   Capillary Refill: Brisk,< 3 seconds   GI: diffuse TTP Non-distended.  Normal bowel sounds.  Skin: Warm and dry. No nodule on exposed extremities. No rash on exposed extremities.   M/S: No cyanosis. No joint deformity. No clubbing.   Neuro: Awake. Grossly nonfocal    Psych: Oriented x 3. No anxiety or agitation.      Scheduled Meds:   cefTRIAXone (ROCEPHIN) IV  1,000 mg IntraVENous Q24H    metroNIDAZOLE  500 mg IntraVENous Q8H    sodium chloride flush  5-40 mL IntraVENous 2 times per day    aspirin  81 mg Oral Daily    cetirizine  10 mg Oral Daily    hydrALAZINE  50 mg Oral 3 times per day    citalopram  30 mg Oral Daily    valsartan  80 mg Oral Daily    amLODIPine  5 mg Oral Nightly    pantoprazole  40 mg Oral Daily       Continuous Infusions:   sodium bicarbonate 150 mEq in dextrose 5 % 1,000 mL infusion 75 mL/hr at 06/15/25 2027    sodium chloride         PRN Meds:  sodium chloride flush, sodium chloride, magnesium sulfate, ondansetron **OR** ondansetron, polyethylene glycol, acetaminophen **OR** acetaminophen, traZODone, oxyCODONE-acetaminophen      Data:  CBC:  CONTRAST Additional Contrast? None   Final Result   1. Focal areas of wall thickening of the descending colon may reflect mild   colitis or could be due to bowel underdistension.   2. Sigmoid diverticulosis.   3. Cholecystectomy.   4. Hysterectomy.   5. Atherosclerotic disease.         CT HEAD WO CONTRAST   Final Result   No acute intracranial abnormality.              Assessment/Plan:      Intractable abdominal discomfort with diarrhea and blood in stool  Weight loss   Rectal bleeding  -CT scan showed colitis.    -C. difficile negative.   - GI PCR is pending.    -Started the patient on ceftriaxone and Flagyl DC antibiotic based on culture results   - GI consulted   - clear liquid diet  - previous admission in 2/25 for the same.  EGD and colonoscopy completed at that time  - discussed with Dr. Ku, she will call and discuss with vascular regarding concern for mesenteric ischemia  - added Bentyl TID with meals  - tolerating diet at this time  - hold anticoagulation at this time  - monitor hemoglobin on admission 9.7---7.8      MARIO on CKD stage IIIb  NAGMA  - baseline creatinine appears to be 1.4  - creatinine on admission 2.9  - IVF with Bicarb started  - creatinine remains elevated after IVF-- consulted nephrology       HTN  - continue Norvasc  - hold Valsartan with MARIO  - hydralazine was ordered, meds verified not taking that, discontinued   - monitor blood pressure       Chronic diastolic CHF  - echo as above  - monitor closely with IVF  - holding aldactone and Torsemide at this time   - daily weights,low sodium diet and strict I/O      CAD  S/p PCI  - holding aspirin  - resume statin       Hx of aortic stenosis s/p TAVR    Mood disorder  - resumed Zoloft, pt has not been on celexa    Chronic back pain  -on oxycodone 5 mg for tablet every 4 hours as needed which will be continued    Chronic GERD on PPI at home    Chronic insomnia is on trazodone    MARIA C  - doesn't tolerate CPAP    Normocytic anemia- chronic

## 2025-06-16 NOTE — CARE COORDINATION
Case Management Assessment  Initial Evaluation    Date/Time of Evaluation: 6/16/2025 9:00 AM  Assessment Completed by: Sophia Torres    If patient is discharged prior to next notation, then this note serves as note for discharge by case management.    Patient Name: Nithya Neff                   YOB: 1940  Diagnosis: Dehydration [E86.0]  Colitis [K52.9]  Metabolic acidosis [E87.20]  MARIO (acute kidney injury) [N17.9]                   Date / Time: 6/15/2025  3:08 PM    Patient Admission Status: Inpatient   Readmission Risk (Low < 19, Mod (19-27), High > 27): Readmission Risk Score: 20.3    Current PCP: No, Pcp  PCP verified by CM? Yes (Anca Potts)    Chart Reviewed: Yes      History Provided by: Patient  Patient Orientation: Alert and Oriented    Patient Cognition: Alert    Hospitalization in the last 30 days (Readmission):  No    If yes, Readmission Assessment in CM Navigator will be completed.    Advance Directives:      Code Status: Full Code   Patient's Primary Decision Maker is: Legal Next of Kin    Primary Decision Maker: Sonya Neri - Child - [phone]    Secondary Decision Maker: Robert Neff - Child - [phone]    Discharge Planning:    Patient lives with: Children Type of Home: House  Primary Care Giver: Self  Patient Support Systems include: Children   Current Financial resources: Medicare  Current community resources: None  Current services prior to admission: Home Care, Durable Medical Equipment            Current DME: Walker            Type of Home Care services:  Aide Services    ADLS  Prior functional level: Bathing, Dressing, Mobility, Housework, Assistance with the following:, Shopping, Cooking  Current functional level: Assistance with the following:, Housework, Cooking, Shopping, Bathing, Dressing, Mobility    PT AM-PAC:   /24  OT AM-PAC:   /24    Family can provide assistance at DC: No  Would you like Case Management to discuss the discharge plan with any other family

## 2025-06-16 NOTE — FLOWSHEET NOTE
06/15/25 2018   Vital Signs   Temp Source Axillary   Pulse 80   Heart Rate Source Monitor   Respirations 18   BP (!) 133/44   MAP (Calculated) 74   BP Location Right upper arm   BP Method Automatic   Patient Position Semi fowlers   Pain Assessment   Pain Assessment None - Denies Pain   Oxygen Therapy   SpO2 96 %   O2 Device None (Room air)   Height and Weight   Height 1.524 m (5')   Weight - Scale 48.3 kg (106 lb 7.7 oz)   Weight Method Bed scale   BSA (Calculated - sq m) 1.43 sq meters   BMI (Calculated) 20.8     Received from ER awake, alert, oriented x 4. Vital signs are stable.  Respirations are even and easy, no signs or symptoms of distress.  On telemetry. Instructed to be on clear liquid diet. Instructed to call when needs assistance. Call light within reach.

## 2025-06-16 NOTE — PROGRESS NOTES
Pt a/o. Am assessment completed see flow sheet. Pt denies any pain/ needs at this time. Call light within reach.

## 2025-06-16 NOTE — PROGRESS NOTES
Admission assessment done.   Patient was upset as she was already sleeping. Home med list was not done, she was uncooperative with answering admission questions. Called her daughter Sonya, she doesn't know patient's medications.

## 2025-06-16 NOTE — PROGRESS NOTES
Comprehensive Nutrition Assessment    Type and Reason for Visit:  Initial, Positive nutrition screen (+ screen for MST = 2)    Nutrition Recommendations/Plan:   Continue ADULT DIET; Clear Liquid diet order.   Monitor appetite and po intake + for diet advancement.   Monitor nutrition-related labs, bowel function, and weight trends.      Malnutrition Assessment:  Malnutrition Status:  Severe malnutrition (06/16/25 1355)    Context:  Acute Illness     Findings of the 6 clinical characteristics of malnutrition:  Energy Intake:  50% or less of estimated energy requirements for 5 or more days  Weight Loss:  Greater than 7.5% over 3 months (-21# or 16.4% weight loss since 2/22/25)     Body Fat Loss:  Moderate body fat loss Orbital, Triceps   Muscle Mass Loss:  Moderate muscle mass loss Temples (temporalis), Clavicles (pectoralis & deltoids), Scapula (trapezius)  Fluid Accumulation:  No fluid accumulation     Strength:  Not Performed    Nutrition Assessment:    patient is nutritionally compromised - inadequate oral intake r/t altered GI function, diarrhea, and pain AEB abdominal pain, diarrhea PTA, poor po intake PTA, and weight loss PTA; she is at risk for further compromise d/t CL diet order, GI abnormality, and lack of appetite; will continue ADULT DIET; Clear Liquid diet order    Nutrition Related Findings:    patient is irritable and not cooperative at this time; patient presented with altered mental status, abdominal pain, diarrhea, and lethargy; per GI note, abdominal pain likely due to mesenteric ischemia + possible ischemic colitis; patient lives alone and uses a walker; she has HHA everyday; + BM on 6/15/25; patient is on CL diet order and consumed 120 ml x 1 on 6/15/25 and 480 ml x 1 on 6/16/25 Wound Type: None       Current Nutrition Intake & Therapies:    Average Meal Intake: 1-25%, %  Average Supplements Intake: None Ordered  ADULT DIET; Clear Liquid    Anthropometric Measures:  Height: 152.4 cm

## 2025-06-16 NOTE — PLAN OF CARE
Problem: Safety - Adult  Goal: Free from fall injury  Outcome: Progressing   Call light in reach, bed alarm on for safety. Patient able to make needs known.

## 2025-06-16 NOTE — PROGRESS NOTES
New orders noted. Patient aware. Patient resting in bed, patient able to make needs. Call light in reach.  Vitals:    06/16/25 1342   BP: (!) 138/55   Pulse: 75   Resp: 18   Temp: 97.5 °F (36.4 °C)   SpO2: 98%

## 2025-06-16 NOTE — CONSULTS
GASTROENTEROLOGY INPATIENT CONSULTATION        IDENTIFYING DATA/REASON FOR CONSULTATION   PATIENT:  Nithya Neff  MRN:  [id number]  ADMIT DATE: 6/15/2025  TIME OF EVALUATION: 6/16/2025 1:29 PM  HOSPITAL STAY:   LOS: 1 day     REASON FOR CONSULTATION:  diarrhea, some blood in stool    HISTORY OF PRESENT ILLNESS   Nithya Neff is a 85 y.o. female with a PMH of CAD, non-melanoma skin cancer, CHF, COPD, HTN, TIA, aortic stenosis s/p TAVR, appendectomy, cholecystectomy, hysterectomy who presented on 6/15/2025 with abdominal pain, diarrhea and blood in stool.        The patient states she has chronic diarrhea and has intermittent rectal bleeding for the past 3 months.  Recently diarrhea is worsening.  She also has chronic 10/10 epigastric pain.  She follows with Dr. Del Real in the office.  The patient notes intermittent dark and bright red blood on wiping.  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      CT A/P with possible colitis.  Hgb 9.4 with MCV 78.7. Lactic acid is normal.  Cr 2.9. K 5.3. Alk phos 154 otherwise normal LFTs. Troponin 29. Hemoccult positive. C.diff negative. GI bacterial pathogens negative.    Prior Endoscopic Evaluations:  EGD and Colonoscopy 2/2025 with Dr. Del Real:  -EGD: Esophagitis. Biopsied. A single submucosal papule (nodule) found in the stomach. Biopsied. Normal examined duodenum.   -Colonoscopy: Preparation of the colon was poor. Diverticulosis in the sigmoid colon. Mucosal ulceration at the ileocecal valve. Biopsied. Biopsies were taken with a cold forceps from the right colon for evaluation of microscopic colitis.    FINAL DIAGNOSIS:     A.  Gastric nodule, biopsies:   - Mild chronic gastritis with reactive change.   - Negative for dysplasia or malignancy.     B.  Distal esophagus, biopsies:   - Benign squamous mucosa.   - Negative for eosinophilic esophagitis, intestinal metaplasia,   dysplasia, or malignancy.   - PAS stain is negative for

## 2025-06-16 NOTE — FLOWSHEET NOTE
06/16/25 1942   Handoff   Communication Given Shift Handoff   Handoff Given To Marlon   Handoff Received From Esteban   Handoff Communication Face to Face;At bedside   Time Handoff Given 1935   End of Shift Check Performed Yes

## 2025-06-16 NOTE — PROGRESS NOTES
4 Eyes Skin Assessment     NAME:  Nithya Neff  YOB: 1940  MEDICAL RECORD NUMBER:  [id number]    The patient is being assessed for  Admission    I agree that at least one RN has performed a thorough Head to Toe Skin Assessment on the patient. ALL assessment sites listed below have been assessed.      Areas assessed by both nurses:    Head, Face, Ears, Shoulders, Back, Chest, Arms, Elbows, Hands, Sacrum. Buttock, Coccyx, Ischium, Legs. Feet and Heels, Under Medical Devices , and Other          Does the Patient have a Wound? No noted wound(s)       Mihir Prevention initiated by RN: No  Wound Care Orders initiated by RN: No    Pressure Injury (Stage 3,4, Unstageable, DTI, NWPT, and Complex wounds) if present, place Wound referral order by RN under : No    New Ostomies, if present place, Ostomy referral order under : No     Nurse 1 eSignature: Electronically signed by Marlon Kelley RN on 6/16/25 at 6:35 AM EDT    **SHARE this note so that the co-signing nurse can place an eSignature**    Nurse 2 eSignature: Electronically signed by Jenise Duenas RN on 6/16/25 at 7:05 AM EDT

## 2025-06-16 NOTE — PROGRESS NOTES
Inpatient Physical Therapy Evaluation & Treatment    Unit: 2 [place]  Date:  6/16/2025  Patient Name:    Nithya Neff  Admitting diagnosis:  Dehydration [E86.0]  Colitis [K52.9]  Metabolic acidosis [E87.20]  MARIO (acute kidney injury) [N17.9]  Admit Date:  6/15/2025  Precautions/Restrictions/WB Status/ Lines/ Wounds/ Oxygen: Fall risk, Bed/chair alarm, and Lines (IV)      Pt seen for cotreatment this date due to patient safety, patient endurance, acute illness/injury, and limited functional status information    Treatment Time:  2230- 8061  Treatment Number:  1   Timed Code Treatment Minutes: 43 minutes  Total Treatment Minutes:  53  minutes    Patient Stated Goals for Therapy: \" go home \"          Discharge Recommendations: Home with initial 24 hr assistance and Home PT  DME needs for discharge: Needs Met       Therapy recommendation for EMS Transport: NA    Therapy recommendations for staff:   Assist of 1 for ambulation with use of rolling walker (RW) and gait belt to/from BSC  to/from chair    History of Present Illness:   Per Dr Newby H&P:  Pt is an 85 y.o. female who presents with generalized weakness and debility with dehydration and diarrhea symptoms got worse to the point patient wanted to come to the hospital to get checked denies any chest pain orthopnea PND leg pain and leg swelling.  To be admitted to the hospital for the management of dehydration v primarily     Preadmission Environment:   Pt lives with                                         Alone - dtr comes each day   Home environment:                            senior living apartment   Steps to enter first floor:                     No steps  Laundry:                                              1st floor  Bathroom:                                           walk in shower, grab bars in shower, shower chair, and standard height toilet  Pt sleeps in a:                                     adjustable bed, has a set of three homemade steps up to  Lumbar lordosis, and Posterior pelvic tilt  Standing: Forward head and neck, Thoracic kyphosis, and Forward flexed at hips/trunk    Bed Mobility   Supine to Sit:    CGA and HOB elevated   Sit to Supine:   Not Tested  Rolling:   Not Tested  Scooting in sitting: SBA and With increased time  Scooting in supine:  Not Tested   Bridging:  Not Tested  Comments:     Transfer Training     Sit to stand:   With RW and gait belt and With cues for hand placement  Stand to sit:   CGA, With RW and gait belt, and With cues for hand placement  Bed to/from Chair:  CGA, With RW and gait belt, and With cues for hand placement   Comments:     Gait gait deferred due to reports of increased headache and abdominal pain; pt ambulated 0 ft.     Stair Training deferred, pt does not have stairs in home environment    Therapeutic Exercises Initiated  deferred secondary to treatment focus on functional mobility    Positioning Needs   Pt up in chair, reclined in chair, and alarm set  Call light provided and all needs within reach  RN aware of pt position/status    Other Activities  See OT note for ADLs completed    Patient/Family Education   Pt educated on role of inpatient PT, POC, importance of continued activity, DC recommendations, functional transfer/mobility safety, transfer techniques, and calling for assist with mobility.    Assessment  Pt seen today for physical therapy Evaluation & Treatment. Pt demonstrated decreased Activity tolerance, Balance, Safety, and Strength as well as decreased independence with Ambulation, Bed Mobility , and Transfers. Pt is an 84 y/o femle who presents below her functional baseline. Pt declining ambulation this date due to reports of increased pain  Pt would continue to benefit from skilled PT services to promote increased strength, balance and functional activity tolerance. Recommend continued skilled PT services upon discharge.       Recommending Home 24 hr assist and with home PT upon discharge as patient

## 2025-06-16 NOTE — H&P
V2.0  History and Physical      Name:  Nithya Neff /Age/Sex: 1940  (85 y.o. female)   MRN & CSN:  [id number] & [id number] Encounter Date/Time: 6/15/2025 9:05 PM EDT   Location:  [facility] PCP: Esperanza, Pcp       Hospital Day: 1    Assessment and Plan:   Nithya Neff is a 85 y.o. female  who presents with MARIO (acute kidney injury)    Hospital Problems           Last Modified POA    * (Principal) MARIO (acute kidney injury) 6/15/2025 Yes       Hospital course until today      Assessment and Plan:  Intractable abdominal discomfort with diarrhea and blood in stool.  CT scan showed colitis.  C. difficile negative.  GI PCR is pending.  Started the patient on ceftriaxone and Flagyl DC antibiotic based on culture results consider GI consultation.  Non-anion gap metabolic acidosis in the setting of diarrhea currently on sodium bicarb infusion  Mood disorder on Celexa at home  Benign essential hypertension on Norvasc and valsartan at home  Chronic back pains on oxycodone 5 mg for tablet every 4 hours as needed which will be continued  Chronic GERD on PPI at home  Chronic insomnia is on trazodone        Advance Care Planning    10 minutes were spent discussing the patient's resuscitation status, advance care planning, and end of life care with the patient and/or family/surrogate.    The patient and/or family/surrogate voluntarily agreed to participate in ACP services.    Patient's cognitive capacity: Alert and oriented X3    I answered all the patient/family questions that I could within the range and scope of the current medical situation.  We discussed the medical conditions, risks, benefits, outcomes, and goals of care at this time for the patient's medical issues at hand in the face of the patient's chronic issues and current presentation.    Summary of Discussion:     Outcome:    Home Meds Documented: [] Yes [x] Reconciled as much as possible (based on acuity) through chart review and patient discussion, need

## 2025-06-16 NOTE — CONSULTS
[facility] Consult Note           Reason for Consult:  Acute on chronic kidney disease stage 4  Requesting Physician:  Dr. Lopez    Chief Complaint:  Abdominal pain, diarrhea  History Obtained From:  patient, electronic medical record    History of Present Ilness:    85 year old female with CAD, diastolic CHF, COPD, HTN, Aortic stenosis s/p TAVR who presents with abdominal pain, diarrhea.  We have been asked to assist in further mgmt of her MARIO/CKD-4.    SCr 2.9 on admission, last at 2.6 with initial IVF's, prior 1.2-1.4 from Feb 2025.  +abdominal pain/diarrhea for past few months with ~80 pounds weight loss.  CT A/P with colitis/bowel under-distention.  +weak, +intake reduced, no shortness of breath, no fevers.    Past Medical History:        Diagnosis Date    MARIO (acute kidney injury) 11/24/2021    Altered mental status 11/30/2021    Arthritis     BCC (basal cell carcinoma of skin)     RT clavicle    Bladder infection     frequently    Blurred vision 9/17/2022    CAD (coronary artery disease)     stents    Cancer (HCC)     skin    CHF (congestive heart failure) (HCC)     Chronic back pain     Closed head injury 12/1/2021    COPD (chronic obstructive pulmonary disease) (HCC)     Depression     Depression     Hypercholesteremia     Hypertension     Hypokalemia 1/16/2012    Pain in shoulder [id number]    pain in left shoulder, taking steroid injections for steroid    Painful total knee replacement, initial encounter 10/18/2018    Reflux     Rheumatic fever     as a child    Sleep apnea     uses CPAP    Syncope and collapse 11/30/2021    TIA (transient ischemic attack)     Urinary incontinence     Urinary tract infection in female     Wears glasses        Past Surgical History:        Procedure Laterality Date    AORTIC VALVE REPLACEMENT      APPENDECTOMY      BLADDER REPAIR      3 TIMES    CARDIAC SURGERY      stents    CARPAL TUNNEL RELEASE      RIGHT     nt, nd, no hepatosplenomegaly  Ext: no lower extremity edema  Psychiatric: mood and affect appropriate; judgement and insight intact  Musculoskeletal:  Rom, muscular strength limited; digits, nails normal    Data/  CBC:   Lab Results   Component Value Date/Time    WBC 11.8 06/16/2025 05:51 AM    RBC 3.12 06/16/2025 05:51 AM    HGB 7.8 06/16/2025 05:51 AM    HCT 24.6 06/16/2025 05:51 AM    MCV 78.7 06/16/2025 05:51 AM    MCH 25.1 06/16/2025 05:51 AM    MCHC 31.8 06/16/2025 05:51 AM    RDW 16.4 06/16/2025 05:51 AM     06/16/2025 05:51 AM    MPV 6.8 06/16/2025 05:51 AM     BMP:    Lab Results   Component Value Date/Time     06/16/2025 05:51 AM    K 4.2 06/16/2025 05:51 AM     06/16/2025 05:51 AM    CO2 19 06/16/2025 05:51 AM    BUN 73 06/16/2025 05:51 AM    CREATININE 2.6 06/16/2025 05:51 AM    CALCIUM 7.8 06/16/2025 05:51 AM    GFRAA 17 10/13/2022 03:43 PM    GFRAA >60 05/22/2012 11:41 AM    LABGLOM 17 06/16/2025 05:51 AM    LABGLOM 34 04/14/2024 06:24 AM    GLUCOSE 147 06/16/2025 05:51 AM         Assessment/    - Acute kidney injury - pre-renal/renal hypoperfusion injury    - Chronic kidney disease stage 3b    - Anion-gap metabolic acidosis - lactate wnl      Plan/    - Continue IVF's with bicarbonate replacement at 75 ml/hour  - Torsemide, Aldactone, Valsartan on hold  - UA with microscopy, urine electrolytes  - Trend labs, bp's, weights, & urine output    Addendum/  Okay for CTA from renal standpoint now that SCr improving with IVF's      Thank you for the consultation.  Please do not hesitate to call with questions.    ____________________________________  Saud Quinn MD  [facility]  [email or web address]  Office: [phone]

## 2025-06-16 NOTE — PROGRESS NOTES
Spoke with Dr. Fraire Vascular surgery.  He has reviewed mesenteric doppler and CT abdomen, and appreciates the patient's abdominal pain is likely due to mesenteric ischemia.  If the patient is interested in surgery for this, she will require a CT angiogram. Will discuss with Dr. Quinn to see if CT angiogram possible with renal function\dialysis as an option.  Will discuss findings with patient tomorrow.

## 2025-06-17 ENCOUNTER — HOSPITAL ENCOUNTER (INPATIENT)
Age: 85
LOS: 9 days | Discharge: HOME HEALTH | DRG: 981 | End: 2025-06-26
Attending: INTERNAL MEDICINE | Admitting: INTERNAL MEDICINE
Payer: MEDICARE

## 2025-06-17 ENCOUNTER — PREP FOR PROCEDURE (OUTPATIENT)
Dept: VASCULAR SURGERY | Age: 85
End: 2025-06-17

## 2025-06-17 VITALS
HEIGHT: 60 IN | HEART RATE: 79 BPM | BODY MASS INDEX: 21.55 KG/M2 | RESPIRATION RATE: 16 BRPM | WEIGHT: 109.79 LBS | OXYGEN SATURATION: 96 % | TEMPERATURE: 97.7 F | DIASTOLIC BLOOD PRESSURE: 65 MMHG | SYSTOLIC BLOOD PRESSURE: 143 MMHG

## 2025-06-17 DIAGNOSIS — K55.1 MESENTERIC ARTERY STENOSIS: ICD-10-CM

## 2025-06-17 DIAGNOSIS — R63.4 WEIGHT LOSS: Primary | ICD-10-CM

## 2025-06-17 PROBLEM — K55.9 MESENTERIC ISCHEMIA: Status: ACTIVE | Noted: 2025-06-17

## 2025-06-17 LAB
ALBUMIN SERPL-MCNC: 2.5 G/DL (ref 3.4–5)
ALP SERPL-CCNC: 169 U/L (ref 40–129)
ALT SERPL-CCNC: 10 U/L (ref 10–40)
ANION GAP SERPL CALCULATED.3IONS-SCNC: 12 MMOL/L (ref 3–16)
ANION GAP SERPL CALCULATED.3IONS-SCNC: 15 MMOL/L (ref 3–16)
AST SERPL-CCNC: 16 U/L (ref 15–37)
BASOPHILS # BLD: 0 K/UL (ref 0–0.2)
BASOPHILS # BLD: 0 K/UL (ref 0–0.2)
BASOPHILS NFR BLD: 0.2 %
BASOPHILS NFR BLD: 0.3 %
BILIRUB DIRECT SERPL-MCNC: 0.2 MG/DL (ref 0–0.3)
BILIRUB INDIRECT SERPL-MCNC: 0.1 MG/DL (ref 0–1)
BILIRUB SERPL-MCNC: 0.3 MG/DL (ref 0–1)
BILIRUB UR QL STRIP.AUTO: NEGATIVE
BUN SERPL-MCNC: 35 MG/DL (ref 7–20)
BUN SERPL-MCNC: 45 MG/DL (ref 7–20)
CALCIUM SERPL-MCNC: 7.1 MG/DL (ref 8.3–10.6)
CALCIUM SERPL-MCNC: 8.1 MG/DL (ref 8.3–10.6)
CHLORIDE SERPL-SCNC: 100 MMOL/L (ref 99–110)
CHLORIDE SERPL-SCNC: 101 MMOL/L (ref 99–110)
CHLORIDE UR-SCNC: 21 MMOL/L
CLARITY UR: CLEAR
CO2 SERPL-SCNC: 26 MMOL/L (ref 21–32)
CO2 SERPL-SCNC: 26 MMOL/L (ref 21–32)
COLOR UR: YELLOW
CREAT SERPL-MCNC: 1.4 MG/DL (ref 0.6–1.2)
CREAT SERPL-MCNC: 1.5 MG/DL (ref 0.6–1.2)
DEPRECATED RDW RBC AUTO: 16.1 % (ref 12.4–15.4)
DEPRECATED RDW RBC AUTO: 16.3 % (ref 12.4–15.4)
EOSINOPHIL # BLD: 0 K/UL (ref 0–0.6)
EOSINOPHIL # BLD: 0.1 K/UL (ref 0–0.6)
EOSINOPHIL NFR BLD: 0.4 %
EOSINOPHIL NFR BLD: 1.1 %
EPI CELLS #/AREA URNS HPF: ABNORMAL /HPF (ref 0–5)
GFR SERPLBLD CREATININE-BSD FMLA CKD-EPI: 34 ML/MIN/{1.73_M2}
GFR SERPLBLD CREATININE-BSD FMLA CKD-EPI: 37 ML/MIN/{1.73_M2}
GLUCOSE SERPL-MCNC: 104 MG/DL (ref 70–99)
GLUCOSE SERPL-MCNC: 127 MG/DL (ref 70–99)
GLUCOSE UR STRIP.AUTO-MCNC: NEGATIVE MG/DL
HCT VFR BLD AUTO: 23.5 % (ref 36–48)
HCT VFR BLD AUTO: 28 % (ref 36–48)
HGB BLD-MCNC: 7.6 G/DL (ref 12–16)
HGB BLD-MCNC: 9 G/DL (ref 12–16)
HGB UR QL STRIP.AUTO: NEGATIVE
KETONES UR STRIP.AUTO-MCNC: NEGATIVE MG/DL
LEUKOCYTE ESTERASE UR QL STRIP.AUTO: ABNORMAL
LYMPHOCYTES # BLD: 0.7 K/UL (ref 1–5.1)
LYMPHOCYTES # BLD: 0.9 K/UL (ref 1–5.1)
LYMPHOCYTES NFR BLD: 7.4 %
LYMPHOCYTES NFR BLD: 8 %
MAGNESIUM SERPL-MCNC: 1.58 MG/DL (ref 1.8–2.4)
MAGNESIUM SERPL-MCNC: 2.29 MG/DL (ref 1.8–2.4)
MCH RBC QN AUTO: 25.5 PG (ref 26–34)
MCH RBC QN AUTO: 25.6 PG (ref 26–34)
MCHC RBC AUTO-ENTMCNC: 32 G/DL (ref 31–36)
MCHC RBC AUTO-ENTMCNC: 32.5 G/DL (ref 31–36)
MCV RBC AUTO: 78.4 FL (ref 80–100)
MCV RBC AUTO: 79.9 FL (ref 80–100)
MONOCYTES # BLD: 0.6 K/UL (ref 0–1.3)
MONOCYTES # BLD: 0.6 K/UL (ref 0–1.3)
MONOCYTES NFR BLD: 5 %
MONOCYTES NFR BLD: 6.4 %
NEUTROPHILS # BLD: 8.2 K/UL (ref 1.7–7.7)
NEUTROPHILS # BLD: 9.6 K/UL (ref 1.7–7.7)
NEUTROPHILS NFR BLD: 84.9 %
NEUTROPHILS NFR BLD: 86.3 %
NITRITE UR QL STRIP.AUTO: NEGATIVE
PH UR STRIP.AUTO: 6 [PH] (ref 5–8)
PHOSPHATE SERPL-MCNC: 1.8 MG/DL (ref 2.5–4.9)
PLATELET # BLD AUTO: 263 K/UL (ref 135–450)
PLATELET # BLD AUTO: 299 K/UL (ref 135–450)
PMV BLD AUTO: 6.5 FL (ref 5–10.5)
PMV BLD AUTO: 6.6 FL (ref 5–10.5)
POTASSIUM SERPL-SCNC: 3.3 MMOL/L (ref 3.5–5.1)
POTASSIUM SERPL-SCNC: 3.6 MMOL/L (ref 3.5–5.1)
POTASSIUM UR-SCNC: 15.5 MMOL/L
PROT SERPL-MCNC: 6.2 G/DL (ref 6.4–8.2)
PROT UR STRIP.AUTO-MCNC: ABNORMAL MG/DL
RBC # BLD AUTO: 2.99 M/UL (ref 4–5.2)
RBC # BLD AUTO: 3.51 M/UL (ref 4–5.2)
RBC #/AREA URNS HPF: ABNORMAL /HPF (ref 0–4)
SODIUM SERPL-SCNC: 139 MMOL/L (ref 136–145)
SODIUM SERPL-SCNC: 141 MMOL/L (ref 136–145)
SODIUM UR-SCNC: 42 MMOL/L
SP GR UR STRIP.AUTO: <=1.005 (ref 1–1.03)
UA DIPSTICK W REFLEX MICRO PNL UR: YES
URN SPEC COLLECT METH UR: ABNORMAL
UROBILINOGEN UR STRIP-ACNC: 0.2 E.U./DL
WBC # BLD AUTO: 11.2 K/UL (ref 4–11)
WBC # BLD AUTO: 9.6 K/UL (ref 4–11)
WBC #/AREA URNS HPF: ABNORMAL /HPF (ref 0–5)

## 2025-06-17 PROCEDURE — 2580000003 HC RX 258

## 2025-06-17 PROCEDURE — 2060000000 HC ICU INTERMEDIATE R&B

## 2025-06-17 PROCEDURE — 80048 BASIC METABOLIC PNL TOTAL CA: CPT

## 2025-06-17 PROCEDURE — 36415 COLL VENOUS BLD VENIPUNCTURE: CPT

## 2025-06-17 PROCEDURE — 6370000000 HC RX 637 (ALT 250 FOR IP): Performed by: STUDENT IN AN ORGANIZED HEALTH CARE EDUCATION/TRAINING PROGRAM

## 2025-06-17 PROCEDURE — 87329 GIARDIA AG IA: CPT

## 2025-06-17 PROCEDURE — 2580000003 HC RX 258: Performed by: INTERNAL MEDICINE

## 2025-06-17 PROCEDURE — 85025 COMPLETE CBC W/AUTO DIFF WBC: CPT

## 2025-06-17 PROCEDURE — 99239 HOSP IP/OBS DSCHRG MGMT >30: CPT | Performed by: INTERNAL MEDICINE

## 2025-06-17 PROCEDURE — 87328 CRYPTOSPORIDIUM AG IA: CPT

## 2025-06-17 PROCEDURE — 6370000000 HC RX 637 (ALT 250 FOR IP): Performed by: INTERNAL MEDICINE

## 2025-06-17 PROCEDURE — 83993 ASSAY FOR CALPROTECTIN FECAL: CPT

## 2025-06-17 PROCEDURE — 84300 ASSAY OF URINE SODIUM: CPT

## 2025-06-17 PROCEDURE — 82436 ASSAY OF URINE CHLORIDE: CPT

## 2025-06-17 PROCEDURE — 84133 ASSAY OF URINE POTASSIUM: CPT

## 2025-06-17 PROCEDURE — 87336 ENTAMOEB HIST DISPR AG IA: CPT

## 2025-06-17 PROCEDURE — 6370000000 HC RX 637 (ALT 250 FOR IP): Performed by: NURSE PRACTITIONER

## 2025-06-17 PROCEDURE — 84100 ASSAY OF PHOSPHORUS: CPT

## 2025-06-17 PROCEDURE — 80076 HEPATIC FUNCTION PANEL: CPT

## 2025-06-17 PROCEDURE — 83735 ASSAY OF MAGNESIUM: CPT

## 2025-06-17 PROCEDURE — 81001 URINALYSIS AUTO W/SCOPE: CPT

## 2025-06-17 PROCEDURE — 6360000002 HC RX W HCPCS: Performed by: INTERNAL MEDICINE

## 2025-06-17 PROCEDURE — 6360000002 HC RX W HCPCS

## 2025-06-17 PROCEDURE — 2500000003 HC RX 250 WO HCPCS: Performed by: INTERNAL MEDICINE

## 2025-06-17 RX ORDER — SODIUM CHLORIDE 9 MG/ML
INJECTION, SOLUTION INTRAVENOUS CONTINUOUS
Status: DISCONTINUED | OUTPATIENT
Start: 2025-06-17 | End: 2025-06-19

## 2025-06-17 RX ORDER — VALSARTAN 80 MG/1
80 TABLET ORAL DAILY
Status: DISCONTINUED | OUTPATIENT
Start: 2025-06-17 | End: 2025-06-19

## 2025-06-17 RX ORDER — SODIUM CHLORIDE 9 MG/ML
INJECTION, SOLUTION INTRAVENOUS PRN
Status: DISCONTINUED | OUTPATIENT
Start: 2025-06-17 | End: 2025-06-19

## 2025-06-17 RX ORDER — SODIUM CHLORIDE 0.9 % (FLUSH) 0.9 %
5-40 SYRINGE (ML) INJECTION PRN
Status: DISCONTINUED | OUTPATIENT
Start: 2025-06-17 | End: 2025-06-19

## 2025-06-17 RX ORDER — ATORVASTATIN CALCIUM 40 MG/1
40 TABLET, FILM COATED ORAL DAILY
Status: DISCONTINUED | OUTPATIENT
Start: 2025-06-18 | End: 2025-06-19

## 2025-06-17 RX ORDER — OXYCODONE AND ACETAMINOPHEN 5; 325 MG/1; MG/1
1 TABLET ORAL EVERY 4 HOURS PRN
Refills: 0 | Status: DISCONTINUED | OUTPATIENT
Start: 2025-06-17 | End: 2025-06-17

## 2025-06-17 RX ORDER — ONDANSETRON 2 MG/ML
4 INJECTION INTRAMUSCULAR; INTRAVENOUS EVERY 6 HOURS PRN
Status: DISCONTINUED | OUTPATIENT
Start: 2025-06-17 | End: 2025-06-17

## 2025-06-17 RX ORDER — HYDROMORPHONE HYDROCHLORIDE 1 MG/ML
1 INJECTION, SOLUTION INTRAMUSCULAR; INTRAVENOUS; SUBCUTANEOUS
Status: DISCONTINUED | OUTPATIENT
Start: 2025-06-17 | End: 2025-06-19

## 2025-06-17 RX ORDER — MAGNESIUM SULFATE IN WATER 40 MG/ML
2000 INJECTION, SOLUTION INTRAVENOUS PRN
Status: DISCONTINUED | OUTPATIENT
Start: 2025-06-17 | End: 2025-06-19

## 2025-06-17 RX ORDER — ACETAMINOPHEN 325 MG/1
650 TABLET ORAL EVERY 6 HOURS PRN
Status: DISCONTINUED | OUTPATIENT
Start: 2025-06-17 | End: 2025-06-19

## 2025-06-17 RX ORDER — DICYCLOMINE HYDROCHLORIDE 10 MG/1
10 CAPSULE ORAL
Status: DISCONTINUED | OUTPATIENT
Start: 2025-06-17 | End: 2025-06-19

## 2025-06-17 RX ORDER — METRONIDAZOLE 500 MG/100ML
500 INJECTION, SOLUTION INTRAVENOUS EVERY 8 HOURS
Status: DISCONTINUED | OUTPATIENT
Start: 2025-06-17 | End: 2025-06-18

## 2025-06-17 RX ORDER — PANTOPRAZOLE SODIUM 40 MG/1
40 TABLET, DELAYED RELEASE ORAL
Status: DISCONTINUED | OUTPATIENT
Start: 2025-06-18 | End: 2025-06-19

## 2025-06-17 RX ORDER — ACETAMINOPHEN 650 MG/1
650 SUPPOSITORY RECTAL EVERY 6 HOURS PRN
Status: DISCONTINUED | OUTPATIENT
Start: 2025-06-17 | End: 2025-06-19

## 2025-06-17 RX ORDER — AMLODIPINE BESYLATE 5 MG/1
5 TABLET ORAL NIGHTLY
Status: DISCONTINUED | OUTPATIENT
Start: 2025-06-17 | End: 2025-06-19

## 2025-06-17 RX ORDER — POTASSIUM CHLORIDE 750 MG/1
20 TABLET, EXTENDED RELEASE ORAL ONCE
Status: COMPLETED | OUTPATIENT
Start: 2025-06-17 | End: 2025-06-17

## 2025-06-17 RX ORDER — PROCHLORPERAZINE EDISYLATE 5 MG/ML
10 INJECTION INTRAMUSCULAR; INTRAVENOUS EVERY 6 HOURS PRN
Status: DISCONTINUED | OUTPATIENT
Start: 2025-06-17 | End: 2025-06-19

## 2025-06-17 RX ORDER — ONDANSETRON 4 MG/1
4 TABLET, ORALLY DISINTEGRATING ORAL EVERY 8 HOURS PRN
Status: DISCONTINUED | OUTPATIENT
Start: 2025-06-17 | End: 2025-06-17

## 2025-06-17 RX ORDER — SODIUM CHLORIDE 0.9 % (FLUSH) 0.9 %
5-40 SYRINGE (ML) INJECTION EVERY 12 HOURS SCHEDULED
Status: DISCONTINUED | OUTPATIENT
Start: 2025-06-17 | End: 2025-06-19

## 2025-06-17 RX ORDER — POLYETHYLENE GLYCOL 3350 17 G/17G
17 POWDER, FOR SOLUTION ORAL DAILY PRN
Status: DISCONTINUED | OUTPATIENT
Start: 2025-06-17 | End: 2025-06-19

## 2025-06-17 RX ORDER — OXYCODONE AND ACETAMINOPHEN 7.5; 325 MG/1; MG/1
1 TABLET ORAL EVERY 6 HOURS PRN
Refills: 0 | Status: DISCONTINUED | OUTPATIENT
Start: 2025-06-17 | End: 2025-06-19

## 2025-06-17 RX ORDER — SODIUM CHLORIDE 9 MG/ML
INJECTION, SOLUTION INTRAVENOUS CONTINUOUS
Status: DISCONTINUED | OUTPATIENT
Start: 2025-06-17 | End: 2025-06-17 | Stop reason: HOSPADM

## 2025-06-17 RX ORDER — ONDANSETRON 2 MG/ML
4 INJECTION INTRAMUSCULAR; INTRAVENOUS EVERY 6 HOURS PRN
Status: DISCONTINUED | OUTPATIENT
Start: 2025-06-17 | End: 2025-06-19

## 2025-06-17 RX ORDER — SODIUM CHLORIDE 9 MG/ML
INJECTION, SOLUTION INTRAVENOUS CONTINUOUS
Status: DISCONTINUED | OUTPATIENT
Start: 2025-06-17 | End: 2025-06-17

## 2025-06-17 RX ORDER — TRAZODONE HYDROCHLORIDE 50 MG/1
100 TABLET ORAL NIGHTLY PRN
Status: DISCONTINUED | OUTPATIENT
Start: 2025-06-17 | End: 2025-06-19

## 2025-06-17 RX ORDER — ONDANSETRON 4 MG/1
4 TABLET, ORALLY DISINTEGRATING ORAL EVERY 6 HOURS PRN
Status: DISCONTINUED | OUTPATIENT
Start: 2025-06-17 | End: 2025-06-19

## 2025-06-17 RX ADMIN — DICYCLOMINE HYDROCHLORIDE 10 MG: 10 CAPSULE ORAL at 10:10

## 2025-06-17 RX ADMIN — MAGNESIUM SULFATE HEPTAHYDRATE 2000 MG: 40 INJECTION, SOLUTION INTRAVENOUS at 09:37

## 2025-06-17 RX ADMIN — SODIUM CHLORIDE: 0.9 INJECTION, SOLUTION INTRAVENOUS at 18:50

## 2025-06-17 RX ADMIN — AMLODIPINE BESYLATE 5 MG: 5 TABLET ORAL at 21:15

## 2025-06-17 RX ADMIN — PANTOPRAZOLE SODIUM 40 MG: 40 TABLET, DELAYED RELEASE ORAL at 09:07

## 2025-06-17 RX ADMIN — ONDANSETRON 4 MG: 4 TABLET, ORALLY DISINTEGRATING ORAL at 09:32

## 2025-06-17 RX ADMIN — SERTRALINE HYDROCHLORIDE 50 MG: 50 TABLET ORAL at 09:07

## 2025-06-17 RX ADMIN — OXYCODONE HYDROCHLORIDE AND ACETAMINOPHEN 1 TABLET: 7.5; 325 TABLET ORAL at 21:15

## 2025-06-17 RX ADMIN — SODIUM CHLORIDE 1000 MG: 9 INJECTION, SOLUTION INTRAVENOUS at 00:04

## 2025-06-17 RX ADMIN — TRAZODONE HYDROCHLORIDE 100 MG: 50 TABLET ORAL at 21:15

## 2025-06-17 RX ADMIN — Medication 10 ML: at 09:10

## 2025-06-17 RX ADMIN — ASPIRIN 81 MG: 81 TABLET, COATED ORAL at 09:07

## 2025-06-17 RX ADMIN — ATORVASTATIN CALCIUM 40 MG: 40 TABLET, FILM COATED ORAL at 10:10

## 2025-06-17 RX ADMIN — DICYCLOMINE HYDROCHLORIDE 10 MG: 10 CAPSULE ORAL at 06:32

## 2025-06-17 RX ADMIN — OXYCODONE HYDROCHLORIDE AND ACETAMINOPHEN 1 TABLET: 5; 325 TABLET ORAL at 09:09

## 2025-06-17 RX ADMIN — DICYCLOMINE HYDROCHLORIDE 10 MG: 10 CAPSULE ORAL at 18:50

## 2025-06-17 RX ADMIN — POTASSIUM CHLORIDE 20 MEQ: 750 TABLET, EXTENDED RELEASE ORAL at 12:15

## 2025-06-17 RX ADMIN — ONDANSETRON 4 MG: 4 TABLET, ORALLY DISINTEGRATING ORAL at 19:57

## 2025-06-17 RX ADMIN — METRONIDAZOLE 500 MG: 500 INJECTION, SOLUTION INTRAVENOUS at 12:17

## 2025-06-17 RX ADMIN — SODIUM BICARBONATE: 84 INJECTION, SOLUTION INTRAVENOUS at 04:51

## 2025-06-17 RX ADMIN — METRONIDAZOLE 500 MG: 500 INJECTION, SOLUTION INTRAVENOUS at 04:53

## 2025-06-17 RX ADMIN — SODIUM CHLORIDE: 0.9 INJECTION, SOLUTION INTRAVENOUS at 13:56

## 2025-06-17 RX ADMIN — CETIRIZINE HYDROCHLORIDE 10 MG: 10 TABLET ORAL at 09:07

## 2025-06-17 ASSESSMENT — PAIN SCALES - GENERAL
PAINLEVEL_OUTOF10: 9
PAINLEVEL_OUTOF10: 9
PAINLEVEL_OUTOF10: 7
PAINLEVEL_OUTOF10: 8
PAINLEVEL_OUTOF10: 5
PAINLEVEL_OUTOF10: 8
PAINLEVEL_OUTOF10: 3

## 2025-06-17 ASSESSMENT — PAIN DESCRIPTION - ORIENTATION
ORIENTATION: MID
ORIENTATION: LOWER
ORIENTATION: OTHER (COMMENT)

## 2025-06-17 ASSESSMENT — PAIN DESCRIPTION - DESCRIPTORS
DESCRIPTORS: STABBING
DESCRIPTORS: SHOOTING
DESCRIPTORS: ACHING;DULL;DISCOMFORT
DESCRIPTORS: ACHING;THROBBING

## 2025-06-17 ASSESSMENT — PAIN DESCRIPTION - LOCATION
LOCATION: ABDOMEN;BACK;NECK
LOCATION: ABDOMEN
LOCATION: GENERALIZED
LOCATION: GENERALIZED

## 2025-06-17 ASSESSMENT — PAIN SCALES - WONG BAKER: WONGBAKER_NUMERICALRESPONSE: NO HURT

## 2025-06-17 NOTE — PROGRESS NOTES
Shift report given to Carlita at bedside. Patient is stable. All end of shift needs have been met. No further assistance needed at this time.     Statement Selected

## 2025-06-17 NOTE — PROGRESS NOTES
INPATIENT PROGRESS NOTE        IDENTIFYING DATA/REASON FOR CONSULTATION   PATIENT:  Nithya Neff  MRN:  [id number]  ADMIT DATE: 6/15/2025  TIME OF EVALUATION: 6/17/2025 2:52 PM  HOSPITAL STAY:   LOS: 2 days   CONSULTING PHYSICIAN: Sharifa Gregory DO   REASON FOR CONSULTATION: diarrhea, some blood in stool     Subjective:    Patient seen in follow-up.  She is getting ready to transfer to [facility].  No further diarrhea. Last BM was yesterday morning.     MEDICATIONS   SCHEDULED:  dicyclomine, 10 mg, TID AC  sertraline, 50 mg, Daily  atorvastatin, 40 mg, Daily  cefTRIAXone (ROCEPHIN) IV, 1,000 mg, Q24H  metroNIDAZOLE, 500 mg, Q8H  sodium chloride flush, 5-40 mL, 2 times per day  aspirin, 81 mg, Daily  cetirizine, 10 mg, Daily  [Held by provider] valsartan, 80 mg, Daily  amLODIPine, 5 mg, Nightly  pantoprazole, 40 mg, Daily      FLUIDS/DRIPS:     sodium chloride 75 mL/hr at 06/17/25 1356    sodium chloride       PRNs: sodium chloride flush, 5-40 mL, PRN  sodium chloride, , PRN  magnesium sulfate, 2,000 mg, PRN  ondansetron, 4 mg, Q8H PRN   Or  ondansetron, 4 mg, Q6H PRN  polyethylene glycol, 17 g, Daily PRN  acetaminophen, 650 mg, Q6H PRN   Or  acetaminophen, 650 mg, Q6H PRN  traZODone, 100 mg, Nightly PRN  oxyCODONE-acetaminophen, 1 tablet, Q4H PRN      ALLERGIES:    Allergies   Allergen Reactions    Latex     Levofloxacin Other (See Comments)     Burns mouth per patient    Quinolones Hives and Other (See Comments)     Burned the inside of the mouth  Burns mouth per patient  Other reaction(s): hives  Burns mouth per patient  Burns mouth per patient  Other reaction(s): hives      Adhesive Tape Hives     No bandaids  No bandaids  No bandaids    Codeine Nausea Only, Hives, Itching and Nausea And Vomiting    Morphine Hives, Itching and Nausea Only     Other reaction(s): Unknown  Other reaction(s): Unknown  Other reaction(s): Unknown      Azithromycin     Pentazocine Lactate     Cephalexin Hives, Itching and Rash

## 2025-06-17 NOTE — PROGRESS NOTES
Progress Note    Admit Date:  6/15/2025    Admitted for ongoing diarrhea, dehydration, and blood in stool   C/O persistent abd pain. Work up revealing mesentric ischemia     Subjective:  Ms. Neff seen .   Awake, alert.    Abd tender .  She is tolerating clear liquid.    Objective:   Vitals:    06/17/25 0245   BP: (!) 135/57   Pulse: 73   Resp: 18   Temp: 97.8 °F (36.6 °C)   SpO2: 95%           Intake/Output Summary (Last 24 hours) at 6/17/2025 0910  Last data filed at 6/17/2025 0400  Gross per 24 hour   Intake 1889.68 ml   Output 290 ml   Net 1599.68 ml       Physical Exam:  Elderly pt .awake, alert, oriented  No distress. Alert. Appears chronically ill, pleasant female  Eyes: PERRL. No sclera icterus. No conjunctival injection.   ENT: No discharge. Pharynx clear.   Neck: No JVD.  Trachea midline.  Resp: No accessory muscle use. No crackles. No wheezes. No rhonchi.   CV: Regular rate. Regular rhythm. No murmur.  No rub. No edema.   Capillary Refill: Brisk,< 3 seconds   GI: diffuse TTP Non-distended.  Normal bowel sounds.  Skin: Warm and dry. No nodule on exposed extremities. No rash on exposed extremities.   M/S: No cyanosis. No joint deformity. No clubbing.   Neuro: Awake. Grossly nonfocal    Psych: Oriented x 3. No anxiety or agitation.      Scheduled Meds:   dicyclomine  10 mg Oral TID AC    sertraline  50 mg Oral Daily    atorvastatin  40 mg Oral Daily    cefTRIAXone (ROCEPHIN) IV  1,000 mg IntraVENous Q24H    metroNIDAZOLE  500 mg IntraVENous Q8H    sodium chloride flush  5-40 mL IntraVENous 2 times per day    aspirin  81 mg Oral Daily    cetirizine  10 mg Oral Daily    [Held by provider] valsartan  80 mg Oral Daily    amLODIPine  5 mg Oral Nightly    pantoprazole  40 mg Oral Daily       Continuous Infusions:   sodium bicarbonate 150 mEq in dextrose 5 % 1,000 mL infusion 75 mL/hr at 06/17/25 0451    sodium chloride         PRN Meds:  sodium chloride flush, sodium chloride, magnesium sulfate, ondansetron **OR**  ondansetron, polyethylene glycol, acetaminophen **OR** acetaminophen, traZODone, oxyCODONE-acetaminophen      Data:  CBC:   Recent Labs     06/15/25  1547 06/16/25  0551 06/16/25  1616 06/17/25  0555   WBC 13.6* 11.8*  --  9.6   HGB 9.4* 7.8* 8.8* 7.6*   HCT 31.0* 24.6* 26.9* 23.5*   MCV 82.4 78.7*  --  78.4*    279  --  263     BMP:   Recent Labs     06/16/25  0551 06/16/25  1616 06/17/25  0555    136 139   K 4.2 3.8 3.3*    100 101   CO2 19* 20* 26   PHOS  --  2.7  --    BUN 73* 66* 45*   CREATININE 2.6* 1.9* 1.4*     LIVER PROFILE:   Recent Labs     06/15/25  1547   AST 16   ALT 12   BILITOT 0.3   ALKPHOS 154*     PT/INR: No results for input(s): \"PROTIME\", \"INR\" in the last 72 hours.    CULTURES    Results       Procedure Component Value Units Date/Time    GI Bacterial Pathogens By PCR [[id number]] Collected: 06/15/25 1632    Order Status: Completed Specimen: Stool Updated: 06/16/25 1240     GI Bacterial Pathogens By PCR --     No Shigella spp/EIEC DNA detected  No Shiga toxin-producing gene(s) detected  No Campylobacter spp. (jejuni and coli)DNA detected  No Salmonella spp. DNA detected  No Vibrio vulnificus/parahaemolyticus/cholerae DNA detected  No Plesiomonas shigelloides DNA detected  No Enterotoxigenic E. coli (ETEC) DNA detected  No Yersinia enterocolitica DNA detected  Normal Range:  None detected      Narrative:      ORDER#: [id number]                          ORDERED BY: JONI CONNELL  SOURCE: Stool                              COLLECTED:  06/15/25 16:32  ANTIBIOTICS AT [facility].:                      RECEIVED :  06/15/25 16:34    Clostridium difficile toxin/antigen [[id number]] Collected: 06/15/25 1632    Order Status: Completed Specimen: Stool Updated: 06/15/25 1724     C.diff Toxin/Antigen --     Negative for Clostridium difficile antigen and toxin  Normal Range: Negative      Narrative:      ORDER#: [id number]                          ORDERED BY: JONI CONNELL  SOURCE: Stool

## 2025-06-17 NOTE — PROGRESS NOTES
4 Eyes Skin Assessment     NAME:  Nithya Neff  YOB: 1940  MEDICAL RECORD NUMBER:  [id number]    The patient is being assessed for  Transfer to New Unit    I agree that at least one RN has performed a thorough Head to Toe Skin Assessment on the patient. ALL assessment sites listed below have been assessed.      Areas assessed by both nurses:    Head, Face, Ears, Shoulders, Back, Chest, Arms, Elbows, Hands, Sacrum. Buttock, Coccyx, Ischium, Legs. Feet and Heels, and Under Medical Devices         Does the Patient have a Wound? No noted wound(s)       Mihir Prevention initiated by RN: No  Wound Care Orders initiated by RN: No    Pressure Injury (Stage 3,4, Unstageable, DTI, NWPT, and Complex wounds) if present, place Wound referral order by RN under : No    New Ostomies, if present place, Ostomy referral order under : No     Nurse 1 eSignature: Electronically signed by Tayla Antony RN on 6/17/25 at 2:46 PM EDT    **SHARE this note so that the co-signing nurse can place an eSignature**    Nurse 2 eSignature: Electronically signed by Carlita Stone RN on 6/17/25 at 2:47 PM EDT

## 2025-06-17 NOTE — PROGRESS NOTES
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  Discussed with Dr. Timi Fraire of vascular surgery who is suggesting transfer of patient to [facility] for SMA bypass.  Will discuss with Dr. Gray hospitalist.

## 2025-06-17 NOTE — PROGRESS NOTES
Shift assessment complete.  Patient is alert and oriented.  Vital signs checked.  Assisted to bedside commode.   Pt denies any needs at this time.  Call light within reach.

## 2025-06-17 NOTE — PROGRESS NOTES
Pt transferred to [facility] via [facility]. All belonging with pt at time of discharge. Tele removed from pt.

## 2025-06-17 NOTE — PROGRESS NOTES
Pt resting in bed, awake. Denies any needs at this time. AM assessment completed.     Bedside Mobility Assessment Tool (BMAT):     Assessment Level 1- Sit and Shake    1. From a semi-reclined position, ask patient to sit up and rotate to a seated position at the side of the bed. Can use the bedrail.    2. Ask patient to reach out and grab your hand and shake making sure patient reaches across his/her midline.   Pass- Patient is able to come to a seated position, maintain core strength. Maintains seated balance while reaching across midline. Move on to Assessment Level 2.     Assessment Level 2- Stretch and Point   1. With patient in seated position at the side of the bed, have patient place both feet on the floor (or stool) with knees no higher than hips.    2. Ask patient to stretch one leg and straighten the knee, then bend the ankle/flex and point the toes. If appropriate, repeat with the other leg.   Pass- Patient is able to demonstrate appropriate quad strength on intended weight bearing limb(s). Move onto Assessment Level 3.     Assessment Level 3- Stand   1. Ask patient to elevate off the bed or chair (seated to standing) using an assistive device (cane, bedrail).    2. Patient should be able to raise buttocks off be and hold for a count of five. May repeat once.   Pass- Patient maintains standing stability for at least 5 seconds, proceed to assessment level 4.    Assessment Level 4- Walk   1. Ask patient to march in place at bedside.    2. Then ask patient to advance step and return each foot. Some medical conditions may render a patient from stepping backwards, use your best clinical judgement.   Pass- Patient demonstrates balance while shifting weight and ability to step, takes independent steps, does not use assistive device patient is MOBILITY LEVEL 4.      Mobility Level- 4

## 2025-06-17 NOTE — PLAN OF CARE
Problem: Chronic Conditions and Co-morbidities  Goal: Patient's chronic conditions and co-morbidity symptoms are monitored and maintained or improved  6/17/2025 1046 by Tayla Antony RN  Outcome: Progressing  6/17/2025 0015 by Marlon Kelley RN  Outcome: Progressing  Flowsheets (Taken 6/16/2025 2125)  Care Plan - Patient's Chronic Conditions and Co-Morbidity Symptoms are Monitored and Maintained or Improved:   Monitor and assess patient's chronic conditions and comorbid symptoms for stability, deterioration, or improvement   Update acute care plan with appropriate goals if chronic or comorbid symptoms are exacerbated and prevent overall improvement and discharge   Collaborate with multidisciplinary team to address chronic and comorbid conditions and prevent exacerbation or deterioration     Problem: Discharge Planning  Goal: Discharge to home or other facility with appropriate resources  6/17/2025 1046 by Tayla Antony RN  Outcome: Progressing  6/17/2025 0015 by Marlon Kelley RN  Outcome: Progressing  Flowsheets (Taken 6/16/2025 2125)  Discharge to home or other facility with appropriate resources: Identify barriers to discharge with patient and caregiver     Problem: Safety - Adult  Goal: Free from fall injury  6/17/2025 1046 by Tayla Antony RN  Outcome: Progressing  6/17/2025 0015 by Marlon Kelley RN  Outcome: Progressing     Problem: Pain  Goal: Verbalizes/displays adequate comfort level or baseline comfort level  6/17/2025 1046 by Tayla Antony RN  Outcome: Progressing  6/17/2025 0015 by Marlon Kelley RN  Outcome: Progressing  Flowsheets (Taken 6/16/2025 2135)  Verbalizes/displays adequate comfort level or baseline comfort level:   Encourage patient to monitor pain and request assistance   Assess pain using appropriate pain scale   Administer analgesics based on type and severity of pain and evaluate response     Problem: Nutrition Deficit:  Goal: Optimize nutritional

## 2025-06-17 NOTE — PROGRESS NOTES
HEART FAILURE CARE PLAN:    Comorbidities Reviewed: Yes   Patient has a past medical history of MARIO (acute kidney injury), Altered mental status, Arthritis, BCC (basal cell carcinoma of skin), Bladder infection, Blurred vision, CAD (coronary artery disease), Cancer ([facility]), CHF (congestive heart failure) ([facility]), Chronic back pain, Closed head injury, COPD (chronic obstructive pulmonary disease) ([facility]), Depression, Depression, Hypercholesteremia, Hypertension, Hypokalemia, Pain in shoulder, Painful total knee replacement, initial encounter, Reflux, Rheumatic fever, Sleep apnea, Syncope and collapse, TIA (transient ischemic attack), Urinary incontinence, Urinary tract infection in female, and Wears glasses.     Weights Reviewed: Yes   Admission weight: 48.3 kg (106 lb 7.7 oz)   Wt Readings from Last 3 Encounters:   06/15/25 48.3 kg (106 lb 7.7 oz)   05/01/25 53.5 kg (118 lb)   02/22/25 57.8 kg (127 lb 6.4 oz)     Intake & Output Reviewed: Yes     Intake/Output Summary (Last 24 hours) at 6/17/2025 0046  Last data filed at 6/17/2025 0005  Gross per 24 hour   Intake 1889.68 ml   Output 200 ml   Net 1689.68 ml       ECHOCARDIOGRAM Reviewed: Yes   Patient's Ejection Fraction (EF) is greater than 40%     Medications Reviewed: Yes   SCHEDULED HOSPITAL MEDICATIONS:   dicyclomine  10 mg Oral TID AC    sertraline  50 mg Oral Daily    atorvastatin  40 mg Oral Daily    cefTRIAXone (ROCEPHIN) IV  1,000 mg IntraVENous Q24H    metroNIDAZOLE  500 mg IntraVENous Q8H    sodium chloride flush  5-40 mL IntraVENous 2 times per day    aspirin  81 mg Oral Daily    cetirizine  10 mg Oral Daily    [Held by provider] valsartan  80 mg Oral Daily    amLODIPine  5 mg Oral Nightly    pantoprazole  40 mg Oral Daily     HOME MEDICATIONS:  Prior to Admission medications    Medication Sig Start Date End Date Taking? Authorizing Provider   atorvastatin (LIPITOR) 40 MG tablet Take 1 tablet by mouth daily   Yes Provider, Historical, MD   budesonide (ENTOCORT

## 2025-06-17 NOTE — PROGRESS NOTES
Informed MD via perfect serve that CTA abdomen result is out. As per MD, to observe and to let him know if her hemodynamics get worse or her abdomen becomes rigid or painful.

## 2025-06-17 NOTE — PLAN OF CARE
Problem: Chronic Conditions and Co-morbidities  Goal: Patient's chronic conditions and co-morbidity symptoms are monitored and maintained or improved  Outcome: Progressing     Problem: Discharge Planning  Goal: Discharge to home or other facility with appropriate resources  Outcome: Progressing     Problem: Safety - Adult  Goal: Free from fall injury  6/17/2025 0015 by Marlon Kelley, RN  Outcome: Progressing  6/16/2025 1736 by Marietta Rodriguez, RN  Outcome: Progressing     Problem: Pain  Goal: Verbalizes/displays adequate comfort level or baseline comfort level  Outcome: Progressing  Flowsheets (Taken 6/16/2025 2135)  Verbalizes/displays adequate comfort level or baseline comfort level:   Encourage patient to monitor pain and request assistance   Assess pain using appropriate pain scale   Administer analgesics based on type and severity of pain and evaluate response     Problem: Nutrition Deficit:  Goal: Optimize nutritional status  Outcome: Progressing

## 2025-06-17 NOTE — PROGRESS NOTES
Received call from Transfer center with bed placement for pt.    Pt to be transferred to [facility] via [facility] at 3pm. Room number 450.   Report Number: [phone].

## 2025-06-17 NOTE — PROGRESS NOTES
HEART FAILURE CARE PLAN:    Comorbidities Reviewed: Yes   Patient has a past medical history of MARIO (acute kidney injury), Altered mental status, Arthritis, BCC (basal cell carcinoma of skin), Bladder infection, Blurred vision, CAD (coronary artery disease), Cancer ([facility]), CHF (congestive heart failure) ([facility]), Chronic back pain, Closed head injury, COPD (chronic obstructive pulmonary disease) ([facility]), Depression, Depression, Hypercholesteremia, Hypertension, Hypokalemia, Pain in shoulder, Painful total knee replacement, initial encounter, Reflux, Rheumatic fever, Sleep apnea, Syncope and collapse, TIA (transient ischemic attack), Urinary incontinence, Urinary tract infection in female, and Wears glasses.     Weights Reviewed: Yes   Admission weight: 48.3 kg (106 lb 7.7 oz)   Wt Readings from Last 3 Encounters:   06/17/25 49.8 kg (109 lb 12.6 oz)   05/01/25 53.5 kg (118 lb)   02/22/25 57.8 kg (127 lb 6.4 oz)     Intake & Output Reviewed: Yes     Intake/Output Summary (Last 24 hours) at 6/17/2025 1123  Last data filed at 6/17/2025 0400  Gross per 24 hour   Intake 1409.68 ml   Output 290 ml   Net 1119.68 ml       ECHOCARDIOGRAM Reviewed: Yes   Patient's Ejection Fraction (EF) is greater than 40%     Medications Reviewed: Yes   SCHEDULED HOSPITAL MEDICATIONS:   dicyclomine  10 mg Oral TID AC    sertraline  50 mg Oral Daily    atorvastatin  40 mg Oral Daily    cefTRIAXone (ROCEPHIN) IV  1,000 mg IntraVENous Q24H    metroNIDAZOLE  500 mg IntraVENous Q8H    sodium chloride flush  5-40 mL IntraVENous 2 times per day    aspirin  81 mg Oral Daily    cetirizine  10 mg Oral Daily    [Held by provider] valsartan  80 mg Oral Daily    amLODIPine  5 mg Oral Nightly    pantoprazole  40 mg Oral Daily     HOME MEDICATIONS:  Prior to Admission medications    Medication Sig Start Date End Date Taking? Authorizing Provider   atorvastatin (LIPITOR) 40 MG tablet Take 1 tablet by mouth daily   Yes Provider, Historical, MD   budesonide  morning and at bedtime 5/1/24  Yes ProviderMontana MD   cetirizine (ZYRTEC) 10 MG tablet TAKE (1) TABLET DAILY 11/30/23  Yes Maria G Romero APRN - CNP   acetaminophen (TYLENOL) 325 MG tablet Take 2 tablets by mouth every 6 hours as needed for Pain   Yes ProviderMontana MD   aspirin 81 MG EC tablet Take 1 tablet by mouth daily 3/4/20  Yes ProviderMontana MD      Diet Reviewed: Yes   ADULT DIET; Clear Liquid    Goal of Care Reviewed: Yes   Patient and/or Family's stated Goal of Care this Admission: Reduce shortness of breath, increase activity tolerance, better understand heart failure and disease management, be more comfortable, and reduce lower extremity edema prior to discharge.     Electronically signed by Tayla Antony RN on 6/17/2025 at 11:23 AM

## 2025-06-17 NOTE — FLOWSHEET NOTE
06/16/25 2135   Vital Signs   Respirations 18   Pain Assessment   Pain Assessment 0-10   Pain Level 8   Patient's Stated Pain Goal 0 - No pain   Pain Location Abdomen;Back   Pain Orientation Other (Comment)  (WHOLE ABDOMEN, UPPER AND LOWER BACK)   Pain Descriptors Aching   Functional Pain Assessment Activities are not prevented   Care Plan - Pain Goals   Verbalizes/displays adequate comfort level or baseline comfort level Encourage patient to monitor pain and request assistance;Assess pain using appropriate pain scale;Administer analgesics based on type and severity of pain and evaluate response   Opioid-Induced Sedation   POSS Score 1   RASS   Jeffries Agitation Sedation Scale (RASS) 0     PRN Percocet given

## 2025-06-17 NOTE — PROGRESS NOTES
[facility] Progress Note           Subjective/   85 y.o. year old female who we are seeing in consultation for MARIO.     HPI:  Renal function improving with IVF\"s, oliguric.  Denies any shortness of breath, on RA.    ROS:  +weak, intake reduced.    Objective/   GEN:  Chronically ill, BP (!) 135/57   Pulse 73   Temp 97.8 °F (36.6 °C) (Axillary)   Resp 18   Ht 1.524 m (5')   Wt 49.8 kg (109 lb 12.6 oz)   LMP  (LMP Unknown)   SpO2 95%   BMI 21.44 kg/m²   HEENT: non-icteric, no JVD  CV: S1, S2 without m/r/g; no LE edema  RESP: CTA B without w/r/r; breathing wnl  ABD: +bs, soft, nt, no hsm  SKIN: warm, no rashes    Data/  Recent Labs     06/15/25  1547 06/16/25  0551 06/16/25  1616 06/17/25  0555   WBC 13.6* 11.8*  --  9.6   HGB 9.4* 7.8* 8.8* 7.6*   HCT 31.0* 24.6* 26.9* 23.5*   MCV 82.4 78.7*  --  78.4*    279  --  263     Recent Labs     06/16/25  0551 06/16/25  1616 06/17/25  0555    136 139   K 4.2 3.8 3.3*    100 101   CO2 19* 20* 26   GLUCOSE 147* 166* 104*   PHOS  --  2.7  --    MG  --   --  1.58*   BUN 73* 66* 45*   CREATININE 2.6* 1.9* 1.4*   LABGLOM 17* 25* 37*     UA trace protein, negative blood, s.g. <1.005  Urine sodium 42  Urine chloride 21    CTA ->  1. 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  2. High-grade stenosis of the left renal artery with atrophy and decreased  perfusion of the left kidney.  3. Diffuse pattern of small bowel air-fluid levels with no pattern of  obstruction. This may represent ileus or enteritis.  4. Extensive diverticulosis with no focal inflammation.    Assessment/     - Acute kidney injury - pre-renal/renal hypoperfusion injury     - Chronic kidney disease stage 3b     - Anion-gap metabolic acidosis - lactate wnl    - SMA occlusion/Renal artery stenosis       Plan/     - Continue IVF's - change to NS at 75 ml/hour  - Torsemide, Aldactone, Valsartan on hold  -  Trend labs, bp's, weights, & urine output     ____________________________________  Saud Quinn MD  [facility]  [email or web address]  Office: [phone]

## 2025-06-17 NOTE — H&P
of incubation. 05/10/2024 09:40 AM     Lab Results   Component Value Date/Time    BLOODCULT2 No Growth after 4 days of incubation. 05/10/2024 04:26 PM     Organism:   Lab Results   Component Value Date/Time    ORG Escherichia coli 12/05/2024 05:42 AM    ORG Klebsiella pneumoniae 12/05/2024 05:42 AM       Imaging/Diagnostics Last 24 Hours   CTA ABDOMEN PELVIS W CONTRAST  Result Date: 6/16/2025  EXAMINATION: CTA OF THE ABDOMEN AND PELVIS WITH CONTRAST 6/16/2025 5:29 pm: TECHNIQUE: CTA of the abdomen and pelvis was performed with the administration of intravenous contrast. Multiplanar reformatted images are provided for review. MIP images are provided for review. Automated exposure control, iterative reconstruction, and/or weight based adjustment of the mA/kV was utilized to reduce the radiation dose to as low as reasonably achievable. COMPARISON: 06/15/2025 CT HISTORY: ORDERING SYSTEM PROVIDED HISTORY: abdominal pain, rule out mesenteric ischemia TECHNOLOGIST PROVIDED HISTORY: Reason for exam:->abdominal pain, rule out mesenteric ischemia Additional Contrast?->1 Reason for Exam: abdominal pain, rule out mesenteric ischemia CTA: Normal caliber aorta.  Advanced atherosclerotic plaque and calcification diffusely.  No dissection.  The celiac artery and the branching hepatic and splenic arteries are both patent.  There is complete occlusion of the SMA proximally with dense calcification.  The vessel is reconstituted approximately 3 cm from the origin due to collateral flow.  50% stenosis of the right renal artery with greater than 70% stenosis of the left renal artery at the origin.  Patent HOLA.  The common, internal and external iliac arteries are also patent.  The visualized femoral arteries are patent. CT: Lower Chest:  Bandlike opacities in the lung bases favoring atelectasis. Base of the heart appears normal.  Prior TAVR. Organs: Prior cholecystectomy.  Liver and spleen are normal.  Fat atrophy of the pancreas.

## 2025-06-18 ENCOUNTER — ANESTHESIA EVENT (OUTPATIENT)
Dept: OPERATING ROOM | Age: 85
End: 2025-06-18
Payer: MEDICARE

## 2025-06-18 ENCOUNTER — APPOINTMENT (OUTPATIENT)
Age: 85
DRG: 981 | End: 2025-06-18
Attending: SURGERY
Payer: MEDICARE

## 2025-06-18 LAB
ALBUMIN SERPL-MCNC: 2.3 G/DL (ref 3.4–5)
ALP SERPL-CCNC: 153 U/L (ref 40–129)
ALT SERPL-CCNC: 8 U/L (ref 10–40)
ANION GAP SERPL CALCULATED.3IONS-SCNC: 10 MMOL/L (ref 3–16)
AST SERPL-CCNC: 14 U/L (ref 15–37)
BASOPHILS # BLD: 0 K/UL (ref 0–0.2)
BASOPHILS NFR BLD: 0.2 %
BILIRUB DIRECT SERPL-MCNC: 0.2 MG/DL (ref 0–0.3)
BILIRUB INDIRECT SERPL-MCNC: ABNORMAL MG/DL (ref 0–1)
BILIRUB SERPL-MCNC: <0.2 MG/DL (ref 0–1)
BUN SERPL-MCNC: 29 MG/DL (ref 7–20)
CALCIUM SERPL-MCNC: 8.1 MG/DL (ref 8.3–10.6)
CHLORIDE SERPL-SCNC: 102 MMOL/L (ref 99–110)
CO2 SERPL-SCNC: 26 MMOL/L (ref 21–32)
CREAT SERPL-MCNC: 1.3 MG/DL (ref 0.6–1.2)
DEPRECATED RDW RBC AUTO: 16.3 % (ref 12.4–15.4)
ECHO AO ARCH DIAM: 2.8 CM
ECHO AO ASC DIAM: 2.2 CM
ECHO AO ASCENDING AORTA INDEX: 1.52 CM/M2
ECHO AO DESC DIAM: 2.3 CM
ECHO AO DESCENDING AORTA INDEX: 1.59 CM/M2
ECHO AO ROOT DIAM: 2.2 CM
ECHO AO ROOT INDEX: 1.52 CM/M2
ECHO AV AREA PEAK VELOCITY: 1.3 CM2
ECHO AV AREA VTI: 1.4 CM2
ECHO AV AREA/BSA PEAK VELOCITY: 0.9 CM2/M2
ECHO AV AREA/BSA VTI: 1 CM2/M2
ECHO AV MEAN GRADIENT: 9 MMHG
ECHO AV MEAN VELOCITY: 1.4 M/S
ECHO AV PEAK GRADIENT: 18 MMHG
ECHO AV PEAK VELOCITY: 2.1 M/S
ECHO AV VELOCITY RATIO: 0.52
ECHO AV VTI: 45.6 CM
ECHO BSA: 1.45 M2
ECHO EST RA PRESSURE: 3 MMHG
ECHO IVC PROX: 1.3 CM
ECHO LA AREA 2C: 17.9 CM2
ECHO LA AREA 4C: 21 CM2
ECHO LA DIAMETER INDEX: 2.48 CM/M2
ECHO LA DIAMETER: 3.6 CM
ECHO LA MAJOR AXIS: 6.6 CM
ECHO LA MINOR AXIS: 5.5 CM
ECHO LA TO AORTIC ROOT RATIO: 1.64
ECHO LA VOL BP: 50 ML (ref 22–52)
ECHO LA VOL MOD A2C: 45 ML (ref 22–52)
ECHO LA VOL MOD A4C: 50 ML (ref 22–52)
ECHO LA VOL/BSA BIPLANE: 34 ML/M2 (ref 16–34)
ECHO LA VOLUME INDEX MOD A2C: 31 ML/M2 (ref 16–34)
ECHO LA VOLUME INDEX MOD A4C: 34 ML/M2 (ref 16–34)
ECHO LV E' LATERAL VELOCITY: 10.7 CM/S
ECHO LV E' SEPTAL VELOCITY: 6.96 CM/S
ECHO LV EDV 3D: 94 ML
ECHO LV EDV INDEX 3D: 65 ML/M2
ECHO LV EF PHYSICIAN: 60 %
ECHO LV EJECTION FRACTION 3D: 63 %
ECHO LV ESV 3D: 35 ML
ECHO LV ESV INDEX 3D: 24 ML/M2
ECHO LV FRACTIONAL SHORTENING: 32 % (ref 28–44)
ECHO LV GLOBAL LONGITUDINAL STRAIN (GLS): -13.6 %
ECHO LV GLOBAL LONGITUDINAL STRAIN (GLS): -14.1 %
ECHO LV GLOBAL LONGITUDINAL STRAIN (GLS): -14.4 %
ECHO LV GLOBAL LONGITUDINAL STRAIN (GLS): -15.5 %
ECHO LV INTERNAL DIMENSION DIASTOLE INDEX: 3.45 CM/M2
ECHO LV INTERNAL DIMENSION DIASTOLIC: 5 CM (ref 3.9–5.3)
ECHO LV INTERNAL DIMENSION SYSTOLIC INDEX: 2.34 CM/M2
ECHO LV INTERNAL DIMENSION SYSTOLIC: 3.4 CM
ECHO LV ISOVOLUMETRIC RELAXATION TIME (IVRT): 85 MS
ECHO LV IVSD: 0.7 CM (ref 0.6–0.9)
ECHO LV MASS 2D: 114.7 G (ref 67–162)
ECHO LV MASS 3D INDEX: 71.7 G/M2
ECHO LV MASS 3D: 104 G
ECHO LV MASS INDEX 2D: 79.1 G/M2 (ref 43–95)
ECHO LV POSTERIOR WALL DIASTOLIC: 0.7 CM (ref 0.6–0.9)
ECHO LV RELATIVE WALL THICKNESS RATIO: 0.28
ECHO LVOT AREA: 2.5 CM2
ECHO LVOT AV VTI INDEX: 0.57
ECHO LVOT DIAM: 1.8 CM
ECHO LVOT MEAN GRADIENT: 3 MMHG
ECHO LVOT PEAK GRADIENT: 5 MMHG
ECHO LVOT PEAK VELOCITY: 1.1 M/S
ECHO LVOT STROKE VOLUME INDEX: 45.4 ML/M2
ECHO LVOT SV: 65.9 ML
ECHO LVOT VTI: 25.9 CM
ECHO MV A VELOCITY: 1.2 M/S
ECHO MV E DECELERATION TIME (DT): 232 MS
ECHO MV E VELOCITY: 0.95 M/S
ECHO MV E/A RATIO: 0.79
ECHO MV E/E' LATERAL: 8.88
ECHO MV E/E' RATIO (AVERAGED): 11.26
ECHO MV E/E' SEPTAL: 13.65
ECHO RA AREA 4C: 16.9 CM2
ECHO RA END SYSTOLIC VOLUME APICAL 4 CHAMBER INDEX BSA: 26 ML/M2
ECHO RA VOLUME: 38 ML
ECHO RIGHT VENTRICULAR SYSTOLIC PRESSURE (RVSP): 22 MMHG
ECHO RV BASAL DIMENSION: 4.1 CM
ECHO RV FREE WALL PEAK S': 12.7 CM/S
ECHO RV LONGITUDINAL DIMENSION: 6.8 CM
ECHO RV MID DIMENSION: 3.2 CM
ECHO RV TAPSE: 2.5 CM (ref 1.7–?)
ECHO TV REGURGITANT MAX VELOCITY: 2.17 M/S
ECHO TV REGURGITANT PEAK GRADIENT: 19 MMHG
EOSINOPHIL # BLD: 0.1 K/UL (ref 0–0.6)
EOSINOPHIL NFR BLD: 0.9 %
GFR SERPLBLD CREATININE-BSD FMLA CKD-EPI: 40 ML/MIN/{1.73_M2}
GLUCOSE SERPL-MCNC: 86 MG/DL (ref 70–99)
HCT VFR BLD AUTO: 24.6 % (ref 36–48)
HCT VFR BLD AUTO: 25.3 % (ref 36–48)
HGB BLD-MCNC: 8 G/DL (ref 12–16)
HGB BLD-MCNC: 8.1 G/DL (ref 12–16)
LYMPHOCYTES # BLD: 0.9 K/UL (ref 1–5.1)
LYMPHOCYTES NFR BLD: 9.3 %
MAGNESIUM SERPL-MCNC: 2.15 MG/DL (ref 1.8–2.4)
MCH RBC QN AUTO: 25.7 PG (ref 26–34)
MCHC RBC AUTO-ENTMCNC: 32.5 G/DL (ref 31–36)
MCV RBC AUTO: 79.2 FL (ref 80–100)
MONOCYTES # BLD: 0.6 K/UL (ref 0–1.3)
MONOCYTES NFR BLD: 6.5 %
NEUTROPHILS # BLD: 8 K/UL (ref 1.7–7.7)
NEUTROPHILS NFR BLD: 83.1 %
PHOSPHATE SERPL-MCNC: 2.1 MG/DL (ref 2.5–4.9)
PLATELET # BLD AUTO: 250 K/UL (ref 135–450)
PMV BLD AUTO: 6.3 FL (ref 5–10.5)
POTASSIUM SERPL-SCNC: 3.6 MMOL/L (ref 3.5–5.1)
PROT SERPL-MCNC: 5.7 G/DL (ref 6.4–8.2)
RBC # BLD AUTO: 3.1 M/UL (ref 4–5.2)
SODIUM SERPL-SCNC: 138 MMOL/L (ref 136–145)
WBC # BLD AUTO: 9.7 K/UL (ref 4–11)

## 2025-06-18 PROCEDURE — 85018 HEMOGLOBIN: CPT

## 2025-06-18 PROCEDURE — 80048 BASIC METABOLIC PNL TOTAL CA: CPT

## 2025-06-18 PROCEDURE — 6370000000 HC RX 637 (ALT 250 FOR IP): Performed by: NURSE PRACTITIONER

## 2025-06-18 PROCEDURE — 36415 COLL VENOUS BLD VENIPUNCTURE: CPT

## 2025-06-18 PROCEDURE — 2580000003 HC RX 258: Performed by: INTERNAL MEDICINE

## 2025-06-18 PROCEDURE — 6360000002 HC RX W HCPCS: Performed by: INTERNAL MEDICINE

## 2025-06-18 PROCEDURE — 2500000003 HC RX 250 WO HCPCS: Performed by: INTERNAL MEDICINE

## 2025-06-18 PROCEDURE — 80076 HEPATIC FUNCTION PANEL: CPT

## 2025-06-18 PROCEDURE — 93356 MYOCRD STRAIN IMG SPCKL TRCK: CPT | Performed by: INTERNAL MEDICINE

## 2025-06-18 PROCEDURE — 2500000003 HC RX 250 WO HCPCS: Performed by: NURSE PRACTITIONER

## 2025-06-18 PROCEDURE — 85025 COMPLETE CBC W/AUTO DIFF WBC: CPT

## 2025-06-18 PROCEDURE — 2060000000 HC ICU INTERMEDIATE R&B

## 2025-06-18 PROCEDURE — 76376 3D RENDER W/INTRP POSTPROCES: CPT

## 2025-06-18 PROCEDURE — 6370000000 HC RX 637 (ALT 250 FOR IP): Performed by: INTERNAL MEDICINE

## 2025-06-18 PROCEDURE — C1751 CATH, INF, PER/CENT/MIDLINE: HCPCS

## 2025-06-18 PROCEDURE — 76376 3D RENDER W/INTRP POSTPROCES: CPT | Performed by: INTERNAL MEDICINE

## 2025-06-18 PROCEDURE — 6360000002 HC RX W HCPCS: Performed by: NURSE PRACTITIONER

## 2025-06-18 PROCEDURE — 85014 HEMATOCRIT: CPT

## 2025-06-18 PROCEDURE — 93306 TTE W/DOPPLER COMPLETE: CPT | Performed by: INTERNAL MEDICINE

## 2025-06-18 PROCEDURE — 2580000003 HC RX 258: Performed by: NURSE PRACTITIONER

## 2025-06-18 PROCEDURE — 36410 VNPNXR 3YR/> PHY/QHP DX/THER: CPT

## 2025-06-18 PROCEDURE — 83735 ASSAY OF MAGNESIUM: CPT

## 2025-06-18 PROCEDURE — 05HC33Z INSERTION OF INFUSION DEVICE INTO LEFT BASILIC VEIN, PERCUTANEOUS APPROACH: ICD-10-PCS | Performed by: INTERNAL MEDICINE

## 2025-06-18 PROCEDURE — 76937 US GUIDE VASCULAR ACCESS: CPT

## 2025-06-18 PROCEDURE — 84100 ASSAY OF PHOSPHORUS: CPT

## 2025-06-18 RX ORDER — METRONIDAZOLE 500 MG/100ML
500 INJECTION, SOLUTION INTRAVENOUS EVERY 8 HOURS
Status: DISCONTINUED | OUTPATIENT
Start: 2025-06-18 | End: 2025-06-19

## 2025-06-18 RX ADMIN — SODIUM CHLORIDE: 0.9 INJECTION, SOLUTION INTRAVENOUS at 21:38

## 2025-06-18 RX ADMIN — AMLODIPINE BESYLATE 5 MG: 5 TABLET ORAL at 20:48

## 2025-06-18 RX ADMIN — METRONIDAZOLE 500 MG: 500 INJECTION, SOLUTION INTRAVENOUS at 06:44

## 2025-06-18 RX ADMIN — SODIUM CHLORIDE: 0.9 INJECTION, SOLUTION INTRAVENOUS at 14:01

## 2025-06-18 RX ADMIN — DICYCLOMINE HYDROCHLORIDE 10 MG: 10 CAPSULE ORAL at 08:50

## 2025-06-18 RX ADMIN — ATORVASTATIN CALCIUM 40 MG: 40 TABLET, FILM COATED ORAL at 08:50

## 2025-06-18 RX ADMIN — SODIUM CHLORIDE, PRESERVATIVE FREE 10 ML: 5 INJECTION INTRAVENOUS at 08:52

## 2025-06-18 RX ADMIN — Medication 10 ML: at 06:34

## 2025-06-18 RX ADMIN — SERTRALINE 50 MG: 50 TABLET, FILM COATED ORAL at 08:50

## 2025-06-18 RX ADMIN — OXYCODONE HYDROCHLORIDE AND ACETAMINOPHEN 1 TABLET: 7.5; 325 TABLET ORAL at 08:50

## 2025-06-18 RX ADMIN — HYDROMORPHONE HYDROCHLORIDE 1 MG: 1 INJECTION, SOLUTION INTRAMUSCULAR; INTRAVENOUS; SUBCUTANEOUS at 11:08

## 2025-06-18 RX ADMIN — DICYCLOMINE HYDROCHLORIDE 10 MG: 10 CAPSULE ORAL at 12:30

## 2025-06-18 RX ADMIN — SODIUM CHLORIDE: 0.9 INJECTION, SOLUTION INTRAVENOUS at 18:57

## 2025-06-18 RX ADMIN — METRONIDAZOLE 500 MG: 500 INJECTION, SOLUTION INTRAVENOUS at 21:38

## 2025-06-18 RX ADMIN — SODIUM CHLORIDE, PRESERVATIVE FREE 10 ML: 5 INJECTION INTRAVENOUS at 20:55

## 2025-06-18 RX ADMIN — WATER 1000 MG: 1 INJECTION INTRAMUSCULAR; INTRAVENOUS; SUBCUTANEOUS at 06:34

## 2025-06-18 RX ADMIN — ONDANSETRON 4 MG: 4 TABLET, ORALLY DISINTEGRATING ORAL at 04:51

## 2025-06-18 RX ADMIN — METRONIDAZOLE 500 MG: 500 INJECTION, SOLUTION INTRAVENOUS at 14:02

## 2025-06-18 RX ADMIN — DICYCLOMINE HYDROCHLORIDE 10 MG: 10 CAPSULE ORAL at 16:29

## 2025-06-18 RX ADMIN — PANTOPRAZOLE SODIUM 40 MG: 40 TABLET, DELAYED RELEASE ORAL at 08:50

## 2025-06-18 ASSESSMENT — PAIN DESCRIPTION - DESCRIPTORS
DESCRIPTORS: THROBBING
DESCRIPTORS: ACHING;THROBBING
DESCRIPTORS: ACHING
DESCRIPTORS: ACHING;THROBBING

## 2025-06-18 ASSESSMENT — PAIN DESCRIPTION - PAIN TYPE: TYPE: CHRONIC PAIN

## 2025-06-18 ASSESSMENT — PAIN SCALES - GENERAL
PAINLEVEL_OUTOF10: 10
PAINLEVEL_OUTOF10: 7
PAINLEVEL_OUTOF10: 7
PAINLEVEL_OUTOF10: 10
PAINLEVEL_OUTOF10: 8

## 2025-06-18 ASSESSMENT — PAIN DESCRIPTION - ORIENTATION
ORIENTATION: MID

## 2025-06-18 ASSESSMENT — PAIN DESCRIPTION - LOCATION
LOCATION: ABDOMEN

## 2025-06-18 ASSESSMENT — PAIN - FUNCTIONAL ASSESSMENT: PAIN_FUNCTIONAL_ASSESSMENT: PREVENTS OR INTERFERES SOME ACTIVE ACTIVITIES AND ADLS

## 2025-06-18 NOTE — FLOWSHEET NOTE
BP's are as follows:         06/18/25 0815 06/18/25 0858   Vital Signs   BP (!) 139/58 (!) 138/52   MAP (Calculated) 85 81   MAP (mmHg) 85 81   BP Location Right upper arm Left lower arm

## 2025-06-18 NOTE — CONSULTS
[facility]           Vascular Surgery Consultation  Nithya Neff  Medical Record #: [id number]  YOB: 1940      Date of Admission:  6/17/2025  3:57 PM  Date of Consultation:  6/18/2025      PCP:  Esperanza Pcp    Cards: MD Richardson   GI: MD Nasima   Nephro: MD Quan    Chief Complaint:     History of Present Illness:   We are asked to see this patient in consultation by VAL Arteaga MD  regarding mesenteric ischemia.    Nithya Neff is a 85 y.o. female who presented to [facility] ED with altered mental status, hypotension, weight loss. Evidently, she has had abdominal pain for years but has ahd significant weight loss the 6 months. She has had diarrhea stools and has gotten more lethargic and drowsy with associated abdominal pain . In the ED, at CT of abdomen pelvis showed colitis. IV antibiotics started, nephrology consulted for acute on chronic kidney failure and admitted for further evaluation and treatment. After conferring with vascular, GI and nephrology, she underwent a CTA of the abdomen pelvis which showed complete occlusion of the proximal SMA with reconstitution 3 cm from the origin. The patient was agreeable to be transferred for SMA bypass.        Past Medical History:   Diagnosis Date    MARIO (acute kidney injury) 11/24/2021    Altered mental status 11/30/2021    Arthritis     BCC (basal cell carcinoma of skin)     RT clavicle    Bladder infection     frequently    Blurred vision 9/17/2022    CAD (coronary artery disease)     stents    Cancer (HCC)     skin    CHF (congestive heart failure) ([facility])     Chronic back pain     Closed head injury 12/1/2021    COPD (chronic obstructive pulmonary disease) ([facility])     Depression     Depression     Hypercholesteremia     Hypertension     Hypokalemia 1/16/2012    Pain in shoulder [id number]    pain in left shoulder, taking steroid injections for steroid    Painful total knee replacement, initial encounter 10/18/2018

## 2025-06-18 NOTE — CARE COORDINATION
Case Management Assessment  Initial Evaluation    Date/Time of Evaluation: 6/18/2025 10:51 AM  Assessment Completed by: Mora Youssef RN    If patient is discharged prior to next notation, then this note serves as note for discharge by case management.    Patient Name: Nithya Neff                   YOB: 1940  Diagnosis: Mesenteric ischemia [K55.9]                   Date / Time: 6/17/2025  3:57 PM    Patient Admission Status: Inpatient   Readmission Risk (Low < 19, Mod (19-27), High > 27): Readmission Risk Score: 20.9    Current PCP: No, Pcp  PCP verified by CM? No ([facility] information provided)    Chart Reviewed: Yes      History Provided by: Patient, Medical Record  Patient Orientation: Alert and Oriented    Patient Cognition: Alert    Hospitalization in the last 30 days (Readmission):  Yes    If yes, Readmission Assessment in CM Navigator will be completed.    Advance Directives:      Code Status: Full Code   Patient's Primary Decision Maker is: Patient Declined (Legal Next of Kin Remains as Decision Maker)    Primary Decision Maker: Sonya Neri - Child - [phone]    Secondary Decision Maker: Robert Neff - Child - [phone]    Discharge Planning:    Patient lives with: Alone Type of Home: Apartment  Primary Care Giver: Self  Patient Support Systems include: Children   Current Financial resources: Medicare  Current community resources: ECF/Home Care ([facility] aide)  Current services prior to admission: Home Care, Durable Medical Equipment ([facility] daughter is her Health Aide)            Current DME: Cane, Walker            Type of Home Care services:  Nursing Services    ADLS  Prior functional level: Assistance with the following:, Bathing, Dressing, Mobility, Shopping, Housework, Cooking (Has health aide 4 hours a day T/W and Firday)  Current functional level: Assistance with the following:, Bathing, Dressing, Toileting, Cooking, Housework, Shopping,

## 2025-06-18 NOTE — CONSULTS
Consult Placed     Who: Dr. Nikko Del Real  Date: 6/17/25  Time: 2025     Electronically signed by RAYMOND BARRAZA on 6/17/2025 at 8:27 PM

## 2025-06-19 ENCOUNTER — ANESTHESIA (OUTPATIENT)
Dept: OPERATING ROOM | Age: 85
End: 2025-06-19
Payer: MEDICARE

## 2025-06-19 ENCOUNTER — APPOINTMENT (OUTPATIENT)
Dept: GENERAL RADIOLOGY | Age: 85
DRG: 981 | End: 2025-06-19
Attending: INTERNAL MEDICINE
Payer: MEDICARE

## 2025-06-19 PROBLEM — K55.1 CHRONIC MESENTERIC ISCHEMIA: Status: ACTIVE | Noted: 2025-06-19

## 2025-06-19 LAB
ABO/RH: NORMAL
ALBUMIN SERPL-MCNC: 2 G/DL (ref 3.4–5)
ALP SERPL-CCNC: 139 U/L (ref 40–129)
ALT SERPL-CCNC: 6 U/L (ref 10–40)
ANION GAP SERPL CALCULATED.3IONS-SCNC: 11 MMOL/L (ref 3–16)
ANION GAP SERPL CALCULATED.3IONS-SCNC: 9 MMOL/L (ref 3–16)
ANTIBODY SCREEN: NORMAL
AST SERPL-CCNC: 15 U/L (ref 15–37)
BASE EXCESS BLDA CALC-SCNC: -2 MMOL/L (ref -3–3)
BASE EXCESS BLDA CALC-SCNC: -5 MMOL/L (ref -3–3)
BASOPHILS # BLD: 0 K/UL (ref 0–0.2)
BASOPHILS NFR BLD: 0.4 %
BILIRUB DIRECT SERPL-MCNC: 0.2 MG/DL (ref 0–0.3)
BILIRUB INDIRECT SERPL-MCNC: ABNORMAL MG/DL (ref 0–1)
BILIRUB SERPL-MCNC: <0.2 MG/DL (ref 0–1)
BLOOD BANK DISPENSE STATUS: NORMAL
BLOOD BANK PRODUCT CODE: NORMAL
BPU ID: NORMAL
BUN SERPL-MCNC: 15 MG/DL (ref 7–20)
BUN SERPL-MCNC: 17 MG/DL (ref 7–20)
CA-I BLD-SCNC: 2.07 MMOL/L (ref 1.12–1.32)
CALCIUM SERPL-MCNC: 7.6 MG/DL (ref 8.3–10.6)
CALCIUM SERPL-MCNC: 8 MG/DL (ref 8.3–10.6)
CHLORIDE SERPL-SCNC: 109 MMOL/L (ref 99–110)
CHLORIDE SERPL-SCNC: 113 MMOL/L (ref 99–110)
CO2 BLDA-SCNC: 22 MMOL/L
CO2 BLDA-SCNC: 24 MMOL/L
CO2 SERPL-SCNC: 19 MMOL/L (ref 21–32)
CO2 SERPL-SCNC: 23 MMOL/L (ref 21–32)
CREAT SERPL-MCNC: 1 MG/DL (ref 0.6–1.2)
CREAT SERPL-MCNC: 1.2 MG/DL (ref 0.6–1.2)
CRYPTOSP AG STL QL IA: NORMAL
DEPRECATED RDW RBC AUTO: 16.4 % (ref 12.4–15.4)
DEPRECATED RDW RBC AUTO: 16.4 % (ref 12.4–15.4)
DESCRIPTION BLOOD BANK: NORMAL
E HISTOLYT AG STL QL IA: NORMAL
EOSINOPHIL # BLD: 0.1 K/UL (ref 0–0.6)
EOSINOPHIL NFR BLD: 1.2 %
G LAMBLIA AG STL QL IA: NORMAL
GFR SERPLBLD CREATININE-BSD FMLA CKD-EPI: 44 ML/MIN/{1.73_M2}
GFR SERPLBLD CREATININE-BSD FMLA CKD-EPI: 55 ML/MIN/{1.73_M2}
GLUCOSE BLD-MCNC: 71 MG/DL (ref 70–99)
GLUCOSE BLD-MCNC: 82 MG/DL (ref 70–99)
GLUCOSE SERPL-MCNC: 78 MG/DL (ref 70–99)
GLUCOSE SERPL-MCNC: 86 MG/DL (ref 70–99)
HCO3 BLDA-SCNC: 20.5 MMOL/L (ref 21–29)
HCO3 BLDA-SCNC: 22.7 MMOL/L (ref 21–29)
HCT VFR BLD AUTO: 15 % (ref 36–48)
HCT VFR BLD AUTO: 23.8 % (ref 36–48)
HCT VFR BLD AUTO: 27.6 % (ref 36–48)
HGB BLD CALC-MCNC: 5.1 GM/DL (ref 12–16)
HGB BLD-MCNC: 7.7 G/DL (ref 12–16)
HGB BLD-MCNC: 8.6 G/DL (ref 12–16)
INR PPP: 1.28 (ref 0.86–1.14)
LACTATE BLD-SCNC: <0.3 MMOL/L (ref 0.4–2)
LYMPHOCYTES # BLD: 0.9 K/UL (ref 1–5.1)
LYMPHOCYTES NFR BLD: 13.2 %
MAGNESIUM SERPL-MCNC: 1.94 MG/DL (ref 1.8–2.4)
MCH RBC QN AUTO: 25.9 PG (ref 26–34)
MCH RBC QN AUTO: 26 PG (ref 26–34)
MCHC RBC AUTO-ENTMCNC: 31.3 G/DL (ref 31–36)
MCHC RBC AUTO-ENTMCNC: 32.2 G/DL (ref 31–36)
MCV RBC AUTO: 80.3 FL (ref 80–100)
MCV RBC AUTO: 83.3 FL (ref 80–100)
MONOCYTES # BLD: 0.5 K/UL (ref 0–1.3)
MONOCYTES NFR BLD: 7.1 %
NEUTROPHILS # BLD: 5.5 K/UL (ref 1.7–7.7)
NEUTROPHILS NFR BLD: 78.1 %
PCO2 BLDA: 36.4 MM HG (ref 35–45)
PCO2 BLDA: 37.3 MM HG (ref 35–45)
PERFORMED ON: ABNORMAL
PERFORMED ON: ABNORMAL
PH BLDA: 7.35 [PH] (ref 7.35–7.45)
PH BLDA: 7.4 [PH] (ref 7.35–7.45)
PHOSPHATE SERPL-MCNC: 1.8 MG/DL (ref 2.5–4.9)
PLATELET # BLD AUTO: 186 K/UL (ref 135–450)
PLATELET # BLD AUTO: 213 K/UL (ref 135–450)
PMV BLD AUTO: 6.3 FL (ref 5–10.5)
PMV BLD AUTO: 6.4 FL (ref 5–10.5)
PO2 BLDA: 105.5 MM HG (ref 75–108)
PO2 BLDA: 263.4 MM HG (ref 75–108)
POC SAMPLE TYPE: ABNORMAL
POC SAMPLE TYPE: ABNORMAL
POTASSIUM BLD-SCNC: 3.4 MMOL/L (ref 3.5–5.1)
POTASSIUM SERPL-SCNC: 3.6 MMOL/L (ref 3.5–5.1)
POTASSIUM SERPL-SCNC: 3.7 MMOL/L (ref 3.5–5.1)
PREALB SERPL-MCNC: 6.6 MG/DL (ref 20–40)
PROT SERPL-MCNC: 5.2 G/DL (ref 6.4–8.2)
PROTHROMBIN TIME: 16.2 SEC (ref 12.1–14.9)
RBC # BLD AUTO: 2.96 M/UL (ref 4–5.2)
RBC # BLD AUTO: 3.31 M/UL (ref 4–5.2)
SAO2 % BLDA: 100 % (ref 93–100)
SAO2 % BLDA: 98 % (ref 93–100)
SODIUM BLD-SCNC: 146 MMOL/L (ref 136–145)
SODIUM SERPL-SCNC: 141 MMOL/L (ref 136–145)
SODIUM SERPL-SCNC: 143 MMOL/L (ref 136–145)
WBC # BLD AUTO: 11.6 K/UL (ref 4–11)
WBC # BLD AUTO: 7 K/UL (ref 4–11)

## 2025-06-19 PROCEDURE — 82803 BLOOD GASES ANY COMBINATION: CPT

## 2025-06-19 PROCEDURE — 3700000000 HC ANESTHESIA ATTENDED CARE: Performed by: SURGERY

## 2025-06-19 PROCEDURE — 80048 BASIC METABOLIC PNL TOTAL CA: CPT

## 2025-06-19 PROCEDURE — 86850 RBC ANTIBODY SCREEN: CPT

## 2025-06-19 PROCEDURE — 6360000002 HC RX W HCPCS: Performed by: NURSE ANESTHETIST, CERTIFIED REGISTERED

## 2025-06-19 PROCEDURE — 6360000002 HC RX W HCPCS: Performed by: SURGERY

## 2025-06-19 PROCEDURE — 86900 BLOOD TYPING SEROLOGIC ABO: CPT

## 2025-06-19 PROCEDURE — 85014 HEMATOCRIT: CPT

## 2025-06-19 PROCEDURE — 85025 COMPLETE CBC W/AUTO DIFF WBC: CPT

## 2025-06-19 PROCEDURE — 83735 ASSAY OF MAGNESIUM: CPT

## 2025-06-19 PROCEDURE — 36415 COLL VENOUS BLD VENIPUNCTURE: CPT

## 2025-06-19 PROCEDURE — 82947 ASSAY GLUCOSE BLOOD QUANT: CPT

## 2025-06-19 PROCEDURE — 3700000001 HC ADD 15 MINUTES (ANESTHESIA): Performed by: SURGERY

## 2025-06-19 PROCEDURE — 041D0J2 BYPASS LEFT COMMON ILIAC ARTERY TO MESENTERIC ARTERY WITH SYNTHETIC SUBSTITUTE, OPEN APPROACH: ICD-10-PCS | Performed by: SURGERY

## 2025-06-19 PROCEDURE — 82330 ASSAY OF CALCIUM: CPT

## 2025-06-19 PROCEDURE — 2580000003 HC RX 258: Performed by: INTERNAL MEDICINE

## 2025-06-19 PROCEDURE — 80076 HEPATIC FUNCTION PANEL: CPT

## 2025-06-19 PROCEDURE — C2628 CATHETER, OCCLUSION: HCPCS | Performed by: SURGERY

## 2025-06-19 PROCEDURE — 2709999900 HC NON-CHARGEABLE SUPPLY: Performed by: SURGERY

## 2025-06-19 PROCEDURE — 2500000003 HC RX 250 WO HCPCS: Performed by: NURSE PRACTITIONER

## 2025-06-19 PROCEDURE — 3600000015 HC SURGERY LEVEL 5 ADDTL 15MIN: Performed by: SURGERY

## 2025-06-19 PROCEDURE — 86923 COMPATIBILITY TEST ELECTRIC: CPT

## 2025-06-19 PROCEDURE — 2580000003 HC RX 258: Performed by: NURSE ANESTHETIST, CERTIFIED REGISTERED

## 2025-06-19 PROCEDURE — P9045 ALBUMIN (HUMAN), 5%, 250 ML: HCPCS | Performed by: NURSE ANESTHETIST, CERTIFIED REGISTERED

## 2025-06-19 PROCEDURE — 2700000000 HC OXYGEN THERAPY PER DAY

## 2025-06-19 PROCEDURE — 84295 ASSAY OF SERUM SODIUM: CPT

## 2025-06-19 PROCEDURE — 2500000003 HC RX 250 WO HCPCS: Performed by: SURGERY

## 2025-06-19 PROCEDURE — C1768 GRAFT, VASCULAR: HCPCS | Performed by: SURGERY

## 2025-06-19 PROCEDURE — 6360000002 HC RX W HCPCS: Performed by: INTERNAL MEDICINE

## 2025-06-19 PROCEDURE — 6360000002 HC RX W HCPCS: Performed by: NURSE PRACTITIONER

## 2025-06-19 PROCEDURE — 84100 ASSAY OF PHOSPHORUS: CPT

## 2025-06-19 PROCEDURE — C1894 INTRO/SHEATH, NON-LASER: HCPCS | Performed by: SURGERY

## 2025-06-19 PROCEDURE — 85610 PROTHROMBIN TIME: CPT

## 2025-06-19 PROCEDURE — 30233N1 TRANSFUSION OF NONAUTOLOGOUS RED BLOOD CELLS INTO PERIPHERAL VEIN, PERCUTANEOUS APPROACH: ICD-10-PCS | Performed by: INTERNAL MEDICINE

## 2025-06-19 PROCEDURE — 3600000005 HC SURGERY LEVEL 5 BASE: Performed by: SURGERY

## 2025-06-19 PROCEDURE — 94761 N-INVAS EAR/PLS OXIMETRY MLT: CPT

## 2025-06-19 PROCEDURE — 83605 ASSAY OF LACTIC ACID: CPT

## 2025-06-19 PROCEDURE — 2500000003 HC RX 250 WO HCPCS: Performed by: NURSE ANESTHETIST, CERTIFIED REGISTERED

## 2025-06-19 PROCEDURE — 71045 X-RAY EXAM CHEST 1 VIEW: CPT

## 2025-06-19 PROCEDURE — 85027 COMPLETE CBC AUTOMATED: CPT

## 2025-06-19 PROCEDURE — 86901 BLOOD TYPING SEROLOGIC RH(D): CPT

## 2025-06-19 PROCEDURE — 2580000003 HC RX 258: Performed by: SURGERY

## 2025-06-19 PROCEDURE — 84132 ASSAY OF SERUM POTASSIUM: CPT

## 2025-06-19 PROCEDURE — P9016 RBC LEUKOCYTES REDUCED: HCPCS

## 2025-06-19 PROCEDURE — C1751 CATH, INF, PER/CENT/MIDLINE: HCPCS | Performed by: SURGERY

## 2025-06-19 PROCEDURE — 84134 ASSAY OF PREALBUMIN: CPT

## 2025-06-19 PROCEDURE — 2000000000 HC ICU R&B

## 2025-06-19 DEVICE — PROPATEN VASCULAR GRAFT TW IR 8MMX40CM 40CM RINGS HEPARIN
Type: IMPLANTABLE DEVICE | Site: ABDOMEN | Status: FUNCTIONAL
Brand: GORE PROPATEN VASCULAR GRAFT

## 2025-06-19 RX ORDER — SODIUM CHLORIDE 9 MG/ML
INJECTION, SOLUTION INTRAVENOUS PRN
Status: DISCONTINUED | OUTPATIENT
Start: 2025-06-19 | End: 2025-06-26 | Stop reason: HOSPADM

## 2025-06-19 RX ORDER — INDOMETHACIN 25 MG/1
CAPSULE ORAL
Status: DISCONTINUED | OUTPATIENT
Start: 2025-06-19 | End: 2025-06-19 | Stop reason: SDUPTHER

## 2025-06-19 RX ORDER — ALBUMIN HUMAN 50 G/1000ML
SOLUTION INTRAVENOUS
Status: DISCONTINUED | OUTPATIENT
Start: 2025-06-19 | End: 2025-06-19 | Stop reason: SDUPTHER

## 2025-06-19 RX ORDER — LABETALOL HYDROCHLORIDE 5 MG/ML
5 INJECTION, SOLUTION INTRAVENOUS EVERY 10 MIN PRN
Status: DISCONTINUED | OUTPATIENT
Start: 2025-06-19 | End: 2025-06-19 | Stop reason: HOSPADM

## 2025-06-19 RX ORDER — SODIUM CHLORIDE 9 MG/ML
INJECTION, SOLUTION INTRAVENOUS
Status: DISCONTINUED | OUTPATIENT
Start: 2025-06-19 | End: 2025-06-19 | Stop reason: SDUPTHER

## 2025-06-19 RX ORDER — MEPERIDINE HYDROCHLORIDE 50 MG/ML
12.5 INJECTION INTRAMUSCULAR; INTRAVENOUS; SUBCUTANEOUS EVERY 5 MIN PRN
Status: DISCONTINUED | OUTPATIENT
Start: 2025-06-19 | End: 2025-06-19 | Stop reason: HOSPADM

## 2025-06-19 RX ORDER — SODIUM CHLORIDE, SODIUM LACTATE, POTASSIUM CHLORIDE, CALCIUM CHLORIDE 600; 310; 30; 20 MG/100ML; MG/100ML; MG/100ML; MG/100ML
INJECTION, SOLUTION INTRAVENOUS CONTINUOUS
Status: DISCONTINUED | OUTPATIENT
Start: 2025-06-19 | End: 2025-06-19 | Stop reason: HOSPADM

## 2025-06-19 RX ORDER — MIDAZOLAM HYDROCHLORIDE 1 MG/ML
2 INJECTION, SOLUTION INTRAMUSCULAR; INTRAVENOUS
Status: DISCONTINUED | OUTPATIENT
Start: 2025-06-19 | End: 2025-06-19 | Stop reason: HOSPADM

## 2025-06-19 RX ORDER — PROTAMINE SULFATE 10 MG/ML
INJECTION, SOLUTION INTRAVENOUS
Status: DISCONTINUED | OUTPATIENT
Start: 2025-06-19 | End: 2025-06-19 | Stop reason: SDUPTHER

## 2025-06-19 RX ORDER — SODIUM CHLORIDE 0.9 % (FLUSH) 0.9 %
5-40 SYRINGE (ML) INJECTION EVERY 12 HOURS SCHEDULED
Status: DISCONTINUED | OUTPATIENT
Start: 2025-06-19 | End: 2025-06-19 | Stop reason: HOSPADM

## 2025-06-19 RX ORDER — ONDANSETRON 2 MG/ML
4 INJECTION INTRAMUSCULAR; INTRAVENOUS EVERY 6 HOURS PRN
Status: DISCONTINUED | OUTPATIENT
Start: 2025-06-19 | End: 2025-06-26 | Stop reason: HOSPADM

## 2025-06-19 RX ORDER — PHENYLEPHRINE HCL IN 0.9% NACL 1 MG/10 ML
SYRINGE (ML) INTRAVENOUS
Status: DISCONTINUED | OUTPATIENT
Start: 2025-06-19 | End: 2025-06-19 | Stop reason: SDUPTHER

## 2025-06-19 RX ORDER — SODIUM CHLORIDE 9 MG/ML
INJECTION, SOLUTION INTRAVENOUS PRN
Status: DISCONTINUED | OUTPATIENT
Start: 2025-06-19 | End: 2025-06-19 | Stop reason: HOSPADM

## 2025-06-19 RX ORDER — SODIUM CHLORIDE 0.9 % (FLUSH) 0.9 %
5-40 SYRINGE (ML) INJECTION PRN
Status: DISCONTINUED | OUTPATIENT
Start: 2025-06-19 | End: 2025-06-26 | Stop reason: HOSPADM

## 2025-06-19 RX ORDER — SODIUM CHLORIDE 0.9 % (FLUSH) 0.9 %
5-40 SYRINGE (ML) INJECTION PRN
Status: DISCONTINUED | OUTPATIENT
Start: 2025-06-19 | End: 2025-06-19 | Stop reason: HOSPADM

## 2025-06-19 RX ORDER — DEXAMETHASONE SODIUM PHOSPHATE 4 MG/ML
INJECTION, SOLUTION INTRA-ARTICULAR; INTRALESIONAL; INTRAMUSCULAR; INTRAVENOUS; SOFT TISSUE
Status: DISCONTINUED | OUTPATIENT
Start: 2025-06-19 | End: 2025-06-19 | Stop reason: SDUPTHER

## 2025-06-19 RX ORDER — PROPOFOL 10 MG/ML
INJECTION, EMULSION INTRAVENOUS
Status: DISCONTINUED | OUTPATIENT
Start: 2025-06-19 | End: 2025-06-19 | Stop reason: SDUPTHER

## 2025-06-19 RX ORDER — ESMOLOL HYDROCHLORIDE 10 MG/ML
INJECTION INTRAVENOUS
Status: DISCONTINUED | OUTPATIENT
Start: 2025-06-19 | End: 2025-06-19 | Stop reason: SDUPTHER

## 2025-06-19 RX ORDER — FENTANYL CITRATE 50 UG/ML
INJECTION, SOLUTION INTRAMUSCULAR; INTRAVENOUS
Status: DISCONTINUED | OUTPATIENT
Start: 2025-06-19 | End: 2025-06-19 | Stop reason: SDUPTHER

## 2025-06-19 RX ORDER — ONDANSETRON 2 MG/ML
INJECTION INTRAMUSCULAR; INTRAVENOUS
Status: DISCONTINUED | OUTPATIENT
Start: 2025-06-19 | End: 2025-06-19 | Stop reason: SDUPTHER

## 2025-06-19 RX ORDER — LABETALOL HYDROCHLORIDE 5 MG/ML
INJECTION, SOLUTION INTRAVENOUS
Status: DISCONTINUED | OUTPATIENT
Start: 2025-06-19 | End: 2025-06-19 | Stop reason: SDUPTHER

## 2025-06-19 RX ORDER — LIDOCAINE HYDROCHLORIDE 20 MG/ML
INJECTION, SOLUTION INFILTRATION; PERINEURAL
Status: DISCONTINUED | OUTPATIENT
Start: 2025-06-19 | End: 2025-06-19 | Stop reason: SDUPTHER

## 2025-06-19 RX ORDER — PROMETHAZINE HYDROCHLORIDE 25 MG/1
12.5 TABLET ORAL EVERY 6 HOURS PRN
Status: DISCONTINUED | OUTPATIENT
Start: 2025-06-19 | End: 2025-06-25

## 2025-06-19 RX ORDER — NITROGLYCERIN 20 MG/100ML
5-200 INJECTION INTRAVENOUS CONTINUOUS PRN
Status: DISCONTINUED | OUTPATIENT
Start: 2025-06-19 | End: 2025-06-25

## 2025-06-19 RX ORDER — MAGNESIUM SULFATE 1 G/100ML
1000 INJECTION INTRAVENOUS PRN
Status: DISCONTINUED | OUTPATIENT
Start: 2025-06-19 | End: 2025-06-25

## 2025-06-19 RX ORDER — OXYCODONE HYDROCHLORIDE 5 MG/1
5 TABLET ORAL PRN
Status: DISCONTINUED | OUTPATIENT
Start: 2025-06-19 | End: 2025-06-19 | Stop reason: HOSPADM

## 2025-06-19 RX ORDER — NITROGLYCERIN 5 MG/ML
INJECTION, SOLUTION INTRAVENOUS
Status: DISCONTINUED | OUTPATIENT
Start: 2025-06-19 | End: 2025-06-19 | Stop reason: SDUPTHER

## 2025-06-19 RX ORDER — SODIUM CHLORIDE 9 MG/ML
INJECTION, SOLUTION INTRAVENOUS PRN
Status: DISCONTINUED | OUTPATIENT
Start: 2025-06-19 | End: 2025-06-19

## 2025-06-19 RX ORDER — MORPHINE SULFATE/0.9% NACL/PF 1 MG/ML
SYRINGE (ML) INJECTION CONTINUOUS
Status: DISCONTINUED | OUTPATIENT
Start: 2025-06-19 | End: 2025-06-21

## 2025-06-19 RX ORDER — SODIUM CHLORIDE 0.9 % (FLUSH) 0.9 %
5-40 SYRINGE (ML) INJECTION EVERY 12 HOURS SCHEDULED
Status: DISCONTINUED | OUTPATIENT
Start: 2025-06-19 | End: 2025-06-26 | Stop reason: HOSPADM

## 2025-06-19 RX ORDER — LIDOCAINE HYDROCHLORIDE 10 MG/ML
1 INJECTION, SOLUTION EPIDURAL; INFILTRATION; INTRACAUDAL; PERINEURAL
Status: DISCONTINUED | OUTPATIENT
Start: 2025-06-19 | End: 2025-06-19 | Stop reason: HOSPADM

## 2025-06-19 RX ORDER — ROCURONIUM BROMIDE 10 MG/ML
INJECTION, SOLUTION INTRAVENOUS
Status: DISCONTINUED | OUTPATIENT
Start: 2025-06-19 | End: 2025-06-19 | Stop reason: SDUPTHER

## 2025-06-19 RX ORDER — NALOXONE HYDROCHLORIDE 0.4 MG/ML
INJECTION, SOLUTION INTRAMUSCULAR; INTRAVENOUS; SUBCUTANEOUS PRN
Status: DISCONTINUED | OUTPATIENT
Start: 2025-06-19 | End: 2025-06-19 | Stop reason: HOSPADM

## 2025-06-19 RX ORDER — DIPHENHYDRAMINE HYDROCHLORIDE 50 MG/ML
12.5 INJECTION, SOLUTION INTRAMUSCULAR; INTRAVENOUS
Status: DISCONTINUED | OUTPATIENT
Start: 2025-06-19 | End: 2025-06-19 | Stop reason: HOSPADM

## 2025-06-19 RX ORDER — ONDANSETRON 2 MG/ML
4 INJECTION INTRAMUSCULAR; INTRAVENOUS
Status: DISCONTINUED | OUTPATIENT
Start: 2025-06-19 | End: 2025-06-19 | Stop reason: HOSPADM

## 2025-06-19 RX ORDER — NALOXONE HYDROCHLORIDE 0.4 MG/ML
INJECTION, SOLUTION INTRAMUSCULAR; INTRAVENOUS; SUBCUTANEOUS PRN
Status: DISCONTINUED | OUTPATIENT
Start: 2025-06-19 | End: 2025-06-21

## 2025-06-19 RX ORDER — HEPARIN SODIUM 1000 [USP'U]/ML
INJECTION, SOLUTION INTRAVENOUS; SUBCUTANEOUS
Status: DISCONTINUED | OUTPATIENT
Start: 2025-06-19 | End: 2025-06-19 | Stop reason: SDUPTHER

## 2025-06-19 RX ORDER — OXYCODONE HYDROCHLORIDE 5 MG/1
10 TABLET ORAL PRN
Status: DISCONTINUED | OUTPATIENT
Start: 2025-06-19 | End: 2025-06-19 | Stop reason: HOSPADM

## 2025-06-19 RX ORDER — HYDRALAZINE HYDROCHLORIDE 20 MG/ML
10 INJECTION INTRAMUSCULAR; INTRAVENOUS
Status: DISCONTINUED | OUTPATIENT
Start: 2025-06-19 | End: 2025-06-25

## 2025-06-19 RX ORDER — METOPROLOL TARTRATE 1 MG/ML
5 INJECTION, SOLUTION INTRAVENOUS EVERY 6 HOURS
Status: DISCONTINUED | OUTPATIENT
Start: 2025-06-19 | End: 2025-06-20

## 2025-06-19 RX ORDER — CALCIUM CHLORIDE 100 MG/ML
INJECTION INTRAVENOUS; INTRAVENTRICULAR
Status: DISCONTINUED | OUTPATIENT
Start: 2025-06-19 | End: 2025-06-19 | Stop reason: SDUPTHER

## 2025-06-19 RX ORDER — POTASSIUM CHLORIDE 29.8 MG/ML
20 INJECTION INTRAVENOUS PRN
Status: DISCONTINUED | OUTPATIENT
Start: 2025-06-19 | End: 2025-06-25

## 2025-06-19 RX ORDER — SODIUM CHLORIDE, SODIUM LACTATE, POTASSIUM CHLORIDE, CALCIUM CHLORIDE 600; 310; 30; 20 MG/100ML; MG/100ML; MG/100ML; MG/100ML
INJECTION, SOLUTION INTRAVENOUS CONTINUOUS
Status: DISCONTINUED | OUTPATIENT
Start: 2025-06-19 | End: 2025-06-20

## 2025-06-19 RX ADMIN — CALCIUM CHLORIDE 1 G: 100 INJECTION, SOLUTION INTRAVENOUS at 13:39

## 2025-06-19 RX ADMIN — FENTANYL CITRATE 100 MCG: 50 INJECTION, SOLUTION INTRAMUSCULAR; INTRAVENOUS at 11:58

## 2025-06-19 RX ADMIN — SODIUM CHLORIDE, SODIUM LACTATE, POTASSIUM CHLORIDE, AND CALCIUM CHLORIDE: .6; .31; .03; .02 INJECTION, SOLUTION INTRAVENOUS at 15:17

## 2025-06-19 RX ADMIN — HEPARIN SODIUM 5000 UNITS: 1000 INJECTION INTRAVENOUS; SUBCUTANEOUS at 12:15

## 2025-06-19 RX ADMIN — PROTAMINE SULFATE 25 MG: 10 INJECTION, SOLUTION INTRAVENOUS at 13:45

## 2025-06-19 RX ADMIN — NITROGLYCERIN 40 MCG: 5 INJECTION, SOLUTION INTRAVENOUS at 14:00

## 2025-06-19 RX ADMIN — METOPROLOL TARTRATE 5 MG: 1 INJECTION, SOLUTION INTRAVENOUS at 15:25

## 2025-06-19 RX ADMIN — ROCURONIUM BROMIDE 30 MG: 50 INJECTION, SOLUTION INTRAVENOUS at 11:45

## 2025-06-19 RX ADMIN — METRONIDAZOLE 500 MG: 500 INJECTION, SOLUTION INTRAVENOUS at 05:15

## 2025-06-19 RX ADMIN — LIDOCAINE HYDROCHLORIDE 100 MG: 20 INJECTION, SOLUTION INFILTRATION; PERINEURAL at 12:15

## 2025-06-19 RX ADMIN — SODIUM CHLORIDE: 9 INJECTION, SOLUTION INTRAVENOUS at 10:58

## 2025-06-19 RX ADMIN — MORPHINE SULFATE: 1 INJECTION INTRAVENOUS at 15:21

## 2025-06-19 RX ADMIN — SODIUM CHLORIDE: 9 INJECTION, SOLUTION INTRAVENOUS at 12:54

## 2025-06-19 RX ADMIN — SODIUM BICARBONATE 50 MEQ: 84 INJECTION, SOLUTION INTRAVENOUS at 11:46

## 2025-06-19 RX ADMIN — LABETALOL HYDROCHLORIDE 5 MG: 5 INJECTION, SOLUTION INTRAVENOUS at 14:04

## 2025-06-19 RX ADMIN — ROCURONIUM BROMIDE 20 MG: 50 INJECTION, SOLUTION INTRAVENOUS at 12:55

## 2025-06-19 RX ADMIN — SODIUM CHLORIDE, PRESERVATIVE FREE 10 ML: 5 INJECTION INTRAVENOUS at 21:08

## 2025-06-19 RX ADMIN — Medication 30 MG: at 11:37

## 2025-06-19 RX ADMIN — ROCURONIUM BROMIDE 20 MG: 50 INJECTION, SOLUTION INTRAVENOUS at 13:40

## 2025-06-19 RX ADMIN — HYDROMORPHONE HYDROCHLORIDE 0.5 MG: 1 INJECTION, SOLUTION INTRAMUSCULAR; INTRAVENOUS; SUBCUTANEOUS at 05:15

## 2025-06-19 RX ADMIN — PROPOFOL 60 MG: 10 INJECTION, EMULSION INTRAVENOUS at 11:06

## 2025-06-19 RX ADMIN — NITROGLYCERIN 40 MCG: 5 INJECTION, SOLUTION INTRAVENOUS at 14:04

## 2025-06-19 RX ADMIN — NITROGLYCERIN 20 MCG: 5 INJECTION, SOLUTION INTRAVENOUS at 12:53

## 2025-06-19 RX ADMIN — METOPROLOL TARTRATE 5 MG: 1 INJECTION, SOLUTION INTRAVENOUS at 21:08

## 2025-06-19 RX ADMIN — NITROGLYCERIN 40 MCG: 5 INJECTION, SOLUTION INTRAVENOUS at 11:24

## 2025-06-19 RX ADMIN — DEXAMETHASONE SODIUM PHOSPHATE 4 MG: 4 INJECTION, SOLUTION INTRAMUSCULAR; INTRAVENOUS at 11:10

## 2025-06-19 RX ADMIN — Medication 200 MCG: at 13:51

## 2025-06-19 RX ADMIN — FENTANYL CITRATE 25 MCG: 50 INJECTION, SOLUTION INTRAMUSCULAR; INTRAVENOUS at 11:06

## 2025-06-19 RX ADMIN — SODIUM CHLORIDE: 0.9 INJECTION, SOLUTION INTRAVENOUS at 07:59

## 2025-06-19 RX ADMIN — ROCURONIUM BROMIDE 50 MG: 50 INJECTION, SOLUTION INTRAVENOUS at 11:06

## 2025-06-19 RX ADMIN — NITROGLYCERIN 80 MCG: 5 INJECTION, SOLUTION INTRAVENOUS at 13:47

## 2025-06-19 RX ADMIN — ONDANSETRON 4 MG: 2 INJECTION INTRAMUSCULAR; INTRAVENOUS at 11:10

## 2025-06-19 RX ADMIN — NITROGLYCERIN 80 MCG: 5 INJECTION, SOLUTION INTRAVENOUS at 12:02

## 2025-06-19 RX ADMIN — LIDOCAINE HYDROCHLORIDE 60 MG: 20 INJECTION, SOLUTION INFILTRATION; PERINEURAL at 11:06

## 2025-06-19 RX ADMIN — FENTANYL CITRATE 50 MCG: 50 INJECTION, SOLUTION INTRAMUSCULAR; INTRAVENOUS at 11:23

## 2025-06-19 RX ADMIN — ESMOLOL HYDROCHLORIDE 35 MG: 100 INJECTION, SOLUTION INTRAVENOUS at 11:40

## 2025-06-19 RX ADMIN — NITROGLYCERIN 40 MCG: 5 INJECTION, SOLUTION INTRAVENOUS at 13:57

## 2025-06-19 RX ADMIN — ALBUMIN (HUMAN) 12.5 G: 12.5 INJECTION, SOLUTION INTRAVENOUS at 13:32

## 2025-06-19 RX ADMIN — PROPOFOL 70 MG: 10 INJECTION, EMULSION INTRAVENOUS at 11:40

## 2025-06-19 RX ADMIN — FENTANYL CITRATE 25 MCG: 50 INJECTION, SOLUTION INTRAMUSCULAR; INTRAVENOUS at 11:37

## 2025-06-19 RX ADMIN — ALBUMIN (HUMAN) 12.5 G: 12.5 INJECTION, SOLUTION INTRAVENOUS at 11:47

## 2025-06-19 RX ADMIN — NITROGLYCERIN 20 MCG: 5 INJECTION, SOLUTION INTRAVENOUS at 13:25

## 2025-06-19 RX ADMIN — NITROGLYCERIN 20 MCG: 5 INJECTION, SOLUTION INTRAVENOUS at 13:17

## 2025-06-19 RX ADMIN — NITROGLYCERIN 20 MCG/MIN: 5 INJECTION, SOLUTION INTRAVENOUS at 14:03

## 2025-06-19 RX ADMIN — WATER 1000 MG: 1 INJECTION INTRAMUSCULAR; INTRAVENOUS; SUBCUTANEOUS at 05:16

## 2025-06-19 RX ADMIN — NITROGLYCERIN 20 MCG: 5 INJECTION, SOLUTION INTRAVENOUS at 11:38

## 2025-06-19 ASSESSMENT — PAIN SCALES - GENERAL
PAINLEVEL_OUTOF10: 6
PAINLEVEL_OUTOF10: 8
PAINLEVEL_OUTOF10: 10
PAINLEVEL_OUTOF10: 8
PAINLEVEL_OUTOF10: 9
PAINLEVEL_OUTOF10: 10
PAINLEVEL_OUTOF10: 8

## 2025-06-19 ASSESSMENT — PAIN DESCRIPTION - ORIENTATION
ORIENTATION: MID
ORIENTATION: MID

## 2025-06-19 ASSESSMENT — PAIN - FUNCTIONAL ASSESSMENT
PAIN_FUNCTIONAL_ASSESSMENT: ACTIVITIES ARE NOT PREVENTED
PAIN_FUNCTIONAL_ASSESSMENT: ACTIVITIES ARE NOT PREVENTED
PAIN_FUNCTIONAL_ASSESSMENT: 0-10

## 2025-06-19 ASSESSMENT — PAIN SCALES - WONG BAKER
WONGBAKER_NUMERICALRESPONSE: NO HURT

## 2025-06-19 ASSESSMENT — PAIN DESCRIPTION - LOCATION
LOCATION: GENERALIZED

## 2025-06-19 ASSESSMENT — PAIN DESCRIPTION - PAIN TYPE
TYPE: SURGICAL PAIN
TYPE: SURGICAL PAIN

## 2025-06-19 ASSESSMENT — PAIN DESCRIPTION - DESCRIPTORS
DESCRIPTORS: ACHING
DESCRIPTORS: ACHING

## 2025-06-19 ASSESSMENT — ENCOUNTER SYMPTOMS: SHORTNESS OF BREATH: 1

## 2025-06-19 NOTE — BRIEF OP NOTE
Brief Postoperative Note      Patient: Nithya Neff  YOB: 1940  MRN: [id number]    Date of Procedure: 6/19/2025    Pre-Op Diagnosis Codes:      * Mesenteric artery stenosis [K55.1]    Post-Op Diagnosis: Same       Procedure(s):  SUPERIOR MESENTERIC ARTERY BYPASS GRAFT               **LATEX SENSITIVE**    Surgeon(s):  Timi Fraire MD    Assistant:  Surgical Assistant: Zayda Perez Shawna    Anesthesia: General    Estimated Blood Loss (mL): 450, 225 returned via Cell Saver    Complications: None    Specimens:   * No specimens in log *    Implants:  Implant Name Type Inv. Item Serial No.  Lot No. LRB No. Used Action   GRAFT VASC L40CM DIA8MM RAD L40CM EPTFE HEP THN WALLED - [id number] Vascular grafts GRAFT VASC L40CM DIA8MM RAD L40CM EPTFE HEP THN WALLED [id number]  [employer]WD  N/A 1 Implanted         Drains:   Urinary Catheter 06/19/25 2 Way;Prince (Active)       [REMOVED] External Urinary Catheter (Removed)   Site Assessment Clean,dry & intact 06/17/25 2103   Placement Replaced 06/17/25 2103   Securement Method Securing device (Describe) 06/17/25 2103   Catheter Care Catheter/Wick replaced 06/17/25 2103   Perineal Care Yes 06/17/25 2103   Suction 40 mmgHg continuous 06/17/25 2103       Findings:  Infection Present At Time Of Surgery (PATOS) (choose all levels that have infection present):  No infection present      Electronically signed by Timi Fraire MD on 6/19/2025 at 2:18 PM

## 2025-06-19 NOTE — ANESTHESIA PRE PROCEDURE
Department of Anesthesiology  Preprocedure Note       Name:  Nithya Neff   Age:  85 y.o.  :  1940                                          MRN:  [id number]         Date:  2025      Surgeon: Surgeon(s):  Timi Fraire MD    Procedure: Procedure(s):  SUPERIOR MESENTERIC ARTERY BYPASS GRAFT               **LATEX SENSITIVE**    Medications prior to admission:   Prior to Admission medications    Medication Sig Start Date End Date Taking? Authorizing Provider   atorvastatin (LIPITOR) 40 MG tablet Take 1 tablet by mouth daily   Yes Montana Sanon MD   busPIRone (BUSPAR) 10 MG tablet Take 1 tablet by mouth in the morning and at bedtime   Yes Montana Sanon MD   oxyCODONE-acetaminophen (PERCOCET) 7.5-325 MG per tablet Take 1 tablet by mouth every 6 hours as needed for Pain.   Yes Montana Sanon MD   sertraline (ZOLOFT) 50 MG tablet Take 1 tablet by mouth daily   Yes Montana Sanon MD   cetirizine (ZYRTEC) 10 MG tablet TAKE (1) TABLET DAILY 23  Yes Maria G Romero APRN - CNP   aspirin 81 MG EC tablet Take 1 tablet by mouth daily 3/4/20  Yes Montana Sanon MD   budesonide (ENTOCORT EC) 3 MG delayed release capsule Take 3 capsules by mouth every morning    Montana Sanon MD   clotrimazole (LOTRIMIN) 1 % cream Apply topically 2 times daily Not sure of dose or frequency or where it is applied    Montana Sanon MD   diclofenac (VOLTAREN) 75 MG EC tablet Take 1 tablet by mouth 2 times daily    Montana Sanon MD   docusate sodium (COLACE) 100 MG capsule Take 1 capsule by mouth 2 times daily    Montana Sanon MD   famotidine (PEPCID) 20 MG tablet Take 1 tablet by mouth daily    Montana Sanon MD   hydrOXYzine HCl (ATARAX) 25 MG tablet Take 1 tablet by mouth 3 times daily as needed for Itching or Anxiety    Montana Sanon MD   nystatin (MYCOSTATIN) POWD powder Apply topically 2 times daily    Montana Sanon MD   polyethylene glycol

## 2025-06-19 NOTE — OP NOTE
[address] [address]                            OPERATIVE REPORT      PATIENT NAME: FELIPE KING               : 1940  MED REC NO: [id number]                      ROOM: 0227  ACCOUNT NO: [id number]                       ADMIT DATE: 2025  PROVIDER: Timi Fraire MD      DATE OF PROCEDURE:  2025    SURGEON:  Timi Fraire MD    PREOPERATIVE DIAGNOSIS:  Chronic mesenteric ischemia with superior mesenteric artery occlusion.    POSTOPERATIVE DIAGNOSIS:  Chronic mesenteric ischemia with superior mesenteric artery occlusion.    PROCEDURE:  Retrograde left iliac to superior mesenteric bypass using 8 mm internally reinforced Hartland Propaten graft.    ANESTHESIA:  General endotracheal.    INDICATIONS:  The patient is an 85-year-old female with severe signs and symptoms consistent with chronic mesenteric ischemia.  CT angiogram had been performed, which showed stenosis of the celiac artery and occlusion of approximately the first 3 cm of the superior mesenteric artery.  The patient is brought to the operating room at this time to undergo superior mesenteric artery bypass.    DESCRIPTION OF PROCEDURE:  The patient was brought to the operating room, placed in supine position, general endotracheal anesthesia induced.  After adequate anesthesia, the abdomen was prepped and draped in a sterile fashion.  A midline incision was made.  There were several adhesions to the abdominal wall of omentum.  These were taken down using cautery.  The small bowel was then retracted to the right side of the abdomen.  The retroperitoneal tissue overlying the aorta and the left iliac artery was incised.  The common iliac artery was calcified proximally, but in its midportion, was soft with some posterior plaquing.  I dissected the artery for approximately 3 or 4 cm, enough to clamp proximally and distally to perform the proximal anastomosis.  Next,

## 2025-06-19 NOTE — ANESTHESIA PROCEDURE NOTES
Central Venous Line:    A central venous line was placed using surface landmarks, in the OR for the following indication(s): central venous access and CVP monitoring.6/19/2025 11:15 AM6/19/2025 11:20 AM    Sterility preparation included the following: provider used sterile gloves, gown, hat and mask and maximum sterile barriers used during central venous catheter insertion.    The patient was placed in Trendelenburg position.The right internal jugular vein was prepped.    The site was prepped with Chloraprep.  A 7 Fr (size), 16 (length), introducer triple lumen was placed.    During the procedure, the following specific steps were taken: target vein identified, needle advanced into vein and blood aspirated and guidewire advanced into vein.    Intravenous verification was obtained by venous blood return.    Post insertion care included: all ports aspirated, all ports flushed easily, guidewire removed intact, Biopatch applied, line sutured in place and dressing applied.    During the procedure the patient experienced: patient tolerated procedure well with no complications and EBL < 5mL.      Outcomes: uncomplicated and patient tolerated procedure well  Anesthesia type: general..No  Staffing  Performed: Anesthesiologist   Anesthesiologist: Sree Kothari MD  Performed by: Sree Kothari MD  Authorized by: Sree Kothari MD    Preanesthetic Checklist  Completed: patient identified, timeout performed and monitors applied/VS acknowledged

## 2025-06-19 NOTE — CONSENT
Informed Consent for Blood Component Transfusion Note    I have discussed with the patient the rationale for blood component transfusion; its benefits in treating or preventing fatigue, organ damage, or death; and its risk which includes mild transfusion reactions, rare risk of blood borne infection, or more serious but rare reactions. I have discussed the alternatives to transfusion, including the risk and consequences of not receiving transfusion.     Electronically signed by Timi Fraire MD on 6/19/25 at 2:14 PM EDT

## 2025-06-20 LAB
ANION GAP SERPL CALCULATED.3IONS-SCNC: 10 MMOL/L (ref 3–16)
BUN SERPL-MCNC: 18 MG/DL (ref 7–20)
CALCIUM SERPL-MCNC: 8.1 MG/DL (ref 8.3–10.6)
CALPROTECTIN STL-MCNT: 92 UG/G
CHLORIDE SERPL-SCNC: 111 MMOL/L (ref 99–110)
CO2 SERPL-SCNC: 21 MMOL/L (ref 21–32)
CREAT SERPL-MCNC: 1.1 MG/DL (ref 0.6–1.2)
DEPRECATED RDW RBC AUTO: 16.5 % (ref 12.4–15.4)
GFR SERPLBLD CREATININE-BSD FMLA CKD-EPI: 49 ML/MIN/{1.73_M2}
GLUCOSE SERPL-MCNC: 96 MG/DL (ref 70–99)
HCT VFR BLD AUTO: 28.1 % (ref 36–48)
HGB BLD-MCNC: 9 G/DL (ref 12–16)
MCH RBC QN AUTO: 26 PG (ref 26–34)
MCHC RBC AUTO-ENTMCNC: 32 G/DL (ref 31–36)
MCV RBC AUTO: 81.2 FL (ref 80–100)
PLATELET # BLD AUTO: 210 K/UL (ref 135–450)
PMV BLD AUTO: 6.4 FL (ref 5–10.5)
POTASSIUM SERPL-SCNC: 4.2 MMOL/L (ref 3.5–5.1)
RBC # BLD AUTO: 3.47 M/UL (ref 4–5.2)
SODIUM SERPL-SCNC: 142 MMOL/L (ref 136–145)
WBC # BLD AUTO: 11.4 K/UL (ref 4–11)

## 2025-06-20 PROCEDURE — 2500000003 HC RX 250 WO HCPCS: Performed by: SURGERY

## 2025-06-20 PROCEDURE — 2580000003 HC RX 258: Performed by: INTERNAL MEDICINE

## 2025-06-20 PROCEDURE — 94761 N-INVAS EAR/PLS OXIMETRY MLT: CPT

## 2025-06-20 PROCEDURE — 6370000000 HC RX 637 (ALT 250 FOR IP): Performed by: INTERNAL MEDICINE

## 2025-06-20 PROCEDURE — 36592 COLLECT BLOOD FROM PICC: CPT

## 2025-06-20 PROCEDURE — 6360000002 HC RX W HCPCS: Performed by: SURGERY

## 2025-06-20 PROCEDURE — 2000000000 HC ICU R&B

## 2025-06-20 PROCEDURE — 6370000000 HC RX 637 (ALT 250 FOR IP)

## 2025-06-20 PROCEDURE — 6360000002 HC RX W HCPCS

## 2025-06-20 PROCEDURE — 99024 POSTOP FOLLOW-UP VISIT: CPT | Performed by: CLINICAL NURSE SPECIALIST

## 2025-06-20 PROCEDURE — 2580000003 HC RX 258: Performed by: CLINICAL NURSE SPECIALIST

## 2025-06-20 PROCEDURE — 85027 COMPLETE CBC AUTOMATED: CPT

## 2025-06-20 PROCEDURE — 80048 BASIC METABOLIC PNL TOTAL CA: CPT

## 2025-06-20 PROCEDURE — 2700000000 HC OXYGEN THERAPY PER DAY

## 2025-06-20 RX ORDER — BUSPIRONE HYDROCHLORIDE 5 MG/1
10 TABLET ORAL 2 TIMES DAILY
Status: DISCONTINUED | OUTPATIENT
Start: 2025-06-20 | End: 2025-06-23

## 2025-06-20 RX ORDER — METOPROLOL TARTRATE 1 MG/ML
2.5 INJECTION, SOLUTION INTRAVENOUS EVERY 6 HOURS
Status: DISCONTINUED | OUTPATIENT
Start: 2025-06-20 | End: 2025-06-25

## 2025-06-20 RX ORDER — MUPIROCIN 2 %
OINTMENT (GRAM) TOPICAL 2 TIMES DAILY
Status: COMPLETED | OUTPATIENT
Start: 2025-06-20 | End: 2025-06-24

## 2025-06-20 RX ORDER — 0.9 % SODIUM CHLORIDE 0.9 %
500 INTRAVENOUS SOLUTION INTRAVENOUS ONCE
Status: COMPLETED | OUTPATIENT
Start: 2025-06-20 | End: 2025-06-20

## 2025-06-20 RX ORDER — TORSEMIDE 20 MG/1
20 TABLET ORAL DAILY
Status: DISCONTINUED | OUTPATIENT
Start: 2025-06-20 | End: 2025-06-26 | Stop reason: HOSPADM

## 2025-06-20 RX ORDER — SODIUM CHLORIDE, SODIUM LACTATE, POTASSIUM CHLORIDE, CALCIUM CHLORIDE 600; 310; 30; 20 MG/100ML; MG/100ML; MG/100ML; MG/100ML
INJECTION, SOLUTION INTRAVENOUS CONTINUOUS
Status: DISCONTINUED | OUTPATIENT
Start: 2025-06-20 | End: 2025-06-21

## 2025-06-20 RX ORDER — ALBUTEROL SULFATE 0.83 MG/ML
2.5 SOLUTION RESPIRATORY (INHALATION) EVERY 4 HOURS PRN
Status: DISCONTINUED | OUTPATIENT
Start: 2025-06-20 | End: 2025-06-26 | Stop reason: HOSPADM

## 2025-06-20 RX ORDER — VALSARTAN 80 MG/1
80 TABLET ORAL DAILY
Status: DISCONTINUED | OUTPATIENT
Start: 2025-06-20 | End: 2025-06-26 | Stop reason: HOSPADM

## 2025-06-20 RX ORDER — ATORVASTATIN CALCIUM 40 MG/1
40 TABLET, FILM COATED ORAL NIGHTLY
Status: DISCONTINUED | OUTPATIENT
Start: 2025-06-20 | End: 2025-06-26 | Stop reason: HOSPADM

## 2025-06-20 RX ORDER — PANTOPRAZOLE SODIUM 40 MG/10ML
40 INJECTION, POWDER, LYOPHILIZED, FOR SOLUTION INTRAVENOUS DAILY
Status: DISCONTINUED | OUTPATIENT
Start: 2025-06-20 | End: 2025-06-23

## 2025-06-20 RX ADMIN — SODIUM CHLORIDE, SODIUM LACTATE, POTASSIUM CHLORIDE, AND CALCIUM CHLORIDE: .6; .31; .03; .02 INJECTION, SOLUTION INTRAVENOUS at 16:53

## 2025-06-20 RX ADMIN — MORPHINE SULFATE: 1 INJECTION INTRAVENOUS at 20:57

## 2025-06-20 RX ADMIN — SODIUM CHLORIDE, PRESERVATIVE FREE 10 ML: 5 INJECTION INTRAVENOUS at 10:15

## 2025-06-20 RX ADMIN — SERTRALINE 50 MG: 50 TABLET, FILM COATED ORAL at 15:05

## 2025-06-20 RX ADMIN — SODIUM CHLORIDE 500 ML: 0.9 INJECTION, SOLUTION INTRAVENOUS at 08:31

## 2025-06-20 RX ADMIN — MORPHINE SULFATE: 1 INJECTION INTRAVENOUS at 00:45

## 2025-06-20 RX ADMIN — BUSPIRONE HYDROCHLORIDE 10 MG: 5 TABLET ORAL at 20:49

## 2025-06-20 RX ADMIN — MUPIROCIN: 20 OINTMENT TOPICAL at 20:49

## 2025-06-20 RX ADMIN — VALSARTAN 80 MG: 80 TABLET, FILM COATED ORAL at 15:06

## 2025-06-20 RX ADMIN — PANTOPRAZOLE SODIUM 40 MG: 40 INJECTION, POWDER, FOR SOLUTION INTRAVENOUS at 15:05

## 2025-06-20 RX ADMIN — BUSPIRONE HYDROCHLORIDE 10 MG: 5 TABLET ORAL at 15:05

## 2025-06-20 RX ADMIN — ATORVASTATIN CALCIUM 40 MG: 40 TABLET, FILM COATED ORAL at 20:49

## 2025-06-20 RX ADMIN — SODIUM CHLORIDE, PRESERVATIVE FREE 10 ML: 5 INJECTION INTRAVENOUS at 20:56

## 2025-06-20 RX ADMIN — SODIUM CHLORIDE, SODIUM LACTATE, POTASSIUM CHLORIDE, AND CALCIUM CHLORIDE: .6; .31; .03; .02 INJECTION, SOLUTION INTRAVENOUS at 05:45

## 2025-06-20 RX ADMIN — TORSEMIDE 20 MG: 20 TABLET ORAL at 16:58

## 2025-06-20 RX ADMIN — MUPIROCIN: 20 OINTMENT TOPICAL at 10:15

## 2025-06-20 ASSESSMENT — PAIN SCALES - GENERAL
PAINLEVEL_OUTOF10: 6
PAINLEVEL_OUTOF10: 7
PAINLEVEL_OUTOF10: 7
PAINLEVEL_OUTOF10: 6
PAINLEVEL_OUTOF10: 4
PAINLEVEL_OUTOF10: 8
PAINLEVEL_OUTOF10: 4

## 2025-06-20 ASSESSMENT — PAIN DESCRIPTION - PAIN TYPE
TYPE: SURGICAL PAIN;ACUTE PAIN;CHRONIC PAIN
TYPE: SURGICAL PAIN

## 2025-06-20 ASSESSMENT — PAIN DESCRIPTION - DESCRIPTORS
DESCRIPTORS: ACHING;BURNING;TINGLING
DESCRIPTORS: ACHING
DESCRIPTORS: ACHING;BURNING;TINGLING

## 2025-06-20 ASSESSMENT — PAIN DESCRIPTION - LOCATION
LOCATION: ABDOMEN;HEAD
LOCATION: ABDOMEN;BACK;LEG

## 2025-06-20 ASSESSMENT — PAIN - FUNCTIONAL ASSESSMENT
PAIN_FUNCTIONAL_ASSESSMENT: PREVENTS OR INTERFERES SOME ACTIVE ACTIVITIES AND ADLS
PAIN_FUNCTIONAL_ASSESSMENT: ACTIVITIES ARE NOT PREVENTED
PAIN_FUNCTIONAL_ASSESSMENT: PREVENTS OR INTERFERES SOME ACTIVE ACTIVITIES AND ADLS

## 2025-06-20 ASSESSMENT — PAIN DESCRIPTION - ORIENTATION: ORIENTATION: MID

## 2025-06-20 ASSESSMENT — PAIN SCALES - WONG BAKER: WONGBAKER_NUMERICALRESPONSE: NO HURT

## 2025-06-20 NOTE — CARE COORDINATION
Chart reviewed. LOS day #  3. Admitted as inpatient. Followed by IM, Vascular. S/p superior mesenteric artery bypass graft per Vascular yesterday. On IV fluids. Clamp NG today. OOB today. Pt is from apt. Alone. Dtr is paid HHA through [facility]. Follow.

## 2025-06-20 NOTE — ANESTHESIA POSTPROCEDURE EVALUATION
Department of Anesthesiology  Postprocedure Note    Patient: Nithya Neff  MRN: [id number]  YOB: 1940  Date of evaluation: 6/20/2025    Procedure Summary       Date: 06/19/25 Room / Location: [place] [address] ([facility]) / [facility]    Anesthesia Start: 1058 Anesthesia Stop: 1452    Procedure: SUPERIOR MESENTERIC ARTERY BYPASS GRAFT               **LATEX SENSITIVE** (Abdomen) Diagnosis:       Mesenteric artery stenosis      (Mesenteric artery stenosis [K55.1])    Surgeons: Timi Fraire MD Responsible Provider: Sree Kohtari MD    Anesthesia Type: general ASA Status: 4            Anesthesia Type: No value filed.    Ml Phase I: Ml Score: 10    Ml Phase II:      Anesthesia Post Evaluation    Patient location during evaluation: PACU  Patient participation: complete - patient participated  Level of consciousness: awake and alert  Pain score: 0  Airway patency: patent  Nausea & Vomiting: no nausea and no vomiting  Cardiovascular status: blood pressure returned to baseline  Respiratory status: acceptable  Hydration status: stable  Pain management: adequate    No notable events documented.

## 2025-06-21 LAB
ANION GAP SERPL CALCULATED.3IONS-SCNC: 8 MMOL/L (ref 3–16)
BASOPHILS # BLD: 0 K/UL (ref 0–0.2)
BASOPHILS NFR BLD: 0.1 %
BUN SERPL-MCNC: 17 MG/DL (ref 7–20)
CALCIUM SERPL-MCNC: 8 MG/DL (ref 8.3–10.6)
CHLORIDE SERPL-SCNC: 110 MMOL/L (ref 99–110)
CO2 SERPL-SCNC: 23 MMOL/L (ref 21–32)
CREAT SERPL-MCNC: 1.2 MG/DL (ref 0.6–1.2)
DEPRECATED RDW RBC AUTO: 16.9 % (ref 12.4–15.4)
EOSINOPHIL # BLD: 0.1 K/UL (ref 0–0.6)
EOSINOPHIL NFR BLD: 0.5 %
GFR SERPLBLD CREATININE-BSD FMLA CKD-EPI: 44 ML/MIN/{1.73_M2}
GLUCOSE SERPL-MCNC: 91 MG/DL (ref 70–99)
HCT VFR BLD AUTO: 28.9 % (ref 36–48)
HGB BLD-MCNC: 9.3 G/DL (ref 12–16)
LYMPHOCYTES # BLD: 1.8 K/UL (ref 1–5.1)
LYMPHOCYTES NFR BLD: 13.6 %
MAGNESIUM SERPL-MCNC: 1.51 MG/DL (ref 1.8–2.4)
MCH RBC QN AUTO: 26.2 PG (ref 26–34)
MCHC RBC AUTO-ENTMCNC: 32.1 G/DL (ref 31–36)
MCV RBC AUTO: 81.6 FL (ref 80–100)
MONOCYTES # BLD: 0.8 K/UL (ref 0–1.3)
MONOCYTES NFR BLD: 6 %
NEUTROPHILS # BLD: 10.5 K/UL (ref 1.7–7.7)
NEUTROPHILS NFR BLD: 79.8 %
PHOSPHATE SERPL-MCNC: 2.1 MG/DL (ref 2.5–4.9)
PLATELET # BLD AUTO: 242 K/UL (ref 135–450)
PMV BLD AUTO: 6 FL (ref 5–10.5)
POTASSIUM SERPL-SCNC: 3.8 MMOL/L (ref 3.5–5.1)
RBC # BLD AUTO: 3.55 M/UL (ref 4–5.2)
SODIUM SERPL-SCNC: 141 MMOL/L (ref 136–145)
WBC # BLD AUTO: 13.2 K/UL (ref 4–11)

## 2025-06-21 PROCEDURE — 99024 POSTOP FOLLOW-UP VISIT: CPT | Performed by: SURGERY

## 2025-06-21 PROCEDURE — 6360000002 HC RX W HCPCS: Performed by: SURGERY

## 2025-06-21 PROCEDURE — 6370000000 HC RX 637 (ALT 250 FOR IP): Performed by: INTERNAL MEDICINE

## 2025-06-21 PROCEDURE — 80048 BASIC METABOLIC PNL TOTAL CA: CPT

## 2025-06-21 PROCEDURE — 6370000000 HC RX 637 (ALT 250 FOR IP): Performed by: SURGERY

## 2025-06-21 PROCEDURE — 2580000003 HC RX 258: Performed by: SURGERY

## 2025-06-21 PROCEDURE — 6360000002 HC RX W HCPCS

## 2025-06-21 PROCEDURE — 97530 THERAPEUTIC ACTIVITIES: CPT

## 2025-06-21 PROCEDURE — 85025 COMPLETE CBC W/AUTO DIFF WBC: CPT

## 2025-06-21 PROCEDURE — 94761 N-INVAS EAR/PLS OXIMETRY MLT: CPT

## 2025-06-21 PROCEDURE — 84100 ASSAY OF PHOSPHORUS: CPT

## 2025-06-21 PROCEDURE — 97535 SELF CARE MNGMENT TRAINING: CPT

## 2025-06-21 PROCEDURE — 6370000000 HC RX 637 (ALT 250 FOR IP)

## 2025-06-21 PROCEDURE — 83735 ASSAY OF MAGNESIUM: CPT

## 2025-06-21 PROCEDURE — 97162 PT EVAL MOD COMPLEX 30 MIN: CPT

## 2025-06-21 PROCEDURE — 2500000003 HC RX 250 WO HCPCS: Performed by: CLINICAL NURSE SPECIALIST

## 2025-06-21 PROCEDURE — 97166 OT EVAL MOD COMPLEX 45 MIN: CPT

## 2025-06-21 PROCEDURE — 2000000000 HC ICU R&B

## 2025-06-21 PROCEDURE — 2700000000 HC OXYGEN THERAPY PER DAY

## 2025-06-21 PROCEDURE — 2580000003 HC RX 258: Performed by: INTERNAL MEDICINE

## 2025-06-21 PROCEDURE — 2580000003 HC RX 258

## 2025-06-21 PROCEDURE — 2500000003 HC RX 250 WO HCPCS: Performed by: SURGERY

## 2025-06-21 PROCEDURE — 2500000003 HC RX 250 WO HCPCS

## 2025-06-21 RX ORDER — OXYCODONE AND ACETAMINOPHEN 5; 325 MG/1; MG/1
1 TABLET ORAL EVERY 4 HOURS PRN
Refills: 0 | OUTPATIENT
Start: 2025-06-21

## 2025-06-21 RX ORDER — METRONIDAZOLE 500 MG/100ML
500 INJECTION, SOLUTION INTRAVENOUS EVERY 8 HOURS
Status: DISCONTINUED | OUTPATIENT
Start: 2025-06-21 | End: 2025-06-22

## 2025-06-21 RX ORDER — ACETAMINOPHEN 325 MG/1
650 TABLET ORAL EVERY 4 HOURS PRN
Status: DISCONTINUED | OUTPATIENT
Start: 2025-06-21 | End: 2025-06-25

## 2025-06-21 RX ORDER — OXYCODONE HYDROCHLORIDE 5 MG/1
5 TABLET ORAL EVERY 4 HOURS PRN
Refills: 0 | Status: DISCONTINUED | OUTPATIENT
Start: 2025-06-21 | End: 2025-06-23

## 2025-06-21 RX ORDER — MORPHINE SULFATE 2 MG/ML
2 INJECTION, SOLUTION INTRAMUSCULAR; INTRAVENOUS EVERY 4 HOURS PRN
Status: DISCONTINUED | OUTPATIENT
Start: 2025-06-21 | End: 2025-06-23

## 2025-06-21 RX ORDER — METHOCARBAMOL 750 MG/1
750 TABLET, FILM COATED ORAL 3 TIMES DAILY
Status: DISCONTINUED | OUTPATIENT
Start: 2025-06-21 | End: 2025-06-26 | Stop reason: HOSPADM

## 2025-06-21 RX ORDER — MAGNESIUM SULFATE IN WATER 40 MG/ML
4000 INJECTION, SOLUTION INTRAVENOUS ONCE
Status: DISCONTINUED | OUTPATIENT
Start: 2025-06-21 | End: 2025-06-26 | Stop reason: HOSPADM

## 2025-06-21 RX ADMIN — METHOCARBAMOL 750 MG: 750 TABLET ORAL at 14:07

## 2025-06-21 RX ADMIN — METRONIDAZOLE 500 MG: 500 INJECTION, SOLUTION INTRAVENOUS at 16:05

## 2025-06-21 RX ADMIN — ATORVASTATIN CALCIUM 40 MG: 40 TABLET, FILM COATED ORAL at 21:29

## 2025-06-21 RX ADMIN — METOPROLOL TARTRATE 2.5 MG: 1 INJECTION, SOLUTION INTRAVENOUS at 08:34

## 2025-06-21 RX ADMIN — MAGNESIUM SULFATE HEPTAHYDRATE 1000 MG: 1 INJECTION, SOLUTION INTRAVENOUS at 05:32

## 2025-06-21 RX ADMIN — METHOCARBAMOL 750 MG: 750 TABLET ORAL at 21:29

## 2025-06-21 RX ADMIN — SODIUM CHLORIDE, PRESERVATIVE FREE 10 ML: 5 INJECTION INTRAVENOUS at 08:33

## 2025-06-21 RX ADMIN — MAGNESIUM SULFATE HEPTAHYDRATE 1000 MG: 1 INJECTION, SOLUTION INTRAVENOUS at 07:06

## 2025-06-21 RX ADMIN — SERTRALINE 50 MG: 50 TABLET, FILM COATED ORAL at 08:34

## 2025-06-21 RX ADMIN — METRONIDAZOLE 500 MG: 500 INJECTION, SOLUTION INTRAVENOUS at 23:07

## 2025-06-21 RX ADMIN — MORPHINE SULFATE 2 MG: 2 INJECTION, SOLUTION INTRAMUSCULAR; INTRAVENOUS at 23:02

## 2025-06-21 RX ADMIN — TORSEMIDE 20 MG: 20 TABLET ORAL at 08:34

## 2025-06-21 RX ADMIN — SODIUM CHLORIDE: 0.9 INJECTION, SOLUTION INTRAVENOUS at 05:31

## 2025-06-21 RX ADMIN — MUPIROCIN: 20 OINTMENT TOPICAL at 08:36

## 2025-06-21 RX ADMIN — METRONIDAZOLE 500 MG: 500 INJECTION, SOLUTION INTRAVENOUS at 08:33

## 2025-06-21 RX ADMIN — METHOCARBAMOL 750 MG: 750 TABLET ORAL at 11:39

## 2025-06-21 RX ADMIN — PANTOPRAZOLE SODIUM 40 MG: 40 INJECTION, POWDER, FOR SOLUTION INTRAVENOUS at 08:34

## 2025-06-21 RX ADMIN — ACETAMINOPHEN 650 MG: 325 TABLET ORAL at 11:39

## 2025-06-21 RX ADMIN — MUPIROCIN: 20 OINTMENT TOPICAL at 21:30

## 2025-06-21 RX ADMIN — SODIUM CHLORIDE, PRESERVATIVE FREE 10 ML: 5 INJECTION INTRAVENOUS at 21:30

## 2025-06-21 RX ADMIN — VALSARTAN 80 MG: 80 TABLET, FILM COATED ORAL at 08:34

## 2025-06-21 RX ADMIN — POTASSIUM PHOSPHATE, MONOBASIC POTASSIUM PHOSPHATE, DIBASIC 15 MMOL: 224; 236 INJECTION, SOLUTION, CONCENTRATE INTRAVENOUS at 10:04

## 2025-06-21 RX ADMIN — OXYCODONE 5 MG: 5 TABLET ORAL at 14:07

## 2025-06-21 RX ADMIN — BUSPIRONE HYDROCHLORIDE 10 MG: 5 TABLET ORAL at 21:29

## 2025-06-21 RX ADMIN — BUSPIRONE HYDROCHLORIDE 10 MG: 5 TABLET ORAL at 08:34

## 2025-06-21 RX ADMIN — OXYCODONE 5 MG: 5 TABLET ORAL at 21:37

## 2025-06-21 RX ADMIN — SODIUM CHLORIDE, SODIUM LACTATE, POTASSIUM CHLORIDE, AND CALCIUM CHLORIDE: .6; .31; .03; .02 INJECTION, SOLUTION INTRAVENOUS at 12:52

## 2025-06-21 ASSESSMENT — PAIN SCALES - GENERAL
PAINLEVEL_OUTOF10: 4
PAINLEVEL_OUTOF10: 3
PAINLEVEL_OUTOF10: 9
PAINLEVEL_OUTOF10: 9
PAINLEVEL_OUTOF10: 4
PAINLEVEL_OUTOF10: 4
PAINLEVEL_OUTOF10: 3
PAINLEVEL_OUTOF10: 8

## 2025-06-21 ASSESSMENT — PAIN DESCRIPTION - ORIENTATION
ORIENTATION: LOWER
ORIENTATION: ANTERIOR;POSTERIOR;RIGHT;LEFT
ORIENTATION: RIGHT;LEFT

## 2025-06-21 ASSESSMENT — PAIN DESCRIPTION - DESCRIPTORS
DESCRIPTORS: ACHING
DESCRIPTORS: ACHING
DESCRIPTORS: DISCOMFORT

## 2025-06-21 ASSESSMENT — PAIN SCALES - WONG BAKER: WONGBAKER_NUMERICALRESPONSE: NO HURT

## 2025-06-21 ASSESSMENT — PAIN DESCRIPTION - LOCATION
LOCATION: BACK;ABDOMEN
LOCATION: BACK
LOCATION: BACK;ABDOMEN
LOCATION: ABDOMEN

## 2025-06-22 LAB
ANION GAP SERPL CALCULATED.3IONS-SCNC: 7 MMOL/L (ref 3–16)
BASOPHILS # BLD: 0 K/UL (ref 0–0.2)
BASOPHILS NFR BLD: 0.3 %
BUN SERPL-MCNC: 15 MG/DL (ref 7–20)
CALCIUM SERPL-MCNC: 7.6 MG/DL (ref 8.3–10.6)
CHLORIDE SERPL-SCNC: 108 MMOL/L (ref 99–110)
CO2 SERPL-SCNC: 25 MMOL/L (ref 21–32)
CREAT SERPL-MCNC: 1.1 MG/DL (ref 0.6–1.2)
DEPRECATED RDW RBC AUTO: 16.5 % (ref 12.4–15.4)
EOSINOPHIL # BLD: 0.1 K/UL (ref 0–0.6)
EOSINOPHIL NFR BLD: 0.7 %
GFR SERPLBLD CREATININE-BSD FMLA CKD-EPI: 49 ML/MIN/{1.73_M2}
GLUCOSE SERPL-MCNC: 94 MG/DL (ref 70–99)
HCT VFR BLD AUTO: 27.4 % (ref 36–48)
HGB BLD-MCNC: 8.8 G/DL (ref 12–16)
LYMPHOCYTES # BLD: 1.3 K/UL (ref 1–5.1)
LYMPHOCYTES NFR BLD: 13.4 %
MCH RBC QN AUTO: 25.9 PG (ref 26–34)
MCHC RBC AUTO-ENTMCNC: 32.2 G/DL (ref 31–36)
MCV RBC AUTO: 80.5 FL (ref 80–100)
MONOCYTES # BLD: 0.6 K/UL (ref 0–1.3)
MONOCYTES NFR BLD: 5.8 %
NEUTROPHILS # BLD: 7.8 K/UL (ref 1.7–7.7)
NEUTROPHILS NFR BLD: 79.8 %
PLATELET # BLD AUTO: 199 K/UL (ref 135–450)
PMV BLD AUTO: 6.4 FL (ref 5–10.5)
POTASSIUM SERPL-SCNC: 3.5 MMOL/L (ref 3.5–5.1)
RBC # BLD AUTO: 3.41 M/UL (ref 4–5.2)
SODIUM SERPL-SCNC: 140 MMOL/L (ref 136–145)
WBC # BLD AUTO: 9.8 K/UL (ref 4–11)

## 2025-06-22 PROCEDURE — 2500000003 HC RX 250 WO HCPCS: Performed by: CLINICAL NURSE SPECIALIST

## 2025-06-22 PROCEDURE — 6370000000 HC RX 637 (ALT 250 FOR IP): Performed by: INTERNAL MEDICINE

## 2025-06-22 PROCEDURE — 80048 BASIC METABOLIC PNL TOTAL CA: CPT

## 2025-06-22 PROCEDURE — 2500000003 HC RX 250 WO HCPCS: Performed by: SURGERY

## 2025-06-22 PROCEDURE — 2000000000 HC ICU R&B

## 2025-06-22 PROCEDURE — 6360000002 HC RX W HCPCS: Performed by: SURGERY

## 2025-06-22 PROCEDURE — 6370000000 HC RX 637 (ALT 250 FOR IP)

## 2025-06-22 PROCEDURE — 6370000000 HC RX 637 (ALT 250 FOR IP): Performed by: SURGERY

## 2025-06-22 PROCEDURE — 85025 COMPLETE CBC W/AUTO DIFF WBC: CPT

## 2025-06-22 PROCEDURE — 6360000002 HC RX W HCPCS

## 2025-06-22 PROCEDURE — 99024 POSTOP FOLLOW-UP VISIT: CPT | Performed by: NURSE PRACTITIONER

## 2025-06-22 RX ORDER — AMLODIPINE BESYLATE 5 MG/1
5 TABLET ORAL DAILY
Status: DISCONTINUED | OUTPATIENT
Start: 2025-06-22 | End: 2025-06-26 | Stop reason: HOSPADM

## 2025-06-22 RX ADMIN — MUPIROCIN: 20 OINTMENT TOPICAL at 20:07

## 2025-06-22 RX ADMIN — POTASSIUM CHLORIDE 20 MEQ: 29.8 INJECTION, SOLUTION INTRAVENOUS at 10:59

## 2025-06-22 RX ADMIN — OXYCODONE 5 MG: 5 TABLET ORAL at 20:06

## 2025-06-22 RX ADMIN — POTASSIUM CHLORIDE 20 MEQ: 29.8 INJECTION, SOLUTION INTRAVENOUS at 09:38

## 2025-06-22 RX ADMIN — TORSEMIDE 20 MG: 20 TABLET ORAL at 07:52

## 2025-06-22 RX ADMIN — BUSPIRONE HYDROCHLORIDE 10 MG: 5 TABLET ORAL at 07:52

## 2025-06-22 RX ADMIN — PANTOPRAZOLE SODIUM 40 MG: 40 INJECTION, POWDER, FOR SOLUTION INTRAVENOUS at 07:51

## 2025-06-22 RX ADMIN — MORPHINE SULFATE 2 MG: 2 INJECTION, SOLUTION INTRAMUSCULAR; INTRAVENOUS at 05:50

## 2025-06-22 RX ADMIN — AMLODIPINE BESYLATE 5 MG: 5 TABLET ORAL at 07:53

## 2025-06-22 RX ADMIN — MORPHINE SULFATE 2 MG: 2 INJECTION, SOLUTION INTRAMUSCULAR; INTRAVENOUS at 15:04

## 2025-06-22 RX ADMIN — METRONIDAZOLE 500 MG: 500 INJECTION, SOLUTION INTRAVENOUS at 08:06

## 2025-06-22 RX ADMIN — SODIUM CHLORIDE, PRESERVATIVE FREE 10 ML: 5 INJECTION INTRAVENOUS at 07:55

## 2025-06-22 RX ADMIN — METOPROLOL TARTRATE 2.5 MG: 1 INJECTION, SOLUTION INTRAVENOUS at 07:51

## 2025-06-22 RX ADMIN — MORPHINE SULFATE 2 MG: 2 INJECTION, SOLUTION INTRAMUSCULAR; INTRAVENOUS at 10:55

## 2025-06-22 RX ADMIN — METOPROLOL TARTRATE 2.5 MG: 1 INJECTION, SOLUTION INTRAVENOUS at 05:55

## 2025-06-22 RX ADMIN — METHOCARBAMOL 750 MG: 750 TABLET ORAL at 20:07

## 2025-06-22 RX ADMIN — HYDRALAZINE HYDROCHLORIDE 10 MG: 20 INJECTION INTRAMUSCULAR; INTRAVENOUS at 01:39

## 2025-06-22 RX ADMIN — SODIUM CHLORIDE, PRESERVATIVE FREE 10 ML: 5 INJECTION INTRAVENOUS at 20:07

## 2025-06-22 RX ADMIN — PROMETHAZINE HYDROCHLORIDE 12.5 MG: 25 TABLET ORAL at 15:04

## 2025-06-22 RX ADMIN — ONDANSETRON 4 MG: 2 INJECTION, SOLUTION INTRAMUSCULAR; INTRAVENOUS at 10:45

## 2025-06-22 RX ADMIN — METOPROLOL TARTRATE 2.5 MG: 1 INJECTION, SOLUTION INTRAVENOUS at 20:08

## 2025-06-22 RX ADMIN — METHOCARBAMOL 750 MG: 750 TABLET ORAL at 15:04

## 2025-06-22 RX ADMIN — METHOCARBAMOL 750 MG: 750 TABLET ORAL at 07:52

## 2025-06-22 RX ADMIN — VALSARTAN 80 MG: 80 TABLET, FILM COATED ORAL at 07:53

## 2025-06-22 RX ADMIN — BUSPIRONE HYDROCHLORIDE 10 MG: 5 TABLET ORAL at 20:06

## 2025-06-22 RX ADMIN — ATORVASTATIN CALCIUM 40 MG: 40 TABLET, FILM COATED ORAL at 20:06

## 2025-06-22 RX ADMIN — SERTRALINE 50 MG: 50 TABLET, FILM COATED ORAL at 07:53

## 2025-06-22 RX ADMIN — OXYCODONE 5 MG: 5 TABLET ORAL at 09:35

## 2025-06-22 RX ADMIN — OXYCODONE 5 MG: 5 TABLET ORAL at 01:40

## 2025-06-22 RX ADMIN — MUPIROCIN: 20 OINTMENT TOPICAL at 07:55

## 2025-06-22 ASSESSMENT — PAIN DESCRIPTION - DESCRIPTORS
DESCRIPTORS: ACHING
DESCRIPTORS: DISCOMFORT

## 2025-06-22 ASSESSMENT — PAIN SCALES - GENERAL
PAINLEVEL_OUTOF10: 3
PAINLEVEL_OUTOF10: 7
PAINLEVEL_OUTOF10: 9
PAINLEVEL_OUTOF10: 4
PAINLEVEL_OUTOF10: 5
PAINLEVEL_OUTOF10: 7
PAINLEVEL_OUTOF10: 5
PAINLEVEL_OUTOF10: 8
PAINLEVEL_OUTOF10: 8

## 2025-06-22 ASSESSMENT — PAIN DESCRIPTION - ORIENTATION
ORIENTATION: MID;LOWER
ORIENTATION: MID
ORIENTATION: LOWER
ORIENTATION: ANTERIOR;POSTERIOR

## 2025-06-22 ASSESSMENT — PAIN DESCRIPTION - LOCATION
LOCATION: ABDOMEN
LOCATION: BACK;ABDOMEN
LOCATION: ABDOMEN
LOCATION: BACK;ABDOMEN

## 2025-06-23 LAB
ANION GAP SERPL CALCULATED.3IONS-SCNC: 7 MMOL/L (ref 3–16)
BUN SERPL-MCNC: 11 MG/DL (ref 7–20)
CALCIUM SERPL-MCNC: 7.2 MG/DL (ref 8.3–10.6)
CHLORIDE SERPL-SCNC: 107 MMOL/L (ref 99–110)
CO2 SERPL-SCNC: 28 MMOL/L (ref 21–32)
CREAT SERPL-MCNC: 1.2 MG/DL (ref 0.6–1.2)
GFR SERPLBLD CREATININE-BSD FMLA CKD-EPI: 44 ML/MIN/{1.73_M2}
GLUCOSE SERPL-MCNC: 95 MG/DL (ref 70–99)
MAGNESIUM SERPL-MCNC: 1.56 MG/DL (ref 1.8–2.4)
POTASSIUM SERPL-SCNC: 3.2 MMOL/L (ref 3.5–5.1)
POTASSIUM SERPL-SCNC: 4.1 MMOL/L (ref 3.5–5.1)
SODIUM SERPL-SCNC: 142 MMOL/L (ref 136–145)

## 2025-06-23 PROCEDURE — 99024 POSTOP FOLLOW-UP VISIT: CPT | Performed by: CLINICAL NURSE SPECIALIST

## 2025-06-23 PROCEDURE — 83735 ASSAY OF MAGNESIUM: CPT

## 2025-06-23 PROCEDURE — 6370000000 HC RX 637 (ALT 250 FOR IP): Performed by: INTERNAL MEDICINE

## 2025-06-23 PROCEDURE — 2000000000 HC ICU R&B

## 2025-06-23 PROCEDURE — 97530 THERAPEUTIC ACTIVITIES: CPT

## 2025-06-23 PROCEDURE — 97110 THERAPEUTIC EXERCISES: CPT

## 2025-06-23 PROCEDURE — 2500000003 HC RX 250 WO HCPCS: Performed by: SURGERY

## 2025-06-23 PROCEDURE — 6370000000 HC RX 637 (ALT 250 FOR IP): Performed by: SURGERY

## 2025-06-23 PROCEDURE — 6360000002 HC RX W HCPCS: Performed by: INTERNAL MEDICINE

## 2025-06-23 PROCEDURE — 6360000002 HC RX W HCPCS: Performed by: SURGERY

## 2025-06-23 PROCEDURE — 2500000003 HC RX 250 WO HCPCS: Performed by: CLINICAL NURSE SPECIALIST

## 2025-06-23 PROCEDURE — 97535 SELF CARE MNGMENT TRAINING: CPT

## 2025-06-23 PROCEDURE — 97116 GAIT TRAINING THERAPY: CPT

## 2025-06-23 PROCEDURE — 84132 ASSAY OF SERUM POTASSIUM: CPT

## 2025-06-23 PROCEDURE — 6360000002 HC RX W HCPCS

## 2025-06-23 PROCEDURE — 80048 BASIC METABOLIC PNL TOTAL CA: CPT

## 2025-06-23 PROCEDURE — 6370000000 HC RX 637 (ALT 250 FOR IP)

## 2025-06-23 RX ORDER — OXYCODONE HYDROCHLORIDE 5 MG/1
10 TABLET ORAL EVERY 4 HOURS PRN
Refills: 0 | Status: DISCONTINUED | OUTPATIENT
Start: 2025-06-23 | End: 2025-06-25

## 2025-06-23 RX ORDER — HEPARIN SODIUM 5000 [USP'U]/ML
5000 INJECTION, SOLUTION INTRAVENOUS; SUBCUTANEOUS EVERY 8 HOURS SCHEDULED
Status: DISCONTINUED | OUTPATIENT
Start: 2025-06-23 | End: 2025-06-24

## 2025-06-23 RX ORDER — BUSPIRONE HYDROCHLORIDE 5 MG/1
15 TABLET ORAL 2 TIMES DAILY
Status: DISCONTINUED | OUTPATIENT
Start: 2025-06-23 | End: 2025-06-26 | Stop reason: HOSPADM

## 2025-06-23 RX ORDER — PANTOPRAZOLE SODIUM 40 MG/10ML
40 INJECTION, POWDER, LYOPHILIZED, FOR SOLUTION INTRAVENOUS 2 TIMES DAILY
Status: DISCONTINUED | OUTPATIENT
Start: 2025-06-23 | End: 2025-06-24

## 2025-06-23 RX ADMIN — BUSPIRONE HYDROCHLORIDE 10 MG: 5 TABLET ORAL at 09:05

## 2025-06-23 RX ADMIN — METOPROLOL TARTRATE 2.5 MG: 1 INJECTION, SOLUTION INTRAVENOUS at 20:34

## 2025-06-23 RX ADMIN — BUSPIRONE HYDROCHLORIDE 15 MG: 5 TABLET ORAL at 20:35

## 2025-06-23 RX ADMIN — MUPIROCIN: 20 OINTMENT TOPICAL at 09:34

## 2025-06-23 RX ADMIN — MUPIROCIN: 20 OINTMENT TOPICAL at 20:35

## 2025-06-23 RX ADMIN — OXYCODONE 10 MG: 5 TABLET ORAL at 20:34

## 2025-06-23 RX ADMIN — PANTOPRAZOLE SODIUM 40 MG: 40 INJECTION, POWDER, FOR SOLUTION INTRAVENOUS at 09:04

## 2025-06-23 RX ADMIN — SODIUM CHLORIDE, PRESERVATIVE FREE 10 ML: 5 INJECTION INTRAVENOUS at 09:34

## 2025-06-23 RX ADMIN — AMLODIPINE BESYLATE 5 MG: 5 TABLET ORAL at 09:05

## 2025-06-23 RX ADMIN — POTASSIUM CHLORIDE 20 MEQ: 29.8 INJECTION, SOLUTION INTRAVENOUS at 11:30

## 2025-06-23 RX ADMIN — ATORVASTATIN CALCIUM 40 MG: 40 TABLET, FILM COATED ORAL at 20:34

## 2025-06-23 RX ADMIN — TORSEMIDE 20 MG: 20 TABLET ORAL at 09:05

## 2025-06-23 RX ADMIN — POTASSIUM CHLORIDE 20 MEQ: 29.8 INJECTION, SOLUTION INTRAVENOUS at 09:41

## 2025-06-23 RX ADMIN — PANTOPRAZOLE SODIUM 40 MG: 40 INJECTION, POWDER, FOR SOLUTION INTRAVENOUS at 20:34

## 2025-06-23 RX ADMIN — SODIUM CHLORIDE, PRESERVATIVE FREE 10 ML: 5 INJECTION INTRAVENOUS at 20:35

## 2025-06-23 RX ADMIN — PROMETHAZINE HYDROCHLORIDE 12.5 MG: 25 TABLET ORAL at 09:44

## 2025-06-23 RX ADMIN — METHOCARBAMOL 750 MG: 750 TABLET ORAL at 14:51

## 2025-06-23 RX ADMIN — HEPARIN SODIUM 5000 UNITS: 5000 INJECTION INTRAVENOUS; SUBCUTANEOUS at 22:08

## 2025-06-23 RX ADMIN — METOPROLOL TARTRATE 2.5 MG: 1 INJECTION, SOLUTION INTRAVENOUS at 09:04

## 2025-06-23 RX ADMIN — METOPROLOL TARTRATE 2.5 MG: 1 INJECTION, SOLUTION INTRAVENOUS at 03:15

## 2025-06-23 RX ADMIN — METHOCARBAMOL 750 MG: 750 TABLET ORAL at 20:34

## 2025-06-23 RX ADMIN — MAGNESIUM SULFATE HEPTAHYDRATE 1000 MG: 1 INJECTION, SOLUTION INTRAVENOUS at 17:16

## 2025-06-23 RX ADMIN — MAGNESIUM SULFATE HEPTAHYDRATE 1000 MG: 1 INJECTION, SOLUTION INTRAVENOUS at 16:06

## 2025-06-23 RX ADMIN — METOPROLOL TARTRATE 2.5 MG: 1 INJECTION, SOLUTION INTRAVENOUS at 14:51

## 2025-06-23 RX ADMIN — METHOCARBAMOL 750 MG: 750 TABLET ORAL at 09:05

## 2025-06-23 RX ADMIN — HYDROMORPHONE HYDROCHLORIDE 0.25 MG: 1 INJECTION, SOLUTION INTRAMUSCULAR; INTRAVENOUS; SUBCUTANEOUS at 12:21

## 2025-06-23 RX ADMIN — SERTRALINE 50 MG: 50 TABLET, FILM COATED ORAL at 09:05

## 2025-06-23 RX ADMIN — OXYCODONE 5 MG: 5 TABLET ORAL at 10:32

## 2025-06-23 RX ADMIN — VALSARTAN 80 MG: 80 TABLET, FILM COATED ORAL at 09:05

## 2025-06-23 ASSESSMENT — PAIN SCALES - GENERAL
PAINLEVEL_OUTOF10: 9
PAINLEVEL_OUTOF10: 5
PAINLEVEL_OUTOF10: 8
PAINLEVEL_OUTOF10: 6

## 2025-06-23 ASSESSMENT — PAIN DESCRIPTION - LOCATION
LOCATION: BACK;LEG
LOCATION: BACK;LEG
LOCATION: ABDOMEN;BACK
LOCATION: BACK

## 2025-06-23 ASSESSMENT — PAIN - FUNCTIONAL ASSESSMENT: PAIN_FUNCTIONAL_ASSESSMENT: ACTIVITIES ARE NOT PREVENTED

## 2025-06-23 ASSESSMENT — PAIN DESCRIPTION - ORIENTATION: ORIENTATION: MID

## 2025-06-23 ASSESSMENT — PAIN DESCRIPTION - PAIN TYPE: TYPE: SURGICAL PAIN;ACUTE PAIN

## 2025-06-23 ASSESSMENT — PAIN DESCRIPTION - DESCRIPTORS: DESCRIPTORS: ACHING;DISCOMFORT

## 2025-06-23 NOTE — CONSULTS
Patient: Nithya Neff  [id number]  Date: 6/23/2025      Chief Complaint: back pain, abdominal pain    History of Present Illness/Hospital Course:  Nithya Neff is an 85 year old female with a past medical history significant for aortic stenosis s/p TAVR, CAD, CHF, chronic back pain, COPD, depression, HTN, HLD, and MARIA C who presented to [facility] on 6/15/25 with intractable abdominal pain with diarrhea and blood in stool. CT scan showed colitis. C diff was negative. She was started on ceftriaxone and flagyl. GI was consulted. Recent vascular duplex mesenteric showed severe atherosclerotic plaque involving the abdominal aorta at the level of the superior mesenteric artery. Vascular Surgery was consulted and on 6/19/25 she underwent superior mesenteric artery bypass graft. Hospital course has been notable for MARIO on CKD for which Nephrology has been following. She continues to have functional deficits below her baseline. Today Nithya is seen with her daughter a nursing present. She reports back pain and states this is chronic. She endorses not being interested in going to a rehab. She lives alone in a single level house with a level entry, but he daughter has been helping her and is planning on helping her on discharge.      has a past medical history of MARIO (acute kidney injury), Altered mental status, Arthritis, BCC (basal cell carcinoma of skin), Bladder infection, Blurred vision, CAD (coronary artery disease), Cancer ([facility]), CHF (congestive heart failure) ([facility]), Chronic back pain, Closed head injury, COPD (chronic obstructive pulmonary disease) ([facility]), Depression, Depression, Hypercholesteremia, Hypertension, Hypokalemia, Pain in shoulder, Painful total knee replacement, initial encounter, Reflux, Rheumatic fever, Sleep apnea, Syncope and collapse, TIA (transient ischemic attack), Urinary incontinence, Urinary tract infection in female, and Wears glasses.     has a past surgical history that includes bladder

## 2025-06-23 NOTE — CARE COORDINATION
[facility] - Acute Rehab Unit         Patient prefers to go home. Daughter is comfortable with this plan as well.    Will notify DCP with further updates. Thank you for the referral.      Donaldo Mcmahan MPH, RRT  ARU Admissions Supervisor-[facility]  (P)[phone]  (F)[phone]   Electronically signed by Donaldo Mcmahan on 6/23/2025 at 12:12 PM

## 2025-06-23 NOTE — ACP (ADVANCE CARE PLANNING)
Advance Care Planning     Palliative Team Advance Care Planning (ACP) Conversation    Date of Conversation: 06/23/25    Individuals present for the conversation: Patient with decision making capacity and Daughter , Sonya     ACP documents on file prior to discussion:  -None  Offered option to complete a HCPOA while here; pt declined.    Previously completed document/s not on file: Patient / participant reports that there are no previously executed ACP documents.    Healthcare Decision Maker:    Primary Decision Maker: Sonya Neri - Child - [phone]    Secondary Decision Maker: Robert Neff - Child - [phone]     Conversation Summary:  Pt sitting up in bed, attempted \"bites\" of morning meal but said she didn't feel like she could tolerate eating.  She is making efforts in terms of intake adding she is aware of the import of nutrition postoperatively and in wanting to \"get home.\"  Pt states she has had difficulty tolerating intake prior to surgery.      If pt doesn't qualify for the ARU (new order this am for ARU eval), she is adamantly opposed to short term SNF.  Rather, she would expect to go directly home w/ Sonya.  Dtr feels she can take care of pt in that scenario, said \"I've been taking care of her & I believe I can if she goes right home.\"     Resuscitation Status:   Code Status: Full Code  Deferred discussion.    Documentation Completed:  -Other Mrs. Neff understands, in the absence of a HPOCA, that her dtr and son are viewed as her collective healthcare proxy.  Although pt identifies Sonya as her primary contact, she is clear that she would want son, Robert, to be included in healthcare decision making.     Pt had five children, three of whom have predeceased her, all 2/2 \"heart attacks.\"    Symptom Assessment:         Symptoms       Present Y/N          Medications Doses in last 24 hours    Pain C/o back and abd pain, rated 9/0-10 @ worst Robaxin 750mg TID    Morphine 2mg Q4hr PRN  Roxicodone IR 5mg

## 2025-06-23 NOTE — CONSULTS
Palliative Care Initial Note  Palliative Care Admit date:  6/23/25    ACP/palcare referral noted

## 2025-06-23 NOTE — CARE COORDINATION
Donaldo/ARU updated writer, pt prefers to go home and daughter agrees with this plan, she is already pt's paid caregiver through [facility].  CM will continue to follow pt's progress and coordinate discharge arrangements as appropriate.  SALINA Ortega-MARINO

## 2025-06-24 ENCOUNTER — APPOINTMENT (OUTPATIENT)
Dept: GENERAL RADIOLOGY | Age: 85
DRG: 981 | End: 2025-06-24
Attending: INTERNAL MEDICINE
Payer: MEDICARE

## 2025-06-24 LAB
ALBUMIN SERPL-MCNC: 2.5 G/DL (ref 3.4–5)
ANION GAP SERPL CALCULATED.3IONS-SCNC: 6 MMOL/L (ref 3–16)
BUN SERPL-MCNC: 10 MG/DL (ref 7–20)
CALCIUM SERPL-MCNC: 7.4 MG/DL (ref 8.3–10.6)
CHLORIDE SERPL-SCNC: 104 MMOL/L (ref 99–110)
CO2 SERPL-SCNC: 28 MMOL/L (ref 21–32)
CREAT SERPL-MCNC: 1.2 MG/DL (ref 0.6–1.2)
DEPRECATED RDW RBC AUTO: 17 % (ref 12.4–15.4)
GFR SERPLBLD CREATININE-BSD FMLA CKD-EPI: 44 ML/MIN/{1.73_M2}
GLUCOSE SERPL-MCNC: 113 MG/DL (ref 70–99)
HCT VFR BLD AUTO: 24.6 % (ref 36–48)
HGB BLD-MCNC: 8.2 G/DL (ref 12–16)
MAGNESIUM SERPL-MCNC: 1.86 MG/DL (ref 1.8–2.4)
MCH RBC QN AUTO: 26.7 PG (ref 26–34)
MCHC RBC AUTO-ENTMCNC: 33.4 G/DL (ref 31–36)
MCV RBC AUTO: 79.9 FL (ref 80–100)
PHOSPHATE SERPL-MCNC: 1.2 MG/DL (ref 2.5–4.9)
PLATELET # BLD AUTO: 195 K/UL (ref 135–450)
PMV BLD AUTO: 6.7 FL (ref 5–10.5)
POTASSIUM SERPL-SCNC: 3.4 MMOL/L (ref 3.5–5.1)
RBC # BLD AUTO: 3.08 M/UL (ref 4–5.2)
SODIUM SERPL-SCNC: 138 MMOL/L (ref 136–145)
WBC # BLD AUTO: 7.3 K/UL (ref 4–11)

## 2025-06-24 PROCEDURE — 80069 RENAL FUNCTION PANEL: CPT

## 2025-06-24 PROCEDURE — 2000000000 HC ICU R&B

## 2025-06-24 PROCEDURE — 97110 THERAPEUTIC EXERCISES: CPT

## 2025-06-24 PROCEDURE — 6370000000 HC RX 637 (ALT 250 FOR IP)

## 2025-06-24 PROCEDURE — 6370000000 HC RX 637 (ALT 250 FOR IP): Performed by: SURGERY

## 2025-06-24 PROCEDURE — 97116 GAIT TRAINING THERAPY: CPT

## 2025-06-24 PROCEDURE — 2500000003 HC RX 250 WO HCPCS: Performed by: CLINICAL NURSE SPECIALIST

## 2025-06-24 PROCEDURE — 2500000003 HC RX 250 WO HCPCS: Performed by: SURGERY

## 2025-06-24 PROCEDURE — 6360000002 HC RX W HCPCS: Performed by: INTERNAL MEDICINE

## 2025-06-24 PROCEDURE — 83735 ASSAY OF MAGNESIUM: CPT

## 2025-06-24 PROCEDURE — 6370000000 HC RX 637 (ALT 250 FOR IP): Performed by: INTERNAL MEDICINE

## 2025-06-24 PROCEDURE — 6360000002 HC RX W HCPCS: Performed by: SURGERY

## 2025-06-24 PROCEDURE — 85027 COMPLETE CBC AUTOMATED: CPT

## 2025-06-24 PROCEDURE — 74018 RADEX ABDOMEN 1 VIEW: CPT

## 2025-06-24 RX ORDER — SUCRALFATE 1 G/1
1 TABLET ORAL
Status: DISCONTINUED | OUTPATIENT
Start: 2025-06-24 | End: 2025-06-26 | Stop reason: HOSPADM

## 2025-06-24 RX ORDER — POTASSIUM CHLORIDE 1500 MG/1
40 TABLET, EXTENDED RELEASE ORAL PRN
Status: DISCONTINUED | OUTPATIENT
Start: 2025-06-24 | End: 2025-06-24

## 2025-06-24 RX ORDER — PANTOPRAZOLE SODIUM 40 MG/1
40 TABLET, DELAYED RELEASE ORAL
Status: DISCONTINUED | OUTPATIENT
Start: 2025-06-24 | End: 2025-06-26 | Stop reason: HOSPADM

## 2025-06-24 RX ORDER — ASPIRIN 81 MG/1
81 TABLET ORAL DAILY
Status: DISCONTINUED | OUTPATIENT
Start: 2025-06-25 | End: 2025-06-26 | Stop reason: HOSPADM

## 2025-06-24 RX ORDER — POLYETHYLENE GLYCOL 3350 17 G/17G
17 POWDER, FOR SOLUTION ORAL DAILY
Status: DISCONTINUED | OUTPATIENT
Start: 2025-06-24 | End: 2025-06-26 | Stop reason: HOSPADM

## 2025-06-24 RX ORDER — POTASSIUM CHLORIDE 7.45 MG/ML
10 INJECTION INTRAVENOUS PRN
Status: DISCONTINUED | OUTPATIENT
Start: 2025-06-24 | End: 2025-06-24

## 2025-06-24 RX ORDER — HEPARIN SODIUM 5000 [USP'U]/ML
5000 INJECTION, SOLUTION INTRAVENOUS; SUBCUTANEOUS 2 TIMES DAILY
Status: DISCONTINUED | OUTPATIENT
Start: 2025-06-24 | End: 2025-06-26 | Stop reason: HOSPADM

## 2025-06-24 RX ORDER — POTASSIUM CHLORIDE 1500 MG/1
20 TABLET, EXTENDED RELEASE ORAL ONCE
Status: COMPLETED | OUTPATIENT
Start: 2025-06-24 | End: 2025-06-24

## 2025-06-24 RX ORDER — MAGNESIUM SULFATE 1 G/100ML
1000 INJECTION INTRAVENOUS ONCE
Status: COMPLETED | OUTPATIENT
Start: 2025-06-24 | End: 2025-06-24

## 2025-06-24 RX ADMIN — HYDROMORPHONE HYDROCHLORIDE 0.5 MG: 1 INJECTION, SOLUTION INTRAMUSCULAR; INTRAVENOUS; SUBCUTANEOUS at 23:00

## 2025-06-24 RX ADMIN — SODIUM CHLORIDE, PRESERVATIVE FREE 10 ML: 5 INJECTION INTRAVENOUS at 07:58

## 2025-06-24 RX ADMIN — TORSEMIDE 20 MG: 20 TABLET ORAL at 07:57

## 2025-06-24 RX ADMIN — HEPARIN SODIUM 5000 UNITS: 5000 INJECTION INTRAVENOUS; SUBCUTANEOUS at 20:40

## 2025-06-24 RX ADMIN — MUPIROCIN: 20 OINTMENT TOPICAL at 07:57

## 2025-06-24 RX ADMIN — MUPIROCIN: 20 OINTMENT TOPICAL at 20:41

## 2025-06-24 RX ADMIN — POTASSIUM CHLORIDE 20 MEQ: 1500 TABLET, EXTENDED RELEASE ORAL at 09:06

## 2025-06-24 RX ADMIN — HYDROMORPHONE HYDROCHLORIDE 0.25 MG: 1 INJECTION, SOLUTION INTRAMUSCULAR; INTRAVENOUS; SUBCUTANEOUS at 01:37

## 2025-06-24 RX ADMIN — METOPROLOL TARTRATE 2.5 MG: 1 INJECTION, SOLUTION INTRAVENOUS at 16:22

## 2025-06-24 RX ADMIN — POLYETHYLENE GLYCOL 3350 17 G: 17 POWDER, FOR SOLUTION ORAL at 10:41

## 2025-06-24 RX ADMIN — PANTOPRAZOLE SODIUM 40 MG: 40 INJECTION, POWDER, FOR SOLUTION INTRAVENOUS at 07:57

## 2025-06-24 RX ADMIN — HYDROMORPHONE HYDROCHLORIDE 0.5 MG: 1 INJECTION, SOLUTION INTRAMUSCULAR; INTRAVENOUS; SUBCUTANEOUS at 13:53

## 2025-06-24 RX ADMIN — HYDROMORPHONE HYDROCHLORIDE 0.25 MG: 1 INJECTION, SOLUTION INTRAMUSCULAR; INTRAVENOUS; SUBCUTANEOUS at 06:37

## 2025-06-24 RX ADMIN — VALSARTAN 80 MG: 80 TABLET, FILM COATED ORAL at 07:57

## 2025-06-24 RX ADMIN — SERTRALINE 50 MG: 50 TABLET, FILM COATED ORAL at 07:57

## 2025-06-24 RX ADMIN — HEPARIN SODIUM 5000 UNITS: 5000 INJECTION INTRAVENOUS; SUBCUTANEOUS at 06:37

## 2025-06-24 RX ADMIN — METHOCARBAMOL 750 MG: 750 TABLET ORAL at 13:53

## 2025-06-24 RX ADMIN — MAGNESIUM SULFATE HEPTAHYDRATE 1000 MG: 1 INJECTION, SOLUTION INTRAVENOUS at 10:46

## 2025-06-24 RX ADMIN — AMLODIPINE BESYLATE 5 MG: 5 TABLET ORAL at 07:57

## 2025-06-24 RX ADMIN — HYDROMORPHONE HYDROCHLORIDE 0.5 MG: 1 INJECTION, SOLUTION INTRAMUSCULAR; INTRAVENOUS; SUBCUTANEOUS at 18:52

## 2025-06-24 RX ADMIN — ONDANSETRON 4 MG: 2 INJECTION, SOLUTION INTRAMUSCULAR; INTRAVENOUS at 13:58

## 2025-06-24 RX ADMIN — PANTOPRAZOLE SODIUM 40 MG: 40 TABLET, DELAYED RELEASE ORAL at 16:21

## 2025-06-24 RX ADMIN — SODIUM CHLORIDE, PRESERVATIVE FREE 10 ML: 5 INJECTION INTRAVENOUS at 20:41

## 2025-06-24 RX ADMIN — METOPROLOL TARTRATE 2.5 MG: 1 INJECTION, SOLUTION INTRAVENOUS at 07:57

## 2025-06-24 RX ADMIN — METHOCARBAMOL 750 MG: 750 TABLET ORAL at 07:57

## 2025-06-24 RX ADMIN — SUCRALFATE 1 G: 1 TABLET ORAL at 16:21

## 2025-06-24 RX ADMIN — ATORVASTATIN CALCIUM 40 MG: 40 TABLET, FILM COATED ORAL at 20:40

## 2025-06-24 RX ADMIN — HYDROMORPHONE HYDROCHLORIDE 0.5 MG: 1 INJECTION, SOLUTION INTRAMUSCULAR; INTRAVENOUS; SUBCUTANEOUS at 09:02

## 2025-06-24 RX ADMIN — OXYCODONE 10 MG: 5 TABLET ORAL at 07:56

## 2025-06-24 RX ADMIN — SUCRALFATE 1 G: 1 TABLET ORAL at 10:41

## 2025-06-24 RX ADMIN — BUSPIRONE HYDROCHLORIDE 15 MG: 5 TABLET ORAL at 20:40

## 2025-06-24 RX ADMIN — METOPROLOL TARTRATE 2.5 MG: 1 INJECTION, SOLUTION INTRAVENOUS at 20:40

## 2025-06-24 RX ADMIN — METHOCARBAMOL 750 MG: 750 TABLET ORAL at 20:40

## 2025-06-24 RX ADMIN — BUSPIRONE HYDROCHLORIDE 15 MG: 5 TABLET ORAL at 07:57

## 2025-06-24 ASSESSMENT — PAIN DESCRIPTION - ORIENTATION
ORIENTATION: MID

## 2025-06-24 ASSESSMENT — PAIN SCALES - GENERAL
PAINLEVEL_OUTOF10: 4
PAINLEVEL_OUTOF10: 10
PAINLEVEL_OUTOF10: 5
PAINLEVEL_OUTOF10: 10
PAINLEVEL_OUTOF10: 8
PAINLEVEL_OUTOF10: 3
PAINLEVEL_OUTOF10: 9
PAINLEVEL_OUTOF10: 5
PAINLEVEL_OUTOF10: 10
PAINLEVEL_OUTOF10: 5
PAINLEVEL_OUTOF10: 10
PAINLEVEL_OUTOF10: 9
PAINLEVEL_OUTOF10: 10
PAINLEVEL_OUTOF10: 5

## 2025-06-24 ASSESSMENT — PAIN DESCRIPTION - PAIN TYPE
TYPE: SURGICAL PAIN;ACUTE PAIN
TYPE: SURGICAL PAIN;ACUTE PAIN

## 2025-06-24 ASSESSMENT — PAIN DESCRIPTION - LOCATION
LOCATION: ABDOMEN;BACK
LOCATION: ABDOMEN;BACK
LOCATION: BACK;ABDOMEN
LOCATION: ABDOMEN;BACK

## 2025-06-24 ASSESSMENT — PAIN DESCRIPTION - DESCRIPTORS
DESCRIPTORS: ACHING
DESCRIPTORS: DISCOMFORT
DESCRIPTORS: DISCOMFORT

## 2025-06-24 NOTE — CARE COORDINATION
Writer attempted to talk to pt about discharge planning, pt declined conversation states she would rather sleep.  Writer aware pt wants to return home with daughter who is an aide through [facility].  Writer spoke with pt's daughter/Sonya she agrees with adding skilled home health care, has no agency preference.  Writer made referrals in [facility], awaiting responses.  CM will continue to follow pt's progress and coordinate discharge arrangements as appropriate.  LOULOU OrtegaRN          0510 Addendum: [facility] accepted skilled home health care referral, will follow in Epic.  TERRI Ortega

## 2025-06-25 LAB
ALBUMIN SERPL-MCNC: 2.7 G/DL (ref 3.4–5)
ANION GAP SERPL CALCULATED.3IONS-SCNC: 7 MMOL/L (ref 3–16)
BUN SERPL-MCNC: 9 MG/DL (ref 7–20)
CALCIUM SERPL-MCNC: 7.7 MG/DL (ref 8.3–10.6)
CHLORIDE SERPL-SCNC: 103 MMOL/L (ref 99–110)
CO2 SERPL-SCNC: 29 MMOL/L (ref 21–32)
CREAT SERPL-MCNC: 1.1 MG/DL (ref 0.6–1.2)
DEPRECATED RDW RBC AUTO: 17.2 % (ref 12.4–15.4)
GFR SERPLBLD CREATININE-BSD FMLA CKD-EPI: 49 ML/MIN/{1.73_M2}
GLUCOSE SERPL-MCNC: 98 MG/DL (ref 70–99)
HCT VFR BLD AUTO: 25.9 % (ref 36–48)
HGB BLD-MCNC: 8.6 G/DL (ref 12–16)
MAGNESIUM SERPL-MCNC: 2.02 MG/DL (ref 1.8–2.4)
MCH RBC QN AUTO: 26.9 PG (ref 26–34)
MCHC RBC AUTO-ENTMCNC: 33.1 G/DL (ref 31–36)
MCV RBC AUTO: 81.4 FL (ref 80–100)
PHOSPHATE SERPL-MCNC: 1.6 MG/DL (ref 2.5–4.9)
PLATELET # BLD AUTO: 228 K/UL (ref 135–450)
PMV BLD AUTO: 6.8 FL (ref 5–10.5)
POTASSIUM SERPL-SCNC: 3.6 MMOL/L (ref 3.5–5.1)
RBC # BLD AUTO: 3.18 M/UL (ref 4–5.2)
SODIUM SERPL-SCNC: 139 MMOL/L (ref 136–145)
WBC # BLD AUTO: 10 K/UL (ref 4–11)

## 2025-06-25 PROCEDURE — 80069 RENAL FUNCTION PANEL: CPT

## 2025-06-25 PROCEDURE — 85027 COMPLETE CBC AUTOMATED: CPT

## 2025-06-25 PROCEDURE — 2580000003 HC RX 258: Performed by: CLINICAL NURSE SPECIALIST

## 2025-06-25 PROCEDURE — 6370000000 HC RX 637 (ALT 250 FOR IP)

## 2025-06-25 PROCEDURE — 36415 COLL VENOUS BLD VENIPUNCTURE: CPT

## 2025-06-25 PROCEDURE — 2500000003 HC RX 250 WO HCPCS: Performed by: CLINICAL NURSE SPECIALIST

## 2025-06-25 PROCEDURE — 2500000003 HC RX 250 WO HCPCS: Performed by: SURGERY

## 2025-06-25 PROCEDURE — 99024 POSTOP FOLLOW-UP VISIT: CPT | Performed by: CLINICAL NURSE SPECIALIST

## 2025-06-25 PROCEDURE — 97530 THERAPEUTIC ACTIVITIES: CPT

## 2025-06-25 PROCEDURE — 6370000000 HC RX 637 (ALT 250 FOR IP): Performed by: INTERNAL MEDICINE

## 2025-06-25 PROCEDURE — 97535 SELF CARE MNGMENT TRAINING: CPT

## 2025-06-25 PROCEDURE — 6370000000 HC RX 637 (ALT 250 FOR IP): Performed by: SURGERY

## 2025-06-25 PROCEDURE — 6370000000 HC RX 637 (ALT 250 FOR IP): Performed by: CLINICAL NURSE SPECIALIST

## 2025-06-25 PROCEDURE — 6360000002 HC RX W HCPCS: Performed by: INTERNAL MEDICINE

## 2025-06-25 PROCEDURE — 2000000000 HC ICU R&B

## 2025-06-25 PROCEDURE — 83735 ASSAY OF MAGNESIUM: CPT

## 2025-06-25 RX ORDER — POTASSIUM CHLORIDE 1500 MG/1
20 TABLET, EXTENDED RELEASE ORAL 2 TIMES DAILY
Status: DISCONTINUED | OUTPATIENT
Start: 2025-06-25 | End: 2025-06-25

## 2025-06-25 RX ORDER — DRONABINOL 5 MG/1
5 CAPSULE ORAL 2 TIMES DAILY
Status: DISCONTINUED | OUTPATIENT
Start: 2025-06-25 | End: 2025-06-26 | Stop reason: HOSPADM

## 2025-06-25 RX ORDER — OXYCODONE AND ACETAMINOPHEN 7.5; 325 MG/1; MG/1
1 TABLET ORAL EVERY 6 HOURS PRN
Refills: 0 | Status: DISCONTINUED | OUTPATIENT
Start: 2025-06-25 | End: 2025-06-26 | Stop reason: HOSPADM

## 2025-06-25 RX ORDER — OXYCODONE HYDROCHLORIDE 5 MG/1
10 TABLET ORAL EVERY 4 HOURS PRN
Refills: 0 | Status: DISCONTINUED | OUTPATIENT
Start: 2025-06-25 | End: 2025-06-25

## 2025-06-25 RX ORDER — OXYCODONE HYDROCHLORIDE 5 MG/1
5 TABLET ORAL EVERY 4 HOURS PRN
Refills: 0 | Status: DISCONTINUED | OUTPATIENT
Start: 2025-06-25 | End: 2025-06-25

## 2025-06-25 RX ORDER — METOPROLOL TARTRATE 25 MG/1
12.5 TABLET, FILM COATED ORAL 2 TIMES DAILY
Status: DISCONTINUED | OUTPATIENT
Start: 2025-06-25 | End: 2025-06-26 | Stop reason: HOSPADM

## 2025-06-25 RX ORDER — ACETAMINOPHEN 325 MG/1
650 TABLET ORAL EVERY 4 HOURS PRN
Status: DISCONTINUED | OUTPATIENT
Start: 2025-06-25 | End: 2025-06-26 | Stop reason: HOSPADM

## 2025-06-25 RX ORDER — LIDOCAINE 4 G/G
1 PATCH TOPICAL DAILY
Status: DISCONTINUED | OUTPATIENT
Start: 2025-06-25 | End: 2025-06-26 | Stop reason: HOSPADM

## 2025-06-25 RX ADMIN — OXYCODONE HYDROCHLORIDE AND ACETAMINOPHEN 1 TABLET: 7.5; 325 TABLET ORAL at 20:29

## 2025-06-25 RX ADMIN — DRONABINOL 5 MG: 5 CAPSULE ORAL at 21:29

## 2025-06-25 RX ADMIN — METHOCARBAMOL 750 MG: 750 TABLET ORAL at 12:01

## 2025-06-25 RX ADMIN — SUCRALFATE 1 G: 1 TABLET ORAL at 17:03

## 2025-06-25 RX ADMIN — ASPIRIN 81 MG: 81 TABLET, COATED ORAL at 09:03

## 2025-06-25 RX ADMIN — METOPROLOL TARTRATE 12.5 MG: 25 TABLET, FILM COATED ORAL at 20:29

## 2025-06-25 RX ADMIN — SUCRALFATE 1 G: 1 TABLET ORAL at 09:03

## 2025-06-25 RX ADMIN — METHOCARBAMOL 750 MG: 750 TABLET ORAL at 09:03

## 2025-06-25 RX ADMIN — HYDROMORPHONE HYDROCHLORIDE 0.5 MG: 1 INJECTION, SOLUTION INTRAMUSCULAR; INTRAVENOUS; SUBCUTANEOUS at 04:48

## 2025-06-25 RX ADMIN — AMLODIPINE BESYLATE 5 MG: 5 TABLET ORAL at 09:03

## 2025-06-25 RX ADMIN — PANTOPRAZOLE SODIUM 40 MG: 40 TABLET, DELAYED RELEASE ORAL at 09:03

## 2025-06-25 RX ADMIN — PANTOPRAZOLE SODIUM 40 MG: 40 TABLET, DELAYED RELEASE ORAL at 17:03

## 2025-06-25 RX ADMIN — HYDROMORPHONE HYDROCHLORIDE 0.5 MG: 1 INJECTION, SOLUTION INTRAMUSCULAR; INTRAVENOUS; SUBCUTANEOUS at 09:15

## 2025-06-25 RX ADMIN — METOPROLOL TARTRATE 2.5 MG: 1 INJECTION, SOLUTION INTRAVENOUS at 09:02

## 2025-06-25 RX ADMIN — BUSPIRONE HYDROCHLORIDE 15 MG: 5 TABLET ORAL at 20:29

## 2025-06-25 RX ADMIN — TORSEMIDE 20 MG: 20 TABLET ORAL at 09:03

## 2025-06-25 RX ADMIN — POTASSIUM PHOSPHATE, MONOBASIC POTASSIUM PHOSPHATE, DIBASIC 10 MMOL: 224; 236 INJECTION, SOLUTION, CONCENTRATE INTRAVENOUS at 09:10

## 2025-06-25 RX ADMIN — ATORVASTATIN CALCIUM 40 MG: 40 TABLET, FILM COATED ORAL at 20:29

## 2025-06-25 RX ADMIN — SODIUM CHLORIDE, PRESERVATIVE FREE 10 ML: 5 INJECTION INTRAVENOUS at 20:30

## 2025-06-25 RX ADMIN — HEPARIN SODIUM 5000 UNITS: 5000 INJECTION INTRAVENOUS; SUBCUTANEOUS at 09:02

## 2025-06-25 RX ADMIN — SERTRALINE 50 MG: 50 TABLET, FILM COATED ORAL at 09:03

## 2025-06-25 RX ADMIN — SUCRALFATE 1 G: 1 TABLET ORAL at 12:01

## 2025-06-25 RX ADMIN — METHOCARBAMOL 750 MG: 750 TABLET ORAL at 20:29

## 2025-06-25 RX ADMIN — SODIUM CHLORIDE, PRESERVATIVE FREE 10 ML: 5 INJECTION INTRAVENOUS at 09:08

## 2025-06-25 RX ADMIN — POLYETHYLENE GLYCOL 3350 17 G: 17 POWDER, FOR SOLUTION ORAL at 09:05

## 2025-06-25 RX ADMIN — VALSARTAN 80 MG: 80 TABLET, FILM COATED ORAL at 09:04

## 2025-06-25 RX ADMIN — DRONABINOL 5 MG: 5 CAPSULE ORAL at 12:01

## 2025-06-25 RX ADMIN — BUSPIRONE HYDROCHLORIDE 15 MG: 5 TABLET ORAL at 09:03

## 2025-06-25 RX ADMIN — HEPARIN SODIUM 5000 UNITS: 5000 INJECTION INTRAVENOUS; SUBCUTANEOUS at 20:29

## 2025-06-25 ASSESSMENT — PAIN DESCRIPTION - LOCATION
LOCATION: ABDOMEN;BACK
LOCATION: ABDOMEN;BACK

## 2025-06-25 ASSESSMENT — PAIN SCALES - GENERAL
PAINLEVEL_OUTOF10: 10
PAINLEVEL_OUTOF10: 4
PAINLEVEL_OUTOF10: 9
PAINLEVEL_OUTOF10: 10
PAINLEVEL_OUTOF10: 7
PAINLEVEL_OUTOF10: 5
PAINLEVEL_OUTOF10: 2

## 2025-06-25 ASSESSMENT — PAIN SCALES - WONG BAKER: WONGBAKER_NUMERICALRESPONSE: NO HURT

## 2025-06-25 NOTE — CARE COORDINATION
[place].  Care Managed by Hosp Med, Vasc Surg, GI, and Pall Care has seen. S/P Vasc OR. Advancing to Reg diet today. Discussed dispo w pt at bedside. Plans home w dtr and [facility] thru [facility]- pt states they see her 4 hrs a day M-F.  Wants recliner? Perhaps she means lift chair. Referred to [facility]- who will investigate. CM continues to follow. Alisha Alfaro RN

## 2025-06-26 VITALS
DIASTOLIC BLOOD PRESSURE: 48 MMHG | OXYGEN SATURATION: 100 % | HEART RATE: 83 BPM | HEIGHT: 60 IN | RESPIRATION RATE: 20 BRPM | WEIGHT: 111.33 LBS | BODY MASS INDEX: 21.86 KG/M2 | TEMPERATURE: 98.1 F | SYSTOLIC BLOOD PRESSURE: 93 MMHG

## 2025-06-26 LAB
ALBUMIN SERPL-MCNC: 2.6 G/DL (ref 3.4–5)
ANION GAP SERPL CALCULATED.3IONS-SCNC: 8 MMOL/L (ref 3–16)
BUN SERPL-MCNC: 12 MG/DL (ref 7–20)
CALCIUM SERPL-MCNC: 7.4 MG/DL (ref 8.3–10.6)
CHLORIDE SERPL-SCNC: 104 MMOL/L (ref 99–110)
CO2 SERPL-SCNC: 28 MMOL/L (ref 21–32)
CREAT SERPL-MCNC: 1.1 MG/DL (ref 0.6–1.2)
GFR SERPLBLD CREATININE-BSD FMLA CKD-EPI: 49 ML/MIN/{1.73_M2}
GLUCOSE SERPL-MCNC: 93 MG/DL (ref 70–99)
PHOSPHATE SERPL-MCNC: 1.9 MG/DL (ref 2.5–4.9)
POTASSIUM SERPL-SCNC: 3.3 MMOL/L (ref 3.5–5.1)
SODIUM SERPL-SCNC: 140 MMOL/L (ref 136–145)

## 2025-06-26 PROCEDURE — 2500000003 HC RX 250 WO HCPCS: Performed by: SURGERY

## 2025-06-26 PROCEDURE — 6360000002 HC RX W HCPCS: Performed by: INTERNAL MEDICINE

## 2025-06-26 PROCEDURE — 2500000003 HC RX 250 WO HCPCS: Performed by: CLINICAL NURSE SPECIALIST

## 2025-06-26 PROCEDURE — 97530 THERAPEUTIC ACTIVITIES: CPT

## 2025-06-26 PROCEDURE — 6370000000 HC RX 637 (ALT 250 FOR IP): Performed by: SURGERY

## 2025-06-26 PROCEDURE — 80069 RENAL FUNCTION PANEL: CPT

## 2025-06-26 PROCEDURE — 6370000000 HC RX 637 (ALT 250 FOR IP): Performed by: CLINICAL NURSE SPECIALIST

## 2025-06-26 PROCEDURE — 2580000003 HC RX 258: Performed by: CLINICAL NURSE SPECIALIST

## 2025-06-26 PROCEDURE — 6370000000 HC RX 637 (ALT 250 FOR IP): Performed by: INTERNAL MEDICINE

## 2025-06-26 PROCEDURE — 36415 COLL VENOUS BLD VENIPUNCTURE: CPT

## 2025-06-26 PROCEDURE — 6370000000 HC RX 637 (ALT 250 FOR IP)

## 2025-06-26 PROCEDURE — 97535 SELF CARE MNGMENT TRAINING: CPT

## 2025-06-26 PROCEDURE — 99024 POSTOP FOLLOW-UP VISIT: CPT | Performed by: CLINICAL NURSE SPECIALIST

## 2025-06-26 RX ORDER — LIDOCAINE 4 G/G
1 PATCH TOPICAL DAILY
Qty: 30 EACH | Refills: 1 | Status: SHIPPED | OUTPATIENT
Start: 2025-06-27

## 2025-06-26 RX ORDER — METHOCARBAMOL 750 MG/1
750 TABLET, FILM COATED ORAL 3 TIMES DAILY
Qty: 15 TABLET | Refills: 0 | Status: SHIPPED | OUTPATIENT
Start: 2025-06-26 | End: 2025-07-01

## 2025-06-26 RX ORDER — METOPROLOL TARTRATE 25 MG/1
12.5 TABLET, FILM COATED ORAL 2 TIMES DAILY
Qty: 60 TABLET | Refills: 1 | Status: SHIPPED | OUTPATIENT
Start: 2025-06-26 | End: 2025-07-26

## 2025-06-26 RX ORDER — SODIUM PHOSPHATE, DIBASIC, ANHYDROUS, POTASSIUM PHOSPHATE, MONOBASIC, AND SODIUM PHOSPHATE, MONOBASIC, MONOHYDRATE 852; 155; 130 MG/1; MG/1; MG/1
1 TABLET, COATED ORAL 2 TIMES DAILY
Qty: 20 TABLET | Refills: 0 | Status: SHIPPED | OUTPATIENT
Start: 2025-06-26 | End: 2025-07-06

## 2025-06-26 RX ORDER — DRONABINOL 5 MG/1
5 CAPSULE ORAL 2 TIMES DAILY
Qty: 60 CAPSULE | Refills: 1 | Status: SHIPPED | OUTPATIENT
Start: 2025-06-26 | End: 2025-08-25

## 2025-06-26 RX ADMIN — SODIUM CHLORIDE, PRESERVATIVE FREE 10 ML: 5 INJECTION INTRAVENOUS at 08:24

## 2025-06-26 RX ADMIN — POLYETHYLENE GLYCOL 3350 17 G: 17 POWDER, FOR SOLUTION ORAL at 08:03

## 2025-06-26 RX ADMIN — AMLODIPINE BESYLATE 5 MG: 5 TABLET ORAL at 08:07

## 2025-06-26 RX ADMIN — SUCRALFATE 1 G: 1 TABLET ORAL at 11:26

## 2025-06-26 RX ADMIN — OXYCODONE HYDROCHLORIDE AND ACETAMINOPHEN 1 TABLET: 7.5; 325 TABLET ORAL at 09:30

## 2025-06-26 RX ADMIN — DRONABINOL 5 MG: 5 CAPSULE ORAL at 08:06

## 2025-06-26 RX ADMIN — METHOCARBAMOL 750 MG: 750 TABLET ORAL at 08:07

## 2025-06-26 RX ADMIN — ASPIRIN 81 MG: 81 TABLET, COATED ORAL at 08:06

## 2025-06-26 RX ADMIN — METHOCARBAMOL 750 MG: 750 TABLET ORAL at 14:14

## 2025-06-26 RX ADMIN — HEPARIN SODIUM 5000 UNITS: 5000 INJECTION INTRAVENOUS; SUBCUTANEOUS at 08:06

## 2025-06-26 RX ADMIN — POTASSIUM PHOSPHATE, MONOBASIC POTASSIUM PHOSPHATE, DIBASIC 15 MMOL: 224; 236 INJECTION, SOLUTION, CONCENTRATE INTRAVENOUS at 08:16

## 2025-06-26 RX ADMIN — METOPROLOL TARTRATE 12.5 MG: 25 TABLET, FILM COATED ORAL at 08:07

## 2025-06-26 RX ADMIN — BUSPIRONE HYDROCHLORIDE 15 MG: 5 TABLET ORAL at 08:03

## 2025-06-26 RX ADMIN — VALSARTAN 80 MG: 80 TABLET, FILM COATED ORAL at 08:06

## 2025-06-26 RX ADMIN — PANTOPRAZOLE SODIUM 40 MG: 40 TABLET, DELAYED RELEASE ORAL at 08:07

## 2025-06-26 RX ADMIN — SUCRALFATE 1 G: 1 TABLET ORAL at 08:07

## 2025-06-26 RX ADMIN — SERTRALINE 50 MG: 50 TABLET, FILM COATED ORAL at 08:07

## 2025-06-26 RX ADMIN — TORSEMIDE 20 MG: 20 TABLET ORAL at 08:07

## 2025-06-26 ASSESSMENT — PAIN SCALES - GENERAL
PAINLEVEL_OUTOF10: 3
PAINLEVEL_OUTOF10: 7
PAINLEVEL_OUTOF10: 10

## 2025-06-26 ASSESSMENT — PAIN DESCRIPTION - LOCATION: LOCATION: BACK

## 2025-06-26 NOTE — PROGRESS NOTES
[facility] Medicine Progress Note  V 5.17      Date of Admission: 6/17/2025    Hospital Day: 3      Chief Admission Complaint: Status post SMA bypass    Subjective: Patient just returned from surgery.  Drowsy from anesthesia.    Presenting Admission History:       Nithya Neff is a 85 y.o. female with pmh of essential hypertension, CHF, CAD, aortic stenosis s/p TAVR, anxiety/depression, GERD, chronic insomnia, MARIA C, who presented to [facility] on 6/15/2025 with complaints of generalized weakness, dehydration, bloody diarrhea. CT abdomen pelvis without contrast done at admission showed features suggestive of colitis initially suspected to be infective colitis but workup negative. CT abdomen pelvis with contrast was done on 6/16/2025 which showed complete occlusion of proximal SMA with reconstitution of vessel approximately 3 cm from the origin; there was also noted to be high-grade stenosis of left renal artery with atrophy and decreased perfusion of left kidney. Team at [facility] discussed with vascular surgery team who recommended transfer to [facility] for further evaluation and treatment.     Assessment/Plan:      1.  Mesenteric ischemia.  Patient with significant weight loss.  Status post SMA graft on 6/19.  Vascular surgery following.  Patient transferred to ICU.  2.  Possible colitis.  Patient with possible colitis on CT scan.  Lightest may be secondary to ischemic colitis.  Was on antibiotics but this was discontinued.  Continue to monitor expectantly monitor white count and for fever.  3.  CKD stage IIIb.  Continue to monitor creatinine closely.  Creatinine 1.0 following surgery.  4.  Hypertension.  Monitor blood pressure closely.  Patient with Artline in place.  Goal is systolic 140-160 per vascular surgery.  5.  CHF.  Patient with diastolic dysfunction.  EF was 60 to 64% in May.  Continue to monitor closely.  Will avoid high for bulimia.  6.  CAD.  Aspirin on hold due to recent surgery.  
      Hospital Medicine Progress Note  V 5.17      Date of Admission: 6/17/2025    Hospital Day: 7      Chief Admission Complaint:  Abdominal pain, mesenteric ischemia     Subjective: She is sitting up in bed, eating a meal.  States abdominal discomfort is improving  Denies chest pain or shortness of breath.  She is tolerating oral intake but appetite is still poor    Presenting Admission History:       85 y.o. female with pmh of essential hypertension, CHF, CAD, aortic stenosis s/p TAVR, anxiety/depression, GERD, chronic insomnia, MARIA C, who presented to [facility] on 6/15/2025 with complaints of generalized weakness, dehydration, bloody diarrhea. CT abdomen pelvis without contrast done at admission showed features suggestive of colitis initially suspected to be infective colitis but workup negative. CT abdomen pelvis with contrast was done on 6/16/2025 which showed complete occlusion of proximal SMA with reconstitution of vessel approximately 3 cm from the origin; there was also noted to be high-grade stenosis of left renal artery with atrophy and decreased perfusion of left kidney. Team at [facility] discussed with vascular surgery team who recommended transfer to [facility] for further evaluation and treatment.        Assessment/Plan:      Mesenteric ischemia. Patient presented with blood in stool, abd pain   - CTA abd revealed complete occlusion of SMA  - Vascular surgery following,  She did go for retrograde left iliac to superior mesenteric bypass surgery, grafting on 6/19/2025  -Echo reviewed: 60-65%, AV replaced  Pain regimen adjust as needed  Continue to monitor       Possible colitis noted on CT abd  - GI bacterial pathogens negative  - C diff negative  She did receive IV rocephin + metronidazole for 5-day course, and this was completed on 6/21/2025.   - GI consulted, appreciate their input  Continue to monitor     CKD stage IIIb.  [place] [facility] [place] 
      [facility] Medicine Progress Note  V 5.17      Date of Admission: 6/17/2025    Hospital Day: 5      Chief Admission Complaint:  Abdominal pain, mesenteric ischemia     Subjective:  Patient seen at bedside today. Patient no acute concerns. No pain anywhere, nausea, vomiting, fevers, chills. Patient NG tube in place.    Presenting Admission History:       Nithya Neff is a 85 y.o. female with pmh of essential hypertension, CHF, CAD, aortic stenosis s/p TAVR, anxiety/depression, GERD, chronic insomnia, MARIA C, who presented to [facility] on 6/15/2025 with complaints of generalized weakness, dehydration, bloody diarrhea. CT abdomen pelvis without contrast done at admission showed features suggestive of colitis initially suspected to be infective colitis but workup negative. CT abdomen pelvis with contrast was done on 6/16/2025 which showed complete occlusion of proximal SMA with reconstitution of vessel approximately 3 cm from the origin; there was also noted to be high-grade stenosis of left renal artery with atrophy and decreased perfusion of left kidney. Team at [facility] discussed with vascular surgery team who recommended transfer to [facility] for further evaluation and treatment.     Assessment/Plan:      Mesenteric ischemia. Patient presented with blood in stool, abd pain s/p POD2 Retrograde L Iliac SMA bypass  AS hx  - CTA abd revealed complete occlusion of SMA  - Vascular surgery following, plan for superior mesenteric artery bypass graft surgery on 06/19  -Echo reviewed: 60-65%, AV replaced  Pain regimen adjust as needed  Continue to monitor     Possible colitis noted on CT abd  - GI bacterial pathogens negative  - C diff negative  - Continue rocephin + metronidazole for now, discussed with GI, plan for 5-day course, to complete on 6/21/2025. Began Flagyl in setting of increased white count. Cultures ordered and continue to follow  - GI consulted, appreciate their input  Continue to monitor   
      [facility] Medicine Progress Note  V 5.17      Date of Admission: 6/17/2025    Hospital Day: 2      Chief Admission Complaint:  Abdominal pain, mesenteric ischemia    Subjective:    The patient denies any issues today.  Vascular surgery plans noted for superior mesenteric artery bypass graft surgery tomorrow with Dr. Fraire.       Presenting Admission History:       Nithya Neff is a 85 y.o. female with pmh of essential hypertension, CHF, CAD, aortic stenosis s/p TAVR, anxiety/depression, GERD, chronic insomnia, MARIA C, who presented to [facility] on 6/15/2025 with complaints of generalized weakness, dehydration, bloody diarrhea.  CT abdomen pelvis without contrast done at admission showed features suggestive of colitis initially suspected to be infective colitis but workup negative.  CT abdomen pelvis with contrast was done on 6/16/2025 which showed complete occlusion of proximal SMA with reconstitution of vessel approximately 3 cm from the origin; there was also noted to be high-grade stenosis of left renal artery with atrophy and decreased perfusion of left kidney.  Team at [facility] discussed with vascular surgery team who recommended transfer to [facility] for further evaluation and treatment.     Assessment/Plan:      Mesenteric ischemia. Patient presented with blood in stool, abd pain  - CTA abd revealed complete occlusion of SMA  - Vascular surgery following, plan for superior mesenteric artery bypass graft surgery on 06/19  - TTE ordered and pending    Possible colitis noted on CT abd  - GI bacterial pathogens negative  - C diff negative  - Continue rocephin + metronidazole for now, discussed with GI, plan for 5-day course, to complete on 6/21/2025  - GI consulted, appreciate their input    CKD stage IIIb.  Patient was in MARIO on previous [place] [facility] [place], this has now resolved.  - Baseline creatinine appears to be 1.4  - Nephrology consulted, appreciate their input      HTN  - Continue 
      [facility] Medicine Progress Note  V 5.17      Date of Admission: 6/17/2025    Hospital Day: 6      Chief Admission Complaint:  Abdominal pain, mesenteric ischemia     Subjective:  Patient seen at bedside today. Patient no acute concerns. No pain anywhere, nausea, vomiting, fevers, chills.    Presenting Admission History:       Nithya Neff is a 85 y.o. female with pmh of essential hypertension, CHF, CAD, aortic stenosis s/p TAVR, anxiety/depression, GERD, chronic insomnia, MARIA C, who presented to [facility] on 6/15/2025 with complaints of generalized weakness, dehydration, bloody diarrhea. CT abdomen pelvis without contrast done at admission showed features suggestive of colitis initially suspected to be infective colitis but workup negative. CT abdomen pelvis with contrast was done on 6/16/2025 which showed complete occlusion of proximal SMA with reconstitution of vessel approximately 3 cm from the origin; there was also noted to be high-grade stenosis of left renal artery with atrophy and decreased perfusion of left kidney. Team at [facility] discussed with vascular surgery team who recommended transfer to [facility] for further evaluation and treatment.     Assessment/Plan:      Mesenteric ischemia. Patient presented with blood in stool, abd pain s/p POD3 Retrograde L Iliac SMA bypass  AS hx  - CTA abd revealed complete occlusion of SMA  - Vascular surgery following, plan for superior mesenteric artery bypass graft surgery on 06/19  -Echo reviewed: 60-65%, AV replaced  Pain regimen adjust as needed  Continue to monitor  Advance diet as tolerated     Possible colitis noted on CT abd  - GI bacterial pathogens negative  - C diff negative  - Continue rocephin + metronidazole for now, discussed with GI, plan for 5-day course, to complete on 6/21/2025. Began Flagyl in setting of increased white count. Cultures ordered and continue to follow. Discontinued.  - GI consulted, appreciate their 
      [facility] Medicine Progress Note  V 5.17      Date of Admission: 6/17/2025    Hospital Day: 4      Chief Admission Complaint:  Abdominal pain, mesenteric ischemia     Subjective:  Patient seen at bedside today. Patient no acute concerns. No pain anywhere, nausea, vomiting, fevers, chills. Patient NG tube in place. BM overnight.    Presenting Admission History:       Nithya Neff is a 85 y.o. female with pmh of essential hypertension, CHF, CAD, aortic stenosis s/p TAVR, anxiety/depression, GERD, chronic insomnia, MARIA C, who presented to [facility] on 6/15/2025 with complaints of generalized weakness, dehydration, bloody diarrhea. CT abdomen pelvis without contrast done at admission showed features suggestive of colitis initially suspected to be infective colitis but workup negative. CT abdomen pelvis with contrast was done on 6/16/2025 which showed complete occlusion of proximal SMA with reconstitution of vessel approximately 3 cm from the origin; there was also noted to be high-grade stenosis of left renal artery with atrophy and decreased perfusion of left kidney. Team at [facility] discussed with vascular surgery team who recommended transfer to [facility] for further evaluation and treatment.     Assessment/Plan:      Mesenteric ischemia. Patient presented with blood in stool, abd pain s/p POD1 Retrograde L Iliac SMA bypass  AS hx  - CTA abd revealed complete occlusion of SMA  - Vascular surgery following, plan for superior mesenteric artery bypass graft surgery on 06/19  -Echo reviewed: 60-65%, AV replaced  Pain regimen adjust as needed  Continue to monitor     Possible colitis noted on CT abd  - GI bacterial pathogens negative  - C diff negative  - Continue rocephin + metronidazole for now, discussed with GI, plan for 5-day course, to complete on 6/21/2025  - GI consulted, appreciate their input  Continue to monitor     CKD stage IIIb.  Patient was in [place] [facility] [place], this has 
      [facility] Medicine Progress Note  V 5.17      Date of Admission: 6/17/2025    Hospital Day: 8      Chief Admission Complaint: Abdominal pain found to have mesenteric ischemia    Subjective: She is sitting up in bed.  Complaining of significant abdominal discomfort this morning.  No nausea vomiting.  Appetite is poor.  Denies chest pain.    Presenting Admission History:       85 y.o. female with pmh of essential hypertension, CHF, CAD, aortic stenosis s/p TAVR, anxiety/depression, GERD, chronic insomnia, MARIA C, who presented to [facility] on 6/15/2025 with complaints of generalized weakness, dehydration, bloody diarrhea. CT abdomen pelvis without contrast done at admission showed features suggestive of colitis initially suspected to be infective colitis but workup negative. CT abdomen pelvis with contrast was done on 6/16/2025 which showed complete occlusion of proximal SMA with reconstitution of vessel approximately 3 cm from the origin; there was also noted to be high-grade stenosis of left renal artery with atrophy and decreased perfusion of left kidney. Team at [facility] discussed with vascular surgery team who recommended transfer to [facility] for further evaluation and treatment.           Assessment/Plan:      Mesenteric ischemia. Patient presented with blood in stool, abd pain   - CTA abd revealed complete occlusion of SMA  - Vascular surgery following,  She did go for retrograde left iliac to superior mesenteric bypass surgery, grafting on 6/19/2025 with Vascular surgery/ Dr Fraire.  -Echo reviewed: Ejection fraction 60-65%,   She is complaining of significant abdominal pain will adjust analgesics this time and follow         Possible colitis noted on CT abd  - GI bacterial pathogens negative  - C diff negative  She did receive IV rocephin + metronidazole for 5-day course, and this was completed on 6/21/2025.   - GI consulted, appreciate their input  Continue to monitor     CKD stage IIIb.  
   06/22/25 2011   RT Protocol   History Pulmonary Disease 0   Respiratory pattern 0   Breath sounds 0   Cough 0   Indications for Bronchodilator Therapy None   Bronchodilator Assessment Score 0       
  Physician Progress Note      PATIENT:               FELIPE KING  CSN #:                  [id number]  :                       1940  ADMIT DATE:       2025 3:57 PM  DISCH DATE:  RESPONDING  PROVIDER #:        La Howard MD          QUERY TEXT:    Please clarify the patient?s nutritional status:    The clinical indicators include:  dietician PN on :  Malnutrition Assessment:  Malnutrition Status:  Severe malnutrition (25 1338)  Context:  Acute Illness  Findings of the 6 clinical characteristics of malnutrition:  Energy Intake:  50% or less of estimated energy requirements for 5 or more   days  Weight Loss:  Greater than 7.5% over 3 months  Body Fat Loss:  Moderate body fat loss Orbital, Triceps  Muscle Mass Loss:  Moderate muscle mass loss Temples (temporalis)  Fluid Accumulation:  Unable to assess   Strength:  Not Performed    \"Nutrition Diagnosis:  - Inadequate oral intake related to Altered GI structure,   nausea/vomiting/diarrhea, inadequate protein-energy intake, pain as evidenced   by weight loss, poor intake prior to admission, NPO or clear liquid status due   to medical condition, criteria as identified in malnutrition assessment\"    dietician consult, Continue Clear Liquid diet. Diet advancement per MD. Add   Ensure ONS as diet further advances.  Monitor pertinent labs, bowel habits, weight, N/V, supplement acceptance,   clinical progression.  Options provided:  -- Protein calorie malnutrition severe  -- Other - I will add my own diagnosis  -- Disagree - Not applicable / Not valid  -- Disagree - Clinically unable to determine / Unknown  -- Refer to Clinical Documentation Reviewer    PROVIDER RESPONSE TEXT:    This patient has severe protein calorie malnutrition.    Query created by: Gill Gallo on 2025 8:08 AM      Electronically signed by:  La Howard MD 2025 11:31 AM          
  [facility] Progress Note           Subjective/   85 y.o. year old female who we are seeing in consultation for MARIO.     HPI:  Renal function improved with IVF's now off, back on home torsemide, non-oliguric.  Denies any shortness of breath, on RA now.    ROS:  +weak, intake improving.    Objective/   GEN:  Chronically ill, BP (!) 114/53   Pulse (!) 109   Temp 98.9 °F (37.2 °C) (Oral)   Resp 22   Ht 1.524 m (5')   Wt 56.9 kg (125 lb 7.1 oz)   LMP  (LMP Unknown)   SpO2 95%   BMI 24.50 kg/m²   HEENT: non-icteric, no JVD  CV: S1, S2 without m/r/g; + LE edema  RESP: CTA B without w/r/r; breathing wnl  ABD: +bs, soft, nt, no hsm  SKIN: warm, no rashes    Data/  Recent Labs     06/20/25  0355 06/21/25  0430 06/22/25  0535   WBC 11.4* 13.2* 9.8   HGB 9.0* 9.3* 8.8*   HCT 28.1* 28.9* 27.4*   MCV 81.2 81.6 80.5    242 199     Recent Labs     06/20/25  0355 06/21/25  0430 06/22/25  0535    141 140   K 4.2 3.8 3.5   * 110 108   CO2 21 23 25   GLUCOSE 96 91 94   PHOS  --  2.1*  --    MG  --  1.51*  --    BUN 18 17 15   CREATININE 1.1 1.2 1.1   LABGLOM 49* 44* 49*     UA trace protein, negative blood, s.g. <1.005  Urine sodium 42  Urine chloride 21    CTA ->  1. 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  2. High-grade stenosis of the left renal artery with atrophy and decreased  perfusion of the left kidney.  3. Diffuse pattern of small bowel air-fluid levels with no pattern of  obstruction. This may represent ileus or enteritis.  4. Extensive diverticulosis with no focal inflammation.    Assessment/     - Acute kidney injury - pre-renal/renal hypoperfusion injury   Peak SCr 2.9 on admission, fluid responsive     - Chronic kidney disease stage 3b   SCr 1.2-1.4 range from Feb 2025     - Anion-gap metabolic acidosis - lactate wnl    - SMA occlusion/Left renal artery stenosis with atrophic kidney - plans per 
  [facility] Progress Note           Subjective/   85 y.o. year old female who we are seeing in consultation for MARIO.     HPI:  Renal function improved with IVF's, prn lasix, non-oliguric.  Denies any shortness of breath, on 2 L NC.    ROS:  +weak, intake improving.    Objective/   GEN:  Chronically ill, BP (!) 150/53   Pulse 80   Temp 98.5 °F (36.9 °C) (Bladder)   Resp 17   Ht 1.524 m (5')   Wt 57.3 kg (126 lb 5.2 oz)   LMP  (LMP Unknown)   SpO2 98%   BMI 24.67 kg/m²   HEENT: non-icteric, no JVD  CV: S1, S2 without m/r/g; no LE edema  RESP: CTA B without w/r/r; breathing wnl  ABD: +bs, soft, nt, no hsm  SKIN: warm, no rashes    Data/  Recent Labs     06/19/25  1514 06/20/25  0355 06/21/25  0430   WBC 11.6* 11.4* 13.2*   HGB 8.6* 9.0* 9.3*   HCT 27.6* 28.1* 28.9*   MCV 83.3 81.2 81.6    210 242     Recent Labs     06/19/25  0609 06/19/25  1514 06/20/25  0355 06/21/25  0430    143 142 141   K 3.6 3.7 4.2 3.8    113* 111* 110   CO2 23 19* 21 23   GLUCOSE 78 86 96 91   PHOS 1.8*  --   --  2.1*   MG 1.94  --   --  1.51*   BUN 17 15 18 17   CREATININE 1.2 1.0 1.1 1.2   LABGLOM 44* 55* 49* 44*     UA trace protein, negative blood, s.g. <1.005  Urine sodium 42  Urine chloride 21    CTA ->  1. 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  2. High-grade stenosis of the left renal artery with atrophy and decreased  perfusion of the left kidney.  3. Diffuse pattern of small bowel air-fluid levels with no pattern of  obstruction. This may represent ileus or enteritis.  4. Extensive diverticulosis with no focal inflammation.    Assessment/     - Acute kidney injury - pre-renal/renal hypoperfusion injury   Peak SCr 2.9 on admission, fluid responsive     - Chronic kidney disease stage 3b   SCr 1.2-1.4 range from Feb 2025     - Anion-gap metabolic acidosis - lactate wnl    - SMA occlusion/Left renal artery stenosis 
  [facility] Progress Note           Subjective/   85 y.o. year old female who we are seeing in consultation for MARIO.     HPI:  Renal function improved with IVF\"s, non-oliguric.  CVP's running low, now better at 10 this afternoon.  Denies any shortness of breath, on 2l NC.    ROS:  +weak, intake reduced.    Objective/   GEN:  Chronically ill, BP (!) 153/58   Pulse 59   Temp 98 °F (36.7 °C) (Bladder)   Resp 10   Ht 1.524 m (5')   Wt 56.7 kg (125 lb)   LMP  (LMP Unknown)   SpO2 98%   BMI 24.41 kg/m²   HEENT: non-icteric, no JVD  CV: S1, S2 without m/r/g; no LE edema  RESP: CTA B without w/r/r; breathing wnl  ABD: +bs, soft, nt, no hsm  SKIN: warm, no rashes    Data/  Recent Labs     06/19/25  0609 06/19/25  1347 06/19/25  1514 06/20/25  0355   WBC 7.0  --  11.6* 11.4*   HGB 7.7* 5.1* 8.6* 9.0*   HCT 23.8*  --  27.6* 28.1*   MCV 80.3  --  83.3 81.2     --  186 210     Recent Labs     06/17/25  1930 06/18/25  0652 06/19/25  0609 06/19/25  1514 06/20/25  0355    138 141 143 142   K 3.6 3.6 3.6 3.7 4.2    102 109 113* 111*   CO2 26 26 23 19* 21   GLUCOSE 127* 86 78 86 96   PHOS 1.8* 2.1* 1.8*  --   --    MG 2.29 2.15 1.94  --   --    BUN 35* 29* 17 15 18   CREATININE 1.5* 1.3* 1.2 1.0 1.1   LABGLOM 34* 40* 44* 55* 49*     UA trace protein, negative blood, s.g. <1.005  Urine sodium 42  Urine chloride 21    CTA ->  1. 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  2. High-grade stenosis of the left renal artery with atrophy and decreased  perfusion of the left kidney.  3. Diffuse pattern of small bowel air-fluid levels with no pattern of  obstruction. This may represent ileus or enteritis.  4. Extensive diverticulosis with no focal inflammation.    Assessment/     - Acute kidney injury - pre-renal/renal hypoperfusion injury   Peak SCr 2.9 on admission, fluid responsive     - Chronic kidney disease stage 
  [facility] Progress Note           Subjective/   85 y.o. year old female who we are seeing in consultation for MARIO.     HPI:  Renal function improving with IVF\"s, non-oliguric.  Denies any shortness of breath, on 2l NC.    ROS:  +weak, intake reduced.    Objective/   GEN:  Chronically ill, BP (!) 158/60   Pulse 73   Temp 98.4 °F (36.9 °C) (Oral)   Resp 16   Ht 1.524 m (5')   Wt 51.9 kg (114 lb 6.4 oz)   LMP  (LMP Unknown)   SpO2 95%   BMI 22.34 kg/m²   HEENT: non-icteric, no JVD  CV: S1, S2 without m/r/g; no LE edema  RESP: CTA B without w/r/r; breathing wnl  ABD: +bs, soft, nt, no hsm  SKIN: warm, no rashes    Data/  Recent Labs     06/17/25 1930 06/18/25  0652 06/18/25  1800 06/19/25  0609   WBC 11.2* 9.7  --  7.0   HGB 9.0* 8.0* 8.1* 7.7*   HCT 28.0* 24.6* 25.3* 23.8*   MCV 79.9* 79.2*  --  80.3    250  --  213     Recent Labs     06/17/25 1930 06/18/25  0652 06/19/25  0609    138 141   K 3.6 3.6 3.6    102 109   CO2 26 26 23   GLUCOSE 127* 86 78   PHOS 1.8* 2.1* 1.8*   MG 2.29 2.15 1.94   BUN 35* 29* 17   CREATININE 1.5* 1.3* 1.2   LABGLOM 34* 40* 44*     UA trace protein, negative blood, s.g. <1.005  Urine sodium 42  Urine chloride 21    CTA ->  1. 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  2. High-grade stenosis of the left renal artery with atrophy and decreased  perfusion of the left kidney.  3. Diffuse pattern of small bowel air-fluid levels with no pattern of  obstruction. This may represent ileus or enteritis.  4. Extensive diverticulosis with no focal inflammation.    Assessment/     - Acute kidney injury - pre-renal/renal hypoperfusion injury   Peak SCr 2.9 on admission, fluid responsive     - Chronic kidney disease stage 3b   SCr 1.2-1.4 range from Feb 2025     - Anion-gap metabolic acidosis - lactate wnl    - SMA occlusion/Left renal artery stenosis with atrophic kidney - plans 
  [facility] Progress Note           Subjective/   85 y.o. year old female who we are seeing in consultation for MARIO.     HPI:  Renal function improving with IVF\"s, non-oliguric.  Denies any shortness of breath, on RA.    ROS:  +weak, intake reduced, +abdominal pain.    Objective/   GEN:  Chronically ill, BP (!) 139/58   Pulse 65   Temp 97.9 °F (36.6 °C) (Oral)   Resp 16   Ht 1.524 m (5')   Wt 50.3 kg (111 lb)   LMP  (LMP Unknown)   SpO2 94%   BMI 21.68 kg/m²   HEENT: non-icteric, no JVD  CV: S1, S2 without m/r/g; no LE edema  RESP: CTA B without w/r/r; breathing wnl  ABD: +bs, soft, nt, no hsm  SKIN: warm, no rashes    Data/  Recent Labs     06/17/25  0555 06/17/25  1930 06/18/25  0652   WBC 9.6 11.2* 9.7   HGB 7.6* 9.0* 8.0*   HCT 23.5* 28.0* 24.6*   MCV 78.4* 79.9* 79.2*    299 250     Recent Labs     06/16/25  1616 06/17/25  0555 06/17/25  1930 06/18/25  0652    139 141 138   K 3.8 3.3* 3.6 3.6    101 100 102   CO2 20* 26 26 26   GLUCOSE 166* 104* 127* 86   PHOS 2.7  --  1.8* 2.1*   MG  --  1.58* 2.29 2.15   BUN 66* 45* 35* 29*   CREATININE 1.9* 1.4* 1.5* 1.3*   LABGLOM 25* 37* 34* 40*     UA trace protein, negative blood, s.g. <1.005  Urine sodium 42  Urine chloride 21    CTA ->  1. 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  2. High-grade stenosis of the left renal artery with atrophy and decreased  perfusion of the left kidney.  3. Diffuse pattern of small bowel air-fluid levels with no pattern of  obstruction. This may represent ileus or enteritis.  4. Extensive diverticulosis with no focal inflammation.    Assessment/     - Acute kidney injury - pre-renal/renal hypoperfusion injury   Peak SCr 2.9 on admission, fluid responsive     - Chronic kidney disease stage 3b   SCr 1.2-1.4 range from Feb 2025     - Anion-gap metabolic acidosis - lactate wnl    - SMA occlusion/Left renal artery stenosis 
  Vascular Surgery Progress Note      POD#  1  S/P Retrograde left iliac to superior mesenteric bypass graft (2025)    Chief Complaint: Postop follow up      SUBJECTIVE:  complains of abdominal soreness which is different from preop    OBJECTIVE    Physical  CURRENT VITALS:  BP (!) 153/58   Pulse 59   Temp 98 °F (36.7 °C) (Bladder)   Resp 10   Ht 1.524 m (5')   Wt 56.7 kg (125 lb)   LMP  (LMP Unknown)   SpO2 98%   BMI 24.41 kg/m²   24 HR INTAKE/OUTPUT:    Intake/Output Summary (Last 24 hours) at 2025 0818  Last data filed at 2025 0800  Gross per 24 hour   Intake 7697.02 ml   Output 1414 ml   Net 6283.02 ml     UO:  695-  ml /shift   N-0-0  ml /shift    IV fluids at 75 ml/hr    Awake, alert and oriented. Using Morphine PCA pump  Mid abdominal incision soft, clean dry and intact  Bilateral lower extremities warm with palpable DP pulses  Urinary catheter to gravity drainage      Data  CBC:   Recent Labs     25  1930 25  0652 25  1800 25  0609 25  1347 25  1514 25  0355   WBC 11.2* 9.7  --  7.0  --  11.6* 11.4*   HGB 9.0* 8.0* 8.1* 7.7* 5.1* 8.6* 9.0*   HCT 28.0* 24.6* 25.3* 23.8*  --  27.6* 28.1*   MCV 79.9* 79.2*  --  80.3  --  83.3 81.2    250  --  213  --  186 210     BMP:   Recent Labs     25  1930 25  0652 25  0609 25  1514 25  0355    138 141 143 142   K 3.6 3.6 3.6 3.7 4.2    102 109 113* 111*   CO2 26 26 23 19* 21   PHOS 1.8* 2.1* 1.8*  --   --    GLUCOSE 127* 86 78 86 96   BUN 35* 29* 17 15 18   CREATININE 1.5* 1.3* 1.2 1.0 1.1   CALCIUM 8.1* 8.1* 7.6* 8.0* 8.1*   MG 2.29 2.15 1.94  --   --      Accucheck Glucoses:   Recent Labs     25  1347 25  1610   POCGLU 71 82     PT/INR:   Recent Labs     25  0609   PROTIME 16.2*   INR 1.28*     Current Inpatient Medications  Current Facility-Administered Medications: sodium chloride 0.9 % bolus 500 mL, 500 mL, IntraVENous, 
  Vascular Surgery Progress Note      POD#  3  S/P Retrograde left iliac to superior mesenteric bypass graft (6/19/2025)      SUBJECTIVE:  complains of abdominal soreness and pain when moving to chair. Has been OOB. Large BM yesterday. Taking minimal PO 2/2 no appetite.  Denies N/V. voiding    OBJECTIVE    Physical  CURRENT VITALS:  BP (!) 148/122   Pulse 80   Temp 98.9 °F (37.2 °C) (Bladder)   Resp 18   Ht 1.524 m (5')   Wt 56.9 kg (125 lb 7.1 oz)   LMP  (LMP Unknown)   SpO2 94%   BMI 24.50 kg/m²   24 HR INTAKE/OUTPUT:    Intake/Output Summary (Last 24 hours) at 6/22/2025 0845  Last data filed at 6/22/2025 0601  Gross per 24 hour   Intake 1070.86 ml   Output 2150 ml   Net -1079.14 ml       Awake, alert and oriented.  Mid abdominal incision soft, clean dry and intact  Bilateral lower extremities warm with palpable DP pulses  Urinary catheter to gravity drainage      Data  CBC:   Recent Labs     06/19/25  1347 06/19/25  1514 06/20/25  0355 06/21/25  0430 06/22/25  0535   WBC  --  11.6* 11.4* 13.2* 9.8   HGB 5.1* 8.6* 9.0* 9.3* 8.8*   HCT  --  27.6* 28.1* 28.9* 27.4*   MCV  --  83.3 81.2 81.6 80.5   PLT  --  186 210 242 199     BMP:   Recent Labs     06/19/25  1514 06/20/25  0355 06/21/25  0430 06/22/25  0535    142 141 140   K 3.7 4.2 3.8 3.5   * 111* 110 108   CO2 19* 21 23 25   PHOS  --   --  2.1*  --    GLUCOSE 86 96 91 94   BUN 15 18 17 15   CREATININE 1.0 1.1 1.2 1.1   CALCIUM 8.0* 8.1* 8.0* 7.6*   MG  --   --  1.51*  --      Accucheck Glucoses:   Recent Labs     06/19/25  1347 06/19/25  1610   POCGLU 71 82     PT/INR:   No results for input(s): \"PROTIME\", \"INR\" in the last 72 hours.    Current Inpatient Medications  Current Facility-Administered Medications: amLODIPine (NORVASC) tablet 5 mg, 5 mg, Oral, Daily  magnesium sulfate 4000 mg in 100 mL IVPB premix, 4,000 mg, IntraVENous, Once  metroNIDAZOLE (FLAGYL) 500 mg in 0.9% NaCl 100 mL IVPB premix, 500 mg, IntraVENous, Q8H  methocarbamol 
  Vascular Surgery Progress Note      POD#  4  S/P Retrograde left iliac to superior mesenteric bypass graft (6/19/2025)      SUBJECTIVE:  complains of abdominal pain which is different from preop. Can only eat a few bites at a time    OBJECTIVE    Physical  CURRENT VITALS:  BP (!) 137/56   Pulse 85   Temp 98.8 °F (37.1 °C)   Resp 14   Ht 1.524 m (5')   Wt 56.9 kg (125 lb 7.1 oz)   LMP  (LMP Unknown)   SpO2 96%   BMI 24.50 kg/m²   24 HR INTAKE/OUTPUT:    Intake/Output Summary (Last 24 hours) at 6/23/2025 0919  Last data filed at 6/23/2025 0830  Gross per 24 hour   Intake 938.42 ml   Output 2325 ml   Net -1386.58 ml     Urine:  6339-805-395 ml/shift  BM x 1    Awake, alert and oriented.  Mid abdominal incision soft, clean dry and intact  Bilateral lower extremities warm with palpable DP pulses    - PT  16/24 on 6/21  on the AM-PAC short mobility form  - OT  12/24 on 6/21 on the AM-PAC ADL Inpatient form      Data  CBC:   Recent Labs     06/21/25  0430 06/22/25  0535   WBC 13.2* 9.8   HGB 9.3* 8.8*   HCT 28.9* 27.4*   MCV 81.6 80.5    199     BMP:   Recent Labs     06/21/25  0430 06/22/25  0535 06/23/25  0615    140 142   K 3.8 3.5 3.2*    108 107   CO2 23 25 28   PHOS 2.1*  --   --    GLUCOSE 91 94 95   BUN 17 15 11   CREATININE 1.2 1.1 1.2   CALCIUM 8.0* 7.6* 7.2*   MG 1.51*  --   --      Prealbumin:  6.6    Current Inpatient Medications  Current Facility-Administered Medications: amLODIPine (NORVASC) tablet 5 mg, 5 mg, Oral, Daily  magnesium sulfate 4000 mg in 100 mL IVPB premix, 4,000 mg, IntraVENous, Once  methocarbamol (ROBAXIN) tablet 750 mg, 750 mg, Oral, TID  morphine (PF) injection 2 mg, 2 mg, IntraVENous, Q4H PRN  acetaminophen (TYLENOL) tablet 650 mg, 650 mg, Oral, Q4H PRN  oxyCODONE (ROXICODONE) immediate release tablet 5 mg, 5 mg, Oral, Q4H PRN  mupirocin (BACTROBAN) 2 % ointment, , Each Nostril, BID  metoprolol (LOPRESSOR) injection 2.5 mg, 2.5 mg, IntraVENous, Q6H  torsemide 
  Vascular Surgery Progress Note      POD#  6  S/P Retrograde left iliac to superior mesenteric bypass graft (6/19/2025)      SUBJECTIVE:  complains of back pain and doesn't eat because the food tastes terrible    OBJECTIVE    Physical  CURRENT VITALS:  BP (!) 134/38   Pulse 74   Temp 98.3 °F (36.8 °C) (Oral)   Resp 22   Ht 1.524 m (5')   Wt 50.5 kg (111 lb 5.3 oz)   LMP  (LMP Unknown)   SpO2 97%   BMI 21.74 kg/m²   24 HR INTAKE/OUTPUT:    Intake/Output Summary (Last 24 hours) at 6/25/2025 0801  Last data filed at 6/24/2025 2300  Gross per 24 hour   Intake 261 ml   Output 675 ml   Net -414 ml     Urine:  250+++++-425-NR ml/shift    Access: Lt basilic midline (6/18/2025)    Awake, alert and oriented.Cranky.  Ambulates with walker and assistance of one person  Mid abdominal incision soft, clean dry and intact  Bilateral lower extremities warm with palpable DP pulses    - PT  20/24 on 6/24  on the AM-PAC short mobility form  - OT  12/24 on 6/21 on the AM-PAC ADL Inpatient form      Data  CBC:   Recent Labs     06/24/25  0409 06/25/25  0411   WBC 7.3 10.0   HGB 8.2* 8.6*   HCT 24.6* 25.9*   MCV 79.9* 81.4    228     BMP:   Recent Labs     06/23/25  0615 06/23/25  1500 06/24/25  0409 06/25/25  0411     --  138 139   K 3.2* 4.1 3.4* 3.6     --  104 103   CO2 28  --  28 29   PHOS  --   --  1.2* 1.6*   GLUCOSE 95  --  113* 98   BUN 11  --  10 9   CREATININE 1.2  --  1.2 1.1   CALCIUM 7.2*  --  7.4* 7.7*   MG  --  1.56* 1.86 2.02     Prealbumin:  6.6    Current Inpatient Medications  Current Facility-Administered Medications: acetaminophen (TYLENOL) tablet 650 mg, 650 mg, Oral, Q4H PRN  potassium phosphate 10 mmol in sodium chloride 0.9 % 250 mL IVPB, 10 mmol, IntraVENous, Once  heparin (porcine) injection 5,000 Units, 5,000 Units, SubCUTAneous, BID  aspirin EC tablet 81 mg, 81 mg, Oral, Daily  pantoprazole (PROTONIX) tablet 40 mg, 40 mg, Oral, BID AC  HYDROmorphone (DILAUDID) injection 0.5 mg, 0.5 
  Vascular Surgery Progress Note      POD#  7  S/P Retrograde left iliac to superior mesenteric bypass graft (6/19/2025)      SUBJECTIVE:  slept well and ate well yesterday. States she is getting an appetite.    OBJECTIVE    Physical  CURRENT VITALS:  BP (!) 141/79 Comment: 125/54 after bathroom activity  Pulse 77   Temp 98.1 °F (36.7 °C) (Oral)   Resp 13   Ht 1.524 m (5')   Wt 50.5 kg (111 lb 5.3 oz)   LMP  (LMP Unknown)   SpO2 100%   BMI 21.74 kg/m²   24 HR INTAKE/OUTPUT:    Intake/Output Summary (Last 24 hours) at 6/26/2025 0932  Last data filed at 6/26/2025 0752  Gross per 24 hour   Intake 797 ml   Output 325 ml   Net 472 ml     Urine:  150+-100+-x1 ml/shift  BM x1    Access: Lt basilic midline (6/18/2025)    Awake, alert and oriented. Tolerating a regular diet without abdominalnpain  Ambulates with walker and assistance of one person  Mid abdominal incision soft, clean dry and intact  Bilateral lower extremities warm with palpable DP pulses    - PT  20/24 on 6/24  on the AM-PAC short mobility form  - OT  19/24 on 6/26 on the AM-PAC ADL Inpatient form      Data  CBC:   Recent Labs     06/24/25  0409 06/25/25  0411   WBC 7.3 10.0   HGB 8.2* 8.6*   HCT 24.6* 25.9*   MCV 79.9* 81.4    228     BMP:   Recent Labs     06/23/25  1500 06/24/25  0409 06/25/25  0411 06/26/25  0416   NA  --  138 139 140   K 4.1 3.4* 3.6 3.3*   CL  --  104 103 104   CO2  --  28 29 28   PHOS  --  1.2* 1.6* 1.9*   GLUCOSE  --  113* 98 93   BUN  --  10 9 12   CREATININE  --  1.2 1.1 1.1   CALCIUM  --  7.4* 7.7* 7.4*   MG 1.56* 1.86 2.02  --      Prealbumin:  6.6    Current Inpatient Medications  Current Facility-Administered Medications: potassium phosphate 15 mmol in sodium chloride 0.9 % 250 mL IVPB, 15 mmol, IntraVENous, Once  acetaminophen (TYLENOL) tablet 650 mg, 650 mg, Oral, Q4H PRN  lidocaine 4 % external patch 1 patch, 1 patch, TransDERmal, Daily  metoprolol tartrate (LOPRESSOR) tablet 12.5 mg, 12.5 mg, Oral, 
 at  praying with pt  
 called and states will be to see pt for prayer  
06/20/25 @ 0434 Nephrology paged per MARINO Coulter's request  06/20/25 @ 0536 Nephrology re-paged after no return call  
1530  pt dressed and ready for dc waiting on family to arrive   
4 Eyes Skin Assessment     NAME:  Nithya Neff  YOB: 1940  MEDICAL RECORD NUMBER:  [id number]    The patient is being assessed for  Admission    I agree that at least one RN has performed a thorough Head to Toe Skin Assessment on the patient. ALL assessment sites listed below have been assessed.      Areas assessed by both nurses:    Head, Face, Ears, Shoulders, Back, Chest, Arms, Elbows, Hands, Sacrum. Buttock, Coccyx, Ischium, Legs. Feet and Heels, and Under Medical Devices         Does the Patient have a Wound? No noted wound(s)       Mihir Prevention initiated by RN: Yes  Wound Care Orders initiated by RN: No    Pressure Injury (Stage 3,4, Unstageable, DTI, NWPT, and Complex wounds) if present, place Wound referral order by RN under : No    New Ostomies, if present place, Ostomy referral order under : No     Nurse 1 eSignature: Electronically signed by Deedee Chandler RN on 6/17/25 at 9:49 PM EDT    **SHARE this note so that the co-signing nurse can place an eSignature**    Nurse 2 eSignature: Electronically signed by Darby Ramirez LPN on 6/18/25 at 6:04 AM EDT   
Arrived to place midline after chart review. Pre-procedure and timeout done with MARINO Mark. Discussed limitations of placement and allergies. Procedure explained to pt, including risks and benefits. All questions answered. Pt verbalized understanding.      Vessels easily collapsible upon assessment. No difficulty accessing Basilic vein. Blood free flowing and non-pulsatile. Guidewire, introducer, and catheter all easily inserted. Midline has brisk blood return and flushes easily.     OK to use midline.     Patient tolerated sterile procedure well. Bed left in low position with belongings and call light within reach. Educated on line care. Handoff to bedside RN.      Please call with any questions or concerns. The  will direct you to the PICC RN that is on call.      [phone]           
Comprehensive Nutrition Assessment    Type and Reason for Visit:  Initial, Positive nutrition screen    Nutrition Recommendations/Plan:   Continue Clear Liquid diet. Diet advancement per MD.  Add Ensure ONS as diet further advances.  Monitor pertinent labs, bowel habits, weight, N/V, supplement acceptance, clinical progression.       Malnutrition Assessment:  Malnutrition Status:  Severe malnutrition (06/18/25 1338)    Context:  Acute Illness     Findings of the 6 clinical characteristics of malnutrition:  Energy Intake:  50% or less of estimated energy requirements for 5 or more days  Weight Loss:  Greater than 7.5% over 3 months     Body Fat Loss:  Moderate body fat loss Orbital, Triceps   Muscle Mass Loss:  Moderate muscle mass loss Temples (temporalis)  Fluid Accumulation:  Unable to assess     Strength:  Not Performed    Nutrition Assessment:    Patient seen for positive nutrition screen (weight loss and decreased appetite). Admitted for mesenteric ischemia. Per vascular, pt planned to have superior mesenteric artery bypass graft surgery 6/19. PMHx of hypertension, CHF, CAD, aortic stenosis s/p TAVR, anxiety/depression, GERD, chronic insomnia, MARIA C. Currently on clear liquid diet. Patient sleepy at time of visit. Admits to poor appetite and recent weight loss r/t diarrhea and pain. Significant weight loss of -13% x4 months noted per EMR. Will send Ensure ONS as diet advances. Will continue to monitor.    Nutrition Related Findings:    BG x24 hrs . LBM 6/18. Wound Type: None       Current Nutrition Intake & Therapies:    Average Meal Intake: Unable to assess  Average Supplements Intake: None Ordered  ADULT DIET; Clear Liquid  Diet NPO    Anthropometric Measures:  Height: 152.4 cm (5')  Ideal Body Weight (IBW): 100 lbs (45 kg)       Current Body Weight: 50.3 kg (111 lb) (6/18/25), 111 % IBW. Weight Source: Standing scale  Current BMI (kg/m2): 21.7  Usual Body Weight: 57.8 kg (127 lb 6.8 oz) (2/22/25)   
Comprehensive Nutrition Assessment    Type and Reason for Visit:  Reassess    Nutrition Recommendations/Plan:   Continue NPO. PO diet advancement per MD.   Should alternate nutrition therapy become medically appropriate, please consult clinical dietitian.  Monitor pertinent labs, bowel habits, weight, N/V, clinical progression.       Malnutrition Assessment:  Malnutrition Status:  Severe malnutrition (06/18/25 1338)    Context:  Acute Illness     Findings of the 6 clinical characteristics of malnutrition:  Energy Intake:  50% or less of estimated energy requirements for 5 or more days  Weight Loss:  Greater than 7.5% over 3 months     Body Fat Loss:  Moderate body fat loss Orbital, Triceps   Muscle Mass Loss:  Moderate muscle mass loss Temples (temporalis)  Fluid Accumulation:  Unable to assess     Strength:  Not Performed    Nutrition Assessment:    Followup: Patient is s/p superior mesenteric artery bypass graft 6/19. NGT clamped. Noted pt has been NPO/clear liquid since admission (x3 days). No plans for alternate nutrition therapy at this time. Should alternate nutrition therapy become medically appropriate, please consult clinical dietitian. Noted weight is up +15lbs x3 days, likely r/t fluid. Will continue to monitor.    Nutrition Related Findings:    Labs reviewed. +1 edema RLE/LLE. LBM 6/20. NGT clamped. Wound Type: None       Current Nutrition Intake & Therapies:    Average Meal Intake: NPO  Average Supplements Intake: NPO  Diet NPO    Anthropometric Measures:  Height: 152.4 cm (5')  Ideal Body Weight (IBW): 100 lbs (45 kg)       Current Body Weight: 56.7 kg (125 lb) (6/20/25), 125 % IBW. Weight Source: Bed scale  Current BMI (kg/m2): 24.4  Usual Body Weight: 57.8 kg (127 lb 6.8 oz) (2/22/25)     % Weight Change (Calculated): -12.9  Weight Adjustment For: No Adjustment           BMI Categories: Normal Weight (BMI 22.0 to 24.9) age over 65    Estimated Daily Nutrient Needs:  Energy Requirements Based On: 
Comprehensive Nutrition Assessment    Type and Reason for Visit:  Reassess    Nutrition Recommendations/Plan:   Continue easy to chew diet.   Modify Ensure TID to once/day. Monitor acceptance.   Encourage PO intake as tolerated.   Due to inadequate intake, discussion of palliative care versus nutrition support should be considered. Consult dietitian if tube feeding is desired and consistent with patient's plan of care.  Continue to monitor electrolytes and replace as needed.   Monitor nutrition adequacy, pertinent labs, bowel habits, wt changes, and clinical progress.      Malnutrition Assessment:  Malnutrition Status:  Severe malnutrition (06/18/25 1338)    Context:  Acute Illness     Findings of the 6 clinical characteristics of malnutrition:  Energy Intake:  50% or less of estimated energy requirements for 5 or more days  Weight Loss:  Greater than 7.5% over 3 months     Body Fat Loss:  Moderate body fat loss Orbital, Triceps   Muscle Mass Loss:  Moderate muscle mass loss Temples (temporalis)  Fluid Accumulation:  Mild Extremities   Strength:  Not Performed    Nutrition Assessment:    Attempted to visit pt for follow-up but she was sleeping soundly w/ no family members in room. NGT removed and diet advanced from NPO to clear liquids on 6/20. Currently on an easy to chew diet w/ Ensure TID. Per EMR, PO intakes are mainly 1-25%. Spoke w/ RN who reports pt appeared disinterested in Ensure this morning. Unopened bottle noted on bedside tray at time of visit. Will decrease frequency from TID to daily. Per chart review, pt c/o significant abdominal pain this morning. Will monitor ability to consume adequate PO versus need for alternative nutrition if consistent w/ pt's plan of care. Will continue to monitor.    Nutrition Related Findings:    Last BM documented 6/23 (on bowel regimen). Potassium: 3.4 and phosphorus: 1.2. +1 RUE/BLE edema. Wound Type: Surgical Incision       Current Nutrition Intake & Therapies:  
Discussed risks, benefits, and alternatives with patient.    Patient reason for declining turns, SCD treatment: pain     The following provider was notified of patient's intent to refuse: Hospitalist    Feedback from provider: Dilaudid X1, education     Alternative interventions used in the meantime: educated on importance of turns and wearing SCDs. Pt refusing and agitated     
Family member called twice about dc time first stating 1500 then 1700. Pt son called ivan and paid for meds.  RN picked up meds and gave to son upon his arrival.  Pt iv removed, all dc paperwork was reviewed with both the pt and her son.  Pt was put into a wheel chair and wheeled out to the front where her son picked her up.  DC time is 1800.      
Occupational Therapy    Chart reviewed. OT attempted to see patient. Pt declined OT, pt reports she \"already did therapy.\" Pt reports not feeling well. Will follow up as schedule allows.     Dorinda Pulido, OT  
Occupational Therapy  Facility/Department: [facility] C2 CARD TELEMETRY  Daily Treatment Note  NAME: Nithya Neff  : 1940  MRN: [id number]    Date of Service: 2025    Discharge Recommendations:  24 hour supervision or assist, Home with Home health OT, S Level 1    Therapy discharge recommendations are subject to collaboration from the patient’s interdisciplinary healthcare team, including MD and case management recommendations.    Nithya Neff scored a 19/24 on the AM-PAC ADL Inpatient form. Current research shows that an AM-PAC score of 18 or greater is typically associated with a discharge to the patient's home setting. Based on the patient's AM-PAC score, and their current ADL deficits, it is recommended that the patient have 2-3 sessions per week of Occupational Therapy at d/c to increase the patient's independence.  At this time, this patient demonstrates the endurance and safety to discharge home with home based OT (home vs OP services) and a follow up treatment frequency of 2-3x/wk.   Please see assessment section for further patient specific details.    If pt is unable to be seen after this session, please let this note serve as discharge summary.  Please see case management note for discharge disposition.  Thank you.       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     Assessment   Assessment: Pt seen for follow up OT session this date.  Pt completed bed mobility supervision. Progressed to SBA for stand/step from EOB>BSC to void bladder and complete pericare. Ambulated to bathroom CGA with Rolator. Brushed hair in stance at sink but requires urgent stand/step to standard commode due to elevated HR at 122BPM. Nurse practitioner in room and aware. Pt able to recover within 1-2 min in sitting. Ambulated back to recliner with Rolator CGA. Nurse and nurse practitioner in room. Pt at EOS left with all needs met, call light in reach, and chair alarm engaged. Pt would 
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  Past Medical History:  has a past medical history of MARIO (acute kidney injury), Altered mental status, Arthritis, BCC (basal cell carcinoma of skin), Bladder infection, Blurred vision, CAD (coronary artery disease), Cancer (HCC), CHF (congestive heart failure) (HCC), Chronic back pain, Closed head injury, COPD (chronic obstructive pulmonary disease) (HCC), Depression, Depression, Hypercholesteremia, Hypertension, Hypokalemia, Pain in shoulder, Painful total knee replacement, initial encounter, Reflux, Rheumatic fever, Sleep apnea, Syncope and collapse, TIA (transient ischemic attack), Urinary incontinence, Urinary tract infection in female, and Wears glasses.  Past Surgical History:  has a past surgical history that includes bladder repair; Hysterectomy; Neck surgery; shoulder surgery; Carpal tunnel release; Tonsillectomy; Appendectomy; Upper gastrointestinal endoscopy (11); Spine surgery (); Spinal fixation surgery with implant (); Spinal fixation surgery with implant (); Spinal fixation surgery with implant (); joint replacement; Coronary angioplasty with stent; Cardiac surgery; Skin cancer excision; Colonoscopy; Upper gastrointestinal endoscopy; other surgical history (12); Cholecystectomy, laparoscopic (2017); Upper gastrointestinal endoscopy (2017); Diagnostic Cardiac Cath Lab Procedure; Aortic valve replacement; IR MIDLINE CATH (10/16/2023); Upper gastrointestinal endoscopy (N/A, 2025); Colonoscopy (N/A, 2025); and vascular surgery (N/A, 2025).     
Patient admitted to room 450 from [facility] as a direct admit.  Patient oriented to room, call light, bed rails, phone, lights and bathroom.  Patient instructed about the schedule of the day including: vital sign frequency, lab draws, possible tests, frequency of MD and staff rounds, including RN/MD rounding together at bedside, daily weights, and I &O's.  Patient instructed about prescribed diet, how to use 8MENU, and television.  Bed alarm in place, patient aware of placement and reason.  Telemetry box 54 in place, patient aware of placement and reason.  Bed locked, in lowest position, side rails up 2/4, call light within reach.  Will continue to monitor.     
Patient to [facility], [facility] aware.   
Physical Therapy  Facility/Department: [facility] C2 CARD TELEMETRY  Daily Treatment Note  NAME: Nithya Neff  : 1940  MRN: [id number]    Date of Service: 2025    Discharge Recommendations:  24 hour supervision or assist, Home with Home health PT   PT Equipment Recommendations  Equipment Needed: No  Other: Pt already owns RW and rollator for home use    Patient Diagnosis(es): The encounter diagnosis was Mesenteric artery stenosis.    Assessment  Assessment: Pt participated well when seen for PT follow-up this date.  Pt progressing well since last seen by PT, now performing functional mobility tasks with SBA and ambulating distances up to 40 feet with support of rollator.  Pt self-limits amb distance 2* c/o fatigue, although could likely ambulate further.  Pt still somewhat limited in overall endurance and level of (I) with mobility compared to baseline LOF.  Given improved LOF during PT session this date and availability of 24-hr sup/assist from family, therapist now feels pt can safely return home at D/C.  Recommend 24-hr sup/assist from family and home PT services.  Activity Tolerance: Patient tolerated treatment well;Patient limited by fatigue;Patient limited by endurance  Equipment Needed: No  Other: Pt already owns RW and rollator for home use    Plan  Physical Therapy Plan  General Plan: 3-5 times per week  Specific Instructions for Next Treatment: Progress ther ex and mobility as tolerated  Current Treatment Recommendations: Strengthening;Balance training;Functional mobility training;Transfer training;Gait training;Neuromuscular re-education;Home exercise program;Safety education & training;Therapeutic activities;Co-Treatment;Patient/Caregiver education & training;Endurance training    Restrictions  Restrictions/Precautions  Restrictions/Precautions: General Precautions, Fall Risk  Activity Level: Up with Assist  Position Activity Restriction  Other Position/Activity Restrictions: IV lines, telemetry 
Physical Therapy  Facility/Department: [facility] C2 CARD TELEMETRY  Daily Treatment Note  NAME: Nithya Neff  : 1940  MRN: [id number]    Date of Service: 2025    Discharge Recommendations:  Subacute/Skilled Nursing Facility   PT Equipment Recommendations  Equipment Needed: No  Other: Pt already owns RW and rollator for home use    Therapy discharge recommendations are subject to collaboration from the patient’s interdisciplinary healthcare team, including MD and case management recommendations.    Barriers to Home Discharge:   [] Steps to access home entry or bed/bath:   [x] Unable to transfer, ambulate, or propel wheelchair household distances without assist   [] Limited available assist at home upon discharge    [] Patient or family requests d/c to post-acute facility    [] Poor cognition/safety awareness for d/c to home alone    [] Unable to maintain ordered weight bearing status    [] Patient with salient signs of long-standing immobility   [x] Decreased independence with ADLs   [x] Increased risk for falls    If pt is unable to be seen after this session, please let this note serve as discharge summary.  Please see case management note for discharge disposition.  Thank you.    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    Assessment  Assessment: Pt participated well when seen for PT follow-up this date. Pt seen in conjunction with OT for co-treat session in light of current deficits, in order to safely progress functional mobility. Pt continues to demonstrate difficulties with mobility tasks compared to baseline, today requiring Carlee x 1 for stand-pivot transfers and min/CGA x 1 to amb a total of 10 feet with rollator. Pt limited in amb distance by fatigue, weakness, and worsening nausea. Pt would benefit from continued skilled PT in acute setting to improve strength and mobility and return to baseline LOF. Recommend SNF at D/C; however, pt is currently refusing. If 
Physical Therapy  Facility/Department: [facility] C2 CARD TELEMETRY  Physical Therapy Initial Assessment    Name: Nithya Neff  : 1940  MRN: [id number]  Date of Service: 2025    Discharge Recommendations:  Continue to assess pending progress   PT Equipment Recommendations  Equipment Needed: No  Other: CTA    Therapy discharge recommendations are subject to collaboration from the patient’s interdisciplinary healthcare team, including MD and case management recommendations.    Barriers to Home Discharge:   [] Steps to access home entry or bed/bath:   [x] Unable to transfer, ambulate, or propel wheelchair household distances without assist   [] Limited available assist at home upon discharge    [] Patient or family requests d/c to post-acute facility    [] Poor cognition/safety awareness for d/c to home alone    [] Unable to maintain ordered weight bearing status    [x] Patient with salient signs of long-standing immobility   [x] Decreased independence with ADLs   [x] Increased risk for falls   [] Other:    If pt is unable to be seen after this session, please let this note serve as discharge summary.  Please see case management note for discharge disposition.  Thank you.        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  Past Medical History:  has a past medical history of MARIO (acute kidney injury), Altered mental status, Arthritis, BCC (basal cell carcinoma of skin), Bladder infection, Blurred vision, CAD (coronary artery disease), Cancer (HCC), CHF (congestive heart failure) ([facility]), Chronic back pain, Closed head injury, COPD (chronic obstructive pulmonary disease) ([facility]), Depression, Depression, Hypercholesteremia, Hypertension, Hypokalemia, Pain in shoulder, Painful total knee replacement, initial encounter, Reflux, Rheumatic fever, Sleep apnea, Syncope and collapse, TIA (transient ischemic attack), Urinary incontinence, Urinary tract infection in female, and Wears 
Pt currently has no IV access. An order was obtained for a midline. PICC nurse called and stated there is only one on call nurse for tonight and they will be here as soon as they can .  
Pt very anxious before surgery, states \"has had other surgeries and wasn't scared, but I am scared about this surgery\".  Offered to call  for prayer before surgery.  Pt states would like this.    
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     Most recent vitals: BP (!) 120/50   Pulse 87   Temp 98 °F (36.7 °C) (Oral)   Resp 17   Ht 1.524 m (5')   Wt 50.5 kg (111 lb 5.3 oz)   LMP  (LMP Unknown)   SpO2 97%   BMI 21.74 kg/m²      Drip rates at handoff:    sodium chloride Stopped (06/22/25 0142)       Current O2:   [x] Room Air   [] NC 1 L   [] BiPaP    [] Vented  % Fi02,  Peep    Hospital Course:  ICU Day # 1 (6/19/25)  - admit to ICU from Surgery  - PCA pump keeping pain under control    ICU Day # 1 (6/19/25) Nights:  - Low urine output, UOP = 234 ml  - Paged nephro x2  - PCA pump continued  - x1 BM    ICU Days #2 (6/20/25)  -low uop cont = 500ml bolus, IVF's increased to 125ml/hr per vascular   -Nephro ok w/vascular changes  -peggy/ngt removed = adv to clear liquid diet d/t no nausea  -Vascular concerned for overload;CVP reading 10 = stopped all IVF's  -CVP reading inaccurate = -2 = vascular notified, nephro notified = ok to give Demadex & restart IVF's @50ml/hr   -Nitro @10mcg/min  -UOP 460ml (20 torsemide given @1658)    ICU Days #2 (6/20/25) Nights:  - UOP: 975 ml  - Mag replaced    ICU Days #3 6/21/25 Days  - Mag replaced.   - Phos replaced.   - PCA off, PRN oral pain meds  - Up to chair w/ PT/OT  - Nitro off.   - O2 off.   - Advance diet    ICU Days #4 6/22/25  - K replaced  - Prince out   - PRN Zofran for Nausea     ICU Days #4 6/22-23/25 Night shift  -Pain meds given with evening meds  -Pivot to bedside commode several times  -No significant events    ICU Days #5 6/23/25  - RIJ removed   - 7 seconds V tach when up to commode   - 40mEq K replaced and 2g mag replaced  - Dilaudid 0.25mg once   - Corazon PRN given   - up to chair for 2 hours     ICU Day #5 6/23/25 Nights  - PRNs: 10 mg corazon, and 0.25 mg dilaudid x2 given    ICU Days #6 6/24/25  - KUB for abdominal pain, no acute abnormalities   - K and mag replaced  - 0.5 mg 
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     Most recent vitals: BP (!) 122/44   Pulse 82   Temp 98 °F (36.7 °C) (Oral)   Resp 16   Ht 1.524 m (5')   Wt 50.5 kg (111 lb 5.3 oz)   LMP  (LMP Unknown)   SpO2 97%   BMI 21.74 kg/m²      Drip rates at handoff:    sodium chloride Stopped (06/22/25 0142)       Current O2:   [x] Room Air   [] NC 1 L   [] BiPaP    [] Vented  % Fi02,  Peep    Hospital Course:  ICU Day # 1 (6/19/25)  - admit to ICU from Surgery  - PCA pump keeping pain under control    ICU Day # 1 (6/19/25) Nights:  - Low urine output, UOP = 234 ml  - Paged nephro x2  - PCA pump continued  - x1 BM    ICU Days #2 (6/20/25)  -low uop cont = 500ml bolus, IVF's increased to 125ml/hr per vascular   -Nephro ok w/vascular changes  -peggy/ngt removed = adv to clear liquid diet d/t no nausea  -Vascular concerned for overload;CVP reading 10 = stopped all IVF's  -CVP reading inaccurate = -2 = vascular notified, nephro notified = ok to give Demadex & restart IVF's @50ml/hr   -Nitro @10mcg/min  -UOP 460ml (20 torsemide given @1658)    ICU Days #2 (6/20/25) Nights:  - UOP: 975 ml  - Mag replaced    ICU Days #3 6/21/25 Days  - Mag replaced.   - Phos replaced.   - PCA off, PRN oral pain meds  - Up to chair w/ PT/OT  - Nitro off.   - O2 off.   - Advance diet    ICU Days #4 6/22/25  - K replaced  - Prince out   - PRN Zofran for Nausea     ICU Days #4 6/22-23/25 Night shift  -Pain meds given with evening meds  -Pivot to bedside commode several times  -No significant events    ICU Days #5 6/23/25  - RIJ removed   - 7 seconds V tach when up to commode   - 40mEq K replaced and 2g mag replaced  - Dilaudid 0.25mg once   - Corazon PRN given   - up to chair for 2 hours     ICU Day #5 6/23/25 Nights  - PRNs: 10 mg corazon, and 0.25 mg dilaudid x2 given    ICU Days #6 6/24/25  - KUB for abdominal pain, no acute abnormalities   - K and mag replaced  - 0.5 mg 
Shift: 0543-3914  Reason for ICU Admission: Chronic mesenteric ischemia with superior mesenteric artery occlusion     Most recent vitals: BP (!) 142/53   Pulse 64   Temp 98.2 °F (36.8 °C) (Oral)   Resp 13   Ht 1.524 m (5')   Wt 49 kg (108 lb 0.4 oz)   LMP  (LMP Unknown)   SpO2 98%   BMI 21.10 kg/m²      Drip rates at handoff:    sodium chloride Stopped (06/22/25 0142)    nitroGLYCERIN Stopped (06/21/25 0817)       Current O2:   [x] Room Air   [] NC 1 L   [] BiPaP    [] Vented  % Fi02,  Peep    Hospital Course:  ICU Day # 1 (6/19/25)  - admit to ICU from Surgery  - PCA pump keeping pain under control    ICU Day # 1 (6/19/25) Nights:  - Low urine output, UOP = 234 ml  - Paged nephro x2  - PCA pump continued  - x1 BM    ICU Days #2 (6/20/25)  -low uop cont = 500ml bolus, IVF's increased to 125ml/hr per vascular   -Nephro ok w/vascular changes  -peggy/ngt removed = adv to clear liquid diet d/t no nausea  -Vascular concerned for overload;CVP reading 10 = stopped all IVF's  -CVP reading inaccurate = -2 = vascular notified, nephro notified = ok to give Demadex & restart IVF's @50ml/hr   -Nitro @10mcg/min  -UOP 460ml (20 torsemide given @1658)    ICU Days #2 (6/20/25) Nights:  - UOP: 975 ml  - Mag replaced    ICU Days #3 6/21/25 Days  - Mag replaced.   - Phos replaced.   - PCA off, PRN oral pain meds  - Up to chair w/ PT/OT  - Nitro off.   - O2 off.   - Advance diet    ICU Days #4 6/22/25  - K replaced  - Prince out   - PRN Zofran for Nausea     ICU Days #4 6/22-23/25 Night shift  -Pain meds given with evening meds  -Pivot to bedside commode several times  -No significant events    ICU Days #5 6/23/25  - RIJ removed   - 7 seconds V tach when up to commode   - 40mEq K replaced and 2g mag replaced  - Dilaudid 0.25mg once   - Corazon PRN given   - up to chair for 2 hours     ICU Day #5 6/23/25 Nights  - PRNs: 10 mg corazon, and 0.25 mg dilaudid x2 given    ICU Days #6 6/24/25  - KUB for abdominal pain, no acute abnormalities 
Shift: 1900 - 0700   Reason for ICU Admission: Chronic mesenteric ischemia with superior mesenteric artery occlusion     Most recent vitals: BP (!) 148/53   Pulse 70   Temp 98.2 °F (36.8 °C) (Oral)   Resp 11   Ht 1.524 m (5')   Wt 49 kg (108 lb 0.4 oz)   LMP  (LMP Unknown)   SpO2 97%   BMI 21.10 kg/m²      Drip rates at handoff:    sodium chloride Stopped (06/22/25 0142)    nitroGLYCERIN Stopped (06/21/25 0817)       Current O2:   [x] Room Air   [] NC 1 L   [] BiPaP    [] Vented  % Fi02,  Peep    Hospital Course:  ICU Day # 1 (6/19/25)  - admit to ICU from Surgery  - PCA pump keeping pain under control    ICU Day # 1 (6/19/25) Nights:  - Low urine output, UOP = 234 ml  - Paged nephro x2  - PCA pump continued  - x1 BM    ICU Days #2 (6/20/25)  -low uop cont = 500ml bolus, IVF's increased to 125ml/hr per vascular   -Nephro ok w/vascular changes  -peggy/ngt removed = adv to clear liquid diet d/t no nausea  -Vascular concerned for overload;CVP reading 10 = stopped all IVF's  -CVP reading inaccurate = -2 = vascular notified, nephro notified = ok to give Demadex & restart IVF's @50ml/hr   -Nitro @10mcg/min  -UOP 460ml (20 torsemide given @1658)    ICU Days #2 (6/20/25) Nights:  - UOP: 975 ml  - Mag replaced    ICU Days #3 6/21/25 Days  - Mag replaced.   - Phos replaced.   - PCA off, PRN oral pain meds  - Up to chair w/ PT/OT  - Nitro off.   - O2 off.   - Advance diet    ICU Days #4 6/22/25  - K replaced  - Prince out   - PRN Zofran for Nausea     ICU Days #4 6/22-23/25 Night shift  -Pain meds given with evening meds  -Pivot to bedside commode several times  -No significant events    ICU Days #5 6/23/25  - RIJ removed   - 7 seconds V tach when up to commode   - 40mEq K replaced and 2g mag replaced  - Dilaudid 0.25mg once   - Corazon PRN given   - up to chair for 2 hours     ICU Day #5 6/23/25 Nights  - PRNs: 10 mg corazon, and 0.25 mg dilaudid x2 given    ICU Days #6 6/24/25  - KUB for abdominal pain, no acute 
Shift: 1900 - 0700  Reason for ICU Admission: Chronic mesenteric ischemia with superior mesenteric artery occlusion     Most recent vitals: BP (!) 131/44   Pulse 58   Temp 98.1 °F (36.7 °C) (Oral)   Resp 10   Ht 1.524 m (5')   Wt 49 kg (108 lb 0.4 oz)   LMP  (LMP Unknown)   SpO2 95%   BMI 21.10 kg/m²      Drip rates at handoff:    sodium chloride Stopped (06/22/25 0142)    nitroGLYCERIN Stopped (06/21/25 0817)       Current O2:   [x] Room Air   [] NC 1 L   [] BiPaP    [] Vented  % Fi02,  Peep    Hospital Course:  ICU Day # 1 (6/19/25)  - admit to ICU from Surgery  - PCA pump keeping pain under control    ICU Day # 1 (6/19/25) Nights:  - Low urine output, UOP = 234 ml  - Paged nephro x2  - PCA pump continued  - x1 BM    ICU Days #2 (6/20/25)  -low uop cont = 500ml bolus, IVF's increased to 125ml/hr per vascular   -Nephro ok w/vascular changes  -peggy/ngt removed = adv to clear liquid diet d/t no nausea  -Vascular concerned for overload;CVP reading 10 = stopped all IVF's  -CVP reading inaccurate = -2 = vascular notified, nephro notified = ok to give Demadex & restart IVF's @50ml/hr   -Nitro @10mcg/min  -UOP 460ml (20 torsemide given @1658)    ICU Days #2 (6/20/25) Nights:  - UOP: 975 ml  - Mag replaced    ICU Days #3 6/21/25 Days  - Mag replaced.   - Phos replaced.   - PCA off, PRN oral pain meds  - Up to chair w/ PT/OT  - Nitro off.   - O2 off.   - Advance diet    ICU Days #4 6/22/25  - K replaced  - Prince out   - PRN Zofran for Nausea     ICU Days #4 6/22-23/25 Night shift  -Pain meds given with evening meds  -Pivot to bedside commode several times  -No significant events    ICU Days #5 6/23/25  - RIJ removed   - 7 seconds V tach when up to commode   - 40mEq K replaced and 2g mag replaced  - Dilaudid 0.25mg once   - Corazon PRN given   - up to chair for 2 hours     ICU Day #5 6/23/25 Nights  - PRNs: 10 mg corazon, and 0.25 mg dilaudid x2 given    
Shift: 9266-3599    Reason for ICU Admission: Chronic mesenteric ischemia with superior mesenteric artery occlusion     Most recent vitals: BP (!) 154/91   Pulse 84   Temp 98 °F (36.7 °C) (Oral)   Resp 17   Ht 1.524 m (5')   Wt 56.9 kg (125 lb 7.1 oz)   LMP  (LMP Unknown)   SpO2 100%   BMI 24.50 kg/m²      Drip rates at handoff:    sodium chloride Stopped (06/22/25 0142)    nitroGLYCERIN Stopped (06/21/25 0817)       Current O2:   [x] Room Air   [] NC 1 L   [] BiPaP    [] Vented  % Fi02,  Peep    Hospital Course:  ICU Day # 1 (6/19/25)  - admit to ICU from Surgery  - PCA pump keeping pain under control    ICU Day # 1 (6/19/25) Nights:  - Low urine output, UOP = 234 ml  - Paged nephro x2  - PCA pump continued  - x1 BM    ICU Days #2 (6/20/25)  -low uop cont = 500ml bolus, IVF's increased to 125ml/hr per vascular   -Nephro ok w/vascular changes  -peggy/ngt removed = adv to clear liquid diet d/t no nausea  -Vascular concerned for overload;CVP reading 10 = stopped all IVF's  -CVP reading inaccurate = -2 = vascular notified, nephro notified = ok to give Demadex & restart IVF's @50ml/hr   -Nitro @10mcg/min  -UOP 460ml (20 torsemide given @1658)    ICU Days #2 (6/20/25) Nights:  - UOP: 975 ml  - Mag replaced    ICU Days #3 6/21/25 Days  - Mag replaced.   - Phos replaced.   - PCA off, PRN oral pain meds  - Up to chair w/ PT/OT  - Nitro off.   - O2 off.   - Advance diet    ICU Days #4 6/22/25  - K replaced  - Prince out   - PRN Zofran for Nausea     ICU Days #4 6/22-23/25 Night shift  -Pain meds given with evening meds  -Pivot to bedside commode several times  -No significant events    ICU Days #5 6/23/25  - RIJ removed   - 7 seconds V tach when up to commode   - 40mEq K replaced and 2g mag replaced  - Dilaudid 0.25mg once   - Joanne PRN given   - up to chair for 2 hours   
Shift: Day    Reason for ICU Admission: Chronic mesenteric ischemia with superior mesenteric artery occlusion     Most recent vitals: BP (!) 131/54   Pulse 70   Temp 99.1 °F (37.3 °C) (Oral)   Resp 13   Ht 1.524 m (5')   Wt 51.9 kg (114 lb 6.4 oz)   LMP  (LMP Unknown)   SpO2 96%   BMI 22.34 kg/m²      Drip rates at handoff:    lactated ringers 75 mL/hr at 06/19/25 1643    sodium chloride      nitroGLYCERIN 30 mcg/min (06/19/25 1643)    morphine         Current O2:   [] Room Air   [x] NC 1 L   [] BiPaP    [] Vented  % Fi02,  Peep    Hospital Course:  ICU Day # 1 (6/19/25)  - admit to ICU from Surgery  - PCA pump keeping pain under control    
Shift: Days 6/20/25    Reason for ICU Admission: Chronic mesenteric ischemia with superior mesenteric artery occlusion     Most recent vitals: /89   Pulse 73   Temp 98.7 °F (37.1 °C) (Bladder)   Resp 12   Ht 1.524 m (5')   Wt 56.7 kg (125 lb)   LMP  (LMP Unknown)   SpO2 98%   BMI 24.41 kg/m²      Drip rates at handoff:    lactated ringers 50 mL/hr at 06/20/25 1826    sodium chloride      nitroGLYCERIN 10 mcg/min (06/20/25 1826)    morphine         Current O2:   [] Room Air   [x] NC 1 L   [] BiPaP    [] Vented  % Fi02,  Peep    Hospital Course:  ICU Day # 1 (6/19/25)  - admit to ICU from Surgery  - PCA pump keeping pain under control    ICU Day # 1 (6/19/25) Nights:  - Low urine output, UOP = 234 ml  - Paged nephro x2  - PCA pump continued  - x1 BM    ICU Days #2 (6/20/25)  -low uop cont = 500ml bolus, IVF's increased to 125ml/hr per vascular   -Nephro ok w/vascular changes  -peggy/ngt removed = adv to clear liquid diet d/t no nausea  -Vascular concerned for overload;CVP reading 10 = stopped all IVF's  -CVP reading inaccurate = -2 = vascular notified, nephro notified = ok to give Demadex & restart IVF's @50ml/hr   -Nitro @10mcg/min  -UOP 460ml (20 torsemide given @1658)    ICU Days #2 (6/20/25) Nights:  - UOP: 975 ml  - Mag replaced    
Shift: Days 6/20/25    Reason for ICU Admission: Chronic mesenteric ischemia with superior mesenteric artery occlusion     Most recent vitals: BP (!) 134/56   Pulse 69   Temp 98.2 °F (36.8 °C) (Oral)   Resp 12   Ht 1.524 m (5')   Wt 56.7 kg (125 lb)   LMP  (LMP Unknown)   SpO2 92%   BMI 24.41 kg/m²      Drip rates at handoff:    lactated ringers 50 mL/hr at 06/20/25 1826    sodium chloride      nitroGLYCERIN 10 mcg/min (06/20/25 1826)    morphine         Current O2:   [] Room Air   [x] NC 1 L   [] BiPaP    [] Vented  % Fi02,  Peep    Hospital Course:  ICU Day # 1 (6/19/25)  - admit to ICU from Surgery  - PCA pump keeping pain under control    ICU Day # 1 (6/19/25) Nights:  - Low urine output, UOP = 234 ml  - Paged nephro x2  - PCA pump continued  - x1 BM    ICU Days #2 (6/20/25)  -low uop cont = 500ml bolus, IVF's increased to 125ml/hr per vascular   -Nephro ok w/vascular changes  -peggy/ngt removed = adv to clear liquid diet d/t no nausea  -Vascular concerned for overload;CVP reading 10 = stopped all IVF's  -CVP reading inaccurate = -2 = vascular notified, nephro notified = ok to give Demadex & restart IVF's @50ml/hr   -Nitro @10mcg/min  -UOP 460ml (20 torsemide given @1658)    
Shift: Days 6/20/25    Reason for ICU Admission: Chronic mesenteric ischemia with superior mesenteric artery occlusion     Most recent vitals: BP (!) 150/53   Pulse 80   Temp 98.5 °F (36.9 °C) (Bladder)   Resp 17   Ht 1.524 m (5')   Wt 57.3 kg (126 lb 5.2 oz)   LMP  (LMP Unknown)   SpO2 98%   BMI 24.67 kg/m²      Drip rates at handoff:    lactated ringers 50 mL/hr at 06/21/25 1252    sodium chloride Stopped (06/21/25 0532)    nitroGLYCERIN 5 mcg/min (06/21/25 0618)       Current O2:   [x] Room Air   [] NC 1 L   [] BiPaP    [] Vented  % Fi02,  Peep    Hospital Course:  ICU Day # 1 (6/19/25)  - admit to ICU from Surgery  - PCA pump keeping pain under control    ICU Day # 1 (6/19/25) Nights:  - Low urine output, UOP = 234 ml  - Paged nephro x2  - PCA pump continued  - x1 BM    ICU Days #2 (6/20/25)  -low uop cont = 500ml bolus, IVF's increased to 125ml/hr per vascular   -Nephro ok w/vascular changes  -peggy/ngt removed = adv to clear liquid diet d/t no nausea  -Vascular concerned for overload;CVP reading 10 = stopped all IVF's  -CVP reading inaccurate = -2 = vascular notified, nephro notified = ok to give Demadex & restart IVF's @50ml/hr   -Nitro @10mcg/min  -UOP 460ml (20 torsemide given @1658)    ICU Days #2 (6/20/25) Nights:  - UOP: 975 ml  - Mag replaced    ICU Days #3 6/21/25 Days  - Mag replaced.   - Phos replaced.   - PCA off, PRN oral pain meds  - Up to chair w/ PT/OT  - Nitro off.   - O2 off.   - Advance diet  
Shift: Days 6/21/25    Reason for ICU Admission: Chronic mesenteric ischemia with superior mesenteric artery occlusion     Most recent vitals: BP (!) 120/47   Pulse 74   Temp 98.9 °F (37.2 °C) (Bladder)   Resp 12   Ht 1.524 m (5')   Wt 56.9 kg (125 lb 7.1 oz)   LMP  (LMP Unknown)   SpO2 95%   BMI 24.50 kg/m²      Drip rates at handoff:    sodium chloride 5 mL/hr at 06/21/25 1428    nitroGLYCERIN Stopped (06/21/25 0817)       Current O2:   [x] Room Air   [] NC 1 L   [] BiPaP    [] Vented  % Fi02,  Peep    Hospital Course:  ICU Day # 1 (6/19/25)  - admit to ICU from Surgery  - PCA pump keeping pain under control    ICU Day # 1 (6/19/25) Nights:  - Low urine output, UOP = 234 ml  - Paged nephro x2  - PCA pump continued  - x1 BM    ICU Days #2 (6/20/25)  -low uop cont = 500ml bolus, IVF's increased to 125ml/hr per vascular   -Nephro ok w/vascular changes  -peggy/ngt removed = adv to clear liquid diet d/t no nausea  -Vascular concerned for overload;CVP reading 10 = stopped all IVF's  -CVP reading inaccurate = -2 = vascular notified, nephro notified = ok to give Demadex & restart IVF's @50ml/hr   -Nitro @10mcg/min  -UOP 460ml (20 torsemide given @1658)    ICU Days #2 (6/20/25) Nights:  - UOP: 975 ml  - Mag replaced    ICU Days #3 6/21/25 Days  - Mag replaced.   - Phos replaced.   - PCA off, PRN oral pain meds  - Up to chair w/ PT/OT  - Nitro off.   - O2 off.   - Advance diet    ICU Days #4 6/22/25  - K replaced  - Prince out   - PRN Zofran for Nausea   
Shift: Night    Reason for ICU Admission: Chronic mesenteric ischemia with superior mesenteric artery occlusion     Most recent vitals: BP (!) 158/60   Pulse 71   Temp 98.4 °F (36.9 °C) (Oral)   Resp 16   Ht 1.524 m (5')   Wt 56.7 kg (125 lb)   LMP  (LMP Unknown)   SpO2 (!) 74%   BMI 24.41 kg/m²      Drip rates at handoff:    lactated ringers 75 mL/hr at 06/20/25 0310    sodium chloride      nitroGLYCERIN 15 mcg/min (06/20/25 0310)    morphine         Current O2:   [] Room Air   [x] NC 1 L   [] BiPaP    [] Vented  % Fi02,  Peep    Hospital Course:  ICU Day # 1 (6/19/25)  - admit to ICU from Surgery  - PCA pump keeping pain under control    ICU Day # 1 (6/19/25) Nights:  - Low urine output, UOP = 234 ml  - Paged nephro x2  - PCA pump continued  - x1 BM    
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     Most recent vitals: BP (!) 140/43   Pulse 68   Temp 98.8 °F (37.1 °C)   Resp 14   Ht 1.524 m (5')   Wt 56.9 kg (125 lb 7.1 oz)   LMP  (LMP Unknown)   SpO2 92%   BMI 24.50 kg/m²      Drip rates at handoff:    sodium chloride Stopped (06/22/25 0142)    nitroGLYCERIN Stopped (06/21/25 0817)       Current O2:   [x] Room Air   [] NC 1 L   [] BiPaP    [] Vented  % Fi02,  Peep    Hospital Course:  ICU Day # 1 (6/19/25)  - admit to ICU from Surgery  - PCA pump keeping pain under control    ICU Day # 1 (6/19/25) Nights:  - Low urine output, UOP = 234 ml  - Paged nephro x2  - PCA pump continued  - x1 BM    ICU Days #2 (6/20/25)  -low uop cont = 500ml bolus, IVF's increased to 125ml/hr per vascular   -Nephro ok w/vascular changes  -peggy/ngt removed = adv to clear liquid diet d/t no nausea  -Vascular concerned for overload;CVP reading 10 = stopped all IVF's  -CVP reading inaccurate = -2 = vascular notified, nephro notified = ok to give Demadex & restart IVF's @50ml/hr   -Nitro @10mcg/min  -UOP 460ml (20 torsemide given @1658)    ICU Days #2 (6/20/25) Nights:  - UOP: 975 ml  - Mag replaced    ICU Days #3 6/21/25 Days  - Mag replaced.   - Phos replaced.   - PCA off, PRN oral pain meds  - Up to chair w/ PT/OT  - Nitro off.   - O2 off.   - Advance diet    ICU Days #4 6/22/25  - K replaced  - Prince out   - PRN Zofran for Nausea     ICU Days #4 6/22-23/25 Night shift  -Pain meds given with evening meds  -Pivot to bedside commode several times  -No significant events  
Spoke to nephro not up to date with current patient was told to differ to day shift nephrology.   
Spoke with Dr. Quinn received verbal ok for midline placement. Via telephone.  
The patient has decided to refuse aspects of her care.   The patient has normal mental status and adequate capacity to make medical decisions.  The patient refuses the following medical treatments/interventions Q2 Turns and SCDs.  The risks have been explained to the patient, including worsening illness, chronic pain, permanent disability, and death.  The benefits of treatment have also been explained, including the availability and proximity of nurses, physicians, monitoring, diagnostic testing, and treatment.   
The patient has decided to refuse aspects of her care.   The patient has normal mental status and adequate capacity to make medical decisions.  The patient refuses the following medical treatments/interventions turns, CHG bath, and bath.  The risks have been explained to the patient, including worsening illness, chronic pain, permanent disability, and death.  The benefits of treatment have also been explained, including the availability and proximity of nurses, physicians, monitoring, diagnostic testing, and treatment.   
Vascular    C/o abdominal pain this am.    Vital stable  Labs reviewed- WBC normal, Bicarb normal.  K slightly low.   Will check KUB.     
Vascular    SMA bypass this am.  All questions/concerns answered and addressed.  No change H and P  
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  Denies nausea or vomiting.  Feels hungry.  Small flatus.  No BM.  No belching.  On PPI.   Has not been out of bed since surgery but agreeable to this today.  Forgets to use PCA.     OBJECTIVE  Physical  CURRENT VITALS:  BP (!) 155/64   Pulse 72   Temp 98.3 °F (36.8 °C) (Bladder)   Resp 25   Ht 1.524 m (5')   Wt 57.3 kg (126 lb 5.2 oz)   LMP  (LMP Unknown)   SpO2 100%   BMI 24.67 kg/m²   TEMPERATURE:  Current - Temp: 98.3 °F (36.8 °C); Max - Temp  Av.3 °F (36.8 °C)  Min: 98 °F (36.7 °C)  Max: 98.7 °F (37.1 °C)    General:  Elderly, frail appearing WF.  Appears a little uncomfortable due to pain when she moves.  Otherwise, no distress  Heart:  RRR, NSR on monitor.  Lungs:  clear but diminished at bases.  No wheezes or cough.  Abdomen:  appropriately tender to palpation.  Non-distended.     Data  Component      Latest Ref Rng 2025   WBC      4.0 - 11.0 K/uL 11.4 (H)  13.2 (H)    RBC      4.00 - 5.20 M/uL 3.47 (L)  3.55 (L)    Hemoglobin Quant      12.0 - 16.0 g/dL 9.0 (L)  9.3 (L)    Hematocrit      36.0 - 48.0 % 28.1 (L)  28.9 (L)    MCV      80.0 - 100.0 fL 81.2  81.6    MCH      26.0 - 34.0 pg 26.0  26.2    MCHC      31.0 - 36.0 g/dL 32.0  32.1    RDW      12.4 - 15.4 % 16.5 (H)  16.9 (H)    Platelet Count      135 - 450 K/uL 210  242    MPV      5.0 - 10.5 fL 6.4  6.0    Neutrophils %      %  79.8    Lymphocyte %      %  13.6    Monocytes %      %  6.0    Eosinophils %      %  0.5    Basophils %      %  0.1    Neutrophils Absolute      1.7 - 7.7 K/uL  10.5 (H)    Lymphocytes Absolute      1.0 - 5.1 K/uL  1.8    Monocytes Absolute      0.0 - 1.3 K/uL  0.8    Eosinophils Absolute      0.0 - 0.6 K/uL  0.1    Basophils Absolute      0.0 - 0.2 K/uL  0.0    Sodium      136 - 145 mmol/L 142  141    Potassium      
Only     Other reaction(s): Unknown  Other reaction(s): Unknown  Other reaction(s): Unknown      Azithromycin     Pentazocine Lactate     Cephalexin Hives, Itching and Rash     Other reaction(s): hives      Pentazocine Hives, Itching and Rash    Sulfa Antibiotics Hives, Itching and Rash     Sores all over  Other reaction(s): hives  Other reaction(s): hives      Tramadol Nausea And Vomiting         PHYSICAL EXAM   VITALS:  BP (!) 138/52   Pulse 80   Temp 97.9 °F (36.6 °C) (Oral)   Resp 16   Ht 1.524 m (5')   Wt 50.3 kg (111 lb)   LMP  (LMP Unknown)   SpO2 94%   BMI 21.68 kg/m²   TEMPERATURE:  Current - Temp: 97.9 °F (36.6 °C); Max - Temp  Av.8 °F (36.6 °C)  Min: 97.5 °F (36.4 °C)  Max: 98 °F (36.7 °C)    Physical Exam:  General appearance: alert, cooperative, no distress, appears stated age  Eyes: Anicteric  Head: Normocephalic, without obvious abnormality  Lungs: clear to auscultation bilaterally, Normal Effort  Heart: regular rate and rhythm, normal S1 and S2, no murmurs or rubs  Abdomen: soft, non-distended, non-tender. Bowel sounds normal. No masses,  no organomegaly.   Extremities: atraumatic, no cyanosis or edema  Skin: warm and dry, no jaundice  Neuro: Grossly intact, A&OX3    LABS AND IMAGING   Laboratory   Recent Labs     25  0555 25  0652   WBC 9.6 11.2* 9.7   HGB 7.6* 9.0* 8.0*   HCT 23.5* 28.0* 24.6*   MCV 78.4* 79.9* 79.2*    299 250     Recent Labs     25  1616 25  0555 25  0652    139 141 138   K 3.8 3.3* 3.6 3.6    101 100 102   CO2 20* 26 26 26   PHOS 2.7  --  1.8* 2.1*   BUN 66* 45* 35* 29*   CREATININE 1.9* 1.4* 1.5* 1.3*     Recent Labs     06/15/25  1547 25  1930 25  0652   AST 16 16 14*   ALT 12 10 8*   BILIDIR  --  0.2 0.2   BILITOT 0.3 0.3 <0.2   ALKPHOS 154* 169* 153*     No results for input(s): \"LIPASE\", \"AMYLASE\" in the last 72 hours.  No results for input(s): \"PROTIME\", \"INR\" in the 
Goals  Time Frame for Short Term Goals: 6/28 unless noted - goals ongoing 6/23  Short Term Goal 1: Pt will complete LB dressing with minimal assistance  Short Term Goal 2: Pt will complete toileting with minimal assistance  Short Term Goal 3: Pt will complete functional/ toilet transfer with CGA + LRAD  Short Term Goal 4: Pt will complete x2-3 standing grooming ADLs with CGA  Short Term Goal 5: Pt will perform x10 reps BUE therex for inc strengthening -- by 6/26  Patient Goals   Patient goals : \"to go home\"    AM-PAC - ADL  AM-PAC Daily Activity - Inpatient   How much help is needed for putting on and taking off regular lower body clothing?: Total  How much help is needed for bathing (which includes washing, rinsing, drying)?: A Lot  How much help is needed for toileting (which includes using toilet, bedpan, or urinal)?: Total  How much help is needed for putting on and taking off regular upper body clothing?: A Lot  How much help is needed for taking care of personal grooming?: A Little  How much help for eating meals?: A Little  AM-PAC Inpatient Daily Activity Raw Score: 12  AM-PAC Inpatient ADL T-Scale Score : 30.6  ADL Inpatient CMS 0-100% Score: 66.57  ADL Inpatient CMS G-Code Modifier : CL    Therapy Time   Individual Concurrent Group Co-treatment   Time In       1050   Time Out       1128   Minutes       38   Timed Code Treatment Minutes: 38 Minutes       Leonor Ny OT     
inc strengthening -- by 6/26  Patient Goals   Patient goals : \"to go home\"    AM-PAC - ADL  AM-PAC Daily Activity - Inpatient   How much help is needed for putting on and taking off regular lower body clothing?: A Little  How much help is needed for bathing (which includes washing, rinsing, drying)?: A Little  How much help is needed for toileting (which includes using toilet, bedpan, or urinal)?: A Little  How much help is needed for putting on and taking off regular upper body clothing?: A Little  How much help is needed for taking care of personal grooming?: A Little  How much help for eating meals?: None  AM-PAC Inpatient Daily Activity Raw Score: 19  AM-PAC Inpatient ADL T-Scale Score : 40.22  ADL Inpatient CMS 0-100% Score: 42.8  ADL Inpatient CMS G-Code Modifier : CK    Therapy Time   Individual Concurrent Group Co-treatment   Time In 0820         Time Out 0843         Minutes 23         Timed Code Treatment Minutes: 23 Minutes     Dana Crowley, OT     
05/10/2024 04:26 PM     Organism:   Lab Results   Component Value Date/Time    ORG Escherichia coli 12/05/2024 05:42 AM    ORG Klebsiella pneumoniae 12/05/2024 05:42 AM         CHASE KILPATRICK MD

## 2025-06-26 NOTE — CARE COORDINATION
CASE MANAGEMENT DISCHARGE SUMMARY      Discharge to: Home with  [facility] and [facility]     IMM given:11/24/25    New Durable Medical Equipment ordered/agency: n/a    Transportation:    Family/car: yes     Confirmed discharge plan with:     Patient: yes     Family:  yes/no    Name: Contact number:     CM notified [facility] of Discharge today.     RN, name: Lacie    Note: Discharging nurse to complete KINA, reconcile AVS, and place final copy with patient's discharge packet. RN to ensure that written prescriptions for  Level II medications are sent with patient to the facility as per protocol.      Marielle Shoemaker RN

## 2025-06-26 NOTE — PLAN OF CARE
Problem: Chronic Conditions and Co-morbidities  Goal: Patient's chronic conditions and co-morbidity symptoms are monitored and maintained or improved  6/18/2025 1519 by Brielle Chaney RN  Outcome: Progressing  6/18/2025 0500 by Darby Ramirez LPN  Outcome: Not Progressing     Problem: Discharge Planning  Goal: Discharge to home or other facility with appropriate resources  6/18/2025 1519 by Brielle Chaney RN  Outcome: Progressing  6/18/2025 0500 by Darby Ramirez LPN  Outcome: Not Progressing     Problem: Safety - Adult  Goal: Free from fall injury  6/18/2025 1519 by Brielle Chaney RN  Outcome: Progressing  6/18/2025 0500 by Darby Ramirez LPN  Outcome: Progressing     Problem: Pain  Goal: Verbalizes/displays adequate comfort level or baseline comfort level  6/18/2025 1519 by Brielle Chaney RN  Outcome: Progressing  6/18/2025 0500 by Darby Ramirez LPN  Outcome: Not Progressing     Problem: Skin/Tissue Integrity  Goal: Skin integrity remains intact  Description: 1.  Monitor for areas of redness and/or skin breakdown  2.  Assess vascular access sites hourly  3.  Every 4-6 hours minimum:  Change oxygen saturation probe site  4.  Every 4-6 hours:  If on nasal continuous positive airway pressure, respiratory therapy assess nares and determine need for appliance change or resting period  6/18/2025 1519 by Brielle Chaney RN  Outcome: Progressing  6/18/2025 0500 by Darby Ramirez LPN  Outcome: Progressing     Problem: ABCDS Injury Assessment  Goal: Absence of physical injury  6/18/2025 1519 by Brielle Chaney RN  Outcome: Progressing  6/18/2025 0500 by Darby Ramirez LPN  Outcome: Progressing     Problem: Nutrition Deficit:  Goal: Optimize nutritional status  Outcome: Progressing  Flowsheets (Taken 6/18/2025 1022 by Johana Carty RD)  Nutrient intake appropriate for improving, restoring, or maintaining nutritional needs:   Assess nutritional status and recommend course of 
  Problem: Chronic Conditions and Co-morbidities  Goal: Patient's chronic conditions and co-morbidity symptoms are monitored and maintained or improved  6/19/2025 0017 by Estella Nino RN  Outcome: Progressing  6/18/2025 1519 by Brielle Chaney RN  Outcome: Progressing     Problem: Discharge Planning  Goal: Discharge to home or other facility with appropriate resources  6/19/2025 0017 by Estella Nino RN  Outcome: Progressing  6/18/2025 1519 by Brielle Chaney RN  Outcome: Progressing     Problem: Safety - Adult  Goal: Free from fall injury  6/19/2025 0017 by Estella Nino RN  Outcome: Progressing  6/18/2025 1519 by Brielle Chaney RN  Outcome: Progressing     Problem: Pain  Goal: Verbalizes/displays adequate comfort level or baseline comfort level  6/19/2025 0017 by Estella Nino RN  Outcome: Progressing  6/18/2025 1519 by Brielle Chaney RN  Outcome: Progressing     Problem: Skin/Tissue Integrity  Goal: Skin integrity remains intact  Description: 1.  Monitor for areas of redness and/or skin breakdown  2.  Assess vascular access sites hourly  3.  Every 4-6 hours minimum:  Change oxygen saturation probe site  4.  Every 4-6 hours:  If on nasal continuous positive airway pressure, respiratory therapy assess nares and determine need for appliance change or resting period  6/19/2025 0017 by Estella Nino RN  Outcome: Progressing  6/18/2025 1519 by Brielle Chaney RN  Outcome: Progressing     Problem: ABCDS Injury Assessment  Goal: Absence of physical injury  6/19/2025 0017 by Estella Nino RN  Outcome: Progressing  6/18/2025 1519 by Brielle Chaney RN  Outcome: Progressing     Problem: Nutrition Deficit:  Goal: Optimize nutritional status  6/19/2025 0017 by Estella Nino RN  Outcome: Progressing  6/18/2025 1519 by Brielle Chaney RN  Outcome: Progressing  Flowsheets (Taken 6/18/2025 1022 by Johana Carty, KHARI)  Nutrient intake appropriate for improving, restoring, or maintaining nutritional needs:   Assess 
  Problem: Chronic Conditions and Co-morbidities  Goal: Patient's chronic conditions and co-morbidity symptoms are monitored and maintained or improved  6/19/2025 1010 by Estephanie Bustos RN  Outcome: Progressing  6/19/2025 0017 by Estella Nino RN  Outcome: Progressing  6/19/2025 0017 by Estella Nino RN  Outcome: Progressing     Problem: Discharge Planning  Goal: Discharge to home or other facility with appropriate resources  6/19/2025 1010 by Estephanie Bustos RN  Outcome: Progressing  6/19/2025 0017 by Estella Nino RN  Outcome: Progressing  6/19/2025 0017 by Estella Nino RN  Outcome: Progressing     Problem: Safety - Adult  Goal: Free from fall injury  6/19/2025 1010 by Estephanie Bustos RN  Outcome: Progressing  6/19/2025 0017 by Estella Nino RN  Outcome: Progressing  6/19/2025 0017 by Estella Nino RN  Outcome: Progressing     Problem: Pain  Goal: Verbalizes/displays adequate comfort level or baseline comfort level  6/19/2025 1010 by Estephanie Bustos RN  Outcome: Progressing  6/19/2025 0017 by Estella Nino RN  Outcome: Progressing  6/19/2025 0017 by Estella Nino RN  Outcome: Progressing     Problem: Skin/Tissue Integrity  Goal: Skin integrity remains intact  Description: 1.  Monitor for areas of redness and/or skin breakdown  2.  Assess vascular access sites hourly  3.  Every 4-6 hours minimum:  Change oxygen saturation probe site  4.  Every 4-6 hours:  If on nasal continuous positive airway pressure, respiratory therapy assess nares and determine need for appliance change or resting period  6/19/2025 1010 by Estephanie Bustos RN  Outcome: Progressing  6/19/2025 0017 by Estella Nino RN  Outcome: Progressing  6/19/2025 0017 by Estella Nino RN  Outcome: Progressing     Problem: ABCDS Injury Assessment  Goal: Absence of physical injury  6/19/2025 1010 by Estephanie Bustos RN  Outcome: Progressing  6/19/2025 0017 by Estella Nino, RN  Outcome: 
  Problem: Chronic Conditions and Co-morbidities  Goal: Patient's chronic conditions and co-morbidity symptoms are monitored and maintained or improved  6/21/2025 0534 by Marylin Gracia RN  Outcome: Progressing  Flowsheets (Taken 6/20/2025 2000)  Care Plan - Patient's Chronic Conditions and Co-Morbidity Symptoms are Monitored and Maintained or Improved: Monitor and assess patient's chronic conditions and comorbid symptoms for stability, deterioration, or improvement  6/20/2025 1719 by Abby Milian RN  Outcome: Progressing     Problem: Discharge Planning  Goal: Discharge to home or other facility with appropriate resources  6/21/2025 0534 by Marylin Gracia RN  Outcome: Progressing  Flowsheets (Taken 6/20/2025 2000)  Discharge to home or other facility with appropriate resources:   Identify barriers to discharge with patient and caregiver   Arrange for needed discharge resources and transportation as appropriate  6/20/2025 1719 by Abby Milian RN  Outcome: Progressing     Problem: Safety - Adult  Goal: Free from fall injury  6/21/2025 0534 by Marylin Gracia RN  Outcome: Progressing  Flowsheets (Taken 6/21/2025 0500)  Free From Fall Injury: Instruct family/caregiver on patient safety  6/20/2025 1719 by Abby Milian RN  Outcome: Progressing     Problem: Pain  Goal: Verbalizes/displays adequate comfort level or baseline comfort level  6/21/2025 0534 by Marylin Gracia RN  Outcome: Progressing  Flowsheets  Taken 6/21/2025 0400 by Marylin Gracia RN  Verbalizes/displays adequate comfort level or baseline comfort level:   Encourage patient to monitor pain and request assistance   Assess pain using appropriate pain scale   Administer analgesics based on type and severity of pain and evaluate response   Implement non-pharmacological measures as appropriate and evaluate response  Taken 6/21/2025 0000 by Marylin Gracia RN  Verbalizes/displays adequate comfort level or baseline comfort level:   Encourage 
  Problem: Chronic Conditions and Co-morbidities  Goal: Patient's chronic conditions and co-morbidity symptoms are monitored and maintained or improved  6/21/2025 1314 by Danna Pereira RN  Outcome: Progressing  Flowsheets (Taken 6/21/2025 0800)  Care Plan - Patient's Chronic Conditions and Co-Morbidity Symptoms are Monitored and Maintained or Improved:   Monitor and assess patient's chronic conditions and comorbid symptoms for stability, deterioration, or improvement   Collaborate with multidisciplinary team to address chronic and comorbid conditions and prevent exacerbation or deterioration   Update acute care plan with appropriate goals if chronic or comorbid symptoms are exacerbated and prevent overall improvement and discharge  6/21/2025 0534 by Marylin Gracia RN  Outcome: Progressing  Flowsheets (Taken 6/20/2025 2000)  Care Plan - Patient's Chronic Conditions and Co-Morbidity Symptoms are Monitored and Maintained or Improved: Monitor and assess patient's chronic conditions and comorbid symptoms for stability, deterioration, or improvement     Problem: Discharge Planning  Goal: Discharge to home or other facility with appropriate resources  6/21/2025 1314 by Danna Pereira RN  Outcome: Progressing  Flowsheets (Taken 6/21/2025 0800)  Discharge to home or other facility with appropriate resources:   Identify barriers to discharge with patient and caregiver   Arrange for needed discharge resources and transportation as appropriate   Identify discharge learning needs (meds, wound care, etc)   Arrange for interpreters to assist at discharge as needed  6/21/2025 0534 by Marylin Gracia, RN  Outcome: Progressing  Flowsheets (Taken 6/20/2025 2000)  Discharge to home or other facility with appropriate resources:   Identify barriers to discharge with patient and caregiver   Arrange for needed discharge resources and transportation as appropriate     Problem: Safety - Adult  Goal: Free from fall injury  6/21/2025 1314 
  Problem: Chronic Conditions and Co-morbidities  Goal: Patient's chronic conditions and co-morbidity symptoms are monitored and maintained or improved  6/22/2025 1156 by Danna Pereira RN  Outcome: Progressing  6/22/2025 0459 by Ximena Man RN  Outcome: Progressing     Problem: Discharge Planning  Goal: Discharge to home or other facility with appropriate resources  6/22/2025 1156 by Danna Pereira RN  Outcome: Progressing  6/22/2025 0459 by Ximena Man RN  Outcome: Progressing     Problem: Safety - Adult  Goal: Free from fall injury  6/22/2025 1156 by Danna Pereira RN  Outcome: Progressing  6/22/2025 0459 by Ximena Man RN  Outcome: Progressing     Problem: Pain  Goal: Verbalizes/displays adequate comfort level or baseline comfort level  6/22/2025 1156 by Danna Pereira RN  Outcome: Progressing  6/22/2025 0459 by Ximena Man RN  Outcome: Progressing  Flowsheets (Taken 6/21/2025 2000)  Verbalizes/displays adequate comfort level or baseline comfort level:   Assess pain using appropriate pain scale   Encourage patient to monitor pain and request assistance   Administer analgesics based on type and severity of pain and evaluate response   Implement non-pharmacological measures as appropriate and evaluate response   Consider cultural and social influences on pain and pain management     Problem: Skin/Tissue Integrity  Goal: Skin integrity remains intact  Description: 1.  Monitor for areas of redness and/or skin breakdown  2.  Assess vascular access sites hourly  3.  Every 4-6 hours minimum:  Change oxygen saturation probe site  4.  Every 4-6 hours:  If on nasal continuous positive airway pressure, respiratory therapy assess nares and determine need for appliance change or resting period  6/22/2025 1156 by Danna Pereira RN  Outcome: Progressing  Flowsheets (Taken 6/22/2025 0800)  Skin Integrity Remains Intact:   Monitor for areas of redness and/or skin breakdown   Assess vascular access sites hourly   Every 4-6 
  Problem: Chronic Conditions and Co-morbidities  Goal: Patient's chronic conditions and co-morbidity symptoms are monitored and maintained or improved  6/22/2025 1642 by Danna Pereira RN  Outcome: Progressing  6/22/2025 1156 by Danna Pereira RN  Outcome: Progressing  6/22/2025 0459 by Ximena Man RN  Outcome: Progressing     Problem: Discharge Planning  Goal: Discharge to home or other facility with appropriate resources  6/22/2025 1642 by Danna Pereira RN  Outcome: Progressing  6/22/2025 1156 by Danna Pereira RN  Outcome: Progressing  6/22/2025 0459 by Ximena Man RN  Outcome: Progressing     Problem: Safety - Adult  Goal: Free from fall injury  6/22/2025 1642 by Danna Pereira RN  Outcome: Progressing  6/22/2025 1156 by Danna Pereira RN  Outcome: Progressing  6/22/2025 0459 by Ximena Man RN  Outcome: Progressing     Problem: Pain  Goal: Verbalizes/displays adequate comfort level or baseline comfort level  6/22/2025 1642 by Danna Pereira RN  Outcome: Progressing  6/22/2025 1156 by Danna Pereira RN  Outcome: Progressing  6/22/2025 0459 by Ximena Man RN  Outcome: Progressing  Flowsheets (Taken 6/21/2025 2000)  Verbalizes/displays adequate comfort level or baseline comfort level:   Assess pain using appropriate pain scale   Encourage patient to monitor pain and request assistance   Administer analgesics based on type and severity of pain and evaluate response   Implement non-pharmacological measures as appropriate and evaluate response   Consider cultural and social influences on pain and pain management     Problem: Skin/Tissue Integrity  Goal: Skin integrity remains intact  Description: 1.  Monitor for areas of redness and/or skin breakdown  2.  Assess vascular access sites hourly  3.  Every 4-6 hours minimum:  Change oxygen saturation probe site  4.  Every 4-6 hours:  If on nasal continuous positive airway pressure, respiratory therapy assess nares and determine need for appliance change or resting 
  Problem: Chronic Conditions and Co-morbidities  Goal: Patient's chronic conditions and co-morbidity symptoms are monitored and maintained or improved  6/23/2025 0551 by Keya Evans RN  Outcome: Progressing  6/22/2025 1642 by Danna Pereira RN  Outcome: Progressing     Problem: Discharge Planning  Goal: Discharge to home or other facility with appropriate resources  6/23/2025 0551 by Keya Evans RN  Outcome: Progressing  6/22/2025 1642 by Danna Pereira RN  Outcome: Progressing     Problem: Safety - Adult  Goal: Free from fall injury  6/23/2025 0551 by Keya Evans RN  Outcome: Progressing  6/22/2025 1642 by Danna Pereira RN  Outcome: Progressing     Problem: Pain  Goal: Verbalizes/displays adequate comfort level or baseline comfort level  6/23/2025 0551 by Keya Evans RN  Outcome: Progressing  6/22/2025 1642 by Danna Pereira RN  Outcome: Progressing     Problem: Skin/Tissue Integrity  Goal: Skin integrity remains intact  Description: 1.  Monitor for areas of redness and/or skin breakdown  2.  Assess vascular access sites hourly  3.  Every 4-6 hours minimum:  Change oxygen saturation probe site  4.  Every 4-6 hours:  If on nasal continuous positive airway pressure, respiratory therapy assess nares and determine need for appliance change or resting period  6/23/2025 0551 by Keya Evans RN  Outcome: Progressing  6/22/2025 1642 by Danna Pereira RN  Outcome: Progressing     Problem: ABCDS Injury Assessment  Goal: Absence of physical injury  6/23/2025 0551 by Keya Evans RN  Outcome: Progressing  6/22/2025 1642 by Danna Pereira RN  Outcome: Progressing     Problem: Nutrition Deficit:  Goal: Optimize nutritional status  6/23/2025 0551 by Keya Evans RN  Outcome: Progressing  6/22/2025 1642 by Danna Pereira RN  Outcome: Progressing     Problem: Cardiovascular - Adult  Goal: Maintains optimal cardiac output and hemodynamic stability  6/23/2025 0551 by Keya Evans RN  Outcome: 
  Problem: Chronic Conditions and Co-morbidities  Goal: Patient's chronic conditions and co-morbidity symptoms are monitored and maintained or improved  6/23/2025 1426 by Ladi Zaragoza RN  Outcome: Progressing  Flowsheets (Taken 6/23/2025 0800)  Care Plan - Patient's Chronic Conditions and Co-Morbidity Symptoms are Monitored and Maintained or Improved:   Monitor and assess patient's chronic conditions and comorbid symptoms for stability, deterioration, or improvement   Update acute care plan with appropriate goals if chronic or comorbid symptoms are exacerbated and prevent overall improvement and discharge  6/23/2025 0551 by Keya Evans, RN  Outcome: Progressing     Problem: Discharge Planning  Goal: Discharge to home or other facility with appropriate resources  6/23/2025 1426 by Ladi Zaragoza RN  Outcome: Progressing  Flowsheets (Taken 6/23/2025 0800)  Discharge to home or other facility with appropriate resources:   Identify barriers to discharge with patient and caregiver   Arrange for needed discharge resources and transportation as appropriate   Refer to discharge planning if patient needs post-hospital services based on physician order or complex needs related to functional status, cognitive ability or social support system  6/23/2025 0551 by Keya Evans, RN  Outcome: Progressing     Problem: Safety - Adult  Goal: Free from fall injury  6/23/2025 1426 by Ladi Zaragoza RN  Outcome: Progressing  Flowsheets (Taken 6/23/2025 1424)  Free From Fall Injury:   Based on caregiver fall risk screen, instruct family/caregiver to ask for assistance with transferring infant if caregiver noted to have fall risk factors   Instruct family/caregiver on patient safety  6/23/2025 0551 by Keya Evans RN  Outcome: Progressing     Problem: Pain  Goal: Verbalizes/displays adequate comfort level or baseline comfort level  6/23/2025 1426 by Ladi Zaragoza, RN  Outcome: Progressing  Flowsheets (Taken 6/23/2025 
  Problem: Chronic Conditions and Co-morbidities  Goal: Patient's chronic conditions and co-morbidity symptoms are monitored and maintained or improved  6/24/2025 0011 by Marylin Gracia RN  Outcome: Progressing  Flowsheets (Taken 6/23/2025 2000)  Care Plan - Patient's Chronic Conditions and Co-Morbidity Symptoms are Monitored and Maintained or Improved: Monitor and assess patient's chronic conditions and comorbid symptoms for stability, deterioration, or improvement  6/23/2025 1426 by Ladi Zaragoza RN  Outcome: Progressing  Flowsheets (Taken 6/23/2025 0800)  Care Plan - Patient's Chronic Conditions and Co-Morbidity Symptoms are Monitored and Maintained or Improved:   Monitor and assess patient's chronic conditions and comorbid symptoms for stability, deterioration, or improvement   Update acute care plan with appropriate goals if chronic or comorbid symptoms are exacerbated and prevent overall improvement and discharge     Problem: Discharge Planning  Goal: Discharge to home or other facility with appropriate resources  6/24/2025 0011 by Marylin Gracia RN  Outcome: Progressing  Flowsheets (Taken 6/23/2025 2000)  Discharge to home or other facility with appropriate resources:   Identify barriers to discharge with patient and caregiver   Arrange for needed discharge resources and transportation as appropriate  6/23/2025 1426 by Ladi Zaragoza RN  Outcome: Progressing  Flowsheets (Taken 6/23/2025 0800)  Discharge to home or other facility with appropriate resources:   Identify barriers to discharge with patient and caregiver   Arrange for needed discharge resources and transportation as appropriate   Refer to discharge planning if patient needs post-hospital services based on physician order or complex needs related to functional status, cognitive ability or social support system     Problem: Safety - Adult  Goal: Free from fall injury  6/24/2025 0011 by Marylin Gracia, RN  Outcome: Progressing  6/23/2025 
  Problem: Chronic Conditions and Co-morbidities  Goal: Patient's chronic conditions and co-morbidity symptoms are monitored and maintained or improved  6/25/2025 1542 by Ladi Zaragoza RN  Outcome: Progressing  Flowsheets (Taken 6/25/2025 0800)  Care Plan - Patient's Chronic Conditions and Co-Morbidity Symptoms are Monitored and Maintained or Improved:   Monitor and assess patient's chronic conditions and comorbid symptoms for stability, deterioration, or improvement   Collaborate with multidisciplinary team to address chronic and comorbid conditions and prevent exacerbation or deterioration  6/25/2025 0321 by Marylin Gracia, RN  Outcome: Progressing  Flowsheets  Taken 6/25/2025 0000  Care Plan - Patient's Chronic Conditions and Co-Morbidity Symptoms are Monitored and Maintained or Improved: Monitor and assess patient's chronic conditions and comorbid symptoms for stability, deterioration, or improvement  Taken 6/24/2025 2000  Care Plan - Patient's Chronic Conditions and Co-Morbidity Symptoms are Monitored and Maintained or Improved: Monitor and assess patient's chronic conditions and comorbid symptoms for stability, deterioration, or improvement     Problem: Discharge Planning  Goal: Discharge to home or other facility with appropriate resources  6/25/2025 1542 by Ladi Zaragoza RN  Outcome: Progressing  Flowsheets (Taken 6/25/2025 0800)  Discharge to home or other facility with appropriate resources:   Identify barriers to discharge with patient and caregiver   Arrange for needed discharge resources and transportation as appropriate   Arrange for interpreters to assist at discharge as needed  6/25/2025 0321 by Marylin Gracia, RN  Outcome: Progressing  Flowsheets  Taken 6/25/2025 0000  Discharge to home or other facility with appropriate resources:   Identify barriers to discharge with patient and caregiver   Arrange for needed discharge resources and transportation as appropriate  Taken 6/24/2025 
  Problem: Chronic Conditions and Co-morbidities  Goal: Patient's chronic conditions and co-morbidity symptoms are monitored and maintained or improved  Outcome: Progressing     Problem: Discharge Planning  Goal: Discharge to home or other facility with appropriate resources  Outcome: Progressing     Problem: Safety - Adult  Goal: Free from fall injury  Outcome: Progressing     Problem: Pain  Goal: Verbalizes/displays adequate comfort level or baseline comfort level  Outcome: Progressing  Flowsheets  Taken 6/20/2025 1200 by Abby Milian RN  Verbalizes/displays adequate comfort level or baseline comfort level:   Encourage patient to monitor pain and request assistance   Assess pain using appropriate pain scale  Taken 6/20/2025 0800 by Abby Milian RN  Verbalizes/displays adequate comfort level or baseline comfort level:   Encourage patient to monitor pain and request assistance   Assess pain using appropriate pain scale  Taken 6/20/2025 0400 by Marylin Gracia RN  Verbalizes/displays adequate comfort level or baseline comfort level:   Encourage patient to monitor pain and request assistance   Assess pain using appropriate pain scale   Administer analgesics based on type and severity of pain and evaluate response   Implement non-pharmacological measures as appropriate and evaluate response     Problem: Skin/Tissue Integrity  Goal: Skin integrity remains intact  Description: 1.  Monitor for areas of redness and/or skin breakdown  2.  Assess vascular access sites hourly  3.  Every 4-6 hours minimum:  Change oxygen saturation probe site  4.  Every 4-6 hours:  If on nasal continuous positive airway pressure, respiratory therapy assess nares and determine need for appliance change or resting period  Outcome: Progressing  Flowsheets (Taken 6/20/2025 0400 by Marylin Gracia, RN)  Skin Integrity Remains Intact:   Monitor for areas of redness and/or skin breakdown   Assess vascular access sites hourly   Every 4-6 
  Problem: Chronic Conditions and Co-morbidities  Goal: Patient's chronic conditions and co-morbidity symptoms are monitored and maintained or improved  Outcome: Progressing  Flowsheets  Taken 6/25/2025 0000  Care Plan - Patient's Chronic Conditions and Co-Morbidity Symptoms are Monitored and Maintained or Improved: Monitor and assess patient's chronic conditions and comorbid symptoms for stability, deterioration, or improvement  Taken 6/24/2025 2000  Care Plan - Patient's Chronic Conditions and Co-Morbidity Symptoms are Monitored and Maintained or Improved: Monitor and assess patient's chronic conditions and comorbid symptoms for stability, deterioration, or improvement     Problem: Discharge Planning  Goal: Discharge to home or other facility with appropriate resources  Outcome: Progressing  Flowsheets  Taken 6/25/2025 0000  Discharge to home or other facility with appropriate resources:   Identify barriers to discharge with patient and caregiver   Arrange for needed discharge resources and transportation as appropriate  Taken 6/24/2025 2000  Discharge to home or other facility with appropriate resources:   Identify barriers to discharge with patient and caregiver   Arrange for needed discharge resources and transportation as appropriate     Problem: Safety - Adult  Goal: Free from fall injury  Outcome: Progressing  Flowsheets (Taken 6/25/2025 0300)  Free From Fall Injury: Instruct family/caregiver on patient safety     Problem: Pain  Goal: Verbalizes/displays adequate comfort level or baseline comfort level  Outcome: Progressing  Flowsheets  Taken 6/25/2025 0000  Verbalizes/displays adequate comfort level or baseline comfort level:   Encourage patient to monitor pain and request assistance   Assess pain using appropriate pain scale   Administer analgesics based on type and severity of pain and evaluate response   Implement non-pharmacological measures as appropriate and evaluate response  Taken 6/24/2025 
  Problem: Chronic Conditions and Co-morbidities  Goal: Patient's chronic conditions and co-morbidity symptoms are monitored and maintained or improved  Outcome: Progressing  Flowsheets (Taken 6/19/2025 2000)  Care Plan - Patient's Chronic Conditions and Co-Morbidity Symptoms are Monitored and Maintained or Improved: Monitor and assess patient's chronic conditions and comorbid symptoms for stability, deterioration, or improvement     Problem: Discharge Planning  Goal: Discharge to home or other facility with appropriate resources  Outcome: Progressing  Flowsheets (Taken 6/19/2025 2000)  Discharge to home or other facility with appropriate resources:   Identify barriers to discharge with patient and caregiver   Arrange for needed discharge resources and transportation as appropriate     Problem: Safety - Adult  Goal: Free from fall injury  Outcome: Progressing  Flowsheets (Taken 6/20/2025 0300)  Free From Fall Injury: Instruct family/caregiver on patient safety     Problem: Pain  Goal: Verbalizes/displays adequate comfort level or baseline comfort level  Outcome: Progressing  Flowsheets  Taken 6/20/2025 0000  Verbalizes/displays adequate comfort level or baseline comfort level:   Encourage patient to monitor pain and request assistance   Assess pain using appropriate pain scale   Administer analgesics based on type and severity of pain and evaluate response   Implement non-pharmacological measures as appropriate and evaluate response  Taken 6/19/2025 2000  Verbalizes/displays adequate comfort level or baseline comfort level:   Encourage patient to monitor pain and request assistance   Assess pain using appropriate pain scale   Administer analgesics based on type and severity of pain and evaluate response   Implement non-pharmacological measures as appropriate and evaluate response     Problem: Skin/Tissue Integrity  Goal: Skin integrity remains intact  Description: 1.  Monitor for areas of redness and/or skin 
  Problem: Safety - Adult  Goal: Free from fall injury  Outcome: Progressing     Problem: Skin/Tissue Integrity  Goal: Skin integrity remains intact  Description: 1.  Monitor for areas of redness and/or skin breakdown  2.  Assess vascular access sites hourly  3.  Every 4-6 hours minimum:  Change oxygen saturation probe site  4.  Every 4-6 hours:  If on nasal continuous positive airway pressure, respiratory therapy assess nares and determine need for appliance change or resting period  Outcome: Progressing     Problem: ABCDS Injury Assessment  Goal: Absence of physical injury  Outcome: Progressing     Problem: Chronic Conditions and Co-morbidities  Goal: Patient's chronic conditions and co-morbidity symptoms are monitored and maintained or improved  Outcome: Not Progressing     Problem: Discharge Planning  Goal: Discharge to home or other facility with appropriate resources  Outcome: Not Progressing     Problem: Pain  Goal: Verbalizes/displays adequate comfort level or baseline comfort level  Outcome: Not Progressing     
CHF Care Plan      Patient's EF (Ejection Fraction) is greater than 40% (2024)    Heart Failure Medications:  Diuretics:: None    (One of the following REQUIRED for EF </= 40%/SYSTOLIC FAILURE but MAY be used in EF% >40%/DIASTOLIC FAILURE)        ACE:: None        ARB:: Valsartan         ARNI:: None    (Beta Blockers)  NON- Evidenced Based Beta Blocker (for EF% >40%/DIASTOLIC FAILURE): None    Evidenced Based Beta Blocker::(REQUIRED for EF% <40%/SYSTOLIC FAILURE) None  ...................................................................................................................................................    Failed to redirect to the Timeline version of the eventblimp SmartLink.      Patient's weights and intake/output reviewed    Daily Weight log at bedside, patient/family participation in use of log: \"yes    Patient's current weight today shows a difference of 1 lbs more than last documented weight.      Intake/Output Summary (Last 24 hours) at 6/18/2025 0651  Last data filed at 6/18/2025 0538  Gross per 24 hour   Intake 960 ml   Output 500 ml   Net 460 ml       Education Booklet Provided: yes    Comorbidities Reviewed Yes    Patient has a past medical history of MARIO (acute kidney injury), Altered mental status, Arthritis, BCC (basal cell carcinoma of skin), Bladder infection, Blurred vision, CAD (coronary artery disease), Cancer ([facility]), CHF (congestive heart failure) ([facility]), Chronic back pain, Closed head injury, COPD (chronic obstructive pulmonary disease) ([facility]), Depression, Depression, Hypercholesteremia, Hypertension, Hypokalemia, Pain in shoulder, Painful total knee replacement, initial encounter, Reflux, Rheumatic fever, Sleep apnea, Syncope and collapse, TIA (transient ischemic attack), Urinary incontinence, Urinary tract infection in female, and Wears glasses.     >>For CHF and Comorbidity documentation on Education Time and Topics, please see Education Tab      CHF Education    Learners:  
Increase function to baseline    
Increase patients ADLs/functional status to baseline.    
Multiple attempts to see patient today at 9:30AM, 10AM and 1PM. Patient out of room in surgery.    AKIRA RAMIREZ MD  6/19/2025  1:29 PM    
PLAN OF CARE    Received request for inpatient admission. Admitting provider Dr. Arteaga notified.    AKIRA RAMIREZ MD  6/17/2025  5:51 PM    
MARINO Hannah  Outcome: Progressing     Problem: Nutrition Deficit:  Goal: Optimize nutritional status  6/19/2025 0017 by Estella Nino RN  Outcome: Progressing  6/19/2025 0017 by Estella Nino RN  Outcome: Progressing  6/18/2025 1519 by Brielle Chaney RN  Outcome: Progressing  Flowsheets (Taken 6/18/2025 1022 by Johana Carty RD)  Nutrient intake appropriate for improving, restoring, or maintaining nutritional needs:   Assess nutritional status and recommend course of action   Monitor oral intake, labs, and treatment plans   Recommend appropriate diets, oral nutritional supplements, and vitamin/mineral supplements     
assess nares and determine need for appliance change or resting period  6/24/2025 0011 by Marylin Gracia RN  Outcome: Progressing  Flowsheets  Taken 6/24/2025 0010  Skin Integrity Remains Intact:   Monitor for areas of redness and/or skin breakdown   Assess vascular access sites hourly   Every 4-6 hours minimum:  Change oxygen saturation probe site   Every 4-6 hours:  If on nasal continuous positive airway pressure, assess nares and determine need for appliance change or resting period   Turn and reposition as indicated  Taken 6/23/2025 2000  Skin Integrity Remains Intact:   Monitor for areas of redness and/or skin breakdown   Assess vascular access sites hourly   Every 4-6 hours minimum:  Change oxygen saturation probe site   Every 4-6 hours:  If on nasal continuous positive airway pressure, assess nares and determine need for appliance change or resting period   Turn and reposition as indicated     Problem: ABCDS Injury Assessment  Goal: Absence of physical injury  6/24/2025 1030 by Ladi Zaragoza RN  Outcome: Progressing  Flowsheets (Taken 6/24/2025 1029)  Absence of Physical Injury: Implement safety measures based on patient assessment  6/24/2025 0011 by Marylin Gracia RN  Outcome: Progressing  Flowsheets (Taken 6/24/2025 0010)  Absence of Physical Injury: Implement safety measures based on patient assessment     Problem: Nutrition Deficit:  Goal: Optimize nutritional status  6/24/2025 1030 by Ladi Zaragoza RN  Outcome: Progressing  6/24/2025 0011 by Marylin Gracia RN  Outcome: Progressing     Problem: Cardiovascular - Adult  Goal: Maintains optimal cardiac output and hemodynamic stability  6/24/2025 1030 by Ladi Zaragoza RN  Outcome: Progressing  Flowsheets (Taken 6/24/2025 0800)  Maintains optimal cardiac output and hemodynamic stability:   Monitor blood pressure and heart rate   Monitor urine output and notify Licensed Independent Practitioner for values outside of normal range   Administer 
skin breakdown   Assess vascular access sites hourly   Every 4-6 hours minimum:  Change oxygen saturation probe site   Every 4-6 hours:  If on nasal continuous positive airway pressure, assess nares and determine need for appliance change or resting period   Turn and reposition as indicated  6/25/2025 1542 by Ladi Zaragoza RN  Outcome: Progressing  Flowsheets  Taken 6/25/2025 1541  Skin Integrity Remains Intact:   Monitor for areas of redness and/or skin breakdown   Every 4-6 hours minimum:  Change oxygen saturation probe site   Every 4-6 hours:  If on nasal continuous positive airway pressure, assess nares and determine need for appliance change or resting period  Taken 6/25/2025 0800  Skin Integrity Remains Intact:   Monitor for areas of redness and/or skin breakdown   Every 4-6 hours minimum:  Change oxygen saturation probe site   Assess need for specialty bed   Pressure redistribution bed/mattress (bed type)     Problem: ABCDS Injury Assessment  Goal: Absence of physical injury  6/26/2025 0022 by Marylin Gracia RN  Outcome: Progressing  Flowsheets (Taken 6/26/2025 0019)  Absence of Physical Injury: Implement safety measures based on patient assessment  6/25/2025 1542 by Ladi Zaragoza RN  Outcome: Progressing  Flowsheets (Taken 6/25/2025 1541)  Absence of Physical Injury: Implement safety measures based on patient assessment     Problem: Nutrition Deficit:  Goal: Optimize nutritional status  6/26/2025 0022 by Marylin Gracia, RN  Outcome: Progressing  6/25/2025 1542 by Ladi Zaragoza RN  Outcome: Progressing     Problem: Cardiovascular - Adult  Goal: Maintains optimal cardiac output and hemodynamic stability  6/26/2025 0022 by Marylin Gracia, RN  Outcome: Progressing  Flowsheets (Taken 6/25/2025 2000)  Maintains optimal cardiac output and hemodynamic stability:   Monitor blood pressure and heart rate   Monitor urine output and notify Licensed Independent Practitioner for values outside of normal

## 2025-06-27 ENCOUNTER — CARE COORDINATION (OUTPATIENT)
Dept: CASE MANAGEMENT | Age: 85
End: 2025-06-27

## 2025-06-27 ENCOUNTER — TELEPHONE (OUTPATIENT)
Dept: FAMILY MEDICINE CLINIC | Age: 85
End: 2025-06-27

## 2025-06-27 NOTE — CARE COORDINATION
Care Transitions Note    Initial Call - Call within 2 business days of discharge: Yes    Attempted to reach patient for transitions of care follow up. Unable to reach patient.    Outreach Attempts:   CTN spoke with pt briefly who said she does not feel like talking at this time.  Pt is agreeable to c/b on Monday     Patient: Nithya Neff    Patient : 1940   MRN: <[id number]>    Reason for Admission: MARIO - DEHYDRATION - COLITIS - RECTAL BLEED   Discharge Date: 25  RURS: Readmission Risk Score: 20.6    Last Discharge Facility       Date Complaint Diagnosis Description Type Department Provider    25  Weight loss ... Admission (Discharged) Vick Wesley MD            Was this an external facility discharge? No    Follow Up Appointment:   Patient has hospital follow up appointment scheduled within 7 days of discharge.  With vascular surgeon   Future Appointments         Provider Specialty Dept Phone    2025 12:30 PM Timi Fraire MD Vascular Surgery [phone]            Plan for follow-up on next business day.      Geri Negron RN

## 2025-06-27 NOTE — TELEPHONE ENCOUNTER
Patient was discharged from [facility] on 6/26/2025. She was in the hospital for colonitis. Asking if our office have received a list of her medications. The nurse, Tracie,  saw patient at home today and she can be reached at [phone].

## 2025-06-27 NOTE — CARE COORDINATION
ELIAS spoke with Candice with [facility] to verify hc order - Order received with SOC for today.    SERENITY NatarajanN, RN  Care Transition Coordinator  Contact Number:  [phone]

## 2025-06-30 ENCOUNTER — CARE COORDINATION (OUTPATIENT)
Dept: CASE MANAGEMENT | Age: 85
End: 2025-06-30

## 2025-06-30 NOTE — CARE COORDINATION
Care Transitions Note    Initial Call - Call within 2 business days of discharge: Yes    Attempted to reach patient for transitions of care follow up. Unable to reach patient.    Outreach Attempts:   Spoke with pt adolfo who states she is fine and declined additional needs / calls     Patient: Nithya Neff    Patient : 1940   MRN: <[id number]>    Reason for Admission: dehydration   Discharge Date: 25  RURS: Readmission Risk Score: 20.6    Last Discharge Facility       Date Complaint Diagnosis Description Type Department Provider    25  Weight loss ... Admission (Discharged) Vick Wesley MD            Was this an external facility discharge? No    Follow Up Appointment:   Patient has hospital follow up appointment scheduled V sx appt     Future Appointments         Provider Specialty Dept Phone    2025 12:30 PM Timi Fraire MD Vascular Surgery [phone]            No further follow-up call indicated  CT episode closed - CTN signed off pt.        Geri Negron RN

## 2025-07-01 ENCOUNTER — TELEPHONE (OUTPATIENT)
Dept: FAMILY MEDICINE CLINIC | Age: 85
End: 2025-07-01

## 2025-07-03 NOTE — TELEPHONE ENCOUNTER
She said that's fine, forget it and hung up on me.    14yo with HA since waking up. Did not wake her from sleep. Concussion three weeks ago - hit her head on shelf in school. No LOC. No emesis at time. Two weeks ago saw pmd and was diagnosed w concussion. At that time light and sound was bothering and vision was blurry. + some nausea. These sx improved. HA however persisted, once/day worse 4/10. Today woke up with 9/10 HA. Frontal HA. Intermittent today. Currently HA is 8/10. +photophobia. No emesis. No neck complaints. No fever. No URI sx. Has not tried meds. No previous head imaging. 14yo with HA since waking up. Did not wake her from sleep. Minor head injury three weeks ago - hit her head on shelf in school. No LOC. No emesis at time. Two weeks ago saw pmd and was diagnosed w concussion. At that time light and sound was bothering and vision was blurry. + some nausea. These sx improved. HA however persisted, once/day worse 4/10. Today woke up with 9/10 HA. Frontal HA. Intermittent today. Currently HA is 8/10. +photophobia. No emesis. No neck complaints. No fever. No URI sx. Has not tried meds. No previous head imaging.

## 2025-07-13 NOTE — DISCHARGE SUMMARY
Physician Discharge Summary     Patient ID:  Nithya Neff  [id number]  85 y.o.  1940    Admit date: 6/15/2025    Discharge date and time: 6/17/2025  3:18 PM     Admitting Physician: Luigi Landry MD     Discharge Physician: Elicia Gray MD    Admission Diagnoses: Dehydration [E86.0]  Colitis [K52.9]  Metabolic acidosis [E87.20]  MARIO (acute kidney injury) [N17.9]    Discharge Diagnoses: Principal Problem:    MARIO (acute kidney injury)  Active Problems:    Severe protein-calorie malnutrition    Generalized abdominal pain    Colitis    Metabolic acidosis    Rectal bleeding    Acute on chronic anemia    Dehydration  Resolved Problems:    * No resolved hospital problems. *      Discharged Condition: stable    Indication for Admission:    Per HPI     Chief Complaint: MARIO (acute kidney injury)  Nithya Neff is a 85 y.o. female who presents with generalized weakness and debility with dehydration and diarrhea symptoms got worse to the point patient wanted to come to the hospital to get checked denies any chest pain orthopnea PND leg pain and leg swelling.  To be admitted to the hospital for the management of dehydration primarily versus MARIO       Hospital Course:     Intractable abdominal discomfort with diarrhea and blood in stool  Weight loss   Rectal bleeding   Mesentric ischemia- complete occlusion of SMA  -CT scan showed colitis.    -C. difficile negative.   - GI PCR  for bacterial pathogens neg     - on empiric abx ceftriaxone and Flagyl   - GI consulted   - clear liquid diet  - on PPI, bentyl   - previous admission in 2/25 for the same.  EGD and colonoscopy completed at that time  - discussed with Dr. Ku, she will call and discuss with vascular regarding concern for mesenteric ischemia-> she has d/W vascular surgeon- > CTA abd done shows complete occlusion of SMA .  - hold anticoagulation at this time  - monitor hemoglobin on admission 9.7---7.8  -> Patient needs to be transferred to [facility]

## 2025-07-15 NOTE — PROGRESS NOTES
Physician Progress Note      PATIENT:               FELIPE KING  CSN #:                  [id number]  :                       1940  ADMIT DATE:       6/15/2025 3:08 PM  DISCH DATE:        2025 3:18 PM  RESPONDING  PROVIDER #:        Elicia Gray MD          QUERY TEXT:    Mesentric ischemia is documented in the DS  by Elicia Gray MD.   Please specify the acuity of this condition:    The clinical indicators include:  malnutrition, acidosis    \"Abdominal pain, diarrhea and blood in stool, MARIO.\" (H&P 06/15 Emerson Newby MD)  \"Abdominal pain likely due to mesenteric ischemia + possible ischemic colitis,   suggest vascular surgery consult.\" (Gastroenterology CN  Suzanne Ku MD)  \"Discussed with Dr. Ku, she will call and discuss with vascular regarding   concern for mesenteric ischemia-> she has d/W vascular surgeon- > CTA abd done   shows complete occlusion of SMA.\" (DS  Elicia Gray MD)  hold anticoagulation, Patient needs to be transferred to [facility] for   vascular surgery for SMA bypass.    Thank you,  Ruthann Christianson, [facility], [facility].  Options provided:  -- Acute  -- Chronic  -- Other - I will add my own diagnosis  -- Disagree - Not applicable / Not valid  -- Disagree - Clinically unable to determine / Unknown  -- Refer to Clinical Documentation Reviewer    PROVIDER RESPONSE TEXT:    This is an acute condition.    Query created by: Ruthann Christianson on 7/15/2025 5:25 AM      Electronically signed by:  Elicia Gray MD 7/15/2025 5:51 PM

## 2025-07-16 PROBLEM — E86.0 DEHYDRATION: Status: RESOLVED | Noted: 2025-06-16 | Resolved: 2025-07-16

## 2025-07-18 ENCOUNTER — OFFICE VISIT (OUTPATIENT)
Dept: VASCULAR SURGERY | Age: 85
End: 2025-07-18

## 2025-07-18 VITALS
DIASTOLIC BLOOD PRESSURE: 58 MMHG | HEIGHT: 60 IN | SYSTOLIC BLOOD PRESSURE: 100 MMHG | WEIGHT: 111 LBS | BODY MASS INDEX: 21.79 KG/M2

## 2025-07-18 DIAGNOSIS — Z09 POSTOPERATIVE EXAMINATION: Primary | ICD-10-CM

## 2025-07-18 PROCEDURE — 99024 POSTOP FOLLOW-UP VISIT: CPT | Performed by: SURGERY

## 2025-07-18 NOTE — PROGRESS NOTES
Physician Progress Note      PATIENT:               FELIPE KING  [facility] #:                  [id number]  :                       1940  ADMIT DATE:       6/15/2025 3:08 PM  DISCH DATE:        2025 3:18 PM  RESPONDING  PROVIDER #:        Elicia Gray MD          QUERY TEXT:    Please clarify the patient?s nutritional status:    The clinical indicators include:  86yo female with history of MARIO, Altered mental status , Arthritis, BCC ,   Bladder infection, , CAD , Cancer, CHF, Chronic back pain, Closed head injury   , COPD , Depression, Depression, Hypercholesteremia, Hypertension,   Hypokalemia, Pain in shoulder, Painful total knee replacement, initial   encounter (10/18/2018), Reflux, Rheumatic fever, Sleep apnea, Syncope and   collapse , TIA, Urinary incontinence, Urinary tract infection in female    Per Dietician note on 25-Malnutrition Status:  Severe malnutrition   (25 1355), Energy Intake:  50% or less of estimated energy requirements   for 5 or more days, Weight Loss:  Greater than 7.5% over 3 months (-21# or   16.4% weight loss since 25)  Body Fat Loss:  Moderate body fat loss Orbital, Triceps, Muscle Mass Loss:    Moderate muscle mass loss Temples (temporalis), Clavicles (pectoralis &   deltoids), Scapula (trapezius), Fluid Accumulation:  No fluid accumulation    Continue ADULT DIET; Clear Liquid diet order. Monitor appetite and po intake +   for diet advancement.Monitor nutrition-related labs, bowel function, and   weight trends.  Options provided:  -- Protein calorie malnutrition severe  -- Other - I will add my own diagnosis  -- Disagree - Not applicable / Not valid  -- Disagree - Clinically unable to determine / Unknown  -- Refer to Clinical Documentation Reviewer    PROVIDER RESPONSE TEXT:    This patient has severe protein calorie malnutrition.    Query created by: Khadijah Miller on 2025 11:15 AM      Electronically signed by:  Elicia Gray MD 2025 10:11

## 2025-07-18 NOTE — PROGRESS NOTES
Postop Progress Note    Subjective    Nithya Neff presents to the office for postop follow up s/p Iliac SMA bypass.   Reports continue abdominal pain and poor appetite.        Objective    Vitals:    07/18/25 1326   BP: (!) 100/58     General: alert, cooperative and no distress  Incision: well healed.   No abdominal bruits    Assessment  Continued GI symptoms after mesenteric bypass.     Plan  1. Continue any current medications  Will send for CTA Abd/pelvis to assess bypass graft patency.      Electronically signed by Timi Fraire MD on 7/18/2025 at 1:43 PM

## 2025-07-22 ENCOUNTER — TELEPHONE (OUTPATIENT)
Dept: VASCULAR SURGERY | Age: 85
End: 2025-07-22

## 2025-07-22 DIAGNOSIS — Z09 POSTOPERATIVE EXAMINATION: ICD-10-CM

## 2025-07-22 DIAGNOSIS — K55.1 MESENTERIC ARTERY STENOSIS: Primary | ICD-10-CM

## 2025-07-22 NOTE — TELEPHONE ENCOUNTER
Called patient with date and time of cta abdomin/pelvis as could not make todays apt at [place] at 4:00pm so rescheduled for tomorrow 7/23/25 at 2:30pm arrival and npo 4 hrs prior. Pamsuman

## 2025-07-22 NOTE — TELEPHONE ENCOUNTER
Called patient regarding scheduling cta abdomin/pelvis and could not reach her so called daughter, Sonya and she is going to see if patient can go and get cta at [facility] today. She will call me if cannot make it. She is going to have this done around 4:00pm.jo

## 2025-07-29 NOTE — DISCHARGE INSTRUCTIONS
Heart Failure Resources:  Heart Failure Interactive Workbook:  Go to https://[employer]/publication/?r=752098 for a Free Heart Failure Interactive Workbook provided by The American Heart Association. This interactive workbook will provide information on Healthier Living with Heart Failure. Please copy and paste link into search bar. Use your mouse to scroll through the pages.    HF [place] marika:   Heart Failure Free smart phone marika available for iPhone and Android download. Use your phone to track sodium intake, fluid intake, symptoms, and weight.     Low Sodium Diet / Recipes:  Go to www.[employer] website for “renal” diet which is Low Sodium! [employer] is a dialysis company, but this website offers free seasonal cookbooks. Each quarter, they will release 25-30 new recipes with a breakdown of calories, sodium, and glucose. You can also go to [employer]/recipes website for free recipes.     Discharge Instruction Video:  Scan the QR code below with your camera and click the canva.com link to open the video and watch educational information on Heart Failure and Medications from one of our nurses.   https://www."[employer]/[email or web address]_[email or web address]/edit    Home Exercise Program:   Identification of Green/Yellow/Red zones:  You should be able to identify when you feel good (green zone), if you have 1-2 symptoms of HF (yellow zone), or if you are in need of medical attention (red zone).  In your CHF education folder you were provided a “stop light tool” to outline this information.     We want to you to rate your exertion levels:    Our therapy team has discussed means of identification with you such as the \"Mitzy scale.\"  The Mitzy rating scale ranges from 6 to 20, where 6 means \"no exertion at all\" and 20 means \"maximal exertion.\" The goal is to use this to gauge how much effort it is taking for you to do your normal daily tasks.   You should be able to recognize when too much exertion is  breathing is unlabored without accessory muscle use. being expended.    Elements of Energy Conservation:   Prioritize/Plan: Decide what needs to be done today, and what can wait for a later date, write to do lists, plan ahead to avoid extra trips, and gather supplies and equipment needed before starting an activity.   Position: Avoid tiring and awkward posture that may impair breathing.  Pace: Slow and steady pace, never rushing!  Pursed lip breathing.  Pursed Lip Breathing: \"Smell the roses, blow out the candles\".

## 2025-08-03 ENCOUNTER — APPOINTMENT (OUTPATIENT)
Dept: CT IMAGING | Age: 85
DRG: 391 | End: 2025-08-03
Payer: MEDICARE

## 2025-08-03 ENCOUNTER — HOSPITAL ENCOUNTER (INPATIENT)
Age: 85
LOS: 2 days | Discharge: NURSING, REHAB OR LONG-TERM CARE | DRG: 391 | End: 2025-08-05
Attending: STUDENT IN AN ORGANIZED HEALTH CARE EDUCATION/TRAINING PROGRAM | Admitting: INTERNAL MEDICINE
Payer: MEDICARE
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
## 2025-08-03 DIAGNOSIS — K52.9 COLITIS: Primary | ICD-10-CM
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
## 2025-08-03 DIAGNOSIS — K52.9 ACUTE COLITIS: ICD-10-CM

## 2025-08-03 LAB
ALBUMIN SERPL-MCNC: 2.3 G/DL (ref 3.4–5)
ALBUMIN/GLOB SERPL: 0.7 {RATIO} (ref 1.1–2.2)
ALP SERPL-CCNC: 173 U/L (ref 40–129)
ALT SERPL-CCNC: 12 U/L (ref 10–40)
ANION GAP SERPL CALCULATED.3IONS-SCNC: 22 MMOL/L (ref 3–16)
APTT BLD: 33.8 SEC (ref 22.8–35.8)
AST SERPL-CCNC: 27 U/L (ref 15–37)
BASE EXCESS BLDV CALC-SCNC: -11.1 MMOL/L (ref -3–3)
BASOPHILS # BLD: 0 K/UL (ref 0–0.2)
BASOPHILS NFR BLD: 0.1 %
BILIRUB SERPL-MCNC: 0.3 MG/DL (ref 0–1)
BUN SERPL-MCNC: 28 MG/DL (ref 7–20)
CALCIUM SERPL-MCNC: 8.4 MG/DL (ref 8.3–10.6)
CHLORIDE SERPL-SCNC: 107 MMOL/L (ref 99–110)
CK SERPL-CCNC: 232 U/L (ref 26–192)
CO2 BLDV-SCNC: 15 MMOL/L
CO2 SERPL-SCNC: 12 MMOL/L (ref 21–32)
COHGB MFR BLDV: 3.4 % (ref 0–1.5)
CREAT SERPL-MCNC: 1.6 MG/DL (ref 0.6–1.2)
D-DIMER QUANTITATIVE: 2.4 UG/ML FEU (ref 0–0.6)
DEPRECATED RDW RBC AUTO: 19.2 % (ref 12.4–15.4)
EOSINOPHIL # BLD: 0 K/UL (ref 0–0.6)
EOSINOPHIL NFR BLD: 0 %
FLUAV RNA RESP QL NAA+PROBE: NOT DETECTED
FLUBV RNA RESP QL NAA+PROBE: NOT DETECTED
GFR SERPLBLD CREATININE-BSD FMLA CKD-EPI: 31 ML/MIN/{1.73_M2}
GLUCOSE SERPL-MCNC: 78 MG/DL (ref 70–99)
HCO3 BLDV-SCNC: 13.9 MMOL/L (ref 23–29)
HCT VFR BLD AUTO: 28.6 % (ref 36–48)
HGB BLD-MCNC: 8.9 G/DL (ref 12–16)
INR PPP: 1.1 (ref 0.86–1.14)
LACTATE BLDV-SCNC: 1 MMOL/L (ref 0.4–1.9)
LACTATE BLDV-SCNC: 2.6 MMOL/L (ref 0.4–1.9)
LYMPHOCYTES # BLD: 0.5 K/UL (ref 1–5.1)
LYMPHOCYTES NFR BLD: 4.3 %
MCH RBC QN AUTO: 25.1 PG (ref 26–34)
MCHC RBC AUTO-ENTMCNC: 31.2 G/DL (ref 31–36)
MCV RBC AUTO: 80.5 FL (ref 80–100)
METHGB MFR BLDV: 0 %
MONOCYTES # BLD: 0.3 K/UL (ref 0–1.3)
MONOCYTES NFR BLD: 2.4 %
NEUTROPHILS # BLD: 11.4 K/UL (ref 1.7–7.7)
NEUTROPHILS NFR BLD: 93.2 %
O2 CT VFR BLDV CALC: 9 VOL %
O2 THERAPY: ABNORMAL
PCO2 BLDV: 27.8 MMHG (ref 40–50)
PH BLDV: 7.32 [PH] (ref 7.35–7.45)
PLATELET # BLD AUTO: 376 K/UL (ref 135–450)
PMV BLD AUTO: 6.5 FL (ref 5–10.5)
PO2 BLDV: 49.6 MMHG (ref 25–40)
POTASSIUM SERPL-SCNC: 3.6 MMOL/L (ref 3.5–5.1)
PROCALCITONIN SERPL IA-MCNC: 0.16 NG/ML (ref 0–0.15)
PROT SERPL-MCNC: 5.6 G/DL (ref 6.4–8.2)
PROTHROMBIN TIME: 14.5 SEC (ref 12.1–14.9)
RBC # BLD AUTO: 3.56 M/UL (ref 4–5.2)
SAO2 % BLDV: 83 %
SARS-COV-2 RNA RESP QL NAA+PROBE: NOT DETECTED
SODIUM SERPL-SCNC: 141 MMOL/L (ref 136–145)
TROPONIN, HIGH SENSITIVITY: 28 NG/L (ref 0–14)
TROPONIN, HIGH SENSITIVITY: 29 NG/L (ref 0–14)
TSH SERPL DL<=0.005 MIU/L-ACNC: 2.04 UIU/ML (ref 0.27–4.2)
WBC # BLD AUTO: 12.2 K/UL (ref 4–11)

## 2025-08-03 PROCEDURE — 85379 FIBRIN DEGRADATION QUANT: CPT

## 2025-08-03 PROCEDURE — 2580000003 HC RX 258: Performed by: STUDENT IN AN ORGANIZED HEALTH CARE EDUCATION/TRAINING PROGRAM

## 2025-08-03 PROCEDURE — 87636 SARSCOV2 & INF A&B AMP PRB: CPT

## 2025-08-03 PROCEDURE — 84443 ASSAY THYROID STIM HORMONE: CPT

## 2025-08-03 PROCEDURE — 1200000000 HC SEMI PRIVATE

## 2025-08-03 PROCEDURE — 83605 ASSAY OF LACTIC ACID: CPT

## 2025-08-03 PROCEDURE — 96367 TX/PROPH/DG ADDL SEQ IV INF: CPT

## 2025-08-03 PROCEDURE — 85730 THROMBOPLASTIN TIME PARTIAL: CPT

## 2025-08-03 PROCEDURE — 74177 CT ABD & PELVIS W/CONTRAST: CPT

## 2025-08-03 PROCEDURE — 84145 PROCALCITONIN (PCT): CPT

## 2025-08-03 PROCEDURE — 85025 COMPLETE CBC W/AUTO DIFF WBC: CPT

## 2025-08-03 PROCEDURE — 82803 BLOOD GASES ANY COMBINATION: CPT

## 2025-08-03 PROCEDURE — 84484 ASSAY OF TROPONIN QUANT: CPT

## 2025-08-03 PROCEDURE — 96366 THER/PROPH/DIAG IV INF ADDON: CPT

## 2025-08-03 PROCEDURE — 96365 THER/PROPH/DIAG IV INF INIT: CPT

## 2025-08-03 PROCEDURE — 80053 COMPREHEN METABOLIC PANEL: CPT

## 2025-08-03 PROCEDURE — 93005 ELECTROCARDIOGRAM TRACING: CPT | Performed by: STUDENT IN AN ORGANIZED HEALTH CARE EDUCATION/TRAINING PROGRAM

## 2025-08-03 PROCEDURE — 71260 CT THORAX DX C+: CPT

## 2025-08-03 PROCEDURE — 82550 ASSAY OF CK (CPK): CPT

## 2025-08-03 PROCEDURE — 6360000004 HC RX CONTRAST MEDICATION: Performed by: STUDENT IN AN ORGANIZED HEALTH CARE EDUCATION/TRAINING PROGRAM

## 2025-08-03 PROCEDURE — 87040 BLOOD CULTURE FOR BACTERIA: CPT

## 2025-08-03 PROCEDURE — 6360000002 HC RX W HCPCS: Performed by: STUDENT IN AN ORGANIZED HEALTH CARE EDUCATION/TRAINING PROGRAM

## 2025-08-03 PROCEDURE — 99285 EMERGENCY DEPT VISIT HI MDM: CPT

## 2025-08-03 PROCEDURE — 85610 PROTHROMBIN TIME: CPT

## 2025-08-03 RX ORDER — SODIUM CHLORIDE 9 MG/ML
1000 INJECTION, SOLUTION INTRAVENOUS CONTINUOUS
Status: DISCONTINUED | OUTPATIENT
Start: 2025-08-03 | End: 2025-08-04

## 2025-08-03 RX ORDER — 0.9 % SODIUM CHLORIDE 0.9 %
1000 INTRAVENOUS SOLUTION INTRAVENOUS ONCE
Status: COMPLETED | OUTPATIENT
Start: 2025-08-03 | End: 2025-08-03

## 2025-08-03 RX ORDER — METRONIDAZOLE 500 MG/100ML
500 INJECTION, SOLUTION INTRAVENOUS EVERY 8 HOURS
Status: DISCONTINUED | OUTPATIENT
Start: 2025-08-03 | End: 2025-08-05 | Stop reason: HOSPADM

## 2025-08-03 RX ORDER — IOPAMIDOL 755 MG/ML
70 INJECTION, SOLUTION INTRAVASCULAR
Status: COMPLETED | OUTPATIENT
Start: 2025-08-03 | End: 2025-08-03

## 2025-08-03 RX ADMIN — IOPAMIDOL 70 ML: 755 INJECTION, SOLUTION INTRAVENOUS at 21:48

## 2025-08-03 RX ADMIN — VANCOMYCIN HYDROCHLORIDE 1250 MG: 10 INJECTION, POWDER, LYOPHILIZED, FOR SOLUTION INTRAVENOUS at 21:40

## 2025-08-03 RX ADMIN — SODIUM CHLORIDE 1000 ML: 0.9 INJECTION, SOLUTION INTRAVENOUS at 21:12

## 2025-08-03 RX ADMIN — SODIUM CHLORIDE 1000 ML: 0.9 INJECTION, SOLUTION INTRAVENOUS at 23:11

## 2025-08-03 RX ADMIN — METRONIDAZOLE 500 MG: 500 INJECTION, SOLUTION INTRAVENOUS at 23:39

## 2025-08-03 ASSESSMENT — PAIN DESCRIPTION - LOCATION: LOCATION: GENERALIZED

## 2025-08-03 ASSESSMENT — PAIN - FUNCTIONAL ASSESSMENT: PAIN_FUNCTIONAL_ASSESSMENT: 0-10

## 2025-08-03 ASSESSMENT — PAIN SCALES - GENERAL: PAINLEVEL_OUTOF10: 9

## 2025-08-03 ASSESSMENT — PAIN DESCRIPTION - DESCRIPTORS: DESCRIPTORS: ACHING

## 2025-08-04 LAB
ALBUMIN SERPL-MCNC: 2 G/DL (ref 3.4–5)
ALBUMIN/GLOB SERPL: 0.6 {RATIO} (ref 1.1–2.2)
ALP SERPL-CCNC: 144 U/L (ref 40–129)
ALT SERPL-CCNC: 10 U/L (ref 10–40)
ANION GAP SERPL CALCULATED.3IONS-SCNC: 18 MMOL/L (ref 3–16)
AST SERPL-CCNC: 33 U/L (ref 15–37)
BACTERIA URNS QL MICRO: ABNORMAL /HPF
BASOPHILS # BLD: 0 K/UL (ref 0–0.2)
BASOPHILS NFR BLD: 0 %
BILIRUB SERPL-MCNC: <0.2 MG/DL (ref 0–1)
BILIRUB UR QL STRIP.AUTO: ABNORMAL
BUN SERPL-MCNC: 27 MG/DL (ref 7–20)
CALCIUM SERPL-MCNC: 7.2 MG/DL (ref 8.3–10.6)
CHLORIDE SERPL-SCNC: 111 MMOL/L (ref 99–110)
CK SERPL-CCNC: 331 U/L (ref 26–192)
CLARITY UR: CLEAR
CO2 SERPL-SCNC: 11 MMOL/L (ref 21–32)
COLOR UR: YELLOW
CREAT SERPL-MCNC: 1.3 MG/DL (ref 0.6–1.2)
DEPRECATED RDW RBC AUTO: 19.8 % (ref 12.4–15.4)
EOSINOPHIL # BLD: 0 K/UL (ref 0–0.6)
EOSINOPHIL NFR BLD: 0 %
EPI CELLS #/AREA URNS HPF: ABNORMAL /HPF (ref 0–5)
GFR SERPLBLD CREATININE-BSD FMLA CKD-EPI: 40 ML/MIN/{1.73_M2}
GLUCOSE BLD-MCNC: 121 MG/DL (ref 70–99)
GLUCOSE BLD-MCNC: 133 MG/DL (ref 70–99)
GLUCOSE SERPL-MCNC: 86 MG/DL (ref 70–99)
GLUCOSE UR STRIP.AUTO-MCNC: NEGATIVE MG/DL
HCT VFR BLD AUTO: 24.8 % (ref 36–48)
HCT VFR BLD AUTO: 27.1 % (ref 36–48)
HGB BLD-MCNC: 7.7 G/DL (ref 12–16)
HGB BLD-MCNC: 8.5 G/DL (ref 12–16)
HGB UR QL STRIP.AUTO: ABNORMAL
HYALINE CASTS #/AREA URNS LPF: ABNORMAL /LPF (ref 0–2)
KETONES UR STRIP.AUTO-MCNC: ABNORMAL MG/DL
LEUKOCYTE ESTERASE UR QL STRIP.AUTO: NEGATIVE
LYMPHOCYTES # BLD: 0.7 K/UL (ref 1–5.1)
LYMPHOCYTES NFR BLD: 6.8 %
MCH RBC QN AUTO: 26.3 PG (ref 26–34)
MCHC RBC AUTO-ENTMCNC: 31.3 G/DL (ref 31–36)
MCV RBC AUTO: 83.8 FL (ref 80–100)
MONOCYTES # BLD: 0.4 K/UL (ref 0–1.3)
MONOCYTES NFR BLD: 3.3 %
NEUTROPHILS # BLD: 9.6 K/UL (ref 1.7–7.7)
NEUTROPHILS NFR BLD: 89.9 %
NITRITE UR QL STRIP.AUTO: POSITIVE
PERFORMED ON: ABNORMAL
PERFORMED ON: ABNORMAL
PH UR STRIP.AUTO: 6 [PH] (ref 5–8)
PLATELET # BLD AUTO: 272 K/UL (ref 135–450)
PMV BLD AUTO: 6.6 FL (ref 5–10.5)
POTASSIUM SERPL-SCNC: 3.9 MMOL/L (ref 3.5–5.1)
PROT SERPL-MCNC: 5.5 G/DL (ref 6.4–8.2)
PROT UR STRIP.AUTO-MCNC: ABNORMAL MG/DL
RBC # BLD AUTO: 3.24 M/UL (ref 4–5.2)
RBC #/AREA URNS HPF: ABNORMAL /HPF (ref 0–4)
SODIUM SERPL-SCNC: 140 MMOL/L (ref 136–145)
SP GR UR STRIP.AUTO: 1.02 (ref 1–1.03)
UA COMPLETE W REFLEX CULTURE PNL UR: ABNORMAL
UA DIPSTICK W REFLEX MICRO PNL UR: YES
URN SPEC COLLECT METH UR: ABNORMAL
UROBILINOGEN UR STRIP-ACNC: 0.2 E.U./DL
WBC # BLD AUTO: 10.7 K/UL (ref 4–11)
WBC #/AREA URNS HPF: ABNORMAL /HPF (ref 0–5)

## 2025-08-04 PROCEDURE — 97162 PT EVAL MOD COMPLEX 30 MIN: CPT

## 2025-08-04 PROCEDURE — 87077 CULTURE AEROBIC IDENTIFY: CPT

## 2025-08-04 PROCEDURE — 85018 HEMOGLOBIN: CPT

## 2025-08-04 PROCEDURE — 2580000003 HC RX 258

## 2025-08-04 PROCEDURE — 2580000003 HC RX 258: Performed by: INTERNAL MEDICINE

## 2025-08-04 PROCEDURE — 2500000003 HC RX 250 WO HCPCS: Performed by: INTERNAL MEDICINE

## 2025-08-04 PROCEDURE — 87186 SC STD MICRODIL/AGAR DIL: CPT

## 2025-08-04 PROCEDURE — 85014 HEMATOCRIT: CPT

## 2025-08-04 PROCEDURE — 6370000000 HC RX 637 (ALT 250 FOR IP)

## 2025-08-04 PROCEDURE — 85025 COMPLETE CBC W/AUTO DIFF WBC: CPT

## 2025-08-04 PROCEDURE — 1200000000 HC SEMI PRIVATE

## 2025-08-04 PROCEDURE — 97530 THERAPEUTIC ACTIVITIES: CPT

## 2025-08-04 PROCEDURE — 51798 US URINE CAPACITY MEASURE: CPT

## 2025-08-04 PROCEDURE — 82550 ASSAY OF CK (CPK): CPT

## 2025-08-04 PROCEDURE — 97535 SELF CARE MNGMENT TRAINING: CPT

## 2025-08-04 PROCEDURE — 36415 COLL VENOUS BLD VENIPUNCTURE: CPT

## 2025-08-04 PROCEDURE — 80053 COMPREHEN METABOLIC PANEL: CPT

## 2025-08-04 PROCEDURE — 6370000000 HC RX 637 (ALT 250 FOR IP): Performed by: PHYSICIAN ASSISTANT

## 2025-08-04 PROCEDURE — 87086 URINE CULTURE/COLONY COUNT: CPT

## 2025-08-04 PROCEDURE — 6360000002 HC RX W HCPCS: Performed by: INTERNAL MEDICINE

## 2025-08-04 PROCEDURE — 81001 URINALYSIS AUTO W/SCOPE: CPT

## 2025-08-04 PROCEDURE — 6370000000 HC RX 637 (ALT 250 FOR IP): Performed by: INTERNAL MEDICINE

## 2025-08-04 PROCEDURE — 6360000002 HC RX W HCPCS

## 2025-08-04 PROCEDURE — 97166 OT EVAL MOD COMPLEX 45 MIN: CPT

## 2025-08-04 PROCEDURE — 51701 INSERT BLADDER CATHETER: CPT

## 2025-08-04 PROCEDURE — 6360000002 HC RX W HCPCS: Performed by: STUDENT IN AN ORGANIZED HEALTH CARE EDUCATION/TRAINING PROGRAM

## 2025-08-04 RX ORDER — METOPROLOL TARTRATE 25 MG/1
12.5 TABLET, FILM COATED ORAL 2 TIMES DAILY
Status: DISCONTINUED | OUTPATIENT
Start: 2025-08-04 | End: 2025-08-05 | Stop reason: HOSPADM

## 2025-08-04 RX ORDER — DRONABINOL 2.5 MG/1
5 CAPSULE ORAL 2 TIMES DAILY
Status: DISCONTINUED | OUTPATIENT
Start: 2025-08-04 | End: 2025-08-04 | Stop reason: SDUPTHER

## 2025-08-04 RX ORDER — BUSPIRONE HYDROCHLORIDE 10 MG/1
10 TABLET ORAL 2 TIMES DAILY
Status: DISCONTINUED | OUTPATIENT
Start: 2025-08-04 | End: 2025-08-05 | Stop reason: HOSPADM

## 2025-08-04 RX ORDER — ONDANSETRON 4 MG/1
4 TABLET, ORALLY DISINTEGRATING ORAL EVERY 8 HOURS PRN
Status: DISCONTINUED | OUTPATIENT
Start: 2025-08-04 | End: 2025-08-05 | Stop reason: HOSPADM

## 2025-08-04 RX ORDER — TORSEMIDE 20 MG/1
40 TABLET ORAL DAILY
Status: DISCONTINUED | OUTPATIENT
Start: 2025-08-04 | End: 2025-08-05 | Stop reason: HOSPADM

## 2025-08-04 RX ORDER — BUDESONIDE 3 MG/1
9 CAPSULE, COATED PELLETS ORAL EVERY MORNING
Status: DISCONTINUED | OUTPATIENT
Start: 2025-08-04 | End: 2025-08-04 | Stop reason: ALTCHOICE

## 2025-08-04 RX ORDER — HYDROXYZINE HYDROCHLORIDE 25 MG/1
25 TABLET, FILM COATED ORAL 3 TIMES DAILY PRN
Status: DISCONTINUED | OUTPATIENT
Start: 2025-08-04 | End: 2025-08-05 | Stop reason: HOSPADM

## 2025-08-04 RX ORDER — ACETAMINOPHEN 325 MG/1
650 TABLET ORAL EVERY 6 HOURS PRN
Status: DISCONTINUED | OUTPATIENT
Start: 2025-08-04 | End: 2025-08-05 | Stop reason: HOSPADM

## 2025-08-04 RX ORDER — OXYCODONE AND ACETAMINOPHEN 7.5; 325 MG/1; MG/1
1 TABLET ORAL EVERY 6 HOURS PRN
Status: DISCONTINUED | OUTPATIENT
Start: 2025-08-04 | End: 2025-08-05 | Stop reason: HOSPADM

## 2025-08-04 RX ORDER — SODIUM CHLORIDE 9 MG/ML
INJECTION, SOLUTION INTRAVENOUS PRN
Status: DISCONTINUED | OUTPATIENT
Start: 2025-08-04 | End: 2025-08-05 | Stop reason: HOSPADM

## 2025-08-04 RX ORDER — DOCUSATE SODIUM 100 MG/1
100 CAPSULE, LIQUID FILLED ORAL 2 TIMES DAILY
Status: DISCONTINUED | OUTPATIENT
Start: 2025-08-04 | End: 2025-08-04 | Stop reason: SINTOL

## 2025-08-04 RX ORDER — ONDANSETRON 2 MG/ML
4 INJECTION INTRAMUSCULAR; INTRAVENOUS EVERY 6 HOURS PRN
Status: DISCONTINUED | OUTPATIENT
Start: 2025-08-04 | End: 2025-08-05 | Stop reason: HOSPADM

## 2025-08-04 RX ORDER — ATORVASTATIN CALCIUM 40 MG/1
40 TABLET, FILM COATED ORAL DAILY
Status: DISCONTINUED | OUTPATIENT
Start: 2025-08-04 | End: 2025-08-05 | Stop reason: HOSPADM

## 2025-08-04 RX ORDER — ASPIRIN 81 MG/1
81 TABLET ORAL DAILY
Status: DISCONTINUED | OUTPATIENT
Start: 2025-08-04 | End: 2025-08-05 | Stop reason: HOSPADM

## 2025-08-04 RX ORDER — POLYETHYLENE GLYCOL 3350 17 G/17G
17 POWDER, FOR SOLUTION ORAL DAILY
Status: DISCONTINUED | OUTPATIENT
Start: 2025-08-04 | End: 2025-08-04 | Stop reason: SDUPTHER

## 2025-08-04 RX ORDER — ACETAMINOPHEN 650 MG/1
650 SUPPOSITORY RECTAL EVERY 6 HOURS PRN
Status: DISCONTINUED | OUTPATIENT
Start: 2025-08-04 | End: 2025-08-05 | Stop reason: HOSPADM

## 2025-08-04 RX ORDER — POLYETHYLENE GLYCOL 3350 17 G/17G
17 POWDER, FOR SOLUTION ORAL DAILY
Status: DISCONTINUED | OUTPATIENT
Start: 2025-08-04 | End: 2025-08-04

## 2025-08-04 RX ORDER — PANTOPRAZOLE SODIUM 40 MG/1
40 TABLET, DELAYED RELEASE ORAL
Status: DISCONTINUED | OUTPATIENT
Start: 2025-08-04 | End: 2025-08-05 | Stop reason: HOSPADM

## 2025-08-04 RX ORDER — OXYCODONE AND ACETAMINOPHEN 5; 325 MG/1; MG/1
1 TABLET ORAL EVERY 4 HOURS PRN
Refills: 0 | Status: DISCONTINUED | OUTPATIENT
Start: 2025-08-04 | End: 2025-08-04 | Stop reason: SDUPTHER

## 2025-08-04 RX ORDER — CETIRIZINE HYDROCHLORIDE 10 MG/1
5 TABLET ORAL DAILY
Status: DISCONTINUED | OUTPATIENT
Start: 2025-08-04 | End: 2025-08-05 | Stop reason: HOSPADM

## 2025-08-04 RX ORDER — TRAZODONE HYDROCHLORIDE 100 MG/1
100 TABLET ORAL 2 TIMES DAILY
Status: DISCONTINUED | OUTPATIENT
Start: 2025-08-04 | End: 2025-08-05 | Stop reason: HOSPADM

## 2025-08-04 RX ORDER — SODIUM CHLORIDE 9 MG/ML
1000 INJECTION, SOLUTION INTRAVENOUS CONTINUOUS
Status: DISCONTINUED | OUTPATIENT
Start: 2025-08-04 | End: 2025-08-04

## 2025-08-04 RX ORDER — DICLOFENAC SODIUM 75 MG/1
75 TABLET, DELAYED RELEASE ORAL 2 TIMES DAILY
Status: ON HOLD | COMMUNITY
End: 2025-08-05 | Stop reason: HOSPADM

## 2025-08-04 RX ORDER — AMLODIPINE BESYLATE 5 MG/1
5 TABLET ORAL NIGHTLY
Status: DISCONTINUED | OUTPATIENT
Start: 2025-08-04 | End: 2025-08-05 | Stop reason: HOSPADM

## 2025-08-04 RX ORDER — NYSTATIN 100000 [USP'U]/G
POWDER TOPICAL 4 TIMES DAILY
Status: ON HOLD | COMMUNITY
End: 2025-08-05 | Stop reason: HOSPADM

## 2025-08-04 RX ORDER — SPIRONOLACTONE 25 MG/1
25 TABLET ORAL DAILY
Status: ON HOLD | COMMUNITY
End: 2025-08-05 | Stop reason: HOSPADM

## 2025-08-04 RX ORDER — GINSENG 100 MG
1 CAPSULE ORAL DAILY
Status: ON HOLD | COMMUNITY
End: 2025-08-05 | Stop reason: HOSPADM

## 2025-08-04 RX ORDER — CLOTRIMAZOLE 1 %
CREAM (GRAM) TOPICAL 2 TIMES DAILY
Status: ON HOLD | COMMUNITY
End: 2025-08-05 | Stop reason: HOSPADM

## 2025-08-04 RX ORDER — POLYETHYLENE GLYCOL 3350 17 G/17G
17 POWDER, FOR SOLUTION ORAL DAILY PRN
Status: DISCONTINUED | OUTPATIENT
Start: 2025-08-04 | End: 2025-08-05 | Stop reason: HOSPADM

## 2025-08-04 RX ORDER — TAMSULOSIN HYDROCHLORIDE 0.4 MG/1
0.4 CAPSULE ORAL DAILY
Status: DISCONTINUED | OUTPATIENT
Start: 2025-08-04 | End: 2025-08-05 | Stop reason: HOSPADM

## 2025-08-04 RX ORDER — DRONABINOL 2.5 MG/1
5 CAPSULE ORAL 2 TIMES DAILY
Status: DISCONTINUED | OUTPATIENT
Start: 2025-08-04 | End: 2025-08-05 | Stop reason: HOSPADM

## 2025-08-04 RX ORDER — VALSARTAN 80 MG/1
80 TABLET ORAL DAILY
Status: DISCONTINUED | OUTPATIENT
Start: 2025-08-04 | End: 2025-08-05 | Stop reason: HOSPADM

## 2025-08-04 RX ORDER — HEPARIN SODIUM 5000 [USP'U]/ML
5000 INJECTION, SOLUTION INTRAVENOUS; SUBCUTANEOUS 2 TIMES DAILY
Status: DISCONTINUED | OUTPATIENT
Start: 2025-08-04 | End: 2025-08-05 | Stop reason: HOSPADM

## 2025-08-04 RX ORDER — SODIUM CHLORIDE 0.9 % (FLUSH) 0.9 %
5-40 SYRINGE (ML) INJECTION EVERY 12 HOURS SCHEDULED
Status: DISCONTINUED | OUTPATIENT
Start: 2025-08-04 | End: 2025-08-05 | Stop reason: HOSPADM

## 2025-08-04 RX ORDER — SODIUM CHLORIDE 0.9 % (FLUSH) 0.9 %
5-40 SYRINGE (ML) INJECTION PRN
Status: DISCONTINUED | OUTPATIENT
Start: 2025-08-04 | End: 2025-08-05 | Stop reason: HOSPADM

## 2025-08-04 RX ADMIN — SODIUM CHLORIDE 1000 MG: 9 INJECTION, SOLUTION INTRAVENOUS at 17:26

## 2025-08-04 RX ADMIN — DRONABINOL 5 MG: 2.5 CAPSULE ORAL at 14:49

## 2025-08-04 RX ADMIN — ASPIRIN 81 MG: 81 TABLET, COATED ORAL at 08:58

## 2025-08-04 RX ADMIN — METOPROLOL TARTRATE 12.5 MG: 25 TABLET, FILM COATED ORAL at 08:57

## 2025-08-04 RX ADMIN — DRONABINOL 5 MG: 2.5 CAPSULE ORAL at 20:35

## 2025-08-04 RX ADMIN — Medication 10 ML: at 08:59

## 2025-08-04 RX ADMIN — PANTOPRAZOLE SODIUM 40 MG: 40 TABLET, DELAYED RELEASE ORAL at 05:38

## 2025-08-04 RX ADMIN — POLYETHYLENE GLYCOL (3350) 17 G: 17 POWDER, FOR SOLUTION ORAL at 08:57

## 2025-08-04 RX ADMIN — HEPARIN SODIUM 5000 UNITS: 5000 INJECTION, SOLUTION INTRAVENOUS; SUBCUTANEOUS at 08:58

## 2025-08-04 RX ADMIN — SERTRALINE HYDROCHLORIDE 50 MG: 50 TABLET ORAL at 08:58

## 2025-08-04 RX ADMIN — HEPARIN SODIUM 5000 UNITS: 5000 INJECTION, SOLUTION INTRAVENOUS; SUBCUTANEOUS at 02:51

## 2025-08-04 RX ADMIN — TAMSULOSIN HYDROCHLORIDE 0.4 MG: 0.4 CAPSULE ORAL at 14:49

## 2025-08-04 RX ADMIN — METRONIDAZOLE 500 MG: 500 INJECTION, SOLUTION INTRAVENOUS at 23:26

## 2025-08-04 RX ADMIN — OXYCODONE HYDROCHLORIDE AND ACETAMINOPHEN 1 TABLET: 7.5; 325 TABLET ORAL at 20:39

## 2025-08-04 RX ADMIN — VALSARTAN 80 MG: 80 TABLET ORAL at 08:58

## 2025-08-04 RX ADMIN — SODIUM BICARBONATE: 84 INJECTION, SOLUTION INTRAVENOUS at 13:52

## 2025-08-04 RX ADMIN — OXYCODONE HYDROCHLORIDE AND ACETAMINOPHEN 1 TABLET: 7.5; 325 TABLET ORAL at 14:52

## 2025-08-04 RX ADMIN — METRONIDAZOLE 500 MG: 500 INJECTION, SOLUTION INTRAVENOUS at 16:15

## 2025-08-04 RX ADMIN — CETIRIZINE HYDROCHLORIDE 5 MG: 10 TABLET ORAL at 08:58

## 2025-08-04 RX ADMIN — ATORVASTATIN CALCIUM 40 MG: 40 TABLET, FILM COATED ORAL at 08:58

## 2025-08-04 RX ADMIN — SODIUM CHLORIDE 1000 ML: 0.9 INJECTION, SOLUTION INTRAVENOUS at 05:36

## 2025-08-04 RX ADMIN — METRONIDAZOLE 500 MG: 500 INJECTION, SOLUTION INTRAVENOUS at 06:54

## 2025-08-04 RX ADMIN — OXYCODONE HYDROCHLORIDE AND ACETAMINOPHEN 1 TABLET: 7.5; 325 TABLET ORAL at 01:33

## 2025-08-04 RX ADMIN — Medication 10 ML: at 20:35

## 2025-08-04 ASSESSMENT — PAIN SCALES - GENERAL
PAINLEVEL_OUTOF10: 9
PAINLEVEL_OUTOF10: 5
PAINLEVEL_OUTOF10: 5
PAINLEVEL_OUTOF10: 0
PAINLEVEL_OUTOF10: 9

## 2025-08-04 ASSESSMENT — PAIN DESCRIPTION - LOCATION: LOCATION: ABDOMEN

## 2025-08-04 ASSESSMENT — PAIN SCALES - WONG BAKER
WONGBAKER_NUMERICALRESPONSE: NO HURT
WONGBAKER_NUMERICALRESPONSE: NO HURT

## 2025-08-04 ASSESSMENT — PAIN DESCRIPTION - PAIN TYPE: TYPE: ACUTE PAIN

## 2025-08-04 ASSESSMENT — PAIN - FUNCTIONAL ASSESSMENT
PAIN_FUNCTIONAL_ASSESSMENT: ACTIVITIES ARE NOT PREVENTED
PAIN_FUNCTIONAL_ASSESSMENT: PREVENTS OR INTERFERES SOME ACTIVE ACTIVITIES AND ADLS

## 2025-08-04 ASSESSMENT — PAIN DESCRIPTION - ORIENTATION
ORIENTATION: MID;LEFT;RIGHT
ORIENTATION: MID

## 2025-08-04 ASSESSMENT — PAIN DESCRIPTION - ONSET: ONSET: ON-GOING

## 2025-08-04 ASSESSMENT — PAIN DESCRIPTION - DESCRIPTORS: DESCRIPTORS: THROBBING

## 2025-08-04 ASSESSMENT — PAIN DESCRIPTION - FREQUENCY: FREQUENCY: CONTINUOUS

## 2025-08-05 VITALS
DIASTOLIC BLOOD PRESSURE: 48 MMHG | HEART RATE: 66 BPM | RESPIRATION RATE: 16 BRPM | WEIGHT: 110 LBS | OXYGEN SATURATION: 96 % | HEIGHT: 60 IN | SYSTOLIC BLOOD PRESSURE: 124 MMHG | TEMPERATURE: 97.7 F | BODY MASS INDEX: 21.6 KG/M2
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
## 2025-08-05 LAB
ALBUMIN SERPL-MCNC: 1.8 G/DL (ref 3.4–5)
ALBUMIN/GLOB SERPL: 0.7 {RATIO} (ref 1.1–2.2)
ALP SERPL-CCNC: 116 U/L (ref 40–129)
ALT SERPL-CCNC: 14 U/L (ref 10–40)
ANION GAP SERPL CALCULATED.3IONS-SCNC: 12 MMOL/L (ref 3–16)
AST SERPL-CCNC: 55 U/L (ref 15–37)
BASOPHILS # BLD: 0 K/UL (ref 0–0.2)
BASOPHILS NFR BLD: 0.1 %
BILIRUB SERPL-MCNC: <0.2 MG/DL (ref 0–1)
BUN SERPL-MCNC: 22 MG/DL (ref 7–20)
CALCIUM SERPL-MCNC: 7.3 MG/DL (ref 8.3–10.6)
CHLORIDE SERPL-SCNC: 112 MMOL/L (ref 99–110)
CK SERPL-CCNC: 493 U/L (ref 26–192)
CO2 SERPL-SCNC: 15 MMOL/L (ref 21–32)
CREAT SERPL-MCNC: 1.4 MG/DL (ref 0.6–1.2)
DEPRECATED RDW RBC AUTO: 19.6 % (ref 12.4–15.4)
EKG ATRIAL RATE: 91 BPM
EKG DIAGNOSIS: NORMAL
EKG P AXIS: 84 DEGREES
EKG P-R INTERVAL: 132 MS
EKG Q-T INTERVAL: 388 MS
EKG QRS DURATION: 88 MS
EKG QTC CALCULATION (BAZETT): 477 MS
EKG R AXIS: -42 DEGREES
EKG T AXIS: 88 DEGREES
EKG VENTRICULAR RATE: 91 BPM
EOSINOPHIL # BLD: 0 K/UL (ref 0–0.6)
EOSINOPHIL NFR BLD: 0.4 %
GFR SERPLBLD CREATININE-BSD FMLA CKD-EPI: 37 ML/MIN/{1.73_M2}
GLUCOSE SERPL-MCNC: 126 MG/DL (ref 70–99)
HCT VFR BLD AUTO: 22.8 % (ref 36–48)
HGB BLD-MCNC: 7 G/DL (ref 12–16)
LYMPHOCYTES # BLD: 1.2 K/UL (ref 1–5.1)
LYMPHOCYTES NFR BLD: 13.9 %
MAGNESIUM SERPL-MCNC: 1.54 MG/DL (ref 1.8–2.4)
MCH RBC QN AUTO: 25.4 PG (ref 26–34)
MCHC RBC AUTO-ENTMCNC: 30.9 G/DL (ref 31–36)
MCV RBC AUTO: 82.2 FL (ref 80–100)
MONOCYTES # BLD: 0.3 K/UL (ref 0–1.3)
MONOCYTES NFR BLD: 4.1 %
NEUTROPHILS # BLD: 6.7 K/UL (ref 1.7–7.7)
NEUTROPHILS NFR BLD: 81.5 %
PHOSPHATE SERPL-MCNC: 1.9 MG/DL (ref 2.5–4.9)
PLATELET # BLD AUTO: 208 K/UL (ref 135–450)
PMV BLD AUTO: 6.4 FL (ref 5–10.5)
POTASSIUM SERPL-SCNC: 3 MMOL/L (ref 3.5–5.1)
POTASSIUM SERPL-SCNC: 3 MMOL/L (ref 3.5–5.1)
PROT SERPL-MCNC: 4.3 G/DL (ref 6.4–8.2)
RBC # BLD AUTO: 2.77 M/UL (ref 4–5.2)
SODIUM SERPL-SCNC: 139 MMOL/L (ref 136–145)
WBC # BLD AUTO: 8.3 K/UL (ref 4–11)

## 2025-08-05 PROCEDURE — 2500000003 HC RX 250 WO HCPCS: Performed by: INTERNAL MEDICINE

## 2025-08-05 PROCEDURE — 80053 COMPREHEN METABOLIC PANEL: CPT

## 2025-08-05 PROCEDURE — 6370000000 HC RX 637 (ALT 250 FOR IP)

## 2025-08-05 PROCEDURE — 6360000002 HC RX W HCPCS: Performed by: STUDENT IN AN ORGANIZED HEALTH CARE EDUCATION/TRAINING PROGRAM

## 2025-08-05 PROCEDURE — 6370000000 HC RX 637 (ALT 250 FOR IP): Performed by: INTERNAL MEDICINE

## 2025-08-05 PROCEDURE — 85025 COMPLETE CBC W/AUTO DIFF WBC: CPT

## 2025-08-05 PROCEDURE — 82550 ASSAY OF CK (CPK): CPT

## 2025-08-05 PROCEDURE — 83735 ASSAY OF MAGNESIUM: CPT

## 2025-08-05 PROCEDURE — 93010 ELECTROCARDIOGRAM REPORT: CPT | Performed by: STUDENT IN AN ORGANIZED HEALTH CARE EDUCATION/TRAINING PROGRAM

## 2025-08-05 PROCEDURE — 6370000000 HC RX 637 (ALT 250 FOR IP): Performed by: PHYSICIAN ASSISTANT

## 2025-08-05 PROCEDURE — 36415 COLL VENOUS BLD VENIPUNCTURE: CPT

## 2025-08-05 PROCEDURE — 2580000003 HC RX 258: Performed by: INTERNAL MEDICINE

## 2025-08-05 PROCEDURE — 99239 HOSP IP/OBS DSCHRG MGMT >30: CPT | Performed by: INTERNAL MEDICINE

## 2025-08-05 RX ORDER — OXYCODONE AND ACETAMINOPHEN 7.5; 325 MG/1; MG/1
1 TABLET ORAL EVERY 6 HOURS PRN
Qty: 8 TABLET | Refills: 0 | Status: SHIPPED | OUTPATIENT
Start: 2025-08-05 | End: 2025-08-07

## 2025-08-05 RX ADMIN — CETIRIZINE HYDROCHLORIDE 5 MG: 10 TABLET ORAL at 08:14

## 2025-08-05 RX ADMIN — SERTRALINE HYDROCHLORIDE 50 MG: 50 TABLET ORAL at 08:13

## 2025-08-05 RX ADMIN — PANTOPRAZOLE SODIUM 40 MG: 40 TABLET, DELAYED RELEASE ORAL at 07:18

## 2025-08-05 RX ADMIN — ASPIRIN 81 MG: 81 TABLET, COATED ORAL at 08:14

## 2025-08-05 RX ADMIN — DRONABINOL 5 MG: 2.5 CAPSULE ORAL at 08:13

## 2025-08-05 RX ADMIN — TAMSULOSIN HYDROCHLORIDE 0.4 MG: 0.4 CAPSULE ORAL at 08:13

## 2025-08-05 RX ADMIN — METOPROLOL TARTRATE 12.5 MG: 25 TABLET, FILM COATED ORAL at 08:14

## 2025-08-05 RX ADMIN — SODIUM BICARBONATE: 84 INJECTION, SOLUTION INTRAVENOUS at 07:51

## 2025-08-05 RX ADMIN — METRONIDAZOLE 500 MG: 500 INJECTION, SOLUTION INTRAVENOUS at 07:19

## 2025-08-05 RX ADMIN — ATORVASTATIN CALCIUM 40 MG: 40 TABLET, FILM COATED ORAL at 08:13

## 2025-08-06 LAB
A/G RATIO: 0.7 RATIO (ref 0.8–2.6)
ALBUMIN: 1.8 G/DL (ref 3.5–5.2)
ALP BLD-CCNC: 129 U/L (ref 23–144)
ALT SERPL-CCNC: 21 U/L (ref 0–60)
AST SERPL-CCNC: 50 U/L (ref 0–55)
BACTERIA UR CULT: ABNORMAL
BILIRUB SERPL-MCNC: 0.2 MG/DL (ref 0–1.2)
BUN / CREAT RATIO: 13 (ref 7–25)
BUN BLDV-MCNC: 16 MG/DL (ref 3–29)
CALCIUM SERPL-MCNC: 6.8 MG/DL (ref 8.5–10.5)
CHLORIDE BLD-SCNC: 109 MEQ/L (ref 96–110)
CO2: 20 MEQ/L (ref 19–32)
CREAT SERPL-MCNC: 1.2 MG/DL (ref 0.5–1.2)
ESTIMATED GLOMERULAR FILTRATION RATE CREATININE EQUATION: 44 MLS/MIN/1.73M2
FASTING STATUS: ABNORMAL
GLOBULIN: 2.6 G/DL (ref 1.9–3.6)
GLUCOSE BLD-MCNC: 68 MG/DL (ref 70–99)
HCT VFR BLD CALC: 22.5 % (ref 34–49)
HEMOGLOBIN: 6.8 G/DL (ref 11.2–15.7)
MCH RBC QN AUTO: 25 PG (ref 26–34)
MCHC RBC AUTO-ENTMCNC: 30.2 G/DL (ref 30.7–35.5)
MCV RBC AUTO: 82.7 FL (ref 80–100)
ORGANISM: ABNORMAL
PDW BLD-RTO: 16.4 %
PLATELET # BLD: 175 K/UL (ref 140–400)
PMV BLD AUTO: 9.2 FL (ref 7.2–11.7)
POTASSIUM SERPL-SCNC: 2.9 MEQ/L (ref 3.4–5.3)
RBC # BLD: 2.72 M/UL (ref 3.95–5.26)
SODIUM BLD-SCNC: 140 MEQ/L (ref 135–148)
TOTAL PROTEIN: 4.4 G/DL (ref 6–8.3)
WBC # BLD: 9.5 K/UL (ref 3.5–10.9)

## 2025-08-07 LAB — BACTERIA BLD CULT ORG #2: NORMAL

## 2025-08-08 LAB — BACTERIA BLD CULT: NORMAL

## (undated) DEVICE — BLOOD TRANSFUSION FILTER: Brand: HAEMONETICS

## (undated) DEVICE — ENDO CARRY-ON PROCEDURE KIT INCLUDES SUCTION TUBING, LUBRICANT, GAUZE, BIOHAZARD STICKER, TRANSPORT PAD AND INTERCEPT BEDSIDE KIT.: Brand: ENDO CARRY-ON PROCEDURE KIT

## (undated) DEVICE — STAPLER EXT SKIN 35 WIDE S STL STPL SQUEEZE HNDL VISISTAT

## (undated) DEVICE — SOLUTION IRRIG 2000ML 0.9% SOD CHL USP UROMATIC PLAS CONT

## (undated) DEVICE — YANKAUER,BULB TIP,W/O VENT,RIGID,STERILE: Brand: MEDLINE

## (undated) DEVICE — TTL1LYR 16FR10ML 100%SIL TMPST TR: Brand: MEDLINE

## (undated) DEVICE — GOWN SIRUS NONREIN LG W/TWL: Brand: MEDLINE INDUSTRIES, INC.

## (undated) DEVICE — GOWN,REINFORCED,POLY,AURORA,XLARGE,STRL: Brand: MEDLINE

## (undated) DEVICE — CANNULA,OXY,ADULT,SUPERSOFT,W/7'TUB,SC: Brand: MEDLINE INDUSTRIES, INC.

## (undated) DEVICE — CATHETERIZATION KIT 7 FRX16 CM 3L

## (undated) DEVICE — APPLIER LIG CLP M L11IN TI STR RNG HNDL FOR 20 CLP DISP

## (undated) DEVICE — FOGARTY - HYDRAGRIP SURGICAL - CLAMP INSERTS: Brand: FOGARTY SOFTJAW

## (undated) DEVICE — SUTURE PROL SZ 3-0 L48IN NONABSORBABLE BLU SH L26MM 1/2 CIR 8534H

## (undated) DEVICE — RETAINER VISCERA GLASSMAN FISH DISP ST

## (undated) DEVICE — SPONGE LAP W18XL18IN WHT COT 4 PLY FLD STRUNG RADPQ DISP ST 2 PER PACK

## (undated) DEVICE — AGENT HEMSTAT W2XL4IN OXIDIZED REGENERATED CELOS ABSRB

## (undated) DEVICE — SYRINGE MED 20ML STD CLR PLAS LUERLOCK TIP N CTRL DISP

## (undated) DEVICE — SUTURE VICRYL + SZ 3-0 L27IN ABSRB UD L26MM SH 1/2 CIR VCP416H

## (undated) DEVICE — SUTURE GOR TX SZ 4-0 L36IN NONABSORBABLE L13MM TTC-13 3/8 5N02A

## (undated) DEVICE — 3M™ IOBAN™ 2 ANTIMICROBIAL INCISE DRAPE 6651EZ: Brand: IOBAN™ 2

## (undated) DEVICE — TOWEL,OR,DSP,ST,BLUE,STD,6/PK,12PK/CS: Brand: MEDLINE

## (undated) DEVICE — PINNACLE PRECISION ACCESS SYSTEM INTRODUCER SHEATH: Brand: PINNACLE PRECISION ACCESS SYSTEM

## (undated) DEVICE — DISH PETRI ST LF

## (undated) DEVICE — PERFUSION SET 120 MM

## (undated) DEVICE — COVER,TABLE,HEAVY DUTY,50"X90",STRL: Brand: MEDLINE

## (undated) DEVICE — SUTURE NONABSORBABLE MONOFILAMENT 6-0 C-1 1X30 IN PROLENE 8706H

## (undated) DEVICE — CONMED SCOPE SAVER BITE BLOCK, 20X27 MM: Brand: SCOPE SAVER

## (undated) DEVICE — SYRINGE IRRIG 60ML SFT PLIABLE BLB EZ TO GRP 1 HND USE W/

## (undated) DEVICE — AUTOTRANSFUSION BOWL SET 125 CC XTRA

## (undated) DEVICE — SUTURE NONABSORBABLE MONOFILAMENT 6-0 BV-1 1X30 IN PROLENE 8709H

## (undated) DEVICE — FORCEP BX STD CAP 240CM RAD JAW 4

## (undated) DEVICE — FOGARTY OCCLUSION CATHETER 5F 40CM: Brand: FOGARTY

## (undated) DEVICE — ELECTRODE,RADIOTRANSLUCENT,FOAM,3PK: Brand: MEDLINE

## (undated) DEVICE — SUTURE ABSORBABLE MONOFILAMENT 0 CTX 60 IN VIO PDS + PDP990G

## (undated) DEVICE — Device

## (undated) DEVICE — ENDOSCOPIC KIT 2 12 FT OP4 DE2 GWN SYR

## (undated) DEVICE — GLOVE SURG SZ 8 L11.77IN FNGR THK9.8MIL STRW LTX POLYMER

## (undated) DEVICE — SOLUTION IRRIG 1000ML 0.9% SOD CHL USP POUR PLAS BTL

## (undated) DEVICE — PLEDGET SURG W0.375XL0.062IN THK1.5MM WHT SFT PTFE RECT
Type: IMPLANTABLE DEVICE | Status: NON-FUNCTIONAL
Removed: 2025-06-19

## (undated) DEVICE — DRAPE,LAP,CHOLE,W/TROUGHS,STERILE: Brand: MEDLINE

## (undated) DEVICE — ELECTRODE BLDE L4IN NONINSULATED EDGE